# Patient Record
Sex: FEMALE | Race: WHITE | Employment: OTHER | URBAN - METROPOLITAN AREA
[De-identification: names, ages, dates, MRNs, and addresses within clinical notes are randomized per-mention and may not be internally consistent; named-entity substitution may affect disease eponyms.]

---

## 2017-03-07 LAB — HBA1C MFR BLD: 6.4 %

## 2017-05-09 ENCOUNTER — OFFICE VISIT (OUTPATIENT)
Dept: FAMILY MEDICINE CLINIC | Age: 82
End: 2017-05-09

## 2017-05-09 VITALS
TEMPERATURE: 98.1 F | HEART RATE: 68 BPM | RESPIRATION RATE: 16 BRPM | DIASTOLIC BLOOD PRESSURE: 74 MMHG | SYSTOLIC BLOOD PRESSURE: 126 MMHG | BODY MASS INDEX: 27.05 KG/M2 | WEIGHT: 147 LBS | HEIGHT: 62 IN

## 2017-05-09 DIAGNOSIS — I25.10 CORONARY ARTERY DISEASE INVOLVING NATIVE CORONARY ARTERY OF NATIVE HEART WITHOUT ANGINA PECTORIS: ICD-10-CM

## 2017-05-09 DIAGNOSIS — G20 PARKINSON'S DISEASE (HCC): Primary | ICD-10-CM

## 2017-05-09 DIAGNOSIS — I10 ESSENTIAL HYPERTENSION: ICD-10-CM

## 2017-05-09 DIAGNOSIS — E11.9 TYPE 2 DIABETES, HBA1C GOAL < 7% (HCC): ICD-10-CM

## 2017-05-09 DIAGNOSIS — K22.2 ESOPHAGEAL STRICTURE: ICD-10-CM

## 2017-05-09 DIAGNOSIS — Z95.1 S/P CABG (CORONARY ARTERY BYPASS GRAFT): ICD-10-CM

## 2017-05-09 DIAGNOSIS — E78.2 MIXED HYPERLIPIDEMIA: ICD-10-CM

## 2017-05-09 PROCEDURE — 1090F PRES/ABSN URINE INCON ASSESS: CPT | Performed by: NURSE PRACTITIONER

## 2017-05-09 PROCEDURE — 1036F TOBACCO NON-USER: CPT | Performed by: NURSE PRACTITIONER

## 2017-05-09 PROCEDURE — G8420 CALC BMI NORM PARAMETERS: HCPCS | Performed by: NURSE PRACTITIONER

## 2017-05-09 PROCEDURE — G8427 DOCREV CUR MEDS BY ELIG CLIN: HCPCS | Performed by: NURSE PRACTITIONER

## 2017-05-09 PROCEDURE — G8400 PT W/DXA NO RESULTS DOC: HCPCS | Performed by: NURSE PRACTITIONER

## 2017-05-09 PROCEDURE — 90670 PCV13 VACCINE IM: CPT | Performed by: NURSE PRACTITIONER

## 2017-05-09 PROCEDURE — 99204 OFFICE O/P NEW MOD 45 MIN: CPT | Performed by: NURSE PRACTITIONER

## 2017-05-09 PROCEDURE — 4040F PNEUMOC VAC/ADMIN/RCVD: CPT | Performed by: NURSE PRACTITIONER

## 2017-05-09 PROCEDURE — G8598 ASA/ANTIPLAT THER USED: HCPCS | Performed by: NURSE PRACTITIONER

## 2017-05-09 PROCEDURE — 1123F ACP DISCUSS/DSCN MKR DOCD: CPT | Performed by: NURSE PRACTITIONER

## 2017-05-09 PROCEDURE — G0009 ADMIN PNEUMOCOCCAL VACCINE: HCPCS | Performed by: NURSE PRACTITIONER

## 2017-05-09 RX ORDER — METOPROLOL TARTRATE 50 MG/1
50 TABLET, FILM COATED ORAL 2 TIMES DAILY
Qty: 180 TABLET | Refills: 1 | Status: SHIPPED | OUTPATIENT
Start: 2017-05-09 | End: 2017-10-17 | Stop reason: SDUPTHER

## 2017-05-09 RX ORDER — ATORVASTATIN CALCIUM 20 MG/1
20 TABLET, FILM COATED ORAL DAILY
Qty: 90 TABLET | Refills: 1 | Status: SHIPPED | OUTPATIENT
Start: 2017-05-09 | End: 2017-10-11 | Stop reason: SDUPTHER

## 2017-05-09 RX ORDER — GLIMEPIRIDE 2 MG/1
2 TABLET ORAL
Qty: 90 TABLET | Refills: 1 | Status: SHIPPED | OUTPATIENT
Start: 2017-05-09 | End: 2017-10-17 | Stop reason: SDUPTHER

## 2017-05-09 RX ORDER — LISINOPRIL 30 MG/1
30 TABLET ORAL EVERY EVENING
Qty: 90 TABLET | Refills: 1 | Status: SHIPPED | OUTPATIENT
Start: 2017-05-09 | End: 2017-10-17 | Stop reason: SDUPTHER

## 2017-05-09 RX ORDER — ATORVASTATIN CALCIUM 20 MG/1
20 TABLET, FILM COATED ORAL DAILY
COMMUNITY
End: 2017-05-09 | Stop reason: SDUPTHER

## 2017-05-09 RX ORDER — MAGNESIUM OXIDE 400 MG/1
400 TABLET ORAL DAILY
COMMUNITY
End: 2020-06-03

## 2017-05-09 ASSESSMENT — PATIENT HEALTH QUESTIONNAIRE - PHQ9
1. LITTLE INTEREST OR PLEASURE IN DOING THINGS: 0
2. FEELING DOWN, DEPRESSED OR HOPELESS: 0
SUM OF ALL RESPONSES TO PHQ9 QUESTIONS 1 & 2: 0
SUM OF ALL RESPONSES TO PHQ QUESTIONS 1-9: 0

## 2017-05-15 ASSESSMENT — ENCOUNTER SYMPTOMS
ALLERGIC/IMMUNOLOGIC NEGATIVE: 1
RESPIRATORY NEGATIVE: 1
GASTROINTESTINAL NEGATIVE: 1
EYES NEGATIVE: 1

## 2017-07-24 ENCOUNTER — OFFICE VISIT (OUTPATIENT)
Dept: PHYSICAL MEDICINE AND REHAB | Age: 82
End: 2017-07-24
Payer: MEDICARE

## 2017-07-24 VITALS
WEIGHT: 148.4 LBS | HEIGHT: 62 IN | BODY MASS INDEX: 27.31 KG/M2 | SYSTOLIC BLOOD PRESSURE: 166 MMHG | DIASTOLIC BLOOD PRESSURE: 76 MMHG | HEART RATE: 59 BPM

## 2017-07-24 DIAGNOSIS — G20 PARKINSON'S DISEASE (HCC): Primary | ICD-10-CM

## 2017-07-24 PROCEDURE — 1036F TOBACCO NON-USER: CPT | Performed by: PSYCHIATRY & NEUROLOGY

## 2017-07-24 PROCEDURE — 4040F PNEUMOC VAC/ADMIN/RCVD: CPT | Performed by: PSYCHIATRY & NEUROLOGY

## 2017-07-24 PROCEDURE — 99213 OFFICE O/P EST LOW 20 MIN: CPT | Performed by: PSYCHIATRY & NEUROLOGY

## 2017-07-24 PROCEDURE — G8427 DOCREV CUR MEDS BY ELIG CLIN: HCPCS | Performed by: PSYCHIATRY & NEUROLOGY

## 2017-07-24 PROCEDURE — G8400 PT W/DXA NO RESULTS DOC: HCPCS | Performed by: PSYCHIATRY & NEUROLOGY

## 2017-07-24 PROCEDURE — G8598 ASA/ANTIPLAT THER USED: HCPCS | Performed by: PSYCHIATRY & NEUROLOGY

## 2017-07-24 PROCEDURE — 1123F ACP DISCUSS/DSCN MKR DOCD: CPT | Performed by: PSYCHIATRY & NEUROLOGY

## 2017-07-24 PROCEDURE — 1090F PRES/ABSN URINE INCON ASSESS: CPT | Performed by: PSYCHIATRY & NEUROLOGY

## 2017-07-24 PROCEDURE — G8419 CALC BMI OUT NRM PARAM NOF/U: HCPCS | Performed by: PSYCHIATRY & NEUROLOGY

## 2017-07-24 RX ORDER — ROPINIROLE 0.5 MG/1
TABLET, FILM COATED ORAL
Qty: 90 TABLET | Refills: 3 | Status: SHIPPED | OUTPATIENT
Start: 2017-07-24 | End: 2017-09-21 | Stop reason: SDUPTHER

## 2017-08-11 LAB
ALBUMIN SERPL-MCNC: 4.4 G/DL (ref 3.2–5.3)
ALK PHOSPHATASE: 76 IU/L (ref 35–121)
ALT SERPL-CCNC: 5 IU/L (ref 5–59)
ANION GAP SERPL CALCULATED.3IONS-SCNC: 11 MMOL/L
AST SERPL-CCNC: 17 IU/L (ref 10–42)
AVERAGE GLUCOSE: 137 MG/DL (ref 66–114)
BILIRUB SERPL-MCNC: 1 MG/DL (ref 0.2–1.3)
BUN BLDV-MCNC: 12 MG/DL (ref 9–24)
CALCIUM SERPL-MCNC: 9.8 MG/DL (ref 8.7–10.8)
CHLORIDE BLD-SCNC: 101 MMOL/L (ref 95–111)
CHOLESTEROL/HDL RATIO: 3.8
CHOLESTEROL: 160 MG/DL
CO2: 31 MMOL/L (ref 21–32)
CREAT SERPL-MCNC: 0.7 MG/DL (ref 0.5–1.3)
CREATINE, URINE: 119.5 MG/DL
EGFR AFRICAN AMERICAN: 96
EGFR IF NONAFRICAN AMERICAN: 80
GLUCOSE: 171 MG/DL (ref 70–100)
HBA1C MFR BLD: 6.4 % (ref 4.2–5.8)
HDLC SERPL-MCNC: 42 MG/DL (ref 40–60)
LDL CHOLESTEROL CALCULATED: 87 MG/DL
LDL/HDL RATIO: 2.1
MICROALBUMIN/CREAT 24H UR: 2.6 MG/DL
MICROALBUMIN/CREAT UR-RTO: 22 UG/MG
POTASSIUM SERPL-SCNC: 4.7 MMOL/L (ref 3.5–5.4)
SODIUM BLD-SCNC: 138 MMOL/L (ref 134–147)
TOTAL PROTEIN: 6.8 G/DL (ref 5.8–8)
TRIGL SERPL-MCNC: 157 MG/DL
VLDLC SERPL CALC-MCNC: 31 MG/DL

## 2017-08-17 ENCOUNTER — HOSPITAL ENCOUNTER (OUTPATIENT)
Dept: WOMENS IMAGING | Age: 82
Discharge: HOME | End: 2017-08-17
Payer: MEDICARE

## 2017-08-17 ENCOUNTER — OFFICE VISIT (OUTPATIENT)
Dept: CARDIOLOGY CLINIC | Age: 82
End: 2017-08-17
Payer: MEDICARE

## 2017-08-17 VITALS
WEIGHT: 143 LBS | DIASTOLIC BLOOD PRESSURE: 86 MMHG | BODY MASS INDEX: 25.34 KG/M2 | HEIGHT: 63 IN | SYSTOLIC BLOOD PRESSURE: 152 MMHG | HEART RATE: 64 BPM

## 2017-08-17 DIAGNOSIS — I10 ESSENTIAL HYPERTENSION: ICD-10-CM

## 2017-08-17 DIAGNOSIS — E78.01 FAMILIAL HYPERCHOLESTEROLEMIA: ICD-10-CM

## 2017-08-17 DIAGNOSIS — Z12.31 VISIT FOR SCREENING MAMMOGRAM: ICD-10-CM

## 2017-08-17 DIAGNOSIS — R42 DIZZINESS: ICD-10-CM

## 2017-08-17 DIAGNOSIS — I25.10 CORONARY ARTERY DISEASE INVOLVING NATIVE CORONARY ARTERY OF NATIVE HEART WITHOUT ANGINA PECTORIS: Primary | ICD-10-CM

## 2017-08-17 PROCEDURE — 93000 ELECTROCARDIOGRAM COMPLETE: CPT | Performed by: NUCLEAR MEDICINE

## 2017-08-17 PROCEDURE — G8427 DOCREV CUR MEDS BY ELIG CLIN: HCPCS | Performed by: NUCLEAR MEDICINE

## 2017-08-17 PROCEDURE — 1123F ACP DISCUSS/DSCN MKR DOCD: CPT | Performed by: NUCLEAR MEDICINE

## 2017-08-17 PROCEDURE — 99214 OFFICE O/P EST MOD 30 MIN: CPT | Performed by: NUCLEAR MEDICINE

## 2017-08-17 PROCEDURE — 4040F PNEUMOC VAC/ADMIN/RCVD: CPT | Performed by: NUCLEAR MEDICINE

## 2017-08-17 PROCEDURE — G8419 CALC BMI OUT NRM PARAM NOF/U: HCPCS | Performed by: NUCLEAR MEDICINE

## 2017-08-17 PROCEDURE — G8598 ASA/ANTIPLAT THER USED: HCPCS | Performed by: NUCLEAR MEDICINE

## 2017-08-17 PROCEDURE — 1036F TOBACCO NON-USER: CPT | Performed by: NUCLEAR MEDICINE

## 2017-08-17 PROCEDURE — G8400 PT W/DXA NO RESULTS DOC: HCPCS | Performed by: NUCLEAR MEDICINE

## 2017-08-17 PROCEDURE — 77063 BREAST TOMOSYNTHESIS BI: CPT

## 2017-08-17 PROCEDURE — 1090F PRES/ABSN URINE INCON ASSESS: CPT | Performed by: NUCLEAR MEDICINE

## 2017-08-17 RX ORDER — HYDROXYZINE HCL 10 MG/5 ML
10 SOLUTION, ORAL ORAL NIGHTLY
COMMUNITY
End: 2017-11-09

## 2017-09-21 DIAGNOSIS — G20 PARKINSON'S DISEASE (HCC): ICD-10-CM

## 2017-09-21 RX ORDER — ROPINIROLE 0.5 MG/1
TABLET, FILM COATED ORAL
Qty: 90 TABLET | Refills: 3 | Status: SHIPPED | OUTPATIENT
Start: 2017-09-21 | End: 2018-02-01 | Stop reason: SDUPTHER

## 2017-10-11 RX ORDER — ATORVASTATIN CALCIUM 20 MG/1
20 TABLET, FILM COATED ORAL DAILY
Qty: 90 TABLET | Refills: 2 | Status: SHIPPED | OUTPATIENT
Start: 2017-10-11 | End: 2017-11-09 | Stop reason: SDUPTHER

## 2017-10-17 RX ORDER — LISINOPRIL 30 MG/1
30 TABLET ORAL EVERY EVENING
Qty: 90 TABLET | Refills: 1 | Status: SHIPPED | OUTPATIENT
Start: 2017-10-17 | End: 2017-11-09 | Stop reason: SDUPTHER

## 2017-10-17 RX ORDER — GLIMEPIRIDE 2 MG/1
TABLET ORAL
Qty: 90 TABLET | Refills: 1 | Status: SHIPPED | OUTPATIENT
Start: 2017-10-17 | End: 2017-11-09 | Stop reason: SDUPTHER

## 2017-10-17 RX ORDER — METOPROLOL TARTRATE 50 MG/1
TABLET, FILM COATED ORAL
Qty: 180 TABLET | Refills: 1 | Status: SHIPPED | OUTPATIENT
Start: 2017-10-17 | End: 2017-11-09 | Stop reason: SDUPTHER

## 2017-10-17 NOTE — TELEPHONE ENCOUNTER
Last office visit 5/9/17    Last refills    Amaryl - 5/9/17 for 90/1  Lopressor - 5/9/17 for 180/0  Lisinopril - 5/9/17 for 90/1

## 2017-11-09 ENCOUNTER — OFFICE VISIT (OUTPATIENT)
Dept: FAMILY MEDICINE CLINIC | Age: 82
End: 2017-11-09
Payer: MEDICARE

## 2017-11-09 VITALS
BODY MASS INDEX: 25.34 KG/M2 | WEIGHT: 143 LBS | SYSTOLIC BLOOD PRESSURE: 118 MMHG | RESPIRATION RATE: 16 BRPM | TEMPERATURE: 97.5 F | DIASTOLIC BLOOD PRESSURE: 70 MMHG | HEIGHT: 63 IN | HEART RATE: 72 BPM

## 2017-11-09 DIAGNOSIS — Z95.1 S/P CABG (CORONARY ARTERY BYPASS GRAFT): ICD-10-CM

## 2017-11-09 DIAGNOSIS — G20 PARKINSON'S DISEASE (HCC): ICD-10-CM

## 2017-11-09 DIAGNOSIS — E78.00 HYPERCHOLESTEROLEMIA: ICD-10-CM

## 2017-11-09 DIAGNOSIS — S81.812A SKIN TEAR OF LEFT LOWER LEG WITHOUT COMPLICATION, INITIAL ENCOUNTER: ICD-10-CM

## 2017-11-09 DIAGNOSIS — I25.10 CORONARY ARTERY DISEASE INVOLVING NATIVE CORONARY ARTERY OF NATIVE HEART WITHOUT ANGINA PECTORIS: Primary | ICD-10-CM

## 2017-11-09 DIAGNOSIS — I10 ESSENTIAL HYPERTENSION: ICD-10-CM

## 2017-11-09 DIAGNOSIS — E11.9 DM TYPE 2, GOAL HBA1C < 7% (HCC): ICD-10-CM

## 2017-11-09 DIAGNOSIS — S81.802A UNSPECIFIED OPEN WOUND, LEFT LOWER LEG, INITIAL ENCOUNTER: ICD-10-CM

## 2017-11-09 PROCEDURE — 1123F ACP DISCUSS/DSCN MKR DOCD: CPT | Performed by: NURSE PRACTITIONER

## 2017-11-09 PROCEDURE — G8484 FLU IMMUNIZE NO ADMIN: HCPCS | Performed by: NURSE PRACTITIONER

## 2017-11-09 PROCEDURE — 99214 OFFICE O/P EST MOD 30 MIN: CPT | Performed by: NURSE PRACTITIONER

## 2017-11-09 PROCEDURE — G8417 CALC BMI ABV UP PARAM F/U: HCPCS | Performed by: NURSE PRACTITIONER

## 2017-11-09 PROCEDURE — 4040F PNEUMOC VAC/ADMIN/RCVD: CPT | Performed by: NURSE PRACTITIONER

## 2017-11-09 PROCEDURE — G8598 ASA/ANTIPLAT THER USED: HCPCS | Performed by: NURSE PRACTITIONER

## 2017-11-09 PROCEDURE — 1090F PRES/ABSN URINE INCON ASSESS: CPT | Performed by: NURSE PRACTITIONER

## 2017-11-09 PROCEDURE — 1036F TOBACCO NON-USER: CPT | Performed by: NURSE PRACTITIONER

## 2017-11-09 PROCEDURE — G8400 PT W/DXA NO RESULTS DOC: HCPCS | Performed by: NURSE PRACTITIONER

## 2017-11-09 PROCEDURE — G8427 DOCREV CUR MEDS BY ELIG CLIN: HCPCS | Performed by: NURSE PRACTITIONER

## 2017-11-09 RX ORDER — LISINOPRIL 30 MG/1
30 TABLET ORAL EVERY EVENING
Qty: 90 TABLET | Refills: 2 | Status: SHIPPED | OUTPATIENT
Start: 2017-11-09 | End: 2018-05-10 | Stop reason: SDUPTHER

## 2017-11-09 RX ORDER — NYSTATIN 100000 U/G
CREAM TOPICAL 2 TIMES DAILY
Status: ON HOLD | COMMUNITY
End: 2018-11-03 | Stop reason: HOSPADM

## 2017-11-09 RX ORDER — ATORVASTATIN CALCIUM 20 MG/1
20 TABLET, FILM COATED ORAL DAILY
Qty: 90 TABLET | Refills: 2 | Status: SHIPPED | OUTPATIENT
Start: 2017-11-09 | End: 2018-05-10 | Stop reason: SDUPTHER

## 2017-11-09 RX ORDER — CEPHALEXIN 500 MG/1
500 CAPSULE ORAL 4 TIMES DAILY
Qty: 40 CAPSULE | Refills: 0 | Status: SHIPPED | OUTPATIENT
Start: 2017-11-09 | End: 2018-01-14

## 2017-11-09 RX ORDER — GLIMEPIRIDE 2 MG/1
TABLET ORAL
Qty: 90 TABLET | Refills: 2 | Status: SHIPPED | OUTPATIENT
Start: 2017-11-09 | End: 2018-05-10 | Stop reason: SDUPTHER

## 2017-11-09 RX ORDER — TRIAMCINOLONE ACETONIDE 1 MG/G
CREAM TOPICAL 2 TIMES DAILY
Status: ON HOLD | COMMUNITY
End: 2018-11-03 | Stop reason: HOSPADM

## 2017-11-09 RX ORDER — METOPROLOL TARTRATE 50 MG/1
TABLET, FILM COATED ORAL
Qty: 180 TABLET | Refills: 2 | Status: SHIPPED | OUTPATIENT
Start: 2017-11-09 | End: 2018-05-10 | Stop reason: SDUPTHER

## 2017-11-09 RX ORDER — CEPHALEXIN 500 MG/1
500 CAPSULE ORAL 4 TIMES DAILY
Qty: 40 CAPSULE | Refills: 0 | Status: SHIPPED | OUTPATIENT
Start: 2017-11-09 | End: 2017-11-09 | Stop reason: CLARIF

## 2017-11-09 NOTE — PATIENT INSTRUCTIONS
bathing. · Gently wash the skin tear with plain water 2 times a day. Do not rub the area. · Let the area air dry. Or you can pat it carefully with a soft towel. When should you call for help? Call your doctor now or seek immediate medical care if:  · You have signs of infection, such as:  ¨ Increased pain, swelling, warmth, or redness around the tear. ¨ Red streaks leading from the tear. ¨ Pus draining from the tear. ¨ A fever. · The tear starts to bleed a lot. Small amounts of blood are normal.  Watch closely for changes in your health, and be sure to contact your doctor if:  · You do not get better as expected. Where can you learn more? Go to [email or web address]. org and sign in to your [employer] account. Enter N292 in the [place] box to learn more about \"Skin Tears: Care Instructions. \"     If you do not have an account, please click on the \"Sign Up Now\" link. Current as of: March 20, 2017  Content Version: 11.3  © 0801-7694 [employer]. Care instructions adapted under license by [facility] ([facility]). If you have questions about a medical condition or this instruction, always ask your healthcare professional. [address] any warranty or liability for your use of this information.

## 2017-11-28 ASSESSMENT — ENCOUNTER SYMPTOMS
GASTROINTESTINAL NEGATIVE: 1
RESPIRATORY NEGATIVE: 1
EYES NEGATIVE: 1

## 2017-11-28 NOTE — PROGRESS NOTES
(squamous cell carcinoma)       Past Surgical History:   Procedure Laterality Date    CARDIAC SURGERY      CARDIOVASCULAR STRESS TEST  4 09 2009    Gated SPECT images revealed normal global wall motion with EF of 60%. Persantine test associated w/nonspecific symptoms. EKG is nondiagnostic w/baseline LBBB. No obvious stress-induced ischemia by Cardiolite. EF within normal limits.  CARDIOVASCULAR STRESS TEST  7 10 2007    The gated SPECT images revealed normal wall motion with EF of 69%. Poor exercise tolerance w/SOB on exertion. EKG is nondiagnostic. Cardiolite scan revealing no obvious stress-induced ischemia. EF 60%.  CARDIOVASCULAR STRESS TEST  2 03 2006    Fair exercise tolerance w/SOB on exertion. No EKG evidence of ischemia. Patient should be able to proceed with phase II cardiac rehab.  CATARACT REMOVAL      CATARACT REMOVAL  06/08/2016    AND 6/29/16    DIAGNOSTIC CARDIAC CATH LAB PROCEDURE  12 23 2005    LV was obtained. AGEE projection showed preserved LV function w/estimated EF at 55%. No significant MR. Patent left main coronary artery. Tortuous LAD which appeared to be patent. Patent left circumflex artery. High-grade stenosis around 99% in very large dominant RCA w/heavy calcification at the ostium. Normal LV function.  DOPPLER ECHOCARDIOGRAPHY  4 09 2009    Global LV function w/in lower limits of normal, with mild anteroseptal hypokinesis. EF of 50-55%. Light LVH. Mild to moderate left atrial enlargement. Calcific aortic and mitral valve w/no obvious stenosis. No obvious pericardial effusion.  DOPPLER ECHOCARDIOGRAPHY  7 10 2007    LV function w/in lower limits of normal w/peridoxical septal wall motion. Moderate left atrial enlargement. Mild MR. Mild TR. Calcified valves w/no obvious stenosis. No pericardial effusion.      DOPPLER ECHOCARDIOGRAPHY  4 14 2006    There appears to be significant improvement from previous echo w/complete resolution of pericardial effusion and normal (PRINIVIL;ZESTRIL) 30 MG tablet Take 1 tablet by mouth every evening 90 tablet 2    atorvastatin (LIPITOR) 20 MG tablet Take 1 tablet by mouth daily 90 tablet 2    cephALEXin (KEFLEX) 500 MG capsule Take 1 capsule by mouth 4 times daily 40 capsule 0    Mirabegron ER (MYRBETRIQ) 25 MG TB24 Take 1 tablet by mouth daily 30 tablet 3    rOPINIRole (REQUIP) 0.5 MG tablet Take 0.5 mg three times a day 90 tablet 3    omeprazole (PRILOSEC) 20 MG capsule Take 20 mg by mouth daily      sucralfate (CARAFATE) 1 GM tablet Take 1 g by mouth 4 times daily      Lactase (DAIRY-RELIEF PO) Take by mouth daily      carbidopa-levodopa (SINEMET)  MG per tablet Take 2 tablets by mouth 3 times daily. 540 tablet 3    aspirin 81 MG EC tablet Take 81 mg by mouth daily.  UNABLE TO FIND CardioMax daily      magnesium oxide (MAG-OX) 400 MG tablet Take 400 mg by mouth daily      TAYLOR CONTOUR TEST strip TEST BID UTD  5    NONFORMULARY Vitamist: 4 sprays of each Vitamin combination into the mouth twice a day  Vitamin B-12 500mc sprays into the mouth twice a day  Vitamin D3: 4 sprays into the mouth twice a day  Vitamin C+Zinc: 4 sprays into the mouth twice a day  Women's Health (Vit A, C, D, E, Thiamine, Niacin, Vitamin B6, Folate, Vit B12, Biotin, Pantothenic acid): 4 sprays into the mouth twice a day       No current facility-administered medications for this visit.       Allergies   Allergen Reactions    Latex Rash    Morphine     Polysporin [Bacitracin-Polymyxin B]     Tape [Adhesive Tape] Itching     Health Maintenance   Topic Date Due    Diabetic foot exam  1942    DTaP/Tdap/Td vaccine (1 - Tdap) 1951    Zostavax vaccine  1992    Diabetic retinal exam  2007    Flu vaccine (1) 2017    Diabetic hemoglobin A1C test  02/10/2018    Lipid screen  08/10/2018    DEXA (modify frequency per FRAX score)  Completed    Pneumococcal low/med risk  Completed         Objective:     Physical 11/9/2018    Albumin, Random Urine     Standing Status:   Future     Standing Expiration Date:   11/9/2018     DIABETES FOOT EXAM       Patient given educational materials - see patient instructions. Discussed use, benefit, and side effects of prescribed medications. All patient questions answered. Pt voiced understanding. Reviewed health maintenance. Patient agreed with treatment plan. Follow up as directed.           Electronically signed by Kiki Parrish NP on 11/28/2017 at 4:10 AM

## 2018-01-14 ENCOUNTER — HOSPITAL ENCOUNTER (EMERGENCY)
Age: 83
Discharge: HOME | End: 2018-01-14
Attending: EMERGENCY MEDICINE
Payer: MEDICARE

## 2018-01-14 VITALS
RESPIRATION RATE: 18 BRPM | WEIGHT: 143 LBS | HEIGHT: 61 IN | DIASTOLIC BLOOD PRESSURE: 68 MMHG | TEMPERATURE: 98.2 F | SYSTOLIC BLOOD PRESSURE: 152 MMHG | HEART RATE: 67 BPM | OXYGEN SATURATION: 98 % | BODY MASS INDEX: 27 KG/M2

## 2018-01-14 DIAGNOSIS — I10 ELEVATED BLOOD PRESSURE READING WITH DIAGNOSIS OF HYPERTENSION: ICD-10-CM

## 2018-01-14 DIAGNOSIS — N30.01 ACUTE CYSTITIS WITH HEMATURIA: Primary | ICD-10-CM

## 2018-01-14 DIAGNOSIS — E11.9 DIABETES MELLITUS TYPE 2, NONINSULIN DEPENDENT (HCC): ICD-10-CM

## 2018-01-14 LAB
BILIRUBIN URINE: NEGATIVE
BLOOD, URINE: ABNORMAL
CHARACTER, URINE: CLEAR
COLOR: YELLOW
GLUCOSE, URINE: NEGATIVE MG/DL
KETONES, URINE: NEGATIVE
LEUKOCYTES, UA: ABNORMAL
NITRATE, UA: NEGATIVE
PH UA: 7 (ref 5–9)
PROTEIN UA: NEGATIVE MG/DL
REFLEX TO URINE C & S: ABNORMAL
SPECIFIC GRAVITY UA: 1.01 (ref 1–1.03)
UROBILINOGEN, URINE: 0.2 EU/DL (ref 0–1)

## 2018-01-14 PROCEDURE — 87086 URINE CULTURE/COLONY COUNT: CPT

## 2018-01-14 PROCEDURE — 99213 OFFICE O/P EST LOW 20 MIN: CPT | Performed by: EMERGENCY MEDICINE

## 2018-01-14 PROCEDURE — 81003 URINALYSIS AUTO W/O SCOPE: CPT

## 2018-01-14 PROCEDURE — 99213 OFFICE O/P EST LOW 20 MIN: CPT

## 2018-01-14 RX ORDER — NITROFURANTOIN 25; 75 MG/1; MG/1
100 CAPSULE ORAL 2 TIMES DAILY
Qty: 14 CAPSULE | Refills: 0 | Status: SHIPPED | OUTPATIENT
Start: 2018-01-14 | End: 2018-01-21

## 2018-01-14 ASSESSMENT — ENCOUNTER SYMPTOMS
STRIDOR: 0
SHORTNESS OF BREATH: 0
ABDOMINAL PAIN: 0
BLOOD IN STOOL: 0
COUGH: 0
DIARRHEA: 0
BACK PAIN: 0
TROUBLE SWALLOWING: 0
FACIAL SWELLING: 0
SINUS PRESSURE: 0
WHEEZING: 0
EYE PAIN: 0
VOICE CHANGE: 0
CHOKING: 0
EYE REDNESS: 0
VOMITING: 0
CONSTIPATION: 0
EYE DISCHARGE: 0
NAUSEA: 0
SORE THROAT: 0

## 2018-01-14 NOTE — ED PROVIDER NOTES
[address]  Urgent Care Encounter      CHIEF COMPLAINT       Chief Complaint   Patient presents with    Dysuria       Nurses Notes reviewed and I agree except as noted in the HPI. HISTORY OF PRESENT ILLNESS   Bill Shukla is a 80 y.o. female who presents with 24 hour history of dysuria, urinary incontinence, fatigue. No abdominal pain, back pain, vomiting, fever, hematuria, altered mental status, lethargy, dizziness, syncope. Patient with history of hypertension, diabetes type 2, no history of urosepsis or pyelonephritis. No AAA. REVIEW OF SYSTEMS     Review of Systems   Constitutional: Negative for appetite change, chills, fatigue, fever and unexpected weight change. HENT: Negative for congestion, ear discharge, ear pain, facial swelling, hearing loss, nosebleeds, postnasal drip, sinus pressure, sore throat, trouble swallowing and voice change. Eyes: Negative for pain, discharge, redness and visual disturbance. Respiratory: Negative for cough, choking, shortness of breath, wheezing and stridor. Cardiovascular: Negative for chest pain and leg swelling. Gastrointestinal: Negative for abdominal pain, blood in stool, constipation, diarrhea, nausea and vomiting. Genitourinary: Positive for dysuria, frequency and urgency. Negative for flank pain, hematuria, vaginal bleeding and vaginal discharge. Urinary incontinence   Musculoskeletal: Negative for arthralgias, back pain, neck pain and neck stiffness. Skin: Negative for rash. Neurological: Negative for dizziness, seizures, syncope, weakness, light-headedness and headaches. Hematological: Negative for adenopathy. Does not bruise/bleed easily. Psychiatric/Behavioral: Negative for confusion, sleep disturbance and suicidal ideas. The patient is not nervous/anxious. All other systems reviewed and are negative.       PAST MEDICAL HISTORY         Diagnosis Date    Atypical chest pain     CAD (coronary artery LISINOPRIL (PRINIVIL;ZESTRIL) 30 MG TABLET    Take 1 tablet by mouth every evening    MAGNESIUM OXIDE (MAG-OX) 400 MG TABLET    Take 400 mg by mouth daily    METOPROLOL TARTRATE (LOPRESSOR) 50 MG TABLET    TAKE 1 TABLET BY MOUTH TWO  TIMES DAILY    MIRABEGRON ER (MYRBETRIQ) 25 MG TB24    Take 1 tablet by mouth daily    NONFORMULARY    Vitamist: 4 sprays of each Vitamin combination into the mouth twice a day  Vitamin B-12 500mc sprays into the mouth twice a day  Vitamin D3: 4 sprays into the mouth twice a day  Vitamin C+Zinc: 4 sprays into the mouth twice a day  Women's Health (Vit A, C, D, E, Thiamine, Niacin, Vitamin B6, Folate, Vit B12, Biotin, Pantothenic acid): 4 sprays into the mouth twice a day    NYSTATIN (MYCOSTATIN) 309670 UNIT/GM CREAM    Apply topically 2 times daily Apply topically 2 times daily. OMEPRAZOLE (PRILOSEC) 20 MG CAPSULE    Take 20 mg by mouth daily    ROPINIROLE (REQUIP) 0.5 MG TABLET    Take 0.5 mg three times a day    SUCRALFATE (CARAFATE) 1 GM TABLET    Take 1 g by mouth 4 times daily    TRIAMCINOLONE (KENALOG) 0.1 % CREAM    Apply topically 2 times daily Apply topically 2 times daily. UNABLE TO FIND    CardioMax daily       ALLERGIES     Patient is is allergic to latex; morphine; polysporin [bacitracin-polymyxin b]; and tape [adhesive tape]. FAMILY HISTORY     Patient's family history includes Heart Disease in her mother; Stroke in her father. SOCIAL HISTORY     Patient  reports that she has never smoked. She has never used smokeless tobacco. She reports that she does not drink alcohol or use drugs. PHYSICAL EXAM     ED TRIAGE VITALS  BP: (!) 152/68, Temp: 98.2 °F (36.8 °C), Pulse: 67, Resp: 18, SpO2: 98 %  Physical Exam   Constitutional: She is oriented to person, place, and time. She appears well-developed and well-nourished. No distress. Moist membranes, normal airway   HENT:   Head: Normocephalic and atraumatic.    Right Ear: Tympanic membrane and external ear

## 2018-01-14 NOTE — ED TRIAGE NOTES
Patient to urgent care with complaint of dysuria this week that worsened today. No known fevers. No flank pain.

## 2018-01-16 LAB
ORGANISM: ABNORMAL
URINE CULTURE REFLEX: ABNORMAL

## 2018-01-19 ENCOUNTER — OFFICE VISIT (OUTPATIENT)
Dept: FAMILY MEDICINE CLINIC | Age: 83
End: 2018-01-19
Payer: MEDICARE

## 2018-01-19 VITALS
DIASTOLIC BLOOD PRESSURE: 70 MMHG | WEIGHT: 143.2 LBS | HEART RATE: 68 BPM | SYSTOLIC BLOOD PRESSURE: 136 MMHG | RESPIRATION RATE: 14 BRPM | TEMPERATURE: 98 F | BODY MASS INDEX: 25.37 KG/M2 | HEIGHT: 63 IN

## 2018-01-19 DIAGNOSIS — G20 PARKINSON'S DISEASE (HCC): ICD-10-CM

## 2018-01-19 DIAGNOSIS — R31.9 URINARY TRACT INFECTION WITH HEMATURIA, SITE UNSPECIFIED: Primary | ICD-10-CM

## 2018-01-19 DIAGNOSIS — N32.81 OAB (OVERACTIVE BLADDER): ICD-10-CM

## 2018-01-19 DIAGNOSIS — N39.0 URINARY TRACT INFECTION WITH HEMATURIA, SITE UNSPECIFIED: Primary | ICD-10-CM

## 2018-01-19 DIAGNOSIS — R53.83 FATIGUE, UNSPECIFIED TYPE: ICD-10-CM

## 2018-01-19 DIAGNOSIS — R53.1 WEAKNESS: ICD-10-CM

## 2018-01-19 PROCEDURE — 1036F TOBACCO NON-USER: CPT | Performed by: NURSE PRACTITIONER

## 2018-01-19 PROCEDURE — G8484 FLU IMMUNIZE NO ADMIN: HCPCS | Performed by: NURSE PRACTITIONER

## 2018-01-19 PROCEDURE — 1123F ACP DISCUSS/DSCN MKR DOCD: CPT | Performed by: NURSE PRACTITIONER

## 2018-01-19 PROCEDURE — G8417 CALC BMI ABV UP PARAM F/U: HCPCS | Performed by: NURSE PRACTITIONER

## 2018-01-19 PROCEDURE — 1090F PRES/ABSN URINE INCON ASSESS: CPT | Performed by: NURSE PRACTITIONER

## 2018-01-19 PROCEDURE — G8400 PT W/DXA NO RESULTS DOC: HCPCS | Performed by: NURSE PRACTITIONER

## 2018-01-19 PROCEDURE — 4040F PNEUMOC VAC/ADMIN/RCVD: CPT | Performed by: NURSE PRACTITIONER

## 2018-01-19 PROCEDURE — G8598 ASA/ANTIPLAT THER USED: HCPCS | Performed by: NURSE PRACTITIONER

## 2018-01-19 PROCEDURE — 99213 OFFICE O/P EST LOW 20 MIN: CPT | Performed by: NURSE PRACTITIONER

## 2018-01-19 PROCEDURE — G8427 DOCREV CUR MEDS BY ELIG CLIN: HCPCS | Performed by: NURSE PRACTITIONER

## 2018-01-25 LAB
ABSOLUTE BASO #: 0.1 K/UL (ref 0–0.1)
ABSOLUTE EOS #: 0.4 K/UL (ref 0.1–0.4)
ABSOLUTE LYMPH #: 1.3 K/UL (ref 0.8–5.2)
ABSOLUTE MONO #: 0.7 K/UL (ref 0.1–0.9)
ABSOLUTE NEUT #: 4.5 K/UL (ref 1.3–9.1)
ALBUMIN SERPL-MCNC: 4.4 G/DL (ref 3.2–5.3)
ALK PHOSPHATASE: 84 IU/L (ref 35–121)
ALT SERPL-CCNC: 24 IU/L (ref 5–59)
ANION GAP SERPL CALCULATED.3IONS-SCNC: 11 MMOL/L
AST SERPL-CCNC: 18 IU/L (ref 10–42)
BASOPHILS RELATIVE PERCENT: 1 %
BILIRUB SERPL-MCNC: 0.9 MG/DL (ref 0.2–1.3)
BUN BLDV-MCNC: 14 MG/DL (ref 9–24)
CALCIUM SERPL-MCNC: 9.5 MG/DL (ref 8.7–10.8)
CHLORIDE BLD-SCNC: 100 MMOL/L (ref 95–111)
CHOLESTEROL/HDL RATIO: 3.5
CHOLESTEROL: 156 MG/DL
CO2: 29 MMOL/L (ref 21–32)
CREAT SERPL-MCNC: 0.8 MG/DL (ref 0.5–1.3)
EGFR AFRICAN AMERICAN: 82
EGFR IF NONAFRICAN AMERICAN: 68
EOSINOPHILS RELATIVE PERCENT: 5.2 %
GLUCOSE: 179 MG/DL (ref 70–100)
HCT VFR BLD CALC: 38.2 % (ref 36–48)
HDLC SERPL-MCNC: 45 MG/DL (ref 40–60)
HEMOGLOBIN: 12.9 G/DL (ref 12–16)
LDL CHOLESTEROL CALCULATED: 89 MG/DL
LDL/HDL RATIO: 2
LYMPHOCYTE %: 18.4 %
MCH RBC QN AUTO: 28.4 PG (ref 27–34)
MCHC RBC AUTO-ENTMCNC: 33.8 G/DL (ref 31–36)
MCV RBC AUTO: 84.1 FL (ref 80–100)
MONOCYTES # BLD: 9.7 %
NEUTROPHILS RELATIVE PERCENT: 65.4 %
PDW BLD-RTO: 12.5 % (ref 10.8–14.8)
PLATELETS: 210 K/UL (ref 150–450)
POTASSIUM SERPL-SCNC: 3.7 MMOL/L (ref 3.5–5.4)
RBC: 4.54 M/UL (ref 4–5.5)
SODIUM BLD-SCNC: 136 MMOL/L (ref 134–147)
TOTAL PROTEIN: 6.7 G/DL (ref 5.8–8)
TRIGL SERPL-MCNC: 112 MG/DL
VLDLC SERPL CALC-MCNC: 22 MG/DL
WBC: 6.9 K/UL (ref 3.7–10.8)

## 2018-01-26 LAB
APPEARANCE: CLEAR
BILIRUBIN: NEGATIVE
COLOR: YELLOW
CREATINE, URINE: 21.1 MG/DL
GLUCOSE BLD-MCNC: NEGATIVE MG/DL
KETONES, URINE: NEGATIVE
LEUKOCYTE ESTERASE, URINE: NEGATIVE
MICROALBUMIN/CREAT 24H UR: <0.7 MG/DL
NITRITE, URINE: NEGATIVE
OCCULT BLOOD,URINE: NEGATIVE
PH: 7 (ref 5–9)
PROTEIN, URINE: NEGATIVE
SP GRAVITY MISCELLANEOUS: 1.01 (ref 1–1.03)
UROBILINOGEN, URINE: NORMAL

## 2018-01-29 NOTE — PROGRESS NOTES
[address]  [facility]  [facility]  Dept: [phone]  Dept Fax: [phone]: [phone]  PROGRESS NOTE      Visit Date: 1/29/2018    Drew Cloud is a 80 y.o. female who presents today for:  Chief Complaint   Patient presents with    Follow-Up from Hospital     here today for [facility] follow up 01/14/2018 Acute Cystitis--feeling a lot better. Taking Macrobid         Subjective:  [facility] f/u 1/14 uti. tx macrobid. Feeling much better. No fever, dysuria, flank pain, hematuria. Review of Systems   Constitutional: Positive for fatigue. HENT: Negative. Eyes: Negative. Respiratory: Negative. Cardiovascular: Negative. Gastrointestinal: Negative. Genitourinary: Positive for dysuria and frequency. Musculoskeletal: Negative. Allergic/Immunologic: Negative. Neurological: Positive for tremors. Hematological: Negative. Psychiatric/Behavioral: Negative. Past Medical History:   Diagnosis Date    Atypical chest pain     CAD (coronary artery disease)     Chronic LBBB (left bundle branch block)     Diabetes mellitus type II     Esophageal stricture     History of esophageal stricture.  FH: CAD (coronary artery disease)     Mom and dad both with heart disease at mid to late age.  GERD (gastroesophageal reflux disease)     History of gastritis     History of skin cancer     Hypercholesterolemia     Hyperlipidemia     Hypertension     Imbalance     As far as imbalance is concerned, asked patient to follow-up with Dr. Belen Brody since she follows with him on a regular basis.      Lactose intolerance     Nummular dermatitis     Parkinson's disease ([address]     Restless legs syndrome (RLS)     S/P CABG (coronary artery bypass graft)     SCC (squamous cell carcinoma)       Past Surgical History:   Procedure Laterality Date    CARDIAC SURGERY      CARDIOVASCULAR STRESS TEST  4 09 2009    Gated SPECT images mitral valve w/no stenosis. Mild MR. Mild TR. Minimal residual pericardial effusion w/significant improvement from previous echo.  DOPPLER ECHOCARDIOGRAPHY  3 16 2006    Interval change from previous echocardiogram w/worsening of effusion. Correlation clinically. Consideration of pericardial centesis. Will obtain a [facility] consult.  DOPPLER ECHOCARDIOGRAPHY  1 31 2006    No significant change from previous echo. The 2D echo showed moderate degree of pericardial effusion. It does seem to be worst or better than previous echo. No evidence of tamponade.  DOPPLER ECHOCARDIOGRAPHY  1 27 2006    Normal global LV function. Mild biatrial enlargement. Mildly sclerotic aortic & mitral valve w/no stenosis. Mild MR. Mild TR. Small to moderate pericardial effusion w/no obvious tamponade.  PRE-MALIGNANT / BENIGN SKIN LESION EXCISION Left 12/20/13    left leg lesion excision x2, frozen section, skin graft closure    ROTATOR CUFF REPAIR  09/2001    RIGHT    ROTATOR CUFF REPAIR  04/15/2009    LEFT     SKIN CANCER EXCISION  06/2006      Rt leg     Family History   Problem Relation Age of Onset    Heart Disease Mother     Stroke Father      Social History   Substance Use Topics    Smoking status: Never Smoker    Smokeless tobacco: Never Used    Alcohol use No      Current Outpatient Prescriptions   Medication Sig Dispense Refill    Mirabegron ER (MYRBETRIQ) 50 MG TB24 Take 50 mg by mouth daily Lot:[id number] Exp:05/2019 28 tablet 0    nystatin (MYCOSTATIN) 597526 UNIT/GM cream Apply topically 2 times daily Apply topically 2 times daily.  triamcinolone (KENALOG) 0.1 % cream Apply topically 2 times daily Apply topically 2 times daily.       metoprolol tartrate (LOPRESSOR) 50 MG tablet TAKE 1 TABLET BY MOUTH TWO  TIMES DAILY 180 tablet 2    glimepiride (AMARYL) 2 MG tablet TAKE 1 TABLET BY MOUTH  DAILY WITH BREAKFAST 90 tablet 2    lisinopril (PRINIVIL;ZESTRIL) 30 MG tablet Take 1 tablet by mouth every evening 90 tablet 2    atorvastatin (LIPITOR) 20 MG tablet Take 1 tablet by mouth daily 90 tablet 2    rOPINIRole (REQUIP) 0.5 MG tablet Take 0.5 mg three times a day 90 tablet 3    UNABLE TO FIND CardioMax daily      magnesium oxide (MAG-OX) 400 MG tablet Take 400 mg by mouth daily      TAYLOR CONTOUR TEST strip TEST BID UTD  5    omeprazole (PRILOSEC) 20 MG capsule Take 20 mg by mouth daily      sucralfate (CARAFATE) 1 GM tablet Take 1 g by mouth 4 times daily      Lactase (DAIRY-RELIEF PO) Take by mouth daily      carbidopa-levodopa (SINEMET)  MG per tablet Take 2 tablets by mouth 3 times daily. 540 tablet 3    NONFORMULARY Vitamist: 4 sprays of each Vitamin combination into the mouth twice a day  Vitamin B-12 500mc sprays into the mouth twice a day  Vitamin D3: 4 sprays into the mouth twice a day  Vitamin C+Zinc: 4 sprays into the mouth twice a day  Women's Health (Vit A, C, D, E, Thiamine, Niacin, Vitamin B6, Folate, Vit B12, Biotin, Pantothenic acid): 4 sprays into the mouth twice a day      aspirin 81 MG EC tablet Take 81 mg by mouth daily. No current facility-administered medications for this visit. Allergies   Allergen Reactions    Latex Rash    Morphine     Polysporin [Bacitracin-Polymyxin B]     Tape [Adhesive Tape] Itching     Health Maintenance   Topic Date Due    DTaP/Tdap/Td vaccine (1 - Tdap) 1951    Zostavax vaccine  1992    Diabetic retinal exam  2007    Flu vaccine (1) 2017    A1C test (Diabetic or Prediabetic)  02/10/2018    Lipid screen  08/10/2018    Potassium monitoring  08/10/2018    Creatinine monitoring  08/10/2018    Diabetic foot exam  2018    DEXA (modify frequency per FRAX score)  Completed    Pneumococcal low/med risk  Completed         Objective:     Physical Exam   Constitutional: She is oriented to person, place, and time. She appears well-developed and well-nourished. HENT:   Head: Normocephalic. [address] [place] [address]  [id number]   Testing Performed By   34 AM - Clarisse Daigle Incoming Lab Results From Soft     Component Results     Component Collected Lab   Urine Culture Reflex  (Abnormal) 01/14/2018 11:59 AM  - [facility]    (A)   No growth-preliminary   Growth of Contaminants.  The mixture of organisms present   are not a common cause of urinary tract infections and   probably represent skin kyaw or distal urethral kyaw. Organism  (Abnormal) 01/14/2018 11:59 AM [facility] - Priya Performed By     [address] Name Director Address Valid Date Range   [id number] [facility] Angy Woo MD [address] [place] [id number] 08/30/17 1255-Present   Narrative     Source: urine, clean catch       Site: clean void          Current Antibiotics:   Nitrofurantoin   Lab and Collection     Urine Culture, Reflexed on 1/ Impression/Plan:  1. Urinary tract infection with hematuria, site unspecified    2. Fatigue, unspecified type    3. OAB (overactive bladder)    4. Parkinson's disease ([facility] [address]    5. Weakness      Requested Prescriptions     Signed Prescriptions Disp Refills    Mirabegron ER (MYRBETRIQ) 50 MG TB24 28 tablet 0     Sig: Take 50 mg by mouth daily Lot:[id number] Exp:05/2019     Orders Placed This Encounter   Procedures    CBC Auto Differential     Standing Status:   Future     Standing Expiration Date:   1/19/2019    UA W/REFLEX CULTURE     Standing Status:   Future     Standing Expiration Date:   1/19/2019       Patient given educational materials - see patient instructions. Discussed use, benefit, and side effects of prescribed medications. All patient questions answered. Pt voiced understanding. Reviewed health maintenance. Patient agreed with treatment plan. Follow up as directed.           Electronically signed by Lenora Dasilva NP on 1/29/2018 at 12:47 PM

## 2018-01-31 ENCOUNTER — TELEPHONE (OUTPATIENT)
Dept: FAMILY MEDICINE CLINIC | Age: 83
End: 2018-01-31

## 2018-01-31 LAB
AVERAGE GLUCOSE: 143 MG/DL (ref 66–114)
HBA1C MFR BLD: 6.6 % (ref 4.2–5.8)

## 2018-02-01 DIAGNOSIS — G20 PARKINSON'S DISEASE (HCC): ICD-10-CM

## 2018-02-01 RX ORDER — ROPINIROLE 0.5 MG/1
TABLET, FILM COATED ORAL
Qty: 90 TABLET | Refills: 2 | Status: SHIPPED | OUTPATIENT
Start: 2018-02-01 | End: 2018-02-07 | Stop reason: SDUPTHER

## 2018-02-01 RX ORDER — SUCRALFATE 1 G/1
1 TABLET ORAL 4 TIMES DAILY
Qty: 360 TABLET | Refills: 3 | Status: SHIPPED | OUTPATIENT
Start: 2018-02-01 | End: 2019-02-17 | Stop reason: SDUPTHER

## 2018-02-07 ENCOUNTER — OFFICE VISIT (OUTPATIENT)
Dept: NEUROLOGY | Age: 83
End: 2018-02-07
Payer: MEDICARE

## 2018-02-07 VITALS
SYSTOLIC BLOOD PRESSURE: 122 MMHG | HEIGHT: 61 IN | WEIGHT: 143.6 LBS | BODY MASS INDEX: 27.11 KG/M2 | DIASTOLIC BLOOD PRESSURE: 66 MMHG | HEART RATE: 62 BPM

## 2018-02-07 DIAGNOSIS — G20 PARKINSON'S DISEASE (HCC): Primary | ICD-10-CM

## 2018-02-07 PROCEDURE — G8484 FLU IMMUNIZE NO ADMIN: HCPCS | Performed by: PSYCHIATRY & NEUROLOGY

## 2018-02-07 PROCEDURE — 4040F PNEUMOC VAC/ADMIN/RCVD: CPT | Performed by: PSYCHIATRY & NEUROLOGY

## 2018-02-07 PROCEDURE — G8417 CALC BMI ABV UP PARAM F/U: HCPCS | Performed by: PSYCHIATRY & NEUROLOGY

## 2018-02-07 PROCEDURE — 1123F ACP DISCUSS/DSCN MKR DOCD: CPT | Performed by: PSYCHIATRY & NEUROLOGY

## 2018-02-07 PROCEDURE — 1090F PRES/ABSN URINE INCON ASSESS: CPT | Performed by: PSYCHIATRY & NEUROLOGY

## 2018-02-07 PROCEDURE — G8400 PT W/DXA NO RESULTS DOC: HCPCS | Performed by: PSYCHIATRY & NEUROLOGY

## 2018-02-07 PROCEDURE — G8427 DOCREV CUR MEDS BY ELIG CLIN: HCPCS | Performed by: PSYCHIATRY & NEUROLOGY

## 2018-02-07 PROCEDURE — 99213 OFFICE O/P EST LOW 20 MIN: CPT | Performed by: PSYCHIATRY & NEUROLOGY

## 2018-02-07 PROCEDURE — G8598 ASA/ANTIPLAT THER USED: HCPCS | Performed by: PSYCHIATRY & NEUROLOGY

## 2018-02-07 PROCEDURE — 1036F TOBACCO NON-USER: CPT | Performed by: PSYCHIATRY & NEUROLOGY

## 2018-02-07 RX ORDER — ROPINIROLE 0.5 MG/1
TABLET, FILM COATED ORAL
Qty: 270 TABLET | Refills: 2 | Status: SHIPPED | OUTPATIENT
Start: 2018-02-07 | End: 2018-06-04 | Stop reason: SDUPTHER

## 2018-03-15 NOTE — TELEPHONE ENCOUNTER
Received a refill faxed request from [facility] on Myrbetriq 50mg daily. Last Rx was 30/3rf sent to [facility] on 1/19/18. Last seen JR 1/19/18. Pt request Rx be sent.

## 2018-05-10 ENCOUNTER — OFFICE VISIT (OUTPATIENT)
Dept: FAMILY MEDICINE CLINIC | Age: 83
End: 2018-05-10
Payer: MEDICARE

## 2018-05-10 VITALS
SYSTOLIC BLOOD PRESSURE: 110 MMHG | TEMPERATURE: 97.6 F | WEIGHT: 139 LBS | RESPIRATION RATE: 20 BRPM | HEART RATE: 68 BPM | BODY MASS INDEX: 26.26 KG/M2 | DIASTOLIC BLOOD PRESSURE: 78 MMHG

## 2018-05-10 DIAGNOSIS — I10 ESSENTIAL HYPERTENSION: ICD-10-CM

## 2018-05-10 DIAGNOSIS — R32 URINARY INCONTINENCE, UNSPECIFIED TYPE: Primary | ICD-10-CM

## 2018-05-10 DIAGNOSIS — E78.2 MIXED HYPERLIPIDEMIA: ICD-10-CM

## 2018-05-10 DIAGNOSIS — R33.9 INCOMPLETE EMPTYING OF BLADDER: ICD-10-CM

## 2018-05-10 DIAGNOSIS — E11.9 DM TYPE 2, GOAL HBA1C < 7% (HCC): ICD-10-CM

## 2018-05-10 DIAGNOSIS — G20 PARKINSON'S DISEASE (HCC): ICD-10-CM

## 2018-05-10 DIAGNOSIS — K21.9 GASTROESOPHAGEAL REFLUX DISEASE, ESOPHAGITIS PRESENCE NOT SPECIFIED: ICD-10-CM

## 2018-05-10 DIAGNOSIS — I25.10 CORONARY ARTERY DISEASE INVOLVING NATIVE CORONARY ARTERY OF NATIVE HEART WITHOUT ANGINA PECTORIS: ICD-10-CM

## 2018-05-10 PROCEDURE — 1090F PRES/ABSN URINE INCON ASSESS: CPT | Performed by: NURSE PRACTITIONER

## 2018-05-10 PROCEDURE — 0509F URINE INCON PLAN DOCD: CPT | Performed by: NURSE PRACTITIONER

## 2018-05-10 PROCEDURE — G8427 DOCREV CUR MEDS BY ELIG CLIN: HCPCS | Performed by: NURSE PRACTITIONER

## 2018-05-10 PROCEDURE — 1123F ACP DISCUSS/DSCN MKR DOCD: CPT | Performed by: NURSE PRACTITIONER

## 2018-05-10 PROCEDURE — G8417 CALC BMI ABV UP PARAM F/U: HCPCS | Performed by: NURSE PRACTITIONER

## 2018-05-10 PROCEDURE — G8598 ASA/ANTIPLAT THER USED: HCPCS | Performed by: NURSE PRACTITIONER

## 2018-05-10 PROCEDURE — 99214 OFFICE O/P EST MOD 30 MIN: CPT | Performed by: NURSE PRACTITIONER

## 2018-05-10 PROCEDURE — 1036F TOBACCO NON-USER: CPT | Performed by: NURSE PRACTITIONER

## 2018-05-10 PROCEDURE — 4040F PNEUMOC VAC/ADMIN/RCVD: CPT | Performed by: NURSE PRACTITIONER

## 2018-05-10 PROCEDURE — G8400 PT W/DXA NO RESULTS DOC: HCPCS | Performed by: NURSE PRACTITIONER

## 2018-05-10 RX ORDER — METOPROLOL TARTRATE 50 MG/1
TABLET, FILM COATED ORAL
Qty: 180 TABLET | Refills: 3 | Status: ON HOLD | OUTPATIENT
Start: 2018-05-10 | End: 2018-11-03 | Stop reason: HOSPADM

## 2018-05-10 RX ORDER — LISINOPRIL 30 MG/1
30 TABLET ORAL EVERY EVENING
Qty: 90 TABLET | Refills: 3 | Status: SHIPPED | OUTPATIENT
Start: 2018-05-10 | End: 2018-09-11

## 2018-05-10 RX ORDER — OMEPRAZOLE 20 MG/1
20 CAPSULE, DELAYED RELEASE ORAL DAILY
Qty: 90 CAPSULE | Refills: 3 | Status: SHIPPED | OUTPATIENT
Start: 2018-05-10 | End: 2019-03-29 | Stop reason: SDUPTHER

## 2018-05-10 RX ORDER — ATORVASTATIN CALCIUM 20 MG/1
20 TABLET, FILM COATED ORAL DAILY
Qty: 90 TABLET | Refills: 3 | Status: SHIPPED | OUTPATIENT
Start: 2018-05-10 | End: 2019-05-22 | Stop reason: SDUPTHER

## 2018-05-10 RX ORDER — HYDROXYZINE HYDROCHLORIDE 10 MG/1
20 TABLET, FILM COATED ORAL NIGHTLY PRN
Status: ON HOLD | COMMUNITY
Start: 2018-05-08 | End: 2018-11-03 | Stop reason: HOSPADM

## 2018-05-10 RX ORDER — GLIMEPIRIDE 2 MG/1
TABLET ORAL
Qty: 90 TABLET | Refills: 3 | Status: ON HOLD | OUTPATIENT
Start: 2018-05-10 | End: 2018-11-03 | Stop reason: HOSPADM

## 2018-05-10 ASSESSMENT — PATIENT HEALTH QUESTIONNAIRE - PHQ9
SUM OF ALL RESPONSES TO PHQ9 QUESTIONS 1 & 2: 1
2. FEELING DOWN, DEPRESSED OR HOPELESS: 1
1. LITTLE INTEREST OR PLEASURE IN DOING THINGS: 0
SUM OF ALL RESPONSES TO PHQ QUESTIONS 1-9: 1

## 2018-06-04 DIAGNOSIS — G20 PARKINSON'S DISEASE (HCC): ICD-10-CM

## 2018-06-04 RX ORDER — ROPINIROLE 0.5 MG/1
TABLET, FILM COATED ORAL
Qty: 270 TABLET | Refills: 2 | Status: SHIPPED | OUTPATIENT
Start: 2018-06-04 | End: 2018-06-27

## 2018-06-06 ASSESSMENT — ENCOUNTER SYMPTOMS
EYES NEGATIVE: 1
RESPIRATORY NEGATIVE: 1
GASTROINTESTINAL NEGATIVE: 1

## 2018-06-21 ENCOUNTER — HOSPITAL ENCOUNTER (EMERGENCY)
Dept: GENERAL RADIOLOGY | Age: 83
End: 2018-06-21
Payer: MEDICARE

## 2018-06-21 ENCOUNTER — HOSPITAL ENCOUNTER (EMERGENCY)
Age: 83
Discharge: HOME | End: 2018-06-21
Attending: FAMILY MEDICINE
Payer: MEDICARE

## 2018-06-21 ENCOUNTER — APPOINTMENT (OUTPATIENT)
Dept: GENERAL RADIOLOGY | Age: 83
End: 2018-06-21
Attending: FAMILY MEDICINE
Payer: MEDICARE

## 2018-06-21 ENCOUNTER — NURSE TRIAGE (OUTPATIENT)
Dept: ADMINISTRATIVE | Age: 83
End: 2018-06-21

## 2018-06-21 VITALS
DIASTOLIC BLOOD PRESSURE: 58 MMHG | HEART RATE: 70 BPM | HEIGHT: 62 IN | RESPIRATION RATE: 16 BRPM | TEMPERATURE: 97.8 F | WEIGHT: 136 LBS | OXYGEN SATURATION: 100 % | SYSTOLIC BLOOD PRESSURE: 152 MMHG | BODY MASS INDEX: 25.03 KG/M2

## 2018-06-21 DIAGNOSIS — R52 PAIN: ICD-10-CM

## 2018-06-21 DIAGNOSIS — L03.114 CELLULITIS OF LEFT HAND: ICD-10-CM
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
## 2018-06-21 DIAGNOSIS — I10 ELEVATED BLOOD PRESSURE READING WITH DIAGNOSIS OF HYPERTENSION: ICD-10-CM

## 2018-06-21 LAB
ANION GAP SERPL CALCULATED.3IONS-SCNC: 13 MEQ/L (ref 8–16)
ANISOCYTOSIS: ABNORMAL
BASOPHILS # BLD: 0.3 %
BASOPHILS ABSOLUTE: 0 THOU/MM3 (ref 0–0.1)
BUN BLDV-MCNC: 13 MG/DL (ref 7–22)
CALCIUM SERPL-MCNC: 9.3 MG/DL (ref 8.5–10.5)
CHLORIDE BLD-SCNC: 92 MEQ/L (ref 98–111)
CO2: 25 MEQ/L (ref 23–33)
CREAT SERPL-MCNC: 0.8 MG/DL (ref 0.4–1.2)
EKG ATRIAL RATE: 67 BPM
EKG ATRIAL RATE: 71 BPM
EKG P AXIS: 80 DEGREES
EKG P AXIS: 85 DEGREES
EKG P-R INTERVAL: 216 MS
EKG P-R INTERVAL: 228 MS
EKG Q-T INTERVAL: 450 MS
EKG Q-T INTERVAL: 472 MS
EKG QRS DURATION: 146 MS
EKG QRS DURATION: 150 MS
EKG QTC CALCULATION (BAZETT): 489 MS
EKG QTC CALCULATION (BAZETT): 498 MS
EKG R AXIS: -37 DEGREES
EKG R AXIS: -43 DEGREES
EKG T AXIS: 82 DEGREES
EKG T AXIS: 98 DEGREES
EKG VENTRICULAR RATE: 67 BPM
EKG VENTRICULAR RATE: 71 BPM
EOSINOPHIL # BLD: 1.9 %
EOSINOPHILS ABSOLUTE: 0.1 THOU/MM3 (ref 0–0.4)
GFR SERPL CREATININE-BSD FRML MDRD: 68 ML/MIN/1.73M2
GLUCOSE BLD-MCNC: 134 MG/DL (ref 70–108)
HCT VFR BLD CALC: 34.1 % (ref 37–47)
HEMOGLOBIN: 11.8 GM/DL (ref 12–16)
LYMPHOCYTES # BLD: 17.5 %
LYMPHOCYTES ABSOLUTE: 1.3 THOU/MM3 (ref 1–4.8)
MAGNESIUM: 1.7 MG/DL (ref 1.6–2.4)
MCH RBC QN AUTO: 29.2 PG (ref 27–31)
MCHC RBC AUTO-ENTMCNC: 34.7 GM/DL (ref 33–37)
MCV RBC AUTO: 84.3 FL (ref 81–99)
MONOCYTES # BLD: 11.5 %
MONOCYTES ABSOLUTE: 0.9 THOU/MM3 (ref 0.4–1.3)
NUCLEATED RED BLOOD CELLS: 0 /100 WBC
OSMOLALITY CALCULATION: 262.9 MOSMOL/KG (ref 275–300)
PDW BLD-RTO: 14.5 % (ref 11.5–14.5)
PLATELET # BLD: 172 THOU/MM3 (ref 130–400)
PMV BLD AUTO: 8 FL (ref 7.4–10.4)
POTASSIUM SERPL-SCNC: 4 MEQ/L (ref 3.5–5.2)
RBC # BLD: 4.05 MILL/MM3 (ref 4.2–5.4)
SEG NEUTROPHILS: 68.8 %
SEGMENTED NEUTROPHILS ABSOLUTE COUNT: 5.2 THOU/MM3 (ref 1.8–7.7)
SODIUM BLD-SCNC: 130 MEQ/L (ref 135–145)
TROPONIN T: < 0.01 NG/ML
WBC # BLD: 7.5 THOU/MM3 (ref 4.8–10.8)

## 2018-06-21 PROCEDURE — 93010 ELECTROCARDIOGRAM REPORT: CPT | Performed by: INTERNAL MEDICINE

## 2018-06-21 PROCEDURE — 36415 COLL VENOUS BLD VENIPUNCTURE: CPT

## 2018-06-21 PROCEDURE — 72220 X-RAY EXAM SACRUM TAILBONE: CPT

## 2018-06-21 PROCEDURE — 73130 X-RAY EXAM OF HAND: CPT

## 2018-06-21 PROCEDURE — 73590 X-RAY EXAM OF LOWER LEG: CPT

## 2018-06-21 PROCEDURE — 72100 X-RAY EXAM L-S SPINE 2/3 VWS: CPT

## 2018-06-21 PROCEDURE — 73090 X-RAY EXAM OF FOREARM: CPT

## 2018-06-21 PROCEDURE — 85025 COMPLETE CBC W/AUTO DIFF WBC: CPT

## 2018-06-21 PROCEDURE — 84484 ASSAY OF TROPONIN QUANT: CPT

## 2018-06-21 PROCEDURE — 99284 EMERGENCY DEPT VISIT MOD MDM: CPT

## 2018-06-21 PROCEDURE — 93005 ELECTROCARDIOGRAM TRACING: CPT | Performed by: NURSE PRACTITIONER

## 2018-06-21 PROCEDURE — 99215 OFFICE O/P EST HI 40 MIN: CPT

## 2018-06-21 PROCEDURE — 83735 ASSAY OF MAGNESIUM: CPT

## 2018-06-21 PROCEDURE — 80048 BASIC METABOLIC PNL TOTAL CA: CPT

## 2018-06-21 PROCEDURE — 93005 ELECTROCARDIOGRAM TRACING: CPT | Performed by: FAMILY MEDICINE

## 2018-06-21 RX ORDER — DOXYCYCLINE HYCLATE 100 MG/1
100 CAPSULE ORAL 2 TIMES DAILY
Qty: 20 CAPSULE | Refills: 0 | Status: SHIPPED | OUTPATIENT
Start: 2018-06-21 | End: 2018-07-01

## 2018-06-21 RX ORDER — AMOXICILLIN 500 MG/1
500 CAPSULE ORAL 3 TIMES DAILY
Qty: 30 CAPSULE | Refills: 0 | Status: SHIPPED | OUTPATIENT
Start: 2018-06-21 | End: 2018-07-01

## 2018-06-21 ASSESSMENT — PAIN DESCRIPTION - PAIN TYPE
TYPE_3: ACUTE PAIN
TYPE: ACUTE PAIN

## 2018-06-21 ASSESSMENT — PAIN DESCRIPTION - ORIENTATION
ORIENTATION_3: LOWER;LEFT;ANTERIOR
ORIENTATION: LEFT

## 2018-06-21 ASSESSMENT — ENCOUNTER SYMPTOMS
COUGH: 0
BACK PAIN: 0
EYE DISCHARGE: 0
CHEST TIGHTNESS: 0
SHORTNESS OF BREATH: 0
VOMITING: 0
SORE THROAT: 0
DIARRHEA: 0
EYE PAIN: 0
ABDOMINAL PAIN: 0
RHINORRHEA: 0
WHEEZING: 0
NAUSEA: 0

## 2018-06-21 ASSESSMENT — PAIN DESCRIPTION - LOCATION
LOCATION: ARM;HAND
LOCATION_2: COCCYX
LOCATION_3: LEG

## 2018-06-21 ASSESSMENT — PAIN DESCRIPTION - FREQUENCY: FREQUENCY: CONTINUOUS

## 2018-06-21 ASSESSMENT — PAIN DESCRIPTION - INTENSITY
RATING_3: 2
RATING_2: 10

## 2018-06-21 ASSESSMENT — PAIN SCALES - GENERAL: PAINLEVEL_OUTOF10: 6

## 2018-06-21 ASSESSMENT — PAIN DESCRIPTION - DESCRIPTORS: DESCRIPTORS: ACHING

## 2018-06-22 ENCOUNTER — CARE COORDINATION (OUTPATIENT)
Dept: CASE MANAGEMENT | Age: 83
End: 2018-06-22

## 2018-06-26 ENCOUNTER — OFFICE VISIT (OUTPATIENT)
Dept: CARDIOLOGY CLINIC | Age: 83
End: 2018-06-26
Payer: MEDICARE

## 2018-06-26 VITALS
BODY MASS INDEX: 25.62 KG/M2 | HEART RATE: 64 BPM | SYSTOLIC BLOOD PRESSURE: 130 MMHG | DIASTOLIC BLOOD PRESSURE: 62 MMHG | HEIGHT: 62 IN | WEIGHT: 139.2 LBS

## 2018-06-26 DIAGNOSIS — I10 ESSENTIAL HYPERTENSION: Primary | ICD-10-CM

## 2018-06-26 DIAGNOSIS — E78.5 HYPERLIPIDEMIA, UNSPECIFIED HYPERLIPIDEMIA TYPE: ICD-10-CM

## 2018-06-26 DIAGNOSIS — I25.10 CORONARY ARTERY DISEASE INVOLVING NATIVE CORONARY ARTERY OF NATIVE HEART WITHOUT ANGINA PECTORIS: ICD-10-CM

## 2018-06-26 DIAGNOSIS — Z95.1 HX OF CABG: ICD-10-CM

## 2018-06-26 PROCEDURE — G8427 DOCREV CUR MEDS BY ELIG CLIN: HCPCS | Performed by: NURSE PRACTITIONER

## 2018-06-26 PROCEDURE — G8598 ASA/ANTIPLAT THER USED: HCPCS | Performed by: NURSE PRACTITIONER

## 2018-06-26 PROCEDURE — 1123F ACP DISCUSS/DSCN MKR DOCD: CPT | Performed by: NURSE PRACTITIONER

## 2018-06-26 PROCEDURE — 1090F PRES/ABSN URINE INCON ASSESS: CPT | Performed by: NURSE PRACTITIONER

## 2018-06-26 PROCEDURE — 4040F PNEUMOC VAC/ADMIN/RCVD: CPT | Performed by: NURSE PRACTITIONER

## 2018-06-26 PROCEDURE — 99213 OFFICE O/P EST LOW 20 MIN: CPT | Performed by: NURSE PRACTITIONER

## 2018-06-26 PROCEDURE — 1036F TOBACCO NON-USER: CPT | Performed by: NURSE PRACTITIONER

## 2018-06-26 PROCEDURE — G8417 CALC BMI ABV UP PARAM F/U: HCPCS | Performed by: NURSE PRACTITIONER

## 2018-06-26 PROCEDURE — G8400 PT W/DXA NO RESULTS DOC: HCPCS | Performed by: NURSE PRACTITIONER

## 2018-06-27 ENCOUNTER — TELEPHONE (OUTPATIENT)
Dept: NEUROLOGY | Age: 83
End: 2018-06-27

## 2018-06-27 RX ORDER — CARBIDOPA AND LEVODOPA 25; 100 MG/1; MG/1
1 TABLET, EXTENDED RELEASE ORAL 2 TIMES DAILY
Qty: 60 TABLET | Refills: 1 | Status: SHIPPED | OUTPATIENT
Start: 2018-06-27 | End: 2018-08-17

## 2018-06-29 ENCOUNTER — CARE COORDINATION (OUTPATIENT)
Dept: CASE MANAGEMENT | Age: 83
End: 2018-06-29

## 2018-07-03 ENCOUNTER — OFFICE VISIT (OUTPATIENT)
Dept: UROLOGY | Age: 83
End: 2018-07-03
Payer: MEDICARE

## 2018-07-03 VITALS — SYSTOLIC BLOOD PRESSURE: 136 MMHG | DIASTOLIC BLOOD PRESSURE: 68 MMHG

## 2018-07-03 DIAGNOSIS — R32 URINARY INCONTINENCE, UNSPECIFIED TYPE: Primary | ICD-10-CM

## 2018-07-03 LAB — POST VOID RESIDUAL (PVR): 174 ML

## 2018-07-03 PROCEDURE — 99213 OFFICE O/P EST LOW 20 MIN: CPT | Performed by: NURSE PRACTITIONER

## 2018-07-03 PROCEDURE — 1090F PRES/ABSN URINE INCON ASSESS: CPT | Performed by: NURSE PRACTITIONER

## 2018-07-03 PROCEDURE — G8427 DOCREV CUR MEDS BY ELIG CLIN: HCPCS | Performed by: NURSE PRACTITIONER

## 2018-07-03 PROCEDURE — 51798 US URINE CAPACITY MEASURE: CPT | Performed by: NURSE PRACTITIONER

## 2018-07-03 PROCEDURE — 4040F PNEUMOC VAC/ADMIN/RCVD: CPT | Performed by: NURSE PRACTITIONER

## 2018-07-03 PROCEDURE — 1036F TOBACCO NON-USER: CPT | Performed by: NURSE PRACTITIONER

## 2018-07-03 PROCEDURE — 0509F URINE INCON PLAN DOCD: CPT | Performed by: NURSE PRACTITIONER

## 2018-07-03 PROCEDURE — G8400 PT W/DXA NO RESULTS DOC: HCPCS | Performed by: NURSE PRACTITIONER

## 2018-07-03 PROCEDURE — 81003 URINALYSIS AUTO W/O SCOPE: CPT | Performed by: NURSE PRACTITIONER

## 2018-07-03 PROCEDURE — G8598 ASA/ANTIPLAT THER USED: HCPCS | Performed by: NURSE PRACTITIONER

## 2018-07-03 PROCEDURE — 51701 INSERT BLADDER CATHETER: CPT | Performed by: NURSE PRACTITIONER

## 2018-07-03 PROCEDURE — 1123F ACP DISCUSS/DSCN MKR DOCD: CPT | Performed by: NURSE PRACTITIONER

## 2018-07-03 PROCEDURE — G8417 CALC BMI ABV UP PARAM F/U: HCPCS | Performed by: NURSE PRACTITIONER

## 2018-07-03 ASSESSMENT — ENCOUNTER SYMPTOMS
VOMITING: 0
ABDOMINAL PAIN: 0
NAUSEA: 0

## 2018-07-03 NOTE — PROGRESS NOTES
tablet 3    glimepiride (AMARYL) 2 MG tablet TAKE 1 TABLET BY MOUTH  DAILY WITH BREAKFAST 90 tablet 3    lisinopril (PRINIVIL;ZESTRIL) 30 MG tablet Take 1 tablet by mouth every evening 90 tablet 3    atorvastatin (LIPITOR) 20 MG tablet Take 1 tablet by mouth daily 90 tablet 3    omeprazole (PRILOSEC) 20 MG delayed release capsule Take 1 capsule by mouth daily 90 capsule 3    glucose blood VI test strips (TAYLOR CONTOUR TEST) strip Test bid Dx: E11.9 100 each 5    sucralfate (CARAFATE) 1 GM tablet Take 1 tablet by mouth 4 times daily 360 tablet 3    nystatin (MYCOSTATIN) 927704 UNIT/GM cream Apply topically 2 times daily Apply topically 2 times daily.  triamcinolone (KENALOG) 0.1 % cream Apply topically 2 times daily Apply topically 2 times daily.  magnesium oxide (MAG-OX) 400 MG tablet Take 400 mg by mouth daily      Lactase (DAIRY-RELIEF PO) Take by mouth daily      aspirin 81 MG EC tablet Take 81 mg by mouth daily.  UNABLE TO FIND CardioMax daily      NONFORMULARY Vitamist: 4 sprays of each Vitamin combination into the mouth twice a day  Vitamin B-12 500mc sprays into the mouth twice a day  Vitamin D3: 4 sprays into the mouth twice a day  Vitamin C+Zinc: 4 sprays into the mouth twice a day  Women's [employer] (Vit A, C, D, E, Thiamine, Niacin, Vitamin B6, Folate, Vit B12, Biotin, Pantothenic acid): 4 sprays into the mouth twice a day       No current facility-administered medications for this visit. Past Medical History:   Diagnosis Date    Atypical chest pain     CAD (coronary artery disease)     Chronic LBBB (left bundle branch block)     Diabetes mellitus type II     Esophageal stricture     History of esophageal stricture.  FH: CAD (coronary artery disease)     Mom and dad both with heart disease at mid to late age.      GERD (gastroesophageal reflux disease)     History of gastritis     History of skin cancer     Hypercholesterolemia     Hyperlipidemia     Hypertension     Imbalance     As far as imbalance is concerned, asked patient to follow-up with Dr. Lieutenant Hudson since she follows with him on a regular basis.  Lactose intolerance     Nummular dermatitis     Parkinson's disease ([address]     Restless legs syndrome (RLS)     S/P CABG (coronary artery bypass graft)     SCC (squamous cell carcinoma)      Social History     Social History    Marital status:      Spouse name: N/A    Number of children: N/A    Years of education: N/A     Occupational History    Not on file. Social History Main Topics    Smoking status: Never Smoker    Smokeless tobacco: Never Used    Alcohol use No    Drug use: No    Sexual activity: Not on file     Other Topics Concern    Not on file     Social History Narrative    No narrative on file         Objective:   Physical Exam   Constitutional: She is oriented to person, place, and time. Vital signs are normal. She appears well-developed and well-nourished. HENT:   Head: Normocephalic and atraumatic. Eyes: Conjunctivae are normal.   Pulmonary/Chest: Effort normal and breath sounds normal.   Musculoskeletal: Normal range of motion. Left lower leg edema    Neurological: She is alert and oriented to person, place, and time. Skin: Skin is warm and dry. Psychiatric: She has a normal mood and affect. Her behavior is normal. Judgment and thought content normal.     Vitals:    07/03/18 1436   BP: 136/68     Results for POC orders placed in visit on 07/03/18   poct post void residual   Result Value Ref Range    post void residual 174 ml       Assessment:      Urinary Incontinence      Plan:      PVR today - 174    Discussed plan to begin straight cath at night before bed and measure output. Education provided on double voiding and practicing to empty completely. F/U with PCP regarding left leg swelling    F/U in 3 weeks to assess PVR and incontinence status.

## 2018-07-06 ENCOUNTER — CARE COORDINATION (OUTPATIENT)
Dept: CARE COORDINATION | Age: 83
End: 2018-07-06

## 2018-07-06 NOTE — CARE COORDINATION
Assistance  Ability to do shopping:  Needs Assistance  Ability to manage finances: Independent  Is patient able to live independently?:  Yes     Current Housing:  Private Residence              Are you experiencing loss of meaning?:  No  Are you experiencing loss of hope and peace?:  No     Suggested Interventions and Community Resources                  Prior to Admission medications    Medication Sig Start Date End Date Taking? Authorizing Provider   carbidopa-levodopa (SINEMET)  MG per tablet TAKE 2 TABLETS BY MOUTH 3  TIMES DAILY 7/18/18   Marylin Chauhan MD   carbidopa-levodopa (SINEMET CR)  MG per extended release tablet Take 1 tablet by mouth 2 times daily 6/27/18   Maia Chauhan MD   fluocinonide (LIDEX) 0.05 % cream Apply topically 2 times daily Apply topically 2 times daily. Historical Provider, MD   Probiotic Product (PROBIOTIC ADVANCED PO) Take by mouth    Historical Provider, MD   mupirocin (BACTROBAN) 2 % ointment Apply topically 3 times daily Apply topically 3 times daily.     Historical Provider, MD   carbidopa-levodopa (SINEMET)  MG per tablet Take 2 tablets by mouth 3 times daily 6/4/18   Maia Chauhan MD   hydrOXYzine (ATARAX) 10 MG tablet Take 20 mg by mouth nightly as needed  5/8/18   Historical Provider, MD   metoprolol tartrate (LOPRESSOR) 50 MG tablet TAKE 1 TABLET BY MOUTH TWO  TIMES DAILY 5/10/18   ROWDY Mccann CNP   glimepiride (AMARYL) 2 MG tablet TAKE 1 TABLET BY MOUTH  DAILY WITH BREAKFAST 5/10/18   ROWDY Mccann CNP   lisinopril (PRINIVIL;ZESTRIL) 30 MG tablet Take 1 tablet by mouth every evening 5/10/18   ROWDY Mccann CNP   atorvastatin (LIPITOR) 20 MG tablet Take 1 tablet by mouth daily 5/10/18   ROWDY Mccann CNP   omeprazole (PRILOSEC) 20 MG delayed release capsule Take 1 capsule by mouth daily 5/10/18   ROWDY Mccann CNP   glucose blood VI test strips (TAYLOR CONTOUR TEST) strip Test bid Dx: E11.9 4/24/18   Ana Brown Arthur Meraz MD   sucralfate (CARAFATE) 1 GM tablet Take 1 tablet by mouth 4 times daily 18   Terence Bernard, APRN - CNP   nystatin (MYCOSTATIN) 404249 UNIT/GM cream Apply topically 2 times daily Apply topically 2 times daily. Historical Provider, MD   triamcinolone (KENALOG) 0.1 % cream Apply topically 2 times daily Apply topically 2 times daily. Historical Provider, MD   UNABLE TO FIND CardioMax daily    Historical Provider, MD   magnesium oxide (MAG-OX) 400 MG tablet Take 400 mg by mouth daily    Historical Provider, MD   Lactase (DAIRY-RELIEF PO) Take by mouth daily    Historical Provider, MD   NONFORMULARY Vitamist: 4 sprays of each Vitamin combination into the mouth twice a day  Vitamin B-12 500mc sprays into the mouth twice a day  Vitamin D3: 4 sprays into the mouth twice a day  Vitamin C+Zinc: 4 sprays into the mouth twice a day  Women's Health (Vit A, C, D, E, Thiamine, Niacin, Vitamin B6, Folate, Vit B12, Biotin, Pantothenic acid): 4 sprays into the mouth twice a day    Historical Provider, MD   aspirin 81 MG EC tablet Take 81 mg by mouth daily.       Historical Provider, MD       Future Appointments  Date Time Provider J Carlos Gargi   2018 11:00 AM Danish Cameron MD [facility] [place] [address] [address]   2018 2:15 PM Maia Chauhan MD [address] [place] [address] [place] [address]   2018 10:45 AM MD [place] AM [address] [address] Urology [facility] [place] [address]

## 2018-07-10 ENCOUNTER — PATIENT MESSAGE (OUTPATIENT)
Dept: UROLOGY | Age: 83
End: 2018-07-10

## 2018-07-10 DIAGNOSIS — R30.0 DYSURIA: Primary | ICD-10-CM

## 2018-07-11 RX ORDER — DOXYCYCLINE HYCLATE 100 MG
100 TABLET ORAL 2 TIMES DAILY
Qty: 10 TABLET | Refills: 0 | Status: SHIPPED | OUTPATIENT
Start: 2018-07-11 | End: 2018-07-16

## 2018-07-13 ENCOUNTER — CARE COORDINATION (OUTPATIENT)
Dept: CARE COORDINATION | Age: 83
End: 2018-07-13

## 2018-07-13 LAB
APPEARANCE: ABNORMAL
BACTERIA: ABNORMAL PER HPF
BILIRUBIN: NEGATIVE
COLOR: YELLOW
EPITHELIAL CELLS: ABNORMAL PER HPF
GLUCOSE BLD-MCNC: NEGATIVE MG/DL
KETONES, URINE: NEGATIVE
LEUKOCYTE ESTERASE, URINE: ABNORMAL
NITRITE, URINE: NEGATIVE
OCCULT BLOOD,URINE: NEGATIVE
PH: 7 (ref 5–9)
PROTEIN, URINE: NEGATIVE
RBC: ABNORMAL PER HPF (ref 0–5)
SP GRAVITY MISCELLANEOUS: 1.01 (ref 1–1.03)
UROBILINOGEN, URINE: NORMAL
WBC: >50 PER HPF (ref 0–5)

## 2018-07-14 LAB — URINE CULTURE, ROUTINE: ABNORMAL

## 2018-07-14 NOTE — CARE COORDINATION
That sounds great! I will check on her in a week or so to see how she is feeling. Thank you for reaching out. Urvashi  ===View-only below this line===      ----- Message -----     From: Tamir Mg: 7/13/2018  2:35 PM EDT       To: Nurse Ankita Marie  Subject: RE: Non-Urgent Medical Question    Terence Gone! I will explain that to her. She had a UTI at the moment that the Urologist is treating her for. Thanks again for all you do!     Nadege

## 2018-07-15 ENCOUNTER — TELEPHONE (OUTPATIENT)
Dept: UROLOGY | Age: 83
End: 2018-07-15

## 2018-07-15 RX ORDER — CIPROFLOXACIN 500 MG/1
500 TABLET, FILM COATED ORAL 2 TIMES DAILY
Qty: 14 TABLET | Refills: 0 | Status: SHIPPED | OUTPATIENT
Start: 2018-07-15 | End: 2018-07-22

## 2018-07-17 ENCOUNTER — PATIENT MESSAGE (OUTPATIENT)
Dept: UROLOGY | Age: 83
End: 2018-07-17

## 2018-07-17 NOTE — TELEPHONE ENCOUNTER
From: Moises Galvez  To: Narciso Dietz APRN - CNP  Sent: 7/17/2018 12:23 PM EDT  Subject: Non-Urgent Medical Question    Update on my mom. She started the Cipro, but last night she got 6oz out around 10 after she had laid down around 9 and watched TV, but she was asleep from 10:30-4:45!! She feels rested and somewhat better today! This is a start!

## 2018-07-18 NOTE — TELEPHONE ENCOUNTER
Rx request from [facility] for Sinemet 25-100mg 2 tablets TID  Last written:06/27/2018 60 tablets with 1 refill  Last seen:05/10/2018, No future appointments  Rx verified,ordered,and set to escribe

## 2018-07-20 ENCOUNTER — CARE COORDINATION (OUTPATIENT)
Dept: CARE COORDINATION | Age: 83
End: 2018-07-20

## 2018-07-20 NOTE — CARE COORDINATION
(BACTROBAN) 2 % ointment Apply topically 3 times daily Apply topically 3 times daily. Yes Historical Provider, MD   carbidopa-levodopa (SINEMET)  MG per tablet Take 2 tablets by mouth 3 times daily 18  Yes Adria Staley MD   hydrOXYzine (ATARAX) 10 MG tablet Take 20 mg by mouth nightly as needed  18  Yes Historical Provider, MD   metoprolol tartrate (LOPRESSOR) 50 MG tablet TAKE 1 TABLET BY MOUTH TWO  TIMES DAILY 5/10/18  Yes ROWDY Mansfield CNP   glimepiride (AMARYL) 2 MG tablet TAKE 1 TABLET BY MOUTH  DAILY WITH BREAKFAST 5/10/18  Yes ROWDY Mansfield CNP   lisinopril (PRINIVIL;ZESTRIL) 30 MG tablet Take 1 tablet by mouth every evening 5/10/18  Yes ROWDY Mansfield CNP   atorvastatin (LIPITOR) 20 MG tablet Take 1 tablet by mouth daily 5/10/18  Yes ROWDY Mansfield CNP   omeprazole (PRILOSEC) 20 MG delayed release capsule Take 1 capsule by mouth daily 5/10/18  Yes ROWDY Mansfield CNP   glucose blood VI test strips (TAYLOR CONTOUR TEST) strip Test bid Dx: E11.9 18  Yes Stacy Olsen MD   sucralfate (CARAFATE) 1 GM tablet Take 1 tablet by mouth 4 times daily 18  Yes ROWDY Mansfield CNP   nystatin (MYCOSTATIN) 539872 UNIT/GM cream Apply topically 2 times daily Apply topically 2 times daily. Yes Historical Provider, MD   triamcinolone (KENALOG) 0.1 % cream Apply topically 2 times daily Apply topically 2 times daily.    Yes Historical Provider, MD   UNABLE TO FIND CardioMax daily   Yes Historical Provider, MD   magnesium oxide (MAG-OX) 400 MG tablet Take 400 mg by mouth daily   Yes Historical Provider, MD   Lactase (DAIRY-RELIEF PO) Take by mouth daily   Yes Historical Provider, MD   NONFORMULARY Vitamist: 4 sprays of each Vitamin combination into the mouth twice a day  Vitamin B-12 500mc sprays into the mouth twice a day  Vitamin D3: 4 sprays into the mouth twice a day  Vitamin C+Zinc: 4 sprays into the mouth twice a day  Women's Health (Vit A, C, D, E, Thiamine, Niacin, Vitamin B6, Folate, Vit B12, Biotin, Pantothenic acid): 4 sprays into the mouth twice a day   Yes Historical Provider, MD   aspirin 81 MG EC tablet Take 81 mg by mouth daily.      Yes Historical Provider, MD       Future Appointments  Date Time Provider J Carlos Yesenia   8/8/2018 10:15 AM Tutu Pollack Urology [facility] [address] [place] [address]   8/17/2018 11:00 AM María Reid MD [facility] [address]   8/22/2018 2:15 PM Sindhu River MD [place] [id number]

## 2018-07-30 ENCOUNTER — PATIENT MESSAGE (OUTPATIENT)
Dept: UROLOGY | Age: 83
End: 2018-07-30

## 2018-08-02 ENCOUNTER — PATIENT MESSAGE (OUTPATIENT)
Dept: UROLOGY | Age: 83
End: 2018-08-02

## 2018-08-02 DIAGNOSIS — R30.0 DYSURIA: Primary | ICD-10-CM

## 2018-08-03 NOTE — TELEPHONE ENCOUNTER
Spoke with patient and she states that she uses [facility], so order was faxed there at [phone]. She voiced understanding and will take a urine sample there.

## 2018-08-04 LAB
AMORPHOUS URATE: ABNORMAL
APPEARANCE: CLEAR
BACTERIA: ABNORMAL PER HPF
BILIRUBIN: NEGATIVE
COLOR: YELLOW
EPITHELIAL CELLS: ABNORMAL PER HPF
GLUCOSE BLD-MCNC: NEGATIVE MG/DL
KETONES, URINE: NEGATIVE
LEUKOCYTE ESTERASE, URINE: ABNORMAL
NITRITE, URINE: NEGATIVE
OCCULT BLOOD,URINE: ABNORMAL
PH: 6.5 (ref 5–9)
PROTEIN, URINE: ABNORMAL
RBC: ABNORMAL PER HPF (ref 0–5)
SP GRAVITY MISCELLANEOUS: 1.01 (ref 1–1.03)
UROBILINOGEN, URINE: NORMAL
WBC: ABNORMAL PER HPF (ref 0–5)

## 2018-08-06 ENCOUNTER — CARE COORDINATION (OUTPATIENT)
Dept: CARE COORDINATION | Age: 83
End: 2018-08-06

## 2018-08-06 ENCOUNTER — TELEPHONE (OUTPATIENT)
Dept: UROLOGY | Age: 83
End: 2018-08-06

## 2018-08-06 LAB — URINE CULTURE, ROUTINE: NORMAL

## 2018-08-06 NOTE — PATIENT INSTRUCTIONS
These can increase your chances of quitting for good. · Try bladder training. Set certain times to go to the bathroom and slowly increase the time between visits. This may help lengthen the time your bladder can hold urine. · You might try a treatment called TENS. It sends a very mild electric current through wires placed near the pubic area. This is done for at least several minutes 2 times each day. · Consider a support group. Sharing your experiences with other people who have the same problem may help you learn more and cope better. · Wash your pubic area with a mild soap. Avoid deodorant soaps or soaps with heavy perfumes. · Wear loose-fitting clothing that does not put pressure on your bladder. When should you call for help? Call your doctor now or seek immediate medical care if:    · You have symptoms of a urinary infection. For example:  ¨ You have blood or pus in your urine. ¨ You have pain in your back just below your rib cage. This is called flank pain. ¨ You have a fever, chills, or body aches. ¨ It hurts to urinate. ¨ You have groin or belly pain.    Watch closely for changes in your health, and be sure to contact your doctor if:    · You do not get better as expected. Where can you learn more? Go to [email or web address]. org and sign in to your [employer] account. Enter R390 in the BetterDoctor box to learn more about \"Interstitial Cystitis: Care Instructions. \"     If you do not have an account, please click on the \"Sign Up Now\" link. Current as of: May 12, 2017  Content Version: 11.6  © 6954-2307 [employer]. Care instructions adapted under license by [facility] [employer] [facility] ([facility]). If you have questions about a medical condition or this instruction, always ask your healthcare professional. [address] any warranty or liability for your use of this information.

## 2018-08-06 NOTE — CARE COORDINATION
Chaka Blackwell MD   hydrOXYzine (ATARAX) 10 MG tablet Take 20 mg by mouth nightly as needed  18   Historical Provider, MD   metoprolol tartrate (LOPRESSOR) 50 MG tablet TAKE 1 TABLET BY MOUTH TWO  TIMES DAILY 5/10/18   ROWDY Gutierrez CNP   glimepiride (AMARYL) 2 MG tablet TAKE 1 TABLET BY MOUTH  DAILY WITH BREAKFAST 5/10/18   ROWDY Gutierrez CNP   lisinopril (PRINIVIL;ZESTRIL) 30 MG tablet Take 1 tablet by mouth every evening 5/10/18   ROWDY Gutierrez CNP   atorvastatin (LIPITOR) 20 MG tablet Take 1 tablet by mouth daily 5/10/18   ROWDY Gutierrez CNP   omeprazole (PRILOSEC) 20 MG delayed release capsule Take 1 capsule by mouth daily 5/10/18   ROWDY Gutierrez CNP   glucose blood VI test strips (TAYLOR CONTOUR TEST) strip Test bid Dx: E11.9 18   Kia Smith MD   sucralfate (CARAFATE) 1 GM tablet Take 1 tablet by mouth 4 times daily 18   ROWDY Gutierrez CNP   nystatin (MYCOSTATIN) 050374 UNIT/GM cream Apply topically 2 times daily Apply topically 2 times daily. Historical Provider, MD   triamcinolone (KENALOG) 0.1 % cream Apply topically 2 times daily Apply topically 2 times daily. Historical Provider, MD   UNABLE TO FIND CardioMax daily    Historical Provider, MD   magnesium oxide (MAG-OX) 400 MG tablet Take 400 mg by mouth daily    Historical Provider, MD   Lactase (DAIRY-RELIEF PO) Take by mouth daily    Historical Provider, MD   NONFORMULARY Vitamist: 4 sprays of each Vitamin combination into the mouth twice a day  Vitamin B-12 500mc sprays into the mouth twice a day  Vitamin D3: 4 sprays into the mouth twice a day  Vitamin C+Zinc: 4 sprays into the mouth twice a day  Women's Health (Vit A, C, D, E, Thiamine, Niacin, Vitamin B6, Folate, Vit B12, Biotin, Pantothenic acid): 4 sprays into the mouth twice a day    Historical Provider, MD   aspirin 81 MG EC tablet Take 81 mg by mouth daily.       Historical Provider, MD       Future Appointments  Date Time Provider J Carlos Patterson   8/8/2018 10:15 AM Vesta Tidwell Close Urology [facility] [place]   8/17/2018 11:00 AM Bartolo Chirinos MD [facility] - [place] [address]   8/22/2018 2:15 PM Jennifer Hilario MD [place] [id number]

## 2018-08-07 ASSESSMENT — ENCOUNTER SYMPTOMS
VOMITING: 0
NAUSEA: 0
ABDOMINAL PAIN: 0

## 2018-08-07 NOTE — PROGRESS NOTES
tablet TAKE 1 TABLET BY MOUTH  DAILY WITH BREAKFAST 90 tablet 3    lisinopril (PRINIVIL;ZESTRIL) 30 MG tablet Take 1 tablet by mouth every evening 90 tablet 3    atorvastatin (LIPITOR) 20 MG tablet Take 1 tablet by mouth daily 90 tablet 3    omeprazole (PRILOSEC) 20 MG delayed release capsule Take 1 capsule by mouth daily 90 capsule 3    glucose blood VI test strips (TAYLOR CONTOUR TEST) strip Test bid Dx: E11.9 100 each 5    sucralfate (CARAFATE) 1 GM tablet Take 1 tablet by mouth 4 times daily 360 tablet 3    nystatin (MYCOSTATIN) 631082 UNIT/GM cream Apply topically 2 times daily Apply topically 2 times daily.  triamcinolone (KENALOG) 0.1 % cream Apply topically 2 times daily Apply topically 2 times daily.  UNABLE TO FIND CardioMax daily      magnesium oxide (MAG-OX) 400 MG tablet Take 400 mg by mouth daily      Lactase (DAIRY-RELIEF PO) Take by mouth daily      NONFORMULARY Vitamist: 4 sprays of each Vitamin combination into the mouth twice a day  Vitamin B-12 500mc sprays into the mouth twice a day  Vitamin D3: 4 sprays into the mouth twice a day  Vitamin C+Zinc: 4 sprays into the mouth twice a day  Women's [place] (Vit A, C, D, E, Thiamine, Niacin, Vitamin B6, Folate, Vit B12, Biotin, Pantothenic acid): 4 sprays into the mouth twice a day      aspirin 81 MG EC tablet Take 81 mg by mouth daily. No current facility-administered medications for this visit. Past Medical History:   Diagnosis Date    Atypical chest pain     CAD (coronary artery disease)     Chronic LBBB (left bundle branch block)     Diabetes mellitus type II     Esophageal stricture     History of esophageal stricture.  FH: CAD (coronary artery disease)     Mom and dad both with heart disease at mid to late age.      GERD (gastroesophageal reflux disease)     History of gastritis     History of skin cancer     Hypercholesterolemia     Hyperlipidemia     Hypertension     Imbalance     As far as void residual 76ml ml       Assessment:      Urinary Incontinence  Recurrent UTI  Urinary Retention      Plan:      She continues to straight cath at night and continues to have residual of 120-160 cc. She reports that she had gross hematuria two weeks ago. UA at previous visit showed moderate microscopic blood. Discussed work-up including cytology, CT, and cystoscopy. She would like to proceed with the work-up. Continue straight catheterization nightly. Follow-up in a few weeks for cystoscopy with CT prior.

## 2018-08-08 ENCOUNTER — OFFICE VISIT (OUTPATIENT)
Dept: UROLOGY | Age: 83
End: 2018-08-08
Payer: MEDICARE

## 2018-08-08 VITALS
DIASTOLIC BLOOD PRESSURE: 62 MMHG | HEIGHT: 62 IN | BODY MASS INDEX: 24.79 KG/M2 | SYSTOLIC BLOOD PRESSURE: 112 MMHG | WEIGHT: 134.7 LBS

## 2018-08-08 DIAGNOSIS — R31.0 GROSS HEMATURIA: ICD-10-CM

## 2018-08-08 DIAGNOSIS — N39.46 MIXED STRESS AND URGE URINARY INCONTINENCE: Primary | ICD-10-CM

## 2018-08-08 LAB
BILIRUBIN URINE: NEGATIVE
BLOOD URINE, POC: NEGATIVE
CHARACTER, URINE: CLEAR
COLOR, URINE: YELLOW
GLUCOSE URINE: NEGATIVE MG/DL
KETONES, URINE: NEGATIVE
LEUKOCYTE CLUMPS, URINE: NEGATIVE
NITRITE, URINE: NEGATIVE
PH, URINE: 6
POST VOID RESIDUAL (PVR): NORMAL ML
PROTEIN, URINE: NEGATIVE MG/DL
SPECIFIC GRAVITY, URINE: <= 1.005 (ref 1–1.03)
UROBILINOGEN, URINE: 0.2 EU/DL

## 2018-08-08 PROCEDURE — 81003 URINALYSIS AUTO W/O SCOPE: CPT | Performed by: NURSE PRACTITIONER

## 2018-08-08 PROCEDURE — 99213 OFFICE O/P EST LOW 20 MIN: CPT | Performed by: NURSE PRACTITIONER

## 2018-08-08 PROCEDURE — 1090F PRES/ABSN URINE INCON ASSESS: CPT | Performed by: NURSE PRACTITIONER

## 2018-08-08 PROCEDURE — 4040F PNEUMOC VAC/ADMIN/RCVD: CPT | Performed by: NURSE PRACTITIONER

## 2018-08-08 PROCEDURE — 1036F TOBACCO NON-USER: CPT | Performed by: NURSE PRACTITIONER

## 2018-08-08 PROCEDURE — 1101F PT FALLS ASSESS-DOCD LE1/YR: CPT | Performed by: NURSE PRACTITIONER

## 2018-08-08 PROCEDURE — G8420 CALC BMI NORM PARAMETERS: HCPCS | Performed by: NURSE PRACTITIONER

## 2018-08-08 PROCEDURE — 1123F ACP DISCUSS/DSCN MKR DOCD: CPT | Performed by: NURSE PRACTITIONER

## 2018-08-08 PROCEDURE — 51798 US URINE CAPACITY MEASURE: CPT | Performed by: NURSE PRACTITIONER

## 2018-08-08 PROCEDURE — G8400 PT W/DXA NO RESULTS DOC: HCPCS | Performed by: NURSE PRACTITIONER

## 2018-08-08 PROCEDURE — G8598 ASA/ANTIPLAT THER USED: HCPCS | Performed by: NURSE PRACTITIONER

## 2018-08-08 PROCEDURE — G8427 DOCREV CUR MEDS BY ELIG CLIN: HCPCS | Performed by: NURSE PRACTITIONER

## 2018-08-08 PROCEDURE — 0509F URINE INCON PLAN DOCD: CPT | Performed by: NURSE PRACTITIONER

## 2018-08-09 ENCOUNTER — PATIENT MESSAGE (OUTPATIENT)
Dept: UROLOGY | Age: 83
End: 2018-08-09

## 2018-08-09 ENCOUNTER — HOSPITAL ENCOUNTER (OUTPATIENT)
Dept: CT IMAGING | Age: 83
Discharge: HOME | End: 2018-08-09
Payer: MEDICARE

## 2018-08-09 DIAGNOSIS — N39.46 MIXED STRESS AND URGE URINARY INCONTINENCE: ICD-10-CM

## 2018-08-09 DIAGNOSIS — R31.0 GROSS HEMATURIA: ICD-10-CM

## 2018-08-09 LAB
CREAT SERPL-MCNC: 0.6 MG/DL (ref 0.5–1.2)
GFR, ESTIMATED: > 90 ML/MIN/1.73M2

## 2018-08-09 PROCEDURE — 74178 CT ABD&PLV WO CNTR FLWD CNTR: CPT

## 2018-08-09 PROCEDURE — 6360000004 HC RX CONTRAST MEDICATION: Performed by: NURSE PRACTITIONER

## 2018-08-09 PROCEDURE — 82565 ASSAY OF CREATININE: CPT

## 2018-08-09 RX ADMIN — IOPAMIDOL 85 ML: 755 INJECTION, SOLUTION INTRAVENOUS at 09:40

## 2018-08-10 ENCOUNTER — PATIENT MESSAGE (OUTPATIENT)
Dept: UROLOGY | Age: 83
End: 2018-08-10

## 2018-08-13 NOTE — TELEPHONE ENCOUNTER
From: Linette Laura  To: ROWDY Wright - CNP  Sent: 8/10/2018 4:32 PM EDT  Subject: Visit Follow-Up Question    I'm so sorry to email you guys again. Just got off the phone with Mom and it's burning again, then she gets up and try's to go to the bathroom. She is running in there every twenty minutes.      Nadege

## 2018-08-14 DIAGNOSIS — N39.0 URINARY TRACT INFECTION WITHOUT HEMATURIA, SITE UNSPECIFIED: Primary | ICD-10-CM

## 2018-08-14 NOTE — TELEPHONE ENCOUNTER
Her urine culture was negative so no infection. I am not sure what is causing the recurrent burning. We will know more after she has the cystoscopy. I would suggest she take AZO as needed for the burning. If she thinks it's a UTI we could get a UA with reflex culture.

## 2018-08-15 LAB
AMORPHOUS PHOSPHATES, URINE: ABNORMAL
APPEARANCE: ABNORMAL
BACTERIA: ABNORMAL PER HPF
BILIRUBIN: NEGATIVE
COLOR: YELLOW
EPITHELIAL CELLS: ABNORMAL PER HPF
GLUCOSE BLD-MCNC: NEGATIVE MG/DL
KETONES, URINE: NEGATIVE
LEUKOCYTE ESTERASE, URINE: ABNORMAL
NITRITE, URINE: POSITIVE
OCCULT BLOOD,URINE: NEGATIVE
PH: 7 (ref 5–9)
PROTEIN, URINE: NEGATIVE
RBC: ABNORMAL PER HPF (ref 0–5)
SP GRAVITY MISCELLANEOUS: 1.01 (ref 1–1.03)
UROBILINOGEN, URINE: NORMAL
WBC: >50 PER HPF (ref 0–5)

## 2018-08-16 ENCOUNTER — TELEPHONE (OUTPATIENT)
Dept: UROLOGY | Age: 83
End: 2018-08-16

## 2018-08-16 LAB — URINE CULTURE, ROUTINE: ABNORMAL

## 2018-08-16 RX ORDER — SULFAMETHOXAZOLE AND TRIMETHOPRIM 800; 160 MG/1; MG/1
1 TABLET ORAL 2 TIMES DAILY
Qty: 20 TABLET | Refills: 0 | Status: SHIPPED | OUTPATIENT
Start: 2018-08-16 | End: 2018-08-26

## 2018-08-16 NOTE — TELEPHONE ENCOUNTER
Attempted to call patient and phone just rang with no answer. I called and notified the patients daughter Orly(on [employer]) that her mothers urine culture was positive for an infection and an antibiotic was called into her pharmacy. Orly voiced understanding.

## 2018-08-17 ENCOUNTER — OFFICE VISIT (OUTPATIENT)
Dept: CARDIOLOGY CLINIC | Age: 83
End: 2018-08-17
Payer: MEDICARE

## 2018-08-17 VITALS
HEART RATE: 64 BPM | BODY MASS INDEX: 24.84 KG/M2 | WEIGHT: 135 LBS | DIASTOLIC BLOOD PRESSURE: 81 MMHG | HEIGHT: 62 IN | SYSTOLIC BLOOD PRESSURE: 159 MMHG

## 2018-08-17 DIAGNOSIS — I25.810 CORONARY ARTERY DISEASE INVOLVING CORONARY BYPASS GRAFT OF NATIVE HEART WITHOUT ANGINA PECTORIS: ICD-10-CM

## 2018-08-17 DIAGNOSIS — E78.01 FAMILIAL HYPERCHOLESTEROLEMIA: ICD-10-CM

## 2018-08-17 DIAGNOSIS — I10 ESSENTIAL HYPERTENSION: Primary | ICD-10-CM

## 2018-08-17 PROCEDURE — 4040F PNEUMOC VAC/ADMIN/RCVD: CPT | Performed by: NUCLEAR MEDICINE

## 2018-08-17 PROCEDURE — 1123F ACP DISCUSS/DSCN MKR DOCD: CPT | Performed by: NUCLEAR MEDICINE

## 2018-08-17 PROCEDURE — 1090F PRES/ABSN URINE INCON ASSESS: CPT | Performed by: NUCLEAR MEDICINE

## 2018-08-17 PROCEDURE — G8427 DOCREV CUR MEDS BY ELIG CLIN: HCPCS | Performed by: NUCLEAR MEDICINE

## 2018-08-17 PROCEDURE — 99214 OFFICE O/P EST MOD 30 MIN: CPT | Performed by: NUCLEAR MEDICINE

## 2018-08-17 PROCEDURE — G8598 ASA/ANTIPLAT THER USED: HCPCS | Performed by: NUCLEAR MEDICINE

## 2018-08-17 PROCEDURE — 1101F PT FALLS ASSESS-DOCD LE1/YR: CPT | Performed by: NUCLEAR MEDICINE

## 2018-08-17 PROCEDURE — 1036F TOBACCO NON-USER: CPT | Performed by: NUCLEAR MEDICINE

## 2018-08-17 PROCEDURE — G8400 PT W/DXA NO RESULTS DOC: HCPCS | Performed by: NUCLEAR MEDICINE

## 2018-08-17 PROCEDURE — G8420 CALC BMI NORM PARAMETERS: HCPCS | Performed by: NUCLEAR MEDICINE

## 2018-08-17 RX ORDER — PYRIDOXINE HCL (VITAMIN B6) 100 MG
500 TABLET ORAL 2 TIMES DAILY
COMMUNITY

## 2018-08-17 NOTE — PROGRESS NOTES
echo. The 2D echo showed moderate degree of pericardial effusion. It does seem to be worst or better than previous echo. No evidence of tamponade.  DOPPLER ECHOCARDIOGRAPHY  1 27 2006    Normal global LV function. Mild biatrial enlargement. Mildly sclerotic aortic & mitral valve w/no stenosis. Mild MR. Mild TR. Small to moderate pericardial effusion w/no obvious tamponade.  PRE-MALIGNANT / BENIGN SKIN LESION EXCISION Left 12/20/13    left leg lesion excision x2, frozen section, skin graft closure    ROTATOR CUFF REPAIR  09/2001    RIGHT    ROTATOR CUFF REPAIR  04/15/2009    LEFT     SKIN CANCER EXCISION  06/2006      Rt leg     Family History   Problem Relation Age of Onset    Heart Disease Mother     Stroke Father      Social History   Substance Use Topics    Smoking status: Never Smoker    Smokeless tobacco: Never Used    Alcohol use No      Current Outpatient Prescriptions   Medication Sig Dispense Refill    Cranberry 500 MG CAPS Take 500 mg by mouth 2 times daily      sulfamethoxazole-trimethoprim (BACTRIM DS) 800-160 MG per tablet Take 1 tablet by mouth 2 times daily for 10 days 20 tablet 0    fluocinonide (LIDEX) 0.05 % cream Apply topically 2 times daily Apply topically 2 times daily.  Probiotic Product (PROBIOTIC ADVANCED PO) Take by mouth      mupirocin (BACTROBAN) 2 % ointment Apply topically 3 times daily Apply topically 3 times daily.       carbidopa-levodopa (SINEMET)  MG per tablet Take 2 tablets by mouth 3 times daily 540 tablet 1    hydrOXYzine (ATARAX) 10 MG tablet Take 20 mg by mouth nightly as needed       metoprolol tartrate (LOPRESSOR) 50 MG tablet TAKE 1 TABLET BY MOUTH TWO  TIMES DAILY 180 tablet 3    glimepiride (AMARYL) 2 MG tablet TAKE 1 TABLET BY MOUTH  DAILY WITH BREAKFAST 90 tablet 3    lisinopril (PRINIVIL;ZESTRIL) 30 MG tablet Take 1 tablet by mouth every evening 90 tablet 3    atorvastatin (LIPITOR) 20 MG tablet Take 1 tablet by mouth daily

## 2018-08-22 ENCOUNTER — OFFICE VISIT (OUTPATIENT)
Dept: NEUROLOGY | Age: 83
End: 2018-08-22
Payer: MEDICARE

## 2018-08-22 VITALS
HEART RATE: 70 BPM | SYSTOLIC BLOOD PRESSURE: 110 MMHG | BODY MASS INDEX: 25.4 KG/M2 | WEIGHT: 138 LBS | DIASTOLIC BLOOD PRESSURE: 68 MMHG | HEIGHT: 62 IN

## 2018-08-22 DIAGNOSIS — G20 PARKINSON'S DISEASE (HCC): Primary | ICD-10-CM

## 2018-08-22 PROCEDURE — 99213 OFFICE O/P EST LOW 20 MIN: CPT | Performed by: PSYCHIATRY & NEUROLOGY

## 2018-08-22 PROCEDURE — G8598 ASA/ANTIPLAT THER USED: HCPCS | Performed by: PSYCHIATRY & NEUROLOGY

## 2018-08-22 PROCEDURE — G8427 DOCREV CUR MEDS BY ELIG CLIN: HCPCS | Performed by: PSYCHIATRY & NEUROLOGY

## 2018-08-22 PROCEDURE — 1036F TOBACCO NON-USER: CPT | Performed by: PSYCHIATRY & NEUROLOGY

## 2018-08-22 PROCEDURE — 1101F PT FALLS ASSESS-DOCD LE1/YR: CPT | Performed by: PSYCHIATRY & NEUROLOGY

## 2018-08-22 PROCEDURE — G8417 CALC BMI ABV UP PARAM F/U: HCPCS | Performed by: PSYCHIATRY & NEUROLOGY

## 2018-08-22 PROCEDURE — 4040F PNEUMOC VAC/ADMIN/RCVD: CPT | Performed by: PSYCHIATRY & NEUROLOGY

## 2018-08-22 PROCEDURE — 1090F PRES/ABSN URINE INCON ASSESS: CPT | Performed by: PSYCHIATRY & NEUROLOGY

## 2018-08-22 PROCEDURE — G8400 PT W/DXA NO RESULTS DOC: HCPCS | Performed by: PSYCHIATRY & NEUROLOGY

## 2018-08-22 PROCEDURE — 1123F ACP DISCUSS/DSCN MKR DOCD: CPT | Performed by: PSYCHIATRY & NEUROLOGY

## 2018-08-22 NOTE — PROGRESS NOTES
test strips (TAYLOR CONTOUR TEST) strip, Test bid Dx: E11.9, Disp: 100 each, Rfl: 5    sucralfate (CARAFATE) 1 GM tablet, Take 1 tablet by mouth 4 times daily, Disp: 360 tablet, Rfl: 3    nystatin (MYCOSTATIN) 536599 UNIT/GM cream, Apply topically 2 times daily Apply topically 2 times daily. , Disp: , Rfl:     triamcinolone (KENALOG) 0.1 % cream, Apply topically 2 times daily Apply topically 2 times daily. , Disp: , Rfl:     UNABLE TO FIND, CardioMax daily, Disp: , Rfl:     magnesium oxide (MAG-OX) 400 MG tablet, Take 400 mg by mouth daily, Disp: , Rfl:     Lactase (DAIRY-RELIEF PO), Take by mouth daily, Disp: , Rfl:     NONFORMULARY, Vitamist: 4 sprays of each Vitamin combination into the mouth twice a day Vitamin B-12 500mc sprays into the mouth twice a day Vitamin D3: 4 sprays into the mouth twice a day Vitamin C+Zinc: 4 sprays into the mouth twice a day Women's [place] (Vit A, C, D, E, Thiamine, Niacin, Vitamin B6, Folate, Vit B12, Biotin, Pantothenic acid): 4 sprays into the mouth twice a day, Disp: , Rfl:     aspirin 81 MG EC tablet, Take 81 mg by mouth daily. , Disp: , Rfl:       PE:   Vitals:    18 1432   BP: (!) 152/82   Site: Left Arm   Position: Sitting   Cuff Size: Medium Adult   Pulse: 70   Weight: 138 lb (62.6 kg)   Height: 5' 2\" (1.575 m)        General Appearance:  awake, alert, oriented, in no distress, well developed, cooperative. Language is Intact. Neck: There is no carotid bruits. The Neck is supple. Neuro: CN 2-12 grossly intact with no focal deficits. Power right leg  -5/5, otherwise power is 5/5 Throughout symmetric, Reflexes are symmetric. Long tracts are intact. Cerebellar exam is Intact. Sensory exam is intact to light touch. no resting tremor, no pinrolling, mild bradykinesia, mild Hypohonia, normal blink rate, slow hesitant exiting chair, decreased arm swing, she is stooped forward, she is slow with her ambulation. She has mild Gait shuffling, no freezing with her gait.

## 2018-08-23 ENCOUNTER — TELEPHONE (OUTPATIENT)
Dept: UROLOGY | Age: 83
End: 2018-08-23

## 2018-08-23 ENCOUNTER — PROCEDURE VISIT (OUTPATIENT)
Dept: UROLOGY | Age: 83
End: 2018-08-23
Payer: MEDICARE

## 2018-08-23 VITALS
HEIGHT: 61 IN | BODY MASS INDEX: 25.49 KG/M2 | DIASTOLIC BLOOD PRESSURE: 70 MMHG | WEIGHT: 135 LBS | SYSTOLIC BLOOD PRESSURE: 120 MMHG

## 2018-08-23 DIAGNOSIS — N39.46 MIXED INCONTINENCE: Primary | ICD-10-CM

## 2018-08-23 LAB — POST VOID RESIDUAL (PVR): 159 ML

## 2018-08-23 PROCEDURE — 99999 PR OFFICE/OUTPT VISIT,PROCEDURE ONLY: CPT | Performed by: UROLOGY

## 2018-08-23 PROCEDURE — 52000 CYSTOURETHROSCOPY: CPT | Performed by: UROLOGY

## 2018-08-23 PROCEDURE — 51798 US URINE CAPACITY MEASURE: CPT | Performed by: UROLOGY

## 2018-08-23 RX ORDER — FLUOROURACIL 5 MG/G
CREAM TOPICAL DAILY
Status: ON HOLD | COMMUNITY
End: 2018-11-03 | Stop reason: HOSPADM

## 2018-08-23 RX ORDER — CARBIDOPA AND LEVODOPA 25; 100 MG/1; MG/1
1 TABLET, EXTENDED RELEASE ORAL 2 TIMES DAILY
Qty: 60 TABLET | Refills: 3 | Status: SHIPPED | OUTPATIENT
Start: 2018-08-23 | End: 2019-01-18

## 2018-08-23 NOTE — TELEPHONE ENCOUNTER
Carbo/Levo Tab 25 100 Mg   2 tab  3 times daily     Carbidop/lev 25 mg- 100mg CR  1 Tab 2 times daily     Verified medications and patient is taking both and requesting refill on Carb/Levo CR.

## 2018-08-23 NOTE — TELEPHONE ENCOUNTER
Pt daughter Emma Andino called in and spoke to Graciela Ang earlier. She told Graciela Ang they need more caths ordered she has 9 left. She caths HS. Called yu back to verify the [place] size and told her it would be either 12, 14,16,18. She will call back and let us know what size needs ordered through [facility].

## 2018-08-23 NOTE — PROGRESS NOTES
Ms. Deven Tellez was seen in follow up for cystoscopy as per plan developed by Maris Pruett CNP. Cystoscopy was performed without difficulty and it was well tolerated. Past Medical History:   Diagnosis Date    Atypical chest pain     CAD (coronary artery disease)     Chronic LBBB (left bundle branch block)     Diabetes mellitus type II     Esophageal stricture     History of esophageal stricture.  FH: CAD (coronary artery disease)     Mom and dad both with heart disease at mid to late age.  GERD (gastroesophageal reflux disease)     History of gastritis     History of skin cancer     Hypercholesterolemia     Hyperlipidemia     Hypertension     Imbalance     As far as imbalance is concerned, asked patient to follow-up with Dr. Susannah Sanders since she follows with him on a regular basis.  Lactose intolerance     Nummular dermatitis     Parkinson's disease ([address]     Restless legs syndrome (RLS)     S/P CABG (coronary artery bypass graft)     SCC (squamous cell carcinoma)        Past Surgical History:   Procedure Laterality Date    CARDIAC SURGERY      CARDIOVASCULAR STRESS TEST  4 09 2009    Gated SPECT images revealed normal global wall motion with EF of 60%. Persantine test associated w/nonspecific symptoms. EKG is nondiagnostic w/baseline LBBB. No obvious stress-induced ischemia by Cardiolite. EF within normal limits.  CARDIOVASCULAR STRESS TEST  7 10 2007    The gated SPECT images revealed normal wall motion with EF of 69%. Poor exercise tolerance w/SOB on exertion. EKG is nondiagnostic. Cardiolite scan revealing no obvious stress-induced ischemia. EF 60%.  CARDIOVASCULAR STRESS TEST  2 03 2006    Fair exercise tolerance w/SOB on exertion. No EKG evidence of ischemia. Patient should be able to proceed with phase II cardiac rehab.  CATARACT REMOVAL      CATARACT REMOVAL  06/08/2016    AND 6/29/16    DIAGNOSTIC CARDIAC CATH LAB PROCEDURE  12 23 2005    LV was obtained.  AGEE 4 sprays of each Vitamin combination into the mouth twice a day  Vitamin B-12 500mc sprays into the mouth twice a day  Vitamin D3: 4 sprays into the mouth twice a day  Vitamin C+Zinc: 4 sprays into the mouth twice a day  Women's Health (Vit A, C, D, E, Thiamine, Niacin, Vitamin B6, Folate, Vit B12, Biotin, Pantothenic acid): 4 sprays into the mouth twice a day      aspirin 81 MG EC tablet Take 81 mg by mouth daily. No current facility-administered medications on file prior to visit. Allergies   Allergen Reactions    Latex Rash    Morphine     Polysporin [Bacitracin-Polymyxin B]     Tape Zandra Frock Tape] Itching       Family History   Problem Relation Age of Onset    Heart Disease Mother     Stroke Father        Social History     Social History    Marital status:      Spouse name: N/A    Number of children: N/A    Years of education: N/A     Occupational History    Not on file. Social History Main Topics    Smoking status: Never Smoker    Smokeless tobacco: Never Used    Alcohol use No    Drug use: No    Sexual activity: Not on file     Other Topics Concern    Not on file     Social History Narrative    No narrative on file       Review of Systems  No problems with ears, nose or throat. No problems with eyes. No chest pain, shortness of breath, abdominal pain, extremity pain or weakness, and no neurological deficits. No rashes. No swollen glands or lymph nodes.  symptoms per HPI. The remainder of the review of symptoms is negative. Exam  General: alert and oriented. Cooperative. HENT: Normocephalic, Atraumatic. Eyes: No scleral icterus, mucous membranes moist.  Respiratory: Effort normal.   Skin: No rashes or obvious lesions.     Labs    Results for POC orders placed in visit on 18   poct post void residual   Result Value Ref Range    post void residual 159 ml       Lab Results   Component Value Date    CREATININE 0.6 2018    BUN 13 2018  (L) 06/21/2018    K 4.0 06/21/2018    CL 92 (L) 06/21/2018    CO2 25 06/21/2018       Radiology  The patient has had a CT scan that I have reviewed along with its accompanying report. The study demonstrates wall thickening but no other abnormalities. PROCEDURE: CT ABDOMEN PELVIS W WO CONTRAST       CLINICAL INFORMATION: Mixed stress and urge urinary incontinence, Gross hematuria .       COMPARISON:        TECHNIQUE: 5 mm axial CT images were obtained through the abdomen and pelvis before and after the administration of intravenous and oral contrast. Coronal and sagittal reconstructions were obtained.       All CT scans at this facility use dose modulation, iterative reconstruction, and/or weight-based dosing when appropriate to reduce radiation dose to as low as reasonably achievable.       FINDINGS:   Lung base: Mild nodular scar within the lingula is stable.       Liver and spleen: normal enhancement, no masses seen.       Biliary: normal; no calculi.       Pancreas: head, body, and tail unremarkable.       Adrenals: symmetric, no calcifications or masses.       Kidneys: Precontrast images show no intrarenal calculi. No ureteral calculi or dilatation. Postcontrast images show no definite enhancing mass or hydronephrosis. 5 mm low-attenuation area potentially could relate to a cyst on the right. Its small size    limits assessment.       Aorta: normal caliber.       Lymph Nodes: normal size.       Bowel: Normal caliber. No evidence of obstruction.       Bones: normal for age.       Bladder: Anteriorly the bladder appears mildly thickened, though it is incompletely distended. Posteriorly and laterally its margins are normal.       Reproductive: 16 mm low-attenuation focus projects within the left ovary suspicious for a tiny cyst.                   Impression   Questionable thickening of the urinary bladder.  No kidney mass, stone or obstruction.               Cystoscopy  After obtaining informed

## 2018-08-24 ENCOUNTER — PATIENT MESSAGE (OUTPATIENT)
Dept: UROLOGY | Age: 83
End: 2018-08-24

## 2018-08-30 ENCOUNTER — CARE COORDINATION (OUTPATIENT)
Dept: CARE COORDINATION | Age: 83
End: 2018-08-30

## 2018-08-30 NOTE — CARE COORDINATION
metoprolol tartrate (LOPRESSOR) 50 MG tablet TAKE 1 TABLET BY MOUTH TWO  TIMES DAILY 5/10/18   [facility], APRN - CNP   glimepiride (AMARYL) 2 MG tablet TAKE 1 TABLET BY MOUTH  DAILY WITH BREAKFAST 5/10/18   [facility], APRN - CNP   lisinopril (PRINIVIL;ZESTRIL) 30 MG tablet Take 1 tablet by mouth every evening 5/10/18   [facility], APRN - CNP   atorvastatin (LIPITOR) 20 MG tablet Take 1 tablet by mouth daily 5/10/18   [facility], APRN - CNP   omeprazole (PRILOSEC) 20 MG delayed release capsule Take 1 capsule by mouth daily 5/10/18   [facility], APRN - CNP   glucose blood VI test strips (TAYLOR CONTOUR TEST) strip Test bid Dx: E11.9 18   Jimmy Morales MD   sucralfate (CARAFATE) 1 GM tablet Take 1 tablet by mouth 4 times daily 18   [facility], APRN - CNP   nystatin (MYCOSTATIN) 601549 UNIT/GM cream Apply topically 2 times daily Apply topically 2 times daily. Historical Provider, MD   triamcinolone (KENALOG) 0.1 % cream Apply topically 2 times daily Apply topically 2 times daily. Historical Provider, MD   UNABLE TO FIND CardioMax daily    Historical Provider, MD   magnesium oxide (MAG-OX) 400 MG tablet Take 400 mg by mouth daily    Historical Provider, MD   Lactase (DAIRY-RELIEF PO) Take by mouth daily    Historical Provider, MD   NONFORMULARY Vitamist: 4 sprays of each Vitamin combination into the mouth twice a day  Vitamin B-12 500mc sprays into the mouth twice a day  Vitamin D3: 4 sprays into the mouth twice a day  Vitamin C+Zinc: 4 sprays into the mouth twice a day  Women's Health (Vit A, C, D, E, Thiamine, Niacin, Vitamin B6, Folate, Vit B12, Biotin, Pantothenic acid): 4 sprays into the mouth twice a day    Historical Provider, MD   aspirin 81 MG EC tablet Take 81 mg by mouth daily.       Historical Provider, MD       Future Appointments  Date Time Provider J Carlos Patterson   10/1/2018 2:30 PM MD ZORA Gould AM, II.[facility] - [facility]   2019 2:45 PM Loni Fulton

## 2018-09-05 ENCOUNTER — OFFICE VISIT (OUTPATIENT)
Dept: FAMILY MEDICINE CLINIC | Age: 83
End: 2018-09-05
Payer: MEDICARE

## 2018-09-05 VITALS
HEIGHT: 61 IN | TEMPERATURE: 98.8 F | WEIGHT: 136.63 LBS | BODY MASS INDEX: 25.79 KG/M2 | HEART RATE: 68 BPM | DIASTOLIC BLOOD PRESSURE: 76 MMHG | SYSTOLIC BLOOD PRESSURE: 140 MMHG | RESPIRATION RATE: 18 BRPM

## 2018-09-05 DIAGNOSIS — R30.0 BURNING WITH URINATION: Primary | ICD-10-CM

## 2018-09-05 DIAGNOSIS — R31.29 HEMATURIA, MICROSCOPIC: ICD-10-CM

## 2018-09-05 DIAGNOSIS — N39.0 RECURRENT UTI: ICD-10-CM

## 2018-09-05 LAB
BILIRUBIN, POC: NEGATIVE
BLOOD URINE, POC: ABNORMAL
CLARITY, POC: ABNORMAL
COLOR, POC: YELLOW
GLUCOSE URINE, POC: NEGATIVE
KETONES, POC: NEGATIVE
LEUKOCYTE EST, POC: ABNORMAL
NITRITE, POC: NEGATIVE
PH, POC: 6
PROTEIN, POC: ABNORMAL
SPECIFIC GRAVITY, POC: 1.01
UROBILINOGEN, POC: ABNORMAL

## 2018-09-05 PROCEDURE — G8417 CALC BMI ABV UP PARAM F/U: HCPCS | Performed by: NURSE PRACTITIONER

## 2018-09-05 PROCEDURE — 90662 IIV NO PRSV INCREASED AG IM: CPT | Performed by: NURSE PRACTITIONER

## 2018-09-05 PROCEDURE — G8400 PT W/DXA NO RESULTS DOC: HCPCS | Performed by: NURSE PRACTITIONER

## 2018-09-05 PROCEDURE — 1123F ACP DISCUSS/DSCN MKR DOCD: CPT | Performed by: NURSE PRACTITIONER

## 2018-09-05 PROCEDURE — G0008 ADMIN INFLUENZA VIRUS VAC: HCPCS | Performed by: NURSE PRACTITIONER

## 2018-09-05 PROCEDURE — G8598 ASA/ANTIPLAT THER USED: HCPCS | Performed by: NURSE PRACTITIONER

## 2018-09-05 PROCEDURE — 99213 OFFICE O/P EST LOW 20 MIN: CPT | Performed by: NURSE PRACTITIONER

## 2018-09-05 PROCEDURE — 1090F PRES/ABSN URINE INCON ASSESS: CPT | Performed by: NURSE PRACTITIONER

## 2018-09-05 PROCEDURE — G8427 DOCREV CUR MEDS BY ELIG CLIN: HCPCS | Performed by: NURSE PRACTITIONER

## 2018-09-05 PROCEDURE — 1101F PT FALLS ASSESS-DOCD LE1/YR: CPT | Performed by: NURSE PRACTITIONER

## 2018-09-05 PROCEDURE — 4040F PNEUMOC VAC/ADMIN/RCVD: CPT | Performed by: NURSE PRACTITIONER

## 2018-09-05 PROCEDURE — 1036F TOBACCO NON-USER: CPT | Performed by: NURSE PRACTITIONER

## 2018-09-05 PROCEDURE — 81003 URINALYSIS AUTO W/O SCOPE: CPT | Performed by: NURSE PRACTITIONER

## 2018-09-05 RX ORDER — CIPROFLOXACIN 500 MG/1
500 TABLET, FILM COATED ORAL 2 TIMES DAILY
Qty: 28 TABLET | Refills: 0 | Status: SHIPPED | OUTPATIENT
Start: 2018-09-05 | End: 2018-09-06 | Stop reason: SINTOL

## 2018-09-05 NOTE — PATIENT INSTRUCTIONS
pneumococcal vaccine (PCV13) and/or DTaP vaccine at the same time might be slightly more likely to have a seizure caused by fever. Ask your doctor for more information. Tell your doctor if a child who is getting flu vaccine has ever had a seizure  Problems that could happen after any injected vaccine:  · People sometimes faint after a medical procedure, including vaccination. Sitting or lying down for about 15 minutes can help prevent fainting, and injuries caused by a fall. Tell your doctor if you feel dizzy, or have vision changes or ringing in the ears. · Some people get severe pain in the shoulder and have difficulty moving the arm where a shot was given. This happens very rarely. · Any medication can cause a severe allergic reaction. Such reactions from a vaccine are very rare, estimated at about 1 in a million doses, and would happen within a few minutes to a few hours after the vaccination. As with any medicine, there is a very remote chance of a vaccine causing a serious injury or death. The safety of vaccines is always being monitored. For more information, visit: www.cdc.gov/vaccinesafety/. What if there is a serious reaction? What should I look for? · Look for anything that concerns you, such as signs of a severe allergic reaction, very high fever, or unusual behavior. Signs of a severe allergic reaction can include hives, swelling of the face and throat, difficulty breathing, a fast heartbeat, dizziness, and weakness - usually within a few minutes to a few hours after the vaccination. What should I do? · If you think it is a severe allergic reaction or other emergency that can't wait, call 9-1-1 and get the person to the nearest hospital. Otherwise, call your doctor. · Reactions should be reported to the \"Vaccine Adverse Event Reporting System\" (VAERS). Your doctor should file this report, or you can do it yourself through the VAERS website at www.vaers. [facility].gov, or by calling 9-491-047-006-962-6852. [facility] does not give medical advice. The National Vaccine Injury Compensation Program  The National Vaccine Injury Compensation Program (VICP) is a federal program that was created to compensate people who may have been injured by certain vaccines. Persons who believe they may have been injured by a vaccine can learn about the program and about filing a claim by calling 1-995.929.8613 or visiting the [employer] website at www.[facility].gov/vaccinecompensation. There is a time limit to file a claim for compensation. How can I learn more? · Ask your healthcare provider. He or she can give you the vaccine package insert or suggest other sources of information. · Call your local or state health department. · Contact the Centers for Disease Control and Prevention (CDC):  ¨ Call 3-394.647.5344 (1-800-CDC-INFO) or  ¨ Visit CDC's website at www.cdc.gov/flu  Vaccine Information Statement  Inactivated Influenza Vaccine  8/7/2015)  42 ETIENNE Korin Livia [id number]  Department of Health and Human Services  Centers for Disease Control and Prevention  Many Vaccine Information Statements are available in Beninese and other languages. See www.immunize.org/vis. Muchas hojas de información sobre vacunas están disponibles en español y en otros idiomas. Visite www.immunize.org/vis. Care instructions adapted under license by [facility] ([facility]). If you have questions about a medical condition or this instruction, always ask your healthcare professional. [place] [address] any warranty or liability for your use of this information.

## 2018-09-05 NOTE — PROGRESS NOTES
Immunization(s) given during visit:    Immunizations     Name Date Dose Route    Influenza, High Dose (Fluzone 65 yrs and older) 9/5/2018 0.5 mL Intramuscular    Site: Deltoid- Left    Lot: [id number]    NDC: 26054-277-61          Most recent Vaccine Information Sheet given to pt

## 2018-09-06 ENCOUNTER — NURSE TRIAGE (OUTPATIENT)
Dept: ADMINISTRATIVE | Age: 83
End: 2018-09-06

## 2018-09-06 DIAGNOSIS — K92.1 BLACK STOOLS: Primary | ICD-10-CM

## 2018-09-06 RX ORDER — CEPHALEXIN 500 MG/1
500 CAPSULE ORAL 3 TIMES DAILY
Qty: 30 CAPSULE | Refills: 0 | Status: SHIPPED | OUTPATIENT
Start: 2018-09-06 | End: 2018-09-16

## 2018-09-06 NOTE — TELEPHONE ENCOUNTER
Spoke with patient--Denies abdominal pain, chest pain, fever, dizziness, SOB or heartburn. She is not taking iron or Pepto Bismol.

## 2018-09-07 ENCOUNTER — HOSPITAL ENCOUNTER (OUTPATIENT)
Age: 83
Discharge: HOME | End: 2018-09-07
Payer: MEDICARE

## 2018-09-07 DIAGNOSIS — K92.1 BLACK STOOLS: ICD-10-CM

## 2018-09-07 LAB
ANION GAP SERPL CALCULATED.3IONS-SCNC: 13 MEQ/L (ref 8–16)
BUN BLDV-MCNC: 14 MG/DL (ref 7–22)
CALCIUM SERPL-MCNC: 9.8 MG/DL (ref 8.5–10.5)
CHLORIDE BLD-SCNC: 99 MEQ/L (ref 98–111)
CO2: 27 MEQ/L (ref 23–33)
CREAT SERPL-MCNC: 0.6 MG/DL (ref 0.4–1.2)
ERYTHROCYTE [DISTWIDTH] IN BLOOD BY AUTOMATED COUNT: 12.8 % (ref 11.5–14.5)
ERYTHROCYTE [DISTWIDTH] IN BLOOD BY AUTOMATED COUNT: 38.8 FL (ref 35–45)
GFR SERPL CREATININE-BSD FRML MDRD: > 90 ML/MIN/1.73M2
GLUCOSE BLD-MCNC: 100 MG/DL (ref 70–108)
HCT VFR BLD CALC: 38.8 % (ref 37–47)
HEMOGLOBIN: 13 GM/DL (ref 12–16)
MCH RBC QN AUTO: 28.3 PG (ref 26–33)
MCHC RBC AUTO-ENTMCNC: 33.5 GM/DL (ref 32.2–35.5)
MCV RBC AUTO: 84.5 FL (ref 81–99)
ORGANISM: ABNORMAL
PLATELET # BLD: 169 THOU/MM3 (ref 130–400)
PMV BLD AUTO: 9.3 FL (ref 9.4–12.4)
POTASSIUM SERPL-SCNC: 3.9 MEQ/L (ref 3.5–5.2)
RBC # BLD: 4.59 MILL/MM3 (ref 4.2–5.4)
SODIUM BLD-SCNC: 139 MEQ/L (ref 135–145)
URINE CULTURE, ROUTINE: ABNORMAL
WBC # BLD: 5.8 THOU/MM3 (ref 4.8–10.8)

## 2018-09-07 PROCEDURE — 80048 BASIC METABOLIC PNL TOTAL CA: CPT

## 2018-09-07 PROCEDURE — 85027 COMPLETE CBC AUTOMATED: CPT

## 2018-09-07 PROCEDURE — 36415 COLL VENOUS BLD VENIPUNCTURE: CPT

## 2018-09-11 ENCOUNTER — HOSPITAL ENCOUNTER (EMERGENCY)
Age: 83
Discharge: HOME | End: 2018-09-11
Payer: MEDICARE

## 2018-09-11 VITALS
RESPIRATION RATE: 16 BRPM | SYSTOLIC BLOOD PRESSURE: 170 MMHG | DIASTOLIC BLOOD PRESSURE: 71 MMHG | TEMPERATURE: 98.2 F | HEART RATE: 69 BPM | OXYGEN SATURATION: 95 %

## 2018-09-11 DIAGNOSIS — T78.3XXA ANGIOTENSIN CONVERTING ENZYME INHIBITOR-AGGRAVATED ANGIOEDEMA, INITIAL ENCOUNTER: Primary | ICD-10-CM

## 2018-09-11 DIAGNOSIS — T46.4X5A ANGIOTENSIN CONVERTING ENZYME INHIBITOR-AGGRAVATED ANGIOEDEMA, INITIAL ENCOUNTER: Primary | ICD-10-CM

## 2018-09-11 PROCEDURE — 99283 EMERGENCY DEPT VISIT LOW MDM: CPT

## 2018-09-11 RX ORDER — AMLODIPINE BESYLATE 10 MG/1
10 TABLET ORAL DAILY
Qty: 30 TABLET | Refills: 0 | Status: SHIPPED | OUTPATIENT
Start: 2018-09-11 | End: 2018-09-21 | Stop reason: SDUPTHER

## 2018-09-11 RX ORDER — METHYLPREDNISOLONE 4 MG/1
TABLET ORAL
Qty: 1 KIT | Refills: 0 | Status: SHIPPED | OUTPATIENT
Start: 2018-09-11 | End: 2018-09-17

## 2018-09-11 RX ORDER — FAMOTIDINE 20 MG/1
20 TABLET, FILM COATED ORAL 2 TIMES DAILY
Qty: 30 TABLET | Refills: 0 | Status: ON HOLD | OUTPATIENT
Start: 2018-09-11 | End: 2018-11-03 | Stop reason: HOSPADM

## 2018-09-11 ASSESSMENT — ENCOUNTER SYMPTOMS
TROUBLE SWALLOWING: 0
ABDOMINAL PAIN: 0
NAUSEA: 0
EYE REDNESS: 0
CHEST TIGHTNESS: 0
COUGH: 0
ABDOMINAL DISTENTION: 0
CONSTIPATION: 0
SINUS PRESSURE: 0
SHORTNESS OF BREATH: 0
COLOR CHANGE: 0
DIARRHEA: 0
VOICE CHANGE: 0
PHOTOPHOBIA: 0
VOMITING: 0
BLOOD IN STOOL: 0
RHINORRHEA: 0
WHEEZING: 0
BACK PAIN: 0
FACIAL SWELLING: 1
SORE THROAT: 0

## 2018-09-11 NOTE — ED PROVIDER NOTES
Med      UNABLE TO FIND CardioMax dailyHistorical Med      magnesium oxide (MAG-OX) 400 MG tablet Take 400 mg by mouth dailyHistorical Med      Lactase (DAIRY-RELIEF PO) Take by mouth daily      NONFORMULARY Vitamist: 4 sprays of each Vitamin combination into the mouth twice a day  Vitamin B-12 500mc sprays into the mouth twice a day  Vitamin D3: 4 sprays into the mouth twice a day  Vitamin C+Zinc: 4 sprays into the mouth twice a day  Women's Health (Vit  A, C, D, E, Thiamine, Niacin, Vitamin B6, Folate, Vit B12, Biotin, Pantothenic acid): 4 sprays into the mouth twice a day      aspirin 81 MG EC tablet Take 81 mg by mouth daily. ALLERGIES     is allergic to latex; morphine; polysporin [bacitracin-polymyxin b]; and tape [adhesive tape]. FAMILY HISTORY     indicated that her mother is . She indicated that her father is . family history includes Heart Disease in her mother; Stroke in her father. SOCIAL HISTORY      reports that she has never smoked. She has never used smokeless tobacco. She reports that she does not drink alcohol or use drugs. PHYSICAL EXAM     INITIAL VITALS:  oral temperature is 98.2 °F (36.8 °C). Her blood pressure is 170/71 (abnormal) and her pulse is 69. Her respiration is 16 and oxygen saturation is 95%. Physical Exam   Constitutional: She is oriented to person, place, and time. She appears well-developed and well-nourished. HENT:   Head: Normocephalic. Right Ear: External ear normal.   Left Ear: External ear normal.   Mouth/Throat: Uvula is midline and oropharynx is clear and moist.   Lower lip edema. Eyes: Pupils are equal, round, and reactive to light. Conjunctivae and EOM are normal.   Neck: Normal range of motion. Neck supple. Cardiovascular: Normal rate, regular rhythm, S1 normal, S2 normal, normal heart sounds and intact distal pulses. Pulmonary/Chest: Effort normal and breath sounds normal. No respiratory distress.  She exhibits no tenderness. Abdominal: Soft. Normal appearance and bowel sounds are normal. She exhibits no distension. There is no tenderness. Musculoskeletal: Normal range of motion. Lymphadenopathy:     She has no cervical adenopathy. Neurological: She is alert and oriented to person, place, and time. Skin: Skin is warm, dry and intact. Psychiatric: She has a normal mood and affect. Her speech is normal and behavior is normal. Thought content normal.   Nursing note and vitals reviewed. DIFFERENTIAL DIAGNOSIS:   Medication side effect     DIAGNOSTIC RESULTS     EKG: All EKG's are interpreted by the Emergency Department Physician who either signs or Co-signs this chart in the absence of a cardiologist.    None    RADIOLOGY: non-plain film images(s) such as CT, Ultrasound and MRI are read by the radiologist.  Plain radiographic images are visualized and preliminarily interpreted by the emergency physician unless otherwise stated below. None    LABS:   Labs Reviewed - No data to display     None    EMERGENCY DEPARTMENT COURSE:   Vitals:    Vitals:    09/11/18 1129   BP: (!) 180/70   Pulse: 69   Resp: 16   Temp: 98.2 °F (36.8 °C)   TempSrc: Oral   SpO2: 95%       MDM  The patient was seen and evaluated within the ED today following lower lip swelling. Within the department, I observed the patient's vital signs to be within acceptable range, with the exception of hypertension. On exam, I appreciated lower lip edema. I observed the patient's condition to remain stable during the duration of their stay. I explained my proposed course of treatment to the patient, and they were amenable to my decision. I discussed findings with Dr. Yuli Allan the patient PCP, he would like to replace lisinopril with Norvasc. They were discharged home with Pepcid, Medrol, Massimo and Norvasc, and will return to the ED if their symptoms become more severe in nature, or otherwise change. Patient is to follow up with her PCP.   I advised the

## 2018-09-12 ENCOUNTER — CARE COORDINATION (OUTPATIENT)
Dept: CARE COORDINATION | Age: 83
End: 2018-09-12

## 2018-09-13 ENCOUNTER — NURSE TRIAGE (OUTPATIENT)
Dept: ADMINISTRATIVE | Age: 83
End: 2018-09-13

## 2018-09-18 NOTE — PROGRESS NOTES
TABLET BY MOUTH  DAILY WITH BREAKFAST 90 tablet 3    atorvastatin (LIPITOR) 20 MG tablet Take 1 tablet by mouth daily 90 tablet 3    omeprazole (PRILOSEC) 20 MG delayed release capsule Take 1 capsule by mouth daily 90 capsule 3    glucose blood VI test strips (TAYLOR CONTOUR TEST) strip Test bid Dx: E11.9 100 each 5    sucralfate (CARAFATE) 1 GM tablet Take 1 tablet by mouth 4 times daily 360 tablet 3    nystatin (MYCOSTATIN) 031747 UNIT/GM cream Apply topically 2 times daily Apply topically 2 times daily.  triamcinolone (KENALOG) 0.1 % cream Apply topically 2 times daily Apply topically 2 times daily.  UNABLE TO FIND CardioMax daily      magnesium oxide (MAG-OX) 400 MG tablet Take 400 mg by mouth daily      Lactase (DAIRY-RELIEF PO) Take by mouth daily      NONFORMULARY Vitamist: 4 sprays of each Vitamin combination into the mouth twice a day  Vitamin B-12 500mc sprays into the mouth twice a day  Vitamin D3: 4 sprays into the mouth twice a day  Vitamin C+Zinc: 4 sprays into the mouth twice a day  Women's [employer] (Vit A, C, D, E, Thiamine, Niacin, Vitamin B6, Folate, Vit B12, Biotin, Pantothenic acid): 4 sprays into the mouth twice a day      aspirin 81 MG EC tablet Take 81 mg by mouth daily.  famotidine (PEPCID) 20 MG tablet Take 1 tablet by mouth 2 times daily 30 tablet 0    amLODIPine (NORVASC) 10 MG tablet Take 1 tablet by mouth daily 30 tablet 0     No current facility-administered medications for this visit.       Allergies   Allergen Reactions    Latex Rash    Morphine     Polysporin [Bacitracin-Polymyxin B]     Tape [Adhesive Tape] Itching     Health Maintenance   Topic Date Due    DTaP/Tdap/Td vaccine (1 - Tdap) 1951    Shingles Vaccine (1 of 2 - 2 Dose Series) 1982    A1C test (Diabetic or Prediabetic)  2018    Diabetic foot exam  2018    Lipid screen  2019    Diabetic retinal exam  2019    DEXA (modify frequency per FRAX score) Status:   Future     Number of Occurrences:   1     Standing Expiration Date:   9/5/2019     Order Specific Question:   Specify (ex-cath, midstream, cysto, etc)? Answer:   midstream    INFLUENZA, HIGH DOSE, 65 YRS +, IM, PF, PREFILL SYR, 0.5ML (FLUZONE HD)    POCT Urinalysis No Micro (Auto)       Patient given educational materials - see patient instructions. Discussed use, benefit, and side effects of prescribed medications. All patient questions answered. Pt voiced understanding. Reviewed health maintenance. Patient agreed with treatment plan. Follow up as directed. Continue medications as prescribed.  Educational information on above diagnosis  provided per AVS.      Electronically signed by ROWDY Daniels CNP on 9/18/2018 at 10:15 AM

## 2018-09-21 ENCOUNTER — OFFICE VISIT (OUTPATIENT)
Dept: FAMILY MEDICINE CLINIC | Age: 83
End: 2018-09-21
Payer: MEDICARE

## 2018-09-21 VITALS
DIASTOLIC BLOOD PRESSURE: 60 MMHG | BODY MASS INDEX: 25.36 KG/M2 | WEIGHT: 134.2 LBS | RESPIRATION RATE: 16 BRPM | SYSTOLIC BLOOD PRESSURE: 119 MMHG | HEART RATE: 62 BPM

## 2018-09-21 DIAGNOSIS — G20 PARKINSON'S DISEASE (HCC): Primary | ICD-10-CM

## 2018-09-21 LAB
AMORPHOUS URATE: ABNORMAL
APPEARANCE: ABNORMAL
BACTERIA: ABNORMAL PER HPF
BILIRUBIN: NEGATIVE
COLOR: ABNORMAL
EPITHELIAL CELLS: ABNORMAL PER HPF
GLUCOSE BLD-MCNC: NEGATIVE MG/DL
KETONES, URINE: ABNORMAL
LEUKOCYTE ESTERASE, URINE: ABNORMAL
NITRITE, URINE: NEGATIVE
OCCULT BLOOD,URINE: NEGATIVE
PH: 5.5 (ref 5–9)
PROTEIN, URINE: ABNORMAL
RBC: ABNORMAL PER HPF (ref 0–5)
SP GRAVITY MISCELLANEOUS: 1.02 (ref 1–1.03)
UROBILINOGEN, URINE: NORMAL
WBC: >50 PER HPF (ref 0–5)

## 2018-09-21 PROCEDURE — 1123F ACP DISCUSS/DSCN MKR DOCD: CPT | Performed by: FAMILY MEDICINE

## 2018-09-21 PROCEDURE — 1090F PRES/ABSN URINE INCON ASSESS: CPT | Performed by: FAMILY MEDICINE

## 2018-09-21 PROCEDURE — G8598 ASA/ANTIPLAT THER USED: HCPCS | Performed by: FAMILY MEDICINE

## 2018-09-21 PROCEDURE — 4040F PNEUMOC VAC/ADMIN/RCVD: CPT | Performed by: FAMILY MEDICINE

## 2018-09-21 PROCEDURE — G8428 CUR MEDS NOT DOCUMENT: HCPCS | Performed by: FAMILY MEDICINE

## 2018-09-21 PROCEDURE — G8400 PT W/DXA NO RESULTS DOC: HCPCS | Performed by: FAMILY MEDICINE

## 2018-09-21 PROCEDURE — 99213 OFFICE O/P EST LOW 20 MIN: CPT | Performed by: FAMILY MEDICINE

## 2018-09-21 PROCEDURE — G8417 CALC BMI ABV UP PARAM F/U: HCPCS | Performed by: FAMILY MEDICINE

## 2018-09-21 PROCEDURE — 1036F TOBACCO NON-USER: CPT | Performed by: FAMILY MEDICINE

## 2018-09-21 PROCEDURE — 1101F PT FALLS ASSESS-DOCD LE1/YR: CPT | Performed by: FAMILY MEDICINE

## 2018-09-21 RX ORDER — AMLODIPINE BESYLATE 10 MG/1
10 TABLET ORAL DAILY
Qty: 90 TABLET | Refills: 3 | Status: ON HOLD | OUTPATIENT
Start: 2018-09-21 | End: 2018-11-03 | Stop reason: HOSPADM

## 2018-09-23 ASSESSMENT — ENCOUNTER SYMPTOMS
EYES NEGATIVE: 1
DIARRHEA: 0
VOMITING: 0
SHORTNESS OF BREATH: 0
CHEST TIGHTNESS: 0
RHINORRHEA: 0
SORE THROAT: 0
ABDOMINAL PAIN: 0
COUGH: 0
BACK PAIN: 0
NAUSEA: 0

## 2018-09-24 ENCOUNTER — TELEPHONE (OUTPATIENT)
Dept: UROLOGY | Age: 83
End: 2018-09-24

## 2018-09-24 DIAGNOSIS — N39.0 URINARY TRACT INFECTION WITHOUT HEMATURIA, SITE UNSPECIFIED: Primary | ICD-10-CM

## 2018-09-24 LAB
BILIRUBIN URINE: ABNORMAL MG/DL
BLOOD, URINE: NEGATIVE
CLARITY: ABNORMAL
COLOR: ABNORMAL
GLUCOSE URINE: NEGATIVE
KETONES, URINE: ABNORMAL
LEUKOCYTE ESTERASE, URINE: ABNORMAL
NITRITE, URINE: NEGATIVE
PH UA: 5.5 (ref 4.5–8)
PROTEIN UA: ABNORMAL
SPECIFIC GRAVITY UA: 1.02 (ref 1–1.03)
UROBILINOGEN, URINE: NORMAL

## 2018-09-24 NOTE — PROGRESS NOTES
[address] [address]  Dept: [phone]  Dept Fax: [phone]  Loc: [phone]  PROGRESS NOTE      Visit Date: 9/21/2018    Geovanny Louis is a 80 y.o. female who presents today for:  Chief Complaint   Patient presents with   [facility] ED Follow-up     [facility] er 9/11/18, swollen lips and cheeks, sent to er by dentist, was told it was due to lisinopril    Medication Check     carbidopa/[id number] has 2 bottles, taking both, both have different directions    Other     script for new bp machine         Subjective:  HPI  ER follow-up. Patient had angioedema felt to be due to her ACE inhibitor. She was switched to amlodipine. She's had some confusion about her Sinemet and its dosing. We reviewed Dr. Idalia Damon notes and instructed her to take has C instructed and is no    Review of Systems   Constitutional: Negative for activity change, appetite change, fatigue and fever. HENT: Negative for congestion, rhinorrhea and sore throat. Eyes: Negative. Respiratory: Negative for cough, chest tightness and shortness of breath. Cardiovascular: Negative for chest pain and palpitations. Gastrointestinal: Negative for abdominal pain, diarrhea, nausea and vomiting. Genitourinary: Negative for dysuria and urgency. Musculoskeletal: Negative for arthralgias and back pain. Neurological: Negative for dizziness and headaches. Psychiatric/Behavioral: Negative for dysphoric mood. The patient is not nervous/anxious. Past Medical History:   Diagnosis Date    Atypical chest pain     CAD (coronary artery disease)     Chronic LBBB (left bundle branch block)     Diabetes mellitus type II     Esophageal stricture     History of esophageal stricture.  FH: CAD (coronary artery disease)     Mom and dad both with heart disease at mid to late age.      GERD (gastroesophageal reflux disease)     History of gastritis     History of skin cancer     Hypercholesterolemia     Hyperlipidemia     Hypertension     Imbalance     As far as imbalance is concerned, asked patient to follow-up with Dr. Petr Kramer since she follows with him on a regular basis.  Lactose intolerance     Nummular dermatitis     Parkinson's disease ([address]     Restless legs syndrome (RLS)     S/P CABG (coronary artery bypass graft)     SCC (squamous cell carcinoma)       Past Surgical History:   Procedure Laterality Date    CARDIAC SURGERY      CARDIOVASCULAR STRESS TEST  4 09 2009    Gated SPECT images revealed normal global wall motion with EF of 60%. Persantine test associated w/nonspecific symptoms. EKG is nondiagnostic w/baseline LBBB. No obvious stress-induced ischemia by Cardiolite. EF within normal limits.  CARDIOVASCULAR STRESS TEST  7 10 2007    The gated SPECT images revealed normal wall motion with EF of 69%. Poor exercise tolerance w/SOB on exertion. EKG is nondiagnostic. Cardiolite scan revealing no obvious stress-induced ischemia. EF 60%.  CARDIOVASCULAR STRESS TEST  2 03 2006    Fair exercise tolerance w/SOB on exertion. No EKG evidence of ischemia. Patient should be able to proceed with phase II cardiac rehab.  CATARACT REMOVAL      CATARACT REMOVAL  06/08/2016    AND 6/29/16    DIAGNOSTIC CARDIAC CATH LAB PROCEDURE  12 23 2005    LV was obtained. AGEE projection showed preserved LV function w/estimated EF at 55%. No significant MR. Patent left main coronary artery. Tortuous LAD which appeared to be patent. Patent left circumflex artery. High-grade stenosis around 99% in very large dominant RCA w/heavy calcification at the ostium. Normal LV function.  DOPPLER ECHOCARDIOGRAPHY  4 09 2009    Global LV function w/in lower limits of normal, with mild anteroseptal hypokinesis. EF of 50-55%. Light LVH. Mild to moderate left atrial enlargement. Calcific aortic and mitral valve w/no obvious stenosis.  No obvious pericardial effusion.  DOPPLER ECHOCARDIOGRAPHY  7 10 2007    LV function w/in lower limits of normal w/peridoxical septal wall motion. Moderate left atrial enlargement. Mild MR. Mild TR. Calcified valves w/no obvious stenosis. No pericardial effusion.  DOPPLER ECHOCARDIOGRAPHY  4 14 2006    There appears to be significant improvement from previous echo w/complete resolution of pericardial effusion and normal LV function. EF of 60%.  DOPPLER ECHOCARDIOGRAPHY  3 21 2006    Normal LV function. Mild LV hypertrophy. Mild biatrial enlargement. Mildly calcific aortic and mitral valve w/no stenosis. Mild MR. Mild TR. Minimal residual pericardial effusion w/significant improvement from previous echo.  DOPPLER ECHOCARDIOGRAPHY  3 16 2006    Interval change from previous echocardiogram w/worsening of effusion. Correlation clinically. Consideration of pericardial centesis. Will obtain a [facility] consult.  DOPPLER ECHOCARDIOGRAPHY  1 31 2006    No significant change from previous echo. The 2D echo showed moderate degree of pericardial effusion. It does seem to be worst or better than previous echo. No evidence of tamponade.  DOPPLER ECHOCARDIOGRAPHY  1 27 2006    Normal global LV function. Mild biatrial enlargement. Mildly sclerotic aortic & mitral valve w/no stenosis. Mild MR. Mild TR. Small to moderate pericardial effusion w/no obvious tamponade.      PRE-MALIGNANT / BENIGN SKIN LESION EXCISION Left 12/20/13    left leg lesion excision x2, frozen section, skin graft closure    ROTATOR CUFF REPAIR  09/2001    RIGHT    ROTATOR CUFF REPAIR  04/15/2009    LEFT     SKIN CANCER EXCISION  06/2006      Rt leg     Family History   Problem Relation Age of Onset    Heart Disease Mother     Stroke Father      Social History   Substance Use Topics    Smoking status: Never Smoker    Smokeless tobacco: Never Used    Alcohol use No      Current Outpatient Prescriptions   Medication Sig Dispense Refill    amLODIPine

## 2018-09-25 LAB — URINE CULTURE, ROUTINE: ABNORMAL

## 2018-09-25 RX ORDER — CIPROFLOXACIN 500 MG/1
500 TABLET, FILM COATED ORAL 2 TIMES DAILY
Qty: 6 TABLET | Refills: 0 | Status: SHIPPED | OUTPATIENT
Start: 2018-09-25 | End: 2018-09-28

## 2018-09-25 NOTE — TELEPHONE ENCOUNTER
Received UA Results, Scanned into Epic, routed to Ean Streeter as he is rounding since [facility] if out of office

## 2018-09-25 NOTE — TELEPHONE ENCOUNTER
Spoke with the patient and she voiced understanding. She will get the antibiotic picked up today. Contacted [place], at [facility], she had me call the [facility] office, I spoke with Jm at [employer] and they have cultured on it, they are working on the sensitivities now and results will be sent to our office.

## 2018-09-25 NOTE — TELEPHONE ENCOUNTER
Reviewed. Notify lab to send for culture. Will send script for Cipro to patient's pharmacy. Please notify patient.

## 2018-10-01 ENCOUNTER — PROCEDURE VISIT (OUTPATIENT)
Dept: UROLOGY | Age: 83
End: 2018-10-01
Payer: MEDICARE

## 2018-10-01 VITALS — WEIGHT: 136 LBS | BODY MASS INDEX: 25.68 KG/M2 | HEIGHT: 61 IN

## 2018-10-01 DIAGNOSIS — N39.46 MIXED INCONTINENCE: Primary | ICD-10-CM

## 2018-10-01 LAB
BILIRUBIN URINE: NEGATIVE
BLOOD URINE, POC: NEGATIVE
CHARACTER, URINE: CLEAR
COLOR, URINE: YELLOW
GLUCOSE URINE: NEGATIVE MG/DL
KETONES, URINE: NEGATIVE
LEUKOCYTE CLUMPS, URINE: NEGATIVE
NITRITE, URINE: NEGATIVE
PH, URINE: 6
PROTEIN, URINE: NEGATIVE MG/DL
SPECIFIC GRAVITY, URINE: 1.01 (ref 1–1.03)
UROBILINOGEN, URINE: 0.2 EU/DL

## 2018-10-01 PROCEDURE — 64561 IMPLANT NEUROELECTRODES: CPT | Performed by: UROLOGY

## 2018-10-01 PROCEDURE — 81003 URINALYSIS AUTO W/O SCOPE: CPT | Performed by: UROLOGY

## 2018-10-01 PROCEDURE — 99999 PR OFFICE/OUTPT VISIT,PROCEDURE ONLY: CPT | Performed by: UROLOGY

## 2018-10-01 PROCEDURE — P9612 CATHETERIZE FOR URINE SPEC: HCPCS | Performed by: UROLOGY

## 2018-10-01 NOTE — PROGRESS NOTES
Performed a straight catheterization in order to receive a urine specimen using 16 [place] catheter. No difficulty, patient tolerated well. 140mL of urine collected.

## 2018-10-04 ENCOUNTER — NURSE ONLY (OUTPATIENT)
Dept: UROLOGY | Age: 83
End: 2018-10-04
Payer: MEDICARE

## 2018-10-04 DIAGNOSIS — N39.0 URINARY TRACT INFECTION WITHOUT HEMATURIA, SITE UNSPECIFIED: ICD-10-CM

## 2018-10-04 DIAGNOSIS — R30.0 DYSURIA: Primary | ICD-10-CM

## 2018-10-04 DIAGNOSIS — R33.9 URINARY RETENTION: ICD-10-CM

## 2018-10-04 LAB
BILIRUBIN URINE: NEGATIVE
BLOOD URINE, POC: ABNORMAL
CHARACTER, URINE: CLEAR
COLOR, URINE: YELLOW
GLUCOSE URINE: NEGATIVE MG/DL
KETONES, URINE: ABNORMAL
LEUKOCYTE CLUMPS, URINE: ABNORMAL
NITRITE, URINE: NEGATIVE
PH, URINE: 6
PROTEIN, URINE: NEGATIVE MG/DL
SPECIFIC GRAVITY, URINE: 1.01 (ref 1–1.03)
UROBILINOGEN, URINE: 0.2 EU/DL

## 2018-10-04 PROCEDURE — 99999 PR OFFICE/OUTPT VISIT,PROCEDURE ONLY: CPT | Performed by: NURSE PRACTITIONER

## 2018-10-04 PROCEDURE — 81003 URINALYSIS AUTO W/O SCOPE: CPT | Performed by: NURSE PRACTITIONER

## 2018-10-04 NOTE — PROGRESS NOTES
Pt's urine ran today it was blood moderate leuks small. Notified pt could be infection but most likely it is not, will send for culture just in case. Pt knows results will probably be in Saturday. She was also notified that if it is positive for infection that she will probably need to keep interstim leads in longer than Monday to notice improvement after antibiotic therapy. We will call pt with culture results, appt scheduled tentatively for Monday.

## 2018-10-04 NOTE — PROGRESS NOTES
Reports little to no improvement during the night, but has had improvement throughout the day. Would like to keep the interstim trial in until Monday.

## 2018-10-05 ENCOUNTER — TELEPHONE (OUTPATIENT)
Dept: UROLOGY | Age: 83
End: 2018-10-05

## 2018-10-05 ENCOUNTER — CARE COORDINATION (OUTPATIENT)
Dept: CARE COORDINATION | Age: 83
End: 2018-10-05

## 2018-10-05 LAB — URINE CULTURE, ROUTINE: NORMAL

## 2018-10-08 ENCOUNTER — NURSE ONLY (OUTPATIENT)
Dept: UROLOGY | Age: 83
End: 2018-10-08

## 2018-10-08 ENCOUNTER — CARE COORDINATION (OUTPATIENT)
Dept: CARE COORDINATION | Age: 83
End: 2018-10-08

## 2018-10-08 DIAGNOSIS — N32.81 OVERACTIVE BLADDER: Primary | ICD-10-CM

## 2018-10-09 ENCOUNTER — TELEPHONE (OUTPATIENT)
Dept: UROLOGY | Age: 83
End: 2018-10-09

## 2018-10-09 DIAGNOSIS — R07.9 CHEST PAIN, UNSPECIFIED TYPE: Primary | ICD-10-CM

## 2018-10-09 DIAGNOSIS — R06.02 SOB (SHORTNESS OF BREATH): ICD-10-CM

## 2018-10-10 NOTE — TELEPHONE ENCOUNTER
Stress test and echo scheduled 10-19-18  Pt daughter informed, instructions reviewed and mailed to pt.

## 2018-10-14 ENCOUNTER — HOSPITAL ENCOUNTER (INPATIENT)
Age: 83
LOS: 4 days | Discharge: NURSING, REHAB OR LONG-TERM CARE | DRG: 871 | End: 2018-10-18
Attending: EMERGENCY MEDICINE | Admitting: FAMILY MEDICINE
Payer: MEDICARE

## 2018-10-14 ENCOUNTER — APPOINTMENT (OUTPATIENT)
Dept: GENERAL RADIOLOGY | Age: 83
DRG: 871 | End: 2018-10-14
Payer: MEDICARE

## 2018-10-14 ENCOUNTER — APPOINTMENT (OUTPATIENT)
Dept: CT IMAGING | Age: 83
DRG: 871 | End: 2018-10-14
Payer: MEDICARE

## 2018-10-14 DIAGNOSIS — N39.0 URINARY TRACT INFECTION WITHOUT HEMATURIA, SITE UNSPECIFIED: Primary | ICD-10-CM

## 2018-10-14 DIAGNOSIS — E87.1 HYPONATREMIA: ICD-10-CM

## 2018-10-14 DIAGNOSIS — E86.0 DEHYDRATION: ICD-10-CM

## 2018-10-14 LAB
ALBUMIN SERPL-MCNC: 3.5 G/DL (ref 3.5–5.1)
ALP BLD-CCNC: 87 U/L (ref 38–126)
ALT SERPL-CCNC: 15 U/L (ref 11–66)
ANION GAP SERPL CALCULATED.3IONS-SCNC: 16 MEQ/L (ref 8–16)
AST SERPL-CCNC: 31 U/L (ref 5–40)
AVERAGE GLUCOSE: 168 MG/DL (ref 70–126)
BACTERIA: ABNORMAL /HPF
BASOPHILS # BLD: 0.2 %
BASOPHILS ABSOLUTE: 0 THOU/MM3 (ref 0–0.1)
BILIRUB SERPL-MCNC: 1.1 MG/DL (ref 0.3–1.2)
BILIRUBIN DIRECT: 0.5 MG/DL (ref 0–0.3)
BILIRUBIN URINE: NEGATIVE
BLOOD, URINE: ABNORMAL
BUN BLDV-MCNC: 21 MG/DL (ref 7–22)
CALCIUM SERPL-MCNC: 9 MG/DL (ref 8.5–10.5)
CASTS 2: ABNORMAL /LPF
CASTS UA: ABNORMAL /LPF
CHARACTER, URINE: ABNORMAL
CHLORIDE BLD-SCNC: 93 MEQ/L (ref 98–111)
CO2: 19 MEQ/L (ref 23–33)
COLOR: YELLOW
CREAT SERPL-MCNC: 1 MG/DL (ref 0.4–1.2)
CRYSTALS, UA: ABNORMAL
DOHLE BODIES: PRESENT
EOSINOPHIL # BLD: 0 %
EOSINOPHILS ABSOLUTE: 0 THOU/MM3 (ref 0–0.4)
EPITHELIAL CELLS, UA: ABNORMAL /HPF
ERYTHROCYTE [DISTWIDTH] IN BLOOD BY AUTOMATED COUNT: 13.4 % (ref 11.5–14.5)
ERYTHROCYTE [DISTWIDTH] IN BLOOD BY AUTOMATED COUNT: 40.4 FL (ref 35–45)
ETHYL ALCOHOL, SERUM: < 0.01 %
GFR SERPL CREATININE-BSD FRML MDRD: 53 ML/MIN/1.73M2
GLUCOSE BLD-MCNC: 225 MG/DL (ref 70–108)
GLUCOSE BLD-MCNC: 240 MG/DL (ref 70–108)
GLUCOSE URINE: NEGATIVE MG/DL
HBA1C MFR BLD: 7.6 % (ref 4.4–6.4)
HCT VFR BLD CALC: 33 % (ref 37–47)
HEMOGLOBIN: 11.4 GM/DL (ref 12–16)
IMMATURE GRANS (ABS): 0.04 THOU/MM3 (ref 0–0.07)
IMMATURE GRANULOCYTES: 0.5 %
KETONES, URINE: ABNORMAL
LACTIC ACID: 1.5 MMOL/L (ref 0.5–2.2)
LEUKOCYTE ESTERASE, URINE: ABNORMAL
LIPASE: 12.5 U/L (ref 5.6–51.3)
LYMPHOCYTES # BLD: 3.4 %
LYMPHOCYTES ABSOLUTE: 0.3 THOU/MM3 (ref 1–4.8)
MAGNESIUM: 1.6 MG/DL (ref 1.6–2.4)
MCH RBC QN AUTO: 28.5 PG (ref 26–33)
MCHC RBC AUTO-ENTMCNC: 34.5 GM/DL (ref 32.2–35.5)
MCV RBC AUTO: 82.5 FL (ref 81–99)
MISCELLANEOUS 2: ABNORMAL
MONOCYTES # BLD: 12.9 %
MONOCYTES ABSOLUTE: 1 THOU/MM3 (ref 0.4–1.3)
NITRITE, URINE: POSITIVE
NUCLEATED RED BLOOD CELLS: 0 /100 WBC
OSMOLALITY CALCULATION: 267.9 MOSMOL/KG (ref 275–300)
OSMOLALITY URINE: 363 MOSMOL/KG (ref 250–750)
OSMOLALITY: 287 MOSMOL/KG (ref 275–295)
PH UA: 5.5
PLATELET # BLD: 157 THOU/MM3 (ref 130–400)
PLATELET ESTIMATE: ADEQUATE
PMV BLD AUTO: 10.6 FL (ref 9.4–12.4)
POTASSIUM SERPL-SCNC: 3.5 MEQ/L (ref 3.5–5.2)
PROTEIN UA: 100
RBC # BLD: 4 MILL/MM3 (ref 4.2–5.4)
RBC URINE: ABNORMAL /HPF
RENAL EPITHELIAL, UA: ABNORMAL
SCAN OF BLOOD SMEAR: NORMAL
SEG NEUTROPHILS: 83 %
SEGMENTED NEUTROPHILS ABSOLUTE COUNT: 6.7 THOU/MM3 (ref 1.8–7.7)
SODIUM BLD-SCNC: 128 MEQ/L (ref 135–145)
SODIUM URINE: 33 MEQ/L
SPECIFIC GRAVITY, URINE: 1.01 (ref 1–1.03)
TOTAL PROTEIN: 6.7 G/DL (ref 6.1–8)
TOXIC GRANULATION: PRESENT
UROBILINOGEN, URINE: 0.2 EU/DL
WBC # BLD: 8.1 THOU/MM3 (ref 4.8–10.8)
WBC UA: > 200 /HPF
YEAST: ABNORMAL

## 2018-10-14 PROCEDURE — 99222 1ST HOSP IP/OBS MODERATE 55: CPT | Performed by: FAMILY MEDICINE

## 2018-10-14 PROCEDURE — 83690 ASSAY OF LIPASE: CPT

## 2018-10-14 PROCEDURE — 6370000000 HC RX 637 (ALT 250 FOR IP): Performed by: EMERGENCY MEDICINE

## 2018-10-14 PROCEDURE — 87186 SC STD MICRODIL/AGAR DIL: CPT

## 2018-10-14 PROCEDURE — 6360000002 HC RX W HCPCS: Performed by: FAMILY MEDICINE

## 2018-10-14 PROCEDURE — 87040 BLOOD CULTURE FOR BACTERIA: CPT

## 2018-10-14 PROCEDURE — 6360000002 HC RX W HCPCS: Performed by: EMERGENCY MEDICINE

## 2018-10-14 PROCEDURE — 71046 X-RAY EXAM CHEST 2 VIEWS: CPT

## 2018-10-14 PROCEDURE — 82948 REAGENT STRIP/BLOOD GLUCOSE: CPT

## 2018-10-14 PROCEDURE — 87077 CULTURE AEROBIC IDENTIFY: CPT

## 2018-10-14 PROCEDURE — G0480 DRUG TEST DEF 1-7 CLASSES: HCPCS

## 2018-10-14 PROCEDURE — 72125 CT NECK SPINE W/O DYE: CPT

## 2018-10-14 PROCEDURE — 82248 BILIRUBIN DIRECT: CPT

## 2018-10-14 PROCEDURE — 96361 HYDRATE IV INFUSION ADD-ON: CPT

## 2018-10-14 PROCEDURE — 87801 DETECT AGNT MULT DNA AMPLI: CPT

## 2018-10-14 PROCEDURE — 2709999900 HC NON-CHARGEABLE SUPPLY

## 2018-10-14 PROCEDURE — 80053 COMPREHEN METABOLIC PANEL: CPT

## 2018-10-14 PROCEDURE — 6370000000 HC RX 637 (ALT 250 FOR IP): Performed by: FAMILY MEDICINE

## 2018-10-14 PROCEDURE — 81001 URINALYSIS AUTO W/SCOPE: CPT

## 2018-10-14 PROCEDURE — 83935 ASSAY OF URINE OSMOLALITY: CPT

## 2018-10-14 PROCEDURE — 87335 E COLI 0157 AG IA: CPT

## 2018-10-14 PROCEDURE — 96366 THER/PROPH/DIAG IV INF ADDON: CPT

## 2018-10-14 PROCEDURE — 36415 COLL VENOUS BLD VENIPUNCTURE: CPT

## 2018-10-14 PROCEDURE — 72170 X-RAY EXAM OF PELVIS: CPT

## 2018-10-14 PROCEDURE — 93005 ELECTROCARDIOGRAM TRACING: CPT | Performed by: EMERGENCY MEDICINE

## 2018-10-14 PROCEDURE — 96365 THER/PROPH/DIAG IV INF INIT: CPT

## 2018-10-14 PROCEDURE — 99285 EMERGENCY DEPT VISIT HI MDM: CPT

## 2018-10-14 PROCEDURE — 94760 N-INVAS EAR/PLS OXIMETRY 1: CPT

## 2018-10-14 PROCEDURE — 2580000003 HC RX 258: Performed by: FAMILY MEDICINE

## 2018-10-14 PROCEDURE — 83605 ASSAY OF LACTIC ACID: CPT

## 2018-10-14 PROCEDURE — 87184 SC STD DISK METHOD PER PLATE: CPT

## 2018-10-14 PROCEDURE — 83735 ASSAY OF MAGNESIUM: CPT

## 2018-10-14 PROCEDURE — 83036 HEMOGLOBIN GLYCOSYLATED A1C: CPT

## 2018-10-14 PROCEDURE — 87086 URINE CULTURE/COLONY COUNT: CPT

## 2018-10-14 PROCEDURE — 70450 CT HEAD/BRAIN W/O DYE: CPT

## 2018-10-14 PROCEDURE — 85025 COMPLETE CBC W/AUTO DIFF WBC: CPT

## 2018-10-14 PROCEDURE — 2580000003 HC RX 258: Performed by: EMERGENCY MEDICINE

## 2018-10-14 PROCEDURE — 83930 ASSAY OF BLOOD OSMOLALITY: CPT

## 2018-10-14 PROCEDURE — 1200000003 HC TELEMETRY R&B

## 2018-10-14 PROCEDURE — 84300 ASSAY OF URINE SODIUM: CPT

## 2018-10-14 PROCEDURE — 84295 ASSAY OF SERUM SODIUM: CPT

## 2018-10-14 RX ORDER — NICOTINE POLACRILEX 4 MG
15 LOZENGE BUCCAL PRN
Status: DISCONTINUED | OUTPATIENT
Start: 2018-10-14 | End: 2018-10-18 | Stop reason: SDUPTHER

## 2018-10-14 RX ORDER — SODIUM CHLORIDE 0.9 % (FLUSH) 0.9 %
10 SYRINGE (ML) INJECTION PRN
Status: DISCONTINUED | OUTPATIENT
Start: 2018-10-14 | End: 2018-10-17 | Stop reason: SDUPTHER

## 2018-10-14 RX ORDER — CARBIDOPA AND LEVODOPA 25; 100 MG/1; MG/1
1 TABLET, EXTENDED RELEASE ORAL 2 TIMES DAILY
Status: DISCONTINUED | OUTPATIENT
Start: 2018-10-14 | End: 2018-10-18 | Stop reason: HOSPADM

## 2018-10-14 RX ORDER — ASPIRIN 81 MG/1
81 TABLET ORAL DAILY
Status: DISCONTINUED | OUTPATIENT
Start: 2018-10-15 | End: 2018-10-18 | Stop reason: HOSPADM

## 2018-10-14 RX ORDER — 0.9 % SODIUM CHLORIDE 0.9 %
30 INTRAVENOUS SOLUTION INTRAVENOUS ONCE
Status: COMPLETED | OUTPATIENT
Start: 2018-10-14 | End: 2018-10-14

## 2018-10-14 RX ORDER — FAMOTIDINE 20 MG/1
20 TABLET, FILM COATED ORAL DAILY
Status: DISCONTINUED | OUTPATIENT
Start: 2018-10-15 | End: 2018-10-18 | Stop reason: HOSPADM

## 2018-10-14 RX ORDER — ONDANSETRON 2 MG/ML
4 INJECTION INTRAMUSCULAR; INTRAVENOUS EVERY 6 HOURS PRN
Status: DISCONTINUED | OUTPATIENT
Start: 2018-10-14 | End: 2018-10-18 | Stop reason: HOSPADM

## 2018-10-14 RX ORDER — METOPROLOL TARTRATE 50 MG/1
50 TABLET, FILM COATED ORAL 2 TIMES DAILY
Status: DISCONTINUED | OUTPATIENT
Start: 2018-10-14 | End: 2018-10-16

## 2018-10-14 RX ORDER — PYRIDOXINE HCL (VITAMIN B6) 100 MG
500 TABLET ORAL 2 TIMES DAILY
Status: DISCONTINUED | OUTPATIENT
Start: 2018-10-14 | End: 2018-10-14

## 2018-10-14 RX ORDER — SUCRALFATE 1 G/1
1 TABLET ORAL 4 TIMES DAILY
Status: DISCONTINUED | OUTPATIENT
Start: 2018-10-14 | End: 2018-10-18 | Stop reason: HOSPADM

## 2018-10-14 RX ORDER — AMLODIPINE BESYLATE 10 MG/1
10 TABLET ORAL DAILY
Status: DISCONTINUED | OUTPATIENT
Start: 2018-10-15 | End: 2018-10-16

## 2018-10-14 RX ORDER — SODIUM CHLORIDE 9 MG/ML
INJECTION, SOLUTION INTRAVENOUS CONTINUOUS
Status: DISCONTINUED | OUTPATIENT
Start: 2018-10-14 | End: 2018-10-18 | Stop reason: HOSPADM

## 2018-10-14 RX ORDER — LORAZEPAM 1 MG/1
1 TABLET ORAL NIGHTLY PRN
Status: DISCONTINUED | OUTPATIENT
Start: 2018-10-15 | End: 2018-10-15

## 2018-10-14 RX ORDER — DEXTROSE MONOHYDRATE 50 MG/ML
100 INJECTION, SOLUTION INTRAVENOUS PRN
Status: DISCONTINUED | OUTPATIENT
Start: 2018-10-14 | End: 2018-10-18 | Stop reason: HOSPADM

## 2018-10-14 RX ORDER — DEXTROSE MONOHYDRATE 25 G/50ML
12.5 INJECTION, SOLUTION INTRAVENOUS PRN
Status: DISCONTINUED | OUTPATIENT
Start: 2018-10-14 | End: 2018-10-18 | Stop reason: SDUPTHER

## 2018-10-14 RX ORDER — GLIMEPIRIDE 2 MG/1
2 TABLET ORAL
Status: CANCELLED | OUTPATIENT
Start: 2018-10-15

## 2018-10-14 RX ORDER — ACETAMINOPHEN 325 MG/1
650 TABLET ORAL ONCE
Status: COMPLETED | OUTPATIENT
Start: 2018-10-14 | End: 2018-10-14

## 2018-10-14 RX ORDER — ATORVASTATIN CALCIUM 20 MG/1
20 TABLET, FILM COATED ORAL DAILY
Status: DISCONTINUED | OUTPATIENT
Start: 2018-10-15 | End: 2018-10-18 | Stop reason: HOSPADM

## 2018-10-14 RX ORDER — SODIUM CHLORIDE 0.9 % (FLUSH) 0.9 %
10 SYRINGE (ML) INJECTION EVERY 12 HOURS SCHEDULED
Status: DISCONTINUED | OUTPATIENT
Start: 2018-10-14 | End: 2018-10-17 | Stop reason: SDUPTHER

## 2018-10-14 RX ADMIN — CARBIDOPA AND LEVODOPA 1 TABLET: 25; 100 TABLET, EXTENDED RELEASE ORAL at 22:37

## 2018-10-14 RX ADMIN — ENOXAPARIN SODIUM 40 MG: 40 INJECTION SUBCUTANEOUS at 22:37

## 2018-10-14 RX ADMIN — ACETAMINOPHEN 650 MG: 325 TABLET ORAL at 17:11

## 2018-10-14 RX ADMIN — PIPERACILLIN SODIUM,TAZOBACTAM SODIUM 3.38 G: 3; .375 INJECTION, POWDER, FOR SOLUTION INTRAVENOUS at 17:11

## 2018-10-14 RX ADMIN — SODIUM CHLORIDE: 9 INJECTION, SOLUTION INTRAVENOUS at 20:50

## 2018-10-14 RX ADMIN — SODIUM CHLORIDE 1824 ML: 9 INJECTION, SOLUTION INTRAVENOUS at 16:10

## 2018-10-14 RX ADMIN — SUCRALFATE 1 G: 1 TABLET ORAL at 22:37

## 2018-10-14 ASSESSMENT — ENCOUNTER SYMPTOMS
PHOTOPHOBIA: 0
SINUS PRESSURE: 0
COUGH: 0
VOICE CHANGE: 0
BLOOD IN STOOL: 0
EYE REDNESS: 0
EYE DISCHARGE: 0
SHORTNESS OF BREATH: 0
EYE PAIN: 0
ABDOMINAL PAIN: 0
EYE ITCHING: 0
WHEEZING: 0
TROUBLE SWALLOWING: 0
ABDOMINAL DISTENTION: 0
DIARRHEA: 0
CHEST TIGHTNESS: 0
VOMITING: 0
NAUSEA: 0
SORE THROAT: 0
CHOKING: 0
RHINORRHEA: 0
BACK PAIN: 1
CONSTIPATION: 0

## 2018-10-14 ASSESSMENT — PAIN SCALES - GENERAL
PAINLEVEL_OUTOF10: 5
PAINLEVEL_OUTOF10: 9
PAINLEVEL_OUTOF10: 9

## 2018-10-14 ASSESSMENT — PAIN DESCRIPTION - LOCATION: LOCATION: BACK

## 2018-10-14 ASSESSMENT — PAIN DESCRIPTION - FREQUENCY: FREQUENCY: CONTINUOUS

## 2018-10-14 ASSESSMENT — PAIN DESCRIPTION - PAIN TYPE
TYPE: ACUTE PAIN
TYPE: ACUTE PAIN

## 2018-10-14 ASSESSMENT — PAIN DESCRIPTION - DESCRIPTORS: DESCRIPTORS: ACHING

## 2018-10-14 ASSESSMENT — PAIN DESCRIPTION - ONSET: ONSET: ON-GOING

## 2018-10-14 NOTE — ED PROVIDER NOTES
Coordination normal.   Skin: Skin is warm and dry. No rash noted. She is not diaphoretic. Psychiatric: She has a normal mood and affect. Her behavior is normal. Judgment and thought content normal.   Nursing note and vitals reviewed. DIFFERENTIAL DIAGNOSIS:   Differential diagnoses were discussedextensively with the patient and family including but no limited to Infection, fall, altered mental status, Alzheimer's, Parkinson. DIAGNOSTIC RESULTS     EKG: All EKG's are interpreted by the Emergency Department Physician who either signs or Co-signs this chart in the absence of a cardiologist.  EKG reveals sinus tachycardia left axis deviation and left bundle branch block. Ventricular rate of 106 AR interval 174 QRS duration 146 QT interval 374 QTc of 496. When compared with EKG dated June 21, 2018 sinus tachycardia has replaced sinus rhythm. RADIOLOGY: non-plain film images(s) such as CT, Ultrasound and MRI are read by the radiologist.    [email or web address]   Final Result   1. There is no fracture. 2. Numerous additional findings as described in the body the report. **This report has been created using voice recognition software. It may contain minor errors which are inherent in voice recognition technology. **      Final report electronically signed by Dr. Paty Brgiht on 10/14/2018 5:02 PM      CT HEAD WO CONTRAST   Final Result   1. There is no acute intracranial abnormality. 2. Additional findings as described in the body the report. **This report has been created using voice recognition software. It may contain minor errors which are inherent in voice recognition technology. **      Final report electronically signed by Dr. Paty Bright on 10/14/2018 4:57 PM      XR CHEST STANDARD (2 VW)   Final Result   1. There is no acute cardiopulmonary process. **This report has been created using voice recognition software.   It may contain minor errors which are 132/69  (!) 126/57   Pulse: 110 110    Resp: 26     Temp: 103.7 °F (39.8 °C)     TempSrc: Rectal     SpO2: 96%  96%   Weight: 134 lb (60.8 kg)       3:43 PM: The patient was seen andevaluated. Appropriate labs were ordered and reviewed. Patient has a long history of urinary tract infections. She has a fever today of 103.7. Fluids and Tylenol were given. Patient is tachycardic at 110. Respiratory 26. She is 96% on room air. She is not hypotensive. I fear that this is a urinary tract infection bordering on sepsis. Fluids were started. Antibiotics were started. CT scans were negative for any injury from the fall. After the patient got fluids and some Tylenol she began to feel a lot better. At this point I feel that this is mostly due to  Her urinary tract infection. She does see Dr. Yaritza Stark for, kidney urinary tract infections. At this point I called and spoke with Dr. Mariia Tyler and discussed case with him. He graciously accepted the admission. Patient will be admitted in stable condition pending further evaluation and treatment. CRITICALCARE:   None    CONSULTS:  Dr. Rangel Leann:  None    FINAL IMPRESSION      1. Urinary tract infection without hematuria, site unspecified    2. Hyponatremia    3. Dehydration          DISPOSITION/PLAN   Admission    PATIENT REFERRED TO:  No follow-up provider specified. DISCHARGE MEDICATIONS:  New Prescriptions    No medications on file       (Please note that portions of this note were completed with a voice recognition program.  Efforts weremade to edit the dictations but occasionally words are mis-transcribed.)    Scribe:  Dejan Matias 10/14/18 3:43 PM Scribing for and in the presence Cornel Juan DO.     Scribe: Dejan Fall 10/14/18 3:43 PM    Provider:  I personally performed the services described in the documentation, reviewed and edited the documentation which was dictated to the scribe inmy presence, and it accurately records my words and actions.     Asael Tanner,  10/14/18 5:45 PM        Asael Tanner,   10/14/18 8214

## 2018-10-14 NOTE — H&P
History & Physical        Patient:  Anastasia Hogue  YOB: 1932    MRN: [id number]     Acct: [de-identified]    PCP: Shirlene Edouard MD    Date of Admission: 10/14/2018    Date of Service: Pt seen/examined on 10/14/18  and Admitted toInpatient with expected LOS greater than two midnights due to medical therapy. Chief Complaint:  Fall and fever      History Of Present Illness:      80 y.o. female, w PMH of DM type 2, HTN, CAD, s/p CABG, h/o imbalance, who presented to [facility], accompanied by her daughter, due to fall and fever. Pt reports she was in the bathroom last night and had off balance and fell, tried to hold on the sink, landed against the door of the bathroom and hit the back of her head. She denies dizziness, HA, focal weakness, numbness, chest pain, palpitations. She also reports that for past 3 days, she's having intermittent rt lower quadrant abdominal and rt pelvic pain, 6/10, now 5/10, accompanied by dysuria, urinary frequency and urgency x 3 days. She denies hematuria. Of note, she reports that she has urinary incontinence x 1 year now. Per pt, she was scheduled for \"bladder stimulation\" and supposed to have lexiscan stress test and echo for cardiac clearance. Also, she said that she has recurrent UTI since 01/2018. Pt also reports low appetite for 2 days, feeling bloated, nauseous, no vomiting. She also reports constant B/L lower back pain x 1 day, 5/10, achy, radiates to rt lower quadrant of abdomen. She also c/o intermittent neck pain x 3 days, 3/10. Daughter reports that she felt that the pt was warm and looks weak this afternoon, hence daughter brought pt to ER. In ER, her UA showed nitrites and large leukocyte esterase. She received 1 dose of Zosyn 3.375 g IV. CT head, CT cervical, cxr and xr pelvis all done, all came back unremarkable.  CBC normal except for mild anemia ( Hgb 11.4, baseline Hgb 12-13) , CMP normal except for hyponatremia, sodium of 128, her Musculoskeletal:  No back tenderness, negative   Neurologic:  Neurovascularly intact without any focal sensory/motor deficits. Psychiatric:  Alert and oriented, thought content appropriate, normal insight  Exam of extremities: peripheral pulses normal, no pedal or leg edema, no clubbing or cyanosis, (+) skin hyperpigmentation on both legs       Labs:     Recent Labs      10/14/18   1600   WBC  8.1   HGB  11.4*   HCT  33.0*   PLT  157     Recent Labs      10/14/18   1600   NA  128*   K  3.5   CL  93*   CO2  19*   BUN  21   CREATININE  1.0   CALCIUM  9.0     Recent Labs      10/14/18   1600   AST  31   ALT  15   BILIDIR  0.5*   BILITOT  1.1   ALKPHOS  87     Lactic Acid 1.5  0.5 - 2.2 mmol/L Final 10/14/2018  4:00 PM     ETHYL ALCOHOL, SERUM < 0.01  0.00 % Final 10/14/2018  4:00 PM     Magnesium 1.6  1.6 - 2.4 mg/dL Final 10/14/2018  4:00 PM     Lipase 12.5  5.6 - 51.3 U/L Final 10/14/2018  4:00 PM         No results for input(s): INR in the last 72 hours. No results for input(s): Merryl Passe in the last 72 hours. Urinalysis:      Lab Results   Component Value Date    NITRU POSITIVE 10/14/2018    WBCUA > 200 10/14/2018    BACTERIA MANY 10/14/2018    RBCUA 5-10 10/14/2018    BLOODU MODERATE 10/14/2018    SPECGRAV 1.025 09/24/2018    GLUCOSEU NEGATIVE 10/14/2018       Intake & Output:  No intake/output data recorded. No intake/output data recorded. Radiology:         CT CERVICAL SPINE WO CONTRAST   Final Result   1. There is no fracture. 2. Numerous additional findings as described in the body the report. **This report has been created using voice recognition software. It may contain minor errors which are inherent in voice recognition technology. **      Final report electronically signed by Dr. Paty Bright on 10/14/2018 5:02 PM      CT HEAD WO CONTRAST   Final Result   1. There is no acute intracranial abnormality. 2. Additional findings as described in the body the report. SSI  -will check A1C    7. Normocytic anemia, mild    -pt is asymptomatic  -repeat cbc in am     8. HTN, controlled     -cont amlodipine and metoprolol   -VS per protocol     9. CAD, s/p CABG, stable   -cont Metoprolol, asa, lipitor    10. Hyperlipidemia    -cont lipitor        Thank you Shirlene Edouard MD for the opportunity to be involved in this patient's care.         Electronically signed by Irma Cochran MD on 10/14/2018 at 7:29 PM

## 2018-10-15 ENCOUNTER — APPOINTMENT (OUTPATIENT)
Dept: CT IMAGING | Age: 83
DRG: 871 | End: 2018-10-15
Payer: MEDICARE

## 2018-10-15 PROBLEM — I48.91 ATRIAL FIBRILLATION WITH RVR (HCC): Status: ACTIVE | Noted: 2018-10-15

## 2018-10-15 PROBLEM — N12 PYELONEPHRITIS: Status: ACTIVE | Noted: 2018-10-15

## 2018-10-15 LAB
ABSOLUTE RETIC #: 63 THOU/MM3 (ref 20–115)
ANION GAP SERPL CALCULATED.3IONS-SCNC: 16 MEQ/L (ref 8–16)
BASOPHILS # BLD: 0 %
BASOPHILS ABSOLUTE: 0 THOU/MM3 (ref 0–0.1)
BUN BLDV-MCNC: 13 MG/DL (ref 7–22)
CALCIUM SERPL-MCNC: 8.3 MG/DL (ref 8.5–10.5)
CHLORIDE BLD-SCNC: 100 MEQ/L (ref 98–111)
CO2: 20 MEQ/L (ref 23–33)
CREAT SERPL-MCNC: 0.8 MG/DL (ref 0.4–1.2)
EKG ATRIAL RATE: 100 BPM
EKG ATRIAL RATE: 106 BPM
EKG ATRIAL RATE: 97 BPM
EKG P AXIS: 91 DEGREES
EKG P-R INTERVAL: 174 MS
EKG Q-T INTERVAL: 336 MS
EKG Q-T INTERVAL: 374 MS
EKG Q-T INTERVAL: 394 MS
EKG QRS DURATION: 146 MS
EKG QRS DURATION: 146 MS
EKG QRS DURATION: 148 MS
EKG QTC CALCULATION (BAZETT): 437 MS
EKG QTC CALCULATION (BAZETT): 496 MS
EKG QTC CALCULATION (BAZETT): 518 MS
EKG R AXIS: -43 DEGREES
EKG R AXIS: -46 DEGREES
EKG R AXIS: -46 DEGREES
EKG T AXIS: 111 DEGREES
EKG T AXIS: 118 DEGREES
EKG T AXIS: 121 DEGREES
EKG VENTRICULAR RATE: 102 BPM
EKG VENTRICULAR RATE: 104 BPM
EKG VENTRICULAR RATE: 106 BPM
EOSINOPHIL # BLD: 0 %
EOSINOPHILS ABSOLUTE: 0 THOU/MM3 (ref 0–0.4)
ERYTHROCYTE [DISTWIDTH] IN BLOOD BY AUTOMATED COUNT: 13.4 % (ref 11.5–14.5)
ERYTHROCYTE [DISTWIDTH] IN BLOOD BY AUTOMATED COUNT: 41.4 FL (ref 35–45)
FERRITIN: 518 NG/ML (ref 10–291)
FOLATE: > 20 NG/ML (ref 4.8–24.2)
GFR SERPL CREATININE-BSD FRML MDRD: 68 ML/MIN/1.73M2
GLUCOSE BLD-MCNC: 166 MG/DL (ref 70–108)
GLUCOSE BLD-MCNC: 177 MG/DL (ref 70–108)
GLUCOSE BLD-MCNC: 219 MG/DL (ref 70–108)
GLUCOSE BLD-MCNC: 229 MG/DL (ref 70–108)
GLUCOSE BLD-MCNC: 244 MG/DL (ref 70–108)
GLUCOSE BLD-MCNC: 249 MG/DL (ref 70–108)
HCT VFR BLD CALC: 29.7 % (ref 37–47)
HEMOCCULT STL QL: NEGATIVE
HEMOGLOBIN: 9.9 GM/DL (ref 12–16)
IMMATURE GRANS (ABS): 0.04 THOU/MM3 (ref 0–0.07)
IMMATURE RETIC FRACT: 10.3 % (ref 3–15.9)
IRON: 9 UG/DL (ref 50–170)
LV EF: 60 %
LVEF MODALITY: NORMAL
LYMPHOCYTES # BLD: 7 %
LYMPHOCYTES ABSOLUTE: 0.5 THOU/MM3 (ref 1–4.8)
MAGNESIUM: 1.7 MG/DL (ref 1.6–2.4)
MCH RBC QN AUTO: 28 PG (ref 26–33)
MCHC RBC AUTO-ENTMCNC: 33.3 GM/DL (ref 32.2–35.5)
MCV RBC AUTO: 84.1 FL (ref 81–99)
MONOCYTES # BLD: 17 %
MONOCYTES ABSOLUTE: 1.1 THOU/MM3 (ref 0.4–1.3)
NUCLEATED RED BLOOD CELLS: 0 /100 WBC
OSMOLALITY CALCULATION: 279.3 MOSMOL/KG (ref 275–300)
PLATELET # BLD: 127 THOU/MM3 (ref 130–400)
PMV BLD AUTO: 10.6 FL (ref 9.4–12.4)
POTASSIUM REFLEX MAGNESIUM: 3.1 MEQ/L (ref 3.5–5.2)
RBC # BLD: 3.53 MILL/MM3 (ref 4.2–5.4)
RETIC HEMOGLOBIN: 25.1 PG (ref 28.2–35.7)
RETICULOCYTE ABSOLUTE COUNT: 1.7 % (ref 0.5–2)
SCAN OF BLOOD SMEAR: NORMAL
SEG NEUTROPHILS: 76 %
SEGMENTED NEUTROPHILS ABSOLUTE COUNT: 5 THOU/MM3 (ref 1.8–7.7)
SODIUM BLD-SCNC: 130 MEQ/L (ref 135–145)
SODIUM BLD-SCNC: 133 MEQ/L (ref 135–145)
SODIUM BLD-SCNC: 134 MEQ/L (ref 135–145)
SODIUM BLD-SCNC: 136 MEQ/L (ref 135–145)
TROPONIN T: < 0.01 NG/ML
VITAMIN B-12: 1031 PG/ML (ref 211–911)
WBC # BLD: 6.6 THOU/MM3 (ref 4.8–10.8)

## 2018-10-15 PROCEDURE — 6360000004 HC RX CONTRAST MEDICATION: Performed by: FAMILY MEDICINE

## 2018-10-15 PROCEDURE — 99222 1ST HOSP IP/OBS MODERATE 55: CPT | Performed by: NURSE PRACTITIONER

## 2018-10-15 PROCEDURE — 6370000000 HC RX 637 (ALT 250 FOR IP): Performed by: FAMILY MEDICINE

## 2018-10-15 PROCEDURE — 85025 COMPLETE CBC W/AUTO DIFF WBC: CPT

## 2018-10-15 PROCEDURE — 6360000002 HC RX W HCPCS: Performed by: FAMILY MEDICINE

## 2018-10-15 PROCEDURE — 93005 ELECTROCARDIOGRAM TRACING: CPT | Performed by: FAMILY MEDICINE

## 2018-10-15 PROCEDURE — 97535 SELF CARE MNGMENT TRAINING: CPT

## 2018-10-15 PROCEDURE — 2709999900 HC NON-CHARGEABLE SUPPLY

## 2018-10-15 PROCEDURE — 97110 THERAPEUTIC EXERCISES: CPT

## 2018-10-15 PROCEDURE — 82728 ASSAY OF FERRITIN: CPT

## 2018-10-15 PROCEDURE — 82272 OCCULT BLD FECES 1-3 TESTS: CPT

## 2018-10-15 PROCEDURE — 2580000003 HC RX 258: Performed by: FAMILY MEDICINE

## 2018-10-15 PROCEDURE — 97161 PT EVAL LOW COMPLEX 20 MIN: CPT

## 2018-10-15 PROCEDURE — 1200000003 HC TELEMETRY R&B

## 2018-10-15 PROCEDURE — 84466 ASSAY OF TRANSFERRIN: CPT

## 2018-10-15 PROCEDURE — 93010 ELECTROCARDIOGRAM REPORT: CPT | Performed by: NUCLEAR MEDICINE

## 2018-10-15 PROCEDURE — 83735 ASSAY OF MAGNESIUM: CPT

## 2018-10-15 PROCEDURE — 93010 ELECTROCARDIOGRAM REPORT: CPT | Performed by: INTERNAL MEDICINE

## 2018-10-15 PROCEDURE — 82948 REAGENT STRIP/BLOOD GLUCOSE: CPT

## 2018-10-15 PROCEDURE — 84484 ASSAY OF TROPONIN QUANT: CPT

## 2018-10-15 PROCEDURE — 74178 CT ABD&PLV WO CNTR FLWD CNTR: CPT

## 2018-10-15 PROCEDURE — 51798 US URINE CAPACITY MEASURE: CPT

## 2018-10-15 PROCEDURE — 84295 ASSAY OF SERUM SODIUM: CPT

## 2018-10-15 PROCEDURE — 99232 SBSQ HOSP IP/OBS MODERATE 35: CPT | Performed by: FAMILY MEDICINE

## 2018-10-15 PROCEDURE — 99222 1ST HOSP IP/OBS MODERATE 55: CPT | Performed by: INTERNAL MEDICINE

## 2018-10-15 PROCEDURE — G8987 SELF CARE CURRENT STATUS: HCPCS

## 2018-10-15 PROCEDURE — 97166 OT EVAL MOD COMPLEX 45 MIN: CPT

## 2018-10-15 PROCEDURE — 80048 BASIC METABOLIC PNL TOTAL CA: CPT

## 2018-10-15 PROCEDURE — 82607 VITAMIN B-12: CPT

## 2018-10-15 PROCEDURE — G8978 MOBILITY CURRENT STATUS: HCPCS

## 2018-10-15 PROCEDURE — G8988 SELF CARE GOAL STATUS: HCPCS

## 2018-10-15 PROCEDURE — 36415 COLL VENOUS BLD VENIPUNCTURE: CPT

## 2018-10-15 PROCEDURE — 93306 TTE W/DOPPLER COMPLETE: CPT

## 2018-10-15 PROCEDURE — 83540 ASSAY OF IRON: CPT

## 2018-10-15 PROCEDURE — 85046 RETICYTE/HGB CONCENTRATE: CPT

## 2018-10-15 PROCEDURE — G8979 MOBILITY GOAL STATUS: HCPCS

## 2018-10-15 PROCEDURE — 82746 ASSAY OF FOLIC ACID SERUM: CPT

## 2018-10-15 RX ORDER — ACETAMINOPHEN 160 MG/5ML
650 SOLUTION ORAL EVERY 4 HOURS PRN
Status: DISCONTINUED | OUTPATIENT
Start: 2018-10-15 | End: 2018-10-15

## 2018-10-15 RX ORDER — ACETAMINOPHEN 325 MG/1
650 TABLET ORAL EVERY 4 HOURS PRN
Status: DISCONTINUED | OUTPATIENT
Start: 2018-10-15 | End: 2018-10-18 | Stop reason: HOSPADM

## 2018-10-15 RX ORDER — POTASSIUM CHLORIDE 20 MEQ/1
40 TABLET, EXTENDED RELEASE ORAL ONCE
Status: COMPLETED | OUTPATIENT
Start: 2018-10-15 | End: 2018-10-15

## 2018-10-15 RX ADMIN — FAMOTIDINE 20 MG: 20 TABLET ORAL at 09:26

## 2018-10-15 RX ADMIN — AMLODIPINE BESYLATE 10 MG: 10 TABLET ORAL at 09:27

## 2018-10-15 RX ADMIN — SUCRALFATE 1 G: 1 TABLET ORAL at 16:45

## 2018-10-15 RX ADMIN — IOPAMIDOL 80 ML: 755 INJECTION, SOLUTION INTRAVENOUS at 10:13

## 2018-10-15 RX ADMIN — SUCRALFATE 1 G: 1 TABLET ORAL at 09:26

## 2018-10-15 RX ADMIN — POTASSIUM CHLORIDE 40 MEQ: 20 TABLET, EXTENDED RELEASE ORAL at 13:17

## 2018-10-15 RX ADMIN — PIPERACILLIN SODIUM,TAZOBACTAM SODIUM 3.38 G: 3; .375 INJECTION, POWDER, FOR SOLUTION INTRAVENOUS at 17:58

## 2018-10-15 RX ADMIN — ASPIRIN 81 MG: 81 TABLET, COATED ORAL at 09:25

## 2018-10-15 RX ADMIN — SODIUM CHLORIDE: 9 INJECTION, SOLUTION INTRAVENOUS at 09:44

## 2018-10-15 RX ADMIN — SUCRALFATE 1 G: 1 TABLET ORAL at 13:17

## 2018-10-15 RX ADMIN — INSULIN LISPRO 1 UNITS: 100 INJECTION, SOLUTION INTRAVENOUS; SUBCUTANEOUS at 21:57

## 2018-10-15 RX ADMIN — ACETAMINOPHEN 650 MG: 650 SOLUTION ORAL at 15:03

## 2018-10-15 RX ADMIN — ATORVASTATIN CALCIUM 20 MG: 20 TABLET, FILM COATED ORAL at 09:26

## 2018-10-15 RX ADMIN — METOPROLOL TARTRATE 50 MG: 50 TABLET, FILM COATED ORAL at 21:54

## 2018-10-15 RX ADMIN — SUCRALFATE 1 G: 1 TABLET ORAL at 21:54

## 2018-10-15 RX ADMIN — CARBIDOPA AND LEVODOPA 1 TABLET: 25; 100 TABLET, EXTENDED RELEASE ORAL at 09:26

## 2018-10-15 RX ADMIN — CARBIDOPA AND LEVODOPA 1 TABLET: 25; 100 TABLET, EXTENDED RELEASE ORAL at 22:35

## 2018-10-15 RX ADMIN — INSULIN LISPRO 4 UNITS: 100 INJECTION, SOLUTION INTRAVENOUS; SUBCUTANEOUS at 16:53

## 2018-10-15 RX ADMIN — PIPERACILLIN SODIUM,TAZOBACTAM SODIUM 3.38 G: 3; .375 INJECTION, POWDER, FOR SOLUTION INTRAVENOUS at 01:48

## 2018-10-15 RX ADMIN — METOPROLOL TARTRATE 50 MG: 50 TABLET, FILM COATED ORAL at 09:25

## 2018-10-15 RX ADMIN — Medication 1 UNITS: at 01:49

## 2018-10-15 RX ADMIN — MAGNESIUM GLUCONATE 500 MG ORAL TABLET 400 MG: 500 TABLET ORAL at 16:45

## 2018-10-15 RX ADMIN — PIPERACILLIN SODIUM,TAZOBACTAM SODIUM 3.38 G: 3; .375 INJECTION, POWDER, FOR SOLUTION INTRAVENOUS at 09:31

## 2018-10-15 RX ADMIN — ENOXAPARIN SODIUM 40 MG: 40 INJECTION SUBCUTANEOUS at 21:54

## 2018-10-15 RX ADMIN — INSULIN LISPRO 2 UNITS: 100 INJECTION, SOLUTION INTRAVENOUS; SUBCUTANEOUS at 13:12

## 2018-10-15 ASSESSMENT — PAIN DESCRIPTION - ORIENTATION
ORIENTATION: RIGHT

## 2018-10-15 ASSESSMENT — PAIN SCALES - GENERAL
PAINLEVEL_OUTOF10: 3
PAINLEVEL_OUTOF10: 6
PAINLEVEL_OUTOF10: 8

## 2018-10-15 ASSESSMENT — PAIN DESCRIPTION - PAIN TYPE
TYPE: ACUTE PAIN

## 2018-10-15 ASSESSMENT — PAIN DESCRIPTION - FREQUENCY
FREQUENCY: CONTINUOUS
FREQUENCY: CONTINUOUS

## 2018-10-15 ASSESSMENT — PAIN DESCRIPTION - LOCATION
LOCATION: ABDOMEN

## 2018-10-15 ASSESSMENT — PAIN DESCRIPTION - DESCRIPTORS
DESCRIPTORS: ACHING
DESCRIPTORS: ACHING

## 2018-10-15 ASSESSMENT — PAIN DESCRIPTION - PROGRESSION: CLINICAL_PROGRESSION: NOT CHANGED

## 2018-10-15 NOTE — PROGRESS NOTES
in 9/2018 was 139, though she had hyponatremia in 6/2018, sodium of 130. Her blood glucose is also elevated  ( she said her FBS at home usually at 160s). Urine and blood cultures were all sent. Past Medical History:   Diagnosis Date    Atypical chest pain     CAD (coronary artery disease)     Chronic LBBB (left bundle branch block)     Diabetes mellitus type II     Esophageal stricture     History of esophageal stricture.  FH: CAD (coronary artery disease)     Mom and dad both with heart disease at mid to late age.  GERD (gastroesophageal reflux disease)     History of gastritis     History of skin cancer     Hypercholesterolemia     Hyperlipidemia     Hypertension     Imbalance     As far as imbalance is concerned, asked patient to follow-up with Dr. Mason Connelly since she follows with him on a regular basis.  Lactose intolerance     Nummular dermatitis     Parkinson's disease ([address]     Restless legs syndrome (RLS)     S/P CABG (coronary artery bypass graft)     SCC (squamous cell carcinoma)      Past Surgical History:   Procedure Laterality Date    CARDIAC SURGERY      CARDIOVASCULAR STRESS TEST  4 09 2009    Gated SPECT images revealed normal global wall motion with EF of 60%. Persantine test associated w/nonspecific symptoms. EKG is nondiagnostic w/baseline LBBB. No obvious stress-induced ischemia by Cardiolite. EF within normal limits.  CARDIOVASCULAR STRESS TEST  7 10 2007    The gated SPECT images revealed normal wall motion with EF of 69%. Poor exercise tolerance w/SOB on exertion. EKG is nondiagnostic. Cardiolite scan revealing no obvious stress-induced ischemia. EF 60%.  CARDIOVASCULAR STRESS TEST  2 03 2006    Fair exercise tolerance w/SOB on exertion. No EKG evidence of ischemia. Patient should be able to proceed with phase II cardiac rehab.      CATARACT REMOVAL      CATARACT REMOVAL  06/08/2016    AND 6/29/16    DIAGNOSTIC CARDIAC CATH LAB PROCEDURE  12 23 2005    LV was obtained. AGEE projection showed preserved LV function w/estimated EF at 55%. No significant MR. Patent left main coronary artery. Tortuous LAD which appeared to be patent. Patent left circumflex artery. High-grade stenosis around 99% in very large dominant RCA w/heavy calcification at the ostium. Normal LV function.  DOPPLER ECHOCARDIOGRAPHY  4 09 2009    Global LV function w/in lower limits of normal, with mild anteroseptal hypokinesis. EF of 50-55%. Light LVH. Mild to moderate left atrial enlargement. Calcific aortic and mitral valve w/no obvious stenosis. No obvious pericardial effusion.  DOPPLER ECHOCARDIOGRAPHY  7 10 2007    LV function w/in lower limits of normal w/peridoxical septal wall motion. Moderate left atrial enlargement. Mild MR. Mild TR. Calcified valves w/no obvious stenosis. No pericardial effusion.  DOPPLER ECHOCARDIOGRAPHY  4 14 2006    There appears to be significant improvement from previous echo w/complete resolution of pericardial effusion and normal LV function. EF of 60%.  DOPPLER ECHOCARDIOGRAPHY  3 21 2006    Normal LV function. Mild LV hypertrophy. Mild biatrial enlargement. Mildly calcific aortic and mitral valve w/no stenosis. Mild MR. Mild TR. Minimal residual pericardial effusion w/significant improvement from previous echo.  DOPPLER ECHOCARDIOGRAPHY  3 16 2006    Interval change from previous echocardiogram w/worsening of effusion. Correlation clinically. Consideration of pericardial centesis. Will obtain a [facility] consult.  DOPPLER ECHOCARDIOGRAPHY  1 31 2006    No significant change from previous echo. The 2D echo showed moderate degree of pericardial effusion. It does seem to be worst or better than previous echo. No evidence of tamponade.  DOPPLER ECHOCARDIOGRAPHY  1 27 2006    Normal global LV function. Mild biatrial enlargement. Mildly sclerotic aortic & mitral valve w/no stenosis. Mild MR. Mild TR.  Small to moderate pericardial

## 2018-10-15 NOTE — PROGRESS NOTES
[facility]  INPATIENT OCCUPATIONAL THERAPY  [facility] ONC MED 5K  EVALUATION    Time:  Time In: 7965  Time Out: 1625  Timed Code Treatment Minutes: 25 Minutes  Minutes: 40          Date: 10/15/2018  Patient Name: Aleisha Bass,   Gender: female      MRN: [id number]  : 1932  (80 y.o.)  Referring Practitioner: Dr. Josselin Fitzpatrick MD  Diagnosis: UTI  Additional Pertinent Hx: Pt presented to [address] [place] [address], accompanied by her daughter, due to fall and fever. Pt reports she was in the bathroom last night and had off balance and fell, tried to hold on the sink, landed against the door of the bathroom and hit the back of her head. She denies dizziness, HA, focal weakness, numbness, chest pain, palpitations. She also reports that for past 3 days, she's having intermittent rt lower quadrant abdominal and rt pelvic pain, 10, now 5/10, accompanied by dysuria, urinary frequency and urgency x 3 days. She denies hematuria. Of note, she reports that she has urinary incontinence x 1 year now. Per pt, she was scheduled for \"bladder stimulation\" and supposed to have lexiscan stress test and echo for cardiac clearance. Also, she said that she has recurrent UTI since 2018. Pt also reports low appetite for 2 days, feeling bloated, nauseous, no vomiting. She also reports constant B/L lower back pain x 1 day, 5/10, achy, radiates to rt lower quadrant of abdomen. She also c/o intermittent neck pain x 3 days, 3/10. Daughter reports that she felt that the pt was warm and looks weak this afternoon, hence daughter brought pt to ER. In ER, her UA showed nitrites and large leukocyte esterase. She received 1 dose of Zosyn 3.375 g IV. CT head, CT cervical, cxr and xr pelvis all done, all came back unremarkable. CBC normal except for mild anemia ( Hgb 11.4, baseline Hgb 12-13) , CMP normal except for hyponatremia, sodium of 128, her sodium in 2018 was 139, though she had hyponatremia in 2018, sodium of 130.  Her blood Mild MR. Mild TR. Small to moderate pericardial effusion w/no obvious tamponade.  PRE-MALIGNANT / BENIGN SKIN LESION EXCISION Left 12/20/13    left leg lesion excision x2, frozen section, skin graft closure    ROTATOR CUFF REPAIR  09/2001    RIGHT    ROTATOR CUFF REPAIR  04/15/2009    LEFT     SKIN CANCER EXCISION  06/2006      Rt leg           Subjective  Chart Reviewed: Yes (Internal medicine note; PT evaluation; order review)  Patient assessed for rehabilitation services?: Yes  Response to previous treatment: Patient with no complaints from previous session  Family / Caregiver Present: Yes (Daughter- Orly present and supportive)    Subjective: Pleasant and cooperative  Comments: RN approved session. Pt agreed to go make a trip to the bathroom. She reports some abdominal pain that is mild. Her nurse is aware. General:  Overall Orientation Status: Impaired  Orientation Level: Oriented to person, Oriented to time, Oriented to place, Disoriented to situation (verbal cue provided for orienting to what she was being treated for)    Vision: Impaired  Vision Exceptions: Wears glasses at all times    Hearing: Exceptions to [facility]  Hearing Exceptions: Hard of hearing/hearing concerns         Pain:  Pain Assessment  Patient Currently in Pain: Yes  Pain Assessment: 0-10  Pain Level: 3  Pain Type: Acute pain  Pain Location: Abdomen  Pain Orientation: Right  Pain Descriptors: Aching  Pain Frequency: Continuous  Clinical Progression: Not changed  Patient's Stated Pain Goal: No pain  Pain Intervention(s): Repositioned; Rest  Response to Pain Intervention: Patient Satisfied  Multiple Pain Sites: No       Social/Functional History:  Lives With: Alone  Type of Home: Apartment  Home Layout: One level  Home Access: Level entry  Home Equipment: 4 wheeled walker     Bathroom Shower/Tub: Walk-in shower, Shower chair with back  Bathroom Toilet: Handicap height  Bathroom Equipment: Grab bars in shower  Bathroom

## 2018-10-15 NOTE — CONSULTS
aortic and mitral valve w/no stenosis. Mild MR. Mild TR. Minimal residual pericardial effusion w/significant improvement from previous echo.  DOPPLER ECHOCARDIOGRAPHY  3 16 2006    Interval change from previous echocardiogram w/worsening of effusion. Correlation clinically. Consideration of pericardial centesis. Will obtain a [facility] consult.  DOPPLER ECHOCARDIOGRAPHY  1 31 2006    No significant change from previous echo. The 2D echo showed moderate degree of pericardial effusion. It does seem to be worst or better than previous echo. No evidence of tamponade.  DOPPLER ECHOCARDIOGRAPHY  1 27 2006    Normal global LV function. Mild biatrial enlargement. Mildly sclerotic aortic & mitral valve w/no stenosis. Mild MR. Mild TR. Small to moderate pericardial effusion w/no obvious tamponade.  PRE-MALIGNANT / BENIGN SKIN LESION EXCISION Left 12/20/13    left leg lesion excision x2, frozen section, skin graft closure    ROTATOR CUFF REPAIR  09/2001    RIGHT    ROTATOR CUFF REPAIR  04/15/2009    LEFT     SKIN CANCER EXCISION  06/2006      Rt leg       Social History     Social History    Marital status:      Spouse name: N/A    Number of children: N/A    Years of education: N/A     Occupational History    Not on file. Social History Main Topics    Smoking status: Never Smoker    Smokeless tobacco: Never Used    Alcohol use No    Drug use: No    Sexual activity: Not on file     Other Topics Concern    Not on file     Social History Narrative    No narrative on file       AL  Family History   Problem Relation Age of Onset    Heart Disease Mother     Diabetes Mother     Stroke Father     Diabetes Sister     Lung Cancer Brother        Allergies:     Allergies   Allergen Reactions    Latex Rash    Lisinopril Swelling    Morphine     Polysporin [Bacitracin-Polymyxin B]     Tape [place] Tape] Itching    Keflex [Cephalexin] Rash       ROS:  Unable to obtain due to patient's

## 2018-10-15 NOTE — PROGRESS NOTES
Hospitalist Progress Note    Patient:  Zaira Robles      Unit/Bed:5K-06/006-A    YOB: 1932    MRN: [id number]       Acct: [de-identified]     PCP: Esa Broussard MD    Date of Admission: 10/14/2018    Chief Complaint: fall and fever    Hospital Course:     Please see H/P for details. In summary, this is a 80 y.o. w PMH of DM type 2, HTN, CAD, s/p CABG, h/o imbalance, who presented to [facility], accompanied by her daughter, due to fall and fever. In ER, pt was diagnosed w UTI. She received 1 dose of zosyn 3.375 g  in ER. Of note, pt reports that she has recurrent UTI since 01/2018. On admission, pt was also c/o neck and back pain. CT head, CT cervical, cxr and xr pelvis all done, all came back unremarkable. She was also c/o rt lower abd pain and rt pelvic pain, CT abd/pelvis ordered. Also, has hyponatremia, sodium of 128 on admission, her sodium in 9/2018 was 139. Pt was also c/o rt lower abd pain Pt was admitted for recurrent UTI. Zosyn continued, urology consult placed. Overnight, pt had 1 low-grade fever ( Temp 99.1). Her blood glucose uncontrolled, running on 200s. She's on low dose SSI. Nurse said she received 1 unit of insulin overnight. Pt was seen by Urology Yunior Cottrell, ROWDY-ELIZABETH), who recommend cont IV abx, bladder scan, cranberry and d-mannose. Subjective:     Pt seen and examined. She reports urinary frequency improving, still having dysuria, no hematuria. Still having rt lower/rt pelvic pain, improving, 5/10. No N/V. Low back pain getting better per pt, aggravates by sitting. Denies fever, chills,  chest pain, sob, HA. Last BM was yesterday, denies constipation, diarrhea, rectal bleeding, melena. Pt denies having neck pain.        Medications:  Reviewed    Infusion Medications    sodium chloride 75 mL/hr at 10/14/18 2050    dextrose       Scheduled Medications    amLODIPine  10 mg Oral Daily    aspirin  81 mg Oral Daily    atorvastatin  20 mg Oral Daily NA  128*  134*  136   K  3.5   --   3.1*   CL  93*   --   100   CO2  19*   --   20*   BUN  21   --   13   CREATININE  1.0   --   0.8   CALCIUM  9.0   --   8.3*     Recent Labs      10/14/18   1600   AST  31   ALT  15   BILIDIR  0.5*   BILITOT  1.1   ALKPHOS  87       Lab Results   Component Value Date    MG 1.7 10/15/2018     enteric gram negative bacilli     Urine Culture Reflex 10/14/2018  3:49  [facility]   [place] count: >100,000 CFU/mL      Osmolality, Ur 363  250 - 750 mosmol/kg Final 10/14/2018  3:49 PM     Sodium, Ur 33  meq/l Final 10/14/2018  3:49 PM     Blood Culture, Routine 10/14/2018  4:00  [facility]   No growth-preliminary      Blood Culture, Routine 10/14/2018  4:52  [facility]   No growth-preliminary      Lab Results   Component Value Date    LABA1C 7.6 (H) 10/14/2018     No results found for: EAG      No results for input(s): INR in the last 72 hours. No results for input(s): [place] Ann [place] in the last 72 hours. Urinalysis:    Lab Results   Component Value Date    NITRU POSITIVE 10/14/2018    WBCUA > 200 10/14/2018    BACTERIA MANY 10/14/2018    RBCUA 5-10 10/14/2018    BLOODU MODERATE 10/14/2018    SPECGRAV 1.025 09/24/2018    GLUCOSEU NEGATIVE 10/14/2018       Radiology:  CT CERVICAL SPINE WO CONTRAST   Final Result   1. There is no fracture. 2. Numerous additional findings as described in the body the report. **This report has been created using voice recognition software. It may contain minor errors which are inherent in voice recognition technology. **      Final report electronically signed by Dr. Su Saint on 10/14/2018 5:02 PM      CT HEAD WO CONTRAST   Final Result   1. There is no acute intracranial abnormality. 2. Additional findings as described in the body the report. **This report has been created using voice recognition software.   It may contain minor errors which are inherent in voice

## 2018-10-15 NOTE — CONSULTS
[facility] [address] [place] [address]  Cardiology Consult      Patient:  Anastasia Hogue  YOB: 1932    MRN: [id number]   Acct: [de-identified]     Primary Care Physician: Shirlene Edouard MD        REASON FOR CONSULT:               CHIEF COMPLAINT:  Fall and fever      HISTORY OF PRESENT ILLNESS:          All labs, EKG's, diagnostic testing and images as well as cardiac cath, stress testing   were reviewed during this encounter    PREVIOUS CARDIAC TESTING    Ekg:     Echo:    Str. Test:    Cath:      Past Medical History:    Past Medical History:   Diagnosis Date    Atypical chest pain     CAD (coronary artery disease)     Chronic LBBB (left bundle branch block)     Diabetes mellitus type II     Esophageal stricture     History of esophageal stricture.  FH: CAD (coronary artery disease)     Mom and dad both with heart disease at mid to late age.  GERD (gastroesophageal reflux disease)     History of gastritis     History of skin cancer     Hypercholesterolemia     Hyperlipidemia     Hypertension     Imbalance     As far as imbalance is concerned, asked patient to follow-up with Dr. Marlene Huff since she follows with him on a regular basis.  Lactose intolerance     Nummular dermatitis     Parkinson's disease ([address]     Restless legs syndrome (RLS)     S/P CABG (coronary artery bypass graft)     SCC (squamous cell carcinoma)        Past Surgical History:    Past Surgical History:   Procedure Laterality Date    CARDIAC SURGERY      CARDIOVASCULAR STRESS TEST  4 09 2009    Gated SPECT images revealed normal global wall motion with EF of 60%. Persantine test associated w/nonspecific symptoms. EKG is nondiagnostic w/baseline LBBB. No obvious stress-induced ischemia by Cardiolite. EF within normal limits.  CARDIOVASCULAR STRESS TEST  7 10 2007    The gated SPECT images revealed normal wall motion with EF of 69%. Poor exercise tolerance w/SOB on exertion. EKG is nondiagnostic. Pulse Resp SpO2 Weight   10/15/18 0830 131/69 98.2 °F (36.8 °C) Axillary 103 18 96 % -   10/15/18 0445 (!) 109/59 98.5 °F (36.9 °C) Oral 104 16 95 % -   10/15/18 0200 128/62 98.7 °F (37.1 °C) Oral 103 20 92 % -   10/14/18 2135 - - - - - 92 % -   10/14/18 1949 (!) 132/51 98 °F (36.7 °C) Oral 84 18 93 % 135 lb 14.4 oz (61.6 kg)   10/14/18 1904 (!) 133/38 99.1 °F (37.3 °C) Oral 90 18 95 % -   10/14/18 1726 (!) 126/57 - - - - 96 % -   10/14/18 1550 - - - 110 - - -   10/14/18 1546 132/69 103.7 °F (39.8 °C) Rectal 110 26 96 % 134 lb (60.8 kg)       Last 3 weights: Wt Readings from Last 3 Encounters:   10/14/18 135 lb 14.4 oz (61.6 kg)   10/01/18 136 lb (61.7 kg)   09/21/18 134 lb 3.2 oz (60.9 kg)     24 hour intake/output:  Intake/Output Summary (Last 24 hours) at 10/15/18 1023  Last data filed at 10/15/18 0520   Gross per 24 hour   Intake             1580 ml   Output              200 ml   Net             1380 ml     BMI:Body mass index is 25.68 kg/m². General Appearance: alert and oriented to person, place and time, well developed and well- nourished, in no acute distress  Skin: warm and dry, no rash or erythema  Eyes: pupils equal, round, and reactive to light, extraocular eye movements intact, conjunctivae normal  Neck: supple and non-tender without mass, no thyromegaly or thyroid nodules, no cervical lymphadenopathy  Pulmonary/Chest: clear to auscultation bilaterally- no wheezes, rales or rhonchi, normal air movement, no respiratory distress  Cardiovascular: normal rate, regular rhythm, normal S1 and S2, no murmurs, rubs, clicks, or gallops, distal pulses intact, no carotid bruits, Negative JVD  Radial Pulses: intact 2+  Abdomen: soft, non-tender, non-distended, normal bowel sounds, no masses or organomegaly  Extremities: no cyanosis, clubbing .  Edema  Musculoskeletal: normal range of motion, no joint swelling, deformity or tenderness    LABS:  Recent Labs      10/15/18   0753   TROPONINT  < 0.010     CBC: Lab Results   Component Value Date    WBC 6.6 10/15/2018    RBC 3.53 10/15/2018    RBC 0-5 09/21/2018    HGB 9.9 10/15/2018    HCT 29.7 10/15/2018    MCV 84.1 10/15/2018    MCH 28.0 10/15/2018    MCHC 33.3 10/15/2018    RDW 14.5 06/21/2018     10/15/2018    MPV 10.6 10/15/2018     BMP:  Lab Results   Component Value Date     10/15/2018    K 3.1 10/15/2018     10/15/2018    CO2 20 10/15/2018    BUN 13 10/15/2018    LABALBU 3.5 10/14/2018    LABALBU 4.1 01/12/2012    CREATININE 0.8 10/15/2018    CALCIUM 8.3 10/15/2018    LABGLOM 68 10/15/2018    GLUCOSE 229 10/15/2018    GLUCOSE 179 01/25/2018     Hepatic Function Panel:  Lab Results   Component Value Date    ALKPHOS 87 10/14/2018    ALT 15 10/14/2018    AST 31 10/14/2018    PROT 6.7 10/14/2018    BILITOT 1.1 10/14/2018    BILITOT NEGATIVE 09/21/2018    BILIDIR 0.5 10/14/2018    LABALBU 3.5 10/14/2018    LABALBU 4.1 01/12/2012     Magnesium:    Lab Results   Component Value Date    MG 1.7 10/15/2018     Warfarin PT/INR:  No components found for: PTPATWAR, PTINRWAR  HgBA1c:    Lab Results   Component Value Date    LABA1C 7.6 10/14/2018     FLP:  Lab Results   Component Value Date    TRIG 112 01/25/2018    HDL 45 01/25/2018    LDLCALC 89 01/25/2018    LDLDIRECT 220.21 03/07/2016    LABVLDL 22 01/25/2018     TSH:    Lab Results   Component Value Date    TSH 4.040 05/28/2014     BNP: No results found for: BNP      ASSESSMENT/PLAN:  79 yo in SR 6/2018 presents with  'fall & fever '  ECG - AF with HR low 100's  On sinemet , metoprolol. ...  K 3.1 , Mg 1.7  Rec : K ~ 4.0 & Mg ~ 2.0      Pablo Calhoun MD FACC,FASE,FSVM,FSCAI,ZIA,ELOY,FACP.  10:23 AM  10/15/2018

## 2018-10-15 NOTE — PLAN OF CARE
Problem: Pain:  Goal: Pain level will decrease  Pain level will decrease   Outcome: Ongoing  Patient reporting pain of 6/10 with goal of no pain. Patient satisfied with current interventions. Pain assessments ongoing. Problem: Risk for Impaired Skin Integrity  Goal: Tissue integrity - skin and mucous membranes  Structural intactness and normal physiological function of skin and  mucous membranes. Outcome: Ongoing  Patient encouraged to turn and reposition every 2 hours. Amanda care provided when patient is incontinent. Skin assessments ongoing. Problem: Urinary Elimination:  Goal: Signs and symptoms of infection will decrease  Signs and symptoms of infection will decrease   Outcome: Ongoing  Patient on IV antibiotics. Goal: Ability to reestablish a normal urinary elimination pattern will improve - after catheter removal  Ability to reestablish a normal urinary elimination pattern will improve   Outcome: Ongoing  Patient incontinent at this time. Comments: Care plan reviewed with patient and daughter. Patient and daughter verbalize understanding of the plan of care and contribute to goal setting.

## 2018-10-16 LAB
ACINETOBACTER BAUMANNII FILM ARRAY: NOT DETECTED
ANION GAP SERPL CALCULATED.3IONS-SCNC: 12 MEQ/L (ref 8–16)
BASOPHILS # BLD: 0.2 %
BASOPHILS ABSOLUTE: 0 THOU/MM3 (ref 0–0.1)
BOTTLE TYPE: ABNORMAL
BUN BLDV-MCNC: 11 MG/DL (ref 7–22)
CALCIUM SERPL-MCNC: 8.2 MG/DL (ref 8.5–10.5)
CANDIDA ALBICANS FILM ARRAY: NOT DETECTED
CANDIDA GLABRATA FILM ARRAY: NOT DETECTED
CANDIDA KRUSEI FILM ARRAY: NOT DETECTED
CANDIDA PARAPSILOSIS FILM ARRAY: NOT DETECTED
CANDIDA TROPICALIS FILM ARRAY: NOT DETECTED
CARBAPENEM RESITANT FILM ARRAY: NOT DETECTED
CHLORIDE BLD-SCNC: 100 MEQ/L (ref 98–111)
CO2: 20 MEQ/L (ref 23–33)
CREAT SERPL-MCNC: 0.8 MG/DL (ref 0.4–1.2)
EKG ATRIAL RATE: 87 BPM
EKG Q-T INTERVAL: 412 MS
EKG QRS DURATION: 146 MS
EKG QTC CALCULATION (BAZETT): 495 MS
EKG R AXIS: -49 DEGREES
EKG T AXIS: 74 DEGREES
EKG VENTRICULAR RATE: 87 BPM
ENTERBACTER CLOACAE FILM ARRAY: NOT DETECTED
ENTERBACTERIACEAE FILM ARRAY: DETECTED
ENTEROCOCCUS FILM ARRAY: NOT DETECTED
EOSINOPHIL # BLD: 0.5 %
EOSINOPHILS ABSOLUTE: 0 THOU/MM3 (ref 0–0.4)
ERYTHROCYTE [DISTWIDTH] IN BLOOD BY AUTOMATED COUNT: 13.8 % (ref 11.5–14.5)
ERYTHROCYTE [DISTWIDTH] IN BLOOD BY AUTOMATED COUNT: 41.5 FL (ref 35–45)
ESCHERICHIA COLI FILM ARRAY: DETECTED
GFR SERPL CREATININE-BSD FRML MDRD: 68 ML/MIN/1.73M2
GLUCOSE BLD-MCNC: 183 MG/DL (ref 70–108)
GLUCOSE BLD-MCNC: 189 MG/DL (ref 70–108)
GLUCOSE BLD-MCNC: 209 MG/DL (ref 70–108)
GLUCOSE BLD-MCNC: 230 MG/DL (ref 70–108)
GLUCOSE BLD-MCNC: 255 MG/DL (ref 70–108)
HAEMOPHILUS INFLUENZA FILM ARRAY: NOT DETECTED
HCT VFR BLD CALC: 31.9 % (ref 37–47)
HEMOGLOBIN: 10.9 GM/DL (ref 12–16)
IMMATURE GRANS (ABS): 0.05 THOU/MM3 (ref 0–0.07)
IMMATURE GRANULOCYTES: 0.8 %
KLEBSIELLA OXYTOCA FILM ARRAY: NOT DETECTED
KLEBSIELLA PNEUMONIAE FILM ARRAY: NOT DETECTED
LISTERIA MONOCYTOGENES FILM ARRAY: NOT DETECTED
LYMPHOCYTES # BLD: 11 %
LYMPHOCYTES ABSOLUTE: 0.7 THOU/MM3 (ref 1–4.8)
MAGNESIUM: 1.8 MG/DL (ref 1.6–2.4)
MCH RBC QN AUTO: 28.4 PG (ref 26–33)
MCHC RBC AUTO-ENTMCNC: 34.2 GM/DL (ref 32.2–35.5)
MCV RBC AUTO: 83.1 FL (ref 81–99)
METHICILLIN RESISTANT FILM ARRAY: ABNORMAL
MONOCYTES # BLD: 14.7 %
MONOCYTES ABSOLUTE: 1 THOU/MM3 (ref 0.4–1.3)
NEISSERIA MENIGITIDIS FILM ARRAY: NOT DETECTED
NUCLEATED RED BLOOD CELLS: 0 /100 WBC
ORGANISM: ABNORMAL
PLATELET # BLD: 145 THOU/MM3 (ref 130–400)
PLATELET ESTIMATE: ADEQUATE
PMV BLD AUTO: 11 FL (ref 9.4–12.4)
POTASSIUM REFLEX MAGNESIUM: 3.3 MEQ/L (ref 3.5–5.2)
PROTEUS FILM ARRAY: NOT DETECTED
PSEUDOMONAS AERUGINOSA FILM ARRAY: NOT DETECTED
RBC # BLD: 3.84 MILL/MM3 (ref 4.2–5.4)
SCAN OF BLOOD SMEAR: NORMAL
SEG NEUTROPHILS: 72.8 %
SEGMENTED NEUTROPHILS ABSOLUTE COUNT: 4.8 THOU/MM3 (ref 1.8–7.7)
SERRATIA MARCESCENS FILM ARRAY: NOT DETECTED
SODIUM BLD-SCNC: 132 MEQ/L (ref 135–145)
SODIUM BLD-SCNC: 132 MEQ/L (ref 135–145)
SODIUM BLD-SCNC: 133 MEQ/L (ref 135–145)
SODIUM BLD-SCNC: 133 MEQ/L (ref 135–145)
SOURCE OF BLOOD CULTURE: ABNORMAL
STAPH AUREUS FILM ARRAY: NOT DETECTED
STAPHYLOCOCCUS FILM ARRAY: NOT DETECTED
STREP AGALACTIAE FILM ARRAY: NOT DETECTED
STREP PNEUMONIAE FILM ARRAY: NOT DETECTED
STREP PYOCGENES FILM ARRAY: NOT DETECTED
STREPTOCOCCUS FILM ARRAY: NOT DETECTED
TOXIC GRANULATION: PRESENT
URINE CULTURE REFLEX: ABNORMAL
VANCOMYCIN RESISTANT FILM ARRAY: ABNORMAL
WBC # BLD: 6.6 THOU/MM3 (ref 4.8–10.8)

## 2018-10-16 PROCEDURE — 97110 THERAPEUTIC EXERCISES: CPT

## 2018-10-16 PROCEDURE — 1200000003 HC TELEMETRY R&B

## 2018-10-16 PROCEDURE — 6370000000 HC RX 637 (ALT 250 FOR IP): Performed by: NURSE PRACTITIONER

## 2018-10-16 PROCEDURE — 84295 ASSAY OF SERUM SODIUM: CPT

## 2018-10-16 PROCEDURE — 6360000002 HC RX W HCPCS: Performed by: FAMILY MEDICINE

## 2018-10-16 PROCEDURE — 99232 SBSQ HOSP IP/OBS MODERATE 35: CPT | Performed by: NURSE PRACTITIONER

## 2018-10-16 PROCEDURE — 99232 SBSQ HOSP IP/OBS MODERATE 35: CPT | Performed by: INTERNAL MEDICINE

## 2018-10-16 PROCEDURE — 2580000003 HC RX 258: Performed by: FAMILY MEDICINE

## 2018-10-16 PROCEDURE — 93005 ELECTROCARDIOGRAM TRACING: CPT | Performed by: NURSE PRACTITIONER

## 2018-10-16 PROCEDURE — 85025 COMPLETE CBC W/AUTO DIFF WBC: CPT

## 2018-10-16 PROCEDURE — 36415 COLL VENOUS BLD VENIPUNCTURE: CPT

## 2018-10-16 PROCEDURE — 82948 REAGENT STRIP/BLOOD GLUCOSE: CPT

## 2018-10-16 PROCEDURE — 97116 GAIT TRAINING THERAPY: CPT

## 2018-10-16 PROCEDURE — 2709999900 HC NON-CHARGEABLE SUPPLY

## 2018-10-16 PROCEDURE — 93010 ELECTROCARDIOGRAM REPORT: CPT | Performed by: NUCLEAR MEDICINE

## 2018-10-16 PROCEDURE — 6370000000 HC RX 637 (ALT 250 FOR IP): Performed by: FAMILY MEDICINE

## 2018-10-16 PROCEDURE — 83735 ASSAY OF MAGNESIUM: CPT

## 2018-10-16 PROCEDURE — 80048 BASIC METABOLIC PNL TOTAL CA: CPT

## 2018-10-16 RX ORDER — POTASSIUM CHLORIDE 20 MEQ/1
40 TABLET, EXTENDED RELEASE ORAL ONCE
Status: COMPLETED | OUTPATIENT
Start: 2018-10-16 | End: 2018-10-16

## 2018-10-16 RX ORDER — AMLODIPINE BESYLATE 5 MG/1
5 TABLET ORAL DAILY
Status: DISCONTINUED | OUTPATIENT
Start: 2018-10-17 | End: 2018-10-17

## 2018-10-16 RX ORDER — METOPROLOL TARTRATE 50 MG/1
50 TABLET, FILM COATED ORAL 3 TIMES DAILY
Status: DISCONTINUED | OUTPATIENT
Start: 2018-10-16 | End: 2018-10-18 | Stop reason: HOSPADM

## 2018-10-16 RX ADMIN — INSULIN LISPRO 3 UNITS: 100 INJECTION, SOLUTION INTRAVENOUS; SUBCUTANEOUS at 21:39

## 2018-10-16 RX ADMIN — AMLODIPINE BESYLATE 10 MG: 10 TABLET ORAL at 09:36

## 2018-10-16 RX ADMIN — ASPIRIN 81 MG: 81 TABLET, COATED ORAL at 09:35

## 2018-10-16 RX ADMIN — ENOXAPARIN SODIUM 40 MG: 40 INJECTION SUBCUTANEOUS at 21:39

## 2018-10-16 RX ADMIN — SUCRALFATE 1 G: 1 TABLET ORAL at 18:36

## 2018-10-16 RX ADMIN — INSULIN LISPRO 4 UNITS: 100 INJECTION, SOLUTION INTRAVENOUS; SUBCUTANEOUS at 18:38

## 2018-10-16 RX ADMIN — CARBIDOPA AND LEVODOPA 1 TABLET: 25; 100 TABLET, EXTENDED RELEASE ORAL at 09:35

## 2018-10-16 RX ADMIN — METOPROLOL TARTRATE 50 MG: 50 TABLET, FILM COATED ORAL at 09:37

## 2018-10-16 RX ADMIN — ACETAMINOPHEN 650 MG: 325 TABLET ORAL at 00:56

## 2018-10-16 RX ADMIN — METOPROLOL TARTRATE 50 MG: 50 TABLET, FILM COATED ORAL at 21:39

## 2018-10-16 RX ADMIN — ATORVASTATIN CALCIUM 20 MG: 20 TABLET, FILM COATED ORAL at 09:40

## 2018-10-16 RX ADMIN — SUCRALFATE 1 G: 1 TABLET ORAL at 21:39

## 2018-10-16 RX ADMIN — ACETAMINOPHEN 650 MG: 325 TABLET ORAL at 13:21

## 2018-10-16 RX ADMIN — INSULIN LISPRO 4 UNITS: 100 INJECTION, SOLUTION INTRAVENOUS; SUBCUTANEOUS at 12:02

## 2018-10-16 RX ADMIN — PIPERACILLIN SODIUM,TAZOBACTAM SODIUM 3.38 G: 3; .375 INJECTION, POWDER, FOR SOLUTION INTRAVENOUS at 00:54

## 2018-10-16 RX ADMIN — FAMOTIDINE 20 MG: 20 TABLET ORAL at 09:35

## 2018-10-16 RX ADMIN — MAGNESIUM GLUCONATE 500 MG ORAL TABLET 400 MG: 500 TABLET ORAL at 09:36

## 2018-10-16 RX ADMIN — PIPERACILLIN SODIUM,TAZOBACTAM SODIUM 3.38 G: 3; .375 INJECTION, POWDER, FOR SOLUTION INTRAVENOUS at 09:34

## 2018-10-16 RX ADMIN — INSULIN LISPRO 2 UNITS: 100 INJECTION, SOLUTION INTRAVENOUS; SUBCUTANEOUS at 09:29

## 2018-10-16 RX ADMIN — SUCRALFATE 1 G: 1 TABLET ORAL at 12:25

## 2018-10-16 RX ADMIN — POTASSIUM CHLORIDE 40 MEQ: 20 TABLET, EXTENDED RELEASE ORAL at 09:34

## 2018-10-16 RX ADMIN — PIPERACILLIN SODIUM,TAZOBACTAM SODIUM 3.38 G: 3; .375 INJECTION, POWDER, FOR SOLUTION INTRAVENOUS at 19:32

## 2018-10-16 RX ADMIN — SODIUM CHLORIDE: 9 INJECTION, SOLUTION INTRAVENOUS at 12:22

## 2018-10-16 RX ADMIN — CARBIDOPA AND LEVODOPA 1 TABLET: 25; 100 TABLET, EXTENDED RELEASE ORAL at 21:39

## 2018-10-16 RX ADMIN — SUCRALFATE 1 G: 1 TABLET ORAL at 09:36

## 2018-10-16 ASSESSMENT — PAIN SCALES - GENERAL
PAINLEVEL_OUTOF10: 4
PAINLEVEL_OUTOF10: 0
PAINLEVEL_OUTOF10: 2
PAINLEVEL_OUTOF10: 4
PAINLEVEL_OUTOF10: 6

## 2018-10-16 ASSESSMENT — PAIN DESCRIPTION - LOCATION: LOCATION: ABDOMEN

## 2018-10-16 ASSESSMENT — PAIN DESCRIPTION - ORIENTATION: ORIENTATION: RIGHT

## 2018-10-16 NOTE — PROGRESS NOTES
Consult received. Chart reviewed. Patient is appropriate for TCU. Await medical stability and bed availability.

## 2018-10-16 NOTE — CONSULTS
pericardial effusion. It does seem to be worst or better than previous echo. No evidence of tamponade.  DOPPLER ECHOCARDIOGRAPHY  1 27 2006    Normal global LV function. Mild biatrial enlargement. Mildly sclerotic aortic & mitral valve w/no stenosis. Mild MR. Mild TR. Small to moderate pericardial effusion w/no obvious tamponade.  PRE-MALIGNANT / BENIGN SKIN LESION EXCISION Left 12/20/13    left leg lesion excision x2, frozen section, skin graft closure    ROTATOR CUFF REPAIR  09/2001    RIGHT    ROTATOR CUFF REPAIR  04/15/2009    LEFT     SKIN CANCER EXCISION  06/2006      Rt leg       Medications: Scheduled Meds:   potassium chloride  40 mEq Oral Once    insulin lispro  0-12 Units Subcutaneous TID WC    insulin lispro  0-6 Units Subcutaneous Nightly    potassium replacement protocol   Other RX Placeholder    magnesium oxide  400 mg Oral Daily    amLODIPine  10 mg Oral Daily    aspirin  81 mg Oral Daily    atorvastatin  20 mg Oral Daily    carbidopa-levodopa  1 tablet Oral BID    famotidine  20 mg Oral Daily    metoprolol tartrate  50 mg Oral BID    sucralfate  1 g Oral 4x Daily    sodium chloride flush  10 mL Intravenous 2 times per day    enoxaparin  40 mg Subcutaneous Daily    piperacillin-tazobactam  3.375 g Intravenous Q8H     Continuous Infusions:   sodium chloride 75 mL/hr at 10/15/18 0944    dextrose       PRN Meds:.acetaminophen, sodium chloride flush, magnesium hydroxide, ondansetron, glucose, dextrose, glucagon (rDNA), dextrose    Allergies:  Latex; Lisinopril; Morphine; Polysporin [bacitracin-polymyxin b]; Tape Skippy Mustapha tape]; and Keflex [cephalexin]    Social History:    reports that she has never smoked. She has never used smokeless tobacco. She reports that she does not drink alcohol or use drugs.     Family History:   Family History   Problem Relation Age of Onset    Heart Disease Mother     Diabetes Mother     Stroke Father     Diabetes Sister     Lung

## 2018-10-16 NOTE — PROGRESS NOTES
Notified of preliminary positive blood cultures growing gram negatvie bacilli from [facility]. Notified Dr. Tenisha Mack. No new orders received at this time.

## 2018-10-16 NOTE — PROGRESS NOTES
dextrose         I & O's:  I/O last 3 completed shifts: In: 1138 [P.O.:440; I.V.:698]  Out: 200 [Urine:200]  No intake/output data recorded. Intake/Output Summary (Last 24 hours) at 10/16/18 1715  Last data filed at 10/16/18 0745   Gross per 24 hour   Intake             1138 ml   Output              200 ml   Net              938 ml       Date 10/16/18 0000 - 10/16/18 2359   Shift 2506-6064 5154-9625 1657-5171 24 Hour Total   I  N  T  A  K  E   Shift Total  (mL/kg)       O  U  T  P  U  T   Urine  (mL/kg/hr) 200  (0.4)   200    Shift Total  (mL/kg) 200  (3.2)   200  (3.2)   Weight (kg) 61.6 61.6 61.6 61.6           CBC:   Recent Labs      10/14/18   1600  10/15/18   0643  10/16/18   0604   WBC  8.1  6.6  6.6   HGB  11.4*  9.9*  10.9*   PLT  157  127*  145     BMP:  Recent Labs      10/14/18   1600   10/15/18   0643   10/15/18   2341  10/16/18   0604  10/16/18   1138   NA  128*   < >  136   < >  133*  132*  133*   K  3.5   --   3.1*   --    --   3.3*   --    CL  93*   --   100   --    --   100   --    CO2  19*   --   20*   --    --   20*   --    BUN  21   --   13   --    --   11   --    CREATININE  1.0   --   0.8   --    --   0.8   --    GLUCOSE  240*   --   229*   --    --   189*   --     < > = values in this interval not displayed. Hepatic: Recent Labs      10/14/18   1600   AST  31   ALT  15   BILITOT  1.1   ALKPHOS  87     BNP: No results for input(s): BNP in the last 72 hours. URINALYSIS:    Results for Soha Cano (MRN [id number]) as of 10/16/2018 17:17   Ref.  Range 10/14/2018 15:49   Color, UA Latest Ref Range: STRAW-YELL  YELLOW   Glucose, UA Latest Ref Range: NEGATIVE mg/dl NEGATIVE   Bilirubin, Urine Latest Ref Range: NEGATIVE  NEGATIVE   Ketones, Urine Latest Ref Range: NEGATIVE  TRACE (A)   Blood, Urine Latest Ref Range: NEGATIVE  MODERATE (A)   pH, UA Latest Ref Range: 5.0 - 9.0  5.5   Protein, UA Latest Ref Range: NEGATIVE  100 (A)   Urobilinogen, Urine Latest Ref Range: 0.0 - 1.0 eu/dl 0.2 Nitrite, Urine Latest Ref Range: NEGATIVE  POSITIVE (A)   Leukocyte Esterase, Urine Latest Ref Range: NEGATIVE  LARGE (A)   Casts UA Latest Ref Range: NONE SEEN /lpf NONE SEEN   CASTS 2 Latest Ref Range: NONE SEEN /lpf NONE SEEN   WBC, UA Latest Ref Range: 0-4/hpf /hpf > 200   RBC, UA Latest Ref Range: 0-2/hpf /hpf 5-10   Epi Cells Latest Ref Range: 3-5/hpf /hpf NONE SEEN   Renal Epithelial, Urine Latest Ref Range: NONE SEEN  NONE SEEN   Bacteria, UA Latest Ref Range: FEW/NONE S /hpf MANY   Yeast, Urine Latest Ref Range: NONE SEEN  NONE SEEN   Crystals Latest Ref Range: NONE SEEN  NONE SEEN   Character, Urine Latest Ref Range: CLEAR-SL C  CLOUDY (A)   Urine Culture Reflex Unknown [place] count: >10. .. Osmolality, Ur Latest Ref Range: 250 - 750 mosmol/kg 363   Sodium, Ur Latest Units: meq/l 33   Specific Gravity, Urine Latest Ref Range: 1.002 - 1.03  1.012         MICRO:     Collected: 10/14/18 1652   Resulting lab: [id number]   Value: gram negative bacilli (Abnormal)   Comment:    *Additional information available - narrative   10/16/2018  9:24 AM - Jeevan Daigle Incoming Lab Results From [facility]     Component Results     Component Collected Lab   Blood Culture, Routine  (Abnormal) 10/14/2018  4:52  [facility]   No growth-preliminary     Organism  (Abnormal) 10/14/2018  4:52 PM [facility] - [facility]   gram negative bacilli           IMAGING:   CT ABDOMEN  Impression   1. Marked heterogeneous enhancement of the right kidney with the right perinephric stranding and edema suggesting pyelonephritis. 2. Heterogeneous enhancement of the spleen of uncertain significance possibly contrast bolus timing issue possibility of an infiltrative process of the spleen is not excludable. 3. Bilateral small pleural effusions and bibasilar atelectasis. 4. Findings of pelvic venous congestion syndrome.          Objective:   Vitals: /60   Pulse 88   Temp 97.6 °F (36.4 °C)

## 2018-10-16 NOTE — PROGRESS NOTES
LAB PROCEDURE  12 23 2005    LV was obtained. AGEE projection showed preserved LV function w/estimated EF at 55%. No significant MR. Patent left main coronary artery. Tortuous LAD which appeared to be patent. Patent left circumflex artery. High-grade stenosis around 99% in very large dominant RCA w/heavy calcification at the ostium. Normal LV function.  DOPPLER ECHOCARDIOGRAPHY  4 09 2009    Global LV function w/in lower limits of normal, with mild anteroseptal hypokinesis. EF of 50-55%. Light LVH. Mild to moderate left atrial enlargement. Calcific aortic and mitral valve w/no obvious stenosis. No obvious pericardial effusion.  DOPPLER ECHOCARDIOGRAPHY  7 10 2007    LV function w/in lower limits of normal w/peridoxical septal wall motion. Moderate left atrial enlargement. Mild MR. Mild TR. Calcified valves w/no obvious stenosis. No pericardial effusion.  DOPPLER ECHOCARDIOGRAPHY  4 14 2006    There appears to be significant improvement from previous echo w/complete resolution of pericardial effusion and normal LV function. EF of 60%.  DOPPLER ECHOCARDIOGRAPHY  3 21 2006    Normal LV function. Mild LV hypertrophy. Mild biatrial enlargement. Mildly calcific aortic and mitral valve w/no stenosis. Mild MR. Mild TR. Minimal residual pericardial effusion w/significant improvement from previous echo.  DOPPLER ECHOCARDIOGRAPHY  3 16 2006    Interval change from previous echocardiogram w/worsening of effusion. Correlation clinically. Consideration of pericardial centesis. Will obtain a [facility].  DOPPLER ECHOCARDIOGRAPHY  1 31 2006    No significant change from previous echo. The 2D echo showed moderate degree of pericardial effusion. It does seem to be worst or better than previous echo. No evidence of tamponade.  DOPPLER ECHOCARDIOGRAPHY  1 27 2006    Normal global LV function. Mild biatrial enlargement. Mildly sclerotic aortic & mitral valve w/no stenosis. Mild MR. Mild TR.  Small to moderate pericardial effusion w/no obvious tamponade.  PRE-MALIGNANT / BENIGN SKIN LESION EXCISION Left 12/20/13    left leg lesion excision x2, frozen section, skin graft closure    ROTATOR CUFF REPAIR  09/2001    RIGHT    ROTATOR CUFF REPAIR  04/15/2009    LEFT     SKIN CANCER EXCISION  06/2006      Rt leg       Restrictions/Precautions:  General Precautions, Fall Risk                    Other position/activity restrictions: Parkinson's       Prior Level of Function:  ADL Assistance: Independent  Homemaking Assistance: Needs assistance  Ambulation Assistance: Independent  Transfer Assistance: Independent  Additional Comments: Pt amb with rollator, daughter and daughter-in-law assist with transportation, groceries, neighbor assists with trash    Subjective:     Subjective: RN approved session and stated pt was in restroom with tech at arrival. Pt agreeable to amb back to bed and perform therex. Pt states she is very tired.      Pain:   .  Pain Assessment  Pain Level: 4       Social/Functional:  Lives With: Alone  Type of Home: Apartment  Home Layout: One level  Home Access: Level entry  Home Equipment: 4 wheeled walker     Objective:  Rolling to Right: Stand by assistance  Sit to Supine: Contact guard assistance (HOB flat, pt able to bring LE into bed)  Scooting: Dependent/Total (use of hercules matress to move to [facility])    Transfers  Sit to Stand: Minimal Assistance (from toilet to walker, use of grab bar)  Stand to sit: Contact guard assistance (onto EOB, cues for hand placement)       Ambulation 1  Surface: level tile  Device: Rolling Walker  Assistance: Contact guard assistance  Quality of Gait: decrease william, decrease step length, lacks heel strike, narrow DION, downward gaze, slight fwd flex posture, steady, no LOB  Distance: 15ftx1            Exercises:  Exercises  Comments: pt completed BLE supine ex x10;ankle pumps, glut set, quad set, heel slides, short arch quads, hip abd/add, and straight leg

## 2018-10-17 LAB
ANION GAP SERPL CALCULATED.3IONS-SCNC: 14 MEQ/L (ref 8–16)
BASOPHILS # BLD: 0.3 %
BASOPHILS ABSOLUTE: 0 THOU/MM3 (ref 0–0.1)
BUN BLDV-MCNC: 9 MG/DL (ref 7–22)
CALCIUM SERPL-MCNC: 8.1 MG/DL (ref 8.5–10.5)
CHLORIDE BLD-SCNC: 100 MEQ/L (ref 98–111)
CO2: 20 MEQ/L (ref 23–33)
CREAT SERPL-MCNC: 0.7 MG/DL (ref 0.4–1.2)
EOSINOPHIL # BLD: 0.8 %
EOSINOPHILS ABSOLUTE: 0.1 THOU/MM3 (ref 0–0.4)
ERYTHROCYTE [DISTWIDTH] IN BLOOD BY AUTOMATED COUNT: 13.8 % (ref 11.5–14.5)
ERYTHROCYTE [DISTWIDTH] IN BLOOD BY AUTOMATED COUNT: 41.4 FL (ref 35–45)
GFR SERPL CREATININE-BSD FRML MDRD: 79 ML/MIN/1.73M2
GLUCOSE BLD-MCNC: 193 MG/DL (ref 70–108)
GLUCOSE BLD-MCNC: 236 MG/DL (ref 70–108)
GLUCOSE BLD-MCNC: 236 MG/DL (ref 70–108)
GLUCOSE BLD-MCNC: 283 MG/DL (ref 70–108)
GLUCOSE BLD-MCNC: 292 MG/DL (ref 70–108)
HCT VFR BLD CALC: 29.7 % (ref 37–47)
HEMOGLOBIN: 10.2 GM/DL (ref 12–16)
IMMATURE GRANS (ABS): 0.09 THOU/MM3 (ref 0–0.07)
IMMATURE GRANULOCYTES: 1.3 %
LYMPHOCYTES # BLD: 12.8 %
LYMPHOCYTES ABSOLUTE: 0.9 THOU/MM3 (ref 1–4.8)
MAGNESIUM: 1.4 MG/DL (ref 1.6–2.4)
MCH RBC QN AUTO: 28 PG (ref 26–33)
MCHC RBC AUTO-ENTMCNC: 34.3 GM/DL (ref 32.2–35.5)
MCV RBC AUTO: 81.6 FL (ref 81–99)
MONOCYTES # BLD: 16.6 %
MONOCYTES ABSOLUTE: 1.2 THOU/MM3 (ref 0.4–1.3)
NUCLEATED RED BLOOD CELLS: 0 /100 WBC
PLATELET # BLD: 159 THOU/MM3 (ref 130–400)
PMV BLD AUTO: 10.8 FL (ref 9.4–12.4)
POTASSIUM REFLEX MAGNESIUM: 3.5 MEQ/L (ref 3.5–5.2)
RBC # BLD: 3.64 MILL/MM3 (ref 4.2–5.4)
SEG NEUTROPHILS: 68.2 %
SEGMENTED NEUTROPHILS ABSOLUTE COUNT: 4.9 THOU/MM3 (ref 1.8–7.7)
SODIUM BLD-SCNC: 133 MEQ/L (ref 135–145)
SODIUM BLD-SCNC: 133 MEQ/L (ref 135–145)
SODIUM BLD-SCNC: 134 MEQ/L (ref 135–145)
SODIUM BLD-SCNC: 134 MEQ/L (ref 135–145)
WBC # BLD: 7.2 THOU/MM3 (ref 4.8–10.8)

## 2018-10-17 PROCEDURE — 82948 REAGENT STRIP/BLOOD GLUCOSE: CPT

## 2018-10-17 PROCEDURE — 99232 SBSQ HOSP IP/OBS MODERATE 35: CPT | Performed by: INTERNAL MEDICINE

## 2018-10-17 PROCEDURE — 80048 BASIC METABOLIC PNL TOTAL CA: CPT

## 2018-10-17 PROCEDURE — 6370000000 HC RX 637 (ALT 250 FOR IP): Performed by: FAMILY MEDICINE

## 2018-10-17 PROCEDURE — 6370000000 HC RX 637 (ALT 250 FOR IP): Performed by: NURSE PRACTITIONER

## 2018-10-17 PROCEDURE — 83735 ASSAY OF MAGNESIUM: CPT

## 2018-10-17 PROCEDURE — 2580000003 HC RX 258: Performed by: FAMILY MEDICINE

## 2018-10-17 PROCEDURE — 87040 BLOOD CULTURE FOR BACTERIA: CPT

## 2018-10-17 PROCEDURE — 6360000002 HC RX W HCPCS: Performed by: FAMILY MEDICINE

## 2018-10-17 PROCEDURE — 36415 COLL VENOUS BLD VENIPUNCTURE: CPT

## 2018-10-17 PROCEDURE — 2709999900 HC NON-CHARGEABLE SUPPLY

## 2018-10-17 PROCEDURE — 99232 SBSQ HOSP IP/OBS MODERATE 35: CPT | Performed by: NURSE PRACTITIONER

## 2018-10-17 PROCEDURE — 84295 ASSAY OF SERUM SODIUM: CPT

## 2018-10-17 PROCEDURE — 1200000003 HC TELEMETRY R&B

## 2018-10-17 PROCEDURE — 6360000002 HC RX W HCPCS: Performed by: INTERNAL MEDICINE

## 2018-10-17 PROCEDURE — 85025 COMPLETE CBC W/AUTO DIFF WBC: CPT

## 2018-10-17 RX ORDER — DILTIAZEM HYDROCHLORIDE 180 MG/1
180 CAPSULE, COATED, EXTENDED RELEASE ORAL DAILY
Status: DISCONTINUED | OUTPATIENT
Start: 2018-10-17 | End: 2018-10-17

## 2018-10-17 RX ORDER — DIGOXIN 0.25 MG/ML
500 INJECTION INTRAMUSCULAR; INTRAVENOUS ONCE
Status: DISCONTINUED | OUTPATIENT
Start: 2018-10-17 | End: 2018-10-17

## 2018-10-17 RX ORDER — LIDOCAINE HYDROCHLORIDE 10 MG/ML
5 INJECTION, SOLUTION EPIDURAL; INFILTRATION; INTRACAUDAL; PERINEURAL ONCE
Status: DISCONTINUED | OUTPATIENT
Start: 2018-10-17 | End: 2018-10-18 | Stop reason: HOSPADM

## 2018-10-17 RX ORDER — MAGNESIUM SULFATE IN WATER 40 MG/ML
2 INJECTION, SOLUTION INTRAVENOUS ONCE
Status: COMPLETED | OUTPATIENT
Start: 2018-10-17 | End: 2018-10-17

## 2018-10-17 RX ORDER — ZOLPIDEM TARTRATE 5 MG/1
5 TABLET ORAL ONCE
Status: COMPLETED | OUTPATIENT
Start: 2018-10-17 | End: 2018-10-17

## 2018-10-17 RX ORDER — POTASSIUM CHLORIDE 20 MEQ/1
40 TABLET, EXTENDED RELEASE ORAL ONCE
Status: COMPLETED | OUTPATIENT
Start: 2018-10-17 | End: 2018-10-17

## 2018-10-17 RX ORDER — SODIUM CHLORIDE 0.9 % (FLUSH) 0.9 %
10 SYRINGE (ML) INJECTION PRN
Status: DISCONTINUED | OUTPATIENT
Start: 2018-10-17 | End: 2018-10-18 | Stop reason: HOSPADM

## 2018-10-17 RX ORDER — SODIUM CHLORIDE 0.9 % (FLUSH) 0.9 %
10 SYRINGE (ML) INJECTION EVERY 12 HOURS SCHEDULED
Status: DISCONTINUED | OUTPATIENT
Start: 2018-10-17 | End: 2018-10-18 | Stop reason: HOSPADM

## 2018-10-17 RX ADMIN — INSULIN LISPRO 4 UNITS: 100 INJECTION, SOLUTION INTRAVENOUS; SUBCUTANEOUS at 17:33

## 2018-10-17 RX ADMIN — ATORVASTATIN CALCIUM 20 MG: 20 TABLET, FILM COATED ORAL at 09:05

## 2018-10-17 RX ADMIN — PIPERACILLIN SODIUM,TAZOBACTAM SODIUM 3.38 G: 3; .375 INJECTION, POWDER, FOR SOLUTION INTRAVENOUS at 17:36

## 2018-10-17 RX ADMIN — CARBIDOPA AND LEVODOPA 1 TABLET: 25; 100 TABLET, EXTENDED RELEASE ORAL at 09:05

## 2018-10-17 RX ADMIN — INSULIN LISPRO 3 UNITS: 100 INJECTION, SOLUTION INTRAVENOUS; SUBCUTANEOUS at 21:13

## 2018-10-17 RX ADMIN — PIPERACILLIN SODIUM,TAZOBACTAM SODIUM 3.38 G: 3; .375 INJECTION, POWDER, FOR SOLUTION INTRAVENOUS at 01:05

## 2018-10-17 RX ADMIN — METOPROLOL TARTRATE 50 MG: 50 TABLET, FILM COATED ORAL at 21:13

## 2018-10-17 RX ADMIN — ACETAMINOPHEN 650 MG: 325 TABLET ORAL at 07:30

## 2018-10-17 RX ADMIN — METOPROLOL TARTRATE 50 MG: 50 TABLET, FILM COATED ORAL at 13:33

## 2018-10-17 RX ADMIN — METOPROLOL TARTRATE 50 MG: 50 TABLET, FILM COATED ORAL at 09:04

## 2018-10-17 RX ADMIN — CARBIDOPA AND LEVODOPA 1 TABLET: 25; 100 TABLET, EXTENDED RELEASE ORAL at 21:13

## 2018-10-17 RX ADMIN — FAMOTIDINE 20 MG: 20 TABLET ORAL at 09:04

## 2018-10-17 RX ADMIN — AMLODIPINE BESYLATE 5 MG: 5 TABLET ORAL at 09:05

## 2018-10-17 RX ADMIN — INSULIN LISPRO 4 UNITS: 100 INJECTION, SOLUTION INTRAVENOUS; SUBCUTANEOUS at 09:07

## 2018-10-17 RX ADMIN — SUCRALFATE 1 G: 1 TABLET ORAL at 09:04

## 2018-10-17 RX ADMIN — ACETAMINOPHEN 650 MG: 325 TABLET ORAL at 11:32

## 2018-10-17 RX ADMIN — POTASSIUM CHLORIDE 40 MEQ: 20 TABLET, EXTENDED RELEASE ORAL at 07:30

## 2018-10-17 RX ADMIN — INSULIN LISPRO 6 UNITS: 100 INJECTION, SOLUTION INTRAVENOUS; SUBCUTANEOUS at 13:28

## 2018-10-17 RX ADMIN — PIPERACILLIN SODIUM,TAZOBACTAM SODIUM 3.38 G: 3; .375 INJECTION, POWDER, FOR SOLUTION INTRAVENOUS at 10:13

## 2018-10-17 RX ADMIN — DILTIAZEM HYDROCHLORIDE 30 MG: 30 TABLET, FILM COATED ORAL at 17:32

## 2018-10-17 RX ADMIN — ASPIRIN 81 MG: 81 TABLET, COATED ORAL at 09:04

## 2018-10-17 RX ADMIN — MAGNESIUM GLUCONATE 500 MG ORAL TABLET 400 MG: 500 TABLET ORAL at 09:05

## 2018-10-17 RX ADMIN — SUCRALFATE 1 G: 1 TABLET ORAL at 13:33

## 2018-10-17 RX ADMIN — APIXABAN 5 MG: 5 TABLET, FILM COATED ORAL at 16:04

## 2018-10-17 RX ADMIN — MAGNESIUM SULFATE HEPTAHYDRATE 2 G: 40 INJECTION, SOLUTION INTRAVENOUS at 10:13

## 2018-10-17 RX ADMIN — SUCRALFATE 1 G: 1 TABLET ORAL at 17:32

## 2018-10-17 RX ADMIN — SODIUM CHLORIDE: 9 INJECTION, SOLUTION INTRAVENOUS at 16:36

## 2018-10-17 RX ADMIN — ZOLPIDEM TARTRATE 5 MG: 5 TABLET ORAL at 01:05

## 2018-10-17 RX ADMIN — DILTIAZEM HYDROCHLORIDE 30 MG: 30 TABLET, FILM COATED ORAL at 10:42

## 2018-10-17 ASSESSMENT — PAIN SCALES - GENERAL
PAINLEVEL_OUTOF10: 3
PAINLEVEL_OUTOF10: 7
PAINLEVEL_OUTOF10: 4

## 2018-10-17 ASSESSMENT — PAIN DESCRIPTION - PAIN TYPE: TYPE: ACUTE PAIN

## 2018-10-17 ASSESSMENT — PAIN DESCRIPTION - LOCATION: LOCATION: ABDOMEN

## 2018-10-17 NOTE — PROGRESS NOTES
Patient's R.N. and specific instructions given and infectious disease issues addressed. Will follow the patient closely with you. Also please see the orders for updated patient care orders written by ID team.    Thank you for involving us in this patient's care. I will be discussing this case with the treating physicians. Please don't hesitate to call me if any questions, issues  or concerns      Please see orders for updated patient care orders written today.   Electronically signed by Alix Mendes on 10/17/2018 at 12:59 PM

## 2018-10-17 NOTE — PLAN OF CARE
Problem: Pain:  Goal: Pain level will decrease  Pain level will decrease   Outcome: Ongoing  Patient has PRN Tylenol when needed for pain. Problem: Falls - Risk of:  Goal: Will remain free from falls  Will remain free from falls   Outcome: Ongoing  Patient remains free from falls this shift. Call light and bedside table within reach. Bed in lowest position with alarm on. Rounding hourly. Problem: Risk for Impaired Skin Integrity  Goal: Tissue integrity - skin and mucous membranes  Structural intactness and normal physiological function of skin and  mucous membranes. Outcome: Ongoing  No new skin issues noted this shift. Patient turns self. Head of bead self regulated. Up with 1 assist.     Problem: Urinary Elimination:  Goal: Signs and symptoms of infection will decrease  Signs and symptoms of infection will decrease   Outcome: Ongoing  Patient confused at times. On Zosyn ATB. Goal: Ability to reestablish a normal urinary elimination pattern will improve - after catheter removal  Ability to reestablish a normal urinary elimination pattern will improve   Outcome: Ongoing  Patient up to void at least Q2 hours. Problem: DISCHARGE BARRIERS  Goal: Patient's continuum of care needs are met  Outcome: Ongoing  No plans for discharge at this time. Comments: Care plan reviewed with patient. Patient verbalizes understanding of the plan of care and contribute to goal setting.

## 2018-10-17 NOTE — PROGRESS NOTES
29.7*   PLT  127*  145  159     Recent Labs      10/15/18   0643   10/16/18   0604   10/17/18   0007  10/17/18   0424  10/17/18   1201   NA  136   < >  132*   < >  134*  134*  133*   K  3.1*   --   3.3*   --    --   3.5   --    CL  100   --   100   --    --   100   --    CO2  20*   --   20*   --    --   20*   --    BUN  13   --   11   --    --   9   --    CREATININE  0.8   --   0.8   --    --   0.7   --    CALCIUM  8.3*   --   8.2*   --    --   8.1*   --     < > = values in this interval not displayed. No results for input(s): AST, ALT, BILIDIR, BILITOT, ALKPHOS in the last 72 hours. Lab Results   Component Value Date    MG 1.4 10/17/2018     enteric gram negative bacilli     Urine Culture Reflex 10/14/2018  3:49  [facility]   [place] count: >100,000 CFU/mL      Osmolality, Ur 363  250 - 750 mosmol/kg Final 10/14/2018  3:49 PM     Sodium, Ur 33  meq/l Final 10/14/2018  3:49 PM     Blood Culture, Routine 10/14/2018  4:00  [facility]   No growth-preliminary      Blood Culture, Routine 10/14/2018  4:52  [facility]   No growth-preliminary      Lab Results   Component Value Date    LABA1C 7.6 (H) 10/14/2018     No results found for: EAG      No results for input(s): INR in the last 72 hours. No results for input(s): Claretha Rocker in the last 72 hours. Urinalysis:      Lab Results   Component Value Date    NITRU POSITIVE 10/14/2018    WBCUA > 200 10/14/2018    BACTERIA MANY 10/14/2018    RBCUA 5-10 10/14/2018    BLOODU MODERATE 10/14/2018    SPECGRAV 1.025 09/24/2018    GLUCOSEU NEGATIVE 10/14/2018       Radiology:  CT ABDOMEN PELVIS W WO CONTRAST Additional Contrast? None   Final Result   1. Marked heterogeneous enhancement of the right kidney with the right perinephric stranding and edema suggesting pyelonephritis.    2. Heterogeneous enhancement of the spleen of uncertain significance possibly contrast bolus timing issue possibility of an

## 2018-10-18 ENCOUNTER — HOSPITAL ENCOUNTER (INPATIENT)
Age: 83
LOS: 16 days | Discharge: HOME | DRG: 871 | End: 2018-11-03
Attending: FAMILY MEDICINE | Admitting: FAMILY MEDICINE
Payer: MEDICARE

## 2018-10-18 VITALS
DIASTOLIC BLOOD PRESSURE: 60 MMHG | HEART RATE: 91 BPM | TEMPERATURE: 98.3 F | RESPIRATION RATE: 18 BRPM | SYSTOLIC BLOOD PRESSURE: 139 MMHG | OXYGEN SATURATION: 97 % | HEIGHT: 61 IN | WEIGHT: 155.5 LBS | BODY MASS INDEX: 29.36 KG/M2

## 2018-10-18 PROBLEM — R53.81 PHYSICAL DECONDITIONING: Status: ACTIVE | Noted: 2018-10-18

## 2018-10-18 PROBLEM — A41.51 SEPSIS DUE TO ESCHERICHIA COLI (HCC): Status: ACTIVE | Noted: 2018-10-18

## 2018-10-18 LAB
ANION GAP SERPL CALCULATED.3IONS-SCNC: 14 MEQ/L (ref 8–16)
ATYPICAL LYMPHOCYTES: ABNORMAL %
BASOPHILS # BLD: 0.2 %
BASOPHILS ABSOLUTE: 0 THOU/MM3 (ref 0–0.1)
BUN BLDV-MCNC: 9 MG/DL (ref 7–22)
CALCIUM SERPL-MCNC: 8.1 MG/DL (ref 8.5–10.5)
CHLORIDE BLD-SCNC: 99 MEQ/L (ref 98–111)
CO2: 21 MEQ/L (ref 23–33)
CREAT SERPL-MCNC: 0.6 MG/DL (ref 0.4–1.2)
DOHLE BODIES: PRESENT
E.COLI O157:H7: NORMAL
E.COLI O157:H7: NORMAL
EOSINOPHIL # BLD: 0.9 %
EOSINOPHILS ABSOLUTE: 0.1 THOU/MM3 (ref 0–0.4)
ERYTHROCYTE [DISTWIDTH] IN BLOOD BY AUTOMATED COUNT: 14.3 % (ref 11.5–14.5)
ERYTHROCYTE [DISTWIDTH] IN BLOOD BY AUTOMATED COUNT: 43.8 FL (ref 35–45)
GFR SERPL CREATININE-BSD FRML MDRD: > 90 ML/MIN/1.73M2
GLUCOSE BLD-MCNC: 213 MG/DL (ref 70–108)
GLUCOSE BLD-MCNC: 217 MG/DL (ref 70–108)
GLUCOSE BLD-MCNC: 223 MG/DL (ref 70–108)
GLUCOSE BLD-MCNC: 230 MG/DL (ref 70–108)
GLUCOSE BLD-MCNC: 259 MG/DL (ref 70–108)
HCT VFR BLD CALC: 29.1 % (ref 37–47)
HEMOGLOBIN: 9.7 GM/DL (ref 12–16)
IMMATURE GRANS (ABS): 0.13 THOU/MM3 (ref 0–0.07)
IMMATURE GRANULOCYTES: 1.4 %
LYMPHOCYTES # BLD: 13.4 %
LYMPHOCYTES ABSOLUTE: 1.2 THOU/MM3 (ref 1–4.8)
MAGNESIUM: 1.6 MG/DL (ref 1.6–2.4)
MCH RBC QN AUTO: 28.1 PG (ref 26–33)
MCHC RBC AUTO-ENTMCNC: 33.3 GM/DL (ref 32.2–35.5)
MCV RBC AUTO: 84.3 FL (ref 81–99)
MONOCYTES # BLD: 12.3 %
MONOCYTES ABSOLUTE: 1.1 THOU/MM3 (ref 0.4–1.3)
NUCLEATED RED BLOOD CELLS: 0 /100 WBC
PLATELET # BLD: 203 THOU/MM3 (ref 130–400)
PMV BLD AUTO: 11.1 FL (ref 9.4–12.4)
POTASSIUM REFLEX MAGNESIUM: 3.5 MEQ/L (ref 3.5–5.2)
RBC # BLD: 3.45 MILL/MM3 (ref 4.2–5.4)
SEG NEUTROPHILS: 71.8 %
SEGMENTED NEUTROPHILS ABSOLUTE COUNT: 6.7 THOU/MM3 (ref 1.8–7.7)
SODIUM BLD-SCNC: 134 MEQ/L (ref 135–145)
SODIUM BLD-SCNC: 136 MEQ/L (ref 135–145)
TOXIC GRANULATION: PRESENT
TRANSFERRIN: 145 MG/DL (ref 200–400)
WBC # BLD: 9.3 THOU/MM3 (ref 4.8–10.8)

## 2018-10-18 PROCEDURE — 6360000002 HC RX W HCPCS: Performed by: INTERNAL MEDICINE

## 2018-10-18 PROCEDURE — 85025 COMPLETE CBC W/AUTO DIFF WBC: CPT

## 2018-10-18 PROCEDURE — 2580000003 HC RX 258: Performed by: INTERNAL MEDICINE

## 2018-10-18 PROCEDURE — 82948 REAGENT STRIP/BLOOD GLUCOSE: CPT

## 2018-10-18 PROCEDURE — 6370000000 HC RX 637 (ALT 250 FOR IP): Performed by: NURSE PRACTITIONER

## 2018-10-18 PROCEDURE — C1751 CATH, INF, PER/CENT/MIDLINE: HCPCS

## 2018-10-18 PROCEDURE — 99239 HOSP IP/OBS DSCHRG MGMT >30: CPT | Performed by: INTERNAL MEDICINE

## 2018-10-18 PROCEDURE — 2709999900 HC NON-CHARGEABLE SUPPLY

## 2018-10-18 PROCEDURE — 36569 INSJ PICC 5 YR+ W/O IMAGING: CPT

## 2018-10-18 PROCEDURE — 6370000000 HC RX 637 (ALT 250 FOR IP): Performed by: FAMILY MEDICINE

## 2018-10-18 PROCEDURE — 84295 ASSAY OF SERUM SODIUM: CPT

## 2018-10-18 PROCEDURE — 0220000000 HC SKILLED NURSING FACILITY

## 2018-10-18 PROCEDURE — 76937 US GUIDE VASCULAR ACCESS: CPT

## 2018-10-18 PROCEDURE — 36415 COLL VENOUS BLD VENIPUNCTURE: CPT

## 2018-10-18 PROCEDURE — 80048 BASIC METABOLIC PNL TOTAL CA: CPT

## 2018-10-18 PROCEDURE — 2580000003 HC RX 258: Performed by: FAMILY MEDICINE

## 2018-10-18 PROCEDURE — 6360000002 HC RX W HCPCS: Performed by: FAMILY MEDICINE

## 2018-10-18 PROCEDURE — 1290000000 HC SEMI PRIVATE OTHER R&B

## 2018-10-18 PROCEDURE — 83735 ASSAY OF MAGNESIUM: CPT

## 2018-10-18 PROCEDURE — 97530 THERAPEUTIC ACTIVITIES: CPT

## 2018-10-18 PROCEDURE — 05HD33Z INSERTION OF INFUSION DEVICE INTO RIGHT CEPHALIC VEIN, PERCUTANEOUS APPROACH: ICD-10-PCS | Performed by: INTERNAL MEDICINE

## 2018-10-18 PROCEDURE — 6370000000 HC RX 637 (ALT 250 FOR IP): Performed by: INTERNAL MEDICINE

## 2018-10-18 PROCEDURE — 97116 GAIT TRAINING THERAPY: CPT

## 2018-10-18 RX ORDER — SODIUM CHLORIDE 0.9 % (FLUSH) 0.9 %
10 SYRINGE (ML) INJECTION PRN
Status: CANCELLED | OUTPATIENT
Start: 2018-10-18

## 2018-10-18 RX ORDER — DEXTROSE MONOHYDRATE 50 MG/ML
100 INJECTION, SOLUTION INTRAVENOUS PRN
Status: DISCONTINUED | OUTPATIENT
Start: 2018-10-18 | End: 2018-11-03 | Stop reason: HOSPADM

## 2018-10-18 RX ORDER — ASPIRIN 81 MG/1
81 TABLET ORAL DAILY
Status: DISCONTINUED | OUTPATIENT
Start: 2018-10-19 | End: 2018-11-03 | Stop reason: HOSPADM

## 2018-10-18 RX ORDER — SODIUM CHLORIDE 9 MG/ML
INJECTION, SOLUTION INTRAVENOUS CONTINUOUS
Status: DISCONTINUED | OUTPATIENT
Start: 2018-10-18 | End: 2018-10-31

## 2018-10-18 RX ORDER — DEXTROSE MONOHYDRATE 50 MG/ML
100 INJECTION, SOLUTION INTRAVENOUS PRN
Status: DISCONTINUED | OUTPATIENT
Start: 2018-10-18 | End: 2018-10-18 | Stop reason: HOSPADM

## 2018-10-18 RX ORDER — ACETAMINOPHEN 325 MG/1
650 TABLET ORAL EVERY 4 HOURS PRN
Status: CANCELLED | OUTPATIENT
Start: 2018-10-18

## 2018-10-18 RX ORDER — DEXTROSE MONOHYDRATE 25 G/50ML
12.5 INJECTION, SOLUTION INTRAVENOUS PRN
Status: DISCONTINUED | OUTPATIENT
Start: 2018-10-18 | End: 2018-10-18 | Stop reason: HOSPADM

## 2018-10-18 RX ORDER — NICOTINE POLACRILEX 4 MG
15 LOZENGE BUCCAL PRN
Status: CANCELLED | OUTPATIENT
Start: 2018-10-18 | Stop reason: SDUPTHER

## 2018-10-18 RX ORDER — SUCRALFATE 1 G/1
1 TABLET ORAL 4 TIMES DAILY
Status: CANCELLED | OUTPATIENT
Start: 2018-10-18

## 2018-10-18 RX ORDER — NICOTINE POLACRILEX 4 MG
15 LOZENGE BUCCAL PRN
Status: DISCONTINUED | OUTPATIENT
Start: 2018-10-18 | End: 2018-10-18 | Stop reason: HOSPADM

## 2018-10-18 RX ORDER — FAMOTIDINE 20 MG/1
20 TABLET, FILM COATED ORAL DAILY
Status: CANCELLED | OUTPATIENT
Start: 2018-10-19

## 2018-10-18 RX ORDER — SODIUM CHLORIDE 9 MG/ML
INJECTION, SOLUTION INTRAVENOUS CONTINUOUS
Status: CANCELLED | OUTPATIENT
Start: 2018-10-18

## 2018-10-18 RX ORDER — FAMOTIDINE 20 MG/1
20 TABLET, FILM COATED ORAL DAILY
Status: DISCONTINUED | OUTPATIENT
Start: 2018-10-19 | End: 2018-11-03 | Stop reason: HOSPADM

## 2018-10-18 RX ORDER — ACETAMINOPHEN 325 MG/1
650 TABLET ORAL EVERY 4 HOURS PRN
Status: DISCONTINUED | OUTPATIENT
Start: 2018-10-18 | End: 2018-11-03 | Stop reason: HOSPADM

## 2018-10-18 RX ORDER — ASPIRIN 81 MG/1
81 TABLET ORAL DAILY
Status: CANCELLED | OUTPATIENT
Start: 2018-10-19

## 2018-10-18 RX ORDER — SODIUM CHLORIDE 0.9 % (FLUSH) 0.9 %
10 SYRINGE (ML) INJECTION PRN
Status: DISCONTINUED | OUTPATIENT
Start: 2018-10-18 | End: 2018-10-31

## 2018-10-18 RX ORDER — CARBIDOPA AND LEVODOPA 25; 100 MG/1; MG/1
1 TABLET, EXTENDED RELEASE ORAL 2 TIMES DAILY
Status: DISCONTINUED | OUTPATIENT
Start: 2018-10-18 | End: 2018-11-03 | Stop reason: HOSPADM

## 2018-10-18 RX ORDER — SUCRALFATE 1 G/1
1 TABLET ORAL 4 TIMES DAILY
Status: DISCONTINUED | OUTPATIENT
Start: 2018-10-19 | End: 2018-11-03 | Stop reason: HOSPADM

## 2018-10-18 RX ORDER — CARBIDOPA AND LEVODOPA 25; 100 MG/1; MG/1
1 TABLET, EXTENDED RELEASE ORAL 2 TIMES DAILY
Status: CANCELLED | OUTPATIENT
Start: 2018-10-18

## 2018-10-18 RX ORDER — SODIUM CHLORIDE 0.9 % (FLUSH) 0.9 %
10 SYRINGE (ML) INJECTION EVERY 12 HOURS SCHEDULED
Status: DISCONTINUED | OUTPATIENT
Start: 2018-10-18 | End: 2018-10-22

## 2018-10-18 RX ORDER — DEXTROSE MONOHYDRATE 50 MG/ML
100 INJECTION, SOLUTION INTRAVENOUS PRN
Status: CANCELLED | OUTPATIENT
Start: 2018-10-18

## 2018-10-18 RX ORDER — METOPROLOL TARTRATE 50 MG/1
50 TABLET, FILM COATED ORAL 3 TIMES DAILY
Status: DISCONTINUED | OUTPATIENT
Start: 2018-10-18 | End: 2018-11-03 | Stop reason: HOSPADM

## 2018-10-18 RX ORDER — ONDANSETRON 2 MG/ML
4 INJECTION INTRAMUSCULAR; INTRAVENOUS EVERY 6 HOURS PRN
Status: DISCONTINUED | OUTPATIENT
Start: 2018-10-18 | End: 2018-11-03 | Stop reason: HOSPADM

## 2018-10-18 RX ORDER — SODIUM CHLORIDE 0.9 % (FLUSH) 0.9 %
10 SYRINGE (ML) INJECTION EVERY 12 HOURS SCHEDULED
Status: CANCELLED | OUTPATIENT
Start: 2018-10-18

## 2018-10-18 RX ORDER — ONDANSETRON 2 MG/ML
4 INJECTION INTRAMUSCULAR; INTRAVENOUS EVERY 6 HOURS PRN
Status: CANCELLED | OUTPATIENT
Start: 2018-10-18

## 2018-10-18 RX ORDER — ATORVASTATIN CALCIUM 20 MG/1
20 TABLET, FILM COATED ORAL DAILY
Status: DISCONTINUED | OUTPATIENT
Start: 2018-10-19 | End: 2018-11-03 | Stop reason: HOSPADM

## 2018-10-18 RX ORDER — ATORVASTATIN CALCIUM 20 MG/1
20 TABLET, FILM COATED ORAL DAILY
Status: CANCELLED | OUTPATIENT
Start: 2018-10-19

## 2018-10-18 RX ORDER — DEXTROSE MONOHYDRATE 25 G/50ML
12.5 INJECTION, SOLUTION INTRAVENOUS PRN
Status: CANCELLED | OUTPATIENT
Start: 2018-10-18 | Stop reason: SDUPTHER

## 2018-10-18 RX ORDER — METOPROLOL TARTRATE 50 MG/1
50 TABLET, FILM COATED ORAL 3 TIMES DAILY
Status: CANCELLED | OUTPATIENT
Start: 2018-10-18

## 2018-10-18 RX ADMIN — SODIUM CHLORIDE: 9 INJECTION, SOLUTION INTRAVENOUS at 06:29

## 2018-10-18 RX ADMIN — FAMOTIDINE 20 MG: 20 TABLET ORAL at 08:45

## 2018-10-18 RX ADMIN — ATORVASTATIN CALCIUM 20 MG: 20 TABLET, FILM COATED ORAL at 08:46

## 2018-10-18 RX ADMIN — APIXABAN 5 MG: 5 TABLET, FILM COATED ORAL at 08:45

## 2018-10-18 RX ADMIN — SUCRALFATE 1 G: 1 TABLET ORAL at 18:25

## 2018-10-18 RX ADMIN — METOPROLOL TARTRATE 50 MG: 50 TABLET, FILM COATED ORAL at 23:40

## 2018-10-18 RX ADMIN — INSULIN LISPRO 4 UNITS: 100 INJECTION, SOLUTION INTRAVENOUS; SUBCUTANEOUS at 12:59

## 2018-10-18 RX ADMIN — DILTIAZEM HYDROCHLORIDE 30 MG: 30 TABLET, FILM COATED ORAL at 06:28

## 2018-10-18 RX ADMIN — SUCRALFATE 1 G: 1 TABLET ORAL at 12:59

## 2018-10-18 RX ADMIN — CARBIDOPA AND LEVODOPA 1 TABLET: 25; 100 TABLET, EXTENDED RELEASE ORAL at 23:40

## 2018-10-18 RX ADMIN — DILTIAZEM HYDROCHLORIDE 30 MG: 30 TABLET, FILM COATED ORAL at 12:59

## 2018-10-18 RX ADMIN — CARBIDOPA AND LEVODOPA 1 TABLET: 25; 100 TABLET, EXTENDED RELEASE ORAL at 08:46

## 2018-10-18 RX ADMIN — MAGNESIUM GLUCONATE 500 MG ORAL TABLET 400 MG: 500 TABLET ORAL at 08:46

## 2018-10-18 RX ADMIN — Medication 10 ML: at 23:39

## 2018-10-18 RX ADMIN — SUCRALFATE 1 G: 1 TABLET ORAL at 08:46

## 2018-10-18 RX ADMIN — METOPROLOL TARTRATE 50 MG: 50 TABLET, FILM COATED ORAL at 08:46

## 2018-10-18 RX ADMIN — DILTIAZEM HYDROCHLORIDE 30 MG: 30 TABLET, FILM COATED ORAL at 01:01

## 2018-10-18 RX ADMIN — INSULIN LISPRO 4 UNITS: 100 INJECTION, SOLUTION INTRAVENOUS; SUBCUTANEOUS at 18:25

## 2018-10-18 RX ADMIN — SODIUM CHLORIDE: 9 INJECTION, SOLUTION INTRAVENOUS at 23:55

## 2018-10-18 RX ADMIN — APIXABAN 5 MG: 5 TABLET, FILM COATED ORAL at 23:40

## 2018-10-18 RX ADMIN — METOPROLOL TARTRATE 50 MG: 50 TABLET, FILM COATED ORAL at 15:54

## 2018-10-18 RX ADMIN — CEFTRIAXONE SODIUM 2 G: 2 INJECTION, POWDER, FOR SOLUTION INTRAMUSCULAR; INTRAVENOUS at 11:19

## 2018-10-18 RX ADMIN — ASPIRIN 81 MG: 81 TABLET, COATED ORAL at 08:45

## 2018-10-18 RX ADMIN — PIPERACILLIN SODIUM,TAZOBACTAM SODIUM 3.38 G: 3; .375 INJECTION, POWDER, FOR SOLUTION INTRAVENOUS at 02:35

## 2018-10-18 RX ADMIN — INSULIN LISPRO 4 UNITS: 100 INJECTION, SOLUTION INTRAVENOUS; SUBCUTANEOUS at 08:47

## 2018-10-18 RX ADMIN — DILTIAZEM HYDROCHLORIDE 30 MG: 30 TABLET, FILM COATED ORAL at 18:25

## 2018-10-18 RX ADMIN — DILTIAZEM HYDROCHLORIDE 30 MG: 30 TABLET, FILM COATED ORAL at 23:40

## 2018-10-18 ASSESSMENT — PAIN DESCRIPTION - FREQUENCY: FREQUENCY: CONTINUOUS

## 2018-10-18 ASSESSMENT — PAIN DESCRIPTION - DESCRIPTORS: DESCRIPTORS: ACHING

## 2018-10-18 ASSESSMENT — PAIN SCALES - GENERAL
PAINLEVEL_OUTOF10: 0
PAINLEVEL_OUTOF10: 4

## 2018-10-18 ASSESSMENT — PAIN DESCRIPTION - ONSET: ONSET: ON-GOING

## 2018-10-18 ASSESSMENT — PAIN DESCRIPTION - PAIN TYPE: TYPE: ACUTE PAIN

## 2018-10-18 ASSESSMENT — PAIN DESCRIPTION - LOCATION: LOCATION: ABDOMEN;HEAD

## 2018-10-18 NOTE — PROGRESS NOTES
Continuous Infusions:   sodium chloride 75 mL/hr at 10/18/18 0629    dextrose         I & O's:  I/O last 3 completed shifts: In: 2460 [P.O.:720; I.V.:1740]  Out: 800 [Urine:800]  No intake/output data recorded. Intake/Output Summary (Last 24 hours) at 10/18/18 0800  Last data filed at 10/18/18 0323   Gross per 24 hour   Intake             2460 ml   Output              800 ml   Net             1660 ml       Date 10/18/18 0000 - 10/18/18 2359   Shift 5550-6084 1784-7218 4920-3107 24 Hour Total   I  N  T  A  K  E   I.V.  (mL/kg) 940  (15.2)   940  (15.2)    Shift Total  (mL/kg) 940  (15.2)   940  (15.2)   O  U  T  P  U  T   Urine  (mL/kg/hr) 300  (0.6)   300    Shift Total  (mL/kg) 300  (4.9)   300  (4.9)   Weight (kg) 61.6 61.6 61.6 61.6           CBC:   Recent Labs      10/16/18   0604  10/17/18   0424  10/18/18   0350   WBC  6.6  7.2  9.3   HGB  10.9*  10.2*  9.7*   PLT  145  159  203     BMP:    Recent Labs      10/16/18   0604   10/17/18   0424  10/17/18   1201  10/17/18   2144  10/18/18   0350   NA  132*   < >  134*  133*  133*  134*   K  3.3*   --   3.5   --    --   3.5   CL  100   --   100   --    --   99   CO2  20*   --   20*   --    --   21*   BUN  11   --   9   --    --   9   CREATININE  0.8   --   0.7   --    --   0.6   GLUCOSE  189*   --   193*   --    --   213*    < > = values in this interval not displayed. Hepatic:   No results for input(s): AST, ALT, ALB, BILITOT, ALKPHOS in the last 72 hours. BNP: No results for input(s): BNP in the last 72 hours. URINALYSIS:    Results for Valora Copa (MRN [id number]) as of 10/16/2018 17:17   Ref.  Range 10/14/2018 15:49   Color, UA Latest Ref Range: STRAW-YELL  YELLOW   Glucose, UA Latest Ref Range: NEGATIVE mg/dl NEGATIVE   Bilirubin, Urine Latest Ref Range: NEGATIVE  NEGATIVE   Ketones, Urine Latest Ref Range: NEGATIVE  TRACE (A)   Blood, Urine Latest Ref Range: NEGATIVE  MODERATE (A)   pH, UA Latest Ref Range: 5.0 - 9.0  5.5   Protein, UA Latest

## 2018-10-18 NOTE — DISCHARGE SUMMARY
Bilateral small pleural effusions and bibasilar atelectasis. 4. Findings of pelvic venous congestion syndrome. **This report has been created using voice recognition software. It may contain minor errors which are inherent in voice recognition technology. **      Final report electronically signed by Dr. Benedicto Rai on 10/15/2018 10:49 AM      CT CERVICAL SPINE WO CONTRAST   Final Result   1. There is no fracture. 2. Numerous additional findings as described in the body the report. **This report has been created using voice recognition software. It may contain minor errors which are inherent in voice recognition technology. **      Final report electronically signed by Dr. Norman Carmona on 10/14/2018 5:02 PM      CT HEAD WO CONTRAST   Final Result   1. There is no acute intracranial abnormality. 2. Additional findings as described in the body the report. **This report has been created using voice recognition software. It may contain minor errors which are inherent in voice recognition technology. **      Final report electronically signed by Dr. Norman Carmona on 10/14/2018 4:57 PM      XR CHEST STANDARD (2 VW)   Final Result   1. There is no acute cardiopulmonary process. **This report has been created using voice recognition software. It may contain minor errors which are inherent in voice recognition technology. **      Final report electronically signed by Dr. Norman Carmona on 10/14/2018 4:36 PM      XR PELVIS (1-2 VIEWS)   Final Result   1. There is no fracture. **This report has been created using voice recognition software. It may contain minor errors which are inherent in voice recognition technology. **      Final report electronically signed by Dr. Norman Carmona on 10/14/2018 4:34 PM          Diet: DIET CARB CONTROL;   Dietary Nutrition Supplements: Diabetic Oral Supplement  Dietary Nutrition Supplements: Diabetic Oral Supplement    DVT prophylaxis: [x] Lovenox                                 [] SCDs                                 [] SQ Heparin                                 [] Encourage ambulation           [] Already on Anticoagulation     Disposition:    [] Home       [] TCU       [] Rehab       [] Psych       [] SNF       [] [place]       [x] Other-    Code Status: DNR-CC    PT/OT Eval Status: on-board    Assessment/Plan: This is a 80 y.o. Female, admitted for recurrent UTI. 1. Recurrent UTI with sepsis- not severe sepsis(poa)- resolved    -fever improving, pt still has rt pelvic/rt lower abd pain, improving from time of admission, CT abd/pelvis not done yet, asked nurse to f/u on CT abd/pelvis.   -awaiting urine cx   -cont zosyn   -Urology on-board. Infectious disease on board- planning for IV antibiotic at disc- and to go to TCU today with IV antibiotics for 2 weeks    2. New-onset Afib w RVR ( rate 104 on EKG)- currently rate controlled    -CHADsVasc score 5  -pt is asymptomatic   -Discussed to pt and daughter Travis Alegria, who's at bedside, about the Afib and plan of consulting Cardio, ordering echo and Trop. Troponin came back normal. I asked if pt has h/o afib, pt and her daughter said she does not have afib in the past. Discussed given CHADsVasc score, there's risk of stroke and that she needs anti-coagulation, however, given h/o fall, also to consider benefits vs risk of bleeding, discussed will consult cardio. Rate controlled    3. Rt lower abd/pelvic pain, improving    Pelvic congestion syndrome on CT- gynecologist was consulted  Seen- follow-up as outpatient    4. Hyponatremia, improving    -cont IVF NSS  -pt is asymptomatic     5. Anemia, normocytic, mild    -there's a dropped in hgb from 11.4 to 9.9, possibly hemodilutional due to IVF  -pt is asymptomatic      6. Hypokalemia, mild    -ordered potassium 40 mEqs x 1 dose  -potassium replacement protocol.   -bmp in am    7.  Hypomagnesemia, mild     -start magnesium oxide 400 mg

## 2018-10-18 NOTE — PROGRESS NOTES
in 9/2018 was 139, though she had hyponatremia in 6/2018, sodium of 130. Her blood glucose is also elevated  ( she said her FBS at home usually at 160s). Urine and blood cultures were all sent. Past Medical History:   Diagnosis Date    Atypical chest pain     CAD (coronary artery disease)     Chronic LBBB (left bundle branch block)     Diabetes mellitus type II     Esophageal stricture     History of esophageal stricture.  FH: CAD (coronary artery disease)     Mom and dad both with heart disease at mid to late age.  GERD (gastroesophageal reflux disease)     History of gastritis     History of skin cancer     Hypercholesterolemia     Hyperlipidemia     Hypertension     Imbalance     As far as imbalance is concerned, asked patient to follow-up with Dr. Gabi Jay since she follows with him on a regular basis.  Lactose intolerance     Nummular dermatitis     Parkinson's disease ([address]     Restless legs syndrome (RLS)     S/P CABG (coronary artery bypass graft)     SCC (squamous cell carcinoma)      Past Surgical History:   Procedure Laterality Date    CARDIAC SURGERY      CARDIOVASCULAR STRESS TEST  4 09 2009    Gated SPECT images revealed normal global wall motion with EF of 60%. Persantine test associated w/nonspecific symptoms. EKG is nondiagnostic w/baseline LBBB. No obvious stress-induced ischemia by Cardiolite. EF within normal limits.  CARDIOVASCULAR STRESS TEST  7 10 2007    The gated SPECT images revealed normal wall motion with EF of 69%. Poor exercise tolerance w/SOB on exertion. EKG is nondiagnostic. Cardiolite scan revealing no obvious stress-induced ischemia. EF 60%.  CARDIOVASCULAR STRESS TEST  2 03 2006    Fair exercise tolerance w/SOB on exertion. No EKG evidence of ischemia. Patient should be able to proceed with phase II cardiac rehab.      CATARACT REMOVAL      CATARACT REMOVAL  06/08/2016    AND 6/29/16    DIAGNOSTIC CARDIAC CATH LAB PROCEDURE  12 23 2005    LV was obtained. AGEE projection showed preserved LV function w/estimated EF at 55%. No significant MR. Patent left main coronary artery. Tortuous LAD which appeared to be patent. Patent left circumflex artery. High-grade stenosis around 99% in very large dominant RCA w/heavy calcification at the ostium. Normal LV function.  DOPPLER ECHOCARDIOGRAPHY  4 09 2009    Global LV function w/in lower limits of normal, with mild anteroseptal hypokinesis. EF of 50-55%. Light LVH. Mild to moderate left atrial enlargement. Calcific aortic and mitral valve w/no obvious stenosis. No obvious pericardial effusion.  DOPPLER ECHOCARDIOGRAPHY  7 10 2007    LV function w/in lower limits of normal w/peridoxical septal wall motion. Moderate left atrial enlargement. Mild MR. Mild TR. Calcified valves w/no obvious stenosis. No pericardial effusion.  DOPPLER ECHOCARDIOGRAPHY  4 14 2006    There appears to be significant improvement from previous echo w/complete resolution of pericardial effusion and normal LV function. EF of 60%.  DOPPLER ECHOCARDIOGRAPHY  3 21 2006    Normal LV function. Mild LV hypertrophy. Mild biatrial enlargement. Mildly calcific aortic and mitral valve w/no stenosis. Mild MR. Mild TR. Minimal residual pericardial effusion w/significant improvement from previous echo.  DOPPLER ECHOCARDIOGRAPHY  3 16 2006    Interval change from previous echocardiogram w/worsening of effusion. Correlation clinically. Consideration of pericardial centesis. Will obtain a [facility] consult.  DOPPLER ECHOCARDIOGRAPHY  1 31 2006    No significant change from previous echo. The 2D echo showed moderate degree of pericardial effusion. It does seem to be worst or better than previous echo. No evidence of tamponade.  DOPPLER ECHOCARDIOGRAPHY  1 27 2006    Normal global LV function. Mild biatrial enlargement. Mildly sclerotic aortic & mitral valve w/no stenosis. Mild MR. Mild TR.  Small to moderate pericardial for household ambulation. Long term goals  Time Frame for Long term goals : NA due to short length of stay.             AM-PAC Inpatient Mobility without Stair Climbing Raw Score : 15  AM-PAC Inpatient without Stair Climbing T-Scale Score : 43.03  Mobility Inpatient CMS 0-100% Score: 47.43  Mobility Inpatient without Stair CMS G-Code Modifier : CK

## 2018-10-18 NOTE — PROGRESS NOTES
[]C-diff         [] TB  [] Other:       Skills:   [x]Physical therapy     [x]Occupational Therapy   []IV Antibiotics   [] IV meds   [] TPN     []PEG tube Feedings      []New Colostomy   [] Ileostomy care/teaching    [] Speech Therapy   []Wound Vac   []Complex Dressing Changes    []Terminal care    []Other       Has patient had Prior Skilled care in the Last 60 days:  []Yes  [x]NO   If Yes:   Skilled Facility:    How many skilled days were used?

## 2018-10-19 LAB
ANION GAP SERPL CALCULATED.3IONS-SCNC: 15 MEQ/L (ref 8–16)
ATYPICAL LYMPHOCYTES: ABNORMAL %
BASOPHILS # BLD: 0.4 %
BASOPHILS ABSOLUTE: 0 THOU/MM3 (ref 0–0.1)
BUN BLDV-MCNC: 6 MG/DL (ref 7–22)
CALCIUM SERPL-MCNC: 8.5 MG/DL (ref 8.5–10.5)
CHLORIDE BLD-SCNC: 99 MEQ/L (ref 98–111)
CO2: 25 MEQ/L (ref 23–33)
CREAT SERPL-MCNC: 0.6 MG/DL (ref 0.4–1.2)
DOHLE BODIES: PRESENT
EOSINOPHIL # BLD: 1.4 %
EOSINOPHILS ABSOLUTE: 0.1 THOU/MM3 (ref 0–0.4)
ERYTHROCYTE [DISTWIDTH] IN BLOOD BY AUTOMATED COUNT: 14.3 % (ref 11.5–14.5)
ERYTHROCYTE [DISTWIDTH] IN BLOOD BY AUTOMATED COUNT: 43.3 FL (ref 35–45)
GFR SERPL CREATININE-BSD FRML MDRD: > 90 ML/MIN/1.73M2
GLUCOSE BLD-MCNC: 197 MG/DL (ref 70–108)
GLUCOSE BLD-MCNC: 204 MG/DL (ref 70–108)
GLUCOSE BLD-MCNC: 207 MG/DL (ref 70–108)
GLUCOSE BLD-MCNC: 220 MG/DL (ref 70–108)
GLUCOSE BLD-MCNC: 250 MG/DL (ref 70–108)
HCT VFR BLD CALC: 31.1 % (ref 37–47)
HEMOGLOBIN: 10.6 GM/DL (ref 12–16)
IMMATURE GRANS (ABS): 0.24 THOU/MM3 (ref 0–0.07)
IMMATURE GRANULOCYTES: 2.3 %
LYMPHOCYTES # BLD: 15.4 %
LYMPHOCYTES ABSOLUTE: 1.6 THOU/MM3 (ref 1–4.8)
MAGNESIUM: 1.5 MG/DL (ref 1.6–2.4)
MCH RBC QN AUTO: 28.4 PG (ref 26–33)
MCHC RBC AUTO-ENTMCNC: 34.1 GM/DL (ref 32.2–35.5)
MCV RBC AUTO: 83.4 FL (ref 81–99)
MONOCYTES # BLD: 9.9 %
MONOCYTES ABSOLUTE: 1 THOU/MM3 (ref 0.4–1.3)
NUCLEATED RED BLOOD CELLS: 0 /100 WBC
PLATELET # BLD: 267 THOU/MM3 (ref 130–400)
PMV BLD AUTO: 10.4 FL (ref 9.4–12.4)
POTASSIUM REFLEX MAGNESIUM: 3.2 MEQ/L (ref 3.5–5.2)
RBC # BLD: 3.73 MILL/MM3 (ref 4.2–5.4)
SCAN OF BLOOD SMEAR: NORMAL
SEG NEUTROPHILS: 70.6 %
SEGMENTED NEUTROPHILS ABSOLUTE COUNT: 7.5 THOU/MM3 (ref 1.8–7.7)
SODIUM BLD-SCNC: 135 MEQ/L (ref 135–145)
SODIUM BLD-SCNC: 135 MEQ/L (ref 135–145)
SODIUM BLD-SCNC: 136 MEQ/L (ref 135–145)
SODIUM BLD-SCNC: 139 MEQ/L (ref 135–145)
TOXIC GRANULATION: PRESENT
WBC # BLD: 10.6 THOU/MM3 (ref 4.8–10.8)

## 2018-10-19 PROCEDURE — 97110 THERAPEUTIC EXERCISES: CPT

## 2018-10-19 PROCEDURE — 84295 ASSAY OF SERUM SODIUM: CPT

## 2018-10-19 PROCEDURE — 36415 COLL VENOUS BLD VENIPUNCTURE: CPT

## 2018-10-19 PROCEDURE — 82948 REAGENT STRIP/BLOOD GLUCOSE: CPT

## 2018-10-19 PROCEDURE — 2500000003 HC RX 250 WO HCPCS: Performed by: INTERNAL MEDICINE

## 2018-10-19 PROCEDURE — 6370000000 HC RX 637 (ALT 250 FOR IP): Performed by: INTERNAL MEDICINE

## 2018-10-19 PROCEDURE — 85027 COMPLETE CBC AUTOMATED: CPT

## 2018-10-19 PROCEDURE — 97530 THERAPEUTIC ACTIVITIES: CPT

## 2018-10-19 PROCEDURE — 97166 OT EVAL MOD COMPLEX 45 MIN: CPT

## 2018-10-19 PROCEDURE — 83735 ASSAY OF MAGNESIUM: CPT

## 2018-10-19 PROCEDURE — 2580000003 HC RX 258: Performed by: INTERNAL MEDICINE

## 2018-10-19 PROCEDURE — 6360000002 HC RX W HCPCS: Performed by: INTERNAL MEDICINE

## 2018-10-19 PROCEDURE — 2709999900 HC NON-CHARGEABLE SUPPLY

## 2018-10-19 PROCEDURE — 80048 BASIC METABOLIC PNL TOTAL CA: CPT

## 2018-10-19 PROCEDURE — 1290000000 HC SEMI PRIVATE OTHER R&B

## 2018-10-19 PROCEDURE — 97161 PT EVAL LOW COMPLEX 20 MIN: CPT

## 2018-10-19 RX ADMIN — APIXABAN 5 MG: 5 TABLET, FILM COATED ORAL at 07:51

## 2018-10-19 RX ADMIN — INSULIN LISPRO 2 UNITS: 100 INJECTION, SOLUTION INTRAVENOUS; SUBCUTANEOUS at 08:25

## 2018-10-19 RX ADMIN — APIXABAN 5 MG: 5 TABLET, FILM COATED ORAL at 21:35

## 2018-10-19 RX ADMIN — ATORVASTATIN CALCIUM 20 MG: 20 TABLET, FILM COATED ORAL at 21:35

## 2018-10-19 RX ADMIN — METOPROLOL TARTRATE 50 MG: 50 TABLET, FILM COATED ORAL at 21:35

## 2018-10-19 RX ADMIN — DILTIAZEM HYDROCHLORIDE 30 MG: 30 TABLET, FILM COATED ORAL at 06:53

## 2018-10-19 RX ADMIN — METOPROLOL TARTRATE 50 MG: 50 TABLET, FILM COATED ORAL at 13:54

## 2018-10-19 RX ADMIN — CARBIDOPA AND LEVODOPA 1 TABLET: 25; 100 TABLET, EXTENDED RELEASE ORAL at 07:52

## 2018-10-19 RX ADMIN — SUCRALFATE 1 G: 1 TABLET ORAL at 17:09

## 2018-10-19 RX ADMIN — SUCRALFATE 1 G: 1 TABLET ORAL at 21:35

## 2018-10-19 RX ADMIN — INSULIN LISPRO 4 UNITS: 100 INJECTION, SOLUTION INTRAVENOUS; SUBCUTANEOUS at 13:51

## 2018-10-19 RX ADMIN — SODIUM CHLORIDE: 9 INJECTION, SOLUTION INTRAVENOUS at 13:50

## 2018-10-19 RX ADMIN — CEFTRIAXONE SODIUM 2 G: 2 INJECTION, POWDER, FOR SOLUTION INTRAMUSCULAR; INTRAVENOUS at 11:34

## 2018-10-19 RX ADMIN — METOPROLOL TARTRATE 50 MG: 50 TABLET, FILM COATED ORAL at 07:52

## 2018-10-19 RX ADMIN — INSULIN LISPRO 6 UNITS: 100 INJECTION, SOLUTION INTRAVENOUS; SUBCUTANEOUS at 17:05

## 2018-10-19 RX ADMIN — DILTIAZEM HYDROCHLORIDE 30 MG: 30 TABLET, FILM COATED ORAL at 17:04

## 2018-10-19 RX ADMIN — SUCRALFATE 1 G: 1 TABLET ORAL at 14:04

## 2018-10-19 RX ADMIN — SUCRALFATE 1 G: 1 TABLET ORAL at 07:53

## 2018-10-19 RX ADMIN — DILTIAZEM HYDROCHLORIDE 30 MG: 30 TABLET, FILM COATED ORAL at 14:03

## 2018-10-19 RX ADMIN — Medication 3 UNITS: at 00:45

## 2018-10-19 RX ADMIN — FAMOTIDINE 20 MG: 20 TABLET ORAL at 07:53

## 2018-10-19 RX ADMIN — MAGNESIUM GLUCONATE 500 MG ORAL TABLET 400 MG: 500 TABLET ORAL at 07:52

## 2018-10-19 RX ADMIN — ASPIRIN 81 MG: 81 TABLET, COATED ORAL at 07:52

## 2018-10-19 RX ADMIN — Medication 5 UNITS: at 08:28

## 2018-10-19 RX ADMIN — CARBIDOPA AND LEVODOPA 1 TABLET: 25; 100 TABLET, EXTENDED RELEASE ORAL at 21:35

## 2018-10-19 ASSESSMENT — PAIN DESCRIPTION - FREQUENCY: FREQUENCY: INTERMITTENT

## 2018-10-19 ASSESSMENT — PAIN DESCRIPTION - ONSET: ONSET: GRADUAL

## 2018-10-19 ASSESSMENT — PAIN SCALES - GENERAL: PAINLEVEL_OUTOF10: 4

## 2018-10-19 ASSESSMENT — PAIN DESCRIPTION - ORIENTATION: ORIENTATION: LOWER;RIGHT

## 2018-10-19 ASSESSMENT — PAIN DESCRIPTION - PROGRESSION: CLINICAL_PROGRESSION: NOT CHANGED

## 2018-10-19 ASSESSMENT — PAIN DESCRIPTION - PAIN TYPE: TYPE: ACUTE PAIN

## 2018-10-19 ASSESSMENT — PAIN DESCRIPTION - DESCRIPTORS: DESCRIPTORS: PRESSURE

## 2018-10-19 ASSESSMENT — PAIN DESCRIPTION - LOCATION: LOCATION: ABDOMEN

## 2018-10-19 NOTE — FLOWSHEET NOTE
Pt is in bed. Her daughter has papers form the  which need to be notarized. An updated [address] and a financial durable POA. Both will need a notary. The family had questions about her code statuse. I gather they may want to change it from [facility] to CC/A. It was not clear which doctor made her code status be DNR-CC, but it was done on 10/14. Maybe in the ED?      10/19/18 0506   Encounter Summary   Services provided to: Patient and family together   Referral/Consult From: [address] of [address] [place] [address] Visiting Yes  (10/19 AD Fran Clock ?)   Complexity of Encounter High   Length of Encounter 45 minutes   Spiritual/Taoism   Type Spiritual support   Assessment Approachable   Intervention Nurtured hope   Outcome Coping;Encouraged   Advance Directives (For Healthcare)   Pre-existing DNR Comfort Care/DNR Arrest/DNI Order Yes, notify physician for order  (may need to change to DRN/CCA)   Healthcare Directive Yes, patient has an advance directive for healthcare treatment   Type of Healthcare Directive Durable power of  for health care; Health care treatment directive; Living will   Copy in Chart Yes, copy in chart   Chart Copy Status : Updated; Reviewed  (Needs a notary)   It is recommended to the pt's nurse that Palliative nurse becomes involved. A consult would be put in for that. Family is happy with the explanations given. Further paper work needs to be done. Spiritual care and palliative care will need to follow up with this pt and her family.

## 2018-10-19 NOTE — PROGRESS NOTES
Certification for TCU Admission    Name:  Maureen Reyes    MR#:    [id number]  Elif: [de-identified]      :   1932    Long Term Care Facility Type: Transitional Care Unit     Dx:   UTI     Patient Active Problem List   Diagnosis    CAD (coronary artery disease)    S/P CABG (coronary artery bypass graft)    Hyperlipidemia    Parkinson's disease (HCC)    Chronic LBBB (left bundle branch block)    History of skin cancer    Imbalance    Atypical chest pain    Hypercholesterolemia    Diabetes mellitus, type II ([facility]    Esophageal stricture    Hypertension    FH: CAD (coronary artery disease)    History of gastritis    Lactose intolerance    Hyponatremia    Abdominal pain    Melanotic stools    Recurrent UTI    Urinary tract infection without hematuria    Urinary retention    [facility] ([facility])    Pyelonephritis    New onset atrial fibrillation ([facility]    Sepsis due to Escherichia coli ([facility]    Physical deconditioning       Code Status: DNR-CC      Rehabilitation Potential: Good     Prognosis: Good     Stability: Stable     History & Physical current: Yes   If No, note changes in H&P update     Level of Care Recommendation/Request: Skilled     Physician Certification: I certify that I have reviewed the information contained in the discharge summary and that the information is a true and accurate reflection of the individual's condition. I certify this resident is appropriate for skilled services provided in the TCU/ECF for a condition which the individual received inpatient care. Certification: I certify the orders, information, and transfer of said patient is necessary for the continuing treatment of the diagnosis listed in a skilled care setting providing skilled nursing and/or skilled rehabilitative services. The patient will require on a daily basis SNF covered care.   Electronically signed by Yahaira Amor MD on 10/19/2018 at 5:30 PM

## 2018-10-19 NOTE — PROGRESS NOTES
[address] [place] [address]  INPATIENT OCCUPATIONAL THERAPY  [facility] TCU 8E  EVALUATION    Time:  Time In: 08  Time Out: 3984  Timed Code Treatment Minutes: 20 Minutes  Minutes: 30          Date: 10/19/2018  Patient Name: Chika Bone,   Gender: female      MRN: [id number]  : 1932  (80 y.o.)  Referring Practitioner: Tanja Aden MD; Attending: Dr. Pepe Abraham  Diagnosis: Physical Deconditioning  Additional Pertinent Hx: Pt presented to [facility], accompanied by her daughter, due to fall and fever. Pt reports she was in the bathroom last night and had off balance and fell, tried to hold on the sink, landed against the door of the bathroom and hit the back of her head. She denies dizziness, HA, focal weakness, numbness, chest pain, palpitations. She also reports that for past 3 days, she's having intermittent rt lower quadrant abdominal and rt pelvic pain, 6/10, now 5/10, accompanied by dysuria, urinary frequency and urgency x 3 days. She denies hematuria. Of note, she reports that she has urinary incontinence x 1 year now. Per pt, she was scheduled for \"bladder stimulation\" and supposed to have lexiscan stress test and echo for cardiac clearance. Also, she said that she has recurrent UTI since 2018. Pt also reports low appetite for 2 days, feeling bloated, nauseous, no vomiting. She also reports constant B/L lower back pain x 1 day, 5/10, achy, radiates to rt lower quadrant of abdomen. She also c/o intermittent neck pain x 3 days, 3/10. Daughter reports that she felt that the pt was warm and looks weak this afternoon, hence daughter brought pt to ER. In ER, her UA showed nitrites and large leukocyte esterase. She received 1 dose of Zosyn 3.375 g IV. CT head, CT cervical, cxr and xr pelvis all done, all came back unremarkable.  CBC normal except for mild anemia ( Hgb 11.4, baseline Hgb 12-13) , CMP normal except for hyponatremia, sodium of 128, her sodium in 2018 was with phase II cardiac rehab.  CATARACT REMOVAL      CATARACT REMOVAL  06/08/2016    AND 6/29/16    DIAGNOSTIC CARDIAC CATH LAB PROCEDURE  12 23 2005    LV was obtained. AGEE projection showed preserved LV function w/estimated EF at 55%. No significant MR. Patent left main coronary artery. Tortuous LAD which appeared to be patent. Patent left circumflex artery. High-grade stenosis around 99% in very large dominant RCA w/heavy calcification at the ostium. Normal LV function.  DOPPLER ECHOCARDIOGRAPHY  4 09 2009    Global LV function w/in lower limits of normal, with mild anteroseptal hypokinesis. EF of 50-55%. Light LVH. Mild to moderate left atrial enlargement. Calcific aortic and mitral valve w/no obvious stenosis. No obvious pericardial effusion.  DOPPLER ECHOCARDIOGRAPHY  7 10 2007    LV function w/in lower limits of normal w/peridoxical septal wall motion. Moderate left atrial enlargement. Mild MR. Mild TR. Calcified valves w/no obvious stenosis. No pericardial effusion.  DOPPLER ECHOCARDIOGRAPHY  4 14 2006    There appears to be significant improvement from previous echo w/complete resolution of pericardial effusion and normal LV function. EF of 60%.  DOPPLER ECHOCARDIOGRAPHY  3 21 2006    Normal LV function. Mild LV hypertrophy. Mild biatrial enlargement. Mildly calcific aortic and mitral valve w/no stenosis. Mild MR. Mild TR. Minimal residual pericardial effusion w/significant improvement from previous echo.  DOPPLER ECHOCARDIOGRAPHY  3 16 2006    Interval change from previous echocardiogram w/worsening of effusion. Correlation clinically. Consideration of pericardial centesis. Will obtain a [facility] consult.  DOPPLER ECHOCARDIOGRAPHY  1 31 2006    No significant change from previous echo. The 2D echo showed moderate degree of pericardial effusion. It does seem to be worst or better than previous echo. No evidence of tamponade.      DOPPLER ECHOCARDIOGRAPHY  1 27 2006    Normal global

## 2018-10-20 PROBLEM — I70.90 ARTERIOSCLEROSIS: Status: ACTIVE | Noted: 2018-10-20

## 2018-10-20 PROBLEM — M48.02 CERVICAL SPINAL STENOSIS: Status: ACTIVE | Noted: 2018-10-20

## 2018-10-20 PROBLEM — M85.80 OSTEOPENIA: Status: ACTIVE | Noted: 2018-10-20

## 2018-10-20 PROBLEM — M47.812 CERVICAL ARTHRITIS: Status: ACTIVE | Noted: 2018-10-20

## 2018-10-20 LAB
ANION GAP SERPL CALCULATED.3IONS-SCNC: 15 MEQ/L (ref 8–16)
BASOPHILS # BLD: 0.4 %
BASOPHILS ABSOLUTE: 0 THOU/MM3 (ref 0–0.1)
BUN BLDV-MCNC: 8 MG/DL (ref 7–22)
CALCIUM SERPL-MCNC: 8.6 MG/DL (ref 8.5–10.5)
CHLORIDE BLD-SCNC: 97 MEQ/L (ref 98–111)
CO2: 24 MEQ/L (ref 23–33)
CREAT SERPL-MCNC: 0.5 MG/DL (ref 0.4–1.2)
EOSINOPHIL # BLD: 2 %
EOSINOPHILS ABSOLUTE: 0.2 THOU/MM3 (ref 0–0.4)
ERYTHROCYTE [DISTWIDTH] IN BLOOD BY AUTOMATED COUNT: 14.4 % (ref 11.5–14.5)
ERYTHROCYTE [DISTWIDTH] IN BLOOD BY AUTOMATED COUNT: 44.1 FL (ref 35–45)
GFR SERPL CREATININE-BSD FRML MDRD: > 90 ML/MIN/1.73M2
GLUCOSE BLD-MCNC: 210 MG/DL (ref 70–108)
GLUCOSE BLD-MCNC: 238 MG/DL (ref 70–108)
GLUCOSE BLD-MCNC: 241 MG/DL (ref 70–108)
GLUCOSE BLD-MCNC: 249 MG/DL (ref 70–108)
GLUCOSE BLD-MCNC: 266 MG/DL (ref 70–108)
HCT VFR BLD CALC: 31.8 % (ref 37–47)
HEMOGLOBIN: 10.7 GM/DL (ref 12–16)
IMMATURE GRANS (ABS): 0.47 THOU/MM3 (ref 0–0.07)
IMMATURE GRANULOCYTES: 4.5 %
LYMPHOCYTES # BLD: 12.3 %
LYMPHOCYTES ABSOLUTE: 1.3 THOU/MM3 (ref 1–4.8)
MAGNESIUM: 1.5 MG/DL (ref 1.6–2.4)
MCH RBC QN AUTO: 28.3 PG (ref 26–33)
MCHC RBC AUTO-ENTMCNC: 33.6 GM/DL (ref 32.2–35.5)
MCV RBC AUTO: 84.1 FL (ref 81–99)
MONOCYTES # BLD: 8.6 %
MONOCYTES ABSOLUTE: 0.9 THOU/MM3 (ref 0.4–1.3)
NUCLEATED RED BLOOD CELLS: 0 /100 WBC
PLATELET # BLD: 301 THOU/MM3 (ref 130–400)
PMV BLD AUTO: 10.3 FL (ref 9.4–12.4)
POTASSIUM REFLEX MAGNESIUM: 3.5 MEQ/L (ref 3.5–5.2)
RBC # BLD: 3.78 MILL/MM3 (ref 4.2–5.4)
SEG NEUTROPHILS: 72.2 %
SEGMENTED NEUTROPHILS ABSOLUTE COUNT: 7.5 THOU/MM3 (ref 1.8–7.7)
SODIUM BLD-SCNC: 136 MEQ/L (ref 135–145)
WBC # BLD: 10.4 THOU/MM3 (ref 4.8–10.8)

## 2018-10-20 PROCEDURE — 83735 ASSAY OF MAGNESIUM: CPT

## 2018-10-20 PROCEDURE — 80048 BASIC METABOLIC PNL TOTAL CA: CPT

## 2018-10-20 PROCEDURE — 2580000003 HC RX 258: Performed by: INTERNAL MEDICINE

## 2018-10-20 PROCEDURE — 36415 COLL VENOUS BLD VENIPUNCTURE: CPT

## 2018-10-20 PROCEDURE — 2709999900 HC NON-CHARGEABLE SUPPLY

## 2018-10-20 PROCEDURE — 6370000000 HC RX 637 (ALT 250 FOR IP): Performed by: INTERNAL MEDICINE

## 2018-10-20 PROCEDURE — 85025 COMPLETE CBC W/AUTO DIFF WBC: CPT

## 2018-10-20 PROCEDURE — 82948 REAGENT STRIP/BLOOD GLUCOSE: CPT

## 2018-10-20 PROCEDURE — 6360000002 HC RX W HCPCS: Performed by: INTERNAL MEDICINE

## 2018-10-20 PROCEDURE — 1290000000 HC SEMI PRIVATE OTHER R&B

## 2018-10-20 RX ADMIN — INSULIN LISPRO 4 UNITS: 100 INJECTION, SOLUTION INTRAVENOUS; SUBCUTANEOUS at 17:16

## 2018-10-20 RX ADMIN — CARBIDOPA AND LEVODOPA 1 TABLET: 25; 100 TABLET, EXTENDED RELEASE ORAL at 21:14

## 2018-10-20 RX ADMIN — DILTIAZEM HYDROCHLORIDE 30 MG: 30 TABLET, FILM COATED ORAL at 06:21

## 2018-10-20 RX ADMIN — METOPROLOL TARTRATE 50 MG: 50 TABLET, FILM COATED ORAL at 08:56

## 2018-10-20 RX ADMIN — SUCRALFATE 1 G: 1 TABLET ORAL at 21:14

## 2018-10-20 RX ADMIN — APIXABAN 5 MG: 5 TABLET, FILM COATED ORAL at 21:14

## 2018-10-20 RX ADMIN — SUCRALFATE 1 G: 1 TABLET ORAL at 12:12

## 2018-10-20 RX ADMIN — DILTIAZEM HYDROCHLORIDE 30 MG: 30 TABLET, FILM COATED ORAL at 12:11

## 2018-10-20 RX ADMIN — APIXABAN 5 MG: 5 TABLET, FILM COATED ORAL at 08:56

## 2018-10-20 RX ADMIN — SUCRALFATE 1 G: 1 TABLET ORAL at 08:56

## 2018-10-20 RX ADMIN — INSULIN LISPRO 4 UNITS: 100 INJECTION, SOLUTION INTRAVENOUS; SUBCUTANEOUS at 13:39

## 2018-10-20 RX ADMIN — METOPROLOL TARTRATE 50 MG: 50 TABLET, FILM COATED ORAL at 21:14

## 2018-10-20 RX ADMIN — INSULIN LISPRO 4 UNITS: 100 INJECTION, SOLUTION INTRAVENOUS; SUBCUTANEOUS at 08:57

## 2018-10-20 RX ADMIN — Medication 2 UNITS: at 00:18

## 2018-10-20 RX ADMIN — SODIUM CHLORIDE: 9 INJECTION, SOLUTION INTRAVENOUS at 02:46

## 2018-10-20 RX ADMIN — CARBIDOPA AND LEVODOPA 1 TABLET: 25; 100 TABLET, EXTENDED RELEASE ORAL at 08:56

## 2018-10-20 RX ADMIN — MAGNESIUM GLUCONATE 500 MG ORAL TABLET 400 MG: 500 TABLET ORAL at 08:56

## 2018-10-20 RX ADMIN — FAMOTIDINE 20 MG: 20 TABLET ORAL at 08:56

## 2018-10-20 RX ADMIN — DILTIAZEM HYDROCHLORIDE 30 MG: 30 TABLET, FILM COATED ORAL at 00:18

## 2018-10-20 RX ADMIN — SODIUM CHLORIDE: 9 INJECTION, SOLUTION INTRAVENOUS at 17:15

## 2018-10-20 RX ADMIN — ATORVASTATIN CALCIUM 20 MG: 20 TABLET, FILM COATED ORAL at 21:14

## 2018-10-20 RX ADMIN — SUCRALFATE 1 G: 1 TABLET ORAL at 17:16

## 2018-10-20 RX ADMIN — ASPIRIN 81 MG: 81 TABLET, COATED ORAL at 08:56

## 2018-10-20 RX ADMIN — CEFTRIAXONE SODIUM 2 G: 2 INJECTION, POWDER, FOR SOLUTION INTRAMUSCULAR; INTRAVENOUS at 12:01

## 2018-10-20 RX ADMIN — Medication 2 UNITS: at 21:14

## 2018-10-20 RX ADMIN — METOPROLOL TARTRATE 50 MG: 50 TABLET, FILM COATED ORAL at 13:39

## 2018-10-20 RX ADMIN — DILTIAZEM HYDROCHLORIDE 30 MG: 30 TABLET, FILM COATED ORAL at 17:16

## 2018-10-20 ASSESSMENT — PAIN SCALES - GENERAL
PAINLEVEL_OUTOF10: 0
PAINLEVEL_OUTOF10: 0
PAINLEVEL_OUTOF10: 4
PAINLEVEL_OUTOF10: 3

## 2018-10-20 ASSESSMENT — PAIN DESCRIPTION - ORIENTATION: ORIENTATION: RIGHT;LOWER

## 2018-10-20 ASSESSMENT — PAIN DESCRIPTION - DESCRIPTORS: DESCRIPTORS: PRESSURE

## 2018-10-20 ASSESSMENT — PAIN DESCRIPTION - FREQUENCY: FREQUENCY: INTERMITTENT

## 2018-10-20 ASSESSMENT — PAIN DESCRIPTION - PAIN TYPE: TYPE: ACUTE PAIN

## 2018-10-20 ASSESSMENT — PAIN DESCRIPTION - LOCATION: LOCATION: ABDOMEN

## 2018-10-20 NOTE — PROGRESS NOTES
INFECTIOUS DISEASES  PROGRESS NOTE    Pt Name: Sarai Love  MRN: [id number]  [id number]  YOB: 1932  Admit Date: 10/18/2018  7:28 PM  Date of evaluation: 10/20/2018  Primary Care Physician: Juana Dumont MD   [id number]   80-year-old female with  history of problems with urinary incontinence and essentially has been  having recurrent UTIs for which she has been seeing urological service  on an outpatient basis. The patient had past infections with urine  having klebsiella and otherwise had to be admitted to [address] [place] [address] after  getting more confused around 10/14/2018 and the patient is evaluated  by urological service and also, she has GYN evaluation pending. The  patient is being tested by cardiac service for further possible  surgical intervention. The patient has less confusion since  admission. She has been started on IV Zosyn and Infectious Disease  evaluation requested for further options of care. The patient's  family does give history of some parkinsonism. Blood culture 10/14/18 showed e.coli     Subjective: Interval History: As above. Notes reviewed.  Feeling better     Active ATBs: zosyn 10/14 - 10/18                        Rocephin 10/18  Pt/Chart review:  Fever No, DiarrheaNo, Dyspnea No, Nausea:None      Data:   Scheduled Meds:   apixaban  5 mg Oral BID    aspirin  81 mg Oral Daily    atorvastatin  20 mg Oral Daily    carbidopa-levodopa  1 tablet Oral BID    cefTRIAXone (ROCEPHIN) IV  2 g Intravenous Q24H    diltiazem  30 mg Oral 4 times per day    famotidine  20 mg Oral Daily    insulin lispro  0-12 Units Subcutaneous TID WC    insulin lispro  0-6 Units Subcutaneous Nightly    magnesium oxide  400 mg Oral Daily    metoprolol tartrate  50 mg Oral TID    [START ON 10/15/2019] potassium replacement protocol   Other RX Placeholder    [START ON 10/21/2018] ppd   Does not apply Weekly    sodium chloride flush  10 mL Intravenous 2 times per day    sucralfate  1 g Oral 4x mcg/mL Final   cefuroxime Intermediate   Final   ciprofloxacin Sensitive =0.094 mcg/mL Final   gentamicin Resistant >=16 mcg/mL Final   nitrofurantoin Sensitive <=16 mcg/mL Final   tetracycline Resistant >=16 mcg/mL Final   tobramycin Sensitive =4 mcg/mL Final   trimethoprim-sulfamethoxazole Resistant >=320 mcg/mL Final            IMAGING:   CT ABDOMEN  Impression   1. Marked heterogeneous enhancement of the right kidney with the right perinephric stranding and edema suggesting pyelonephritis. 2. Heterogeneous enhancement of the spleen of uncertain significance possibly contrast bolus timing issue possibility of an infiltrative process of the spleen is not excludable. 3. Bilateral small pleural effusions and bibasilar atelectasis. 4. Findings of pelvic venous congestion syndrome. Objective:   Vitals: BP (!) 148/74   Pulse 111   Temp 97.9 °F (36.6 °C) (Oral)   Resp 18   Ht 5' 1\" (1.549 m)   Wt 152 lb 4.8 oz (69.1 kg)   SpO2 94%   BMI 28.78 kg/m²   HEENT: Head: Normocephalic, no lesions, without obvious abnormality. Lungs: clear to auscultation bilaterally  Heart: S1, S2 normal  Abdomen: soft, non-tender; bowel sounds normal; no masses,  no organomegaly  Extremities: extremities normal, atraumatic, no cyanosis or edema  Neurologic: Mental status: Alert, oriented, thought content appropriate        Assessment:     1. Severe sepsis. 2.  Complicated UTI. 3.  Pyelonephritis. 4.  UTI with e.coli  5. Fever of 103.7 at admission. 6.  Urinary incontinence. 7.  Altered mental status with metabolic encephalopathy, improved on  antibiotic therapy. 8.  Renal angle tenderness. 9.  Diabetes mellitus type 2.  10.  Hypertension. 12.  Coronary artery disease. 15.  Deconditioning secondary to overall complications in her care.   14. Bacteremia with E.coli    Patient Active Problem List:     CAD (coronary artery disease)     S/P CABG (coronary artery bypass graft)     Hyperlipidemia     Parkinson's

## 2018-10-21 LAB
ANION GAP SERPL CALCULATED.3IONS-SCNC: 13 MEQ/L (ref 8–16)
BASOPHILS # BLD: 0.6 %
BASOPHILS ABSOLUTE: 0.1 THOU/MM3 (ref 0–0.1)
BLOOD CULTURE, ROUTINE: ABNORMAL
BUN BLDV-MCNC: 9 MG/DL (ref 7–22)
CALCIUM SERPL-MCNC: 8.7 MG/DL (ref 8.5–10.5)
CHLORIDE BLD-SCNC: 97 MEQ/L (ref 98–111)
CO2: 25 MEQ/L (ref 23–33)
CREAT SERPL-MCNC: 0.6 MG/DL (ref 0.4–1.2)
EOSINOPHIL # BLD: 1.9 %
EOSINOPHILS ABSOLUTE: 0.2 THOU/MM3 (ref 0–0.4)
ERYTHROCYTE [DISTWIDTH] IN BLOOD BY AUTOMATED COUNT: 14.6 % (ref 11.5–14.5)
ERYTHROCYTE [DISTWIDTH] IN BLOOD BY AUTOMATED COUNT: 45.1 FL (ref 35–45)
GFR SERPL CREATININE-BSD FRML MDRD: > 90 ML/MIN/1.73M2
GLUCOSE BLD-MCNC: 218 MG/DL (ref 70–108)
GLUCOSE BLD-MCNC: 219 MG/DL (ref 70–108)
GLUCOSE BLD-MCNC: 264 MG/DL (ref 70–108)
GLUCOSE BLD-MCNC: 273 MG/DL (ref 70–108)
GLUCOSE BLD-MCNC: 273 MG/DL (ref 70–108)
HCT VFR BLD CALC: 32.8 % (ref 37–47)
HEMOGLOBIN: 10.8 GM/DL (ref 12–16)
IMMATURE GRANS (ABS): 0.46 THOU/MM3 (ref 0–0.07)
IMMATURE GRANULOCYTES: 4.3 %
LYMPHOCYTES # BLD: 15.5 %
LYMPHOCYTES ABSOLUTE: 1.7 THOU/MM3 (ref 1–4.8)
MCH RBC QN AUTO: 28.2 PG (ref 26–33)
MCHC RBC AUTO-ENTMCNC: 32.9 GM/DL (ref 32.2–35.5)
MCV RBC AUTO: 85.6 FL (ref 81–99)
MONOCYTES # BLD: 7.9 %
MONOCYTES ABSOLUTE: 0.9 THOU/MM3 (ref 0.4–1.3)
NUCLEATED RED BLOOD CELLS: 0 /100 WBC
ORGANISM: ABNORMAL
ORGANISM: ABNORMAL
PLATELET # BLD: 321 THOU/MM3 (ref 130–400)
PMV BLD AUTO: 10 FL (ref 9.4–12.4)
POTASSIUM REFLEX MAGNESIUM: 3.6 MEQ/L (ref 3.5–5.2)
RBC # BLD: 3.83 MILL/MM3 (ref 4.2–5.4)
SEG NEUTROPHILS: 69.8 %
SEGMENTED NEUTROPHILS ABSOLUTE COUNT: 7.5 THOU/MM3 (ref 1.8–7.7)
SODIUM BLD-SCNC: 135 MEQ/L (ref 135–145)
WBC # BLD: 10.8 THOU/MM3 (ref 4.8–10.8)

## 2018-10-21 PROCEDURE — 2709999900 HC NON-CHARGEABLE SUPPLY

## 2018-10-21 PROCEDURE — 1290000000 HC SEMI PRIVATE OTHER R&B

## 2018-10-21 PROCEDURE — 97110 THERAPEUTIC EXERCISES: CPT

## 2018-10-21 PROCEDURE — 97116 GAIT TRAINING THERAPY: CPT

## 2018-10-21 PROCEDURE — 97530 THERAPEUTIC ACTIVITIES: CPT

## 2018-10-21 PROCEDURE — 2580000003 HC RX 258: Performed by: INTERNAL MEDICINE

## 2018-10-21 PROCEDURE — 82948 REAGENT STRIP/BLOOD GLUCOSE: CPT

## 2018-10-21 PROCEDURE — 6370000000 HC RX 637 (ALT 250 FOR IP): Performed by: INTERNAL MEDICINE

## 2018-10-21 PROCEDURE — 80048 BASIC METABOLIC PNL TOTAL CA: CPT

## 2018-10-21 PROCEDURE — 6360000002 HC RX W HCPCS: Performed by: INTERNAL MEDICINE

## 2018-10-21 PROCEDURE — 85025 COMPLETE CBC W/AUTO DIFF WBC: CPT

## 2018-10-21 PROCEDURE — 6370000000 HC RX 637 (ALT 250 FOR IP): Performed by: FAMILY MEDICINE

## 2018-10-21 PROCEDURE — 36415 COLL VENOUS BLD VENIPUNCTURE: CPT

## 2018-10-21 PROCEDURE — 2500000003 HC RX 250 WO HCPCS: Performed by: FAMILY MEDICINE

## 2018-10-21 RX ORDER — FUROSEMIDE 20 MG/1
20 TABLET ORAL DAILY
Status: DISCONTINUED | OUTPATIENT
Start: 2018-10-21 | End: 2018-11-03 | Stop reason: HOSPADM

## 2018-10-21 RX ADMIN — ASPIRIN 81 MG: 81 TABLET, COATED ORAL at 08:21

## 2018-10-21 RX ADMIN — METOPROLOL TARTRATE 50 MG: 50 TABLET, FILM COATED ORAL at 16:26

## 2018-10-21 RX ADMIN — DILTIAZEM HYDROCHLORIDE 30 MG: 30 TABLET, FILM COATED ORAL at 12:57

## 2018-10-21 RX ADMIN — APIXABAN 5 MG: 5 TABLET, FILM COATED ORAL at 22:31

## 2018-10-21 RX ADMIN — SODIUM CHLORIDE: 9 INJECTION, SOLUTION INTRAVENOUS at 06:00

## 2018-10-21 RX ADMIN — SUCRALFATE 1 G: 1 TABLET ORAL at 12:57

## 2018-10-21 RX ADMIN — CEFTRIAXONE SODIUM 2 G: 2 INJECTION, POWDER, FOR SOLUTION INTRAMUSCULAR; INTRAVENOUS at 12:11

## 2018-10-21 RX ADMIN — METOPROLOL TARTRATE 50 MG: 50 TABLET, FILM COATED ORAL at 22:30

## 2018-10-21 RX ADMIN — DILTIAZEM HYDROCHLORIDE 30 MG: 30 TABLET, FILM COATED ORAL at 16:26

## 2018-10-21 RX ADMIN — MICONAZOLE NITRATE: 2 POWDER TOPICAL at 08:22

## 2018-10-21 RX ADMIN — MAGNESIUM GLUCONATE 500 MG ORAL TABLET 400 MG: 500 TABLET ORAL at 08:22

## 2018-10-21 RX ADMIN — INSULIN LISPRO 4 UNITS: 100 INJECTION, SOLUTION INTRAVENOUS; SUBCUTANEOUS at 17:11

## 2018-10-21 RX ADMIN — SUCRALFATE 1 G: 1 TABLET ORAL at 22:30

## 2018-10-21 RX ADMIN — SODIUM CHLORIDE: 9 INJECTION, SOLUTION INTRAVENOUS at 21:17

## 2018-10-21 RX ADMIN — CARBIDOPA AND LEVODOPA 1 TABLET: 25; 100 TABLET, EXTENDED RELEASE ORAL at 08:21

## 2018-10-21 RX ADMIN — ATORVASTATIN CALCIUM 20 MG: 20 TABLET, FILM COATED ORAL at 22:31

## 2018-10-21 RX ADMIN — Medication 2 UNITS: at 22:31

## 2018-10-21 RX ADMIN — INSULIN LISPRO 6 UNITS: 100 INJECTION, SOLUTION INTRAVENOUS; SUBCUTANEOUS at 12:57

## 2018-10-21 RX ADMIN — FUROSEMIDE 20 MG: 20 TABLET ORAL at 16:30

## 2018-10-21 RX ADMIN — METOPROLOL TARTRATE 50 MG: 50 TABLET, FILM COATED ORAL at 08:22

## 2018-10-21 RX ADMIN — FAMOTIDINE 20 MG: 20 TABLET ORAL at 08:21

## 2018-10-21 RX ADMIN — DILTIAZEM HYDROCHLORIDE 30 MG: 30 TABLET, FILM COATED ORAL at 23:42

## 2018-10-21 RX ADMIN — CARBIDOPA AND LEVODOPA 1 TABLET: 25; 100 TABLET, EXTENDED RELEASE ORAL at 22:31

## 2018-10-21 RX ADMIN — SUCRALFATE 1 G: 1 TABLET ORAL at 16:26

## 2018-10-21 RX ADMIN — SUCRALFATE 1 G: 1 TABLET ORAL at 08:22

## 2018-10-21 RX ADMIN — DILTIAZEM HYDROCHLORIDE 30 MG: 30 TABLET, FILM COATED ORAL at 00:11

## 2018-10-21 RX ADMIN — MICONAZOLE NITRATE: 2 POWDER TOPICAL at 22:31

## 2018-10-21 RX ADMIN — APIXABAN 5 MG: 5 TABLET, FILM COATED ORAL at 08:23

## 2018-10-21 RX ADMIN — DILTIAZEM HYDROCHLORIDE 30 MG: 30 TABLET, FILM COATED ORAL at 06:31

## 2018-10-21 RX ADMIN — Medication 10 ML: at 22:41

## 2018-10-21 RX ADMIN — INSULIN LISPRO 6 UNITS: 100 INJECTION, SOLUTION INTRAVENOUS; SUBCUTANEOUS at 08:24

## 2018-10-21 ASSESSMENT — PAIN SCALES - GENERAL
PAINLEVEL_OUTOF10: 5
PAINLEVEL_OUTOF10: 0
PAINLEVEL_OUTOF10: 3
PAINLEVEL_OUTOF10: 0

## 2018-10-21 NOTE — PROGRESS NOTES
in 9/2018 was 139, though she had hyponatremia in 6/2018, sodium of 130. Her blood glucose is also elevated  ( she said her FBS at home usually at 160s). Urine and blood cultures were all sent. Admitted to [facility] 10/18/18. Past Medical History:   Diagnosis Date    Atypical chest pain     CAD (coronary artery disease)     Chronic LBBB (left bundle branch block)     Diabetes mellitus type II     Esophageal stricture     History of esophageal stricture.  FH: CAD (coronary artery disease)     Mom and dad both with heart disease at mid to late age.  GERD (gastroesophageal reflux disease)     History of gastritis     History of skin cancer     Hypercholesterolemia     Hyperlipidemia     Hypertension     Imbalance     As far as imbalance is concerned, asked patient to follow-up with Dr. Jonelle Huff since she follows with him on a regular basis.  Lactose intolerance     Nummular dermatitis     Parkinson's disease ([address]     Restless legs syndrome (RLS)     S/P CABG (coronary artery bypass graft)     SCC (squamous cell carcinoma)      Past Surgical History:   Procedure Laterality Date    CARDIAC SURGERY      CARDIOVASCULAR STRESS TEST  4 09 2009    Gated SPECT images revealed normal global wall motion with EF of 60%. Persantine test associated w/nonspecific symptoms. EKG is nondiagnostic w/baseline LBBB. No obvious stress-induced ischemia by Cardiolite. EF within normal limits.  CARDIOVASCULAR STRESS TEST  7 10 2007    The gated SPECT images revealed normal wall motion with EF of 69%. Poor exercise tolerance w/SOB on exertion. EKG is nondiagnostic. Cardiolite scan revealing no obvious stress-induced ischemia. EF 60%.  CARDIOVASCULAR STRESS TEST  2 03 2006    Fair exercise tolerance w/SOB on exertion. No EKG evidence of ischemia. Patient should be able to proceed with phase II cardiac rehab.      CATARACT REMOVAL      CATARACT REMOVAL  06/08/2016    AND 6/29/16    DIAGNOSTIC

## 2018-10-21 NOTE — PLAN OF CARE
Ongoing  Vital signs, intake and output, and labs monitored for hemodynamic stability. Problem: Urinary Elimination:  Goal: Signs and symptoms of infection will decrease  Signs and symptoms of infection will decrease   Outcome: Ongoing  No signs or symptoms of infection noted. Problem: Nutrition  Goal: Optimal nutrition therapy  Outcome: Ongoing  Tolerating current diet and po fluids well.

## 2018-10-21 NOTE — H&P
TCU History and Physical      Chief Complaint and Reason for TCU Admission:   Need to complete antibiotics and continue the therapies from the acute hospital stay    History of Present Illness:  Clair Dominguez  is a 80 y.o. female admitted to the transitional care unit on 10/18/2018. She was admitted through the ED with UTI that later was found in blood cultures too. She became more medically stable but was significantly weakened and requires more time with therapies before the return home. Past Medical History:      Diagnosis Date    Atypical chest pain     CAD (coronary artery disease)     Chronic LBBB (left bundle branch block)     Diabetes mellitus type II     Esophageal stricture     History of esophageal stricture.  FH: CAD (coronary artery disease)     Mom and dad both with heart disease at mid to late age.  GERD (gastroesophageal reflux disease)     History of gastritis     History of skin cancer     Hypercholesterolemia     Hyperlipidemia     Hypertension     Imbalance     As far as imbalance is concerned, asked patient to follow-up with Dr. Mustapha Benedict since she follows with him on a regular basis.  Lactose intolerance     Nummular dermatitis     Parkinson's disease ([address]     Restless legs syndrome (RLS)     S/P CABG (coronary artery bypass graft)     SCC (squamous cell carcinoma)        Primary care provider: Lavon Bowser MD     Past Surgical History:      Procedure Laterality Date    CARDIAC SURGERY      CARDIOVASCULAR STRESS TEST  4 09 2009    Gated SPECT images revealed normal global wall motion with EF of 60%. Persantine test associated w/nonspecific symptoms. EKG is nondiagnostic w/baseline LBBB. No obvious stress-induced ischemia by Cardiolite. EF within normal limits.  CARDIOVASCULAR STRESS TEST  7 10 2007    The gated SPECT images revealed normal wall motion with EF of 69%. Poor exercise tolerance w/SOB on exertion. EKG is nondiagnostic.  Cardiolite scan

## 2018-10-21 NOTE — PROGRESS NOTES
Oral 4x Daily    tuberculin  5 Units Intradermal Weekly     Continuous Infusions:   sodium chloride 75 mL/hr at 10/21/18 0600    dextrose         I & O's:  I/O last 3 completed shifts: In: 2889.8 [P.O.:977; I.V.:1912.8]  Out: 2750 [Urine:2750]  No intake/output data recorded. Intake/Output Summary (Last 24 hours) at 10/21/18 0954  Last data filed at 10/21/18 0618   Gross per 24 hour   Intake          2649.78 ml   Output             2750 ml   Net          -100.22 ml       Date 10/21/18 0000 - 10/21/18 2359   Shift 4866-8088 2819-6734 8489-9253 24 Hour Total   I  N  T  A  K  E   P.O.  (mL/kg/hr) 400  (0.7)   400    I.V.  (mL/kg) 691.2  (10.1)   691.2  (10.1)    Shift Total  (mL/kg) 1091.2  (16)   1091.2  (16)   O  U  T  P  U  T   Urine  (mL/kg/hr) 1750  (3.2)   1750    Shift Total  (mL/kg) 1750  (25.7)   1750  (25.7)   Weight (kg) 68.1 68.1 68.1 68.1           CBC:   Recent Labs      10/19/18   0632  10/20/18   0617  10/21/18   0637   WBC  10.6  10.4  10.8   HGB  10.6*  10.7*  10.8*   PLT  267  301  321     BMP:    Recent Labs      10/19/18   0632   10/19/18   1833  10/20/18   0617  10/21/18   0637   NA  139   < >  135  136  135   K  3.2*   --    --   3.5  3.6   CL  99   --    --   97*  97*   CO2  25   --    --   24  25   BUN  6*   --    --   8  9   CREATININE  0.6   --    --   0.5  0.6   GLUCOSE  204*   --    --   266*  273*    < > = values in this interval not displayed. Hepatic:   No results for input(s): AST, ALT, ALB, BILITOT, ALKPHOS in the last 72 hours. BNP: No results for input(s): BNP in the last 72 hours. URINALYSIS:    Results for Raymond Campbell (MRN [id number]) as of 10/16/2018 17:17   Ref.  Range 10/14/2018 15:49   Color, UA Latest Ref Range: STRAW-YELL  YELLOW   Glucose, UA Latest Ref Range: NEGATIVE mg/dl NEGATIVE   Bilirubin, Urine Latest Ref Range: NEGATIVE  NEGATIVE   Ketones, Urine Latest Ref Range: NEGATIVE  TRACE (A)   Blood, Urine Latest Ref Range: NEGATIVE  MODERATE (A)   pH, UA

## 2018-10-21 NOTE — PROGRESS NOTES
though she had hyponatremia in 6/2018, sodium of 130. Her blood glucose is also elevated  ( she said her FBS at home usually at 160s). Urine and blood cultures were all sent. Admitted to [facility] 10/18/18. Past Medical History:   Diagnosis Date    Atypical chest pain     CAD (coronary artery disease)     Chronic LBBB (left bundle branch block)     Diabetes mellitus type II     Esophageal stricture     History of esophageal stricture.  FH: CAD (coronary artery disease)     Mom and dad both with heart disease at mid to late age.  GERD (gastroesophageal reflux disease)     History of gastritis     History of skin cancer     Hypercholesterolemia     Hyperlipidemia     Hypertension     Imbalance     As far as imbalance is concerned, asked patient to follow-up with Dr. Veena Chavez since she follows with him on a regular basis.  Lactose intolerance     Nummular dermatitis     Parkinson's disease ([address]     Restless legs syndrome (RLS)     S/P CABG (coronary artery bypass graft)     SCC (squamous cell carcinoma)      Past Surgical History:   Procedure Laterality Date    CARDIAC SURGERY      CARDIOVASCULAR STRESS TEST  4 09 2009    Gated SPECT images revealed normal global wall motion with EF of 60%. Persantine test associated w/nonspecific symptoms. EKG is nondiagnostic w/baseline LBBB. No obvious stress-induced ischemia by Cardiolite. EF within normal limits.  CARDIOVASCULAR STRESS TEST  7 10 2007    The gated SPECT images revealed normal wall motion with EF of 69%. Poor exercise tolerance w/SOB on exertion. EKG is nondiagnostic. Cardiolite scan revealing no obvious stress-induced ischemia. EF 60%.  CARDIOVASCULAR STRESS TEST  2 03 2006    Fair exercise tolerance w/SOB on exertion. No EKG evidence of ischemia. Patient should be able to proceed with phase II cardiac rehab.      CATARACT REMOVAL      CATARACT REMOVAL  06/08/2016    AND 6/29/16    DIAGNOSTIC CARDIAC CATH LAB treatment well;Patient limited by fatigue;Patient limited by endurance    Assessment: Body structures, Functions, Activity limitations: Decreased functional mobility , Decreased endurance, Decreased balance, Decreased strength  Assessment: Patient tolerated session well. Patient demos generalized weakness and lack of endurance. Patient would benefit from continued skilled physical therapy to improve functional mobility and ensure a safe discharge to home. Prognosis: Excellent          Discharge Recommendations:  Discharge Recommendations: Continue to assess pending progress    Patient Education:  Patient Education: transfers, gait, therex, bed mobility    Equipment Recommendations:  Equipment Needed: No    Safety:  Type of devices: All fall risk precautions in place, Bed alarm in place, Call light within reach, Gait belt, Patient at risk for falls, Left in bed (daughter present in room)  Restraints  Initially in place: No    Plan:  Times per week: 6x  Times per day: Daily  Specific instructions for Next Treatment: B LE strengthening, bed mobility, transfers, gait, standing balance  Current Treatment Recommendations: Strengthening, Neuromuscular Re-education, Home Exercise Program, Safety Education & Training, Balance Training, Functional Mobility Training, Transfer Training, Gait Training, Equipment Evaluation, Education, & procurement, Patient/Caregiver Education & Training, Endurance Training    Goals:  Patient goals : To get better and go home. Short term goals  Time Frame for Short term goals: 1 week  Short term goal 1: Pt to transfer supine <--> sit mod I to enable pt to get in/out of bed. Short term goal 2: Pt to transfer sit <--> stand S for increased functional mobility. Short term goal 3: Pt to ambulate >100 feet with RW SBA for household ambulation. Short term goal 4: Pt to improve dynamic standing balance to fair+ to minimize fall risk.     Long term goals  Time Frame for Long term goals : 2

## 2018-10-22 LAB
ALBUMIN SERPL-MCNC: 2.7 G/DL (ref 3.5–5.1)
ALP BLD-CCNC: 96 U/L (ref 38–126)
ALT SERPL-CCNC: 15 U/L (ref 11–66)
ANION GAP SERPL CALCULATED.3IONS-SCNC: 10 MEQ/L (ref 8–16)
AST SERPL-CCNC: 37 U/L (ref 5–40)
BILIRUB SERPL-MCNC: 0.5 MG/DL (ref 0.3–1.2)
BILIRUBIN DIRECT: < 0.2 MG/DL (ref 0–0.3)
BLOOD CULTURE, ROUTINE: NORMAL
BLOOD CULTURE, ROUTINE: NORMAL
BUN BLDV-MCNC: 7 MG/DL (ref 7–22)
CALCIUM SERPL-MCNC: 8.7 MG/DL (ref 8.5–10.5)
CHLORIDE BLD-SCNC: 97 MEQ/L (ref 98–111)
CO2: 29 MEQ/L (ref 23–33)
CREAT SERPL-MCNC: 0.6 MG/DL (ref 0.4–1.2)
GFR SERPL CREATININE-BSD FRML MDRD: > 90 ML/MIN/1.73M2
GLUCOSE BLD-MCNC: 191 MG/DL (ref 70–108)
POTASSIUM REFLEX MAGNESIUM: 4.2 MEQ/L (ref 3.5–5.2)
POTASSIUM SERPL-SCNC: 4.2 MEQ/L (ref 3.5–5.2)
SODIUM BLD-SCNC: 136 MEQ/L (ref 135–145)
TOTAL PROTEIN: 6.1 G/DL (ref 6.1–8)

## 2018-10-22 PROCEDURE — 80048 BASIC METABOLIC PNL TOTAL CA: CPT

## 2018-10-22 PROCEDURE — 82248 BILIRUBIN DIRECT: CPT

## 2018-10-22 PROCEDURE — 36415 COLL VENOUS BLD VENIPUNCTURE: CPT

## 2018-10-22 PROCEDURE — 6370000000 HC RX 637 (ALT 250 FOR IP): Performed by: INTERNAL MEDICINE

## 2018-10-22 PROCEDURE — 97116 GAIT TRAINING THERAPY: CPT

## 2018-10-22 PROCEDURE — 2580000003 HC RX 258: Performed by: INTERNAL MEDICINE

## 2018-10-22 PROCEDURE — 2709999900 HC NON-CHARGEABLE SUPPLY

## 2018-10-22 PROCEDURE — 97110 THERAPEUTIC EXERCISES: CPT

## 2018-10-22 PROCEDURE — 80053 COMPREHEN METABOLIC PANEL: CPT

## 2018-10-22 PROCEDURE — 1290000000 HC SEMI PRIVATE OTHER R&B

## 2018-10-22 PROCEDURE — 97530 THERAPEUTIC ACTIVITIES: CPT

## 2018-10-22 PROCEDURE — 6360000002 HC RX W HCPCS: Performed by: INTERNAL MEDICINE

## 2018-10-22 PROCEDURE — 97535 SELF CARE MNGMENT TRAINING: CPT

## 2018-10-22 PROCEDURE — 6370000000 HC RX 637 (ALT 250 FOR IP): Performed by: FAMILY MEDICINE

## 2018-10-22 RX ADMIN — MAGNESIUM GLUCONATE 500 MG ORAL TABLET 400 MG: 500 TABLET ORAL at 08:32

## 2018-10-22 RX ADMIN — APIXABAN 5 MG: 5 TABLET, FILM COATED ORAL at 20:50

## 2018-10-22 RX ADMIN — APIXABAN 5 MG: 5 TABLET, FILM COATED ORAL at 08:33

## 2018-10-22 RX ADMIN — ATORVASTATIN CALCIUM 20 MG: 20 TABLET, FILM COATED ORAL at 20:50

## 2018-10-22 RX ADMIN — DILTIAZEM HYDROCHLORIDE 30 MG: 30 TABLET, FILM COATED ORAL at 11:58

## 2018-10-22 RX ADMIN — DILTIAZEM HYDROCHLORIDE 30 MG: 30 TABLET, FILM COATED ORAL at 05:33

## 2018-10-22 RX ADMIN — MICONAZOLE NITRATE: 2 POWDER TOPICAL at 22:06

## 2018-10-22 RX ADMIN — FUROSEMIDE 20 MG: 20 TABLET ORAL at 08:32

## 2018-10-22 RX ADMIN — DILTIAZEM HYDROCHLORIDE 30 MG: 30 TABLET, FILM COATED ORAL at 23:52

## 2018-10-22 RX ADMIN — ACETAMINOPHEN 650 MG: 325 TABLET ORAL at 19:40

## 2018-10-22 RX ADMIN — ASPIRIN 81 MG: 81 TABLET, COATED ORAL at 08:33

## 2018-10-22 RX ADMIN — METOPROLOL TARTRATE 50 MG: 50 TABLET, FILM COATED ORAL at 08:32

## 2018-10-22 RX ADMIN — SUCRALFATE 1 G: 1 TABLET ORAL at 17:29

## 2018-10-22 RX ADMIN — DILTIAZEM HYDROCHLORIDE 30 MG: 30 TABLET, FILM COATED ORAL at 17:30

## 2018-10-22 RX ADMIN — METOPROLOL TARTRATE 50 MG: 50 TABLET, FILM COATED ORAL at 20:48

## 2018-10-22 RX ADMIN — CARBIDOPA AND LEVODOPA 1 TABLET: 25; 100 TABLET, EXTENDED RELEASE ORAL at 20:51

## 2018-10-22 RX ADMIN — METFORMIN HYDROCHLORIDE 850 MG: 850 TABLET ORAL at 08:33

## 2018-10-22 RX ADMIN — SUCRALFATE 1 G: 1 TABLET ORAL at 11:58

## 2018-10-22 RX ADMIN — SUCRALFATE 1 G: 1 TABLET ORAL at 20:50

## 2018-10-22 RX ADMIN — FAMOTIDINE 20 MG: 20 TABLET ORAL at 08:33

## 2018-10-22 RX ADMIN — CEFTRIAXONE SODIUM 2 G: 2 INJECTION, POWDER, FOR SOLUTION INTRAMUSCULAR; INTRAVENOUS at 11:58

## 2018-10-22 RX ADMIN — METFORMIN HYDROCHLORIDE 850 MG: 850 TABLET ORAL at 17:29

## 2018-10-22 RX ADMIN — MICONAZOLE NITRATE: 2 POWDER TOPICAL at 11:22

## 2018-10-22 RX ADMIN — SUCRALFATE 1 G: 1 TABLET ORAL at 08:32

## 2018-10-22 RX ADMIN — METOPROLOL TARTRATE 50 MG: 50 TABLET, FILM COATED ORAL at 16:51

## 2018-10-22 RX ADMIN — CARBIDOPA AND LEVODOPA 1 TABLET: 25; 100 TABLET, EXTENDED RELEASE ORAL at 08:33

## 2018-10-22 ASSESSMENT — PAIN SCALES - GENERAL
PAINLEVEL_OUTOF10: 5
PAINLEVEL_OUTOF10: 5

## 2018-10-22 ASSESSMENT — PAIN DESCRIPTION - PAIN TYPE: TYPE: ACUTE PAIN

## 2018-10-22 ASSESSMENT — PAIN DESCRIPTION - LOCATION: LOCATION: LEG

## 2018-10-22 NOTE — PATIENT CARE CONFERENCE
established interdisciplinary plan of care within the medical record of Aleisha Bass. Pt's team conference attended (see above). Pt seen on rounds with LSW, to review the results with patient and family. Discussed length of stay as above. Pt's team conference attended (see above.)  Pt seen on rounds with LSW, to review the results with patient and family. Discussed length of stay as above.     Physical Exam:  General:  Alert and oriented  Vitals:   Vitals:    10/23/18 0814   BP: (!) 142/67   Pulse: 81   Resp: 16   Temp: 98 °F (36.7 °C)   SpO2: 90%     Heart:  Regular rate and rhythm  Lungs:  Clear to auscultation  Abdomen:  soft with positive bowel sounds     Assessment:  Progressing in full rehabilitation program (see above)    Plan:  Continue Rehab            Continue current medications            Spent 36 minutes today in patient management and care    Electronically signed by Scott Ro MD on 10/23/2018 at 9:13 AM

## 2018-10-22 NOTE — PLAN OF CARE
Problem: Discharge Planning:  Goal: Participates in care planning  Participates in care planning  Outcome: Ongoing  Palliative care back to unit to meet with pt and her daughter,Orly to clarify code status. Pt current code status is DNR-CC. Pt up in bedside chair, awake and alert, oriented to all spheres. Pt's daughter,Orly, is here. Symptoms reviewed, pt states no pain or discomfort, states that she feels \"a little better\" each day. Advance care planning discussed, including review of LW and DPOA. Pt states that she is awaiting spiritual care to come in and help her change her current DPOA paper work. The 3 different levels of code status explained, including possible complications of resuscitative issues, if needed. All questions answered and pt states that she wants \"comfort\" but her boys think she should at least \"try the paddles\". We discussed different scenarios of pt experiencing cardiac arrest.  We also discussed what pt would want if she developed issues that the physicians would want to do xrays or CT scans for some reason. Pt states that she wants no further curative care than what she has at this point. Conversation concluded with pt and Orly requesting to continue [facility] code status and pt states, \"if the doctors want to order anything new, they have to get permission from me, I mainly want to be comfortable. \"  Pt and Orly state no other questions or needs at this time. Support and encouragement given. Palliative care will continue to follow and assist with advance care planning as appropriate. Secure text sent to Dr Nicholes Boas to update him on above conversation.

## 2018-10-22 NOTE — PROGRESS NOTES
TCU BALANCE TEST:    Balance during to/from sit to stand PT: Contact guard assistance (good hand placement) (10/21/18 1305)  OT: Contact guard assistance (10/21/18 0939)   Balance during walking PT: Contact guard assistance;Stand by assistance (10/21/18 1306)  OT: Contact guard assistance (10/21/18 0939)   Balance during turn-around and while walking PT: Contact guard assistance;Stand by assistance (10/21/18 1306)  OT: Contact guard assistance (10/21/18 0939)   Balance moving on and off toilet Contact guard assistance (10/19/18 0931)   Balance during surface to/from surface transfers     Independent = 0  Modified Independent, Supervision, SBA = 1  CGA, Min Assist, Mod Assist, Max Assist, Dependent = 2  Not Tested/No Response = 8

## 2018-10-22 NOTE — PROGRESS NOTES
[facility]  INPATIENT PHYSICAL THERAPY  DAILY NOTE  [facility] TCU 8E - 8E-61/061-A    Time In: 1665  Time Out: 1614  Timed Code Treatment Minutes: 61 Minutes  Minutes: 59          Date: 10/22/2018  Patient Name: Cailin Meyers,  Gender:  female        MRN: [id number]  : 1932  (80 y.o.)     Referring Practitioner: Hasmukh Ross MD; Attending: Dr. Yong Moses  Diagnosis: UTI  Additional Pertinent Hx: Pt presented to [facility], accompanied by her daughter, due to fall and fever. Pt reports she was in the bathroom last night and had off balance and fell, tried to hold on the sink, landed against the door of the bathroom and hit the back of her head. She denies dizziness, HA, focal weakness, numbness, chest pain, palpitations. She also reports that for past 3 days, she's having intermittent rt lower quadrant abdominal and rt pelvic pain, /10, now 5/10, accompanied by dysuria, urinary frequency and urgency x 3 days. She denies hematuria. Of note, she reports that she has urinary incontinence x 1 year now. Per pt, she was scheduled for \"bladder stimulation\" and supposed to have lexiscan stress test and echo for cardiac clearance. Also, she said that she has recurrent UTI since 2018. Pt also reports low appetite for 2 days, feeling bloated, nauseous, no vomiting. She also reports constant B/L lower back pain x 1 day, 5/10, achy, radiates to rt lower quadrant of abdomen. She also c/o intermittent neck pain x 3 days, 3/10. Daughter reports that she felt that the pt was warm and looks weak this afternoon, hence daughter brought pt to ER. In ER, her UA showed nitrites and large leukocyte esterase. She received 1 dose of Zosyn 3.375 g IV. CT head, CT cervical, cxr and xr pelvis all done, all came back unremarkable.  CBC normal except for mild anemia ( Hgb 11.4, baseline Hgb 12-13) , CMP normal except for hyponatremia, sodium of 128, her sodium in 2018 was 139, Endurance Training    Goals:  Patient goals : To get better and go home. Short term goals  Time Frame for Short term goals: 1 week  Short term goal 1: Pt to transfer supine <--> sit mod I to enable pt to get in/out of bed. Short term goal 2: Pt to transfer sit <--> stand S for increased functional mobility. Short term goal 3: Pt to ambulate >100 feet with RW SBA for household ambulation. Short term goal 4: Pt to improve dynamic standing balance to fair+ to minimize fall risk. Long term goals  Time Frame for Long term goals : 2 weeks  Long term goal 1: Pt to ambulate >150 feet with RW S for household ambulation. Long term goal 2: Pt to perform car transfer SBA to enable pt to get in/out of the car.

## 2018-10-23 LAB
ANION GAP SERPL CALCULATED.3IONS-SCNC: 11 MEQ/L (ref 8–16)
BUN BLDV-MCNC: 8 MG/DL (ref 7–22)
CALCIUM SERPL-MCNC: 9.1 MG/DL (ref 8.5–10.5)
CHLORIDE BLD-SCNC: 96 MEQ/L (ref 98–111)
CO2: 31 MEQ/L (ref 23–33)
CREAT SERPL-MCNC: 0.6 MG/DL (ref 0.4–1.2)
GFR SERPL CREATININE-BSD FRML MDRD: > 90 ML/MIN/1.73M2
GLUCOSE BLD-MCNC: 190 MG/DL (ref 70–108)
POTASSIUM REFLEX MAGNESIUM: 3.7 MEQ/L (ref 3.5–5.2)
SODIUM BLD-SCNC: 138 MEQ/L (ref 135–145)

## 2018-10-23 PROCEDURE — 6360000002 HC RX W HCPCS: Performed by: INTERNAL MEDICINE

## 2018-10-23 PROCEDURE — 6370000000 HC RX 637 (ALT 250 FOR IP): Performed by: INTERNAL MEDICINE

## 2018-10-23 PROCEDURE — 97530 THERAPEUTIC ACTIVITIES: CPT

## 2018-10-23 PROCEDURE — 36415 COLL VENOUS BLD VENIPUNCTURE: CPT

## 2018-10-23 PROCEDURE — 2580000003 HC RX 258: Performed by: INTERNAL MEDICINE

## 2018-10-23 PROCEDURE — 97110 THERAPEUTIC EXERCISES: CPT

## 2018-10-23 PROCEDURE — 6370000000 HC RX 637 (ALT 250 FOR IP): Performed by: FAMILY MEDICINE

## 2018-10-23 PROCEDURE — 1290000000 HC SEMI PRIVATE OTHER R&B

## 2018-10-23 PROCEDURE — 97116 GAIT TRAINING THERAPY: CPT

## 2018-10-23 PROCEDURE — 97535 SELF CARE MNGMENT TRAINING: CPT

## 2018-10-23 PROCEDURE — 80048 BASIC METABOLIC PNL TOTAL CA: CPT

## 2018-10-23 RX ORDER — TROSPIUM CHLORIDE 20 MG/1
20 TABLET, FILM COATED ORAL NIGHTLY
Status: DISCONTINUED | OUTPATIENT
Start: 2018-10-23 | End: 2018-11-03 | Stop reason: HOSPADM

## 2018-10-23 RX ADMIN — DILTIAZEM HYDROCHLORIDE 30 MG: 30 TABLET, FILM COATED ORAL at 12:47

## 2018-10-23 RX ADMIN — FUROSEMIDE 20 MG: 20 TABLET ORAL at 08:36

## 2018-10-23 RX ADMIN — ATORVASTATIN CALCIUM 20 MG: 20 TABLET, FILM COATED ORAL at 20:38

## 2018-10-23 RX ADMIN — APIXABAN 5 MG: 5 TABLET, FILM COATED ORAL at 08:36

## 2018-10-23 RX ADMIN — MICONAZOLE NITRATE: 2 POWDER TOPICAL at 20:39

## 2018-10-23 RX ADMIN — SUCRALFATE 1 G: 1 TABLET ORAL at 16:17

## 2018-10-23 RX ADMIN — METOPROLOL TARTRATE 50 MG: 50 TABLET, FILM COATED ORAL at 08:36

## 2018-10-23 RX ADMIN — CEFTRIAXONE SODIUM 2 G: 2 INJECTION, POWDER, FOR SOLUTION INTRAMUSCULAR; INTRAVENOUS at 13:05

## 2018-10-23 RX ADMIN — METOPROLOL TARTRATE 50 MG: 50 TABLET, FILM COATED ORAL at 12:47

## 2018-10-23 RX ADMIN — SUCRALFATE 1 G: 1 TABLET ORAL at 12:47

## 2018-10-23 RX ADMIN — FAMOTIDINE 20 MG: 20 TABLET ORAL at 08:36

## 2018-10-23 RX ADMIN — APIXABAN 5 MG: 5 TABLET, FILM COATED ORAL at 20:38

## 2018-10-23 RX ADMIN — ASPIRIN 81 MG: 81 TABLET, COATED ORAL at 08:36

## 2018-10-23 RX ADMIN — SUCRALFATE 1 G: 1 TABLET ORAL at 08:36

## 2018-10-23 RX ADMIN — DILTIAZEM HYDROCHLORIDE 30 MG: 30 TABLET, FILM COATED ORAL at 23:59

## 2018-10-23 RX ADMIN — CARBIDOPA AND LEVODOPA 1 TABLET: 25; 100 TABLET, EXTENDED RELEASE ORAL at 20:39

## 2018-10-23 RX ADMIN — MAGNESIUM GLUCONATE 500 MG ORAL TABLET 400 MG: 500 TABLET ORAL at 08:36

## 2018-10-23 RX ADMIN — DILTIAZEM HYDROCHLORIDE 30 MG: 30 TABLET, FILM COATED ORAL at 05:39

## 2018-10-23 RX ADMIN — METFORMIN HYDROCHLORIDE 850 MG: 850 TABLET ORAL at 16:17

## 2018-10-23 RX ADMIN — METOPROLOL TARTRATE 50 MG: 50 TABLET, FILM COATED ORAL at 20:38

## 2018-10-23 RX ADMIN — TROSPIUM CHLORIDE 20 MG: 20 TABLET ORAL at 20:38

## 2018-10-23 RX ADMIN — DILTIAZEM HYDROCHLORIDE 30 MG: 30 TABLET, FILM COATED ORAL at 16:17

## 2018-10-23 RX ADMIN — MICONAZOLE NITRATE: 2 POWDER TOPICAL at 08:37

## 2018-10-23 RX ADMIN — METFORMIN HYDROCHLORIDE 850 MG: 850 TABLET ORAL at 08:36

## 2018-10-23 RX ADMIN — SUCRALFATE 1 G: 1 TABLET ORAL at 20:38

## 2018-10-23 RX ADMIN — CARBIDOPA AND LEVODOPA 1 TABLET: 25; 100 TABLET, EXTENDED RELEASE ORAL at 08:36

## 2018-10-23 ASSESSMENT — PAIN SCALES - GENERAL
PAINLEVEL_OUTOF10: 0
PAINLEVEL_OUTOF10: 4

## 2018-10-23 ASSESSMENT — PAIN DESCRIPTION - FREQUENCY: FREQUENCY: INTERMITTENT

## 2018-10-23 ASSESSMENT — PAIN DESCRIPTION - LOCATION: LOCATION: ABDOMEN

## 2018-10-23 ASSESSMENT — PAIN DESCRIPTION - ORIENTATION: ORIENTATION: LOWER

## 2018-10-23 ASSESSMENT — PAIN DESCRIPTION - PAIN TYPE: TYPE: ACUTE PAIN

## 2018-10-23 ASSESSMENT — PAIN DESCRIPTION - DESCRIPTORS: DESCRIPTORS: PRESSURE

## 2018-10-23 NOTE — PROGRESS NOTES
[facility]  INPATIENT PHYSICAL THERAPY  DAILY NOTE  [facility] T[facility] 8E - 8E-61/061-A    Time In: 6492  Time Out: 1200  Timed Code Treatment Minutes: 55 Minutes  Minutes: 55          Date: 10/23/2018  Patient Name: Annabel Najera,  Gender:  female        MRN: [id number]  : 1932  (80 y.o.)     Referring Practitioner: Katarzyna Webster MD; Attending: Dr. Dionicio Norton  Diagnosis: UTI  Additional Pertinent Hx: Pt presented to [facility], accompanied by her daughter, due to fall and fever. Pt reports she was in the bathroom last night and had off balance and fell, tried to hold on the sink, landed against the door of the bathroom and hit the back of her head. She denies dizziness, HA, focal weakness, numbness, chest pain, palpitations. She also reports that for past 3 days, she's having intermittent rt lower quadrant abdominal and rt pelvic pain, /10, now 5/10, accompanied by dysuria, urinary frequency and urgency x 3 days. She denies hematuria. Of note, she reports that she has urinary incontinence x 1 year now. Per pt, she was scheduled for \"bladder stimulation\" and supposed to have lexiscan stress test and echo for cardiac clearance. Also, she said that she has recurrent UTI since 2018. Pt also reports low appetite for 2 days, feeling bloated, nauseous, no vomiting. She also reports constant B/L lower back pain x 1 day, 5/10, achy, radiates to rt lower quadrant of abdomen. She also c/o intermittent neck pain x 3 days, 3/10. Daughter reports that she felt that the pt was warm and looks weak this afternoon, hence daughter brought pt to ER. In ER, her UA showed nitrites and large leukocyte esterase. She received 1 dose of Zosyn 3.375 g IV. CT head, CT cervical, cxr and xr pelvis all done, all came back unremarkable.  CBC normal except for mild anemia ( Hgb 11.4, baseline Hgb 12-13) , CMP normal except for hyponatremia, sodium of 128, her sodium in 2018 was 139, pericardial effusion w/no obvious tamponade.  JOINT REPLACEMENT      PRE-MALIGNANT / BENIGN SKIN LESION EXCISION Left 12/20/13    left leg lesion excision x2, frozen section, skin graft closure    ROTATOR CUFF REPAIR  09/2001    RIGHT    ROTATOR CUFF REPAIR  04/15/2009    LEFT     SKIN CANCER EXCISION  06/2006      Rt leg       Restrictions/Precautions:  General Precautions, Fall Risk   Other position/activity restrictions: Parkinson's       Prior Level of Function:  ADL Assistance: Independent  Homemaking Assistance: Needs assistance  Ambulation Assistance: Independent  Transfer Assistance: Independent  Additional Comments: Pt amb with rollator, daughter and 2 daughter-in-law assist with transportation, groceries, neighbor assists with trash    Subjective:  Response To Previous Treatment: Patient with no complaints from previous session. Family / Caregiver Present: Yes (dtr. present for partial session. DR. Kenyatta Balderrama and francisco arrived to report to pt. and dtr. team meeting  update)  Comments: pt. very slow moving throughout session, depends change and b le washing needed . pt. was incontinent during amb. from b/s chair-> bathroom   Subjective: Patient  in bedside chair upon arrival. Patient agreeable to therapy. requesting to go to the bathroom prior to amb. Pain: no   .          Social/Functional:  Lives With: Alone  Type of Home: Apartment  Home Layout: One level  Home Access: Level entry  Home Equipment: 4 wheeled walker     Objective:  Scooting: Supervision (scoot to edge of sitting surfaces)    Transfers  Sit to Stand: Stand by assistance  Stand to sit: Contact guard assistance (very slow moving . Toilet transfer with rolling walker ,ETS with cga stand<-> sit, assist needed with depends change )       Ambulation 1  Surface: level tile  Device: Rolling Walker  Assistance: Contact guard assistance;Stand by assistance  Quality of Gait:  close sba with occasional lt. cga.  very slow

## 2018-10-23 NOTE — PLAN OF CARE
Consult to Social Work    Inpatient consult to Cardiology    Inpatient consult to Dietitian    Inpatient consult to Infectious Diseases    Inpatient consult to [address] [place] [address]    Dietary Nutrition Supplements: Diabetic Oral Supplement    Dietary Nutrition Supplements: Other Oral Supplement (see comment)    OT eval and treat    PT eval and treat    Initiate Oxygen Therapy Protocol    POCT Glucose    Fall precautions       Current Medications:  Current Facility-Administered Medications   Medication Dose Route Frequency Provider Last Rate Last Dose    miconazole (MICOTIN) 2 % powder   Topical BID Esa Lauren MD        furosemide (LASIX) tablet 20 mg  20 mg Oral Daily Esa Lauren MD   20 mg at 10/23/18 0836    metFORMIN (GLUCOPHAGE) tablet 850 mg  850 mg Oral BID WC Esa Lauren MD   850 mg at 10/23/18 0836    magnesium hydroxide (MILK OF MAGNESIA) 400 MG/5ML suspension 30 mL  30 mL Oral Daily PRN Saul Beck MD        ondansetron (ZOFRAN) injection 4 mg  4 mg Intravenous Q6H PRN Saul Beck MD        0.9 % sodium chloride infusion   Intravenous Continuous Saul Beck MD 75 mL/hr at 10/22/18 0650      acetaminophen (TYLENOL) tablet 650 mg  650 mg Oral Q4H PRN Saul Beck MD   650 mg at 10/22/18 1940    apixaban (ELIQUIS) tablet 5 mg  5 mg Oral BID Saul Beck MD   5 mg at 10/23/18 0836    aspirin EC tablet 81 mg  81 mg Oral Daily [address], MD   81 mg at 10/23/18 0836    atorvastatin (LIPITOR) tablet 20 mg  20 mg Oral Daily [address], MD   20 mg at 10/22/18 2050    carbidopa-levodopa (SINEMET CR)  MG per extended release tablet 1 tablet  1 tablet Oral BID Saul Call MD   1 tablet at 10/23/18 2113  cefTRIAXone (ROCEPHIN) 2 g IVPB in D5W 50ml minibag  2 g Intravenous Q24H Artie Machado MD   Stopped at 10/22/18 1228    dextrose 5 % solution  100 mL/hr Intravenous PRN Saul Beck MD        diltiazem (CARDIZEM) tablet 30 mg  30 mg Oral 4 times per day [address], MD   30 mg at 10/23/18 0539    famotidine (PEPCID) tablet 20 mg  20 mg Oral Daily [address], MD   20 mg at 10/23/18 0836    magnesium oxide (MAG-OX) tablet 400 mg  400 mg Oral Daily [address], MD   400 mg at 10/23/18 1983    metoprolol tartrate (LOPRESSOR) tablet 50 mg  50 mg Oral  [address], MD   50 mg at 10/23/18 0836    [START ON 10/15/2019] potassium replacement protocol   Other MD Cori        ppd (tuberculin skin test) read   Does not apply Weekly Saul Beck MD        sodium chloride flush 0.9 % injection 10 mL  10 mL Intravenous  Gustavo Coon MD        sucralfate (CARAFATE) tablet 1 g  1 g Oral 4x Daily Saul Beck MD   1 g at 10/23/18 4383    tuberculin injection 5 Units  5 Units Intradermal Weekly Saul Navarrete MD   5 Units at 10/19/18 5468       Plan of Care Problems and Goals (a check in the box indicates current careplan problems and goals):     [x] Problem: Pain    Goal: Pain Level will decrease   [x] Problem: Falls- Risk of    Goal: Absence of falls    Goal: Absence of physical injury   [x] Problem: Serum Glucose Level- Abnormal    Goal: Ability to maintain appropriate glucose levels will improve   [x] Problem: Risk of Impaired Skin Integrity    Goal: Will show no infection signs and symptoms    Goal: Absence of new skin breakdown   [x] Problem: Risk of Bleeding    Goal: Will show no

## 2018-10-24 LAB
ANION GAP SERPL CALCULATED.3IONS-SCNC: 10 MEQ/L (ref 8–16)
BUN BLDV-MCNC: 8 MG/DL (ref 7–22)
CALCIUM SERPL-MCNC: 9.7 MG/DL (ref 8.5–10.5)
CHLORIDE BLD-SCNC: 94 MEQ/L (ref 98–111)
CO2: 31 MEQ/L (ref 23–33)
CREAT SERPL-MCNC: 0.5 MG/DL (ref 0.4–1.2)
GFR SERPL CREATININE-BSD FRML MDRD: > 90 ML/MIN/1.73M2
GLUCOSE BLD-MCNC: 221 MG/DL (ref 70–108)
GLUCOSE BLD-MCNC: 239 MG/DL (ref 70–108)
POTASSIUM REFLEX MAGNESIUM: 4.8 MEQ/L (ref 3.5–5.2)
SODIUM BLD-SCNC: 135 MEQ/L (ref 135–145)

## 2018-10-24 PROCEDURE — 1290000000 HC SEMI PRIVATE OTHER R&B

## 2018-10-24 PROCEDURE — 2580000003 HC RX 258: Performed by: INTERNAL MEDICINE

## 2018-10-24 PROCEDURE — 97530 THERAPEUTIC ACTIVITIES: CPT

## 2018-10-24 PROCEDURE — 80048 BASIC METABOLIC PNL TOTAL CA: CPT

## 2018-10-24 PROCEDURE — 6370000000 HC RX 637 (ALT 250 FOR IP): Performed by: INTERNAL MEDICINE

## 2018-10-24 PROCEDURE — 6370000000 HC RX 637 (ALT 250 FOR IP): Performed by: FAMILY MEDICINE

## 2018-10-24 PROCEDURE — 97110 THERAPEUTIC EXERCISES: CPT

## 2018-10-24 PROCEDURE — 82948 REAGENT STRIP/BLOOD GLUCOSE: CPT

## 2018-10-24 PROCEDURE — 97116 GAIT TRAINING THERAPY: CPT

## 2018-10-24 PROCEDURE — 97535 SELF CARE MNGMENT TRAINING: CPT

## 2018-10-24 PROCEDURE — 2709999900 HC NON-CHARGEABLE SUPPLY

## 2018-10-24 PROCEDURE — 6360000002 HC RX W HCPCS: Performed by: INTERNAL MEDICINE

## 2018-10-24 PROCEDURE — 36415 COLL VENOUS BLD VENIPUNCTURE: CPT

## 2018-10-24 RX ADMIN — TROSPIUM CHLORIDE 20 MG: 20 TABLET ORAL at 20:22

## 2018-10-24 RX ADMIN — METOPROLOL TARTRATE 50 MG: 50 TABLET, FILM COATED ORAL at 14:56

## 2018-10-24 RX ADMIN — MAGNESIUM GLUCONATE 500 MG ORAL TABLET 400 MG: 500 TABLET ORAL at 10:29

## 2018-10-24 RX ADMIN — CARBIDOPA AND LEVODOPA 1 TABLET: 25; 100 TABLET, EXTENDED RELEASE ORAL at 20:22

## 2018-10-24 RX ADMIN — SUCRALFATE 1 G: 1 TABLET ORAL at 10:30

## 2018-10-24 RX ADMIN — SUCRALFATE 1 G: 1 TABLET ORAL at 11:48

## 2018-10-24 RX ADMIN — DILTIAZEM HYDROCHLORIDE 30 MG: 30 TABLET, FILM COATED ORAL at 18:20

## 2018-10-24 RX ADMIN — DILTIAZEM HYDROCHLORIDE 30 MG: 30 TABLET, FILM COATED ORAL at 11:48

## 2018-10-24 RX ADMIN — FAMOTIDINE 20 MG: 20 TABLET ORAL at 10:30

## 2018-10-24 RX ADMIN — FUROSEMIDE 20 MG: 20 TABLET ORAL at 10:29

## 2018-10-24 RX ADMIN — METOPROLOL TARTRATE 50 MG: 50 TABLET, FILM COATED ORAL at 10:30

## 2018-10-24 RX ADMIN — APIXABAN 5 MG: 5 TABLET, FILM COATED ORAL at 20:21

## 2018-10-24 RX ADMIN — CEFTRIAXONE SODIUM 2 G: 2 INJECTION, POWDER, FOR SOLUTION INTRAMUSCULAR; INTRAVENOUS at 11:48

## 2018-10-24 RX ADMIN — MICONAZOLE NITRATE: 2 POWDER TOPICAL at 20:26

## 2018-10-24 RX ADMIN — SUCRALFATE 1 G: 1 TABLET ORAL at 18:20

## 2018-10-24 RX ADMIN — DILTIAZEM HYDROCHLORIDE 30 MG: 30 TABLET, FILM COATED ORAL at 05:23

## 2018-10-24 RX ADMIN — METOPROLOL TARTRATE 50 MG: 50 TABLET, FILM COATED ORAL at 20:22

## 2018-10-24 RX ADMIN — ASPIRIN 81 MG: 81 TABLET, COATED ORAL at 10:30

## 2018-10-24 RX ADMIN — SUCRALFATE 1 G: 1 TABLET ORAL at 20:22

## 2018-10-24 RX ADMIN — APIXABAN 5 MG: 5 TABLET, FILM COATED ORAL at 10:30

## 2018-10-24 RX ADMIN — CARBIDOPA AND LEVODOPA 1 TABLET: 25; 100 TABLET, EXTENDED RELEASE ORAL at 10:30

## 2018-10-24 RX ADMIN — METFORMIN HYDROCHLORIDE 850 MG: 850 TABLET ORAL at 18:20

## 2018-10-24 RX ADMIN — MICONAZOLE NITRATE: 2 POWDER TOPICAL at 10:31

## 2018-10-24 RX ADMIN — METFORMIN HYDROCHLORIDE 850 MG: 850 TABLET ORAL at 10:30

## 2018-10-24 RX ADMIN — ATORVASTATIN CALCIUM 20 MG: 20 TABLET, FILM COATED ORAL at 20:22

## 2018-10-24 ASSESSMENT — PAIN SCALES - GENERAL
PAINLEVEL_OUTOF10: 0
PAINLEVEL_OUTOF10: 4

## 2018-10-24 ASSESSMENT — PAIN DESCRIPTION - ORIENTATION: ORIENTATION: LOWER;RIGHT

## 2018-10-24 ASSESSMENT — PAIN DESCRIPTION - LOCATION: LOCATION: ABDOMEN

## 2018-10-24 ASSESSMENT — PAIN DESCRIPTION - DESCRIPTORS: DESCRIPTORS: PRESSURE

## 2018-10-24 NOTE — PROGRESS NOTES
though she had hyponatremia in 6/2018, sodium of 130. Her blood glucose is also elevated  ( she said her FBS at home usually at 160s). Urine and blood cultures were all sent. Admitted to [facility] 10/18/18. Past Medical History:   Diagnosis Date    Atypical chest pain     CAD (coronary artery disease)     Chronic LBBB (left bundle branch block)     Diabetes mellitus type II     Esophageal stricture     History of esophageal stricture.  FH: CAD (coronary artery disease)     Mom and dad both with heart disease at mid to late age.  GERD (gastroesophageal reflux disease)     History of gastritis     History of skin cancer     Hypercholesterolemia     Hyperlipidemia     Hypertension     Imbalance     As far as imbalance is concerned, asked patient to follow-up with Dr. Cesia Sandy since she follows with him on a regular basis.  Lactose intolerance     Nummular dermatitis     Parkinson's disease ([address]     Restless legs syndrome (RLS)     S/P CABG (coronary artery bypass graft)     SCC (squamous cell carcinoma)      Past Surgical History:   Procedure Laterality Date    CARDIAC SURGERY      CARDIOVASCULAR STRESS TEST  4 09 2009    Gated SPECT images revealed normal global wall motion with EF of 60%. Persantine test associated w/nonspecific symptoms. EKG is nondiagnostic w/baseline LBBB. No obvious stress-induced ischemia by Cardiolite. EF within normal limits.  CARDIOVASCULAR STRESS TEST  7 10 2007    The gated SPECT images revealed normal wall motion with EF of 69%. Poor exercise tolerance w/SOB on exertion. EKG is nondiagnostic. Cardiolite scan revealing no obvious stress-induced ischemia. EF 60%.  CARDIOVASCULAR STRESS TEST  2 03 2006    Fair exercise tolerance w/SOB on exertion. No EKG evidence of ischemia. Patient should be able to proceed with phase II cardiac rehab.      CATARACT REMOVAL      CATARACT REMOVAL  06/08/2016    AND 6/29/16    DIAGNOSTIC CARDIAC CATH LAB

## 2018-10-24 NOTE — PROGRESS NOTES
CARDIAC CATH LAB PROCEDURE  12 23 2005    LV was obtained. AGEE projection showed preserved LV function w/estimated EF at 55%. No significant MR. Patent left main coronary artery. Tortuous LAD which appeared to be patent. Patent left circumflex artery. High-grade stenosis around 99% in very large dominant RCA w/heavy calcification at the ostium. Normal LV function.  DOPPLER ECHOCARDIOGRAPHY  4 09 2009    Global LV function w/in lower limits of normal, with mild anteroseptal hypokinesis. EF of 50-55%. Light LVH. Mild to moderate left atrial enlargement. Calcific aortic and mitral valve w/no obvious stenosis. No obvious pericardial effusion.  DOPPLER ECHOCARDIOGRAPHY  7 10 2007    LV function w/in lower limits of normal w/peridoxical septal wall motion. Moderate left atrial enlargement. Mild MR. Mild TR. Calcified valves w/no obvious stenosis. No pericardial effusion.  DOPPLER ECHOCARDIOGRAPHY  4 14 2006    There appears to be significant improvement from previous echo w/complete resolution of pericardial effusion and normal LV function. EF of 60%.  DOPPLER ECHOCARDIOGRAPHY  3 21 2006    Normal LV function. Mild LV hypertrophy. Mild biatrial enlargement. Mildly calcific aortic and mitral valve w/no stenosis. Mild MR. Mild TR. Minimal residual pericardial effusion w/significant improvement from previous echo.  DOPPLER ECHOCARDIOGRAPHY  3 16 2006    Interval change from previous echocardiogram w/worsening of effusion. Correlation clinically. Consideration of pericardial centesis. Will obtain a [facility] consult.  DOPPLER ECHOCARDIOGRAPHY  1 31 2006    No significant change from previous echo. The 2D echo showed moderate degree of pericardial effusion. It does seem to be worst or better than previous echo. No evidence of tamponade.  DOPPLER ECHOCARDIOGRAPHY  1 27 2006    Normal global LV function. Mild biatrial enlargement. Mildly sclerotic aortic & mitral valve w/no stenosis. Mild MR. Mild TR.  Small assistance  Stand to sit: Stand by assistance  Toilet Transfers  Equipment Used: Raised toilet seat with rails  Toilet Transfer: Contact guard assistance (CGA-SBA x2 trials)    Balance  Sitting Balance: Supervision  Standing Balance: Contact guard assistance         Functional Mobility  Functional - Mobility Device: Rolling Walker  Activity: To/from bathroom (x2 trials)  Assist Level: Contact guard assistance        Activity Tolerance:  Activity Tolerance: Patient Tolerated treatment well    Assessment:     Performance deficits / Impairments: Decreased functional mobility , Decreased ADL status, Decreased safe awareness, Decreased high-level IADLs, Decreased endurance, Decreased balance  Prognosis: Good    Discharge Recommendations:  Discharge Recommendations: Home with Home health OT    Patient Education:  Patient Education: safety with transfers and mobility, ADL strategies    Equipment Recommendations:  Equipment Needed: Yes  Other: Will continue to monitor- may benefit from a reacher    Safety:  Safety Devices in place: Yes  Type of devices: Call light within reach, Chair alarm in place, Gait belt, Left in chair    Plan:  Times per week: 6x  Current Treatment Recommendations: Self-Care / ADL, Endurance Training, Strengthening, Safety Education & Training, Patient/Caregiver Education & Training, Functional Mobility Training, Balance Training    Goals:       Short term goals  Time Frame for Short term goals: 1 week  Short term goal 1: Pt will demonstrate functional mobility walking to/from the bathroom or in the kitchenette around obstacles with SBA to prepare for doing simple homemaking tasks. Marichuy Ratel term goal 2: Pt will complete dynamic standing tasks while using 1-2 handed release technique with SBA to increase her safety and for doing cooking or cleaning tasks. Short term goal 3: Pt will complete LB ADL with Young while using safe technique to increase her independence with self care.    Short term goal 4:

## 2018-10-24 NOTE — PROGRESS NOTES
Range: NONE SEEN  NONE SEEN   Character, Urine Latest Ref Range: CLEAR-SL C  CLOUDY (A)   Urine Culture Reflex Unknown Henderson count: >10. .. Osmolality, Ur Latest Ref Range: 250 - 750 mosmol/kg 363   Sodium, Ur Latest Units: meq/l 33   Specific Gravity, Urine Latest Ref Range: 1.002 - 1.03  1.012         MICRO:   10/16/2018  7:19 AM - Pil[place], [place] Incoming Lab Results From Soft     Component Results     Component Collected Lab   Organism  (Abnormal) 10/14/2018  3:49  [facility]   Escherichia coli     Urine Culture Reflex 10/14/2018  3:49  [facility]   Henderson count: >100,000 CFU/mL    Testing Performed By     Lab - Abbreviation Name Director Address Valid Date Range   [facility] - [address] LAB Dread Jimenez MD [address] [place] [id number] 08/30/17 1255-Present   Narrative     Source: cath urine       Site:           Current Antibiotics: not stated   Culture & Susceptibility     ESCHERICHIA COLI     Antibiotic Interpretation [facility] Unit Status   ampicillin-sulbactam Intermediate =16 mcg/mL Final   cefOXitin Sensitive <=4 mcg/mL Final   cefTRIAXone Sensitive <=1 mcg/mL Final   cefuroxime Intermediate   Final   ciprofloxacin Sensitive =0.094 mcg/mL Final   gentamicin Resistant >=16 mcg/mL Final   nitrofurantoin Sensitive <=16 mcg/mL Final   tetracycline Resistant >=16 mcg/mL Final   tobramycin Sensitive =4 mcg/mL Final   trimethoprim-sulfamethoxazole Resistant >=320 mcg/mL Final            IMAGING:   CT ABDOMEN  Impression   1. Marked heterogeneous enhancement of the right kidney with the right perinephric stranding and edema suggesting pyelonephritis. 2. Heterogeneous enhancement of the spleen of uncertain significance possibly contrast bolus timing issue possibility of an infiltrative process of the spleen is not excludable. 3. Bilateral small pleural effusions and bibasilar atelectasis.    4. Findings of pelvic venous congestion

## 2018-10-25 LAB
ANION GAP SERPL CALCULATED.3IONS-SCNC: 11 MEQ/L (ref 8–16)
BUN BLDV-MCNC: 10 MG/DL (ref 7–22)
CALCIUM SERPL-MCNC: 9.1 MG/DL (ref 8.5–10.5)
CHLORIDE BLD-SCNC: 92 MEQ/L (ref 98–111)
CO2: 29 MEQ/L (ref 23–33)
CREAT SERPL-MCNC: 0.6 MG/DL (ref 0.4–1.2)
ERYTHROCYTE [DISTWIDTH] IN BLOOD BY AUTOMATED COUNT: 14.1 % (ref 11.5–14.5)
ERYTHROCYTE [DISTWIDTH] IN BLOOD BY AUTOMATED COUNT: 43.7 FL (ref 35–45)
GFR SERPL CREATININE-BSD FRML MDRD: > 90 ML/MIN/1.73M2
GLUCOSE BLD-MCNC: 176 MG/DL (ref 70–108)
GLUCOSE BLD-MCNC: 180 MG/DL (ref 70–108)
HCT VFR BLD CALC: 33.7 % (ref 37–47)
HEMOGLOBIN: 11.1 GM/DL (ref 12–16)
MCH RBC QN AUTO: 28 PG (ref 26–33)
MCHC RBC AUTO-ENTMCNC: 32.9 GM/DL (ref 32.2–35.5)
MCV RBC AUTO: 84.9 FL (ref 81–99)
PLATELET # BLD: 339 THOU/MM3 (ref 130–400)
PMV BLD AUTO: 9.3 FL (ref 9.4–12.4)
POTASSIUM REFLEX MAGNESIUM: 4.1 MEQ/L (ref 3.5–5.2)
POTASSIUM SERPL-SCNC: 4.1 MEQ/L (ref 3.5–5.2)
RBC # BLD: 3.97 MILL/MM3 (ref 4.2–5.4)
SODIUM BLD-SCNC: 132 MEQ/L (ref 135–145)
WBC # BLD: 8.5 THOU/MM3 (ref 4.8–10.8)

## 2018-10-25 PROCEDURE — 36415 COLL VENOUS BLD VENIPUNCTURE: CPT

## 2018-10-25 PROCEDURE — 1290000000 HC SEMI PRIVATE OTHER R&B

## 2018-10-25 PROCEDURE — 6370000000 HC RX 637 (ALT 250 FOR IP): Performed by: FAMILY MEDICINE

## 2018-10-25 PROCEDURE — 80048 BASIC METABOLIC PNL TOTAL CA: CPT

## 2018-10-25 PROCEDURE — 82948 REAGENT STRIP/BLOOD GLUCOSE: CPT

## 2018-10-25 PROCEDURE — 97110 THERAPEUTIC EXERCISES: CPT

## 2018-10-25 PROCEDURE — 85027 COMPLETE CBC AUTOMATED: CPT

## 2018-10-25 PROCEDURE — 2580000003 HC RX 258: Performed by: INTERNAL MEDICINE

## 2018-10-25 PROCEDURE — 6370000000 HC RX 637 (ALT 250 FOR IP): Performed by: INTERNAL MEDICINE

## 2018-10-25 PROCEDURE — 2709999900 HC NON-CHARGEABLE SUPPLY

## 2018-10-25 PROCEDURE — 97116 GAIT TRAINING THERAPY: CPT

## 2018-10-25 PROCEDURE — 97535 SELF CARE MNGMENT TRAINING: CPT

## 2018-10-25 PROCEDURE — 97530 THERAPEUTIC ACTIVITIES: CPT

## 2018-10-25 PROCEDURE — 6360000002 HC RX W HCPCS: Performed by: INTERNAL MEDICINE

## 2018-10-25 RX ADMIN — CARBIDOPA AND LEVODOPA 1 TABLET: 25; 100 TABLET, EXTENDED RELEASE ORAL at 08:48

## 2018-10-25 RX ADMIN — ASPIRIN 81 MG: 81 TABLET, COATED ORAL at 08:48

## 2018-10-25 RX ADMIN — APIXABAN 5 MG: 5 TABLET, FILM COATED ORAL at 21:12

## 2018-10-25 RX ADMIN — SUCRALFATE 1 G: 1 TABLET ORAL at 08:48

## 2018-10-25 RX ADMIN — METFORMIN HYDROCHLORIDE 850 MG: 850 TABLET ORAL at 17:53

## 2018-10-25 RX ADMIN — SUCRALFATE 1 G: 1 TABLET ORAL at 21:13

## 2018-10-25 RX ADMIN — METOPROLOL TARTRATE 50 MG: 50 TABLET, FILM COATED ORAL at 21:13

## 2018-10-25 RX ADMIN — ATORVASTATIN CALCIUM 20 MG: 20 TABLET, FILM COATED ORAL at 21:13

## 2018-10-25 RX ADMIN — DILTIAZEM HYDROCHLORIDE 30 MG: 30 TABLET, FILM COATED ORAL at 00:56

## 2018-10-25 RX ADMIN — METOPROLOL TARTRATE 50 MG: 50 TABLET, FILM COATED ORAL at 14:53

## 2018-10-25 RX ADMIN — CEFTRIAXONE SODIUM 2 G: 2 INJECTION, POWDER, FOR SOLUTION INTRAMUSCULAR; INTRAVENOUS at 11:16

## 2018-10-25 RX ADMIN — MICONAZOLE NITRATE: 2 POWDER TOPICAL at 08:53

## 2018-10-25 RX ADMIN — SUCRALFATE 1 G: 1 TABLET ORAL at 17:53

## 2018-10-25 RX ADMIN — FAMOTIDINE 20 MG: 20 TABLET ORAL at 08:48

## 2018-10-25 RX ADMIN — METOPROLOL TARTRATE 50 MG: 50 TABLET, FILM COATED ORAL at 08:48

## 2018-10-25 RX ADMIN — TROSPIUM CHLORIDE 20 MG: 20 TABLET ORAL at 21:13

## 2018-10-25 RX ADMIN — APIXABAN 5 MG: 5 TABLET, FILM COATED ORAL at 08:48

## 2018-10-25 RX ADMIN — CARBIDOPA AND LEVODOPA 1 TABLET: 25; 100 TABLET, EXTENDED RELEASE ORAL at 21:13

## 2018-10-25 RX ADMIN — DILTIAZEM HYDROCHLORIDE 30 MG: 30 TABLET, FILM COATED ORAL at 06:30

## 2018-10-25 RX ADMIN — MAGNESIUM GLUCONATE 500 MG ORAL TABLET 400 MG: 500 TABLET ORAL at 08:48

## 2018-10-25 RX ADMIN — METFORMIN HYDROCHLORIDE 850 MG: 850 TABLET ORAL at 08:49

## 2018-10-25 RX ADMIN — DILTIAZEM HYDROCHLORIDE 30 MG: 30 TABLET, FILM COATED ORAL at 18:13

## 2018-10-25 RX ADMIN — DILTIAZEM HYDROCHLORIDE 30 MG: 30 TABLET, FILM COATED ORAL at 11:20

## 2018-10-25 RX ADMIN — FUROSEMIDE 20 MG: 20 TABLET ORAL at 08:48

## 2018-10-25 RX ADMIN — MICONAZOLE NITRATE: 2 POWDER TOPICAL at 21:23

## 2018-10-25 RX ADMIN — SUCRALFATE 1 G: 1 TABLET ORAL at 13:22

## 2018-10-25 ASSESSMENT — PAIN SCALES - GENERAL
PAINLEVEL_OUTOF10: 0

## 2018-10-25 NOTE — PROGRESS NOTES
Anayeli Rice        MRN: [id number]    : 1932  (80 y.o.)  Gender: female   Principal Problem: Sepsis due to Escherichia coli [facility])    Section J    Health Conditions    Should Pain Assessment Interview be Conducted? Attempt to conduct interview with all residents. If resident is comatose, skip to , Shortness of Breath (dyspnea)   Enter Code  1 0 - No à (resident is rarely/never understood) à Skip to [address] of Breath  1 - Yes à Continue to , Pain Presence   Pain Assessment Interview   Pain Presence   Enter Code  1 Ask resident: Demetrius Warren you had pain or hurting at any time in the last 5 days?   0 - No à Skip to , Shortness of Breath  1 - Yes  à Continue to , Pain Frequency  9 - Unable to answer à Skip to , Shortness of Breath    Pain Frequency   Enter Code          3 Ask resident: Ignacio Evans much of the time have you experienced pain or hurting over the last 5 days?   1 - Almost constantly  2 - Frequently  3 - Occasionally  4 - Rarely  9 - Unable to answer    Pain Effect on Function   Enter Code      0 Ask resident: Arsarahlexy ShellAguilar the last 5 days, has pain made it hard for you to sleep at night?   0 - No   1 - Yes   9 - Unable to answer    Enter Code      0 Ask resident: Jadiel Shellnes the last 5 days, have you limited your day-to-day activities because of pain?   0 - No   1 - Yes   9 - Unable to answer     Pain Intensity   Enter Code      05 A. Numeric Rating Scale (00-10)  Ask resident: Summer Barbour rate your worst pain over the last 5 days on a zero to ten scale, with zero being no pain and ten as the worst pain you can imagine.  (Show resident 00-10 pain scale)  Enter two-digit response. Enter 99 if unable to answer.

## 2018-10-25 NOTE — PROGRESS NOTES
Dynamic: Fair;+       Exercises:  Exercises  Comments: none       Activity Tolerance:  Activity Tolerance: Patient limited by endurance    Assessment: Body structures, Functions, Activity limitations: Decreased functional mobility , Decreased endurance, Decreased balance, Decreased strength  Assessment: The patient continues to make progress with all mobility. She has currently met 3/4 STGs and 1/2 LTGs. Goals will be updated at this time and emphasis will be placed on standing balance and tolerance to activity prior to d/c  Prognosis: Excellent          Discharge Recommendations:  Discharge Recommendations: Continue to assess pending progress    Patient Education:  Patient Education: POC, transfers, gait, standing balance, car, steps    Equipment Recommendations:  Equipment Needed: No (pt. owns a rollator)    Safety:  Type of devices: All fall risk precautions in place, Gait belt, Call light within reach, Nurse notified, Patient at risk for falls, Left in chair  Restraints  Initially in place: No    Plan:  Times per week: 6x  Times per day: Daily  Specific instructions for Next Treatment: B LE strengthening, bed mobility, transfers, gait, standing balance  Current Treatment Recommendations: Strengthening, Neuromuscular Re-education, Home Exercise Program, Safety Education & Training, Balance Training, Functional Mobility Training, Transfer Training, Gait Training, Equipment Evaluation, Education, & procurement, Patient/Caregiver Education & Training, Endurance Training    Goals:  Patient goals : To get better and go home. Short term goals  Time Frame for Short term goals: 1 week  Short term goal 1: Pt to transfer supine <--> sit mod I to enable pt to get in/out of bed. MET  Short term goal 2: Pt to transfer sit <--> stand S for increased functional mobility. NOT MET  Short term goal 3: Pt to ambulate >100 feet with RW SBA for household ambulation.  MET  Short term goal 4: Pt to improve dynamic standing balance to fair+ to minimize fall risk. MET    Long term goals  Time Frame for Long term goals : 2 weeks  Long term goal 1: Pt to ambulate >150 feet with RW S for household ambulation. NOT MET  Long term goal 2: Pt to perform car transfer SBA to enable pt to get in/out of the car. MET    Revised Short-Term Goals:    Short term goals  Time Frame for Short term goals: 1 week  Short term goal 1: Pt to transfer supine <--> sit mod I to enable pt to get in/out of bed. DISCONTINUE  Short term goal 2: Pt to transfer sit <--> stand S for increased functional mobility. DISCONTINUE  Short term goal 3: Pt to ambulate >100 feet with RW SBA for household ambulation. DISCONTINUE  Short term goal 4: Pt to improve dynamic standing balance to fair+ to minimize fall risk. DISCONTINUE      Revised Long-Term Goals  Long term goals  Time Frame for Long term goals : 2 weeks  Long term goal 1: Pt to transfer supine <--> sit mod I to enable pt to get in/out of bed. Long term goal 2: Pt to transfer sit <--> stand at mod I for increased functional mobility. Long term goal 3: Pt to ambulate >200 feet with RW at mod I for household ambulation.    Long term goal 4: Pt to perform car transfer at mod I for safe transport home  Long term goal 5: Pt to negotiate 6 inch platform step with RW at S for home access

## 2018-10-26 LAB
ANION GAP SERPL CALCULATED.3IONS-SCNC: 13 MEQ/L (ref 8–16)
BUN BLDV-MCNC: 12 MG/DL (ref 7–22)
CALCIUM SERPL-MCNC: 9.3 MG/DL (ref 8.5–10.5)
CHLORIDE BLD-SCNC: 93 MEQ/L (ref 98–111)
CO2: 30 MEQ/L (ref 23–33)
CREAT SERPL-MCNC: 0.6 MG/DL (ref 0.4–1.2)
GFR SERPL CREATININE-BSD FRML MDRD: > 90 ML/MIN/1.73M2
GLUCOSE BLD-MCNC: 161 MG/DL (ref 70–108)
GLUCOSE BLD-MCNC: 204 MG/DL (ref 70–108)
POTASSIUM REFLEX MAGNESIUM: 4.4 MEQ/L (ref 3.5–5.2)
SODIUM BLD-SCNC: 136 MEQ/L (ref 135–145)

## 2018-10-26 PROCEDURE — 97110 THERAPEUTIC EXERCISES: CPT

## 2018-10-26 PROCEDURE — 97530 THERAPEUTIC ACTIVITIES: CPT

## 2018-10-26 PROCEDURE — 2580000003 HC RX 258: Performed by: INTERNAL MEDICINE

## 2018-10-26 PROCEDURE — 2709999900 HC NON-CHARGEABLE SUPPLY

## 2018-10-26 PROCEDURE — 6370000000 HC RX 637 (ALT 250 FOR IP): Performed by: INTERNAL MEDICINE

## 2018-10-26 PROCEDURE — 97116 GAIT TRAINING THERAPY: CPT

## 2018-10-26 PROCEDURE — 6360000002 HC RX W HCPCS: Performed by: INTERNAL MEDICINE

## 2018-10-26 PROCEDURE — 82948 REAGENT STRIP/BLOOD GLUCOSE: CPT

## 2018-10-26 PROCEDURE — 97535 SELF CARE MNGMENT TRAINING: CPT

## 2018-10-26 PROCEDURE — 1290000000 HC SEMI PRIVATE OTHER R&B

## 2018-10-26 PROCEDURE — 6370000000 HC RX 637 (ALT 250 FOR IP): Performed by: FAMILY MEDICINE

## 2018-10-26 PROCEDURE — 36415 COLL VENOUS BLD VENIPUNCTURE: CPT

## 2018-10-26 PROCEDURE — 80048 BASIC METABOLIC PNL TOTAL CA: CPT

## 2018-10-26 RX ADMIN — CEFTRIAXONE SODIUM 2 G: 2 INJECTION, POWDER, FOR SOLUTION INTRAMUSCULAR; INTRAVENOUS at 12:00

## 2018-10-26 RX ADMIN — METOPROLOL TARTRATE 50 MG: 50 TABLET, FILM COATED ORAL at 09:55

## 2018-10-26 RX ADMIN — DILTIAZEM HYDROCHLORIDE 30 MG: 30 TABLET, FILM COATED ORAL at 06:31

## 2018-10-26 RX ADMIN — METOPROLOL TARTRATE 50 MG: 50 TABLET, FILM COATED ORAL at 20:26

## 2018-10-26 RX ADMIN — SUCRALFATE 1 G: 1 TABLET ORAL at 20:28

## 2018-10-26 RX ADMIN — SUCRALFATE 1 G: 1 TABLET ORAL at 09:55

## 2018-10-26 RX ADMIN — SUCRALFATE 1 G: 1 TABLET ORAL at 13:09

## 2018-10-26 RX ADMIN — APIXABAN 5 MG: 5 TABLET, FILM COATED ORAL at 20:26

## 2018-10-26 RX ADMIN — MAGNESIUM GLUCONATE 500 MG ORAL TABLET 400 MG: 500 TABLET ORAL at 09:55

## 2018-10-26 RX ADMIN — CARBIDOPA AND LEVODOPA 1 TABLET: 25; 100 TABLET, EXTENDED RELEASE ORAL at 09:54

## 2018-10-26 RX ADMIN — METFORMIN HYDROCHLORIDE 850 MG: 850 TABLET ORAL at 09:54

## 2018-10-26 RX ADMIN — APIXABAN 5 MG: 5 TABLET, FILM COATED ORAL at 09:54

## 2018-10-26 RX ADMIN — MICONAZOLE NITRATE: 2 POWDER TOPICAL at 09:57

## 2018-10-26 RX ADMIN — ASPIRIN 81 MG: 81 TABLET, COATED ORAL at 09:54

## 2018-10-26 RX ADMIN — METOPROLOL TARTRATE 50 MG: 50 TABLET, FILM COATED ORAL at 13:09

## 2018-10-26 RX ADMIN — TROSPIUM CHLORIDE 20 MG: 20 TABLET ORAL at 20:26

## 2018-10-26 RX ADMIN — CARBIDOPA AND LEVODOPA 1 TABLET: 25; 100 TABLET, EXTENDED RELEASE ORAL at 20:26

## 2018-10-26 RX ADMIN — MICONAZOLE NITRATE: 2 POWDER TOPICAL at 20:28

## 2018-10-26 RX ADMIN — METFORMIN HYDROCHLORIDE 850 MG: 850 TABLET ORAL at 17:30

## 2018-10-26 RX ADMIN — DILTIAZEM HYDROCHLORIDE 30 MG: 30 TABLET, FILM COATED ORAL at 12:48

## 2018-10-26 RX ADMIN — SUCRALFATE 1 G: 1 TABLET ORAL at 17:30

## 2018-10-26 RX ADMIN — DILTIAZEM HYDROCHLORIDE 30 MG: 30 TABLET, FILM COATED ORAL at 17:30

## 2018-10-26 RX ADMIN — ATORVASTATIN CALCIUM 20 MG: 20 TABLET, FILM COATED ORAL at 20:26

## 2018-10-26 RX ADMIN — FUROSEMIDE 20 MG: 20 TABLET ORAL at 09:55

## 2018-10-26 RX ADMIN — FAMOTIDINE 20 MG: 20 TABLET ORAL at 09:54

## 2018-10-26 ASSESSMENT — PAIN SCALES - GENERAL
PAINLEVEL_OUTOF10: 0

## 2018-10-26 NOTE — PROGRESS NOTES
CARDIAC CATH LAB PROCEDURE  12 23 2005    LV was obtained. AGEE projection showed preserved LV function w/estimated EF at 55%. No significant MR. Patent left main coronary artery. Tortuous LAD which appeared to be patent. Patent left circumflex artery. High-grade stenosis around 99% in very large dominant RCA w/heavy calcification at the ostium. Normal LV function.  DOPPLER ECHOCARDIOGRAPHY  4 09 2009    Global LV function w/in lower limits of normal, with mild anteroseptal hypokinesis. EF of 50-55%. Light LVH. Mild to moderate left atrial enlargement. Calcific aortic and mitral valve w/no obvious stenosis. No obvious pericardial effusion.  DOPPLER ECHOCARDIOGRAPHY  7 10 2007    LV function w/in lower limits of normal w/peridoxical septal wall motion. Moderate left atrial enlargement. Mild MR. Mild TR. Calcified valves w/no obvious stenosis. No pericardial effusion.  DOPPLER ECHOCARDIOGRAPHY  4 14 2006    There appears to be significant improvement from previous echo w/complete resolution of pericardial effusion and normal LV function. EF of 60%.  DOPPLER ECHOCARDIOGRAPHY  3 21 2006    Normal LV function. Mild LV hypertrophy. Mild biatrial enlargement. Mildly calcific aortic and mitral valve w/no stenosis. Mild MR. Mild TR. Minimal residual pericardial effusion w/significant improvement from previous echo.  DOPPLER ECHOCARDIOGRAPHY  3 16 2006    Interval change from previous echocardiogram w/worsening of effusion. Correlation clinically. Consideration of pericardial centesis. Will obtain a [facility] consult.  DOPPLER ECHOCARDIOGRAPHY  1 31 2006    No significant change from previous echo. The 2D echo showed moderate degree of pericardial effusion. It does seem to be worst or better than previous echo. No evidence of tamponade.  DOPPLER ECHOCARDIOGRAPHY  1 27 2006    Normal global LV function. Mild biatrial enlargement. Mildly sclerotic aortic & mitral valve w/no stenosis. Mild MR. Mild TR.  Small to moderate pericardial effusion w/no obvious tamponade.  JOINT REPLACEMENT      PRE-MALIGNANT / BENIGN SKIN LESION EXCISION Left 12/20/13    left leg lesion excision x2, frozen section, skin graft closure    ROTATOR CUFF REPAIR  09/2001    RIGHT    ROTATOR CUFF REPAIR  04/15/2009    LEFT     SKIN CANCER EXCISION  06/2006      Rt leg       Restrictions/Precautions:  General Precautions, Fall Risk                    Other position/activity restrictions: Parkinson's       Prior Level of Function:  ADL Assistance: Independent  Homemaking Assistance: Needs assistance  Ambulation Assistance: Independent  Transfer Assistance: Independent  Additional Comments: Pt amb with rollator, daughter and 2 daughter-in-law assist with transportation, groceries, neighbor assists with trash    Subjective       Subjective: Pt in recliner and agreeable to OT. pleasant    Overall Orientation Status: Within Normal Limits         Pain:  Pain Assessment  Patient Currently in Pain: Denies       Objective  Overall Cognitive Status: WFL           ADL  Grooming: Stand by assistance (at sink X 5 minutes for oral care and to wash hands)  LE Dressing: Stand by assistance (to don shoes and change depends increased time needed )  Toileting: Stand by assistance (clothing managment and hygiene standing after BM)               Transfers  Sit to stand: Stand by assistance  Stand to sit: Stand by assistance  Toilet Transfers  Toilet - Technique: Ambulating  Equipment Used: Raised toilet seat with rails  Toilet Transfer: Stand by assistance    Balance  Sitting Balance: Supervision  Standing Balance: Stand by assistance     Time: 5 minutes during ADLs  Comment: good safety awareness      Functional Mobility  Functional - Mobility Device: Rolling Walker  Activity: To/from bathroom; Other  Assist Level: Stand by assistance  Functional Mobility Comments: down hallway needing seated rest break X 4 events, very slow pace no LOB, fatigued after.

## 2018-10-26 NOTE — PROGRESS NOTES
INFECTIOUS DISEASES  PROGRESS NOTE    Pt Name: Cailin Meyers  MRN: [id number]  [id number]  YOB: 1932  Admit Date: 10/18/2018  7:28 PM  Date of evaluation: 10/26/2018  Primary Care Physician: Deion Porter MD   [id number]/069-A   80year-old female with  history of problems with urinary incontinence and essentially has been  having recurrent UTIs for which she has been seeing urological service  on an outpatient basis. The patient had past infections with urine  having klebsiella and otherwise had to be admitted to [address] [place] [address] after  getting more confused around 10/14/2018 and the patient is evaluated  by urological service and also, she has GYN evaluation pending. The  patient is being tested by cardiac service for further possible  surgical intervention. The patient has less confusion since  admission. She has been started on IV Zosyn and Infectious Disease  evaluation requested for further options of care. The patient's  family does give history of some parkinsonism. Blood culture 10/14/18 showed e.coli     Subjective:    Interval History:  Feeling better  No new complaint    Active ATBs: zosyn 10/14 - 10/18                        Rocephin 10/18    Pt/Chart review:  Fever No, DiarrheaNo, Dyspnea No, Nausea:None      Data:   Scheduled Meds:   trospium  20 mg Oral Nightly    miconazole   Topical BID    furosemide  20 mg Oral Daily    metFORMIN  850 mg Oral BID WC    apixaban  5 mg Oral BID    aspirin  81 mg Oral Daily    atorvastatin  20 mg Oral Daily    carbidopa-levodopa  1 tablet Oral BID    cefTRIAXone (ROCEPHIN) IV  2 g Intravenous Q24H    diltiazem  30 mg Oral 4 times per day    famotidine  20 mg Oral Daily    magnesium oxide  400 mg Oral Daily    metoprolol tartrate  50 mg Oral TID    [START ON 10/15/2019] potassium replacement protocol   Other RX Placeholder    ppd   Does not apply Weekly    sucralfate  1 g Oral 4x Daily    tuberculin  5 Units Intradermal Weekly     Continuous

## 2018-10-26 NOTE — PLAN OF CARE
Problem: Infection - Central Venous Catheter-Associated Bloodstream Infection:  Goal: Will show no infection signs and symptoms  Will show no infection signs and symptoms   Outcome: Ongoing  Surgical site will be assessed every shift for warmth to site, redness, pain or drainage. Problem: Pain:  Goal: Pain level will decrease  Pain level will decrease   Outcome: Ongoing  Reposition frequently to alleviate pain. Problem: Risk for Impaired Skin Integrity  Goal: Tissue integrity - skin and mucous membranes  Structural intactness and normal physiological function of skin and  mucous membranes. Outcome: Ongoing  Skin assessment every shift. Problem: Mobility - Impaired:  Goal: Mobility will improve  Mobility will improve   Outcome: Ongoing  Pt will participate in PT daily as directed to increase mobility. Problem: Serum Glucose Level - Abnormal:  Goal: Ability to maintain appropriate glucose levels will improve  Ability to maintain appropriate glucose levels will improve   Outcome: Ongoing  Follow dietary guidelines    Problem: Urinary Elimination:  Goal: Signs and symptoms of infection will decrease  Signs and symptoms of infection will decrease   Outcome: Ongoing  Offer toileting when rounding.

## 2018-10-26 NOTE — PROGRESS NOTES
pericardial effusion w/no obvious tamponade.  JOINT REPLACEMENT      PRE-MALIGNANT / BENIGN SKIN LESION EXCISION Left 12/20/13    left leg lesion excision x2, frozen section, skin graft closure    ROTATOR CUFF REPAIR  09/2001    RIGHT    ROTATOR CUFF REPAIR  04/15/2009    LEFT     SKIN CANCER EXCISION  06/2006      Rt leg       Restrictions/Precautions:  General Precautions, Fall Risk      Other position/activity restrictions: Parkinson's       Prior Level of Function:  ADL Assistance: Independent  Homemaking Assistance: Needs assistance  Ambulation Assistance: Independent  Transfer Assistance: Independent  Additional Comments: Pt amb with rollator, daughter and 2 daughter-in-law assist with transportation, groceries, neighbor assists with trash    Subjective:  Response To Previous Treatment: Patient with no complaints from previous session. Family / Caregiver Present: Yes (dtr's present in room . did not observe gym activity )  Comments: pt. very slow moving throughout session,  pt. reported slight dizziness  while completing standing ther. ex.     occasional short seated rests needed   Subjective: patient in chair on arrival, pleasant and agrees to therapy. Pain: no   .          Social/Functional:  Lives With: Alone  Type of Home: Apartment  Home Layout: One level  Home Access: Level entry  Home Equipment: 4 wheeled walker     Objective:  Scooting: Modified independent (scoot to edge of sitting surfaces)    Transfers  Sit to Stand: Stand by assistance  Stand to sit: Stand by assistance       Ambulation 1  Surface: level tile  Device: Rolling Walker  Assistance: Stand by assistance  Quality of Gait:  very slow pace, step through pattern with decreased step length B, slight forward flexed trunk, steady  Distance: 200' x1, 100' x 1  Comments:  and no standing rests needed with each bout of amb.  this am    Exercises:  Exercises  Comments:  15 reps each b le standing heelraises, marching, hip abd/add, partial squats, ham curls,str. leg hip flexion with b ue support with sba.  standing with b ue support  on  //  bars 15 reps each alternating b le 4\" step-ups with sba     Activity Tolerance:  Activity Tolerance: Patient Tolerated treatment well  Activity Tolerance: activity completed slow pace. pt motivated to get stronger and independent for going home alone   Discharge Recommendations:  Discharge Recommendations: Continue to assess pending progress    Patient Education:  Patient Education: POC, transfers, gait, standing balance, car, steps    Equipment Recommendations:  Equipment Needed: No (pt. owns a rollator)    Safety:  Type of devices: All fall risk precautions in place, Patient at risk for falls, Gait belt, Call light within reach, Nurse notified (pt. requesting to sit on toilet , call light on lap, dtr's outside of room(standing at door) and informed of pt's location)  Restraints  Initially in place: No    Plan:  Times per week: 6x  Times per day: Daily  Specific instructions for Next Treatment: B LE strengthening, bed mobility, transfers, gait, standing balance  Current Treatment Recommendations: Strengthening, Neuromuscular Re-education, Home Exercise Program, Safety Education & Training, Balance Training, Functional Mobility Training, Transfer Training, Gait Training, Equipment Evaluation, Education, & procurement, Patient/Caregiver Education & Training, Endurance Training    Goals:  Patient goals : To get better and go home. Short term goals  Time Frame for Short term goals: 1 week  Short term goal 1: Pt to transfer supine <--> sit mod I to enable pt to get in/out of bed. DISCONTINUE  Short term goal 2: Pt to transfer sit <--> stand S for increased functional mobility. DISCONTINUE  Short term goal 3: Pt to ambulate >100 feet with RW SBA for household ambulation. DISCONTINUE  Short term goal 4: Pt to improve dynamic standing balance to fair+ to minimize fall risk.  DISCONTINUE    Long term

## 2018-10-27 LAB
ANION GAP SERPL CALCULATED.3IONS-SCNC: 11 MEQ/L (ref 8–16)
BUN BLDV-MCNC: 12 MG/DL (ref 7–22)
CALCIUM SERPL-MCNC: 9.5 MG/DL (ref 8.5–10.5)
CHLORIDE BLD-SCNC: 94 MEQ/L (ref 98–111)
CO2: 30 MEQ/L (ref 23–33)
CREAT SERPL-MCNC: 0.6 MG/DL (ref 0.4–1.2)
GFR SERPL CREATININE-BSD FRML MDRD: > 90 ML/MIN/1.73M2
GLUCOSE BLD-MCNC: 177 MG/DL (ref 70–108)
GLUCOSE BLD-MCNC: 189 MG/DL (ref 70–108)
POTASSIUM REFLEX MAGNESIUM: 5 MEQ/L (ref 3.5–5.2)
SODIUM BLD-SCNC: 135 MEQ/L (ref 135–145)

## 2018-10-27 PROCEDURE — 82948 REAGENT STRIP/BLOOD GLUCOSE: CPT

## 2018-10-27 PROCEDURE — 6370000000 HC RX 637 (ALT 250 FOR IP): Performed by: INTERNAL MEDICINE

## 2018-10-27 PROCEDURE — 6370000000 HC RX 637 (ALT 250 FOR IP): Performed by: FAMILY MEDICINE

## 2018-10-27 PROCEDURE — 2709999900 HC NON-CHARGEABLE SUPPLY

## 2018-10-27 PROCEDURE — 36415 COLL VENOUS BLD VENIPUNCTURE: CPT

## 2018-10-27 PROCEDURE — 80048 BASIC METABOLIC PNL TOTAL CA: CPT

## 2018-10-27 PROCEDURE — 2580000003 HC RX 258: Performed by: INTERNAL MEDICINE

## 2018-10-27 PROCEDURE — 6360000002 HC RX W HCPCS: Performed by: INTERNAL MEDICINE

## 2018-10-27 PROCEDURE — 1290000000 HC SEMI PRIVATE OTHER R&B

## 2018-10-27 RX ADMIN — MICONAZOLE NITRATE: 2 POWDER TOPICAL at 21:57

## 2018-10-27 RX ADMIN — DILTIAZEM HYDROCHLORIDE 30 MG: 30 TABLET, FILM COATED ORAL at 00:56

## 2018-10-27 RX ADMIN — DILTIAZEM HYDROCHLORIDE 30 MG: 30 TABLET, FILM COATED ORAL at 05:44

## 2018-10-27 RX ADMIN — ATORVASTATIN CALCIUM 20 MG: 20 TABLET, FILM COATED ORAL at 21:56

## 2018-10-27 RX ADMIN — METOPROLOL TARTRATE 50 MG: 50 TABLET, FILM COATED ORAL at 09:46

## 2018-10-27 RX ADMIN — SUCRALFATE 1 G: 1 TABLET ORAL at 13:56

## 2018-10-27 RX ADMIN — METFORMIN HYDROCHLORIDE 850 MG: 850 TABLET ORAL at 08:15

## 2018-10-27 RX ADMIN — APIXABAN 5 MG: 5 TABLET, FILM COATED ORAL at 09:45

## 2018-10-27 RX ADMIN — TROSPIUM CHLORIDE 20 MG: 20 TABLET ORAL at 21:56

## 2018-10-27 RX ADMIN — MAGNESIUM GLUCONATE 500 MG ORAL TABLET 400 MG: 500 TABLET ORAL at 09:45

## 2018-10-27 RX ADMIN — FAMOTIDINE 20 MG: 20 TABLET ORAL at 09:45

## 2018-10-27 RX ADMIN — APIXABAN 5 MG: 5 TABLET, FILM COATED ORAL at 21:56

## 2018-10-27 RX ADMIN — METOPROLOL TARTRATE 50 MG: 50 TABLET, FILM COATED ORAL at 14:00

## 2018-10-27 RX ADMIN — ASPIRIN 81 MG: 81 TABLET, COATED ORAL at 09:45

## 2018-10-27 RX ADMIN — SUCRALFATE 1 G: 1 TABLET ORAL at 09:45

## 2018-10-27 RX ADMIN — CARBIDOPA AND LEVODOPA 1 TABLET: 25; 100 TABLET, EXTENDED RELEASE ORAL at 21:56

## 2018-10-27 RX ADMIN — DILTIAZEM HYDROCHLORIDE 30 MG: 30 TABLET, FILM COATED ORAL at 18:28

## 2018-10-27 RX ADMIN — FUROSEMIDE 20 MG: 20 TABLET ORAL at 09:45

## 2018-10-27 RX ADMIN — CARBIDOPA AND LEVODOPA 1 TABLET: 25; 100 TABLET, EXTENDED RELEASE ORAL at 09:45

## 2018-10-27 RX ADMIN — METOPROLOL TARTRATE 50 MG: 50 TABLET, FILM COATED ORAL at 21:56

## 2018-10-27 RX ADMIN — METFORMIN HYDROCHLORIDE 850 MG: 850 TABLET ORAL at 17:25

## 2018-10-27 RX ADMIN — DILTIAZEM HYDROCHLORIDE 30 MG: 30 TABLET, FILM COATED ORAL at 23:34

## 2018-10-27 RX ADMIN — CEFTRIAXONE SODIUM 2 G: 2 INJECTION, POWDER, FOR SOLUTION INTRAMUSCULAR; INTRAVENOUS at 12:00

## 2018-10-27 RX ADMIN — SUCRALFATE 1 G: 1 TABLET ORAL at 17:25

## 2018-10-27 RX ADMIN — MICONAZOLE NITRATE: 2 POWDER TOPICAL at 09:46

## 2018-10-27 RX ADMIN — SUCRALFATE 1 G: 1 TABLET ORAL at 21:56

## 2018-10-27 ASSESSMENT — PAIN SCALES - GENERAL
PAINLEVEL_OUTOF10: 0
PAINLEVEL_OUTOF10: 0

## 2018-10-27 NOTE — PROGRESS NOTES
INFECTIOUS DISEASES  PROGRESS NOTE    Pt Name: Cailin Meyers  MRN: [id number]  [id number]  YOB: 1932  Admit Date: 10/18/2018  7:28 PM  Date of evaluation: 10/27/2018  Primary Care Physician: Deion Porter MD   [id number]   80year-old female with  history of problems with urinary incontinence and essentially has been  having recurrent UTIs for which she has been seeing urological service  on an outpatient basis. The patient had past infections with urine  having klebsiella and otherwise had to be admitted to [address] [place] [address] after  getting more confused around 10/14/2018 and the patient is evaluated  by urological service and also, she has GYN evaluation pending. The  patient is being tested by cardiac service for further possible  surgical intervention. The patient has less confusion since  admission. She has been started on IV Zosyn and Infectious Disease  evaluation requested for further options of care. The patient's  family does give history of some parkinsonism. Blood culture 10/14/18 showed e.coli     Subjective: Interval History:  Feeling better  No new complaint except right arm upper erythema proximal to picc line dressing . Gerard Lara  Mildly tender     Active ATBs: zosyn 10/14 - 10/18                        Rocephin 10/18    Pt/Chart review:  Fever No, DiarrheaNo, Dyspnea No, Nausea:None      Data:   Scheduled Meds:   trospium  20 mg Oral Nightly    miconazole   Topical BID    furosemide  20 mg Oral Daily    metFORMIN  850 mg Oral BID WC    apixaban  5 mg Oral BID    aspirin  81 mg Oral Daily    atorvastatin  20 mg Oral Daily    carbidopa-levodopa  1 tablet Oral BID    cefTRIAXone (ROCEPHIN) IV  2 g Intravenous Q24H    diltiazem  30 mg Oral 4 times per day    famotidine  20 mg Oral Daily    magnesium oxide  400 mg Oral Daily    metoprolol tartrate  50 mg Oral TID    [START ON 10/15/2019] potassium replacement protocol   Other RX Placeholder    ppd   Does not apply Weekly    infectious disease issues addressed. Will follow the patient closely with you. Also please see the orders for updated patient care orders written by ID team.    Thank you for involving us in this patient's care. I will be discussing this case with the treating physicians. Please don't hesitate to call me if any questions, issues  or concerns      Please see orders for updated patient care orders written today.   Electronically signed by Micaela Hamilton on 10/27/2018 at 11:44 AM

## 2018-10-28 LAB
ANION GAP SERPL CALCULATED.3IONS-SCNC: 10 MEQ/L (ref 8–16)
BUN BLDV-MCNC: 15 MG/DL (ref 7–22)
CALCIUM SERPL-MCNC: 9.4 MG/DL (ref 8.5–10.5)
CHLORIDE BLD-SCNC: 96 MEQ/L (ref 98–111)
CO2: 30 MEQ/L (ref 23–33)
CREAT SERPL-MCNC: 0.6 MG/DL (ref 0.4–1.2)
GFR SERPL CREATININE-BSD FRML MDRD: > 90 ML/MIN/1.73M2
GLUCOSE BLD-MCNC: 157 MG/DL (ref 70–108)
GLUCOSE BLD-MCNC: 172 MG/DL (ref 70–108)
POTASSIUM REFLEX MAGNESIUM: 5 MEQ/L (ref 3.5–5.2)
SODIUM BLD-SCNC: 136 MEQ/L (ref 135–145)

## 2018-10-28 PROCEDURE — 36415 COLL VENOUS BLD VENIPUNCTURE: CPT

## 2018-10-28 PROCEDURE — 97530 THERAPEUTIC ACTIVITIES: CPT

## 2018-10-28 PROCEDURE — 97110 THERAPEUTIC EXERCISES: CPT

## 2018-10-28 PROCEDURE — 6360000002 HC RX W HCPCS: Performed by: INTERNAL MEDICINE

## 2018-10-28 PROCEDURE — 82948 REAGENT STRIP/BLOOD GLUCOSE: CPT

## 2018-10-28 PROCEDURE — 80048 BASIC METABOLIC PNL TOTAL CA: CPT

## 2018-10-28 PROCEDURE — 2500000003 HC RX 250 WO HCPCS: Performed by: INTERNAL MEDICINE

## 2018-10-28 PROCEDURE — 1290000000 HC SEMI PRIVATE OTHER R&B

## 2018-10-28 PROCEDURE — 2580000003 HC RX 258: Performed by: INTERNAL MEDICINE

## 2018-10-28 PROCEDURE — 6370000000 HC RX 637 (ALT 250 FOR IP): Performed by: FAMILY MEDICINE

## 2018-10-28 PROCEDURE — 97535 SELF CARE MNGMENT TRAINING: CPT

## 2018-10-28 PROCEDURE — 97116 GAIT TRAINING THERAPY: CPT

## 2018-10-28 PROCEDURE — 6370000000 HC RX 637 (ALT 250 FOR IP): Performed by: INTERNAL MEDICINE

## 2018-10-28 PROCEDURE — 2709999900 HC NON-CHARGEABLE SUPPLY

## 2018-10-28 RX ORDER — PIOGLITAZONEHYDROCHLORIDE 15 MG/1
15 TABLET ORAL DAILY
Status: DISCONTINUED | OUTPATIENT
Start: 2018-10-28 | End: 2018-11-03 | Stop reason: HOSPADM

## 2018-10-28 RX ADMIN — METOPROLOL TARTRATE 50 MG: 50 TABLET, FILM COATED ORAL at 08:53

## 2018-10-28 RX ADMIN — ATORVASTATIN CALCIUM 20 MG: 20 TABLET, FILM COATED ORAL at 21:26

## 2018-10-28 RX ADMIN — TROSPIUM CHLORIDE 20 MG: 20 TABLET ORAL at 21:26

## 2018-10-28 RX ADMIN — DILTIAZEM HYDROCHLORIDE 30 MG: 30 TABLET, FILM COATED ORAL at 23:49

## 2018-10-28 RX ADMIN — PIOGLITAZONE 15 MG: 15 TABLET ORAL at 16:27

## 2018-10-28 RX ADMIN — CARBIDOPA AND LEVODOPA 1 TABLET: 25; 100 TABLET, EXTENDED RELEASE ORAL at 08:51

## 2018-10-28 RX ADMIN — SUCRALFATE 1 G: 1 TABLET ORAL at 13:47

## 2018-10-28 RX ADMIN — FUROSEMIDE 20 MG: 20 TABLET ORAL at 08:52

## 2018-10-28 RX ADMIN — METFORMIN HYDROCHLORIDE 850 MG: 850 TABLET ORAL at 16:27

## 2018-10-28 RX ADMIN — SUCRALFATE 1 G: 1 TABLET ORAL at 16:27

## 2018-10-28 RX ADMIN — CARBIDOPA AND LEVODOPA 1 TABLET: 25; 100 TABLET, EXTENDED RELEASE ORAL at 21:26

## 2018-10-28 RX ADMIN — CEFTRIAXONE SODIUM 2 G: 2 INJECTION, POWDER, FOR SOLUTION INTRAMUSCULAR; INTRAVENOUS at 11:50

## 2018-10-28 RX ADMIN — FAMOTIDINE 20 MG: 20 TABLET ORAL at 08:52

## 2018-10-28 RX ADMIN — MAGNESIUM GLUCONATE 500 MG ORAL TABLET 400 MG: 500 TABLET ORAL at 08:51

## 2018-10-28 RX ADMIN — METOPROLOL TARTRATE 50 MG: 50 TABLET, FILM COATED ORAL at 21:26

## 2018-10-28 RX ADMIN — MICONAZOLE NITRATE: 2 POWDER TOPICAL at 21:26

## 2018-10-28 RX ADMIN — Medication 5 UNITS: at 16:32

## 2018-10-28 RX ADMIN — ASPIRIN 81 MG: 81 TABLET, COATED ORAL at 08:53

## 2018-10-28 RX ADMIN — SUCRALFATE 1 G: 1 TABLET ORAL at 08:52

## 2018-10-28 RX ADMIN — APIXABAN 5 MG: 5 TABLET, FILM COATED ORAL at 08:52

## 2018-10-28 RX ADMIN — MICONAZOLE NITRATE: 2 POWDER TOPICAL at 08:53

## 2018-10-28 RX ADMIN — DILTIAZEM HYDROCHLORIDE 30 MG: 30 TABLET, FILM COATED ORAL at 11:50

## 2018-10-28 RX ADMIN — METFORMIN HYDROCHLORIDE 850 MG: 850 TABLET ORAL at 08:51

## 2018-10-28 RX ADMIN — DILTIAZEM HYDROCHLORIDE 30 MG: 30 TABLET, FILM COATED ORAL at 18:20

## 2018-10-28 RX ADMIN — APIXABAN 5 MG: 5 TABLET, FILM COATED ORAL at 21:26

## 2018-10-28 RX ADMIN — SUCRALFATE 1 G: 1 TABLET ORAL at 21:26

## 2018-10-28 RX ADMIN — DILTIAZEM HYDROCHLORIDE 30 MG: 30 TABLET, FILM COATED ORAL at 06:08

## 2018-10-28 RX ADMIN — METOPROLOL TARTRATE 50 MG: 50 TABLET, FILM COATED ORAL at 13:47

## 2018-10-28 ASSESSMENT — PAIN SCALES - GENERAL: PAINLEVEL_OUTOF10: 0

## 2018-10-28 NOTE — PROGRESS NOTES
PROCEDURE  12 23 2005    LV was obtained. AGEE projection showed preserved LV function w/estimated EF at 55%. No significant MR. Patent left main coronary artery. Tortuous LAD which appeared to be patent. Patent left circumflex artery. High-grade stenosis around 99% in very large dominant RCA w/heavy calcification at the ostium. Normal LV function.  DOPPLER ECHOCARDIOGRAPHY  4 09 2009    Global LV function w/in lower limits of normal, with mild anteroseptal hypokinesis. EF of 50-55%. Light LVH. Mild to moderate left atrial enlargement. Calcific aortic and mitral valve w/no obvious stenosis. No obvious pericardial effusion.  DOPPLER ECHOCARDIOGRAPHY  7 10 2007    LV function w/in lower limits of normal w/peridoxical septal wall motion. Moderate left atrial enlargement. Mild MR. Mild TR. Calcified valves w/no obvious stenosis. No pericardial effusion.  DOPPLER ECHOCARDIOGRAPHY  4 14 2006    There appears to be significant improvement from previous echo w/complete resolution of pericardial effusion and normal LV function. EF of 60%.  DOPPLER ECHOCARDIOGRAPHY  3 21 2006    Normal LV function. Mild LV hypertrophy. Mild biatrial enlargement. Mildly calcific aortic and mitral valve w/no stenosis. Mild MR. Mild TR. Minimal residual pericardial effusion w/significant improvement from previous echo.  DOPPLER ECHOCARDIOGRAPHY  3 16 2006    Interval change from previous echocardiogram w/worsening of effusion. Correlation clinically. Consideration of pericardial centesis. Will obtain a [facility] consult.  DOPPLER ECHOCARDIOGRAPHY  1 31 2006    No significant change from previous echo. The 2D echo showed moderate degree of pericardial effusion. It does seem to be worst or better than previous echo. No evidence of tamponade.  DOPPLER ECHOCARDIOGRAPHY  1 27 2006    Normal global LV function. Mild biatrial enlargement. Mildly sclerotic aortic & mitral valve w/no stenosis. Mild MR. Mild TR.  Small to moderate

## 2018-10-29 LAB
ANION GAP SERPL CALCULATED.3IONS-SCNC: 9 MEQ/L (ref 8–16)
BUN BLDV-MCNC: 15 MG/DL (ref 7–22)
CALCIUM SERPL-MCNC: 9.5 MG/DL (ref 8.5–10.5)
CHLORIDE BLD-SCNC: 96 MEQ/L (ref 98–111)
CO2: 31 MEQ/L (ref 23–33)
CREAT SERPL-MCNC: 0.6 MG/DL (ref 0.4–1.2)
ERYTHROCYTE [DISTWIDTH] IN BLOOD BY AUTOMATED COUNT: 13.8 % (ref 11.5–14.5)
ERYTHROCYTE [DISTWIDTH] IN BLOOD BY AUTOMATED COUNT: 42.7 FL (ref 35–45)
GFR SERPL CREATININE-BSD FRML MDRD: > 90 ML/MIN/1.73M2
GLUCOSE BLD-MCNC: 152 MG/DL (ref 70–108)
GLUCOSE BLD-MCNC: 171 MG/DL (ref 70–108)
HCT VFR BLD CALC: 33.6 % (ref 37–47)
HEMOGLOBIN: 11.2 GM/DL (ref 12–16)
MCH RBC QN AUTO: 28.3 PG (ref 26–33)
MCHC RBC AUTO-ENTMCNC: 33.3 GM/DL (ref 32.2–35.5)
MCV RBC AUTO: 84.8 FL (ref 81–99)
PLATELET # BLD: 260 THOU/MM3 (ref 130–400)
PMV BLD AUTO: 9.4 FL (ref 9.4–12.4)
POTASSIUM REFLEX MAGNESIUM: 5 MEQ/L (ref 3.5–5.2)
POTASSIUM SERPL-SCNC: 5 MEQ/L (ref 3.5–5.2)
RBC # BLD: 3.96 MILL/MM3 (ref 4.2–5.4)
SODIUM BLD-SCNC: 136 MEQ/L (ref 135–145)
WBC # BLD: 5.6 THOU/MM3 (ref 4.8–10.8)

## 2018-10-29 PROCEDURE — 6370000000 HC RX 637 (ALT 250 FOR IP): Performed by: FAMILY MEDICINE

## 2018-10-29 PROCEDURE — 97530 THERAPEUTIC ACTIVITIES: CPT

## 2018-10-29 PROCEDURE — 1290000000 HC SEMI PRIVATE OTHER R&B

## 2018-10-29 PROCEDURE — 2709999900 HC NON-CHARGEABLE SUPPLY

## 2018-10-29 PROCEDURE — 80048 BASIC METABOLIC PNL TOTAL CA: CPT

## 2018-10-29 PROCEDURE — 6370000000 HC RX 637 (ALT 250 FOR IP): Performed by: INTERNAL MEDICINE

## 2018-10-29 PROCEDURE — 97535 SELF CARE MNGMENT TRAINING: CPT

## 2018-10-29 PROCEDURE — 82948 REAGENT STRIP/BLOOD GLUCOSE: CPT

## 2018-10-29 PROCEDURE — 97116 GAIT TRAINING THERAPY: CPT

## 2018-10-29 PROCEDURE — 97110 THERAPEUTIC EXERCISES: CPT

## 2018-10-29 PROCEDURE — 36415 COLL VENOUS BLD VENIPUNCTURE: CPT

## 2018-10-29 PROCEDURE — 85027 COMPLETE CBC AUTOMATED: CPT

## 2018-10-29 RX ADMIN — SUCRALFATE 1 G: 1 TABLET ORAL at 21:44

## 2018-10-29 RX ADMIN — TROSPIUM CHLORIDE 20 MG: 20 TABLET ORAL at 21:44

## 2018-10-29 RX ADMIN — DILTIAZEM HYDROCHLORIDE 30 MG: 30 TABLET, FILM COATED ORAL at 11:41

## 2018-10-29 RX ADMIN — ATORVASTATIN CALCIUM 20 MG: 20 TABLET, FILM COATED ORAL at 21:44

## 2018-10-29 RX ADMIN — FAMOTIDINE 20 MG: 20 TABLET ORAL at 09:46

## 2018-10-29 RX ADMIN — FUROSEMIDE 20 MG: 20 TABLET ORAL at 09:46

## 2018-10-29 RX ADMIN — MICONAZOLE NITRATE: 2 POWDER TOPICAL at 21:45

## 2018-10-29 RX ADMIN — CARBIDOPA AND LEVODOPA 1 TABLET: 25; 100 TABLET, EXTENDED RELEASE ORAL at 09:46

## 2018-10-29 RX ADMIN — APIXABAN 5 MG: 5 TABLET, FILM COATED ORAL at 21:44

## 2018-10-29 RX ADMIN — METOPROLOL TARTRATE 50 MG: 50 TABLET, FILM COATED ORAL at 09:45

## 2018-10-29 RX ADMIN — METOPROLOL TARTRATE 50 MG: 50 TABLET, FILM COATED ORAL at 14:00

## 2018-10-29 RX ADMIN — CARBIDOPA AND LEVODOPA 1 TABLET: 25; 100 TABLET, EXTENDED RELEASE ORAL at 21:45

## 2018-10-29 RX ADMIN — SUCRALFATE 1 G: 1 TABLET ORAL at 12:00

## 2018-10-29 RX ADMIN — ASPIRIN 81 MG: 81 TABLET, COATED ORAL at 09:46

## 2018-10-29 RX ADMIN — APIXABAN 5 MG: 5 TABLET, FILM COATED ORAL at 09:46

## 2018-10-29 RX ADMIN — SUCRALFATE 1 G: 1 TABLET ORAL at 17:04

## 2018-10-29 RX ADMIN — MICONAZOLE NITRATE: 2 POWDER TOPICAL at 09:47

## 2018-10-29 RX ADMIN — PIOGLITAZONE 15 MG: 15 TABLET ORAL at 09:47

## 2018-10-29 RX ADMIN — DILTIAZEM HYDROCHLORIDE 30 MG: 30 TABLET, FILM COATED ORAL at 17:44

## 2018-10-29 RX ADMIN — MAGNESIUM GLUCONATE 500 MG ORAL TABLET 400 MG: 500 TABLET ORAL at 09:46

## 2018-10-29 RX ADMIN — METFORMIN HYDROCHLORIDE 850 MG: 850 TABLET ORAL at 08:10

## 2018-10-29 RX ADMIN — SUCRALFATE 1 G: 1 TABLET ORAL at 09:46

## 2018-10-29 RX ADMIN — METOPROLOL TARTRATE 50 MG: 50 TABLET, FILM COATED ORAL at 21:44

## 2018-10-29 RX ADMIN — METFORMIN HYDROCHLORIDE 850 MG: 850 TABLET ORAL at 19:21

## 2018-10-29 RX ADMIN — DILTIAZEM HYDROCHLORIDE 30 MG: 30 TABLET, FILM COATED ORAL at 05:55

## 2018-10-29 RX ADMIN — DILTIAZEM HYDROCHLORIDE 30 MG: 30 TABLET, FILM COATED ORAL at 23:42

## 2018-10-29 ASSESSMENT — PAIN SCALES - GENERAL
PAINLEVEL_OUTOF10: 0
PAINLEVEL_OUTOF10: 0

## 2018-10-29 NOTE — PROGRESS NOTES
Patient is alert & oriented x3. Speech is clear and appropriate. PERLLA brisk 4mm-2mm. Mucous membranes pink & dry. Lung sounds clear throughout bilaterally. No cough noted. Respirations easy and unlabored. Apical heart rate strong and regular. Abdomen is soft, non tender upon palpation. Hyperactive bowel sounds x4 quadrants. Capillary refill is brisk, skin turgor is brisk. Arm drift is negative. Sensation is present in upper and lower extremities. Patient moves freely in chair. Pedal push and pull strong and equal. Homans sign negative. Dorsalis pedis pulse strong and equal bilaterally. Right lower leg swelling with +1 pitting edema, ace wrap applied. K-pad applied to right upper arm due to pain and swelling from a previous PICC line removal. Patient is sitting up in chair with call light within reach.        [facility] nursing student

## 2018-10-29 NOTE — PROGRESS NOTES
Called pharmacy about verifcation of a medication being sent up. Pharmacy stated they are out and will need to run to out patient to get some more.      Ihsan Turner [facility] nursing student

## 2018-10-29 NOTE — PROGRESS NOTES
Activity Tolerance:  Activity Tolerance: Patient Tolerated treatment well    Assessment:     Performance deficits / Impairments: Decreased functional mobility , Decreased ADL status, Decreased safe awareness, Decreased high-level IADLs, Decreased endurance, Decreased balance  Prognosis: Good    Discharge Recommendations:  Discharge Recommendations: Home with Home health OT    Patient Education:  Patient Education: Parkinson support group    Equipment Recommendations:  Equipment Needed: Yes  Other: Will continue to monitor- may benefit from a reacher    Safety:  Safety Devices in place: Yes  Type of devices: Call light within reach, Chair alarm in place, Gait belt, Left in chair    Plan:  Times per week: 6x  Current Treatment Recommendations: Self-Care / ADL, Endurance Training, Strengthening, Safety Education & Training, Patient/Caregiver Education & Training, Functional Mobility Training, Balance Training    Goals:       Short term goals  Time Frame for Short term goals: 1 week  Short term goal 1: Pt will demonstrate functional mobility walking to/from the bathroom or in the kitchenette around obstacles with S to prepare for doing simple homemaking tasks. [place] term goal 2: Pt will complete dynamic standing tasks while using 1-2 handed release technique with S to increase her safety and for doing cooking or cleaning tasks. Short term goal 3: Pt will complete LB ADL with SBA while using safe technique to increase her independence with self care. Short term goal 4: Pt will complete various transfers including to/from the toilet seat with armrest with S and min cues for hand placement to increase her independence with toileting routine. Long term goals  Time Frame for Long term goals : 2 weeks  Long term goal 1: Pt will complete ADL routine with S to increase indep with self care. Long term goal 2: pt will complete light IADL task with S to increase indep with warming up light meals.

## 2018-10-30 LAB
ANION GAP SERPL CALCULATED.3IONS-SCNC: 9 MEQ/L (ref 8–16)
BACTERIA: ABNORMAL /HPF
BILIRUBIN URINE: NEGATIVE
BLOOD, URINE: NEGATIVE
BUN BLDV-MCNC: 15 MG/DL (ref 7–22)
CALCIUM SERPL-MCNC: 9.2 MG/DL (ref 8.5–10.5)
CASTS 2: ABNORMAL /LPF
CASTS UA: ABNORMAL /LPF
CHARACTER, URINE: CLEAR
CHLORIDE BLD-SCNC: 97 MEQ/L (ref 98–111)
CO2: 29 MEQ/L (ref 23–33)
COLOR: YELLOW
CREAT SERPL-MCNC: 0.5 MG/DL (ref 0.4–1.2)
CRYSTALS, UA: ABNORMAL
EPITHELIAL CELLS, UA: ABNORMAL /HPF
GFR SERPL CREATININE-BSD FRML MDRD: > 90 ML/MIN/1.73M2
GLUCOSE BLD-MCNC: 160 MG/DL (ref 70–108)
GLUCOSE BLD-MCNC: 177 MG/DL (ref 70–108)
GLUCOSE URINE: NEGATIVE MG/DL
KETONES, URINE: NEGATIVE
LEUKOCYTE ESTERASE, URINE: ABNORMAL
MISCELLANEOUS 2: ABNORMAL
NITRITE, URINE: NEGATIVE
PH UA: 6.5
POTASSIUM REFLEX MAGNESIUM: 4.8 MEQ/L (ref 3.5–5.2)
PROTEIN UA: NEGATIVE
RBC URINE: ABNORMAL /HPF
RENAL EPITHELIAL, UA: ABNORMAL
SODIUM BLD-SCNC: 135 MEQ/L (ref 135–145)
SPECIFIC GRAVITY, URINE: 1.01 (ref 1–1.03)
UROBILINOGEN, URINE: 0.2 EU/DL
WBC UA: ABNORMAL /HPF
YEAST: ABNORMAL

## 2018-10-30 PROCEDURE — 82948 REAGENT STRIP/BLOOD GLUCOSE: CPT

## 2018-10-30 PROCEDURE — 6370000000 HC RX 637 (ALT 250 FOR IP): Performed by: FAMILY MEDICINE

## 2018-10-30 PROCEDURE — 1290000000 HC SEMI PRIVATE OTHER R&B

## 2018-10-30 PROCEDURE — 97110 THERAPEUTIC EXERCISES: CPT

## 2018-10-30 PROCEDURE — 97535 SELF CARE MNGMENT TRAINING: CPT

## 2018-10-30 PROCEDURE — 6370000000 HC RX 637 (ALT 250 FOR IP): Performed by: INTERNAL MEDICINE

## 2018-10-30 PROCEDURE — 36415 COLL VENOUS BLD VENIPUNCTURE: CPT

## 2018-10-30 PROCEDURE — 0220000000 HC SKILLED NURSING FACILITY

## 2018-10-30 PROCEDURE — 2709999900 HC NON-CHARGEABLE SUPPLY

## 2018-10-30 PROCEDURE — 80048 BASIC METABOLIC PNL TOTAL CA: CPT

## 2018-10-30 PROCEDURE — 97116 GAIT TRAINING THERAPY: CPT

## 2018-10-30 PROCEDURE — 97530 THERAPEUTIC ACTIVITIES: CPT

## 2018-10-30 PROCEDURE — 87086 URINE CULTURE/COLONY COUNT: CPT

## 2018-10-30 PROCEDURE — 81001 URINALYSIS AUTO W/SCOPE: CPT

## 2018-10-30 RX ADMIN — METOPROLOL TARTRATE 50 MG: 50 TABLET, FILM COATED ORAL at 14:47

## 2018-10-30 RX ADMIN — METFORMIN HYDROCHLORIDE 850 MG: 850 TABLET ORAL at 17:15

## 2018-10-30 RX ADMIN — PIOGLITAZONE 15 MG: 15 TABLET ORAL at 09:15

## 2018-10-30 RX ADMIN — METOPROLOL TARTRATE 50 MG: 50 TABLET, FILM COATED ORAL at 20:48

## 2018-10-30 RX ADMIN — ASPIRIN 81 MG: 81 TABLET, COATED ORAL at 09:15

## 2018-10-30 RX ADMIN — SUCRALFATE 1 G: 1 TABLET ORAL at 20:48

## 2018-10-30 RX ADMIN — DILTIAZEM HYDROCHLORIDE 30 MG: 30 TABLET, FILM COATED ORAL at 17:39

## 2018-10-30 RX ADMIN — CARBIDOPA AND LEVODOPA 1 TABLET: 25; 100 TABLET, EXTENDED RELEASE ORAL at 09:15

## 2018-10-30 RX ADMIN — TROSPIUM CHLORIDE 20 MG: 20 TABLET ORAL at 20:48

## 2018-10-30 RX ADMIN — ATORVASTATIN CALCIUM 20 MG: 20 TABLET, FILM COATED ORAL at 20:48

## 2018-10-30 RX ADMIN — DILTIAZEM HYDROCHLORIDE 30 MG: 30 TABLET, FILM COATED ORAL at 13:00

## 2018-10-30 RX ADMIN — SUCRALFATE 1 G: 1 TABLET ORAL at 09:15

## 2018-10-30 RX ADMIN — FAMOTIDINE 20 MG: 20 TABLET ORAL at 09:15

## 2018-10-30 RX ADMIN — APIXABAN 5 MG: 5 TABLET, FILM COATED ORAL at 09:15

## 2018-10-30 RX ADMIN — MICONAZOLE NITRATE: 2 POWDER TOPICAL at 09:24

## 2018-10-30 RX ADMIN — SUCRALFATE 1 G: 1 TABLET ORAL at 17:15

## 2018-10-30 RX ADMIN — CARBIDOPA AND LEVODOPA 1 TABLET: 25; 100 TABLET, EXTENDED RELEASE ORAL at 20:48

## 2018-10-30 RX ADMIN — METOPROLOL TARTRATE 50 MG: 50 TABLET, FILM COATED ORAL at 09:15

## 2018-10-30 RX ADMIN — METFORMIN HYDROCHLORIDE 850 MG: 850 TABLET ORAL at 08:00

## 2018-10-30 RX ADMIN — DILTIAZEM HYDROCHLORIDE 30 MG: 30 TABLET, FILM COATED ORAL at 05:29

## 2018-10-30 RX ADMIN — MAGNESIUM GLUCONATE 500 MG ORAL TABLET 400 MG: 500 TABLET ORAL at 09:15

## 2018-10-30 RX ADMIN — SUCRALFATE 1 G: 1 TABLET ORAL at 13:00

## 2018-10-30 RX ADMIN — FUROSEMIDE 20 MG: 20 TABLET ORAL at 09:15

## 2018-10-30 RX ADMIN — MICONAZOLE NITRATE: 2 POWDER TOPICAL at 20:48

## 2018-10-30 RX ADMIN — APIXABAN 5 MG: 5 TABLET, FILM COATED ORAL at 20:48

## 2018-10-30 ASSESSMENT — PAIN SCALES - GENERAL
PAINLEVEL_OUTOF10: 0

## 2018-10-30 NOTE — PROGRESS NOTES
[facility] 60  INPATIENT OCCUPATIONAL THERAPY  [facility]  DAILY NOTE    Time:  Time In: 6844  Time Out: 1103  Timed Code Treatment Minutes: 45 Minutes  Minutes: 45          Date: 10/30/2018  Patient Name: Marie Bhandari,   Gender: female      Room: 8E-69/069-A  MRN: [id number]  : 1932  (80 y.o.)  Referring Practitioner: Monique Coon MD; Attending: Dr. Cris Carrizales  Diagnosis: Physical Deconditioning  Additional Pertinent Hx: Pt presented to [facility], accompanied by her daughter, due to fall and fever. Pt reports she was in the bathroom last night and had off balance and fell, tried to hold on the sink, landed against the door of the bathroom and hit the back of her head. She denies dizziness, HA, focal weakness, numbness, chest pain, palpitations. She also reports that for past 3 days, she's having intermittent rt lower quadrant abdominal and rt pelvic pain, 6/10, now 5/10, accompanied by dysuria, urinary frequency and urgency x 3 days. She denies hematuria. Of note, she reports that she has urinary incontinence x 1 year now. Per pt, she was scheduled for \"bladder stimulation\" and supposed to have lexiscan stress test and echo for cardiac clearance. Also, she said that she has recurrent UTI since 2018. Pt also reports low appetite for 2 days, feeling bloated, nauseous, no vomiting. She also reports constant B/L lower back pain x 1 day, 5/10, achy, radiates to rt lower quadrant of abdomen. She also c/o intermittent neck pain x 3 days, 310. Daughter reports that she felt that the pt was warm and looks weak this afternoon, hence daughter brought pt to ER. In ER, her UA showed nitrites and large leukocyte esterase. She received 1 dose of Zosyn 3.375 g IV. CT head, CT cervical, cxr and xr pelvis all done, all came back unremarkable.  CBC normal except for mild anemia ( Hgb 11.4, baseline Hgb 12-13) , CMP normal except for hyponatremia, sodium of 128, her sodium in 9/2018 was 139, though she had hyponatremia in 6/2018, sodium of 130. Her blood glucose is also elevated  ( she said her FBS at home usually at 160s). Urine and blood cultures were all sent. Admitted to [facility] 10/18/18. Past Medical History:   Diagnosis Date    Atypical chest pain     CAD (coronary artery disease)     Chronic LBBB (left bundle branch block)     Diabetes mellitus type II     Esophageal stricture     History of esophageal stricture.  FH: CAD (coronary artery disease)     Mom and dad both with heart disease at mid to late age.  GERD (gastroesophageal reflux disease)     History of gastritis     History of skin cancer     Hypercholesterolemia     Hyperlipidemia     Hypertension     Imbalance     As far as imbalance is concerned, asked patient to follow-up with Dr. Veena Chavez since she follows with him on a regular basis.  Lactose intolerance     Nummular dermatitis     Parkinson's disease ([address]     Restless legs syndrome (RLS)     S/P CABG (coronary artery bypass graft)     SCC (squamous cell carcinoma)      Past Surgical History:   Procedure Laterality Date    CARDIAC SURGERY      CARDIOVASCULAR STRESS TEST  4 09 2009    Gated SPECT images revealed normal global wall motion with EF of 60%. Persantine test associated w/nonspecific symptoms. EKG is nondiagnostic w/baseline LBBB. No obvious stress-induced ischemia by Cardiolite. EF within normal limits.  CARDIOVASCULAR STRESS TEST  7 10 2007    The gated SPECT images revealed normal wall motion with EF of 69%. Poor exercise tolerance w/SOB on exertion. EKG is nondiagnostic. Cardiolite scan revealing no obvious stress-induced ischemia. EF 60%.  CARDIOVASCULAR STRESS TEST  2 03 2006    Fair exercise tolerance w/SOB on exertion. No EKG evidence of ischemia. Patient should be able to proceed with phase II cardiac rehab.      CATARACT REMOVAL      CATARACT REMOVAL  06/08/2016    AND 6/29/16    DIAGNOSTIC

## 2018-10-30 NOTE — PLAN OF CARE
Problem: Infection - Central Venous Catheter-Associated Bloodstream Infection:  Goal: Will show no infection signs and symptoms  Will show no infection signs and symptoms   Outcome: Ongoing  Monitor vital signs. Problem: Pain:  Goal: Pain level will decrease  Pain level will decrease   Outcome: Ongoing  Assess barriers to pain control. Problem: Falls - Risk of:  Goal: Will remain free from falls  Will remain free from falls   Outcome: Ongoing  Fall precautions. Problem: Risk for Impaired Skin Integrity  Goal: Tissue integrity - skin and mucous membranes  Structural intactness and normal physiological function of skin and  mucous membranes. Outcome: Ongoing  Skin assessment. Problem: Bleeding:  Goal: Will show no signs and symptoms of excessive bleeding  Will show no signs and symptoms of excessive bleeding   Outcome: Ongoing  Bleeding precautions. Problem: Mobility - Impaired:  Goal: Mobility will improve  Mobility will improve   Outcome: Ongoing  Assess barriers to increased mobility. Problem: Serum Glucose Level - Abnormal:  Goal: Ability to maintain appropriate glucose levels will improve  Ability to maintain appropriate glucose levels will improve   Outcome: Ongoing  Monitor glucose levels. Problem: Sensory Perception - Impaired:  Goal: Ability to maintain a stable neurologic state will improve  Ability to maintain a stable neurologic state will improve   Outcome: Ongoing      Problem: Cardiac Output - Decreased:  Goal: Hemodynamic stability will improve  Hemodynamic stability will improve   Outcome: Ongoing      Problem: Urinary Elimination:  Goal: Signs and symptoms of infection will decrease  Signs and symptoms of infection will decrease   Outcome: Ongoing  Encourage patient to consume % of all meals.     Problem: Nutrition  Goal: Optimal nutrition therapy  Outcome: Ongoing      Problem: DISCHARGE BARRIERS  Goal: Patient's continuum of care needs are met  Outcome: Ongoing  Involve patient in discharge planning process.

## 2018-10-30 NOTE — PROGRESS NOTES
but cant do or no choice  6 - No response or non-responsive Enter Codes in Boxes    1 A. How important is it to you to have books, newspapers, and magazines to read?    1 B. How important is it to you to listen to music you like?    4 C. How important is it to you to be around animals such as pets?     1 D. How important is it to you to keep up with the news?    1 E. How important is it to you to do things with groups of people?     1 F. How important is it to you to do your favorite activities?     1 G. How important is it to you to go outside to get fresh air when the weather is good?     1 H. How important is it to you to participate in Islam services or practices? .  Daily and Activity Preferences Primary Respondent   Enter Code    1 Indicate Primary respondent for Daily and Activity Preferences ( and )  1 - Resident  2 - Family or Significant other (close friend or other representative)  5 - Interview could not be completed by resident or family/significant other (no response to 3 or more items)

## 2018-10-31 ENCOUNTER — TELEPHONE (OUTPATIENT)
Dept: UROLOGY | Age: 83
End: 2018-10-31

## 2018-10-31 LAB
ANION GAP SERPL CALCULATED.3IONS-SCNC: 9 MEQ/L (ref 8–16)
BUN BLDV-MCNC: 18 MG/DL (ref 7–22)
CALCIUM SERPL-MCNC: 9.4 MG/DL (ref 8.5–10.5)
CHLORIDE BLD-SCNC: 95 MEQ/L (ref 98–111)
CO2: 30 MEQ/L (ref 23–33)
CREAT SERPL-MCNC: 0.7 MG/DL (ref 0.4–1.2)
GFR SERPL CREATININE-BSD FRML MDRD: 79 ML/MIN/1.73M2
GLUCOSE BLD-MCNC: 147 MG/DL (ref 70–108)
GLUCOSE BLD-MCNC: 163 MG/DL (ref 70–108)
POTASSIUM REFLEX MAGNESIUM: 4.9 MEQ/L (ref 3.5–5.2)
SODIUM BLD-SCNC: 134 MEQ/L (ref 135–145)

## 2018-10-31 PROCEDURE — 36415 COLL VENOUS BLD VENIPUNCTURE: CPT

## 2018-10-31 PROCEDURE — 97110 THERAPEUTIC EXERCISES: CPT

## 2018-10-31 PROCEDURE — 6370000000 HC RX 637 (ALT 250 FOR IP): Performed by: INTERNAL MEDICINE

## 2018-10-31 PROCEDURE — 6370000000 HC RX 637 (ALT 250 FOR IP): Performed by: FAMILY MEDICINE

## 2018-10-31 PROCEDURE — 97530 THERAPEUTIC ACTIVITIES: CPT

## 2018-10-31 PROCEDURE — 82948 REAGENT STRIP/BLOOD GLUCOSE: CPT

## 2018-10-31 PROCEDURE — 1290000000 HC SEMI PRIVATE OTHER R&B

## 2018-10-31 PROCEDURE — 2709999900 HC NON-CHARGEABLE SUPPLY

## 2018-10-31 PROCEDURE — 80048 BASIC METABOLIC PNL TOTAL CA: CPT

## 2018-10-31 PROCEDURE — 97535 SELF CARE MNGMENT TRAINING: CPT

## 2018-10-31 RX ORDER — NITROFURANTOIN 25; 75 MG/1; MG/1
100 CAPSULE ORAL DAILY
Qty: 21 CAPSULE | Refills: 0 | Status: SHIPPED | OUTPATIENT
Start: 2018-10-31 | End: 2018-11-06

## 2018-10-31 RX ADMIN — MICONAZOLE NITRATE: 2 POWDER TOPICAL at 10:22

## 2018-10-31 RX ADMIN — SUCRALFATE 1 G: 1 TABLET ORAL at 10:21

## 2018-10-31 RX ADMIN — DILTIAZEM HYDROCHLORIDE 30 MG: 30 TABLET, FILM COATED ORAL at 23:17

## 2018-10-31 RX ADMIN — TROSPIUM CHLORIDE 20 MG: 20 TABLET ORAL at 21:49

## 2018-10-31 RX ADMIN — MAGNESIUM GLUCONATE 500 MG ORAL TABLET 400 MG: 500 TABLET ORAL at 10:21

## 2018-10-31 RX ADMIN — METOPROLOL TARTRATE 50 MG: 50 TABLET, FILM COATED ORAL at 21:48

## 2018-10-31 RX ADMIN — DILTIAZEM HYDROCHLORIDE 30 MG: 30 TABLET, FILM COATED ORAL at 06:09

## 2018-10-31 RX ADMIN — APIXABAN 5 MG: 5 TABLET, FILM COATED ORAL at 10:21

## 2018-10-31 RX ADMIN — DILTIAZEM HYDROCHLORIDE 30 MG: 30 TABLET, FILM COATED ORAL at 00:53

## 2018-10-31 RX ADMIN — SUCRALFATE 1 G: 1 TABLET ORAL at 15:58

## 2018-10-31 RX ADMIN — MICONAZOLE NITRATE: 2 POWDER TOPICAL at 21:49

## 2018-10-31 RX ADMIN — DILTIAZEM HYDROCHLORIDE 30 MG: 30 TABLET, FILM COATED ORAL at 13:24

## 2018-10-31 RX ADMIN — METOPROLOL TARTRATE 50 MG: 50 TABLET, FILM COATED ORAL at 10:21

## 2018-10-31 RX ADMIN — FUROSEMIDE 20 MG: 20 TABLET ORAL at 10:21

## 2018-10-31 RX ADMIN — METFORMIN HYDROCHLORIDE 850 MG: 850 TABLET ORAL at 15:58

## 2018-10-31 RX ADMIN — CARBIDOPA AND LEVODOPA 1 TABLET: 25; 100 TABLET, EXTENDED RELEASE ORAL at 10:20

## 2018-10-31 RX ADMIN — FAMOTIDINE 20 MG: 20 TABLET ORAL at 10:20

## 2018-10-31 RX ADMIN — SUCRALFATE 1 G: 1 TABLET ORAL at 21:49

## 2018-10-31 RX ADMIN — ASPIRIN 81 MG: 81 TABLET, COATED ORAL at 10:20

## 2018-10-31 RX ADMIN — APIXABAN 5 MG: 5 TABLET, FILM COATED ORAL at 21:48

## 2018-10-31 RX ADMIN — PIOGLITAZONE 15 MG: 15 TABLET ORAL at 10:20

## 2018-10-31 RX ADMIN — ATORVASTATIN CALCIUM 20 MG: 20 TABLET, FILM COATED ORAL at 21:48

## 2018-10-31 RX ADMIN — METFORMIN HYDROCHLORIDE 850 MG: 850 TABLET ORAL at 10:22

## 2018-10-31 RX ADMIN — DILTIAZEM HYDROCHLORIDE 30 MG: 30 TABLET, FILM COATED ORAL at 17:24

## 2018-10-31 RX ADMIN — METOPROLOL TARTRATE 50 MG: 50 TABLET, FILM COATED ORAL at 16:00

## 2018-10-31 RX ADMIN — SUCRALFATE 1 G: 1 TABLET ORAL at 17:24

## 2018-10-31 RX ADMIN — CARBIDOPA AND LEVODOPA 1 TABLET: 25; 100 TABLET, EXTENDED RELEASE ORAL at 21:42

## 2018-10-31 ASSESSMENT — PAIN SCALES - GENERAL
PAINLEVEL_OUTOF10: 0

## 2018-10-31 NOTE — PROGRESS NOTES
not recall  1. Yes, after cueing (a color)  2. Yes, no cue required       2 Able to recall bed  0. No - could not recall  1. Yes, after cueing (a piece of furniture)  2. Yes, no cue required                Patient Name: Dawn Quijano        MRN: [id number]    : 1932  (80 y.o.)  Gender: female   Principal Problem: Sepsis due to Escherichia coli [facility])    Section D - Mood     Should Resident Mood Interview be Conducted? - Attempt to conduct interview with all residents   0. No (resident is rarely/never understood) à Skip to and complete -, Staff Assessment of Mood (PHQ 9-OV)  1. Yes à Continue to , Resident Mood Interview (PHQ-9) Enter Code    1   . Resident Mood Interview (PHQ-9)   Say to resident: Maggie Baez the last 2 weeks, have you been bothered by any of the following problems?    If symptom is present, enter 1(yes) in column 1, Symptom Presence. Then move to column 2, Symptom Frequency, and indicate symptom frequency. 1. Symptom Presence                   2. Symptom                                                                  Frequency  0. No (enter 0 in column 2)          0. Never or 1 day          1. Yes (enter 0-3 in column 2)      1. 2-6 days           9. No Response                               2.  7-11 days                                                 3.  12-14 days   1. Symptom Presence 2. Symptom  Frequency        Enter Scores in boxes below   A. Little interest or pleasure in doing things 0 0   B. Feeling down, depressed, or hopeless 1 1   C. Trouble falling or staying asleep, or sleeping too much 1 2   D. Feeling tired or having little energy 1 1   E. Poor appetite or overeating 1 1   F. Feeling bad about yourself - or that you are a failure, or have let your family down 0 0   G. Trouble concentrating on things, such as reading the newspaper or watching television 0 0   H. Moving or speaking so slowly that other people have noticed.   Or the opposite-being

## 2018-10-31 NOTE — PLAN OF CARE
release tablet 1 tablet  1 tablet Oral BID Saul Beck MD   1 tablet at 10/31/18 1020    dextrose 5 % solution  100 mL/hr Intravenous PRN Saul Beck MD        diltiazem (CARDIZEM) tablet 30 mg  30 mg Oral 4 times per day Liza Estrada MD   30 mg at 10/31/18 6628    famotidine (PEPCID) tablet 20 mg  20 mg Oral Daily Liza Estrada MD   20 mg at 10/31/18 1020    magnesium oxide (MAG-OX) tablet 400 mg  400 mg Oral Daily Liza Estrada MD   400 mg at 10/31/18 1021    metoprolol tartrate (LOPRESSOR) tablet 50 mg  50 mg Oral TID Liza Estrada MD   50 mg at 10/31/18 1021    [START ON 10/15/2019] potassium replacement protocol   Other MD Cori        sodium chloride flush 0.9 % injection 10 mL  10 mL Intravenous PRN Saul Beck MD        sucralfate (CARAFATE) tablet 1 g  1 g Oral 4x Daily Saul Beck MD   1 g at 10/31/18 1021       Plan of Care Problems and Goals (a check in the box indicates current careplan problems and goals):     [x] Problem: Pain    Goal: Pain Level will decrease   [x] Problem: Falls- Risk of    Goal: Absence of falls    Goal: Absence of physical injury   [x] Problem: Serum Glucose Level- Abnormal    Goal: Ability to maintain appropriate glucose levels will improve   [x] Problem: Risk of Pressure Ulcer    Goal: Absence of pressure ulcer   [x] Problem: Risk of Bleeding    Goal: Will show no signs and symptoms of excessive bleeding     [x] Problem: Urinary Elimination- Impaired    Goal: Decrease in number of episodes of urinary incontinence    Goal: Skin integrity will improve    Goal: Will remain free from infection    Goal: Ability to recognize the need to void and respond appropriately will improve   [x] Problem: Urinary Tract Infection    Goal: Will show no signs and symptoms of infection   [x] Problem: Nutrition    Goal: Optimal nutrition therapy   [x] Problem: Fluid Volume- Risk of, Imbalanced    Goal: Will remain free of signs and symptoms of dehydration   [x] Problem: Impaired Mobility    Goal: Mobility will improve   [x] Problem: Discharge Barriers    Goal: Patient's continuum of care needs are met   [x] Problem: Mobility (PT)    Goal: to transfer supine <--> sit modified independent to enable her to get in/out of bed    Goal: to transfer sit <--> stand at modified independent for increased functional mobility    Goal: to ambulate >200 feet with rolling walker at modified independent for household ambulation    Goal: to perform car transfer at [facility] for safe transport home    Goal: to negotiate 6 inch platform step with rolling walker at supervision for home access   [x] Problem: Safety (OT)     Goal: will demonstrate functional mobility walking to/from the bathroom or in the kitchenette around obstacles with supervision to prepare for doing simple homemaking tasks     Goal: will complete dynamic standing tasks while using 1-2 handed release technique with supervision to increase her safety and for doing cooking or cleaning tasks    Goal: will complete lower body activity of daily living (ADL) with stand by assist while using safe technique to increase her independence with self care    Goal: will complete various transfers including to/from the toilet seat with armrest with supervision and minimal cues for hand placement to increase her independence with toileting routine

## 2018-10-31 NOTE — TELEPHONE ENCOUNTER
Called patient's daughter,Orly, to set up a surgery date. Surgery set for 12/4/18 arrival at 6:00 am. Patient is currently in the hospital. We are awaiting cardiac clearance from Dr. Hawk Bills. Patient has a stress test set for 11/1/18.  Surgery instructions to be mailed to patient and consent on arrival.

## 2018-10-31 NOTE — PROGRESS NOTES
updated patient care orders written by ID team.    Thank you for involving us in this patient's care. I will be discussing this case with the treating physicians. Please don't hesitate to call me if any questions, issues  or concerns      Please see orders for updated patient care orders written today.   Electronically signed by Eduardo Gamboa on 10/31/2018 at 9:48 AM

## 2018-10-31 NOTE — PROGRESS NOTES
Pt is re scheduled for stress test for cardiac clearance for urology procedure scheduled for 12/4, pts daughter checked with her ins and this will be covered since pt is inpt and this is for clearance only. Stress test is scheduled for 11/01 at 8am  Pt will be NPO after 8pm today    Pt and daughter are aware.

## 2018-10-31 NOTE — PROGRESS NOTES
TCU BALANCE TEST:    Balance during to/from sit to stand PT: Supervision   OT: Stand by assistance    Balance during walking PT: Supervision  OT: Stand by assistance    Balance during turn-around and while walking PT: Supervision   OT: Stand by assistance    Balance moving on and off toilet Stand by assistance    Balance during surface to/from surface transfers     Independent = 0  Modified Independent, Supervision, SBA = 1  CGA, Min Assist, Mod Assist, Max Assist, Dependent = 2  Not Tested/No Response = 8

## 2018-10-31 NOTE — PROGRESS NOTES
EMR  · Ideal Body Wt: 105 lb (47.6 kg), % Ideal Body 127%  · BMI Classification: BMI 25.0 - 29.9 Overweight  · Comparative Standards (Estimated Nutrition Needs):  · Estimated Daily Total Kcal: 2663-9512 (25-30 kcals/kg current weight of 61 kg)  · Estimated Daily Protein (g): 73+ (1.2+ grams/kg current weight of 61 kg)    Estimated Intake vs Estimated Needs: Intake Improving    Nutrition Risk Level: Moderate    Nutrition Interventions:   Continue current diet, Continue current ONS  Continued Inpatient Monitoring, Education declined, Coordination of Care    Nutrition Evaluation:   · Evaluation: Progressing toward goals   · Goals: Patient will consume 75% or greater of most meals and ONS during LOS    · Monitoring: Meal Intake, Supplement Intake, Diet Tolerance, Skin Integrity, Ascites/Edema, Weight, Pertinent Labs    See Adult Nutrition Doc Flowsheet for more detail.      Electronically signed by Zeyad Mendez RD, LD on 10/31/18 at 12:04 PM    Contact Number: [phone]

## 2018-11-01 ENCOUNTER — APPOINTMENT (OUTPATIENT)
Dept: NON INVASIVE DIAGNOSTICS | Age: 83
DRG: 871 | End: 2018-11-01
Attending: FAMILY MEDICINE
Payer: MEDICARE

## 2018-11-01 LAB
ANION GAP SERPL CALCULATED.3IONS-SCNC: 11 MEQ/L (ref 8–16)
BUN BLDV-MCNC: 17 MG/DL (ref 7–22)
CALCIUM SERPL-MCNC: 9.6 MG/DL (ref 8.5–10.5)
CHLORIDE BLD-SCNC: 93 MEQ/L (ref 98–111)
CO2: 30 MEQ/L (ref 23–33)
CREAT SERPL-MCNC: 0.7 MG/DL (ref 0.4–1.2)
ERYTHROCYTE [DISTWIDTH] IN BLOOD BY AUTOMATED COUNT: 14 % (ref 11.5–14.5)
ERYTHROCYTE [DISTWIDTH] IN BLOOD BY AUTOMATED COUNT: 42.9 FL (ref 35–45)
GFR SERPL CREATININE-BSD FRML MDRD: 79 ML/MIN/1.73M2
GLUCOSE BLD-MCNC: 165 MG/DL (ref 70–108)
GLUCOSE BLD-MCNC: 217 MG/DL (ref 70–108)
HCT VFR BLD CALC: 34 % (ref 37–47)
HEMOGLOBIN: 11.2 GM/DL (ref 12–16)
MCH RBC QN AUTO: 27.9 PG (ref 26–33)
MCHC RBC AUTO-ENTMCNC: 32.9 GM/DL (ref 32.2–35.5)
MCV RBC AUTO: 84.6 FL (ref 81–99)
PLATELET # BLD: 267 THOU/MM3 (ref 130–400)
PMV BLD AUTO: 9.4 FL (ref 9.4–12.4)
POTASSIUM REFLEX MAGNESIUM: 4.2 MEQ/L (ref 3.5–5.2)
POTASSIUM SERPL-SCNC: 4.2 MEQ/L (ref 3.5–5.2)
RBC # BLD: 4.02 MILL/MM3 (ref 4.2–5.4)
SODIUM BLD-SCNC: 134 MEQ/L (ref 135–145)
WBC # BLD: 4.9 THOU/MM3 (ref 4.8–10.8)

## 2018-11-01 PROCEDURE — 2709999900 HC NON-CHARGEABLE SUPPLY

## 2018-11-01 PROCEDURE — 36415 COLL VENOUS BLD VENIPUNCTURE: CPT

## 2018-11-01 PROCEDURE — 93017 CV STRESS TEST TRACING ONLY: CPT

## 2018-11-01 PROCEDURE — 97110 THERAPEUTIC EXERCISES: CPT

## 2018-11-01 PROCEDURE — 6360000002 HC RX W HCPCS

## 2018-11-01 PROCEDURE — 78452 HT MUSCLE IMAGE SPECT MULT: CPT

## 2018-11-01 PROCEDURE — 3430000000 HC RX DIAGNOSTIC RADIOPHARMACEUTICAL: Performed by: FAMILY MEDICINE

## 2018-11-01 PROCEDURE — A9500 TC99M SESTAMIBI: HCPCS | Performed by: FAMILY MEDICINE

## 2018-11-01 PROCEDURE — 80048 BASIC METABOLIC PNL TOTAL CA: CPT

## 2018-11-01 PROCEDURE — 6370000000 HC RX 637 (ALT 250 FOR IP): Performed by: INTERNAL MEDICINE

## 2018-11-01 PROCEDURE — 82948 REAGENT STRIP/BLOOD GLUCOSE: CPT

## 2018-11-01 PROCEDURE — 6370000000 HC RX 637 (ALT 250 FOR IP): Performed by: FAMILY MEDICINE

## 2018-11-01 PROCEDURE — 97530 THERAPEUTIC ACTIVITIES: CPT

## 2018-11-01 PROCEDURE — 1290000000 HC SEMI PRIVATE OTHER R&B

## 2018-11-01 PROCEDURE — 85027 COMPLETE CBC AUTOMATED: CPT

## 2018-11-01 PROCEDURE — 97116 GAIT TRAINING THERAPY: CPT

## 2018-11-01 RX ADMIN — ATORVASTATIN CALCIUM 20 MG: 20 TABLET, FILM COATED ORAL at 21:36

## 2018-11-01 RX ADMIN — APIXABAN 5 MG: 5 TABLET, FILM COATED ORAL at 21:36

## 2018-11-01 RX ADMIN — MICONAZOLE NITRATE: 2 POWDER TOPICAL at 21:36

## 2018-11-01 RX ADMIN — MICONAZOLE NITRATE: 2 POWDER TOPICAL at 10:25

## 2018-11-01 RX ADMIN — APIXABAN 5 MG: 5 TABLET, FILM COATED ORAL at 10:24

## 2018-11-01 RX ADMIN — PIOGLITAZONE 15 MG: 15 TABLET ORAL at 10:24

## 2018-11-01 RX ADMIN — SUCRALFATE 1 G: 1 TABLET ORAL at 10:24

## 2018-11-01 RX ADMIN — METOPROLOL TARTRATE 50 MG: 50 TABLET, FILM COATED ORAL at 14:50

## 2018-11-01 RX ADMIN — METOPROLOL TARTRATE 50 MG: 50 TABLET, FILM COATED ORAL at 10:24

## 2018-11-01 RX ADMIN — ASPIRIN 81 MG: 81 TABLET, COATED ORAL at 10:24

## 2018-11-01 RX ADMIN — CARBIDOPA AND LEVODOPA 1 TABLET: 25; 100 TABLET, EXTENDED RELEASE ORAL at 21:37

## 2018-11-01 RX ADMIN — METFORMIN HYDROCHLORIDE 850 MG: 850 TABLET ORAL at 17:20

## 2018-11-01 RX ADMIN — DILTIAZEM HYDROCHLORIDE 30 MG: 30 TABLET, FILM COATED ORAL at 12:43

## 2018-11-01 RX ADMIN — DILTIAZEM HYDROCHLORIDE 30 MG: 30 TABLET, FILM COATED ORAL at 17:20

## 2018-11-01 RX ADMIN — Medication 32.5 MILLICURIE: at 08:30

## 2018-11-01 RX ADMIN — TROSPIUM CHLORIDE 20 MG: 20 TABLET ORAL at 21:36

## 2018-11-01 RX ADMIN — SUCRALFATE 1 G: 1 TABLET ORAL at 21:36

## 2018-11-01 RX ADMIN — FAMOTIDINE 20 MG: 20 TABLET ORAL at 10:24

## 2018-11-01 RX ADMIN — CARBIDOPA AND LEVODOPA 1 TABLET: 25; 100 TABLET, EXTENDED RELEASE ORAL at 10:29

## 2018-11-01 RX ADMIN — METFORMIN HYDROCHLORIDE 850 MG: 850 TABLET ORAL at 10:24

## 2018-11-01 RX ADMIN — MAGNESIUM GLUCONATE 500 MG ORAL TABLET 400 MG: 500 TABLET ORAL at 10:24

## 2018-11-01 RX ADMIN — METOPROLOL TARTRATE 50 MG: 50 TABLET, FILM COATED ORAL at 21:36

## 2018-11-01 RX ADMIN — Medication 9.4 MILLICURIE: at 07:30

## 2018-11-01 RX ADMIN — DILTIAZEM HYDROCHLORIDE 30 MG: 30 TABLET, FILM COATED ORAL at 06:37

## 2018-11-01 RX ADMIN — FUROSEMIDE 20 MG: 20 TABLET ORAL at 10:24

## 2018-11-01 RX ADMIN — SUCRALFATE 1 G: 1 TABLET ORAL at 12:44

## 2018-11-01 RX ADMIN — SUCRALFATE 1 G: 1 TABLET ORAL at 17:20

## 2018-11-01 ASSESSMENT — PAIN SCALES - GENERAL
PAINLEVEL_OUTOF10: 0
PAINLEVEL_OUTOF10: 0

## 2018-11-01 NOTE — PROGRESS NOTES
[facility]  INPATIENT PHYSICAL THERAPY  DAILY NOTE  [facility] TCU 8E - 8E-69/069-A    Time In: 3611  Time Out: 1227  Timed Code Treatment Minutes: 52 Minutes  Minutes: 49          Date: 2018  Patient Name: Katina Mccarthy,  Gender:  female        MRN: [id number]  : 1932  (80 y.o.)     Referring Practitioner: Katheleen Cushing, MD; Attending: Dr. Mack Mosley  Diagnosis: UTI  Additional Pertinent Hx: Pt presented to [facility], accompanied by her daughter, due to fall and fever. Pt reports she was in the bathroom last night and had off balance and fell, tried to hold on the sink, landed against the door of the bathroom and hit the back of her head. She denies dizziness, HA, focal weakness, numbness, chest pain, palpitations. She also reports that for past 3 days, she's having intermittent rt lower quadrant abdominal and rt pelvic pain, 10, now 5/10, accompanied by dysuria, urinary frequency and urgency x 3 days. She denies hematuria. Of note, she reports that she has urinary incontinence x 1 year now. Per pt, she was scheduled for \"bladder stimulation\" and supposed to have lexiscan stress test and echo for cardiac clearance. Also, she said that she has recurrent UTI since 2018. Pt also reports low appetite for 2 days, feeling bloated, nauseous, no vomiting. She also reports constant B/L lower back pain x 1 day, 5/10, achy, radiates to rt lower quadrant of abdomen. She also c/o intermittent neck pain x 3 days, 3/10. Daughter reports that she felt that the pt was warm and looks weak this afternoon, hence daughter brought pt to ER. In ER, her UA showed nitrites and large leukocyte esterase. She received 1 dose of Zosyn 3.375 g IV. CT head, CT cervical, cxr and xr pelvis all done, all came back unremarkable.  CBC normal except for mild anemia ( Hgb 11.4, baseline Hgb 12-13) , CMP normal except for hyponatremia, sodium of 128, her sodium in 2018 was 139, though Patient completed B LE standing therapeutic exercises  with lt. touch to // bars:15x each  heel/toe raises, HS curls, str. leg hip flexion,hip abd/add, mini squats, and marches. 15 reps each b le seated resistance red t-band ham curls, marching, hip abd/add, 15 reps isometric hip add ball squeeze Patient required  min verbal cues on proper technique,all to increase B LE strength and ROM required for increased improvement with functional mobility. Patient required rest breaks with exercises due to increased fatigue. Activity Tolerance:  Activity Tolerance: Patient Tolerated treatment well  Activity Tolerance: activity completed slow pace. pt motivated to get stronger and independent for going home alone            Discharge Recommendations:  Discharge Recommendations: Continue to assess pending progress    Patient Education:  Patient Education: POC, transfers, gait, standing balance, car, steps    Equipment Recommendations:  Equipment Needed: No    Safety:  Type of devices: All fall risk precautions in place, Patient at risk for falls, Call light within reach, Gait belt, Nurse notified (pt. seated on toilet, nurse informed, call light near)  Restraints  Initially in place: No    Plan:  Times per week: 6x  Times per day: Daily  Specific instructions for Next Treatment: B LE strengthening, bed mobility, transfers, gait, standing balance  Current Treatment Recommendations: Strengthening, Neuromuscular Re-education, Home Exercise Program, Safety Education & Training, Balance Training, Functional Mobility Training, Transfer Training, Gait Training, Equipment Evaluation, Education, & procurement, Patient/Caregiver Education & Training, Endurance Training    Goals:  Patient goals : To get better and go home. Short term goals  Time Frame for Short term goals: 1 week  Short term goal 1: Pt to transfer supine <--> sit mod I to enable pt to get in/out of bed.  DISCONTINUE  Short term goal 2: Pt to transfer sit <-->

## 2018-11-01 NOTE — PROGRESS NOTES
Pt and family inquiring about if she has to go home instead of nursing home, she would  Like ureteral  or external catheter at night and nursing to assist, contacted urology they feel a gardner placed should be last resort, external catheter is not an option, pt then states she and family may use a bedside commode, which Michael Henry NP at urology agrees would be best option, but if gardner is needed Dr. Denise Davis could order for dc and then pt would follow with urology.    Pt will speak with family and let us know what she will want for possible discharge on Saturday

## 2018-11-02 ENCOUNTER — TELEPHONE (OUTPATIENT)
Dept: FAMILY MEDICINE CLINIC | Age: 83
End: 2018-11-02

## 2018-11-02 LAB
ANION GAP SERPL CALCULATED.3IONS-SCNC: 11 MEQ/L (ref 8–16)
BUN BLDV-MCNC: 15 MG/DL (ref 7–22)
CALCIUM SERPL-MCNC: 9.1 MG/DL (ref 8.5–10.5)
CHLORIDE BLD-SCNC: 97 MEQ/L (ref 98–111)
CO2: 28 MEQ/L (ref 23–33)
CREAT SERPL-MCNC: 0.6 MG/DL (ref 0.4–1.2)
GFR SERPL CREATININE-BSD FRML MDRD: > 90 ML/MIN/1.73M2
GLUCOSE BLD-MCNC: 150 MG/DL (ref 70–108)
GLUCOSE BLD-MCNC: 160 MG/DL (ref 70–108)
POTASSIUM REFLEX MAGNESIUM: 4.6 MEQ/L (ref 3.5–5.2)
SODIUM BLD-SCNC: 136 MEQ/L (ref 135–145)

## 2018-11-02 PROCEDURE — 97530 THERAPEUTIC ACTIVITIES: CPT

## 2018-11-02 PROCEDURE — 6370000000 HC RX 637 (ALT 250 FOR IP): Performed by: FAMILY MEDICINE

## 2018-11-02 PROCEDURE — 97116 GAIT TRAINING THERAPY: CPT

## 2018-11-02 PROCEDURE — 97535 SELF CARE MNGMENT TRAINING: CPT

## 2018-11-02 PROCEDURE — 6370000000 HC RX 637 (ALT 250 FOR IP): Performed by: INTERNAL MEDICINE

## 2018-11-02 PROCEDURE — 80048 BASIC METABOLIC PNL TOTAL CA: CPT

## 2018-11-02 PROCEDURE — 82948 REAGENT STRIP/BLOOD GLUCOSE: CPT

## 2018-11-02 PROCEDURE — 97110 THERAPEUTIC EXERCISES: CPT

## 2018-11-02 PROCEDURE — 1290000000 HC SEMI PRIVATE OTHER R&B

## 2018-11-02 PROCEDURE — 36415 COLL VENOUS BLD VENIPUNCTURE: CPT

## 2018-11-02 PROCEDURE — 2709999900 HC NON-CHARGEABLE SUPPLY

## 2018-11-02 RX ADMIN — FUROSEMIDE 20 MG: 20 TABLET ORAL at 09:39

## 2018-11-02 RX ADMIN — ASPIRIN 81 MG: 81 TABLET, COATED ORAL at 09:39

## 2018-11-02 RX ADMIN — SUCRALFATE 1 G: 1 TABLET ORAL at 09:39

## 2018-11-02 RX ADMIN — FAMOTIDINE 20 MG: 20 TABLET ORAL at 09:39

## 2018-11-02 RX ADMIN — MICONAZOLE NITRATE: 2 POWDER TOPICAL at 20:30

## 2018-11-02 RX ADMIN — CARBIDOPA AND LEVODOPA 1 TABLET: 25; 100 TABLET, EXTENDED RELEASE ORAL at 09:39

## 2018-11-02 RX ADMIN — DILTIAZEM HYDROCHLORIDE 30 MG: 30 TABLET, FILM COATED ORAL at 06:15

## 2018-11-02 RX ADMIN — METOPROLOL TARTRATE 50 MG: 50 TABLET, FILM COATED ORAL at 13:13

## 2018-11-02 RX ADMIN — CARBIDOPA AND LEVODOPA 1 TABLET: 25; 100 TABLET, EXTENDED RELEASE ORAL at 20:29

## 2018-11-02 RX ADMIN — TROSPIUM CHLORIDE 20 MG: 20 TABLET ORAL at 20:29

## 2018-11-02 RX ADMIN — SUCRALFATE 1 G: 1 TABLET ORAL at 13:13

## 2018-11-02 RX ADMIN — MICONAZOLE NITRATE: 2 POWDER TOPICAL at 09:39

## 2018-11-02 RX ADMIN — PIOGLITAZONE 15 MG: 15 TABLET ORAL at 09:39

## 2018-11-02 RX ADMIN — DILTIAZEM HYDROCHLORIDE 30 MG: 30 TABLET, FILM COATED ORAL at 18:12

## 2018-11-02 RX ADMIN — MAGNESIUM GLUCONATE 500 MG ORAL TABLET 400 MG: 500 TABLET ORAL at 09:39

## 2018-11-02 RX ADMIN — ATORVASTATIN CALCIUM 20 MG: 20 TABLET, FILM COATED ORAL at 20:29

## 2018-11-02 RX ADMIN — METOPROLOL TARTRATE 50 MG: 50 TABLET, FILM COATED ORAL at 09:39

## 2018-11-02 RX ADMIN — DILTIAZEM HYDROCHLORIDE 30 MG: 30 TABLET, FILM COATED ORAL at 13:12

## 2018-11-02 RX ADMIN — APIXABAN 5 MG: 5 TABLET, FILM COATED ORAL at 20:29

## 2018-11-02 RX ADMIN — SUCRALFATE 1 G: 1 TABLET ORAL at 18:12

## 2018-11-02 RX ADMIN — METOPROLOL TARTRATE 50 MG: 50 TABLET, FILM COATED ORAL at 20:29

## 2018-11-02 RX ADMIN — SUCRALFATE 1 G: 1 TABLET ORAL at 20:29

## 2018-11-02 RX ADMIN — METFORMIN HYDROCHLORIDE 850 MG: 850 TABLET ORAL at 18:13

## 2018-11-02 RX ADMIN — APIXABAN 5 MG: 5 TABLET, FILM COATED ORAL at 09:39

## 2018-11-02 RX ADMIN — METFORMIN HYDROCHLORIDE 850 MG: 850 TABLET ORAL at 09:39

## 2018-11-02 ASSESSMENT — PAIN SCALES - GENERAL
PAINLEVEL_OUTOF10: 0
PAINLEVEL_OUTOF10: 0

## 2018-11-02 NOTE — PROGRESS NOTES
sodium in 9/2018 was 139, though she had hyponatremia in 6/2018, sodium of 130. Her blood glucose is also elevated  ( she said her FBS at home usually at 160s). Urine and blood cultures were all sent. Admitted to [facility] 10/18/18. Past Medical History:   Diagnosis Date    Atypical chest pain     CAD (coronary artery disease)     Chronic LBBB (left bundle branch block)     Diabetes mellitus type II     Esophageal stricture     History of esophageal stricture.  FH: CAD (coronary artery disease)     Mom and dad both with heart disease at mid to late age.  GERD (gastroesophageal reflux disease)     History of gastritis     History of skin cancer     Hypercholesterolemia     Hyperlipidemia     Hypertension     Imbalance     As far as imbalance is concerned, asked patient to follow-up with Dr. Indra Rosales since she follows with him on a regular basis.  Lactose intolerance     Nummular dermatitis     Parkinson's disease ([address]     Restless legs syndrome (RLS)     S/P CABG (coronary artery bypass graft)     SCC (squamous cell carcinoma)      Past Surgical History:   Procedure Laterality Date    CARDIAC SURGERY      CARDIOVASCULAR STRESS TEST  4 09 2009    Gated SPECT images revealed normal global wall motion with EF of 60%. Persantine test associated w/nonspecific symptoms. EKG is nondiagnostic w/baseline LBBB. No obvious stress-induced ischemia by Cardiolite. EF within normal limits.  CARDIOVASCULAR STRESS TEST  7 10 2007    The gated SPECT images revealed normal wall motion with EF of 69%. Poor exercise tolerance w/SOB on exertion. EKG is nondiagnostic. Cardiolite scan revealing no obvious stress-induced ischemia. EF 60%.  CARDIOVASCULAR STRESS TEST  2 03 2006    Fair exercise tolerance w/SOB on exertion. No EKG evidence of ischemia. Patient should be able to proceed with phase II cardiac rehab.      CATARACT REMOVAL      CATARACT REMOVAL  06/08/2016    AND 6/29/16    DIAGNOSTIC to moderate pericardial effusion w/no obvious tamponade.  JOINT REPLACEMENT      PRE-MALIGNANT / BENIGN SKIN LESION EXCISION Left 12/20/13    left leg lesion excision x2, frozen section, skin graft closure    ROTATOR CUFF REPAIR  09/2001    RIGHT    ROTATOR CUFF REPAIR  04/15/2009    LEFT     SKIN CANCER EXCISION  06/2006      Rt leg       Restrictions/Precautions:  General Precautions, Fall Risk                    Other position/activity restrictions: Parkinson's       Prior Level of Function:  ADL Assistance: Independent  Homemaking Assistance: Needs assistance  Ambulation Assistance: Independent  Transfer Assistance: Independent  Additional Comments: Pt amb with rollator, daughter and 2 daughter-in-law assist with transportation, groceries, neighbor assists with trash    Subjective       Subjective: Patient lying in bed upon arrival. Agreeable to OT session    Overall Orientation Status: Within Normal Limits         Pain:  Pain Assessment  Patient Currently in Pain: Denies       Objective  Overall Cognitive Status: WFL            ADL  Grooming: Supervision (standing sinkside to complete hair care)  UE Bathing: Setup  LE Bathing: Supervision (standing to wash jabari area/bottom. Able to wash BLE below knees including B feet seated on RTS)  UE Dressing: Supervision (to retrieve clothing from closet. Able to doff pajama and don sweater in seated position)  LE Dressing: Supervision (to retrieve clothing from closet.  Able to thread BLE into pants/undergarment and don shoes/socks in seated position)  Toileting: Supervision        Bed mobility  Rolling to Left: Modified independent  Supine to Sit: Modified independent  Scooting: Modified independent    Transfers  Sit to stand: Supervision  Stand to sit: Supervision  Toilet Transfers  Equipment Used: Raised toilet seat with rails  Toilet Transfer: Supervision    Balance  Sitting Balance: Modified independent   Standing Balance: Supervision Functional Mobility  Functional - Mobility Device: Rolling Walker  Activity: To/from bathroom; Other  Assist Level: Supervision  Functional Mobility Comments: To/from therapy gym. No LOB noted. Required seated rest break after each trial of mobility        Type of ROM/Therapeutic Exercise: AROM (red theraband)  Comment: Completed B UE exercise with red theraband x 1 set x 15 repetitions all joints all planes sitting in arm chair. Brief rest break between each section of exercise. Exercises completed to increase strength and activity for ADLs and toilet transfers       Activity Tolerance:  Activity Tolerance: Patient Tolerated treatment well    Assessment:     Performance deficits / Impairments: Decreased functional mobility , Decreased ADL status, Decreased safe awareness, Decreased high-level IADLs, Decreased endurance, Decreased balance  Prognosis: Good    Discharge Recommendations:  Discharge Recommendations: Home with Home health OT    Patient Education:  Patient Education: HEP    Equipment Recommendations:  Equipment Needed: Yes  Other: Will continue to monitor- may benefit from a reacher    Safety:  Safety Devices in place: Yes  Type of devices: Call light within reach, Chair alarm in place, Gait belt, Left in chair    Plan:  Times per week: 6x  Current Treatment Recommendations: Self-Care / ADL, Endurance Training, Strengthening, Safety Education & Training, Patient/Caregiver Education & Training, Functional Mobility Training, Balance Training    Goals:       Short term goals  Time Frame for Short term goals: 1 week  Short term goal 1: Pt will demonstrate functional mobility walking to/from the bathroom or in the kitchenette around obstacles with S to prepare for doing simple homemaking tasks. Nae Hodgear term goal 2: Pt will complete dynamic standing tasks while using 1-2 handed release technique with S to increase her safety and for doing cooking or cleaning tasks.    Short term goal 3: Pt will complete LB ADL with SBA while using safe technique to increase her independence with self care. Short term goal 4: Pt will complete various transfers including to/from the toilet seat with armrest with S and min cues for hand placement to increase her independence with toileting routine. Long term goals  Time Frame for Long term goals : 2 weeks  Long term goal 1: Pt will complete ADL routine with S to increase indep with self care. Long term goal 2: pt will complete light IADL task with S to increase indep with warming up light meals.

## 2018-11-02 NOTE — FLOWSHEET NOTE
Date: 2018  Patient Name: Sanjuanita Sanders        MRN: [id number]   Account: [de-identified]   : 1932  (80 y.o.)  Gender: female   Referring Practitioner: Torin Feliciano MD; Attending: Dr. Kiara Chester  Diagnosis: Physical Deconditioning  Additional Pertinent Hx: Pt presented to [facility], accompanied by her daughter, due to fall and fever. Pt reports she was in the bathroom last night and had off balance and fell, tried to hold on the sink, landed against the door of the bathroom and hit the back of her head. She denies dizziness, HA, focal weakness, numbness, chest pain, palpitations. She also reports that for past 3 days, she's having intermittent rt lower quadrant abdominal and rt pelvic pain, 6/10, now 5/10, accompanied by dysuria, urinary frequency and urgency x 3 days. She denies hematuria. Of note, she reports that she has urinary incontinence x 1 year now. Per pt, she was scheduled for \"bladder stimulation\" and supposed to have lexiscan stress test and echo for cardiac clearance. Also, she said that she has recurrent UTI since 2018. Pt also reports low appetite for 2 days, feeling bloated, nauseous, no vomiting. She also reports constant B/L lower back pain x 1 day, 5/10, achy, radiates to rt lower quadrant of abdomen. She also c/o intermittent neck pain x 3 days, 3/10. Daughter reports that she felt that the pt was warm and looks weak this afternoon, hence daughter brought pt to ER. In ER, her UA showed nitrites and large leukocyte esterase. She received 1 dose of Zosyn 3.375 g IV. CT head, CT cervical, cxr and xr pelvis all done, all came back unremarkable. CBC normal except for mild anemia ( Hgb 11.4, baseline Hgb 12-13) , CMP normal except for hyponatremia, sodium of 128, her sodium in 2018 was 139, though she had hyponatremia in 2018, sodium of 130. Her blood glucose is also elevated  ( she said her FBS at home usually at 160s).  Urine and blood

## 2018-11-03 VITALS
SYSTOLIC BLOOD PRESSURE: 127 MMHG | HEIGHT: 61 IN | WEIGHT: 133.8 LBS | RESPIRATION RATE: 20 BRPM | DIASTOLIC BLOOD PRESSURE: 58 MMHG | HEART RATE: 59 BPM | OXYGEN SATURATION: 92 % | TEMPERATURE: 97.9 F | BODY MASS INDEX: 25.26 KG/M2

## 2018-11-03 LAB
ANION GAP SERPL CALCULATED.3IONS-SCNC: 8 MEQ/L (ref 8–16)
BUN BLDV-MCNC: 16 MG/DL (ref 7–22)
CALCIUM SERPL-MCNC: 9.5 MG/DL (ref 8.5–10.5)
CHLORIDE BLD-SCNC: 97 MEQ/L (ref 98–111)
CO2: 30 MEQ/L (ref 23–33)
CREAT SERPL-MCNC: 0.7 MG/DL (ref 0.4–1.2)
GFR SERPL CREATININE-BSD FRML MDRD: 79 ML/MIN/1.73M2
GLUCOSE BLD-MCNC: 152 MG/DL (ref 70–108)
GLUCOSE BLD-MCNC: 155 MG/DL (ref 70–108)
ORGANISM: ABNORMAL
POTASSIUM REFLEX MAGNESIUM: 5.6 MEQ/L (ref 3.5–5.2)
POTASSIUM SERPL-SCNC: 4.2 MEQ/L (ref 3.5–5.2)
SODIUM BLD-SCNC: 135 MEQ/L (ref 135–145)
URINE CULTURE REFLEX: ABNORMAL

## 2018-11-03 PROCEDURE — 36415 COLL VENOUS BLD VENIPUNCTURE: CPT

## 2018-11-03 PROCEDURE — 82948 REAGENT STRIP/BLOOD GLUCOSE: CPT

## 2018-11-03 PROCEDURE — 6370000000 HC RX 637 (ALT 250 FOR IP): Performed by: FAMILY MEDICINE

## 2018-11-03 PROCEDURE — 80048 BASIC METABOLIC PNL TOTAL CA: CPT

## 2018-11-03 PROCEDURE — 6370000000 HC RX 637 (ALT 250 FOR IP): Performed by: INTERNAL MEDICINE

## 2018-11-03 PROCEDURE — 84132 ASSAY OF SERUM POTASSIUM: CPT

## 2018-11-03 RX ORDER — METOPROLOL TARTRATE 50 MG/1
50 TABLET, FILM COATED ORAL 2 TIMES DAILY
COMMUNITY
Start: 2018-11-03 | End: 2019-06-06 | Stop reason: SDUPTHER

## 2018-11-03 RX ORDER — FUROSEMIDE 20 MG/1
20 TABLET ORAL DAILY
Qty: 15 TABLET | Refills: 0 | Status: SHIPPED | OUTPATIENT
Start: 2018-11-04 | End: 2018-11-12 | Stop reason: SDUPTHER

## 2018-11-03 RX ORDER — FAMOTIDINE 20 MG
20 TABLET ORAL DAILY
COMMUNITY
Start: 2018-11-04 | End: 2018-11-27 | Stop reason: ALTCHOICE

## 2018-11-03 RX ORDER — PIOGLITAZONEHYDROCHLORIDE 15 MG/1
15 TABLET ORAL DAILY
Qty: 15 TABLET | Refills: 0 | Status: SHIPPED | OUTPATIENT
Start: 2018-11-04 | End: 2018-11-12 | Stop reason: SDUPTHER

## 2018-11-03 RX ADMIN — METOPROLOL TARTRATE 50 MG: 50 TABLET, FILM COATED ORAL at 08:33

## 2018-11-03 RX ADMIN — DILTIAZEM HYDROCHLORIDE 30 MG: 30 TABLET, FILM COATED ORAL at 02:19

## 2018-11-03 RX ADMIN — MAGNESIUM GLUCONATE 500 MG ORAL TABLET 400 MG: 500 TABLET ORAL at 08:33

## 2018-11-03 RX ADMIN — DILTIAZEM HYDROCHLORIDE 30 MG: 30 TABLET, FILM COATED ORAL at 11:35

## 2018-11-03 RX ADMIN — FAMOTIDINE 20 MG: 20 TABLET ORAL at 08:42

## 2018-11-03 RX ADMIN — PIOGLITAZONE 15 MG: 15 TABLET ORAL at 08:33

## 2018-11-03 RX ADMIN — APIXABAN 5 MG: 5 TABLET, FILM COATED ORAL at 08:34

## 2018-11-03 RX ADMIN — ASPIRIN 81 MG: 81 TABLET, COATED ORAL at 08:42

## 2018-11-03 RX ADMIN — FUROSEMIDE 20 MG: 20 TABLET ORAL at 08:33

## 2018-11-03 RX ADMIN — METFORMIN HYDROCHLORIDE 850 MG: 850 TABLET ORAL at 08:34

## 2018-11-03 RX ADMIN — MICONAZOLE NITRATE: 2 POWDER TOPICAL at 09:39

## 2018-11-03 RX ADMIN — SUCRALFATE 1 G: 1 TABLET ORAL at 08:33

## 2018-11-03 RX ADMIN — CARBIDOPA AND LEVODOPA 1 TABLET: 25; 100 TABLET, EXTENDED RELEASE ORAL at 08:34

## 2018-11-03 RX ADMIN — DILTIAZEM HYDROCHLORIDE 30 MG: 30 TABLET, FILM COATED ORAL at 05:33

## 2018-11-03 ASSESSMENT — PAIN SCALES - GENERAL: PAINLEVEL_OUTOF10: 0

## 2018-11-03 NOTE — DISCHARGE INSTR - DIET
 Good nutrition is important when healing from an illness, injury, or surgery. Follow any nutrition recommendations given to you during your hospital stay.  If you were given an oral nutrition supplement while in the hospital, continue to take this supplement at home. You can take it with meals, in-between meals, and/or before bedtime. These supplements can be purchased at most local grocery stores, pharmacies, and chain super-stores.  If you have any questions about your diet or nutrition, call the hospital and ask for the dietitian. · Do not skip meals. Eating a balanced diet may increase your energy level.

## 2018-11-04 ENCOUNTER — CARE COORDINATION (OUTPATIENT)
Dept: CASE MANAGEMENT | Age: 83
End: 2018-11-04

## 2018-11-04 DIAGNOSIS — A41.51 SEPSIS DUE TO ESCHERICHIA COLI (HCC): Primary | ICD-10-CM

## 2018-11-04 NOTE — CARE COORDINATION
since hospital discharge. States she was able to get a good nights sleep last night and has BSC to utilize. States having pain in both knees which she attributes to Parkinsons but is ambulating with walker without difficulty. Denies any shortness of breath, chest pain, vomiting, chills or fever. States having nausea when taking Macrobid but no vomiting. CTC advised to take with food or saltine cracker and call PCP if symptoms continue. Verified with Herberth Soliz that Donalda Kill was prescribed by Dr. Evangelina Kaye and admits patient took Donalda Kill on empty stomach. Patient states she monitors blood sugar once daily. States FBS today was 199. Denies any s/s of hyperglycemia. Advised to document BS readings and take log to PCP follow up appointment. Advised that infection can have a direct effect on BS increase and should improve as healing occurs. Reviewed Diabetes zones and knowing when to seek medical attention. Confirmed with patient and her daughter that she is scheduled for TCM/HFU visit with Dr. Boy Michael (PCP) on 11/12/18 at 9:10 am. Herberth Soliz reports her sister will be taking patient to follow up visit or another family member therefore denying any transportation issues at this time. [id number] code entered. Denies any other complaints, concerns or needs. CTC signing off for Care Transition and will provide warm handoff to [facility] as patient is active with Care Coordination.      Follow Up  Future Appointments  Date Time Provider J Carlos Patterson   11/12/2018 9:10 AM Juana Dumont MD [facility] [place] OFFENEHALINA BROWN   2/27/2019 2:45 PM Lavelle Buchanan MD [address] [facility]   8/23/2019 11:00 AM Cristine Archer MD [address] [facility] ROWDY Miles

## 2018-11-05 ENCOUNTER — TELEPHONE (OUTPATIENT)
Dept: UROLOGY | Age: 83
End: 2018-11-05

## 2018-11-05 DIAGNOSIS — N39.0 URINARY TRACT INFECTION WITHOUT HEMATURIA, SITE UNSPECIFIED: Primary | ICD-10-CM

## 2018-11-05 NOTE — PROGRESS NOTES
240 ( she said her FBS at home usually at 160s). Urine and blood cultures were all sent. Admitted to [facility] 10/18/18. Restrictions/Precautions:  Restrictions/Precautions: General Precautions, Fall Risk            Position Activity Restriction  Other position/activity restrictions: Parkinson's         Past Medical History:   Diagnosis Date    Atypical chest pain     CAD (coronary artery disease)     Chronic LBBB (left bundle branch block)     Diabetes mellitus type II     Esophageal stricture     History of esophageal stricture.  FH: CAD (coronary artery disease)     Mom and dad both with heart disease at mid to late age.  GERD (gastroesophageal reflux disease)     History of gastritis     History of skin cancer     Hypercholesterolemia     Hyperlipidemia     Hypertension     Imbalance     As far as imbalance is concerned, asked patient to follow-up with Dr. Marlene Huff since she follows with him on a regular basis.  Lactose intolerance     Nummular dermatitis     Parkinson's disease ([address]     Restless legs syndrome (RLS)     S/P CABG (coronary artery bypass graft)     SCC (squamous cell carcinoma)      Past Surgical History:   Procedure Laterality Date    CARDIAC SURGERY      CARDIOVASCULAR STRESS TEST  4 09 2009    Gated SPECT images revealed normal global wall motion with EF of 60%. Persantine test associated w/nonspecific symptoms. EKG is nondiagnostic w/baseline LBBB. No obvious stress-induced ischemia by Cardiolite. EF within normal limits.  CARDIOVASCULAR STRESS TEST  7 10 2007    The gated SPECT images revealed normal wall motion with EF of 69%. Poor exercise tolerance w/SOB on exertion. EKG is nondiagnostic. Cardiolite scan revealing no obvious stress-induced ischemia. EF 60%.  CARDIOVASCULAR STRESS TEST  2 03 2006    Fair exercise tolerance w/SOB on exertion. No EKG evidence of ischemia. Patient should be able to proceed with phase II cardiac rehab.      CATARACT

## 2018-11-05 NOTE — TELEPHONE ENCOUNTER
I received a message from [employer], daughter, on [employer]. She is questioning if she should stay on the Macrobid until after the interstim is placed. She denies her mother having any fever, chills, dysuria, urgency, or frequency. I advised Quinn Bergeron that the patient's urine could be recollected to check for infection after her antibiotics is completed. She stated her mother should be finished with the antibiotics around 11/21/18.  I placed an order and Costeladia Bergeron stated she would have it collected on 11/16/18

## 2018-11-06 ENCOUNTER — TELEPHONE (OUTPATIENT)
Dept: UROLOGY | Age: 83
End: 2018-11-06

## 2018-11-06 ENCOUNTER — TELEPHONE (OUTPATIENT)
Dept: CARDIOLOGY CLINIC | Age: 83
End: 2018-11-06

## 2018-11-06 NOTE — TELEPHONE ENCOUNTER
I called and spoke with [facility] and advised her of the message below. Orly voiced understanding.

## 2018-11-08 ENCOUNTER — TELEPHONE (OUTPATIENT)
Dept: UROLOGY | Age: 83
End: 2018-11-08

## 2018-11-09 ENCOUNTER — CARE COORDINATION (OUTPATIENT)
Dept: CARE COORDINATION | Age: 83
End: 2018-11-09

## 2018-11-12 ENCOUNTER — OFFICE VISIT (OUTPATIENT)
Dept: FAMILY MEDICINE CLINIC | Age: 83
End: 2018-11-12
Payer: MEDICARE

## 2018-11-12 VITALS
DIASTOLIC BLOOD PRESSURE: 50 MMHG | SYSTOLIC BLOOD PRESSURE: 100 MMHG | HEART RATE: 76 BPM | BODY MASS INDEX: 24.14 KG/M2 | TEMPERATURE: 98 F | HEIGHT: 62 IN | WEIGHT: 131.2 LBS | RESPIRATION RATE: 12 BRPM

## 2018-11-12 DIAGNOSIS — E11.65 TYPE 2 DIABETES MELLITUS WITH HYPERGLYCEMIA, WITHOUT LONG-TERM CURRENT USE OF INSULIN (HCC): ICD-10-CM

## 2018-11-12 DIAGNOSIS — A41.51 SEPSIS DUE TO ESCHERICHIA COLI (HCC): ICD-10-CM

## 2018-11-12 DIAGNOSIS — R53.81 PHYSICAL DECONDITIONING: Primary | ICD-10-CM

## 2018-11-12 DIAGNOSIS — G20 PARKINSON'S DISEASE (HCC): ICD-10-CM

## 2018-11-12 DIAGNOSIS — I48.91 NEW ONSET ATRIAL FIBRILLATION (HCC): ICD-10-CM

## 2018-11-12 PROCEDURE — 99495 TRANSJ CARE MGMT MOD F2F 14D: CPT | Performed by: FAMILY MEDICINE

## 2018-11-12 PROCEDURE — 1111F DSCHRG MED/CURRENT MED MERGE: CPT | Performed by: FAMILY MEDICINE

## 2018-11-12 RX ORDER — PIOGLITAZONEHYDROCHLORIDE 15 MG/1
15 TABLET ORAL DAILY
Qty: 90 TABLET | Refills: 3 | Status: SHIPPED | OUTPATIENT
Start: 2018-11-12 | End: 2019-01-14 | Stop reason: ALTCHOICE

## 2018-11-12 RX ORDER — FUROSEMIDE 20 MG/1
20 TABLET ORAL DAILY
Qty: 90 TABLET | Refills: 3 | Status: SHIPPED | OUTPATIENT
Start: 2018-11-12 | End: 2019-02-07 | Stop reason: DRUGHIGH

## 2018-11-12 NOTE — PROGRESS NOTES
Post-Discharge Transitional Care Management Services or Hospital Follow Up      Alise Smith   YOB: 1932    Date of Office Visit:  11/12/2018  Date of Hospital Admission: 10/18/18  Date of Hospital Discharge: 11/3/18  Risk of hospital readmission (high >=14%.  Medium >=10%) :Readmission Risk Score: 20    Care management risk score Rising risk (score 2-5) and Complex Care (Scores >=6): 10     Non face to face  following discharge, date last encounter closed (first attempt may have been earlier): 11/4/2018 12:04 PM    Call initiated 2 business days of discharge: Yes    Patient Active Problem List   Diagnosis    CAD (coronary artery disease)    S/P CABG (coronary artery bypass graft)    Hyperlipidemia    [facility] [address]    Chronic LBBB (left bundle branch block)    History of skin cancer    Imbalance    Atypical chest pain    Hypercholesterolemia    Diabetes mellitus, type II ([facility] [address]    Esophageal stricture    Hypertension    FH: CAD (coronary artery disease)    History of gastritis    Lactose intolerance    Hyponatremia    Abdominal pain    Melanotic stools    Recurrent UTI    Urinary tract infection without hematuria    Urinary retention    Atrial fibrillation with RVR (HCC)    Pyelonephritis    New onset atrial fibrillation (HCC)    Sepsis due to Escherichia coli (HCC)    Physical deconditioning    Cervical spinal stenosis    Cervical arthritis    Arteriosclerosis    Osteopenia       Allergies   Allergen Reactions    Latex Rash    Lisinopril Swelling    Morphine     Polysporin [Bacitracin-Polymyxin B]     Tape [Adhesive Tape] Itching    Keflex [Cephalexin] Rash       Medications listed as ordered at the time of discharge from [facility] Medication Instructions BLUE:    Printed on:11/12/18 8929   Medication Information                      apixaban (ELIQUIS) 5 MG TABS tablet  Take 1 tablet by mouth 2 times daily Additional RF per her

## 2018-11-26 ENCOUNTER — HOSPITAL ENCOUNTER (OUTPATIENT)
Age: 83
Discharge: HOME | End: 2018-11-26
Payer: MEDICARE

## 2018-11-26 ENCOUNTER — CARE COORDINATION (OUTPATIENT)
Dept: CARE COORDINATION | Age: 83
End: 2018-11-26

## 2018-11-26 DIAGNOSIS — N39.0 URINARY TRACT INFECTION WITHOUT HEMATURIA, SITE UNSPECIFIED: ICD-10-CM

## 2018-11-26 LAB
AMORPHOUS: ABNORMAL
BACTERIA: ABNORMAL /HPF
BILIRUBIN URINE: NEGATIVE
BLOOD, URINE: NEGATIVE
CASTS UA: ABNORMAL /LPF
CHARACTER, URINE: ABNORMAL
COLOR: YELLOW
CRYSTALS, UA: ABNORMAL
EPITHELIAL CELLS, UA: ABNORMAL /HPF
GLUCOSE URINE: NEGATIVE MG/DL
KETONES, URINE: NEGATIVE
LEUKOCYTE ESTERASE, URINE: ABNORMAL
MUCUS: ABNORMAL
NITRITE, URINE: NEGATIVE
PH UA: 6.5
PROTEIN UA: ABNORMAL
RBC URINE: ABNORMAL /HPF
SPECIFIC GRAVITY, URINE: 1.01 (ref 1–1.03)
UROBILINOGEN, URINE: 0.2 EU/DL
WBC UA: ABNORMAL /HPF

## 2018-11-26 PROCEDURE — 87086 URINE CULTURE/COLONY COUNT: CPT

## 2018-11-26 PROCEDURE — 81001 URINALYSIS AUTO W/SCOPE: CPT

## 2018-11-27 LAB
ORGANISM: ABNORMAL
URINE CULTURE REFLEX: ABNORMAL

## 2018-11-27 NOTE — PROGRESS NOTES
NPO after midnight  [employer] and drivers license  Wear comfortable clean clothing  Do not bring jewelry   Shower night before and morning of surgery with a liquid antibacterial soap  Bring medications in original bottles  Bring your walker  Follow all instructions give by your physician   needed at discharge  Call [phone] for any questions

## 2018-11-30 ENCOUNTER — PATIENT MESSAGE (OUTPATIENT)
Dept: UROLOGY | Age: 83
End: 2018-11-30

## 2018-11-30 ENCOUNTER — TELEPHONE (OUTPATIENT)
Dept: UROLOGY | Age: 83
End: 2018-11-30

## 2018-11-30 NOTE — TELEPHONE ENCOUNTER
From: Annabel Najera  To: ROWDY Cardoso CNP  Sent: 11/30/2018 9:36 AM EST  Subject: Labs    urine I'm sorry . Alvin Hamilton    ----- Message -----  From: ROWDY Cardoso CNP  Sent: 11/30/18, 8:12 AM  To: Giulia Mariano  Subject: Labs    Hello. We have not ordered any labs, just urine. Are you referring to the labs from hospitalization? The potassium was high at that time but on re-check it was normal. The glucose will be followed by her family doctor. I don't have any recent labs. Did she get them drawn somewhere else?

## 2018-12-04 ENCOUNTER — APPOINTMENT (OUTPATIENT)
Dept: GENERAL RADIOLOGY | Age: 83
End: 2018-12-04
Attending: UROLOGY
Payer: MEDICARE

## 2018-12-04 ENCOUNTER — ANESTHESIA (OUTPATIENT)
Dept: OPERATING ROOM | Age: 83
End: 2018-12-04
Payer: MEDICARE

## 2018-12-04 ENCOUNTER — ANESTHESIA EVENT (OUTPATIENT)
Dept: OPERATING ROOM | Age: 83
End: 2018-12-04
Payer: MEDICARE

## 2018-12-04 ENCOUNTER — HOSPITAL ENCOUNTER (OUTPATIENT)
Age: 83
Setting detail: OUTPATIENT SURGERY
Discharge: HOME | End: 2018-12-04
Attending: UROLOGY | Admitting: UROLOGY
Payer: MEDICARE

## 2018-12-04 VITALS
DIASTOLIC BLOOD PRESSURE: 55 MMHG | SYSTOLIC BLOOD PRESSURE: 119 MMHG | WEIGHT: 131 LBS | HEIGHT: 62 IN | HEART RATE: 83 BPM | TEMPERATURE: 96.9 F | RESPIRATION RATE: 16 BRPM | BODY MASS INDEX: 24.11 KG/M2 | OXYGEN SATURATION: 96 %

## 2018-12-04 VITALS
OXYGEN SATURATION: 100 % | DIASTOLIC BLOOD PRESSURE: 103 MMHG | RESPIRATION RATE: 3 BRPM | SYSTOLIC BLOOD PRESSURE: 173 MMHG | TEMPERATURE: 97.3 F

## 2018-12-04 DIAGNOSIS — G89.18 POST-OPERATIVE PAIN: Primary | ICD-10-CM

## 2018-12-04 LAB
GLUCOSE BLD-MCNC: 170 MG/DL (ref 70–108)
GLUCOSE BLD-MCNC: 215 MG/DL (ref 70–108)

## 2018-12-04 PROCEDURE — 2580000003 HC RX 258: Performed by: UROLOGY

## 2018-12-04 PROCEDURE — 6360000002 HC RX W HCPCS: Performed by: ANESTHESIOLOGY

## 2018-12-04 PROCEDURE — 3700000001 HC ADD 15 MINUTES (ANESTHESIA): Performed by: UROLOGY

## 2018-12-04 PROCEDURE — 6360000002 HC RX W HCPCS

## 2018-12-04 PROCEDURE — 76000 FLUOROSCOPY <1 HR PHYS/QHP: CPT | Performed by: UROLOGY

## 2018-12-04 PROCEDURE — 3600000003 HC SURGERY LEVEL 3 BASE: Performed by: UROLOGY

## 2018-12-04 PROCEDURE — 82948 REAGENT STRIP/BLOOD GLUCOSE: CPT

## 2018-12-04 PROCEDURE — 64590 INS/RPL PRPH SAC/GSTR NPG/R: CPT | Performed by: UROLOGY

## 2018-12-04 PROCEDURE — 6360000002 HC RX W HCPCS: Performed by: UROLOGY

## 2018-12-04 PROCEDURE — 7100000010 HC PHASE II RECOVERY - FIRST 15 MIN: Performed by: UROLOGY

## 2018-12-04 PROCEDURE — 7100000001 HC PACU RECOVERY - ADDTL 15 MIN: Performed by: UROLOGY

## 2018-12-04 PROCEDURE — 2500000003 HC RX 250 WO HCPCS: Performed by: ANESTHESIOLOGY

## 2018-12-04 PROCEDURE — 64581 OPN IMPLTJ NEA SACRAL NERVE: CPT | Performed by: UROLOGY

## 2018-12-04 PROCEDURE — 72170 X-RAY EXAM OF PELVIS: CPT

## 2018-12-04 PROCEDURE — 3209999900 FLUORO FOR SURGICAL PROCEDURES

## 2018-12-04 PROCEDURE — 2709999900 HC NON-CHARGEABLE SUPPLY: Performed by: UROLOGY

## 2018-12-04 PROCEDURE — C1787 PATIENT PROGR, NEUROSTIM: HCPCS | Performed by: UROLOGY

## 2018-12-04 PROCEDURE — 7100000000 HC PACU RECOVERY - FIRST 15 MIN: Performed by: UROLOGY

## 2018-12-04 PROCEDURE — 3700000000 HC ANESTHESIA ATTENDED CARE: Performed by: UROLOGY

## 2018-12-04 PROCEDURE — C1883 ADAPT/EXT, PACING/NEURO LEAD: HCPCS | Performed by: UROLOGY

## 2018-12-04 PROCEDURE — 6360000002 HC RX W HCPCS: Performed by: NURSE ANESTHETIST, CERTIFIED REGISTERED

## 2018-12-04 PROCEDURE — C1894 INTRO/SHEATH, NON-LASER: HCPCS | Performed by: UROLOGY

## 2018-12-04 PROCEDURE — 3600000013 HC SURGERY LEVEL 3 ADDTL 15MIN: Performed by: UROLOGY

## 2018-12-04 PROCEDURE — 95972 ALYS CPLX SP/PN NPGT W/PRGRM: CPT | Performed by: UROLOGY

## 2018-12-04 PROCEDURE — 7100000011 HC PHASE II RECOVERY - ADDTL 15 MIN: Performed by: UROLOGY

## 2018-12-04 PROCEDURE — 2500000003 HC RX 250 WO HCPCS: Performed by: UROLOGY

## 2018-12-04 PROCEDURE — 2500000003 HC RX 250 WO HCPCS: Performed by: NURSE ANESTHETIST, CERTIFIED REGISTERED

## 2018-12-04 PROCEDURE — C1767 GENERATOR, NEURO NON-RECHARG: HCPCS | Performed by: UROLOGY

## 2018-12-04 PROCEDURE — L8689 EXTERNAL RECHARG SYS INTERN: HCPCS | Performed by: UROLOGY

## 2018-12-04 DEVICE — Z DUP USE 2628873 GENERATOR NEUROSTIMULATOR H1.7XL2IN THK3IN TORQ WRNCH PROD: Type: IMPLANTABLE DEVICE | Site: BACK | Status: FUNCTIONAL

## 2018-12-04 DEVICE — KIT NEUROSTIMULATOR LD L28CM DIA1.27MM ELECTRD SPC 1.5MM: Type: IMPLANTABLE DEVICE | Status: FUNCTIONAL

## 2018-12-04 RX ORDER — SODIUM CHLORIDE 9 MG/ML
INJECTION, SOLUTION INTRAVENOUS CONTINUOUS
Status: DISCONTINUED | OUTPATIENT
Start: 2018-12-04 | End: 2018-12-04 | Stop reason: HOSPADM

## 2018-12-04 RX ORDER — PROPOFOL 10 MG/ML
INJECTION, EMULSION INTRAVENOUS PRN
Status: DISCONTINUED | OUTPATIENT
Start: 2018-12-04 | End: 2018-12-04 | Stop reason: SDUPTHER

## 2018-12-04 RX ORDER — DEXAMETHASONE SODIUM PHOSPHATE 4 MG/ML
INJECTION, SOLUTION INTRA-ARTICULAR; INTRALESIONAL; INTRAMUSCULAR; INTRAVENOUS; SOFT TISSUE PRN
Status: DISCONTINUED | OUTPATIENT
Start: 2018-12-04 | End: 2018-12-04 | Stop reason: SDUPTHER

## 2018-12-04 RX ORDER — HYDRALAZINE HYDROCHLORIDE 20 MG/ML
5 INJECTION INTRAMUSCULAR; INTRAVENOUS EVERY 5 MIN PRN
Status: DISCONTINUED | OUTPATIENT
Start: 2018-12-04 | End: 2018-12-04 | Stop reason: HOSPADM

## 2018-12-04 RX ORDER — ROCURONIUM BROMIDE 10 MG/ML
INJECTION, SOLUTION INTRAVENOUS PRN
Status: DISCONTINUED | OUTPATIENT
Start: 2018-12-04 | End: 2018-12-04 | Stop reason: SDUPTHER

## 2018-12-04 RX ORDER — LIDOCAINE HYDROCHLORIDE 20 MG/ML
INJECTION, SOLUTION INFILTRATION; PERINEURAL PRN
Status: DISCONTINUED | OUTPATIENT
Start: 2018-12-04 | End: 2018-12-04 | Stop reason: SDUPTHER

## 2018-12-04 RX ORDER — ONDANSETRON 2 MG/ML
4 INJECTION INTRAMUSCULAR; INTRAVENOUS EVERY 4 HOURS PRN
Status: DISCONTINUED | OUTPATIENT
Start: 2018-12-04 | End: 2018-12-04 | Stop reason: HOSPADM

## 2018-12-04 RX ORDER — ACETAMINOPHEN 325 MG/1
650 TABLET ORAL EVERY 4 HOURS PRN
Status: DISCONTINUED | OUTPATIENT
Start: 2018-12-04 | End: 2018-12-04 | Stop reason: HOSPADM

## 2018-12-04 RX ORDER — EPHEDRINE SULFATE 50 MG/ML
INJECTION INTRAVENOUS PRN
Status: DISCONTINUED | OUTPATIENT
Start: 2018-12-04 | End: 2018-12-04 | Stop reason: SDUPTHER

## 2018-12-04 RX ORDER — KETOROLAC TROMETHAMINE 30 MG/ML
15 INJECTION, SOLUTION INTRAMUSCULAR; INTRAVENOUS EVERY 6 HOURS PRN
Status: DISCONTINUED | OUTPATIENT
Start: 2018-12-04 | End: 2018-12-04 | Stop reason: HOSPADM

## 2018-12-04 RX ORDER — BUPIVACAINE HYDROCHLORIDE 5 MG/ML
INJECTION, SOLUTION EPIDURAL; INTRACAUDAL PRN
Status: DISCONTINUED | OUTPATIENT
Start: 2018-12-04 | End: 2018-12-04 | Stop reason: HOSPADM

## 2018-12-04 RX ORDER — ONDANSETRON 2 MG/ML
INJECTION INTRAMUSCULAR; INTRAVENOUS PRN
Status: DISCONTINUED | OUTPATIENT
Start: 2018-12-04 | End: 2018-12-04 | Stop reason: SDUPTHER

## 2018-12-04 RX ORDER — FENTANYL CITRATE 50 UG/ML
INJECTION, SOLUTION INTRAMUSCULAR; INTRAVENOUS PRN
Status: DISCONTINUED | OUTPATIENT
Start: 2018-12-04 | End: 2018-12-04 | Stop reason: SDUPTHER

## 2018-12-04 RX ORDER — HYDRALAZINE HYDROCHLORIDE 20 MG/ML
INJECTION INTRAMUSCULAR; INTRAVENOUS
Status: COMPLETED
Start: 2018-12-04 | End: 2018-12-04

## 2018-12-04 RX ORDER — HYDROCODONE BITARTRATE AND ACETAMINOPHEN 5; 325 MG/1; MG/1
1 TABLET ORAL EVERY 4 HOURS PRN
Qty: 10 TABLET | Refills: 0 | Status: SHIPPED | OUTPATIENT
Start: 2018-12-04 | End: 2018-12-07

## 2018-12-04 RX ORDER — FENTANYL CITRATE 50 UG/ML
25 INJECTION, SOLUTION INTRAMUSCULAR; INTRAVENOUS
Status: DISCONTINUED | OUTPATIENT
Start: 2018-12-04 | End: 2018-12-04 | Stop reason: HOSPADM

## 2018-12-04 RX ORDER — LABETALOL HYDROCHLORIDE 5 MG/ML
5 INJECTION, SOLUTION INTRAVENOUS EVERY 5 MIN PRN
Status: DISCONTINUED | OUTPATIENT
Start: 2018-12-04 | End: 2018-12-04 | Stop reason: HOSPADM

## 2018-12-04 RX ORDER — CIPROFLOXACIN 500 MG/1
500 TABLET, FILM COATED ORAL 2 TIMES DAILY
Qty: 10 TABLET | Refills: 0 | Status: SHIPPED | OUTPATIENT
Start: 2018-12-04 | End: 2018-12-09

## 2018-12-04 RX ADMIN — FENTANYL CITRATE 100 MCG: 50 INJECTION INTRAMUSCULAR; INTRAVENOUS at 14:19

## 2018-12-04 RX ADMIN — ONDANSETRON HYDROCHLORIDE 4 MG: 4 INJECTION, SOLUTION INTRAMUSCULAR; INTRAVENOUS at 14:41

## 2018-12-04 RX ADMIN — SUGAMMADEX 119 MG: 100 INJECTION, SOLUTION INTRAVENOUS at 14:48

## 2018-12-04 RX ADMIN — HYDRALAZINE HYDROCHLORIDE 5 MG: 20 INJECTION INTRAMUSCULAR; INTRAVENOUS at 16:15

## 2018-12-04 RX ADMIN — LABETALOL 20 MG/4 ML (5 MG/ML) INTRAVENOUS SYRINGE 5 MG: at 15:53

## 2018-12-04 RX ADMIN — SODIUM CHLORIDE: 9 INJECTION, SOLUTION INTRAVENOUS at 16:17

## 2018-12-04 RX ADMIN — ROCURONIUM BROMIDE 40 MG: 10 INJECTION INTRAVENOUS at 14:19

## 2018-12-04 RX ADMIN — PROPOFOL 150 MG: 10 INJECTION, EMULSION INTRAVENOUS at 14:19

## 2018-12-04 RX ADMIN — VANCOMYCIN HYDROCHLORIDE 1 G: 1 INJECTION, POWDER, LYOPHILIZED, FOR SOLUTION INTRAVENOUS at 13:41

## 2018-12-04 RX ADMIN — EPHEDRINE SULFATE 10 MG: 50 INJECTION, SOLUTION INTRAVENOUS at 14:46

## 2018-12-04 RX ADMIN — FENTANYL CITRATE 50 MCG: 50 INJECTION INTRAMUSCULAR; INTRAVENOUS at 14:23

## 2018-12-04 RX ADMIN — SODIUM CHLORIDE: 9 INJECTION, SOLUTION INTRAVENOUS at 14:14

## 2018-12-04 RX ADMIN — LIDOCAINE HYDROCHLORIDE 100 MG: 20 INJECTION, SOLUTION INFILTRATION; PERINEURAL at 14:19

## 2018-12-04 RX ADMIN — FENTANYL CITRATE 50 MCG: 50 INJECTION INTRAMUSCULAR; INTRAVENOUS at 15:36

## 2018-12-04 RX ADMIN — VANCOMYCIN HYDROCHLORIDE 1 G: 1 INJECTION, POWDER, LYOPHILIZED, FOR SOLUTION INTRAVENOUS at 14:15

## 2018-12-04 RX ADMIN — SODIUM CHLORIDE: 9 INJECTION, SOLUTION INTRAVENOUS at 13:13

## 2018-12-04 RX ADMIN — HYDRALAZINE HYDROCHLORIDE 5 MG: 20 INJECTION INTRAMUSCULAR; INTRAVENOUS at 16:21

## 2018-12-04 RX ADMIN — DEXAMETHASONE SODIUM PHOSPHATE 4 MG: 4 INJECTION, SOLUTION INTRAMUSCULAR; INTRAVENOUS at 14:41

## 2018-12-04 RX ADMIN — LABETALOL 20 MG/4 ML (5 MG/ML) INTRAVENOUS SYRINGE 5 MG: at 16:03

## 2018-12-04 ASSESSMENT — PULMONARY FUNCTION TESTS
PIF_VALUE: 16
PIF_VALUE: 17
PIF_VALUE: 25
PIF_VALUE: 18
PIF_VALUE: 16
PIF_VALUE: 18
PIF_VALUE: 16
PIF_VALUE: 17
PIF_VALUE: 18
PIF_VALUE: 17
PIF_VALUE: 18
PIF_VALUE: 16
PIF_VALUE: 18
PIF_VALUE: 17
PIF_VALUE: 16
PIF_VALUE: 25
PIF_VALUE: 1
PIF_VALUE: 18
PIF_VALUE: 15
PIF_VALUE: 18
PIF_VALUE: 18
PIF_VALUE: 17
PIF_VALUE: 18
PIF_VALUE: 16
PIF_VALUE: 18
PIF_VALUE: 17
PIF_VALUE: 18
PIF_VALUE: 17
PIF_VALUE: 18
PIF_VALUE: 18
PIF_VALUE: 11
PIF_VALUE: 17
PIF_VALUE: 18
PIF_VALUE: 18
PIF_VALUE: 17
PIF_VALUE: 24
PIF_VALUE: 16
PIF_VALUE: 17
PIF_VALUE: 17
PIF_VALUE: 18
PIF_VALUE: 18
PIF_VALUE: 3
PIF_VALUE: 18
PIF_VALUE: 17
PIF_VALUE: 4
PIF_VALUE: 18
PIF_VALUE: 17
PIF_VALUE: 16
PIF_VALUE: 2
PIF_VALUE: 17
PIF_VALUE: 18
PIF_VALUE: 17
PIF_VALUE: 19
PIF_VALUE: 2
PIF_VALUE: 3
PIF_VALUE: 17
PIF_VALUE: 18
PIF_VALUE: 16
PIF_VALUE: 22
PIF_VALUE: 1
PIF_VALUE: 17
PIF_VALUE: 17
PIF_VALUE: 18
PIF_VALUE: 18
PIF_VALUE: 1

## 2018-12-04 ASSESSMENT — PAIN SCALES - GENERAL
PAINLEVEL_OUTOF10: 2

## 2018-12-04 ASSESSMENT — PAIN DESCRIPTION - ORIENTATION: ORIENTATION: LEFT

## 2018-12-04 ASSESSMENT — PAIN DESCRIPTION - DESCRIPTORS: DESCRIPTORS: ACHING

## 2018-12-04 ASSESSMENT — PAIN DESCRIPTION - LOCATION: LOCATION: HIP

## 2018-12-04 NOTE — BRIEF OP NOTE
Brief Postoperative Note  ______________________________________________________________    Patient: Cailin Meyers  YOB: 1932  MRN: [id number]  Date of Procedure: 12/4/2018    Pre-Op Diagnosis: MIXED INCONTINENCE    Post-Op Diagnosis: Same       Procedure(s):  PERMANENT INTERSTIM PLACEMENT    Anesthesia: General  Surgeon(s):  Danni Humphrey MD    Assistant: none    Estimated Blood Loss (mL): 10 cc    Complications: none    Specimens:   * No specimens in log *    Implants:    Implant Name Type Inv.  Item Serial No.  Lot No. LRB No. Used   KIT LEAD EXTENSION NEUROSTIMULATOR PAIN THERAPY Urological/Gyn KIT LEAD EXTENSION NEUROSTIMULATOR PAIN THERAPY  [employer] [place] [id number] N/A 1   Genny Frankel - [id number] Neuro/Shunt Genny Frankel [id number] [employer] [place] [employer]  N/A 1         Drains:   External Urinary Catheter (Active)       Findings: good placement and response, best response with superior curve of lead and placed in this way    Danni Humphrey MD  Date: 12/4/2018  Time: 3:29 PM

## 2018-12-04 NOTE — ANESTHESIA PRE PROCEDURE
Osteopenia M85.80       Past Medical History:        Diagnosis Date    Arthritis     Atypical chest pain     CAD (coronary artery disease)     Chronic LBBB (left bundle branch block)     Diabetes mellitus type II     Esophageal stricture     History of esophageal stricture.  FH: CAD (coronary artery disease)     Mom and dad both with heart disease at mid to late age.  GERD (gastroesophageal reflux disease)     History of gastritis     History of skin cancer     Hypercholesterolemia     Hyperlipidemia     Hypertension     Imbalance     As far as imbalance is concerned, asked patient to follow-up with Dr. Emilie Kelley since she follows with him on a regular basis.  Lactose intolerance     Nummular dermatitis     Parkinson's disease ([address]     Restless legs syndrome (RLS)     S/P CABG (coronary artery bypass graft)     SCC (squamous cell carcinoma)        Past Surgical History:        Procedure Laterality Date    CARDIAC SURGERY      CARDIOVASCULAR STRESS TEST  4 09 2009    Gated SPECT images revealed normal global wall motion with EF of 60%. Persantine test associated w/nonspecific symptoms. EKG is nondiagnostic w/baseline LBBB. No obvious stress-induced ischemia by Cardiolite. EF within normal limits.  CARDIOVASCULAR STRESS TEST  7 10 2007    The gated SPECT images revealed normal wall motion with EF of 69%. Poor exercise tolerance w/SOB on exertion. EKG is nondiagnostic. Cardiolite scan revealing no obvious stress-induced ischemia. EF 60%.  CARDIOVASCULAR STRESS TEST  2 03 2006    Fair exercise tolerance w/SOB on exertion. No EKG evidence of ischemia. Patient should be able to proceed with phase II cardiac rehab.  CATARACT REMOVAL      CATARACT REMOVAL  06/08/2016    AND 6/29/16    DIAGNOSTIC CARDIAC CATH LAB PROCEDURE  12 23 2005    LV was obtained. AGEE projection showed preserved LV function w/estimated EF at 55%. No significant MR. Patent left main coronary artery.

## 2018-12-04 NOTE — H&P
Take 400 mg by mouth daily, Disp: , Rfl:     Lactase (DAIRY-RELIEF PO), Take by mouth daily, Disp: , Rfl:     aspirin 81 MG EC tablet, Take 81 mg by mouth daily. , Disp: , Rfl:     glucose blood VI test strips (TAYLOR CONTOUR TEST) strip, Test bid Dx: E11.9, Disp: 100 each, Rfl: 5     Social History     Social History    Marital status:      Spouse name: N/A    Number of children: N/A    Years of education: N/A     Occupational History    Not on file. Social History Main Topics    Smoking status: Never Smoker    Smokeless tobacco: Never Used    Alcohol use No    Drug use: No    Sexual activity: Not on file     Other Topics Concern    Not on file     Social History Narrative    No narrative on file       Family History   Problem Relation Age of Onset    Heart Disease Mother     Diabetes Mother     Stroke Father     Diabetes Sister     Lung Cancer Brother         Review of Systems  No problems with ears, nose or throat. No problems with eyes. No chest pain, shortness of breath, abdominal pain, extremity pain or weakness, and no neurological deficits. No rashes. No swollen glands or lymph nodes.  symptoms per HPI. The remainder of the review of symptoms is negative.     Exam  General: alert and oriented. Cooperative. HENT: Normocephalic, Atraumatic. Eyes: No scleral icterus, mucous membranes moist.  Respiratory: Effort normal.   Skin: No rashes or obvious lesions.        Procedure note   After explaining what we would be doing, Alf Gracia was placed in prone position on the procedure table. Her lower back and upper gluteal region was prepped and she was draped in the standard fashion for surgery. Using sterile technique the position of needle placement was marked out and then the area where the needles would be advanced was instilled with local anesthetic.  The location of needle placement was determined by using the standard length needle as a guide and then going 2 cm lateral to

## 2018-12-04 NOTE — OP NOTE
the procedure well and there were no complications      * Fluoroscopy was used throughout the procedure in order to guide needle placement and in order to make sure that leads were placed in good position.

## 2018-12-11 ENCOUNTER — CARE COORDINATION (OUTPATIENT)
Dept: CARE COORDINATION | Age: 83
End: 2018-12-11

## 2018-12-12 ENCOUNTER — TELEPHONE (OUTPATIENT)
Dept: CARDIOLOGY CLINIC | Age: 83
End: 2018-12-12

## 2018-12-12 NOTE — TELEPHONE ENCOUNTER
[facility] if pt needs to be on eliquis. The notes are confusing  Looks like she was on it then off, and back on. Can you help? Do you need to evaluate her.  Thanks

## 2018-12-12 NOTE — TELEPHONE ENCOUNTER
Pt cant afford 400.00 for a 3 month supply of eliquis. Is pt on this longterm? Any suggestions? LM yamileth hayward at pt assistance to see if she can help.

## 2018-12-14 ENCOUNTER — OFFICE VISIT (OUTPATIENT)
Dept: UROLOGY | Age: 83
End: 2018-12-14
Payer: MEDICARE

## 2018-12-14 VITALS
HEIGHT: 62 IN | SYSTOLIC BLOOD PRESSURE: 128 MMHG | BODY MASS INDEX: 23.74 KG/M2 | DIASTOLIC BLOOD PRESSURE: 70 MMHG | WEIGHT: 129 LBS

## 2018-12-14 DIAGNOSIS — R30.0 DYSURIA: ICD-10-CM

## 2018-12-14 DIAGNOSIS — R32 URINARY INCONTINENCE, UNSPECIFIED TYPE: Primary | ICD-10-CM

## 2018-12-14 LAB — POST VOID RESIDUAL (PVR): 203 ML

## 2018-12-14 PROCEDURE — 51798 US URINE CAPACITY MEASURE: CPT | Performed by: NURSE PRACTITIONER

## 2018-12-14 PROCEDURE — 99024 POSTOP FOLLOW-UP VISIT: CPT | Performed by: NURSE PRACTITIONER

## 2018-12-14 RX ORDER — NITROFURANTOIN 25; 75 MG/1; MG/1
100 CAPSULE ORAL 2 TIMES DAILY
Qty: 10 CAPSULE | Refills: 0 | Status: SHIPPED | OUTPATIENT
Start: 2018-12-14 | End: 2018-12-19

## 2018-12-14 ASSESSMENT — ENCOUNTER SYMPTOMS
ABDOMINAL PAIN: 0
NAUSEA: 0
VOMITING: 0

## 2018-12-14 NOTE — PROGRESS NOTES
Not on file. Social History Main Topics    Smoking status: Never Smoker    Smokeless tobacco: Never Used    Alcohol use No    Drug use: No    Sexual activity: Not on file     Other Topics Concern    Not on file     Social History Narrative    No narrative on file         Objective:   Physical Exam   Constitutional: She is oriented to person, place, and time. Vital signs are normal. She appears well-developed and well-nourished. HENT:   Head: Normocephalic and atraumatic. Eyes: Conjunctivae are normal.   Pulmonary/Chest: Effort normal and breath sounds normal.   Musculoskeletal: Normal range of motion. Neurological: She is alert and oriented to person, place, and time. Skin: Skin is warm and dry. Surgical incision well approximated, no drainage, no erythema or edema. Psychiatric: She has a normal mood and affect. Her behavior is normal. Judgment and thought content normal.       Assessment:      Urinary Incontinence  Recurrent UTI  Urinary Retention  Chronic Dysuria      Plan:      She is doing well. Surgical site WNL. She will contact Lizandro regarding changing of Interstim per her preference. Start Macrobid for dysuria. Order given to obtain urine culture. Follow-up in 3 months.

## 2018-12-16 LAB — URINE CULTURE, ROUTINE: NORMAL

## 2018-12-31 ENCOUNTER — OFFICE VISIT (OUTPATIENT)
Dept: FAMILY MEDICINE CLINIC | Age: 83
End: 2018-12-31
Payer: MEDICARE

## 2018-12-31 ENCOUNTER — HOSPITAL ENCOUNTER (OUTPATIENT)
Age: 83
Discharge: HOME | End: 2018-12-31
Payer: MEDICARE

## 2018-12-31 VITALS
HEIGHT: 62 IN | OXYGEN SATURATION: 98 % | TEMPERATURE: 97.3 F | BODY MASS INDEX: 23.34 KG/M2 | WEIGHT: 126.8 LBS | RESPIRATION RATE: 16 BRPM | DIASTOLIC BLOOD PRESSURE: 54 MMHG | SYSTOLIC BLOOD PRESSURE: 110 MMHG | HEART RATE: 81 BPM

## 2018-12-31 DIAGNOSIS — K52.9 ACUTE GASTROENTERITIS: ICD-10-CM

## 2018-12-31 DIAGNOSIS — K52.9 ACUTE GASTROENTERITIS: Primary | ICD-10-CM

## 2018-12-31 LAB
ADENOVIRUS F 40 41 PCR: NOT DETECTED
ANION GAP SERPL CALCULATED.3IONS-SCNC: 14 MEQ/L (ref 8–16)
ASTROVIRUS PCR: DETECTED
BASOPHILS # BLD: 0.5 %
BASOPHILS ABSOLUTE: 0 THOU/MM3 (ref 0–0.1)
BUN BLDV-MCNC: 7 MG/DL (ref 7–22)
CALCIUM SERPL-MCNC: 9.1 MG/DL (ref 8.5–10.5)
CAMPYLOBACTER PCR: NOT DETECTED
CHLORIDE BLD-SCNC: 97 MEQ/L (ref 98–111)
CLOSTRIDIUM DIFFICILE, PCR: NOT DETECTED
CO2: 23 MEQ/L (ref 23–33)
CREAT SERPL-MCNC: 0.7 MG/DL (ref 0.4–1.2)
CRYPTOSPORIDIUM PCR: NOT DETECTED
CYCLOSPORA CAYETANENSIS PCR: NOT DETECTED
E COLI 0157 PCR: ABNORMAL
E COLI ENTEROAGGREGATIVE PCR: NOT DETECTED
E COLI ENTEROPATHOGENIC PCR: NOT DETECTED
E COLI ENTEROTOXIGENIC PCR: NOT DETECTED
E COLI SHIGA LIKE TOXIN PCR: NOT DETECTED
E COLI SHIGELLA/ENTEROINVASIVE PCR: NOT DETECTED
E HISTOLYTICA GI FILM ARRAY: NOT DETECTED
EOSINOPHIL # BLD: 1.8 %
EOSINOPHILS ABSOLUTE: 0.1 THOU/MM3 (ref 0–0.4)
ERYTHROCYTE [DISTWIDTH] IN BLOOD BY AUTOMATED COUNT: 13.7 % (ref 11.5–14.5)
ERYTHROCYTE [DISTWIDTH] IN BLOOD BY AUTOMATED COUNT: 42.3 FL (ref 35–45)
GFR SERPL CREATININE-BSD FRML MDRD: 79 ML/MIN/1.73M2
GIARDIA LAMBLIA PCR: NOT DETECTED
GLUCOSE BLD-MCNC: 167 MG/DL (ref 70–108)
HCT VFR BLD CALC: 38.1 % (ref 37–47)
HEMOGLOBIN: 12.7 GM/DL (ref 12–16)
IMMATURE GRANS (ABS): 0.03 THOU/MM3 (ref 0–0.07)
IMMATURE GRANULOCYTES: 0.5 %
LYMPHOCYTES # BLD: 24.9 %
LYMPHOCYTES ABSOLUTE: 1.7 THOU/MM3 (ref 1–4.8)
MCH RBC QN AUTO: 28.3 PG (ref 26–33)
MCHC RBC AUTO-ENTMCNC: 33.3 GM/DL (ref 32.2–35.5)
MCV RBC AUTO: 84.9 FL (ref 81–99)
MONOCYTES # BLD: 13.1 %
MONOCYTES ABSOLUTE: 0.9 THOU/MM3 (ref 0.4–1.3)
NOROVIRUS GI GII PCR: NOT DETECTED
NUCLEATED RED BLOOD CELLS: 0 /100 WBC
PLATELET # BLD: 256 THOU/MM3 (ref 130–400)
PLESIOMONAS SHIGELLOIDES PCR: NOT DETECTED
PMV BLD AUTO: 10.1 FL (ref 9.4–12.4)
POTASSIUM SERPL-SCNC: 3.4 MEQ/L (ref 3.5–5.2)
RBC # BLD: 4.49 MILL/MM3 (ref 4.2–5.4)
ROTAVIRUS A PCR: NOT DETECTED
SALMONELLA PCR: NOT DETECTED
SAPOVIRUS PCR: NOT DETECTED
SEG NEUTROPHILS: 59.2 %
SEGMENTED NEUTROPHILS ABSOLUTE COUNT: 4 THOU/MM3 (ref 1.8–7.7)
SODIUM BLD-SCNC: 134 MEQ/L (ref 135–145)
VIBRIO CHOLERAE PCR: NOT DETECTED
VIBRIO PCR: NOT DETECTED
WBC # BLD: 6.7 THOU/MM3 (ref 4.8–10.8)
YERSINIA ENTEROCOLITICA PCR: NOT DETECTED

## 2018-12-31 PROCEDURE — 87507 IADNA-DNA/RNA PROBE TQ 12-25: CPT

## 2018-12-31 PROCEDURE — 1036F TOBACCO NON-USER: CPT | Performed by: NURSE PRACTITIONER

## 2018-12-31 PROCEDURE — G8427 DOCREV CUR MEDS BY ELIG CLIN: HCPCS | Performed by: NURSE PRACTITIONER

## 2018-12-31 PROCEDURE — 1123F ACP DISCUSS/DSCN MKR DOCD: CPT | Performed by: NURSE PRACTITIONER

## 2018-12-31 PROCEDURE — 4040F PNEUMOC VAC/ADMIN/RCVD: CPT | Performed by: NURSE PRACTITIONER

## 2018-12-31 PROCEDURE — G8598 ASA/ANTIPLAT THER USED: HCPCS | Performed by: NURSE PRACTITIONER

## 2018-12-31 PROCEDURE — G8482 FLU IMMUNIZE ORDER/ADMIN: HCPCS | Performed by: NURSE PRACTITIONER

## 2018-12-31 PROCEDURE — 1090F PRES/ABSN URINE INCON ASSESS: CPT | Performed by: NURSE PRACTITIONER

## 2018-12-31 PROCEDURE — 1101F PT FALLS ASSESS-DOCD LE1/YR: CPT | Performed by: NURSE PRACTITIONER

## 2018-12-31 PROCEDURE — 99214 OFFICE O/P EST MOD 30 MIN: CPT | Performed by: NURSE PRACTITIONER

## 2018-12-31 PROCEDURE — G8420 CALC BMI NORM PARAMETERS: HCPCS | Performed by: NURSE PRACTITIONER

## 2018-12-31 RX ORDER — LOPERAMIDE HYDROCHLORIDE 2 MG/1
2 CAPSULE ORAL 4 TIMES DAILY PRN
Qty: 8 CAPSULE | Refills: 0 | Status: SHIPPED | OUTPATIENT
Start: 2018-12-31 | End: 2019-01-10

## 2018-12-31 NOTE — PROGRESS NOTES
loperamide (RA ANTI-DIARRHEAL) 2 MG capsule; Take 1 capsule by mouth 4 times daily as needed for Diarrhea  -     CBC With Auto Differential  -     Basic Metabolic Panel        No Follow-up on file. Patient instructions given andreviewed.         Electronically signed by ROWDY Coppola CNP on 12/31/2018 at 11:46 AM

## 2019-01-01 ENCOUNTER — NURSE TRIAGE (OUTPATIENT)
Dept: ADMINISTRATIVE | Age: 84
End: 2019-01-01

## 2019-01-13 ENCOUNTER — TELEPHONE (OUTPATIENT)
Dept: FAMILY MEDICINE CLINIC | Age: 84
End: 2019-01-13

## 2019-01-14 ENCOUNTER — OFFICE VISIT (OUTPATIENT)
Dept: FAMILY MEDICINE CLINIC | Age: 84
End: 2019-01-14
Payer: MEDICARE

## 2019-01-14 VITALS
HEART RATE: 74 BPM | BODY MASS INDEX: 24.62 KG/M2 | SYSTOLIC BLOOD PRESSURE: 138 MMHG | RESPIRATION RATE: 18 BRPM | TEMPERATURE: 97.8 F | DIASTOLIC BLOOD PRESSURE: 68 MMHG | WEIGHT: 133.8 LBS | HEIGHT: 62 IN | OXYGEN SATURATION: 97 %

## 2019-01-14 DIAGNOSIS — I48.91 NEW ONSET ATRIAL FIBRILLATION (HCC): ICD-10-CM

## 2019-01-14 DIAGNOSIS — I10 ESSENTIAL HYPERTENSION: ICD-10-CM

## 2019-01-14 DIAGNOSIS — E11.65 TYPE 2 DIABETES MELLITUS WITH HYPERGLYCEMIA, WITHOUT LONG-TERM CURRENT USE OF INSULIN (HCC): Primary | ICD-10-CM

## 2019-01-14 DIAGNOSIS — G20 PARKINSON'S DISEASE (HCC): ICD-10-CM

## 2019-01-14 LAB
ANION GAP SERPL CALCULATED.3IONS-SCNC: 11 MEQ/L (ref 8–16)
BUN BLDV-MCNC: 16 MG/DL (ref 7–22)
CALCIUM SERPL-MCNC: 9.6 MG/DL (ref 8.5–10.5)
CHLORIDE BLD-SCNC: 96 MEQ/L (ref 98–111)
CO2: 27 MEQ/L (ref 23–33)
CREAT SERPL-MCNC: 0.7 MG/DL (ref 0.4–1.2)
ERYTHROCYTE [DISTWIDTH] IN BLOOD BY AUTOMATED COUNT: 14.2 % (ref 11.5–14.5)
ERYTHROCYTE [DISTWIDTH] IN BLOOD BY AUTOMATED COUNT: 45.3 FL (ref 35–45)
GFR SERPL CREATININE-BSD FRML MDRD: 79 ML/MIN/1.73M2
GLUCOSE BLD-MCNC: 215 MG/DL (ref 70–108)
HCT VFR BLD CALC: 33.4 % (ref 37–47)
HEMOGLOBIN: 10.6 GM/DL (ref 12–16)
MCH RBC QN AUTO: 28 PG (ref 26–33)
MCHC RBC AUTO-ENTMCNC: 31.7 GM/DL (ref 32.2–35.5)
MCV RBC AUTO: 88.1 FL (ref 81–99)
PLATELET # BLD: 236 THOU/MM3 (ref 130–400)
PMV BLD AUTO: 9.7 FL (ref 9.4–12.4)
POTASSIUM SERPL-SCNC: 4.5 MEQ/L (ref 3.5–5.2)
RBC # BLD: 3.79 MILL/MM3 (ref 4.2–5.4)
SODIUM BLD-SCNC: 134 MEQ/L (ref 135–145)
WBC # BLD: 5 THOU/MM3 (ref 4.8–10.8)

## 2019-01-14 PROCEDURE — G8420 CALC BMI NORM PARAMETERS: HCPCS | Performed by: NURSE PRACTITIONER

## 2019-01-14 PROCEDURE — 1123F ACP DISCUSS/DSCN MKR DOCD: CPT | Performed by: NURSE PRACTITIONER

## 2019-01-14 PROCEDURE — 1101F PT FALLS ASSESS-DOCD LE1/YR: CPT | Performed by: NURSE PRACTITIONER

## 2019-01-14 PROCEDURE — 99214 OFFICE O/P EST MOD 30 MIN: CPT | Performed by: NURSE PRACTITIONER

## 2019-01-14 PROCEDURE — 4040F PNEUMOC VAC/ADMIN/RCVD: CPT | Performed by: NURSE PRACTITIONER

## 2019-01-14 PROCEDURE — G8482 FLU IMMUNIZE ORDER/ADMIN: HCPCS | Performed by: NURSE PRACTITIONER

## 2019-01-14 PROCEDURE — 1036F TOBACCO NON-USER: CPT | Performed by: NURSE PRACTITIONER

## 2019-01-14 PROCEDURE — 1090F PRES/ABSN URINE INCON ASSESS: CPT | Performed by: NURSE PRACTITIONER

## 2019-01-14 PROCEDURE — G8427 DOCREV CUR MEDS BY ELIG CLIN: HCPCS | Performed by: NURSE PRACTITIONER

## 2019-01-14 PROCEDURE — G8598 ASA/ANTIPLAT THER USED: HCPCS | Performed by: NURSE PRACTITIONER

## 2019-01-14 RX ORDER — AMLODIPINE BESYLATE 5 MG/1
5 TABLET ORAL DAILY
Qty: 30 TABLET | Refills: 3 | Status: SHIPPED | OUTPATIENT
Start: 2019-01-14 | End: 2019-01-23

## 2019-01-14 ASSESSMENT — ENCOUNTER SYMPTOMS
SORE THROAT: 0
CONSTIPATION: 0
SHORTNESS OF BREATH: 1
COUGH: 0
ABDOMINAL PAIN: 0
WHEEZING: 0
NAUSEA: 0
DIARRHEA: 0

## 2019-01-15 ENCOUNTER — PATIENT MESSAGE (OUTPATIENT)
Dept: FAMILY MEDICINE CLINIC | Age: 84
End: 2019-01-15

## 2019-01-16 ENCOUNTER — CARE COORDINATION (OUTPATIENT)
Dept: CARE COORDINATION | Age: 84
End: 2019-01-16

## 2019-01-18 ENCOUNTER — OFFICE VISIT (OUTPATIENT)
Dept: CARDIOLOGY CLINIC | Age: 84
End: 2019-01-18
Payer: MEDICARE

## 2019-01-18 VITALS
WEIGHT: 124.6 LBS | SYSTOLIC BLOOD PRESSURE: 122 MMHG | HEIGHT: 62 IN | BODY MASS INDEX: 22.93 KG/M2 | HEART RATE: 76 BPM | DIASTOLIC BLOOD PRESSURE: 78 MMHG

## 2019-01-18 DIAGNOSIS — I10 ESSENTIAL HYPERTENSION: Primary | ICD-10-CM

## 2019-01-18 DIAGNOSIS — I48.0 PAROXYSMAL ATRIAL FIBRILLATION (HCC): ICD-10-CM

## 2019-01-18 DIAGNOSIS — I25.10 CORONARY ARTERY DISEASE INVOLVING NATIVE CORONARY ARTERY OF NATIVE HEART WITHOUT ANGINA PECTORIS: ICD-10-CM

## 2019-01-18 PROCEDURE — 1101F PT FALLS ASSESS-DOCD LE1/YR: CPT | Performed by: NUCLEAR MEDICINE

## 2019-01-18 PROCEDURE — 1036F TOBACCO NON-USER: CPT | Performed by: NUCLEAR MEDICINE

## 2019-01-18 PROCEDURE — G8482 FLU IMMUNIZE ORDER/ADMIN: HCPCS | Performed by: NUCLEAR MEDICINE

## 2019-01-18 PROCEDURE — G8598 ASA/ANTIPLAT THER USED: HCPCS | Performed by: NUCLEAR MEDICINE

## 2019-01-18 PROCEDURE — G8420 CALC BMI NORM PARAMETERS: HCPCS | Performed by: NUCLEAR MEDICINE

## 2019-01-18 PROCEDURE — 99213 OFFICE O/P EST LOW 20 MIN: CPT | Performed by: NUCLEAR MEDICINE

## 2019-01-18 PROCEDURE — 4040F PNEUMOC VAC/ADMIN/RCVD: CPT | Performed by: NUCLEAR MEDICINE

## 2019-01-18 PROCEDURE — 1123F ACP DISCUSS/DSCN MKR DOCD: CPT | Performed by: NUCLEAR MEDICINE

## 2019-01-18 PROCEDURE — G8428 CUR MEDS NOT DOCUMENT: HCPCS | Performed by: NUCLEAR MEDICINE

## 2019-01-18 PROCEDURE — 1090F PRES/ABSN URINE INCON ASSESS: CPT | Performed by: NUCLEAR MEDICINE

## 2019-01-23 ENCOUNTER — OFFICE VISIT (OUTPATIENT)
Dept: FAMILY MEDICINE CLINIC | Age: 84
End: 2019-01-23
Payer: MEDICARE

## 2019-01-23 VITALS
TEMPERATURE: 97 F | RESPIRATION RATE: 16 BRPM | SYSTOLIC BLOOD PRESSURE: 104 MMHG | HEART RATE: 72 BPM | DIASTOLIC BLOOD PRESSURE: 62 MMHG | WEIGHT: 125 LBS | BODY MASS INDEX: 22.86 KG/M2

## 2019-01-23 DIAGNOSIS — I10 ESSENTIAL HYPERTENSION: ICD-10-CM

## 2019-01-23 DIAGNOSIS — R60.9 EDEMA, UNSPECIFIED TYPE: Primary | ICD-10-CM

## 2019-01-23 DIAGNOSIS — I25.10 CORONARY ARTERY DISEASE INVOLVING NATIVE CORONARY ARTERY OF NATIVE HEART WITHOUT ANGINA PECTORIS: ICD-10-CM

## 2019-01-23 DIAGNOSIS — E11.65 TYPE 2 DIABETES MELLITUS WITH HYPERGLYCEMIA, WITHOUT LONG-TERM CURRENT USE OF INSULIN (HCC): ICD-10-CM

## 2019-01-23 DIAGNOSIS — E78.2 MIXED HYPERLIPIDEMIA: ICD-10-CM

## 2019-01-23 PROCEDURE — G8427 DOCREV CUR MEDS BY ELIG CLIN: HCPCS | Performed by: FAMILY MEDICINE

## 2019-01-23 PROCEDURE — 99214 OFFICE O/P EST MOD 30 MIN: CPT | Performed by: FAMILY MEDICINE

## 2019-01-23 PROCEDURE — 1036F TOBACCO NON-USER: CPT | Performed by: FAMILY MEDICINE

## 2019-01-23 PROCEDURE — 4040F PNEUMOC VAC/ADMIN/RCVD: CPT | Performed by: FAMILY MEDICINE

## 2019-01-23 PROCEDURE — 1123F ACP DISCUSS/DSCN MKR DOCD: CPT | Performed by: FAMILY MEDICINE

## 2019-01-23 PROCEDURE — 1101F PT FALLS ASSESS-DOCD LE1/YR: CPT | Performed by: FAMILY MEDICINE

## 2019-01-23 PROCEDURE — G8482 FLU IMMUNIZE ORDER/ADMIN: HCPCS | Performed by: FAMILY MEDICINE

## 2019-01-23 PROCEDURE — G8598 ASA/ANTIPLAT THER USED: HCPCS | Performed by: FAMILY MEDICINE

## 2019-01-23 PROCEDURE — G8420 CALC BMI NORM PARAMETERS: HCPCS | Performed by: FAMILY MEDICINE

## 2019-01-23 PROCEDURE — 1090F PRES/ABSN URINE INCON ASSESS: CPT | Performed by: FAMILY MEDICINE

## 2019-01-23 RX ORDER — FUROSEMIDE 20 MG/1
20 TABLET ORAL DAILY
Qty: 90 TABLET | Refills: 3 | Status: CANCELLED | OUTPATIENT
Start: 2019-01-23

## 2019-01-23 ASSESSMENT — ENCOUNTER SYMPTOMS
NAUSEA: 0
ABDOMINAL PAIN: 0
VOMITING: 0
SORE THROAT: 0
DIARRHEA: 0
COUGH: 0
EYES NEGATIVE: 1
CHEST TIGHTNESS: 0
SHORTNESS OF BREATH: 0
RHINORRHEA: 0
BACK PAIN: 0

## 2019-02-05 ENCOUNTER — TELEPHONE (OUTPATIENT)
Dept: CARDIOLOGY CLINIC | Age: 84
End: 2019-02-05

## 2019-02-07 RX ORDER — FUROSEMIDE 20 MG/1
20 TABLET ORAL DAILY
COMMUNITY
End: 2019-12-28

## 2019-02-07 RX ORDER — AMLODIPINE BESYLATE 5 MG/1
5 TABLET ORAL DAILY
COMMUNITY
End: 2019-02-08 | Stop reason: SDUPTHER

## 2019-02-08 ENCOUNTER — TELEPHONE (OUTPATIENT)
Dept: FAMILY MEDICINE CLINIC | Age: 84
End: 2019-02-08

## 2019-02-10 RX ORDER — AMLODIPINE BESYLATE 5 MG/1
5 TABLET ORAL DAILY
Qty: 30 TABLET | Refills: 0 | Status: SHIPPED | OUTPATIENT
Start: 2019-02-10 | End: 2019-02-21 | Stop reason: SDUPTHER

## 2019-02-11 ENCOUNTER — NURSE ONLY (OUTPATIENT)
Dept: UROLOGY | Age: 84
End: 2019-02-11

## 2019-02-11 VITALS — DIASTOLIC BLOOD PRESSURE: 68 MMHG | SYSTOLIC BLOOD PRESSURE: 122 MMHG

## 2019-02-11 DIAGNOSIS — N32.81 OVERACTIVE BLADDER: Primary | ICD-10-CM

## 2019-02-11 DIAGNOSIS — N32.81 URGENCY-FREQUENCY SYNDROME: ICD-10-CM

## 2019-02-13 ENCOUNTER — HOSPITAL ENCOUNTER (OUTPATIENT)
Age: 84
Setting detail: OUTPATIENT SURGERY
Discharge: HOME | End: 2019-02-13
Attending: SPECIALIST | Admitting: SPECIALIST
Payer: MEDICARE

## 2019-02-13 VITALS
DIASTOLIC BLOOD PRESSURE: 72 MMHG | OXYGEN SATURATION: 97 % | HEIGHT: 62 IN | WEIGHT: 124.8 LBS | BODY MASS INDEX: 22.97 KG/M2 | HEART RATE: 81 BPM | RESPIRATION RATE: 16 BRPM | SYSTOLIC BLOOD PRESSURE: 184 MMHG | TEMPERATURE: 97.9 F

## 2019-02-13 PROCEDURE — 7100000011 HC PHASE II RECOVERY - ADDTL 15 MIN: Performed by: SPECIALIST

## 2019-02-13 PROCEDURE — 3600000002 HC SURGERY LEVEL 2 BASE: Performed by: SPECIALIST

## 2019-02-13 PROCEDURE — 2500000003 HC RX 250 WO HCPCS: Performed by: SPECIALIST

## 2019-02-13 PROCEDURE — 3600000012 HC SURGERY LEVEL 2 ADDTL 15MIN: Performed by: SPECIALIST

## 2019-02-13 PROCEDURE — 7100000010 HC PHASE II RECOVERY - FIRST 15 MIN: Performed by: SPECIALIST

## 2019-02-13 PROCEDURE — 2709999900 HC NON-CHARGEABLE SUPPLY: Performed by: SPECIALIST

## 2019-02-13 RX ORDER — LIDOCAINE HYDROCHLORIDE AND EPINEPHRINE 10; 10 MG/ML; UG/ML
INJECTION, SOLUTION INFILTRATION; PERINEURAL PRN
Status: DISCONTINUED | OUTPATIENT
Start: 2019-02-13 | End: 2019-02-13 | Stop reason: ALTCHOICE

## 2019-02-13 ASSESSMENT — PAIN SCALES - GENERAL: PAINLEVEL_OUTOF10: 0

## 2019-02-13 ASSESSMENT — PAIN - FUNCTIONAL ASSESSMENT: PAIN_FUNCTIONAL_ASSESSMENT: 0-10

## 2019-02-18 RX ORDER — SUCRALFATE 1 G/1
1 TABLET ORAL 4 TIMES DAILY
Qty: 360 TABLET | Refills: 0 | Status: SHIPPED | OUTPATIENT
Start: 2019-02-18 | End: 2019-06-18 | Stop reason: SDUPTHER

## 2019-02-21 ENCOUNTER — CARE COORDINATION (OUTPATIENT)
Dept: CARE COORDINATION | Age: 84
End: 2019-02-21

## 2019-02-21 DIAGNOSIS — I10 HYPERTENSION, UNSPECIFIED TYPE: Primary | ICD-10-CM

## 2019-02-21 DIAGNOSIS — E11.65 TYPE 2 DIABETES MELLITUS WITH HYPERGLYCEMIA, WITHOUT LONG-TERM CURRENT USE OF INSULIN (HCC): ICD-10-CM

## 2019-02-21 RX ORDER — AMLODIPINE BESYLATE 5 MG/1
5 TABLET ORAL DAILY
Qty: 90 TABLET | Refills: 3 | Status: SHIPPED | OUTPATIENT
Start: 2019-02-21 | End: 2020-01-07

## 2019-03-08 DIAGNOSIS — I10 HYPERTENSION, UNSPECIFIED TYPE: ICD-10-CM

## 2019-03-08 RX ORDER — AMLODIPINE BESYLATE 5 MG/1
5 TABLET ORAL DAILY
Qty: 90 TABLET | Refills: 3 | OUTPATIENT
Start: 2019-03-08

## 2019-03-29 ENCOUNTER — CARE COORDINATION (OUTPATIENT)
Dept: CARE COORDINATION | Age: 84
End: 2019-03-29

## 2019-03-29 RX ORDER — OMEPRAZOLE 20 MG/1
20 CAPSULE, DELAYED RELEASE ORAL DAILY
Qty: 90 CAPSULE | Refills: 3 | Status: SHIPPED | OUTPATIENT
Start: 2019-03-29 | End: 2020-01-07 | Stop reason: SDUPTHER

## 2019-04-07 NOTE — PROGRESS NOTES
Discussions with [employer] rep. Reviewed, released, authenticated and confirmed by Dr. Otto Nielsen.  Evelin Kat

## 2019-04-08 ENCOUNTER — OFFICE VISIT (OUTPATIENT)
Dept: NEUROLOGY | Age: 84
End: 2019-04-08
Payer: MEDICARE

## 2019-04-08 VITALS
BODY MASS INDEX: 25.36 KG/M2 | HEIGHT: 59 IN | SYSTOLIC BLOOD PRESSURE: 124 MMHG | WEIGHT: 125.8 LBS | HEART RATE: 72 BPM | DIASTOLIC BLOOD PRESSURE: 58 MMHG

## 2019-04-08 DIAGNOSIS — G20 PARKINSON'S DISEASE (HCC): Primary | ICD-10-CM

## 2019-04-08 PROCEDURE — 1090F PRES/ABSN URINE INCON ASSESS: CPT | Performed by: NURSE PRACTITIONER

## 2019-04-08 PROCEDURE — 4040F PNEUMOC VAC/ADMIN/RCVD: CPT | Performed by: NURSE PRACTITIONER

## 2019-04-08 PROCEDURE — G8427 DOCREV CUR MEDS BY ELIG CLIN: HCPCS | Performed by: NURSE PRACTITIONER

## 2019-04-08 PROCEDURE — 99213 OFFICE O/P EST LOW 20 MIN: CPT | Performed by: NURSE PRACTITIONER

## 2019-04-08 PROCEDURE — G8598 ASA/ANTIPLAT THER USED: HCPCS | Performed by: NURSE PRACTITIONER

## 2019-04-08 PROCEDURE — G8417 CALC BMI ABV UP PARAM F/U: HCPCS | Performed by: NURSE PRACTITIONER

## 2019-04-08 PROCEDURE — 1123F ACP DISCUSS/DSCN MKR DOCD: CPT | Performed by: NURSE PRACTITIONER

## 2019-04-08 PROCEDURE — 1036F TOBACCO NON-USER: CPT | Performed by: NURSE PRACTITIONER

## 2019-04-08 NOTE — PATIENT INSTRUCTIONS
1. Continue with Sinemet to 25/100 take 2 tablets three times a day. 2. Start Neupro patch 2 mg every 24 hours  3. Physical therapy evaluation for gait safety  4. Follow up in 4 months or sooner if needed. 5. Call if any questions or concerns.

## 2019-04-08 NOTE — PROGRESS NOTES
NEUROLOGY OUT PATIENT FOLLOW UP NOTE:  4/8/20199:00 AM    Carlos Martinez is here for follow up for Parkinson's disease. She feels she is doing worse since last evaluation. She is more slow with her ambulation. No recent falls. She denies any hallucination. No trouble swallowing. She is here with her daughter to discuss medication options and the plan of care going forward. ROS:  Respiratory : no cough, no shortness of breath  Cardiac: no chest pain. No palpitations.   Renal : no flank pain, no hematuria    Skin: no rash      Allergies   Allergen Reactions    Latex Rash    Lisinopril Swelling     mouth    Tape Misbah Sow Tape] Itching    Keflex [Cephalexin] Nausea And Vomiting and Rash    Lactulose Diarrhea    Morphine Rash    Polysporin [Bacitracin-Polymyxin B] Rash       Current Outpatient Medications:     Probiotic Product (PROBIOTIC ADVANCED PO), Take 1 tablet by mouth daily, Disp: , Rfl:     omeprazole (PRILOSEC) 20 MG delayed release capsule, TAKE 1 CAPSULE BY MOUTH  DAILY, Disp: 90 capsule, Rfl: 3    SITagliptin (JANUVIA) 50 MG tablet, Take 1 tablet by mouth daily, Disp: 90 tablet, Rfl: 1    amLODIPine (NORVASC) 5 MG tablet, Take 1 tablet by mouth daily, Disp: 90 tablet, Rfl: 3    sucralfate (CARAFATE) 1 GM tablet, TAKE 1 TABLET BY MOUTH 4  TIMES DAILY, Disp: 360 tablet, Rfl: 0    furosemide (LASIX) 20 MG tablet, Take 20 mg by mouth daily, Disp: , Rfl:     apixaban (ELIQUIS) 2.5 MG TABS tablet, Take 1 tablet by mouth 2 times daily Additional RF per her PCP, Disp: 180 tablet, Rfl: 3    carbidopa-levodopa (SINEMET)  MG per tablet, Take 2 tablets by mouth 3 times daily, Disp: , Rfl:     metFORMIN (GLUCOPHAGE) 850 MG tablet, Take 1 tablet by mouth 2 times daily (with meals), Disp: 180 tablet, Rfl: 2    diltiazem (CARDIZEM) 30 MG tablet, Take 1 tablet by mouth 3 times daily Additional RF per her PCP, Disp: 270 tablet, Rfl: 3    metoprolol tartrate (LOPRESSOR) 50 MG tablet, Take 50 There is no acute large vessel infarct. 2. Stable small basal ganglion calcifications bilaterally. 3. Stable moderately severe white matter disease within the periventricular deep white matter and centrum semiovale bilaterally. INTRACRANIAL HEMORRHAGE: None. MASS/MASS EFFECT/MIDLINE SHIFT: None. INTRA-AXIAL/EXTRA-AXIAL FLUID COLLECTIONS: None. INTRAORBITAL CONTENTS:   1. No acute abnormality. OTHER:   1. Atheromatous calcification distal vertebral arteries and intracranial internal carotid arteries bilaterally. SKULL/SCALP:   1. There is generalized osteopenia. 2. There is no skull fracture. PARANASAL SINUSES:   1. There is a small mucous retention cyst or polyp within the right maxillary sinus. 2. There is mild mucosal thickening within the ethmoid air cells bilaterally. Impression 1. There is no acute intracranial abnormality. 2. Additional findings as described in the body the report. **This report has been created using voice recognition software. It may contain minor errors which are inherent in voice recognition technology. **    Final report electronically signed by Dr. Bran Stallings on 10/14/2018 4:57 PM          Assessment:     Diagnosis Orders   1. Parkinson's disease ([facility] [address]          She is here for follow up for Parkinson's disease. She feels she is doing worse since last evaluation. She feels she is slower with her ambulation. No recent falls. No hallucination. No dysphagia. She is on Sinemet 25/100mg 2 tablets three times a day. No side effects reported to the medications. She is agreeable to do physical therapy for gait safety. I will also start her on Neupro 2 mg patch. .  After detailed discussion with patient and her daughter we agreed on the following plan. Plan:     1. Continue with Sinemet to 25/100 mg take 2 tablets three times a day. 2. Start Neupro patch 2 mg every 24 hours  3. Physical therapy evaluation for gait safety  4.   Follow up in 4 months or sooner if needed. 5. Call if any questions or concerns. Please call if any questions.      Deidra Kanner CNP

## 2019-04-15 ENCOUNTER — TELEPHONE (OUTPATIENT)
Dept: FAMILY MEDICINE CLINIC | Age: 84
End: 2019-04-15

## 2019-04-15 NOTE — TELEPHONE ENCOUNTER
4/15/19  Patient requesting new Param meter(machine you put your lancet in, as the Contour part she puts her needle in does work as patient states hers will not lock any longer to be able to test her sugar.   Kaelyn on [facility]  Dolv: 1/23/19

## 2019-04-16 ENCOUNTER — OFFICE VISIT (OUTPATIENT)
Dept: UROLOGY | Age: 84
End: 2019-04-16
Payer: MEDICARE

## 2019-04-16 VITALS
DIASTOLIC BLOOD PRESSURE: 50 MMHG | SYSTOLIC BLOOD PRESSURE: 92 MMHG | HEIGHT: 62 IN | WEIGHT: 123 LBS | BODY MASS INDEX: 22.63 KG/M2

## 2019-04-16 DIAGNOSIS — N32.81 OVERACTIVE BLADDER: ICD-10-CM

## 2019-04-16 DIAGNOSIS — R32 URINARY INCONTINENCE, UNSPECIFIED TYPE: Primary | ICD-10-CM

## 2019-04-16 DIAGNOSIS — N39.0 RECURRENT UTI: ICD-10-CM

## 2019-04-16 LAB — POST VOID RESIDUAL (PVR): 67 ML

## 2019-04-16 PROCEDURE — G8598 ASA/ANTIPLAT THER USED: HCPCS | Performed by: NURSE PRACTITIONER

## 2019-04-16 PROCEDURE — 1090F PRES/ABSN URINE INCON ASSESS: CPT | Performed by: NURSE PRACTITIONER

## 2019-04-16 PROCEDURE — 0509F URINE INCON PLAN DOCD: CPT | Performed by: NURSE PRACTITIONER

## 2019-04-16 PROCEDURE — 51798 US URINE CAPACITY MEASURE: CPT | Performed by: NURSE PRACTITIONER

## 2019-04-16 PROCEDURE — 1123F ACP DISCUSS/DSCN MKR DOCD: CPT | Performed by: NURSE PRACTITIONER

## 2019-04-16 PROCEDURE — G8420 CALC BMI NORM PARAMETERS: HCPCS | Performed by: NURSE PRACTITIONER

## 2019-04-16 PROCEDURE — 1036F TOBACCO NON-USER: CPT | Performed by: NURSE PRACTITIONER

## 2019-04-16 PROCEDURE — 4040F PNEUMOC VAC/ADMIN/RCVD: CPT | Performed by: NURSE PRACTITIONER

## 2019-04-16 PROCEDURE — G8427 DOCREV CUR MEDS BY ELIG CLIN: HCPCS | Performed by: NURSE PRACTITIONER

## 2019-04-16 PROCEDURE — 99213 OFFICE O/P EST LOW 20 MIN: CPT | Performed by: NURSE PRACTITIONER

## 2019-04-16 ASSESSMENT — ENCOUNTER SYMPTOMS
VOMITING: 0
NAUSEA: 0
ABDOMINAL PAIN: 0

## 2019-04-16 NOTE — PROGRESS NOTES
Subjective:      Patient ID: Lewis Escalante is a 80 y.o. female. [facility]     Yoly Ellison presents today for follow-up of urinary incontinence, urinary retention, chronic dysuria, & recurrent UTI. She underwent placement of permanent sacral nerve stimulator with leads (stage 1 and 2) on 12/4/18 per Dr. Lila Evangelista. She is doing very well since Interstim placement. She denies any infections since October. She denies any dysuria. She continues to have nocturia 3-5 times a night but most nights are 3 times. She is satisfied. Review of Systems   Constitutional: Positive for fatigue. Negative for activity change, appetite change, chills and fever. Cardiovascular: Negative for leg swelling (left leg). Gastrointestinal: Negative for abdominal pain, nausea and vomiting. Genitourinary: Positive for frequency and urgency. Negative for difficulty urinating, dysuria, flank pain and hematuria. Recurrent UTI.      Allergies   Allergen Reactions    Latex Rash    Lisinopril Swelling     mouth    Tape George Friendly Tape] Itching    Keflex [Cephalexin] Nausea And Vomiting and Rash    Lactulose Diarrhea    Morphine Rash    Polysporin [Bacitracin-Polymyxin B] Rash     Current Outpatient Medications   Medication Sig Dispense Refill    Probiotic Product (PROBIOTIC ADVANCED PO) Take 1 tablet by mouth daily      rotigotine (NEUPRO) 2 MG/24HR Place 1 patch onto the skin daily 14 patch 0    omeprazole (PRILOSEC) 20 MG delayed release capsule TAKE 1 CAPSULE BY MOUTH  DAILY 90 capsule 3    SITagliptin (JANUVIA) 50 MG tablet Take 1 tablet by mouth daily 90 tablet 1    amLODIPine (NORVASC) 5 MG tablet Take 1 tablet by mouth daily 90 tablet 3    sucralfate (CARAFATE) 1 GM tablet TAKE 1 TABLET BY MOUTH 4  TIMES DAILY 360 tablet 0    furosemide (LASIX) 20 MG tablet Take 20 mg by mouth daily      apixaban (ELIQUIS) 2.5 MG TABS tablet Take 1 tablet by mouth 2 times daily Additional RF per her  tablet 3    carbidopa-levodopa file   Social Needs    Financial resource strain: Not on file    Food insecurity:     Worry: Not on file     Inability: Not on file    Transportation needs:     Medical: Not on file     Non-medical: Not on file   Tobacco Use    Smoking status: Never Smoker    Smokeless tobacco: Never Used   Substance and Sexual Activity    Alcohol use: No    Drug use: No    Sexual activity: Not on file   Lifestyle    Physical activity:     Days per week: Not on file     Minutes per session: Not on file    Stress: Not on file   Relationships    Social connections:     Talks on phone: Not on file     Gets together: Not on file     Attends Jain service: Not on file     Active member of club or organization: Not on file     Attends meetings of clubs or organizations: Not on file     Relationship status: Not on file    Intimate partner violence:     Fear of current or ex partner: Not on file     Emotionally abused: Not on file     Physically abused: Not on file     Forced sexual activity: Not on file   Other Topics Concern    Not on file   Social History Narrative    Not on file         Objective:   Physical Exam   Constitutional: She is oriented to person, place, and time. Vital signs are normal. She appears well-developed and well-nourished. HENT:   Head: Normocephalic and atraumatic. Eyes: Conjunctivae are normal.   Pulmonary/Chest: Effort normal and breath sounds normal.   Musculoskeletal: Normal range of motion. Neurological: She is alert and oriented to person, place, and time. Skin: Skin is warm and dry. Psychiatric: She has a normal mood and affect. Her behavior is normal. Judgment and thought content normal.     Results for POC orders placed in visit on 04/16/19   poct post void residual   Result Value Ref Range    post void residual 67 ml       Assessment:      Urinary Incontinence  Urinary Frequency  Recurrent UTI--resolved. Urinary Retention--resolved. Chronic Dysuria--resolved.       Plan: She is doing very well. She has not had a UTI in 6 months. She continues to have some nocturia but this is bearable. She is very happy. Follow-up here PRN.

## 2019-04-17 ENCOUNTER — HOSPITAL ENCOUNTER (OUTPATIENT)
Dept: PHYSICAL THERAPY | Age: 84
Setting detail: THERAPIES SERIES
Discharge: HOME | End: 2019-04-17
Payer: MEDICARE

## 2019-04-17 ENCOUNTER — TELEPHONE (OUTPATIENT)
Dept: FAMILY MEDICINE CLINIC | Age: 84
End: 2019-04-17

## 2019-04-17 PROCEDURE — 97110 THERAPEUTIC EXERCISES: CPT

## 2019-04-17 PROCEDURE — 97161 PT EVAL LOW COMPLEX 20 MIN: CPT

## 2019-04-17 ASSESSMENT — PAIN DESCRIPTION - PAIN TYPE: TYPE: CHRONIC PAIN

## 2019-04-17 ASSESSMENT — PAIN DESCRIPTION - ORIENTATION: ORIENTATION: RIGHT;LEFT

## 2019-04-17 ASSESSMENT — PAIN DESCRIPTION - LOCATION: LOCATION: KNEE;LEG

## 2019-04-17 ASSESSMENT — PAIN SCALES - GENERAL: PAINLEVEL_OUTOF10: 5

## 2019-04-17 NOTE — PROGRESS NOTES
** PLEASE SIGN, DATE AND TIME CERTIFICATION BELOW AND RETURN TO [facility] OUTPATIENT REHABILITATION (FAX #: [phone]). ATTEST/CO-SIGN IF ACCESSING VIA [employer]. THANK YOU.**    I certify that I have examined the patient below and determined that Physical Medicine and Rehabilitation service is necessary and that I approve the established plan of care for up to 90 days or as specifically noted. Attestation, signature or co-signature of physician indicates approval of certification requirements.    ________________________ ____________ __________  Physician Signature   Date   Time       [address]     Time In: 0232  Time Out: 1130  Minutes: 60  Timed Code Treatment Minutes: 10 Minutes     Tinetti  Score:     Date: 2019  Patient Name: Staci Rosenthal,  Gender:  female        CSN: [id number]   : 1932  (80 y.o.)        Referring Practitioner: Malika Esquivel CNP      Diagnosis: Parkinson's disease  Treatment Diagnosis: difficulty with ambulation, balance deficits, leg weakness  Additional Pertinent Hx: No driving. Hx: Parkinson's disease, High BP, Incontinence, Diabetes, Arthritis, Memory issues, SOB       General:  PT Visit Information  Onset Date: 19  PT Insurance Information: Medicare - unlimited visits. Secondary AARP. Aquatics and modalities except Ionto and HP/CP covered. Total # of Visits to Date: 1  Plan of Care/Certification Expiration Date: 19  Progress Note Counter: 1/10                        See Medical History Questionnaire for information related to allergies and medications. Subjective:  Chart Reviewed: Yes  Patient assessed for rehabilitation services?: Yes  Family / Caregiver Present: No  Comments: Follow up with doctor in Aug 2019. Subjective: Patient reports was diagnosed with Parkinson's disease 10-15 years ago. States thinks has had therapy for it before.  States was in and 0 - Unsteady (grabs,staggers)   9. SITTING DOWN 1 - Uses arms or not a smooth motion   BALANCE SCORE 8/16       GAIT SECTION Patient stands with therapist, walks across room (+/- aids), fist at usual pace, then at rapid pace. 1.  INDICATION OF GAIT (Immediately after told to \"go\" 0 - Any hesitancy or multiple attempts   2. STEP LENGTH AND HEIGHT 1 - Step through Right and 1 - Step through Left   3. FOOT CLEARANCE 1 - Left foot clears floor and 1 - Right foot clears floor   4. STEP SYMMETRY 0 - Right and left step length not equal   5. STEP CONTINUITY 0 - Stopping or discontinuity between steps   6. PATH 1 - Mild/moderate deviation or uses walking aid   7. TRUNK 0 - Marked sway or uses walking aid   8. WALKING TIME 1 - Heels almost touching while walking   GAIT SCORE 6/12   TOTAL SCORE 14/28   Risk Indicators:  Less than/equal to 18 = high risk  19-23 Moderate risk  Greater than/equal to 24 = low risk                  Exercises  Exercise 1: DIscussed HEP: Patient doing standing exercises at home: heel raises, merching, mini-squats, hip abd and flex. Does a little walking at home. Seated LAQ and marching. Exercise 2: Added: abduction focusng on lifting hip up 10x bilat - more trouble on left, heel/toe raises 10x each then alternating opposites 10x each                           Activity Tolerance:  Activity Tolerance: Patient Tolerated treatment well, Patient limited by fatigue, Patient limited by endurance    Assessment: Body structures, Functions, Activity limitations: Decreased functional mobility , Decreased ADL status, Decreased strength, Increased Pain, Decreased balance, Decreased endurance  Assessment: Patient with several factors mentioned above that can be addressed by therapy over 8 weeks. Patient needs to work on leg strengthening along with using Parkinson's exericses to gain stability and safety with all activity to prevent falls.   Prognosis: Fair  Discharge Recommendations: Continue to assess pending progress    Patient Education:  Patient Education: POC and HEP                   Plan:  Times per week: 2x/week  Plan weeks: 8 weeks  Specific instructions for Next Treatment: gait and balance, BIG and DLD activity, posture education strengthengin, Nu-step  Current Treatment Recommendations: Strengthening, Balance Training, Functional Mobility Training, Endurance Training, Gait Training, Neuromuscular Re-education, Manual Therapy - Soft Tissue Mobilization, Home Exercise Program, Modalities  Plan Comment: Initiated POC    History: Personal factors or comorbidities that impact plan of care - High Complexity: 3 or more personal factors or comorbidities. See history section above for details. Examination: Body structures and functions, activity limitations, participation restrictions; using standardized tests and measures - High Complexity: 4 or more body structures and functional, activity limitations and/or participation restrictions. See restrictions and objective section above for details. Clinical Presentation: Low - Stable and Uncomplicated: Patient with weakness from hospitalization and Parkinsons. Decision Making: Low Complexity due to Patient should respond well to therapy for strengthening. Decision making was based on patient assessment and decision making process in determining plan of care and establishing reasonable expectations for measurable functional outcomes. Evaluation Complexity: Based on the findings of patient history, examination, clinical presentation, and decision making during this evaluation, the evaluation of Aida Kirk  is of low complexity. Goals:  Patient goals : To walk better and longer with 4 wheeled walker.      Short term goals  Time Frame for Short term goals: 4 weeks  Short term goal 1: Patient to demonstrate >14/28 on the Tinetti to help prevent future falls  Short term goal 2: Patient to demonstrate 4+-5/5 strength in bilateral legs to help with lifting them in and out of the car  Short term goal 3: Patient to be able to ambulate further distances with longer steps and less difficulty turning to returnt to walking through apartment complex with her friend  Short term goal 4: Patient to demonstrate balance activities with 1-2 hand assist with more upright posture for safety with all community ambulation    Long term goals  Time Frame for Long term goals : 8 weeks  Long term goal 1: Patient to be independent with home program to be able to perform prior activity with increased safety and decreased difficulty    [address], [address] [place] [address] Drive

## 2019-04-17 NOTE — TELEPHONE ENCOUNTER
Patient is calling stating the insurance will not pay for the glucose machine.  Please advise to patient and pharmacy

## 2019-04-23 ENCOUNTER — APPOINTMENT (OUTPATIENT)
Dept: PHYSICAL THERAPY | Age: 84
End: 2019-04-23
Payer: MEDICARE

## 2019-04-23 ENCOUNTER — HOSPITAL ENCOUNTER (OUTPATIENT)
Dept: PHYSICAL THERAPY | Age: 84
Setting detail: THERAPIES SERIES
End: 2019-04-23
Payer: MEDICARE

## 2019-04-26 ENCOUNTER — HOSPITAL ENCOUNTER (OUTPATIENT)
Dept: PHYSICAL THERAPY | Age: 84
Setting detail: THERAPIES SERIES
Discharge: HOME | End: 2019-04-26
Payer: MEDICARE

## 2019-04-26 PROCEDURE — 97110 THERAPEUTIC EXERCISES: CPT

## 2019-04-26 PROCEDURE — 97112 NEUROMUSCULAR REEDUCATION: CPT

## 2019-04-26 ASSESSMENT — PAIN SCALES - GENERAL: PAINLEVEL_OUTOF10: 7

## 2019-04-26 ASSESSMENT — PAIN DESCRIPTION - LOCATION: LOCATION: GENERALIZED

## 2019-04-26 ASSESSMENT — PAIN DESCRIPTION - PAIN TYPE: TYPE: CHRONIC PAIN

## 2019-04-26 NOTE — PROGRESS NOTES
by side 30 seconds [facility] CGA x1. Stepping forward and taking same side arm back 10x each side - hold on to bar with opposite hand. Stepping sideways 10x bilaterally holding on with one hand. Exercise 5: Sit to stand 5x from walker. Activity Tolerance:  Activity Tolerance: Patient Tolerated treatment well, Patient limited by fatigue, Patient limited by endurance  Activity Tolerance: Patient reported fatigue and legs still sore after treatment. Assessment: Body structures, Functions, Activity limitations: Decreased functional mobility , Decreased ADL status, Decreased strength, Increased Pain, Decreased balance, Decreased endurance  Assessment: Patient able to progress standing activity today but needed rest breaks throughout due to fatigue. Needed some assist with balance activities and is weaker with standing on right leg. Needs cueing to  legs. Prognosis: Fair  Discharge Recommendations: Continue to assess pending progress    Patient Education:  Patient Education: Continue with HEP. Add in new seated exercises but do not do new standing. Plan:  Times per week: 2x/week  Plan weeks: 8 weeks  Specific instructions for Next Treatment: gait and balance, BIG and DLD activity, posture education strengthengin, Nu-step  Current Treatment Recommendations: Strengthening, Balance Training, Functional Mobility Training, Endurance Training, Gait Training, Neuromuscular Re-education, Manual Therapy - Soft Tissue Mobilization, Home Exercise Program, Modalities  Plan Comment: Continue with POC    Goals:  Patient goals : To walk better and longer with 4 wheeled walker.      Short term goals  Time Frame for Short term goals: 4 weeks  Short term goal 1: Patient to demonstrate >14/28 on the Tinetti to help prevent future falls  Short term goal 2: Patient to demonstrate 4+-5/5 strength in bilateral legs to help with lifting them in and out of the car  Short term goal 3: Patient to be able to ambulate further distances with longer steps and less difficulty turning to returnt to walking through apartment complex with her friend  Short term goal 4: Patient to demonstrate balance activities with 1-2 hand assist with more upright posture for safety with all community ambulation    Long term goals  Time Frame for Long term goals : 8 weeks  Long term goal 1: Patient to be independent with home program to be able to perform prior activity with increased safety and decreased difficulty    [address], [address]

## 2019-04-30 ENCOUNTER — CARE COORDINATION (OUTPATIENT)
Dept: CARE COORDINATION | Age: 84
End: 2019-04-30

## 2019-04-30 NOTE — CARE COORDINATION
Left message to return phone call to care coordinator at [phone]. Plan to ask if she has a glucometer.

## 2019-05-01 ENCOUNTER — HOSPITAL ENCOUNTER (OUTPATIENT)
Dept: PHYSICAL THERAPY | Age: 84
Setting detail: THERAPIES SERIES
Discharge: HOME | End: 2019-05-01
Payer: MEDICARE

## 2019-05-01 PROCEDURE — 97110 THERAPEUTIC EXERCISES: CPT

## 2019-05-01 PROCEDURE — 97112 NEUROMUSCULAR REEDUCATION: CPT

## 2019-05-01 ASSESSMENT — PAIN DESCRIPTION - LOCATION: LOCATION: GENERALIZED

## 2019-05-01 ASSESSMENT — PAIN SCALES - GENERAL: PAINLEVEL_OUTOF10: 7

## 2019-05-01 ASSESSMENT — PAIN DESCRIPTION - PAIN TYPE: TYPE: CHRONIC PAIN

## 2019-05-01 NOTE — PROGRESS NOTES
[place]     Time In: 8753  Time Out: 5484  Minutes: 42  Timed Code Treatment Minutes: 42 Minutes                Date: 2019  Patient Name: Flynn Swift,  Gender:  female        CSN: [id number]   : 1932  (80 y.o.)       Referring Practitioner: Americo Cleveland CNP      Diagnosis: Parkinson's disease  Treatment Diagnosis: difficulty with ambulation, balance deficits, leg weakness   Additional Pertinent Hx: No driving. Hx: Parkinson's disease, High BP, Incontinence, Diabetes, Arthritis, Memory issues, SOB                  General:  PT Visit Information  PT Insurance Information: Medicare - unlimited visits. Secondary AARP. Aquatics and modalities except Ionto and HP/CP covered. Total # of Visits to Date: 3  Plan of Care/Certification Expiration Date: 19  Progress Note Counter: 3/10               Subjective:  Chart Reviewed: Yes  Patient assessed for rehabilitation services?: Yes  Family / Caregiver Present: No  Comments: Follow up with doctor in Aug 2019. Subjective: Patient reports going to look at a few Assisted Living facilities today. States doing ok today. States legs having same amounts of pain. Pain:  Patient Currently in Pain: Yes  Pain Assessment: 0-10  Pain Level: 7  Pain Type: Chronic pain  Pain Location: Generalized      Objective    Exercises  Exercise 1: Standing: heel/toe raises 10x each, marching 10x bilat, mini-squats 10x, hip flexion and abd 10x bilat each one. Then Seated: with green band around knees marching 10x bilat, abduction 10x then LAQ and HS curls 10x each bilat. Exercise 2: Seated: Abduction focusing on lifting hip up 10x bilat - more trouble on left, heel/toe raises 10x each then alternating opposites 10x each - took less time to get going with opposites today.   Exercise 3: Nu-step Level 2 for 5 minutes seat 7/arms 10  Exercise 4: Balance: step stance 30 seconds each way then side by side 30 seconds [facility] SBA-CGA x1. Stepping forward and taking same side arm back 10x each side - hold on to bar with opposite hand. Stepping sideways 10x bilaterally holding on with one hand. Added Rockerboard 10x each of 2 directions. Exercise 5: Sit to stand 5x from red chair without armrests. Had to use hands to push up and took more than one attempt on last time due to bilat knees hurting her today. Exercise 6: Seated rotation 10x bilat         Activity Tolerance:  Activity Tolerance: Patient Tolerated treatment well, Patient limited by fatigue, Patient limited by endurance    Assessment: Body structures, Functions, Activity limitations: Decreased functional mobility , Decreased ADL status, Decreased strength, Increased Pain, Decreased balance, Decreased endurance  Assessment: Patient needing less assist by PT for balance activity today but still needed hands at bars. Able to start rockerboard today and added in some seated rotation. Patient still with trunk rotation. Prognosis: Fair  Discharge Recommendations: Continue to assess pending progress    Patient Education:  Patient Education: Continue with HEP. Plan:  Times per week: 2x/week  Plan weeks: 8 weeks  Specific instructions for Next Treatment: gait and balance, BIG and DLD activity, posture education strengthengin, Nu-step  Current Treatment Recommendations: Strengthening, Balance Training, Functional Mobility Training, Endurance Training, Gait Training, Neuromuscular Re-education, Manual Therapy - Soft Tissue Mobilization, Home Exercise Program, Modalities  Plan Comment: Continue with POC    Goals:  Patient goals : To walk better and longer with 4 wheeled walker.      Short term goals  Time Frame for Short term goals: 4 weeks  Short term goal 1: Patient to demonstrate >14/28 on the Tinetti to help prevent future falls  Short term goal 2: Patient to demonstrate 4+-5/5 strength in bilateral legs to help with lifting them in and out of the car  Short term goal 3: Patient to be able to ambulate further distances with longer steps and less difficulty turning to returnt to walking through apartment complex with her friend  Short term goal 4: Patient to demonstrate balance activities with 1-2 hand assist with more upright posture for safety with all community ambulation    Long term goals  Time Frame for Long term goals : 8 weeks  Long term goal 1: Patient to be independent with home program to be able to perform prior activity with increased safety and decreased difficulty    [address], [address]

## 2019-05-03 ENCOUNTER — HOSPITAL ENCOUNTER (OUTPATIENT)
Dept: PHYSICAL THERAPY | Age: 84
Setting detail: THERAPIES SERIES
Discharge: HOME | End: 2019-05-03
Payer: MEDICARE

## 2019-05-03 PROCEDURE — 97110 THERAPEUTIC EXERCISES: CPT

## 2019-05-03 ASSESSMENT — PAIN DESCRIPTION - PAIN TYPE: TYPE: CHRONIC PAIN

## 2019-05-03 ASSESSMENT — PAIN SCALES - GENERAL: PAINLEVEL_OUTOF10: 5

## 2019-05-03 ASSESSMENT — PAIN DESCRIPTION - ORIENTATION: ORIENTATION: LEFT;RIGHT

## 2019-05-03 ASSESSMENT — PAIN DESCRIPTION - LOCATION: LOCATION: LEG

## 2019-05-03 NOTE — PROGRESS NOTES
[place]     Time In: 8524  Time Out: 1130  Minutes: 45  Timed Code Treatment Minutes: 45 Minutes        Date: 5/3/2019  Patient Name: Sachin No,  Gender:  female        CSN: [id number]   : 1932  (80 y.o.)       Referring Practitioner: Neda Suresh CNP      Diagnosis: Parkinson's disease  Treatment Diagnosis: difficulty with ambulation, balance deficits, leg weakness   Additional Pertinent Hx: No driving. Hx: Parkinson's disease, High BP, Incontinence, Diabetes, Arthritis, Memory issues, SOB          General:  PT Visit Information  PT Insurance Information: Medicare - unlimited visits. Secondary AARP. Aquatics and modalities except Ionto and HP/CP covered. Total # of Visits to Date: 4  Plan of Care/Certification Expiration Date: 19  Progress Note Counter: 4/10               Subjective:  Chart Reviewed: Yes  Patient assessed for rehabilitation services?: Yes  Family / Caregiver Present: No  Comments: Follow up with doctor in Aug 2019. Subjective: Patient states she had trouble breathing last night while in bed. After awhile it susided. BP taken today 130/66. Pain:  Patient Currently in Pain: Yes  Pain Assessment: 0-10  Pain Level: 5  Pain Type: Chronic pain  Pain Location: Leg  Pain Orientation: Left, Right    Objective        Exercises  Exercise 1: Standing: heel/toe raises 10x each, marching 10x bilat, mini-squats 10x, hip flexion and abd 10x bilat each one. Then Seated: with green band around knees marching 10x bilat, abduction 10x then LAQ and HS curls 10x each bilat. Exercise 3: Nu-step Level 2 for 5 minutes seat 7/arms 10  Exercise 4: Balance: step stance 30 seconds each way then side by side 30 seconds [facility] SBA-CGA x1. Stepping forward and taking same side arm back 10x each side - hold on to bar with opposite hand. Stepping sideways 10x bilaterally holding on with one hand.  Added Rockerboard 10x each of 2 directions. Exercise 5: Sit to stand 5x from red chair without armrests. light min assist x 2 then CGA x 5. No use of UE. Exercise 6: Seated rotation 10x bilat       Activity Tolerance:   Patient tolerated well. Assessment: Body structures, Functions, Activity limitations: Decreased functional mobility , Decreased strength, Decreased endurance, Decreased balance, Increased Pain  Assessment: Less assist with sit to stand not using UE today. x 3 seated rest breaks today, \" I'm so tired today. \"   Discharge Recommendations: Continue to assess pending progress    Patient Education:  Patient Education: Continue with HEP. Plan:  Times per week: 2x/week  Plan weeks: 8 weeks  Specific instructions for Next Treatment: gait and balance, BIG and DLD activity, posture education strengthengin, Nu-step  Current Treatment Recommendations: Strengthening, Balance Training, Functional Mobility Training, Endurance Training, Gait Training, Neuromuscular Re-education, Manual Therapy - Soft Tissue Mobilization, Home Exercise Program, Modalities  Plan Comment: Continue with POC    Goals:  Patient goals : To walk better and longer with 4 wheeled walker.      Short term goals  Time Frame for Short term goals: 4 weeks  Short term goal 1: Patient to demonstrate >14/28 on the Tinetti to help prevent future falls  Short term goal 2: Patient to demonstrate 4+-5/5 strength in bilateral legs to help with lifting them in and out of the car  Short term goal 3: Patient to be able to ambulate further distances with longer steps and less difficulty turning to returnt to walking through apartment complex with her friend  Short term goal 4: Patient to demonstrate balance activities with 1-2 hand assist with more upright posture for safety with all community ambulation    Long term goals  Time Frame for Long term goals : 8 weeks  Long term goal 1: Patient to be independent with home program to be able to perform prior activity with increased safety and decreased difficulty    Blima Kevin  PTA [id number]

## 2019-05-08 ENCOUNTER — HOSPITAL ENCOUNTER (OUTPATIENT)
Dept: PHYSICAL THERAPY | Age: 84
Setting detail: THERAPIES SERIES
Discharge: HOME | End: 2019-05-08
Payer: MEDICARE

## 2019-05-08 PROCEDURE — 97110 THERAPEUTIC EXERCISES: CPT

## 2019-05-08 ASSESSMENT — PAIN SCALES - GENERAL: PAINLEVEL_OUTOF10: 5

## 2019-05-08 NOTE — PROGRESS NOTES
[place]     Time In: 1003  Time Out: [address]  Minutes: 42  Timed Code Treatment Minutes: 42 Minutes     Date: 2019  Patient Name: Kathia Shane,  Gender:  female        CSN: [id number]   : 1932  (80 y.o.)       Referring Practitioner: Pietro De Dios CNP      Diagnosis: Parkinson's disease  Treatment Diagnosis: difficulty with ambulation, balance deficits, leg weakness   Additional Pertinent Hx: No driving. Hx: Parkinson's disease, High BP, Incontinence, Diabetes, Arthritis, Memory issues, SOB       General:  PT Visit Information  PT Insurance Information: Medicare - unlimited visits. Secondary AARP. Aquatics and modalities except Ionto and HP/CP covered. Total # of Visits to Date: 5  Plan of Care/Certification Expiration Date: 19  Progress Note Counter: 5/10             Subjective:  Chart Reviewed: Yes  Patient assessed for rehabilitation services?: Yes  Family / Caregiver Present: No  Comments: Follow up with doctor in Aug 2019. Subjective: Patient reports no breathing issues since last time. Patient has an appointment with her family physician on Monday May 13th because she is going to be moving to an assisted living facility. Pain:  Patient Currently in Pain: Yes  Pain Assessment: 0-10  Pain Level: 5(Bilateral knees/legs)    Objective       Exercises  Exercise 1: Standing: heel/toe raises 10x each, marching 10x bilateral, mini-squats 10x, hip flexion and abduction 10x bilateral each one. Then Seated: with green band around knees marching 10x bilateral, abduction 10x then LAQ and Hamstring curls 10x each bilateral  Exercise 3: NuStep Level 2 for 5 minutes seat 7/arms 10  Exercise 4: Balance: step stance 30 seconds each way then side by side 30 seconds with SBA-CGA x1. Stepping forward and taking same side arm back 10x each side - hold on to bar with opposite hand.  Stepping sideways 10x bilaterally assist with more upright posture for safety with all community ambulation    Long term goals  Time Frame for Long term goals : 8 weeks  Long term goal 1: Patient to be independent with home program to be able to perform prior activity with increased safety and decreased difficulty    Swati Hall.  Moses Garcia, [address], 5/8/2019

## 2019-05-10 ENCOUNTER — CARE COORDINATION (OUTPATIENT)
Dept: CARE COORDINATION | Age: 84
End: 2019-05-10

## 2019-05-10 ENCOUNTER — HOSPITAL ENCOUNTER (OUTPATIENT)
Dept: PHYSICAL THERAPY | Age: 84
Setting detail: THERAPIES SERIES
Discharge: HOME | End: 2019-05-10
Payer: MEDICARE

## 2019-05-10 DIAGNOSIS — G20 PARKINSON'S DISEASE (HCC): ICD-10-CM

## 2019-05-10 PROCEDURE — 97110 THERAPEUTIC EXERCISES: CPT

## 2019-05-10 ASSESSMENT — PAIN SCALES - GENERAL: PAINLEVEL_OUTOF10: 8

## 2019-05-10 NOTE — PROGRESS NOTES
[place]     Time In: 1054  Time Out: 1130  Minutes: 36  Timed Code Treatment Minutes: 36 Minutes     Date: 5/10/2019  Patient Name: Sachin No,  Gender:  female        CSN: [id number]   : 1932  (80 y.o.)       Referring Practitioner: Neda Suresh CNP      Diagnosis: Parkinson's disease  Treatment Diagnosis: difficulty with ambulation, balance deficits, leg weakness   Additional Pertinent Hx: No driving. Hx: Parkinson's disease, High BP, Incontinence, Diabetes, Arthritis, Memory issues, SOB       General:  PT Visit Information  PT Insurance Information: Medicare - unlimited visits. Secondary AARP. Aquatics and modalities except Ionto and HP/CP covered. Total # of Visits to Date: 6  Plan of Care/Certification Expiration Date: 19  Progress Note Counter: 6/10             Subjective:  Chart Reviewed: Yes  Patient assessed for rehabilitation services?: Yes  Family / Caregiver Present: No  Comments: Follow up with doctor in Aug 2019. Subjective: Patient reports she had a terrible time sleeping last night because she had trouble with her bowels. Complaints of knees and legs hurting more today. Patient arrived late to therapy session because her transportation was running late. Pain:  Patient Currently in Pain: Yes  Pain Assessment: 0-10  Pain Level: 8(7-8/10 Bilateral knees/legs)    Objective      Exercises  Exercise 1: Standing: heel/toe raises 15x each, marching 15x bilateral, mini-squats 15x, hip flexion and abduction 15x bilateral each one.  Then Seated: with green band around knees marching 10x bilateral, abduction 10x then LAQ and Hamstring curls 10x each bilateral  Exercise 3: NuStep Level 3 for 6 minutes seat 7/arms 10  Exercise 4: Balance: step stance 30 seconds each way then side by side 30 seconds with SBA-CGA x1. Stepping forward and taking same side arm back 10x each side - hold on to bar with opposite hand. Stepping sideways 10x bilaterally holding on with one hand. Rockerboard 10x each of 2 directions. Activity Tolerance:  Activity Tolerance: Patient Tolerated treatment well, Patient limited by endurance    Assessment: Body structures, Functions, Activity limitations: Decreased functional mobility , Decreased strength, Decreased endurance, Decreased balance, Increased Pain  Assessment: Patient more fatigued today and having increased leg/knee pain. Patient needed 1 seated rest break today due to bilateral knee pain. Shortened session due to patient arriving late to session. Patient requested assistance by wheelchair after therapy to Dr. Banuelos Goodness office because she needed to  medication. Discharge Recommendations: Continue to assess pending progress    Patient Education:  Patient Education: Continue with HEP. Monitor knee pain. Plan:  Times per week: 2x/week  Plan weeks: 8 weeks  Specific instructions for Next Treatment: gait and balance, BIG and DLD activity, posture education strengthengin, Nu-step  Current Treatment Recommendations: Strengthening, Balance Training, Functional Mobility Training, Endurance Training, Gait Training, Neuromuscular Re-education, Manual Therapy - Soft Tissue Mobilization, Home Exercise Program, Modalities  Plan Comment: Continue with POC    Goals:  Patient goals : To walk better and longer with 4 wheeled walker.      Short term goals  Time Frame for Short term goals: 4 weeks  Short term goal 1: Patient to demonstrate >14/28 on the Tinetti to help prevent future falls  Short term goal 2: Patient to demonstrate 4+-5/5 strength in bilateral legs to help with lifting them in and out of the car  Short term goal 3: Patient to be able to ambulate further distances with longer steps and less difficulty turning to returnt to walking through apartment complex with her friend  Short term goal 4: Patient to demonstrate balance activities with 1-2 hand assist with more upright posture for safety with all community ambulation    Long term goals  Time Frame for Long term goals : 8 weeks  Long term goal 1: Patient to be independent with home program to be able to perform prior activity with increased safety and decreased difficulty      Marco Antonio .  Avel White, [address], 5/10/2019

## 2019-05-10 NOTE — CARE COORDINATION
Ambulatory Care Coordination Note  5/10/2019  CM Risk Score: 13  Juan Mortality Risk Score: 12    ACC: Sadiq Samson, RN    Summary Note: Spoke with Pat Lao. Getting ready for PT. COA picking her up. Reports legs and knees are sore today. States neuro prescribed neupro patches for parkinson's. Was given samples. Needs more samples or further instructions if she should continue. Reports tremors are less. Attempted to call neuro but missed their call back and cannot reach them. Called neuro office. They have samples for her and she can  until 4 today. MAP needed for prescription. Message left for Angelica Corona regarding Neupro patch MAP. Updated patient. Has PCP appointment next week to transition to assisted living at [facility]. States she is nervous but looking forward to AL. Feels lonely at home. Ready for graduation. Care Coordination Interventions    Program Enrollment:  Complex Care  Referral from Primary Care Provider:  No  Suggested Interventions and Community Resources  Diabetes Education:  Not Started  Fall Risk Prevention:  Completed  Home Health Services:  Completed  Medication Assistance Program:  Completed  Physical Therapy:  Completed  Zone Management Tools:  Completed         Goals Addressed                 This Visit's Progress     COMPLETED: Conditions and Symptoms        I will notify my provider of any symptoms that indicate a worsening of my condition. Barriers: impairment:  physical and overwhelmed by complexity of regimen  Plan for overcoming my barriers: WIll call office or have daughter call  Confidence: 9/10  Anticipated Goal Completion Date: 05/20/19              Prior to Admission medications    Medication Sig Start Date End Date Taking? Authorizing Provider   Misc.  Devices MISC 1 each by Does not apply route once for 1 dose Param Glucose Meter; Diagnosis:E11.65 4/15/19 4/15/19  Lit Odom MD   Probiotic Product (PROBIOTIC ADVANCED PO) Take 1 tablet by mouth daily    Historical Provider, MD   rotigotine (NEUPRO) 2 MG/24HR Place 1 patch onto the skin daily 4/8/19   ROWDY Lindsey CNP   omeprazole (PRILOSEC) 20 MG delayed release capsule TAKE 1 CAPSULE BY MOUTH  DAILY 3/29/19   ROWDY Virk CNP   SITagliptin (JANUVIA) 50 MG tablet Take 1 tablet by mouth daily 2/21/19   Donnie Braun MD   amLODIPine (NORVASC) 5 MG tablet Take 1 tablet by mouth daily 2/21/19   Donnie Braun MD   sucralfate (CARAFATE) 1 GM tablet TAKE 1 TABLET BY MOUTH 4  TIMES DAILY 2/18/19   Donnie Braun MD   furosemide (LASIX) 20 MG tablet Take 20 mg by mouth daily    Historical Provider, MD   apixaban (ELIQUIS) 2.5 MG TABS tablet Take 1 tablet by mouth 2 times daily Additional RF per her PCP 1/19/19   David Hussein MD   carbidopa-levodopa (SINEMET)  MG per tablet Take 2 tablets by mouth 3 times daily    Historical Provider, MD   metFORMIN (GLUCOPHAGE) 850 MG tablet Take 1 tablet by mouth 2 times daily (with meals) 11/26/18   Donnie Braun MD   diltiazem (CARDIZEM) 30 MG tablet Take 1 tablet by mouth 3 times daily Additional RF per her PCP 11/12/18   Donnie Braun MD   metoprolol tartrate (LOPRESSOR) 50 MG tablet Take 50 mg by mouth 2 times daily  11/3/18   Darrel Harding MD   Cranberry 500 MG CAPS Take 500 mg by mouth daily 2 tab    Historical Provider, MD   atorvastatin (LIPITOR) 20 MG tablet Take 1 tablet by mouth daily 5/10/18   ROWDY Virk CNP   magnesium oxide (MAG-OX) 400 MG tablet Take 400 mg by mouth daily    Historical Provider, MD   aspirin 81 MG EC tablet Take 81 mg by mouth daily.       Historical Provider, MD       Future Appointments   Date Time Provider J Carlos Patterson   5/10/2019 10:45 AM Ginny OCONNELL PT None   5/13/2019  3:10 PM Donnie Braun MD [facility]   5/15/2019 10:00 AM Ginny OCONNELL PT None   5/17/2019 10:15 AM Caity Cheung, PT [facility] PT None   8/12/2019 11:30 AM Luis Fernando Kramer, MD [address] [facility]   8/23/2019 11:00 AM Edelmira Montalvo MD [facility] [place] [address]      and   Diabetes Assessment    Meal Planning:  Avoidance of concentrated sweets   Do you have barriers with adherence to non-pharmacologic self-management interventions?  (Nutrition/Exercise/Self-Monitoring):  No   Have you ever had to go to the ED for symptoms of low blood sugar?:  No       No patient-reported symptoms   Do you have hyperglycemia symptoms?:  No   Do you have hypoglycemia symptoms?:  No   Blood Sugar Monitoring Regimen:  Morning Fasting   Blood Sugar Trends:  No Change

## 2019-05-13 ENCOUNTER — TELEPHONE (OUTPATIENT)
Dept: PHARMACY | Age: 84
End: 2019-05-13

## 2019-05-13 ENCOUNTER — OFFICE VISIT (OUTPATIENT)
Dept: FAMILY MEDICINE CLINIC | Age: 84
End: 2019-05-13
Payer: MEDICARE

## 2019-05-13 VITALS
DIASTOLIC BLOOD PRESSURE: 62 MMHG | BODY MASS INDEX: 22.71 KG/M2 | RESPIRATION RATE: 16 BRPM | HEIGHT: 62 IN | TEMPERATURE: 97.6 F | SYSTOLIC BLOOD PRESSURE: 108 MMHG | WEIGHT: 123.4 LBS | HEART RATE: 66 BPM

## 2019-05-13 DIAGNOSIS — E11.65 TYPE 2 DIABETES MELLITUS WITH HYPERGLYCEMIA, WITHOUT LONG-TERM CURRENT USE OF INSULIN (HCC): ICD-10-CM

## 2019-05-13 DIAGNOSIS — I25.10 CORONARY ARTERY DISEASE INVOLVING NATIVE CORONARY ARTERY OF NATIVE HEART WITHOUT ANGINA PECTORIS: Primary | ICD-10-CM

## 2019-05-13 DIAGNOSIS — G20 PARKINSON'S DISEASE (HCC): ICD-10-CM

## 2019-05-13 DIAGNOSIS — Z85.828 HISTORY OF SKIN CANCER: ICD-10-CM

## 2019-05-13 DIAGNOSIS — M48.02 CERVICAL SPINAL STENOSIS: ICD-10-CM

## 2019-05-13 DIAGNOSIS — I10 ESSENTIAL HYPERTENSION: ICD-10-CM

## 2019-05-13 DIAGNOSIS — Z87.19 HISTORY OF GASTRITIS: ICD-10-CM

## 2019-05-13 DIAGNOSIS — E78.2 MIXED HYPERLIPIDEMIA: ICD-10-CM

## 2019-05-13 PROCEDURE — 1090F PRES/ABSN URINE INCON ASSESS: CPT | Performed by: FAMILY MEDICINE

## 2019-05-13 PROCEDURE — G8427 DOCREV CUR MEDS BY ELIG CLIN: HCPCS | Performed by: FAMILY MEDICINE

## 2019-05-13 PROCEDURE — 1123F ACP DISCUSS/DSCN MKR DOCD: CPT | Performed by: FAMILY MEDICINE

## 2019-05-13 PROCEDURE — 99214 OFFICE O/P EST MOD 30 MIN: CPT | Performed by: FAMILY MEDICINE

## 2019-05-13 PROCEDURE — 4040F PNEUMOC VAC/ADMIN/RCVD: CPT | Performed by: FAMILY MEDICINE

## 2019-05-13 PROCEDURE — G8598 ASA/ANTIPLAT THER USED: HCPCS | Performed by: FAMILY MEDICINE

## 2019-05-13 PROCEDURE — G8420 CALC BMI NORM PARAMETERS: HCPCS | Performed by: FAMILY MEDICINE

## 2019-05-13 PROCEDURE — 1036F TOBACCO NON-USER: CPT | Performed by: FAMILY MEDICINE

## 2019-05-13 ASSESSMENT — ENCOUNTER SYMPTOMS
SHORTNESS OF BREATH: 0
COUGH: 0
BACK PAIN: 1
DIARRHEA: 0
SORE THROAT: 0
EYES NEGATIVE: 1
RHINORRHEA: 0
CHEST TIGHTNESS: 0
VOMITING: 0
ABDOMINAL PAIN: 0
NAUSEA: 0

## 2019-05-13 ASSESSMENT — PATIENT HEALTH QUESTIONNAIRE - PHQ9
SUM OF ALL RESPONSES TO PHQ QUESTIONS 1-9: 0
1. LITTLE INTEREST OR PLEASURE IN DOING THINGS: 0
SUM OF ALL RESPONSES TO PHQ9 QUESTIONS 1 & 2: 0
2. FEELING DOWN, DEPRESSED OR HOPELESS: 0
SUM OF ALL RESPONSES TO PHQ QUESTIONS 1-9: 0

## 2019-05-13 NOTE — TELEPHONE ENCOUNTER
Laurita Garcia called about getting some assistance on her Neupro patches, I explained the program and what I need from her. She is going to work on getting me the info I need.           Bel Shoemaker Medication Coordinator   [facility] Patient Assistance Program   (N) [phone]  (W) [phone]

## 2019-05-13 NOTE — PROGRESS NOTES
[address] [address]  Dept: [phone]  Dept Fax: [phone]  Loc: [phone]  PROGRESS NOTE      VisitDate: 5/13/2019    Aida Kirk is a 80 y.o. female who presents today for:     Chief Complaint   Patient presents with   [facility]     admission to [facility].  Other     has bp and glucose readings         Subjective:  HPI  Patient comes in for follow-up chronic health conditions. Blood sugars been stable A. fib suite controlled. Seems to have some issues with her Parkinson's. But moving well and nearly her baseline. She is moving into an assisted living facility and needs forms completed    Review of Systems   Constitutional: Negative for activity change, appetite change, fatigue and fever. HENT: Negative for congestion, rhinorrhea and sore throat. Eyes: Negative. Respiratory: Negative for cough, chest tightness and shortness of breath. Cardiovascular: Negative for chest pain and palpitations. Gastrointestinal: Negative for abdominal pain, diarrhea, nausea and vomiting. Genitourinary: Negative for dysuria and urgency. Musculoskeletal: Positive for arthralgias, back pain and gait problem. Neurological: Positive for tremors. Negative for dizziness and headaches. Psychiatric/Behavioral: Negative for dysphoric mood. The patient is not nervous/anxious. Past Medical History:   Diagnosis Date    Arthritis     Atypical chest pain     CAD (coronary artery disease)     Chronic LBBB (left bundle branch block)     Diabetes mellitus type II     Esophageal stricture     History of esophageal stricture.  FH: CAD (coronary artery disease)     Mom and dad both with heart disease at mid to late age.      GERD (gastroesophageal reflux disease)     History of gastritis     History of skin cancer     Hypercholesterolemia     Hyperlipidemia     Hypertension     Imbalance     As far as imbalance is concerned, asked patient to follow-up with Dr. Ramya Rashid since she follows with him on a regular basis.  Lactose intolerance     Nummular dermatitis     Parkinson's disease ([address] 2009    Restless legs syndrome (RLS)     S/P CABG (coronary artery bypass graft)     SCC (squamous cell carcinoma)       Past Surgical History:   Procedure Laterality Date    CARDIAC SURGERY      CARDIOVASCULAR STRESS TEST  4 09 2009    Gated SPECT images revealed normal global wall motion with EF of 60%. Persantine test associated w/nonspecific symptoms. EKG is nondiagnostic w/baseline LBBB. No obvious stress-induced ischemia by Cardiolite. EF within normal limits.  CARDIOVASCULAR STRESS TEST  7 10 2007    The gated SPECT images revealed normal wall motion with EF of 69%. Poor exercise tolerance w/SOB on exertion. EKG is nondiagnostic. Cardiolite scan revealing no obvious stress-induced ischemia. EF 60%.  CARDIOVASCULAR STRESS TEST  2 03 2006    Fair exercise tolerance w/SOB on exertion. No EKG evidence of ischemia. Patient should be able to proceed with phase II cardiac rehab.  CATARACT REMOVAL      CATARACT REMOVAL  06/08/2016    AND 6/29/16    DIAGNOSTIC CARDIAC CATH LAB PROCEDURE  12 23 2005    LV was obtained. AGEE projection showed preserved LV function w/estimated EF at 55%. No significant MR. Patent left main coronary artery. Tortuous LAD which appeared to be patent. Patent left circumflex artery. High-grade stenosis around 99% in very large dominant RCA w/heavy calcification at the ostium. Normal LV function.  DOPPLER ECHOCARDIOGRAPHY  4 09 2009    Global LV function w/in lower limits of normal, with mild anteroseptal hypokinesis. EF of 50-55%. Light LVH. Mild to moderate left atrial enlargement. Calcific aortic and mitral valve w/no obvious stenosis. No obvious pericardial effusion.      DOPPLER ECHOCARDIOGRAPHY  7 10 2007    LV function w/in lower limits of normal w/peridoxical septal wall motion. Moderate left atrial enlargement. Mild MR. Mild TR. Calcified valves w/no obvious stenosis. No pericardial effusion.  DOPPLER ECHOCARDIOGRAPHY  4 14 2006    There appears to be significant improvement from previous echo w/complete resolution of pericardial effusion and normal LV function. EF of 60%.  DOPPLER ECHOCARDIOGRAPHY  3 21 2006    Normal LV function. Mild LV hypertrophy. Mild biatrial enlargement. Mildly calcific aortic and mitral valve w/no stenosis. Mild MR. Mild TR. Minimal residual pericardial effusion w/significant improvement from previous echo.  DOPPLER ECHOCARDIOGRAPHY  3 16 2006    Interval change from previous echocardiogram w/worsening of effusion. Correlation clinically. Consideration of pericardial centesis. Will obtain a [facility] consult.  DOPPLER ECHOCARDIOGRAPHY  1 31 2006    No significant change from previous echo. The 2D echo showed moderate degree of pericardial effusion. It does seem to be worst or better than previous echo. No evidence of tamponade.  DOPPLER ECHOCARDIOGRAPHY  1 27 2006    Normal global LV function. Mild biatrial enlargement. Mildly sclerotic aortic & mitral valve w/no stenosis. Mild MR. Mild TR. Small to moderate pericardial effusion w/no obvious tamponade.      JOINT REPLACEMENT      MOHS SURGERY Right 2/13/2019    MOHS DEFECT REPAIR CYSTIC SCC RIGHT LOWER LATERAL LEG WITH SKIN GRAFT FROM RIGHT GROIN (FULL THICKNESS ) performed by Dorita Champion MD at [facility] / [address] [place] [address] Left 12/20/13    left leg lesion excision x2, frozen section, skin graft closure    ROTATOR CUFF REPAIR  09/2001    RIGHT    ROTATOR CUFF REPAIR  04/15/2009    LEFT     SKIN CANCER EXCISION  06/2006      Rt leg    SPINAL CORD STIMULATOR IMPLANTATION N/A 12/4/2018    PERMANENT INTERSTIM performed by Gayla Piña MD at [facility] History   Problem Relation Age of Onset    Heart Disease Mother  Diabetes Mother     Stroke Father     Diabetes Sister     Lung Cancer Brother      Social History     Tobacco Use    Smoking status: Never Smoker    Smokeless tobacco: Never Used   Substance Use Topics    Alcohol use: No      Current Outpatient Medications   Medication Sig Dispense Refill    NONFORMULARY Cardio Max dietary supplement  1 scoop daily      Cholecalciferol (VITAMIN D3 PO) Take 4,000 Units by mouth daily 4 sprays twice a day on tongue      NONFORMULARY C+zinc spray    4 sprays BID on tongue      NONFORMULARY vitasight    4 sprays BID on tongue      NONFORMULARY 50 plus multi    4 sprays BID on tongue      rotigotine (NEUPRO) 2 MG/24HR Place 1 patch onto the skin daily 14 patch 0    Probiotic Product (PROBIOTIC ADVANCED PO) Take 1 tablet by mouth daily      omeprazole (PRILOSEC) 20 MG delayed release capsule TAKE 1 CAPSULE BY MOUTH  DAILY 90 capsule 3    SITagliptin (JANUVIA) 50 MG tablet Take 1 tablet by mouth daily 90 tablet 1    amLODIPine (NORVASC) 5 MG tablet Take 1 tablet by mouth daily 90 tablet 3    sucralfate (CARAFATE) 1 GM tablet TAKE 1 TABLET BY MOUTH 4  TIMES DAILY 360 tablet 0    furosemide (LASIX) 20 MG tablet Take 20 mg by mouth daily      apixaban (ELIQUIS) 2.5 MG TABS tablet Take 1 tablet by mouth 2 times daily Additional RF per her  tablet 3    carbidopa-levodopa (SINEMET)  MG per tablet Take 2 tablets by mouth 3 times daily      metFORMIN (GLUCOPHAGE) 850 MG tablet Take 1 tablet by mouth 2 times daily (with meals) 180 tablet 2    diltiazem (CARDIZEM) 30 MG tablet Take 1 tablet by mouth 3 times daily Additional RF per her  tablet 3    metoprolol tartrate (LOPRESSOR) 50 MG tablet Take 50 mg by mouth 2 times daily       Cranberry 500 MG CAPS Take 500 mg by mouth daily 2 tab      atorvastatin (LIPITOR) 20 MG tablet Take 1 tablet by mouth daily 90 tablet 3    magnesium oxide (MAG-OX) 400 MG tablet Take 400 mg by mouth daily      aspirin 81 MG EC tablet Take 81 mg by mouth daily.  Misc. Devices MISC 1 each by Does not apply route once for 1 dose Param Glucose Meter; Diagnosis:E11.65 1 Device 0     No current facility-administered medications for this visit. Allergies   Allergen Reactions    Latex Rash    Lisinopril Swelling     mouth    Tape Berta Rosita Tape] Itching    Keflex [Cephalexin] Nausea And Vomiting and Rash    Lactulose Diarrhea    Morphine Rash    Polysporin [Bacitracin-Polymyxin B] Rash     Health Maintenance   Topic Date Due    DTaP/Tdap/Td vaccine (1 - Tdap) 09/24/1951    Shingles Vaccine (1 of 2) 09/24/1982    Potassium monitoring  01/14/2020    Creatinine monitoring  01/14/2020    Flu vaccine  Completed    Pneumococcal 65+ years Vaccine  Completed         Objective:     Physical Exam   Constitutional: She appears well-developed and well-nourished. No distress. HENT:   Head: Normocephalic and atraumatic. Eyes: Conjunctivae are normal. No scleral icterus. Neck: No JVD present. No thyromegaly present. No bruits   Cardiovascular: Normal rate, regular rhythm and normal heart sounds. Pulmonary/Chest: Effort normal and breath sounds normal. No respiratory distress. She has no wheezes. She has no rales. Abdominal: Soft. She exhibits no mass. There is no tenderness. There is no guarding. Musculoskeletal: She exhibits no edema or tenderness. Neurological: She is alert. No cranial nerve deficit. Coordination abnormal.   Skin: Skin is warm and dry. No rash noted. She is not diaphoretic. /62 (Site: Left Upper Arm)   Pulse 66   Temp 97.6 °F (36.4 °C) (Oral)   Resp 16   Ht 5' 2\" (1.575 m)   Wt 123 lb 6.4 oz (56 kg)   BMI 22.57 kg/m²       Impression/Plan:  1. Coronary artery disease involving native coronary artery of native heart without angina pectoris    2. History of skin cancer    3. History of gastritis    4.  Type 2 diabetes mellitus with hyperglycemia, without long-term current use of insulin ([facility] [address]    5. Cervical spinal stenosis    6. Essential hypertension    7. Mixed hyperlipidemia    8. Parkinson's disease ([facility] [address]      Requested Prescriptions      No prescriptions requested or ordered in this encounter     No orders of the defined types were placed in this encounter. Continue current medications. Forms completed for assisted living facility, total face-to-face time 25 minutes over 50% time spent on counseling regarding home safety medication compliance as well as coordination of care    Patient giveneducational materials - see patient instructions. Discussed use, benefit, and side effects of prescribed medications. All patient questions answered. Pt voiced understanding. Reviewed health maintenance. Patient agreedwith treatment plan. Follow up as directed. **This report has been created using voice recognition software. It may contain minor errorswhich are inherent in voice recognition technology. **       Electronically signed by Janet Salter MD on 5/13/2019 at 6:25 PM

## 2019-05-15 ENCOUNTER — HOSPITAL ENCOUNTER (OUTPATIENT)
Dept: PHYSICAL THERAPY | Age: 84
Setting detail: THERAPIES SERIES
Discharge: HOME | End: 2019-05-15
Payer: MEDICARE

## 2019-05-15 PROCEDURE — 97110 THERAPEUTIC EXERCISES: CPT

## 2019-05-15 ASSESSMENT — PAIN SCALES - GENERAL: PAINLEVEL_OUTOF10: 5

## 2019-05-15 NOTE — PROGRESS NOTES
ambulation    Long term goals  Time Frame for Long term goals : 8 weeks  Long term goal 1: Patient to be independent with home program to be able to perform prior activity with increased safety and decreased difficulty      Barbera Brunner.  Joshua Patel, [facility] [place], 5/15/2019

## 2019-05-17 ENCOUNTER — HOSPITAL ENCOUNTER (OUTPATIENT)
Dept: PHYSICAL THERAPY | Age: 84
Setting detail: THERAPIES SERIES
Discharge: HOME | End: 2019-05-17
Payer: MEDICARE

## 2019-05-17 ENCOUNTER — TELEPHONE (OUTPATIENT)
Dept: NEUROLOGY | Age: 84
End: 2019-05-17

## 2019-05-17 DIAGNOSIS — G20 PARKINSON'S DISEASE (HCC): Primary | ICD-10-CM

## 2019-05-17 PROCEDURE — 97110 THERAPEUTIC EXERCISES: CPT

## 2019-05-17 ASSESSMENT — PAIN DESCRIPTION - ORIENTATION: ORIENTATION: RIGHT;LEFT

## 2019-05-17 ASSESSMENT — PAIN DESCRIPTION - LOCATION: LOCATION: KNEE;LEG

## 2019-05-17 ASSESSMENT — PAIN SCALES - GENERAL: PAINLEVEL_OUTOF10: 9

## 2019-05-17 ASSESSMENT — PAIN DESCRIPTION - PAIN TYPE: TYPE: ACUTE PAIN;CHRONIC PAIN

## 2019-05-17 NOTE — TELEPHONE ENCOUNTER
Order for PT signed. Please fax to [facility] on patient's behalf.   Hoa Tiwari, CNP

## 2019-05-22 RX ORDER — ATORVASTATIN CALCIUM 20 MG/1
20 TABLET, FILM COATED ORAL DAILY
Qty: 90 TABLET | Refills: 3 | Status: SHIPPED | OUTPATIENT
Start: 2019-05-22 | End: 2019-08-23

## 2019-06-06 RX ORDER — METOPROLOL TARTRATE 50 MG/1
50 TABLET, FILM COATED ORAL 2 TIMES DAILY
Qty: 180 TABLET | Refills: 3 | Status: SHIPPED | OUTPATIENT
Start: 2019-06-06 | End: 2020-02-25 | Stop reason: SDUPTHER

## 2019-06-06 NOTE — TELEPHONE ENCOUNTER
Faxed Rx request from [facility] for Metoprolol 50mg 1 tablet BID  Last written:11/03/2018 no quantity or refills noted  Last seen:05/13/2019, No future appointments  Rx verified,ordered,and set to escribe

## 2019-06-18 DIAGNOSIS — E11.65 TYPE 2 DIABETES MELLITUS WITH HYPERGLYCEMIA, WITHOUT LONG-TERM CURRENT USE OF INSULIN (HCC): ICD-10-CM

## 2019-06-18 RX ORDER — SUCRALFATE 1 G/1
1 TABLET ORAL 4 TIMES DAILY
Qty: 360 TABLET | Refills: 0 | Status: SHIPPED | OUTPATIENT
Start: 2019-06-18 | End: 2019-09-05 | Stop reason: SDUPTHER

## 2019-06-18 RX ORDER — SITAGLIPTIN 50 MG/1
TABLET, FILM COATED ORAL
Qty: 90 TABLET | Refills: 2 | Status: SHIPPED | OUTPATIENT
Start: 2019-06-18 | End: 2020-01-20 | Stop reason: SDUPTHER

## 2019-06-25 ENCOUNTER — TELEPHONE (OUTPATIENT)
Dept: CARE COORDINATION | Age: 84
End: 2019-06-25

## 2019-06-25 NOTE — TELEPHONE ENCOUNTER
Spoke with Yobani Vail about returning her portion of the application, she said she moved and lost the paper work. I am going to work on sending her another copy.         Pamela Munoz Medication Coordinator   [facility] Patient Assistance Program   (N) [phone]  (H) [phone]

## 2019-07-11 ENCOUNTER — TELEPHONE (OUTPATIENT)
Dept: CARE COORDINATION | Age: 84
End: 2019-07-11

## 2019-08-12 ENCOUNTER — OFFICE VISIT (OUTPATIENT)
Dept: NEUROLOGY | Age: 84
End: 2019-08-12
Payer: MEDICARE

## 2019-08-12 VITALS
HEIGHT: 62 IN | DIASTOLIC BLOOD PRESSURE: 68 MMHG | WEIGHT: 126.4 LBS | SYSTOLIC BLOOD PRESSURE: 122 MMHG | HEART RATE: 64 BPM | BODY MASS INDEX: 23.26 KG/M2

## 2019-08-12 DIAGNOSIS — G20 PARKINSON'S DISEASE (HCC): Primary | ICD-10-CM

## 2019-08-12 PROCEDURE — 1036F TOBACCO NON-USER: CPT | Performed by: PSYCHIATRY & NEUROLOGY

## 2019-08-12 PROCEDURE — G8427 DOCREV CUR MEDS BY ELIG CLIN: HCPCS | Performed by: PSYCHIATRY & NEUROLOGY

## 2019-08-12 PROCEDURE — G8598 ASA/ANTIPLAT THER USED: HCPCS | Performed by: PSYCHIATRY & NEUROLOGY

## 2019-08-12 PROCEDURE — 99213 OFFICE O/P EST LOW 20 MIN: CPT | Performed by: PSYCHIATRY & NEUROLOGY

## 2019-08-12 PROCEDURE — G8420 CALC BMI NORM PARAMETERS: HCPCS | Performed by: PSYCHIATRY & NEUROLOGY

## 2019-08-12 PROCEDURE — 4040F PNEUMOC VAC/ADMIN/RCVD: CPT | Performed by: PSYCHIATRY & NEUROLOGY

## 2019-08-12 PROCEDURE — 1090F PRES/ABSN URINE INCON ASSESS: CPT | Performed by: PSYCHIATRY & NEUROLOGY

## 2019-08-12 PROCEDURE — 1123F ACP DISCUSS/DSCN MKR DOCD: CPT | Performed by: PSYCHIATRY & NEUROLOGY

## 2019-08-12 NOTE — PROGRESS NOTES
NEUROLOGY OUT PATIENT FOLLOW UP NOTE:  8/12/201911:53 AM    Jun Bonner is here for follow up for Parkinson's disease. She feels she is doing better with her Parkinson symptoms since starting the Neupro 2 mg daily. She feels the same with her ambulation no reported falls, no hallucination. No dysphasia. She is tolerating the Sinemet 2 tablets 3 times a day. She feels she is doing worse since last evaluation. She is here with her daughter to discuss medication options and the plan of care going forward. ROS:  Cardiac: no chest pain. No palpitations.   Renal : no flank pain, no hematuria    Skin: no rash      Allergies   Allergen Reactions    Latex Rash    Lisinopril Swelling     mouth    Tape [Adhesive Tape] Itching    Keflex [Cephalexin] Nausea And Vomiting and Rash    Lactulose Diarrhea    Morphine Rash    Polysporin [Bacitracin-Polymyxin B] Rash       Current Outpatient Medications:     JANUVIA 50 MG tablet, TAKE 1 TABLET BY MOUTH  DAILY, Disp: 90 tablet, Rfl: 2    sucralfate (CARAFATE) 1 GM tablet, TAKE 1 TABLET BY MOUTH 4  TIMES DAILY, Disp: 360 tablet, Rfl: 0    carbidopa-levodopa (SINEMET)  MG per tablet, TAKE 2 TABLETS BY MOUTH 3  TIMES DAILY, Disp: 540 tablet, Rfl: 2    metoprolol tartrate (LOPRESSOR) 50 MG tablet, Take 1 tablet by mouth 2 times daily, Disp: 180 tablet, Rfl: 3    atorvastatin (LIPITOR) 20 MG tablet, TAKE 1 TABLET BY MOUTH  DAILY, Disp: 90 tablet, Rfl: 3    blood glucose test strips (CONTOUR TEST) strip, USE FOR TESTING TWICE DAILY, Disp: 100 strip, Rfl: 2    NONFORMULARY, Cardio Max dietary supplement 1 scoop daily, Disp: , Rfl:     Cholecalciferol (VITAMIN D3 PO), Take 4,000 Units by mouth daily 4 sprays twice a day on tongue, Disp: , Rfl:     NONFORMULARY, C+zinc spray  4 sprays BID on tongue, Disp: , Rfl:     NONFORMULARY, vitasight  4 sprays BID on tongue, Disp: , Rfl:     NONFORMULARY, 50 plus multi  4 sprays BID on tongue, Disp: , Rfl:   

## 2019-08-23 ENCOUNTER — OFFICE VISIT (OUTPATIENT)
Dept: CARDIOLOGY CLINIC | Age: 84
End: 2019-08-23
Payer: MEDICARE

## 2019-08-23 VITALS
DIASTOLIC BLOOD PRESSURE: 62 MMHG | HEART RATE: 52 BPM | BODY MASS INDEX: 23 KG/M2 | WEIGHT: 125 LBS | SYSTOLIC BLOOD PRESSURE: 112 MMHG | HEIGHT: 62 IN

## 2019-08-23 DIAGNOSIS — I48.0 PAROXYSMAL ATRIAL FIBRILLATION (HCC): ICD-10-CM

## 2019-08-23 DIAGNOSIS — I10 ESSENTIAL HYPERTENSION: ICD-10-CM

## 2019-08-23 DIAGNOSIS — I25.810 CORONARY ARTERY DISEASE INVOLVING CORONARY BYPASS GRAFT OF NATIVE HEART WITHOUT ANGINA PECTORIS: Primary | ICD-10-CM

## 2019-08-23 PROCEDURE — G8420 CALC BMI NORM PARAMETERS: HCPCS | Performed by: NUCLEAR MEDICINE

## 2019-08-23 PROCEDURE — 4040F PNEUMOC VAC/ADMIN/RCVD: CPT | Performed by: NUCLEAR MEDICINE

## 2019-08-23 PROCEDURE — 1123F ACP DISCUSS/DSCN MKR DOCD: CPT | Performed by: NUCLEAR MEDICINE

## 2019-08-23 PROCEDURE — G8428 CUR MEDS NOT DOCUMENT: HCPCS | Performed by: NUCLEAR MEDICINE

## 2019-08-23 PROCEDURE — 1036F TOBACCO NON-USER: CPT | Performed by: NUCLEAR MEDICINE

## 2019-08-23 PROCEDURE — 1090F PRES/ABSN URINE INCON ASSESS: CPT | Performed by: NUCLEAR MEDICINE

## 2019-08-23 PROCEDURE — G8598 ASA/ANTIPLAT THER USED: HCPCS | Performed by: NUCLEAR MEDICINE

## 2019-08-23 PROCEDURE — 99213 OFFICE O/P EST LOW 20 MIN: CPT | Performed by: NUCLEAR MEDICINE

## 2019-08-23 NOTE — PROGRESS NOTES
Pt here for 1 year fu     States she had right sided chest pain last week
chills, No fatigue or weight loss  Pulmonary:    No dyspnea, no wheezing  Cardiac:    Denies recent chest pain,   GI:     No nausea or vomiting, no abdominal pain  Neuro:    some dizziness or light headedness,   Musculoskeletal:  No recent active issues  Extremities:   No edema, good peripheral pulses      Objective:  Physical Exam  /62   Pulse 52   Ht 5' 2\" (1.575 m)   Wt 125 lb (56.7 kg)   BMI 22.86 kg/m²   General:   Well developed, well nourished  Lungs:   Clear to auscultation  Heart:    Normal S1 S2, Slight murmur. no rubs, no gallops  Abdomen:   Soft, non tender, no organomegalies, positive bowel sounds  Extremities:   No edema, no cyanosis, good peripheral pulses  Neurological:   Awake, alert, oriented. No obvious focal deficits  Musculoskelatal:  No obvious deformities    Assessment:      Diagnosis Orders   1. Coronary artery disease involving coronary bypass graft of native heart without angina pectoris     2. Paroxysmal atrial fibrillation (HCC)     3. Essential hypertension     cardiac fair for now     Plan:  No follow-ups on file. As above  Continue risk factor modification and medical management  Thank you for allowing me to participate in the care of your patient. Please don't hesitate to contact me regarding any further issues related to the patient care    Orders Placed:  No orders of the defined types were placed in this encounter. Medications Prescribed:  No orders of the defined types were placed in this encounter. Discussed use, benefit, and side effects of prescribed medications. All patient questions answered. Pt voicedunderstanding. Instructed to continue current medications, diet and exercise. Continue risk factor modification and medical management. Patient agreed with treatment plan. Follow up as directed.     Electronically signedby Radha Richard MD on 8/23/2019 at 11:21 AM

## 2019-08-26 LAB
AVERAGE GLUCOSE: NORMAL
HBA1C MFR BLD: 6.5 %

## 2019-09-05 ENCOUNTER — OFFICE VISIT (OUTPATIENT)
Dept: FAMILY MEDICINE CLINIC | Age: 84
End: 2019-09-05
Payer: MEDICARE

## 2019-09-05 VITALS
BODY MASS INDEX: 23.59 KG/M2 | DIASTOLIC BLOOD PRESSURE: 60 MMHG | RESPIRATION RATE: 20 BRPM | HEART RATE: 65 BPM | WEIGHT: 129 LBS | SYSTOLIC BLOOD PRESSURE: 100 MMHG

## 2019-09-05 DIAGNOSIS — M15.9 PRIMARY OSTEOARTHRITIS INVOLVING MULTIPLE JOINTS: Primary | ICD-10-CM

## 2019-09-05 PROCEDURE — G8598 ASA/ANTIPLAT THER USED: HCPCS | Performed by: FAMILY MEDICINE

## 2019-09-05 PROCEDURE — 99213 OFFICE O/P EST LOW 20 MIN: CPT | Performed by: FAMILY MEDICINE

## 2019-09-05 PROCEDURE — G8420 CALC BMI NORM PARAMETERS: HCPCS | Performed by: FAMILY MEDICINE

## 2019-09-05 PROCEDURE — 1090F PRES/ABSN URINE INCON ASSESS: CPT | Performed by: FAMILY MEDICINE

## 2019-09-05 PROCEDURE — 1036F TOBACCO NON-USER: CPT | Performed by: FAMILY MEDICINE

## 2019-09-05 PROCEDURE — G8427 DOCREV CUR MEDS BY ELIG CLIN: HCPCS | Performed by: FAMILY MEDICINE

## 2019-09-05 PROCEDURE — 4040F PNEUMOC VAC/ADMIN/RCVD: CPT | Performed by: FAMILY MEDICINE

## 2019-09-05 PROCEDURE — 1123F ACP DISCUSS/DSCN MKR DOCD: CPT | Performed by: FAMILY MEDICINE

## 2019-09-05 RX ORDER — SUCRALFATE 1 G/1
1 TABLET ORAL 4 TIMES DAILY
Qty: 360 TABLET | Refills: 3 | Status: SHIPPED | OUTPATIENT
Start: 2019-09-05 | End: 2020-01-07 | Stop reason: SDUPTHER

## 2019-09-08 ASSESSMENT — ENCOUNTER SYMPTOMS
RHINORRHEA: 0
VOMITING: 0
SORE THROAT: 0
ABDOMINAL PAIN: 0
BACK PAIN: 0
CHEST TIGHTNESS: 0
NAUSEA: 0
DIARRHEA: 0
SHORTNESS OF BREATH: 0
EYES NEGATIVE: 1
COUGH: 0

## 2019-09-09 NOTE — PROGRESS NOTES
 Parkinson's disease ([facility] [address] 2009    Restless legs syndrome (RLS)     S/P CABG (coronary artery bypass graft)     SCC (squamous cell carcinoma)       Past Surgical History:   Procedure Laterality Date    CARDIAC SURGERY      CARDIOVASCULAR STRESS TEST  4 09 2009    Gated SPECT images revealed normal global wall motion with EF of 60%. Persantine test associated w/nonspecific symptoms. EKG is nondiagnostic w/baseline LBBB. No obvious stress-induced ischemia by Cardiolite. EF within normal limits.  CARDIOVASCULAR STRESS TEST  7 10 2007    The gated SPECT images revealed normal wall motion with EF of 69%. Poor exercise tolerance w/SOB on exertion. EKG is nondiagnostic. Cardiolite scan revealing no obvious stress-induced ischemia. EF 60%.  CARDIOVASCULAR STRESS TEST  2 03 2006    Fair exercise tolerance w/SOB on exertion. No EKG evidence of ischemia. Patient should be able to proceed with phase II cardiac rehab.  CATARACT REMOVAL      CATARACT REMOVAL  06/08/2016    AND 6/29/16    DIAGNOSTIC CARDIAC CATH LAB PROCEDURE  12 23 2005    LV was obtained. AGEE projection showed preserved LV function w/estimated EF at 55%. No significant MR. Patent left main coronary artery. Tortuous LAD which appeared to be patent. Patent left circumflex artery. High-grade stenosis around 99% in very large dominant RCA w/heavy calcification at the ostium. Normal LV function.  DOPPLER ECHOCARDIOGRAPHY  4 09 2009    Global LV function w/in lower limits of normal, with mild anteroseptal hypokinesis. EF of 50-55%. Light LVH. Mild to moderate left atrial enlargement. Calcific aortic and mitral valve w/no obvious stenosis. No obvious pericardial effusion.  DOPPLER ECHOCARDIOGRAPHY  7 10 2007    LV function w/in lower limits of normal w/peridoxical septal wall motion. Moderate left atrial enlargement. Mild MR. Mild TR. Calcified valves w/no obvious stenosis. No pericardial effusion.      DOPPLER ECHOCARDIOGRAPHY  4 14 2006 tobacco: Never Used   Substance Use Topics    Alcohol use: No      Current Outpatient Medications   Medication Sig Dispense Refill    metFORMIN (GLUCOPHAGE) 850 MG tablet Take 1 tablet by mouth 2 times daily (with meals) 180 tablet 3    sucralfate (CARAFATE) 1 GM tablet Take 1 tablet by mouth 4 times daily 360 tablet 3    Protein (CARDIOWHEY) POWD Take by mouth 3 times daily      carbidopa-levodopa (SINEMET)  MG per tablet Take 2 tablets by mouth 3 times daily 540 tablet 2    JANUVIA 50 MG tablet TAKE 1 TABLET BY MOUTH  DAILY 90 tablet 2    metoprolol tartrate (LOPRESSOR) 50 MG tablet Take 1 tablet by mouth 2 times daily 180 tablet 3    blood glucose test strips (CONTOUR TEST) strip USE FOR TESTING TWICE DAILY 100 strip 2    Cholecalciferol (VITAMIN D3 PO) Take 4,000 Units by mouth daily 4 sprays twice a day on tongue      NONFORMULARY 50 plus multi    4 sprays BID on tongue      rotigotine (NEUPRO) 2 MG/24HR Place 1 patch onto the skin daily 14 patch 0    Probiotic Product (PROBIOTIC ADVANCED PO) Take 1 tablet by mouth daily      omeprazole (PRILOSEC) 20 MG delayed release capsule TAKE 1 CAPSULE BY MOUTH  DAILY 90 capsule 3    amLODIPine (NORVASC) 5 MG tablet Take 1 tablet by mouth daily 90 tablet 3    furosemide (LASIX) 20 MG tablet Take 20 mg by mouth daily      apixaban (ELIQUIS) 2.5 MG TABS tablet Take 1 tablet by mouth 2 times daily Additional RF per her  tablet 3    diltiazem (CARDIZEM) 30 MG tablet Take 1 tablet by mouth 3 times daily Additional RF per her  tablet 3    Cranberry 500 MG CAPS Take 500 mg by mouth daily 2 tab      magnesium oxide (MAG-OX) 400 MG tablet Take 400 mg by mouth daily      aspirin 81 MG EC tablet Take 81 mg by mouth daily.  Misc. Devices MISC 1 each by Does not apply route once for 1 dose Param Glucose Meter; Diagnosis:E11.65 1 Device 0     No current facility-administered medications for this visit.       Allergies   Allergen Reactions

## 2019-09-19 ENCOUNTER — TELEPHONE (OUTPATIENT)
Dept: FAMILY MEDICINE CLINIC | Age: 84
End: 2019-09-19

## 2019-09-19 DIAGNOSIS — M15.9 PRIMARY OSTEOARTHRITIS INVOLVING MULTIPLE JOINTS: Primary | ICD-10-CM

## 2019-09-19 NOTE — TELEPHONE ENCOUNTER
Pt states she was seen 2 wks ago for bilateral knee pain and advised to take tylenol 650mg 2 tablets tid which it is not helping. She is wanting to know if she can get a shot, something different for pain of if you want her to see Ortho. Please advise.

## 2019-09-20 ENCOUNTER — HOSPITAL ENCOUNTER (OUTPATIENT)
Dept: GENERAL RADIOLOGY | Age: 84
Discharge: HOME | End: 2019-09-20
Payer: MEDICARE

## 2019-09-20 ENCOUNTER — HOSPITAL ENCOUNTER (OUTPATIENT)
Age: 84
Discharge: HOME | End: 2019-09-20
Payer: MEDICARE

## 2019-09-20 DIAGNOSIS — M15.9 PRIMARY OSTEOARTHRITIS INVOLVING MULTIPLE JOINTS: ICD-10-CM

## 2019-09-20 PROCEDURE — 73562 X-RAY EXAM OF KNEE 3: CPT

## 2019-09-26 ENCOUNTER — OFFICE VISIT (OUTPATIENT)
Dept: FAMILY MEDICINE CLINIC | Age: 84
End: 2019-09-26
Payer: MEDICARE

## 2019-09-26 VITALS
WEIGHT: 130.2 LBS | RESPIRATION RATE: 18 BRPM | HEART RATE: 64 BPM | SYSTOLIC BLOOD PRESSURE: 112 MMHG | BODY MASS INDEX: 23.81 KG/M2 | TEMPERATURE: 98 F | DIASTOLIC BLOOD PRESSURE: 82 MMHG

## 2019-09-26 DIAGNOSIS — M15.9 PRIMARY OSTEOARTHRITIS INVOLVING MULTIPLE JOINTS: Primary | ICD-10-CM

## 2019-09-26 PROCEDURE — 1090F PRES/ABSN URINE INCON ASSESS: CPT | Performed by: FAMILY MEDICINE

## 2019-09-26 PROCEDURE — 99213 OFFICE O/P EST LOW 20 MIN: CPT | Performed by: FAMILY MEDICINE

## 2019-09-26 PROCEDURE — G8598 ASA/ANTIPLAT THER USED: HCPCS | Performed by: FAMILY MEDICINE

## 2019-09-26 PROCEDURE — G8427 DOCREV CUR MEDS BY ELIG CLIN: HCPCS | Performed by: FAMILY MEDICINE

## 2019-09-26 PROCEDURE — 96372 THER/PROPH/DIAG INJ SC/IM: CPT | Performed by: FAMILY MEDICINE

## 2019-09-26 PROCEDURE — 1123F ACP DISCUSS/DSCN MKR DOCD: CPT | Performed by: FAMILY MEDICINE

## 2019-09-26 PROCEDURE — G8420 CALC BMI NORM PARAMETERS: HCPCS | Performed by: FAMILY MEDICINE

## 2019-09-26 PROCEDURE — 1036F TOBACCO NON-USER: CPT | Performed by: FAMILY MEDICINE

## 2019-09-26 PROCEDURE — 4040F PNEUMOC VAC/ADMIN/RCVD: CPT | Performed by: FAMILY MEDICINE

## 2019-09-26 RX ORDER — SENNOSIDES 8.6 MG
650 CAPSULE ORAL EVERY 8 HOURS PRN
COMMUNITY
End: 2019-12-16

## 2019-09-26 RX ORDER — METHYLPREDNISOLONE ACETATE 80 MG/ML
160 INJECTION, SUSPENSION INTRA-ARTICULAR; INTRALESIONAL; INTRAMUSCULAR; SOFT TISSUE ONCE
Status: COMPLETED | OUTPATIENT
Start: 2019-09-26 | End: 2019-09-26

## 2019-09-26 RX ADMIN — METHYLPREDNISOLONE ACETATE 160 MG: 80 INJECTION, SUSPENSION INTRA-ARTICULAR; INTRALESIONAL; INTRAMUSCULAR; SOFT TISSUE at 09:09

## 2019-09-29 ASSESSMENT — ENCOUNTER SYMPTOMS
RHINORRHEA: 0
BACK PAIN: 0
EYES NEGATIVE: 1
ABDOMINAL PAIN: 0
DIARRHEA: 0
NAUSEA: 0
VOMITING: 0
SHORTNESS OF BREATH: 0
COUGH: 0
SORE THROAT: 0
CHEST TIGHTNESS: 0

## 2019-09-29 NOTE — PROGRESS NOTES
imbalance is concerned, asked patient to follow-up with Dr. Samantha Quevedo since she follows with him on a regular basis.  Lactose intolerance     Nummular dermatitis     Parkinson's disease ([address] 2009    Restless legs syndrome (RLS)     S/P CABG (coronary artery bypass graft)     SCC (squamous cell carcinoma)       Past Surgical History:   Procedure Laterality Date    CARDIAC SURGERY      CARDIOVASCULAR STRESS TEST  4 09 2009    Gated SPECT images revealed normal global wall motion with EF of 60%. Persantine test associated w/nonspecific symptoms. EKG is nondiagnostic w/baseline LBBB. No obvious stress-induced ischemia by Cardiolite. EF within normal limits.  CARDIOVASCULAR STRESS TEST  7 10 2007    The gated SPECT images revealed normal wall motion with EF of 69%. Poor exercise tolerance w/SOB on exertion. EKG is nondiagnostic. Cardiolite scan revealing no obvious stress-induced ischemia. EF 60%.  CARDIOVASCULAR STRESS TEST  2 03 2006    Fair exercise tolerance w/SOB on exertion. No EKG evidence of ischemia. Patient should be able to proceed with phase II cardiac rehab.  CATARACT REMOVAL      CATARACT REMOVAL  06/08/2016    AND 6/29/16    DIAGNOSTIC CARDIAC CATH LAB PROCEDURE  12 23 2005    LV was obtained. AGEE projection showed preserved LV function w/estimated EF at 55%. No significant MR. Patent left main coronary artery. Tortuous LAD which appeared to be patent. Patent left circumflex artery. High-grade stenosis around 99% in very large dominant RCA w/heavy calcification at the ostium. Normal LV function.  DOPPLER ECHOCARDIOGRAPHY  4 09 2009    Global LV function w/in lower limits of normal, with mild anteroseptal hypokinesis. EF of 50-55%. Light LVH. Mild to moderate left atrial enlargement. Calcific aortic and mitral valve w/no obvious stenosis. No obvious pericardial effusion.      DOPPLER ECHOCARDIOGRAPHY  7 10 2007    LV function w/in lower limits of normal w/peridoxical septal

## 2019-11-12 RX ORDER — FUROSEMIDE 20 MG/1
TABLET ORAL
Qty: 90 TABLET | Refills: 3 | Status: SHIPPED | OUTPATIENT
Start: 2019-11-12 | End: 2020-01-20 | Stop reason: SDUPTHER

## 2019-12-16 ENCOUNTER — OFFICE VISIT (OUTPATIENT)
Dept: NEUROLOGY | Age: 84
End: 2019-12-16
Payer: MEDICARE

## 2019-12-16 VITALS
SYSTOLIC BLOOD PRESSURE: 118 MMHG | DIASTOLIC BLOOD PRESSURE: 66 MMHG | HEART RATE: 64 BPM | BODY MASS INDEX: 24.29 KG/M2 | HEIGHT: 62 IN | WEIGHT: 132 LBS

## 2019-12-16 DIAGNOSIS — G20 PARKINSON'S DISEASE (HCC): Primary | ICD-10-CM

## 2019-12-16 PROCEDURE — 1123F ACP DISCUSS/DSCN MKR DOCD: CPT | Performed by: PSYCHIATRY & NEUROLOGY

## 2019-12-16 PROCEDURE — 99213 OFFICE O/P EST LOW 20 MIN: CPT | Performed by: PSYCHIATRY & NEUROLOGY

## 2019-12-16 PROCEDURE — 1090F PRES/ABSN URINE INCON ASSESS: CPT | Performed by: PSYCHIATRY & NEUROLOGY

## 2019-12-16 PROCEDURE — G8428 CUR MEDS NOT DOCUMENT: HCPCS | Performed by: PSYCHIATRY & NEUROLOGY

## 2019-12-16 PROCEDURE — G8484 FLU IMMUNIZE NO ADMIN: HCPCS | Performed by: PSYCHIATRY & NEUROLOGY

## 2019-12-16 PROCEDURE — G8598 ASA/ANTIPLAT THER USED: HCPCS | Performed by: PSYCHIATRY & NEUROLOGY

## 2019-12-16 PROCEDURE — 1036F TOBACCO NON-USER: CPT | Performed by: PSYCHIATRY & NEUROLOGY

## 2019-12-16 PROCEDURE — G8420 CALC BMI NORM PARAMETERS: HCPCS | Performed by: PSYCHIATRY & NEUROLOGY

## 2019-12-16 PROCEDURE — 4040F PNEUMOC VAC/ADMIN/RCVD: CPT | Performed by: PSYCHIATRY & NEUROLOGY

## 2019-12-16 RX ORDER — ATORVASTATIN CALCIUM 20 MG/1
20 TABLET, FILM COATED ORAL DAILY
COMMUNITY
End: 2020-02-25 | Stop reason: SDUPTHER

## 2019-12-28 ENCOUNTER — HOSPITAL ENCOUNTER (EMERGENCY)
Age: 84
Discharge: HOME | End: 2019-12-28
Attending: EMERGENCY MEDICINE
Payer: MEDICARE

## 2019-12-28 VITALS
RESPIRATION RATE: 18 BRPM | HEART RATE: 75 BPM | DIASTOLIC BLOOD PRESSURE: 67 MMHG | OXYGEN SATURATION: 97 % | WEIGHT: 123 LBS | HEIGHT: 62 IN | BODY MASS INDEX: 22.63 KG/M2 | SYSTOLIC BLOOD PRESSURE: 144 MMHG | TEMPERATURE: 98.2 F

## 2019-12-28 DIAGNOSIS — I50.22 CHRONIC SYSTOLIC CONGESTIVE HEART FAILURE (HCC): Primary | ICD-10-CM

## 2019-12-28 DIAGNOSIS — E11.9 DIABETES MELLITUS TYPE 2 IN NONOBESE (HCC): ICD-10-CM

## 2019-12-28 DIAGNOSIS — R60.9 DEPENDENT EDEMA: ICD-10-CM

## 2019-12-28 LAB
BILIRUBIN URINE: NEGATIVE
BLOOD, URINE: NEGATIVE
CHARACTER, URINE: CLEAR
COLOR: YELLOW
GLUCOSE, URINE: NEGATIVE MG/DL
KETONES, URINE: NEGATIVE
LEUKOCYTES, UA: ABNORMAL
NITRATE, UA: NEGATIVE
PH UA: 6.5 (ref 5–9)
PROTEIN UA: NEGATIVE MG/DL
REFLEX TO URINE C & S: ABNORMAL
SPECIFIC GRAVITY UA: 1.01 (ref 1–1.03)
UROBILINOGEN, URINE: 0.2 EU/DL (ref 0–1)

## 2019-12-28 PROCEDURE — 81003 URINALYSIS AUTO W/O SCOPE: CPT

## 2019-12-28 PROCEDURE — 99213 OFFICE O/P EST LOW 20 MIN: CPT

## 2019-12-28 PROCEDURE — 99214 OFFICE O/P EST MOD 30 MIN: CPT | Performed by: EMERGENCY MEDICINE

## 2019-12-28 ASSESSMENT — ENCOUNTER SYMPTOMS
FACIAL SWELLING: 0
CONSTIPATION: 0
EYE REDNESS: 0
BLOOD IN STOOL: 0
ABDOMINAL PAIN: 0
COUGH: 0
SORE THROAT: 0
VOMITING: 0
SINUS PRESSURE: 0
VOICE CHANGE: 0
WHEEZING: 0
EYE DISCHARGE: 0
NAUSEA: 0
DIARRHEA: 0
STRIDOR: 0
TROUBLE SWALLOWING: 0
SHORTNESS OF BREATH: 0
CHOKING: 0
BACK PAIN: 0
EYE PAIN: 0

## 2019-12-28 ASSESSMENT — PAIN SCALES - GENERAL: PAINLEVEL_OUTOF10: 6

## 2019-12-28 ASSESSMENT — PAIN DESCRIPTION - LOCATION: LOCATION: LEG

## 2020-01-02 ENCOUNTER — TELEPHONE (OUTPATIENT)
Dept: FAMILY MEDICINE CLINIC | Age: 85
End: 2020-01-02

## 2020-01-07 ENCOUNTER — OFFICE VISIT (OUTPATIENT)
Dept: FAMILY MEDICINE CLINIC | Age: 85
End: 2020-01-07
Payer: MEDICARE

## 2020-01-07 VITALS
BODY MASS INDEX: 22.5 KG/M2 | HEART RATE: 65 BPM | RESPIRATION RATE: 20 BRPM | WEIGHT: 123 LBS | OXYGEN SATURATION: 98 % | TEMPERATURE: 97.9 F | DIASTOLIC BLOOD PRESSURE: 52 MMHG | SYSTOLIC BLOOD PRESSURE: 100 MMHG

## 2020-01-07 PROCEDURE — 1123F ACP DISCUSS/DSCN MKR DOCD: CPT | Performed by: FAMILY MEDICINE

## 2020-01-07 PROCEDURE — 4040F PNEUMOC VAC/ADMIN/RCVD: CPT | Performed by: FAMILY MEDICINE

## 2020-01-07 PROCEDURE — G8484 FLU IMMUNIZE NO ADMIN: HCPCS | Performed by: FAMILY MEDICINE

## 2020-01-07 PROCEDURE — G8427 DOCREV CUR MEDS BY ELIG CLIN: HCPCS | Performed by: FAMILY MEDICINE

## 2020-01-07 PROCEDURE — 99213 OFFICE O/P EST LOW 20 MIN: CPT | Performed by: FAMILY MEDICINE

## 2020-01-07 PROCEDURE — G8420 CALC BMI NORM PARAMETERS: HCPCS | Performed by: FAMILY MEDICINE

## 2020-01-07 PROCEDURE — 1036F TOBACCO NON-USER: CPT | Performed by: FAMILY MEDICINE

## 2020-01-07 PROCEDURE — 1090F PRES/ABSN URINE INCON ASSESS: CPT | Performed by: FAMILY MEDICINE

## 2020-01-07 RX ORDER — SUCRALFATE 1 G/1
1 TABLET ORAL 4 TIMES DAILY
Qty: 360 TABLET | Refills: 3 | Status: ON HOLD | OUTPATIENT
Start: 2020-01-07 | End: 2021-03-02 | Stop reason: HOSPADM

## 2020-01-07 RX ORDER — OMEPRAZOLE 20 MG/1
20 CAPSULE, DELAYED RELEASE ORAL DAILY
Qty: 90 CAPSULE | Refills: 3 | Status: ON HOLD | OUTPATIENT
Start: 2020-01-07 | End: 2021-03-02 | Stop reason: HOSPADM

## 2020-01-12 ASSESSMENT — ENCOUNTER SYMPTOMS
DIARRHEA: 0
RHINORRHEA: 0
VOMITING: 0
BACK PAIN: 0
NAUSEA: 0
COUGH: 0
EYES NEGATIVE: 1
SHORTNESS OF BREATH: 0
CHEST TIGHTNESS: 0
ABDOMINAL PAIN: 0
SORE THROAT: 0

## 2020-01-12 NOTE — PROGRESS NOTES
[address] [place] [address]  Dept: [phone]  Dept Fax: [phone]  Loc: [phone]  PROGRESS NOTE      VisitDate: 1/7/2020    Hilary Kyle is a 80 y.o. female who presents today for:     Chief Complaint   Patient presents with    Dizziness     c/o 3 episodes in the last 6 weeks where she gets lightheaded, feels like she is going to pass out. Went to urgent care on 12/28 because she thought she had UTI but was neg. Told to take extra Lasix for 2 days.  Blood Sugar Problem     BS's have been high. Today 180 fasting, yest 208 fasting.  Medication Refill         Subjective:  HPI  Patient with history of diabetes hypertension both stable and controlled comes in because of lower extremity dependent edema. Medication list reviewed with the patient and family. She is sitting with her legs in a dependent position at all times    Review of Systems   Constitutional: Negative for activity change, appetite change, fatigue and fever. HENT: Negative for congestion, rhinorrhea and sore throat. Eyes: Negative. Respiratory: Negative for cough, chest tightness and shortness of breath. Cardiovascular: Positive for leg swelling. Negative for chest pain and palpitations. Gastrointestinal: Negative for abdominal pain, diarrhea, nausea and vomiting. Genitourinary: Negative for dysuria and urgency. Musculoskeletal: Negative for arthralgias and back pain. Neurological: Negative for dizziness and headaches. Psychiatric/Behavioral: Negative for dysphoric mood. The patient is not nervous/anxious. Past Medical History:   Diagnosis Date    Arthritis     Atypical chest pain     CAD (coronary artery disease)     Chronic LBBB (left bundle branch block)     Diabetes mellitus type II     Esophageal stricture     History of esophageal stricture.      FH: CAD (coronary artery disease)     Mom and dad both with heart disease at mid to late age.  GERD (gastroesophageal reflux disease)     History of gastritis     History of skin cancer     Hypercholesterolemia     Hyperlipidemia     Hypertension     Imbalance     As far as imbalance is concerned, asked patient to follow-up with Dr. Sandra Hooper since she follows with him on a regular basis.  Lactose intolerance     Nummular dermatitis     Parkinson's disease ([facility] [address] 2009    Restless legs syndrome (RLS)     S/P CABG (coronary artery bypass graft)     SCC (squamous cell carcinoma)       Past Surgical History:   Procedure Laterality Date    CARDIAC SURGERY      CARDIOVASCULAR STRESS TEST  4 09 2009    Gated SPECT images revealed normal global wall motion with EF of 60%. Persantine test associated w/nonspecific symptoms. EKG is nondiagnostic w/baseline LBBB. No obvious stress-induced ischemia by Cardiolite. EF within normal limits.  CARDIOVASCULAR STRESS TEST  7 10 2007    The gated SPECT images revealed normal wall motion with EF of 69%. Poor exercise tolerance w/SOB on exertion. EKG is nondiagnostic. Cardiolite scan revealing no obvious stress-induced ischemia. EF 60%.  CARDIOVASCULAR STRESS TEST  2 03 2006    Fair exercise tolerance w/SOB on exertion. No EKG evidence of ischemia. Patient should be able to proceed with phase II cardiac rehab.  CATARACT REMOVAL      CATARACT REMOVAL  06/08/2016    AND 6/29/16    DIAGNOSTIC CARDIAC CATH LAB PROCEDURE  12 23 2005    LV was obtained. AGEE projection showed preserved LV function w/estimated EF at 55%. No significant MR. Patent left main coronary artery. Tortuous LAD which appeared to be patent. Patent left circumflex artery. High-grade stenosis around 99% in very large dominant RCA w/heavy calcification at the ostium. Normal LV function.  DOPPLER ECHOCARDIOGRAPHY  4 09 2009    Global LV function w/in lower limits of normal, with mild anteroseptal hypokinesis. EF of 50-55%. Light LVH.  Mild to moderate left atrial enlargement. Calcific aortic and mitral valve w/no obvious stenosis. No obvious pericardial effusion.  DOPPLER ECHOCARDIOGRAPHY  7 10 2007    LV function w/in lower limits of normal w/peridoxical septal wall motion. Moderate left atrial enlargement. Mild MR. Mild TR. Calcified valves w/no obvious stenosis. No pericardial effusion.  DOPPLER ECHOCARDIOGRAPHY  4 14 2006    There appears to be significant improvement from previous echo w/complete resolution of pericardial effusion and normal LV function. EF of 60%.  DOPPLER ECHOCARDIOGRAPHY  3 21 2006    Normal LV function. Mild LV hypertrophy. Mild biatrial enlargement. Mildly calcific aortic and mitral valve w/no stenosis. Mild MR. Mild TR. Minimal residual pericardial effusion w/significant improvement from previous echo.  DOPPLER ECHOCARDIOGRAPHY  3 16 2006    Interval change from previous echocardiogram w/worsening of effusion. Correlation clinically. Consideration of pericardial centesis. Will obtain a [facility] consult.  DOPPLER ECHOCARDIOGRAPHY  1 31 2006    No significant change from previous echo. The 2D echo showed moderate degree of pericardial effusion. It does seem to be worst or better than previous echo. No evidence of tamponade.  DOPPLER ECHOCARDIOGRAPHY  1 27 2006    Normal global LV function. Mild biatrial enlargement. Mildly sclerotic aortic & mitral valve w/no stenosis. Mild MR. Mild TR. Small to moderate pericardial effusion w/no obvious tamponade.      JOINT REPLACEMENT      MOHS SURGERY Right 2/13/2019    MOHS DEFECT REPAIR CYSTIC SCC RIGHT LOWER LATERAL LEG WITH SKIN GRAFT FROM RIGHT GROIN (FULL THICKNESS ) performed by Bud Bolivar MD at [facility] / [address] [place] [address] Left 12/20/13    left leg lesion excision x2, frozen section, skin graft closure    ROTATOR CUFF REPAIR  09/2001    RIGHT    ROTATOR CUFF REPAIR  04/15/2009    LEFT     SKIN CANCER EXCISION  06/2006   Rt leg    SPINAL CORD STIMULATOR IMPLANTATION N/A 12/4/2018    PERMANENT INTERSTIM performed by Skye Gordon MD at [facility] History   Problem Relation Age of Onset    Heart Disease Mother     Diabetes Mother     Stroke Father     Diabetes Sister     Lung Cancer Brother      Social History     Tobacco Use    Smoking status: Never Smoker    Smokeless tobacco: Never Used   Substance Use Topics    Alcohol use: No      Current Outpatient Medications   Medication Sig Dispense Refill    metFORMIN (GLUCOPHAGE) 850 MG tablet Take 1 tablet by mouth 2 times daily (with meals) 180 tablet 3    sucralfate (CARAFATE) 1 GM tablet Take 1 tablet by mouth 4 times daily 360 tablet 3    omeprazole (PRILOSEC) 20 MG delayed release capsule Take 1 capsule by mouth daily 90 capsule 3    atorvastatin (LIPITOR) 20 MG tablet Take 20 mg by mouth daily      carbidopa-levodopa (SINEMET)  MG per tablet Take 2 tablets by mouth 4 times daily 720 tablet 0    furosemide (LASIX) 20 MG tablet TAKE 1 TABLET BY MOUTH  DAILY 90 tablet 3    diltiazem (CARDIZEM) 30 MG tablet TAKE 1 TABLET BY MOUTH 3  TIMES A  tablet 3    JANUVIA 50 MG tablet TAKE 1 TABLET BY MOUTH  DAILY 90 tablet 2    metoprolol tartrate (LOPRESSOR) 50 MG tablet Take 1 tablet by mouth 2 times daily 180 tablet 3    blood glucose test strips (CONTOUR TEST) strip USE FOR TESTING TWICE DAILY 100 strip 2    Cholecalciferol (VITAMIN D3 PO) Take 4,000 Units by mouth daily 4 sprays twice a day on tongue      NONFORMULARY 50 plus multi    4 sprays BID on tongue      rotigotine (NEUPRO) 2 MG/24HR Place 1 patch onto the skin daily 14 patch 0    Probiotic Product (PROBIOTIC ADVANCED PO) Take 1 tablet by mouth daily      apixaban (ELIQUIS) 2.5 MG TABS tablet Take 1 tablet by mouth 2 times daily Additional RF per her  tablet 3    Cranberry 500 MG CAPS Take 500 mg by mouth daily 2 tab      magnesium oxide (MAG-OX) 400 MG tablet Take 400 mg by mouth daily      aspirin 81 MG EC tablet Take 81 mg by mouth daily.  Misc. Devices MISC 1 each by Does not apply route once for 1 dose Param Glucose Meter; Diagnosis:E11.65 1 Device 0     No current facility-administered medications for this visit. Allergies   Allergen Reactions    Latex Rash    Lisinopril Swelling     mouth    Tape Larena Rude Tape] Itching    Keflex [Cephalexin] Nausea And Vomiting and Rash    Lactulose Diarrhea    Morphine Rash    Polysporin [Bacitracin-Polymyxin B] Rash     Health Maintenance   Topic Date Due    DTaP/Tdap/Td vaccine (1 - Tdap) 09/24/1943    Shingles Vaccine (2 of 3) 06/15/2013    Lipid screen  01/25/2019    Annual Wellness Visit (AWV)  05/29/2019    Flu vaccine (1) 09/01/2019    Potassium monitoring  01/14/2020    Creatinine monitoring  01/14/2020    Pneumococcal 65+ years Vaccine  Completed         Objective:     Physical Exam  Constitutional:       General: She is not in acute distress. Appearance: She is well-developed. She is not diaphoretic. HENT:      Head: Normocephalic and atraumatic. Eyes:      General: No scleral icterus. Conjunctiva/sclera: Conjunctivae normal.   Neck:      Thyroid: No thyromegaly. Vascular: No JVD. Comments: No bruits  Cardiovascular:      Rate and Rhythm: Normal rate and regular rhythm. Heart sounds: Normal heart sounds. Pulmonary:      Effort: Pulmonary effort is normal. No respiratory distress. Breath sounds: Normal breath sounds. No wheezing or rales. Abdominal:      Palpations: Abdomen is soft. There is no mass. Tenderness: There is no tenderness. There is no guarding. Musculoskeletal:         General: No tenderness. Right lower leg: Edema present. Left lower leg: Edema present. Skin:     General: Skin is warm and dry. Findings: No rash. Neurological:      Mental Status: She is alert and oriented to person, place, and time. Cranial Nerves:  No

## 2020-01-13 ENCOUNTER — TELEPHONE (OUTPATIENT)
Dept: FAMILY MEDICINE CLINIC | Age: 85
End: 2020-01-13

## 2020-01-13 RX ORDER — APIXABAN 2.5 MG/1
TABLET, FILM COATED ORAL
Qty: 180 TABLET | Refills: 1 | Status: SHIPPED | OUTPATIENT
Start: 2020-01-13 | End: 2020-04-13 | Stop reason: SDUPTHER

## 2020-01-13 RX ORDER — AMLODIPINE BESYLATE 5 MG/1
5 TABLET ORAL DAILY
Qty: 90 TABLET | Refills: 3 | OUTPATIENT
Start: 2020-01-13

## 2020-01-15 LAB
AVERAGE GLUCOSE: NORMAL
HBA1C MFR BLD: 7 %

## 2020-01-20 ENCOUNTER — TELEPHONE (OUTPATIENT)
Dept: FAMILY MEDICINE CLINIC | Age: 85
End: 2020-01-20

## 2020-01-20 RX ORDER — FUROSEMIDE 20 MG/1
TABLET ORAL
Qty: 90 TABLET | Refills: 3 | Status: SHIPPED | OUTPATIENT
Start: 2020-01-20 | End: 2020-05-15 | Stop reason: SDUPTHER

## 2020-01-20 NOTE — TELEPHONE ENCOUNTER
Today 225 fasting. 1/19--136 fasting, 152 in afternoon. 1/18--202 fasting, 153 in the afternoon,   1/17--135 fasting, 117 in the afternoon. 1/16--187 fasting. Add Amaryl? Nabila Ackerman he has been eating less ice cream and more fruit.  I explained to her that fruit still contains quite a bit of sugar and she says \"I have to have something for dessert\")

## 2020-02-18 NOTE — TELEPHONE ENCOUNTER
Rd Mejias called requesting a refill on the following medications:  Requested Prescriptions     Pending Prescriptions Disp Refills    SITagliptin (JANUVIA) 50 MG tablet 90 tablet 3     Sig: TAKE 1 TABLET BY MOUTH  DAILY     Pharmacy verified:[facility]    01/07/20  Date of last visit:   Date of next visit (if applicable): Visit date not found

## 2020-02-25 RX ORDER — ATORVASTATIN CALCIUM 20 MG/1
20 TABLET, FILM COATED ORAL DAILY
Qty: 90 TABLET | Refills: 0 | Status: SHIPPED | OUTPATIENT
Start: 2020-02-25 | End: 2020-05-04

## 2020-02-25 RX ORDER — METOPROLOL TARTRATE 50 MG/1
50 TABLET, FILM COATED ORAL 2 TIMES DAILY
Qty: 180 TABLET | Refills: 3 | Status: ON HOLD | OUTPATIENT
Start: 2020-02-25 | End: 2020-05-27 | Stop reason: SDUPTHER

## 2020-02-25 NOTE — TELEPHONE ENCOUNTER
Marie Farrell called requesting a refill on the following medications:  Requested Prescriptions     Pending Prescriptions Disp Refills    dilTIAZem (CARDIZEM) 30 MG tablet 270 tablet 3    atorvastatin (LIPITOR) 20 MG tablet       Sig: Take 1 tablet by mouth daily    metoprolol tartrate (LOPRESSOR) 50 MG tablet 180 tablet 3     Sig: Take 1 tablet by mouth 2 times daily     [facility] [place]      Date of last visit: 01/29/20  Date of next visit (if applicable): Visit date not found

## 2020-02-25 NOTE — TELEPHONE ENCOUNTER
Pt states she will be out of diltiazem and would like a 14 day supply called to [facility]. Please advise.

## 2020-04-10 ENCOUNTER — TELEPHONE (OUTPATIENT)
Dept: NEUROLOGY | Age: 85
End: 2020-04-10

## 2020-04-13 NOTE — TELEPHONE ENCOUNTER
Uziel Valencia called requesting a refill on the following medications:  Requested Prescriptions     Pending Prescriptions Disp Refills    apixaban (ELIQUIS) 2.5 MG TABS tablet 180 tablet 1     Pharmacy [facility]: [facility]  . [facility]      Date of last visit: 8/23/2019  Date of next visit (if applicable): 5/23/0236

## 2020-05-04 RX ORDER — ATORVASTATIN CALCIUM 20 MG/1
TABLET, FILM COATED ORAL
Qty: 90 TABLET | Refills: 1 | Status: SHIPPED | OUTPATIENT
Start: 2020-05-04

## 2020-05-15 RX ORDER — FUROSEMIDE 20 MG/1
TABLET ORAL
Qty: 90 TABLET | OUTPATIENT
Start: 2020-05-15

## 2020-05-15 RX ORDER — FUROSEMIDE 20 MG/1
TABLET ORAL
Qty: 30 TABLET | Refills: 0 | Status: SHIPPED | OUTPATIENT
Start: 2020-05-15 | End: 2020-06-03

## 2020-05-15 NOTE — TELEPHONE ENCOUNTER
5/15/20  Patient requesting #30 of Furosemide (LASIX) 20 MG qd until her mail order arrives. She said she is having a little bilateral ankle swelling and is wearing her support hose. She is also a little SOB when she puts on her hose. Patient states the nurse at [facility] looked at them last PM and encouraged her to wear the hose and get the Lasix as she has been out x 3 days.    [employer]    Thanks/blm  Dolv: 1/7/20

## 2020-05-16 ENCOUNTER — HOSPITAL ENCOUNTER (INPATIENT)
Age: 85
LOS: 12 days | Discharge: NURSING, REHAB OR LONG-TERM CARE | DRG: 291 | End: 2020-05-28
Attending: FAMILY MEDICINE | Admitting: INTERNAL MEDICINE
Payer: MEDICARE

## 2020-05-16 ENCOUNTER — APPOINTMENT (OUTPATIENT)
Dept: GENERAL RADIOLOGY | Age: 85
DRG: 291 | End: 2020-05-16
Payer: MEDICARE

## 2020-05-16 PROBLEM — I50.23 SYSTOLIC CHF, ACUTE ON CHRONIC (HCC): Status: ACTIVE | Noted: 2020-05-16

## 2020-05-16 LAB
ALBUMIN SERPL-MCNC: 4.2 G/DL (ref 3.5–5.1)
ALP BLD-CCNC: 71 U/L (ref 38–126)
ALT SERPL-CCNC: < 5 U/L (ref 11–66)
ANION GAP SERPL CALCULATED.3IONS-SCNC: 12 MEQ/L (ref 8–16)
APTT: 34 SECONDS (ref 22–38)
AST SERPL-CCNC: 18 U/L (ref 5–40)
BASOPHILS # BLD: 0.6 %
BASOPHILS ABSOLUTE: 0 THOU/MM3 (ref 0–0.1)
BILIRUB SERPL-MCNC: 0.6 MG/DL (ref 0.3–1.2)
BUN BLDV-MCNC: 13 MG/DL (ref 7–22)
CALCIUM SERPL-MCNC: 9 MG/DL (ref 8.5–10.5)
CHLORIDE BLD-SCNC: 89 MEQ/L (ref 98–111)
CO2: 25 MEQ/L (ref 23–33)
CREAT SERPL-MCNC: 0.5 MG/DL (ref 0.4–1.2)
EKG ATRIAL RATE: 49 BPM
EKG Q-T INTERVAL: 480 MS
EKG QRS DURATION: 148 MS
EKG QTC CALCULATION (BAZETT): 463 MS
EKG R AXIS: -59 DEGREES
EKG T AXIS: 70 DEGREES
EKG VENTRICULAR RATE: 56 BPM
EOSINOPHIL # BLD: 1.5 %
EOSINOPHILS ABSOLUTE: 0.1 THOU/MM3 (ref 0–0.4)
ERYTHROCYTE [DISTWIDTH] IN BLOOD BY AUTOMATED COUNT: 12.9 % (ref 11.5–14.5)
ERYTHROCYTE [DISTWIDTH] IN BLOOD BY AUTOMATED COUNT: 41.1 FL (ref 35–45)
GFR SERPL CREATININE-BSD FRML MDRD: > 90 ML/MIN/1.73M2
GLUCOSE BLD-MCNC: 152 MG/DL (ref 70–108)
GLUCOSE BLD-MCNC: 216 MG/DL (ref 70–108)
GLUCOSE BLD-MCNC: 220 MG/DL (ref 70–108)
HCT VFR BLD CALC: 35.9 % (ref 37–47)
HEMOGLOBIN: 11.7 GM/DL (ref 12–16)
IMMATURE GRANS (ABS): 0.04 THOU/MM3 (ref 0–0.07)
IMMATURE GRANULOCYTES: 0.5 %
INR BLD: 1.42 (ref 0.85–1.13)
LYMPHOCYTES # BLD: 19.7 %
LYMPHOCYTES ABSOLUTE: 1.6 THOU/MM3 (ref 1–4.8)
MCH RBC QN AUTO: 28.5 PG (ref 26–33)
MCHC RBC AUTO-ENTMCNC: 32.6 GM/DL (ref 32.2–35.5)
MCV RBC AUTO: 87.6 FL (ref 81–99)
MONOCYTES # BLD: 12.4 %
MONOCYTES ABSOLUTE: 1 THOU/MM3 (ref 0.4–1.3)
NUCLEATED RED BLOOD CELLS: 0 /100 WBC
OSMOLALITY CALCULATION: 260.2 MOSMOL/KG (ref 275–300)
PLATELET # BLD: 237 THOU/MM3 (ref 130–400)
PMV BLD AUTO: 9.3 FL (ref 9.4–12.4)
POTASSIUM REFLEX MAGNESIUM: 3.9 MEQ/L (ref 3.5–5.2)
PRO-BNP: 3573 PG/ML (ref 0–1800)
RBC # BLD: 4.1 MILL/MM3 (ref 4.2–5.4)
SEG NEUTROPHILS: 65.3 %
SEGMENTED NEUTROPHILS ABSOLUTE COUNT: 5.3 THOU/MM3 (ref 1.8–7.7)
SODIUM BLD-SCNC: 126 MEQ/L (ref 135–145)
TOTAL PROTEIN: 6.6 G/DL (ref 6.1–8)
TROPONIN T: < 0.01 NG/ML
WBC # BLD: 8.1 THOU/MM3 (ref 4.8–10.8)

## 2020-05-16 PROCEDURE — 80053 COMPREHEN METABOLIC PANEL: CPT

## 2020-05-16 PROCEDURE — 85025 COMPLETE CBC W/AUTO DIFF WBC: CPT

## 2020-05-16 PROCEDURE — 96374 THER/PROPH/DIAG INJ IV PUSH: CPT

## 2020-05-16 PROCEDURE — 96375 TX/PRO/DX INJ NEW DRUG ADDON: CPT

## 2020-05-16 PROCEDURE — 85610 PROTHROMBIN TIME: CPT

## 2020-05-16 PROCEDURE — 1200000003 HC TELEMETRY R&B

## 2020-05-16 PROCEDURE — 71045 X-RAY EXAM CHEST 1 VIEW: CPT

## 2020-05-16 PROCEDURE — 6360000002 HC RX W HCPCS: Performed by: FAMILY MEDICINE

## 2020-05-16 PROCEDURE — 82948 REAGENT STRIP/BLOOD GLUCOSE: CPT

## 2020-05-16 PROCEDURE — 85730 THROMBOPLASTIN TIME PARTIAL: CPT

## 2020-05-16 PROCEDURE — 99283 EMERGENCY DEPT VISIT LOW MDM: CPT

## 2020-05-16 PROCEDURE — 6360000002 HC RX W HCPCS: Performed by: INTERNAL MEDICINE

## 2020-05-16 PROCEDURE — 2580000003 HC RX 258: Performed by: INTERNAL MEDICINE

## 2020-05-16 PROCEDURE — 83880 ASSAY OF NATRIURETIC PEPTIDE: CPT

## 2020-05-16 PROCEDURE — 36415 COLL VENOUS BLD VENIPUNCTURE: CPT

## 2020-05-16 PROCEDURE — 93005 ELECTROCARDIOGRAM TRACING: CPT | Performed by: FAMILY MEDICINE

## 2020-05-16 PROCEDURE — 99223 1ST HOSP IP/OBS HIGH 75: CPT | Performed by: INTERNAL MEDICINE

## 2020-05-16 PROCEDURE — 84484 ASSAY OF TROPONIN QUANT: CPT

## 2020-05-16 RX ORDER — ONDANSETRON 2 MG/ML
4 INJECTION INTRAMUSCULAR; INTRAVENOUS EVERY 6 HOURS PRN
Status: DISCONTINUED | OUTPATIENT
Start: 2020-05-16 | End: 2020-05-28 | Stop reason: HOSPADM

## 2020-05-16 RX ORDER — POLYETHYLENE GLYCOL 3350 17 G/17G
17 POWDER, FOR SOLUTION ORAL DAILY PRN
Status: DISCONTINUED | OUTPATIENT
Start: 2020-05-16 | End: 2020-05-28 | Stop reason: HOSPADM

## 2020-05-16 RX ORDER — ACETAMINOPHEN 650 MG/1
650 SUPPOSITORY RECTAL EVERY 6 HOURS PRN
Status: DISCONTINUED | OUTPATIENT
Start: 2020-05-16 | End: 2020-05-28 | Stop reason: HOSPADM

## 2020-05-16 RX ORDER — PROMETHAZINE HYDROCHLORIDE 25 MG/1
12.5 TABLET ORAL EVERY 6 HOURS PRN
Status: DISCONTINUED | OUTPATIENT
Start: 2020-05-16 | End: 2020-05-28 | Stop reason: HOSPADM

## 2020-05-16 RX ORDER — FUROSEMIDE 10 MG/ML
40 INJECTION INTRAMUSCULAR; INTRAVENOUS 2 TIMES DAILY
Status: DISCONTINUED | OUTPATIENT
Start: 2020-05-16 | End: 2020-05-19

## 2020-05-16 RX ORDER — ACETAMINOPHEN 325 MG/1
650 TABLET ORAL EVERY 6 HOURS PRN
Status: DISCONTINUED | OUTPATIENT
Start: 2020-05-16 | End: 2020-05-28 | Stop reason: HOSPADM

## 2020-05-16 RX ORDER — METOPROLOL TARTRATE 50 MG/1
50 TABLET, FILM COATED ORAL 2 TIMES DAILY
Status: DISCONTINUED | OUTPATIENT
Start: 2020-05-16 | End: 2020-05-19

## 2020-05-16 RX ORDER — HYDRALAZINE HYDROCHLORIDE 20 MG/ML
5 INJECTION INTRAMUSCULAR; INTRAVENOUS EVERY 6 HOURS PRN
Status: DISCONTINUED | OUTPATIENT
Start: 2020-05-16 | End: 2020-05-19

## 2020-05-16 RX ORDER — FUROSEMIDE 10 MG/ML
20 INJECTION INTRAMUSCULAR; INTRAVENOUS ONCE
Status: COMPLETED | OUTPATIENT
Start: 2020-05-16 | End: 2020-05-16

## 2020-05-16 RX ORDER — SODIUM CHLORIDE 0.9 % (FLUSH) 0.9 %
10 SYRINGE (ML) INJECTION EVERY 12 HOURS SCHEDULED
Status: DISCONTINUED | OUTPATIENT
Start: 2020-05-16 | End: 2020-05-28 | Stop reason: HOSPADM

## 2020-05-16 RX ORDER — LORAZEPAM 2 MG/ML
0.5 INJECTION INTRAMUSCULAR ONCE
Status: COMPLETED | OUTPATIENT
Start: 2020-05-16 | End: 2020-05-16

## 2020-05-16 RX ORDER — SODIUM CHLORIDE 0.9 % (FLUSH) 0.9 %
10 SYRINGE (ML) INJECTION PRN
Status: DISCONTINUED | OUTPATIENT
Start: 2020-05-16 | End: 2020-05-28 | Stop reason: HOSPADM

## 2020-05-16 RX ADMIN — Medication 10 ML: at 21:04

## 2020-05-16 RX ADMIN — ENOXAPARIN SODIUM 40 MG: 40 INJECTION SUBCUTANEOUS at 21:03

## 2020-05-16 RX ADMIN — FUROSEMIDE 40 MG: 10 INJECTION, SOLUTION INTRAMUSCULAR; INTRAVENOUS at 21:03

## 2020-05-16 RX ADMIN — LORAZEPAM 0.5 MG: 2 INJECTION INTRAMUSCULAR; INTRAVENOUS at 16:35

## 2020-05-16 RX ADMIN — FUROSEMIDE 20 MG: 10 INJECTION, SOLUTION INTRAMUSCULAR; INTRAVENOUS at 16:36

## 2020-05-16 NOTE — ED NOTES
Called [facility]K and informed Ca that the patient will be on their way to the unit after shift change.      Nishant Alatorre  05/16/20 1080

## 2020-05-16 NOTE — ED NOTES
ED to inpatient nurses report    Chief Complaint   Patient presents with    Shortness of Breath      Present to ED from nursing home  LOC: alert and orientated to name, place, date  Vital signs   Vitals:    05/16/20 1555 05/16/20 1634 05/16/20 1724   BP: (!) 198/70 (!) 189/64 (!) 174/60   Pulse: 53 60 52   Resp: 18 16 18   Temp: 97.8 °F (36.6 °C)     TempSrc: Oral     SpO2: 92% 90% 96%   Weight: 138 lb (62.6 kg)     Height: 5' 2\" (1.575 m)        Oxygen Baseline room air    Current needs required 0L Bipap/Cpap No  LDAs:   Peripheral IV 05/16/20 Left Antecubital (Active)   Site Assessment Intact;Dry;Clean 5/16/2020  5:25 PM   Line Status Blood return noted; Flushed;Specimen collected 5/16/2020  4:09 PM   Dressing Status Intact;Dry;Clean 5/16/2020  5:25 PM     Mobility: Independent  Pending ED orders: complete  Present condition: Respirations even and unlabored. Pt denies pain.      Electronically signed by Roel Fatima RN on 5/16/2020 at 6:05 PM       Roel Fatima, [facility]  05/16/20 5710

## 2020-05-16 NOTE — ED PROVIDER NOTES
50 MG tablet Take 1 tablet by mouth 2 times daily 180 tablet 3    dilTIAZem (CARDIZEM) 30 MG tablet TAKE 1 TABLET BY MOUTH 3  TIMES A DAY 42 tablet 0    SITagliptin (JANUVIA) 50 MG tablet TAKE 1 TABLET BY MOUTH  DAILY 90 tablet 1    blood glucose test strips (CONTOUR TEST) strip USE TO TEST BLOOD SUGAR TWICE DAILY. 200 strip 3    SITagliptin (JANUVIA) 50 MG tablet TAKE 1 TABLET BY MOUTH  DAILY 30 tablet 0    metFORMIN (GLUCOPHAGE) 850 MG tablet Take 1 tablet by mouth 2 times daily (with meals) 180 tablet 3    sucralfate (CARAFATE) 1 GM tablet Take 1 tablet by mouth 4 times daily 360 tablet 3    omeprazole (PRILOSEC) 20 MG delayed release capsule Take 1 capsule by mouth daily 90 capsule 3    Cholecalciferol (VITAMIN D3 PO) Take 4,000 Units by mouth daily 4 sprays twice a day on tongue      NONFORMULARY 50 plus multi    4 sprays BID on tongue      rotigotine (NEUPRO) 2 MG/24HR Place 1 patch onto the skin daily 14 patch 0    Misc. Devices MISC 1 each by Does not apply route once for 1 dose Param Glucose Meter; Diagnosis:E11.65 1 Device 0    Probiotic Product (PROBIOTIC ADVANCED PO) Take 1 tablet by mouth daily      Cranberry 500 MG CAPS Take 500 mg by mouth daily 2 tab      magnesium oxide (MAG-OX) 400 MG tablet Take 400 mg by mouth daily      aspirin 81 MG EC tablet Take 81 mg by mouth daily. ALLERGIES   Allergies   Allergen Reactions    Latex Rash    Lisinopril Swelling     mouth    Tape Hilary Sidles Tape] Itching    Keflex [Cephalexin] Nausea And Vomiting and Rash    Lactulose Diarrhea    Morphine Rash    Polysporin [Bacitracin-Polymyxin B] Rash        SOCIAL & FAMILY HISTORY   Social History     Socioeconomic History    Marital status:       Spouse name: None    Number of children: None    Years of education: None    Highest education level: None   Occupational History    None   Social Needs    Financial resource strain: None    Food insecurity     Worry: None life-threatening deterioration of the patient's condition requiring my urgent intervention. Total critical care time is 30 minutes. This includes vital sign monitoring, pulse oximetry monitoring, telemetry monitoring, clinical response to the IV medications, reviewing the nursing notes, consultation time, dictation/documetation time. (This time excludes time spent performing procedures). ED COURSE & MEDICAL DECISION MAKING   Pertinent Labs & Imaging studies reviewed and interpreted. (See chart for details)   See chart for details of medications given during the ED stay. Vitals:    05/16/20 1724   BP: (!) 174/60   Pulse: 52   Resp: 18   Temp:    SpO2: 96%        Consultation: pending workup  The transition of care will be at 6:00 PM.     Differential Diagnosis: Acute Coronary Syndrome, Congestive Heart Failure, Myocardial Infarction, pneumonia vs bronchitis     FINAL IMPRESSION   1. Acute on chronic systolic congestive heart failure ([address]    2. Acute respiratory failure with hypoxemia (HCC)         Plan:     MDM - pt likely has acute exacerbation of her CHF given the fine crackles and her skipping her daily lasix. Pt will be given IV diuresis, will get full cardiac workup, final dispo pending workup. Despite receiving 20mg IV lasix, pt is still tachypneic, will benefit from admission for further inpt diuresis. Pt agreed to this plan, and will be admitted to [facility] service.     Electronically signed by: Demetris Sandy MD, 5/16/2020 6:00 PM   (This note was completed with a voice recognition program)         Sheba Vasquez MD  05/16/20 1800

## 2020-05-16 NOTE — H&P
Chronic LBBB (left bundle branch block)     Diabetes mellitus type II     Esophageal stricture     History of esophageal stricture.  FH: CAD (coronary artery disease)     Mom and dad both with heart disease at mid to late age.  GERD (gastroesophageal reflux disease)     History of gastritis     History of skin cancer     Hypercholesterolemia     Hyperlipidemia     Hypertension     Imbalance     As far as imbalance is concerned, asked patient to follow-up with Dr. Apoorva Barkley since she follows with him on a regular basis.  Lactose intolerance     Nummular dermatitis     Parkinson's disease ([address] 2009    Restless legs syndrome (RLS)     S/P CABG (coronary artery bypass graft)     SCC (squamous cell carcinoma)        Past Surgical History:        Procedure Laterality Date    CARDIAC SURGERY      CARDIOVASCULAR STRESS TEST  4 09 2009    Gated SPECT images revealed normal global wall motion with EF of 60%. Persantine test associated w/nonspecific symptoms. EKG is nondiagnostic w/baseline LBBB. No obvious stress-induced ischemia by Cardiolite. EF within normal limits.  CARDIOVASCULAR STRESS TEST  7 10 2007    The gated SPECT images revealed normal wall motion with EF of 69%. Poor exercise tolerance w/SOB on exertion. EKG is nondiagnostic. Cardiolite scan revealing no obvious stress-induced ischemia. EF 60%.  CARDIOVASCULAR STRESS TEST  2 03 2006    Fair exercise tolerance w/SOB on exertion. No EKG evidence of ischemia. Patient should be able to proceed with phase II cardiac rehab.  CATARACT REMOVAL      CATARACT REMOVAL  06/08/2016    AND 6/29/16    DIAGNOSTIC CARDIAC CATH LAB PROCEDURE  12 23 2005    LV was obtained. AGEE projection showed preserved LV function w/estimated EF at 55%. No significant MR. Patent left main coronary artery. Tortuous LAD which appeared to be patent. Patent left circumflex artery.  High-grade stenosis around 99% in very large dominant RCA w/heavy MG/24HR Place 1 patch onto the skin daily 14 patch 0    Misc. Devices MISC 1 each by Does not apply route once for 1 dose Param Glucose Meter; Diagnosis:E11.65 1 Device 0    Probiotic Product (PROBIOTIC ADVANCED PO) Take 1 tablet by mouth daily      Cranberry 500 MG CAPS Take 500 mg by mouth daily 2 tab      magnesium oxide (MAG-OX) 400 MG tablet Take 400 mg by mouth daily      aspirin 81 MG EC tablet Take 81 mg by mouth daily. Allergies:    Latex; Lisinopril; Tape [adhesive tape]; Keflex [cephalexin]; Lactulose; Morphine; and Polysporin [bacitracin-polymyxin b]    Social History:    reports that she has never smoked. She has never used smokeless tobacco. She reports that she does not drink alcohol or use drugs. Family History:       Problem Relation Age of Onset    Heart Disease Mother     Diabetes Mother     Stroke Father     Diabetes Sister     Lung Cancer Brother        Diet:  DIET CARB CONTROL; Review of systems:   Pertinent positives as noted in the HPI. All other systems reviewed and negative. PHYSICAL EXAM:  BP (!) 174/60   Pulse 52   Temp 97.8 °F (36.6 °C) (Oral)   Resp 18   Ht 5' 2\" (1.575 m)   Wt 138 lb (62.6 kg)   SpO2 96%   BMI 25.24 kg/m²   General appearance: No apparent distress, appears stated age and cooperative. HEENT: Normal cephalic, atraumatic without obvious deformity. Pupils equal, round, and reactive to light. Extra ocular muscles intact. Conjunctivae/corneas clear. Neck: Supple, with full range of motion. No jugular venous distention. Trachea midline. Respiratory:  Decrease breath sounds b/l, no ronchi or wheezing  Cardiovascular: Regular rate and rhythm with normal S1/S2 without murmurs, rubs or gallops. Abdomen: Soft, non-tender, non-distended with normal bowel sounds. Musculoskeletal:  No clubbing, cyanosis, + edema bilaterally. Skin: Skin color, texture, turgor normal.  No rashes or lesions.   Neurologic:  Neurovascularly intact without any focal sensory/motor deficits. Cranial nerves: II-XII intact, grossly non-focal.  Psychiatric: Alert and oriented, thought content appropriate, normal insight  Capillary Refill: Brisk,< 3 seconds   Peripheral Pulses: +2 palpable, equal bilaterally     Labs:   Recent Labs     05/16/20  1600   WBC 8.1   HGB 11.7*   HCT 35.9*        Recent Labs     05/16/20  1600   *   K 3.9   CL 89*   CO2 25   BUN 13   CREATININE 0.5   CALCIUM 9.0     Recent Labs     05/16/20  1600   AST 18   ALT <5*   BILITOT 0.6   ALKPHOS 71     Recent Labs     05/16/20  1600   INR 1.42*     No results for input(s): CKTOTAL, TROPONINI in the last 72 hours. Urinalysis:    Lab Results   Component Value Date    NITRU NEGATIVE 11/26/2018    WBCUA 25-50 11/26/2018    BACTERIA NONE 11/26/2018    RBCUA 3-5 11/26/2018    BLOODU Negative 12/28/2019    SPECGRAV 1.015 12/28/2019    GLUCOSEU NEGATIVE 11/26/2018       Radiology:   XR CHEST PORTABLE   Final Result   1. There is pulmonary vascular congestion with bilateral perihilar and the basilar infiltrates. There is opacification of the left lower chest suggesting a small to moderate left pleural effusion. The hazy attenuation at the right lung base may represent    layering pleural fluid. The right clinical setting findings are consistent with congestive heart failure. Follow-up chest radiograph recommended to confirm complete resolution. **This report has been created using voice recognition software. It may contain minor errors which are inherent in voice recognition technology. **      Final report electronically signed by Dr. Romayne Buckles on 5/16/2020 4:28 PM        Xr Chest Portable    Result Date: 5/16/2020  PROCEDURE: XR CHEST PORTABLE CLINICAL INFORMATION: Shortness of breath; congestive heart failure. COMPARISON: 10/14/2018. TECHNIQUE: AP portable chest radiograph performed. FINDINGS: POSTSURGICAL CHANGES: 1. There are stable median sternotomy wires.  LINES/TUBES/MECHANICAL

## 2020-05-17 ENCOUNTER — APPOINTMENT (OUTPATIENT)
Dept: GENERAL RADIOLOGY | Age: 85
DRG: 291 | End: 2020-05-17
Payer: MEDICARE

## 2020-05-17 LAB
ANION GAP SERPL CALCULATED.3IONS-SCNC: 13 MEQ/L (ref 8–16)
BASOPHILS # BLD: 0.5 %
BASOPHILS ABSOLUTE: 0 THOU/MM3 (ref 0–0.1)
BUN BLDV-MCNC: 11 MG/DL (ref 7–22)
CALCIUM SERPL-MCNC: 8.7 MG/DL (ref 8.5–10.5)
CHLORIDE BLD-SCNC: 89 MEQ/L (ref 98–111)
CO2: 26 MEQ/L (ref 23–33)
CREAT SERPL-MCNC: 0.4 MG/DL (ref 0.4–1.2)
EOSINOPHIL # BLD: 0.4 %
EOSINOPHILS ABSOLUTE: 0 THOU/MM3 (ref 0–0.4)
ERYTHROCYTE [DISTWIDTH] IN BLOOD BY AUTOMATED COUNT: 12.7 % (ref 11.5–14.5)
ERYTHROCYTE [DISTWIDTH] IN BLOOD BY AUTOMATED COUNT: 38.9 FL (ref 35–45)
GFR SERPL CREATININE-BSD FRML MDRD: > 90 ML/MIN/1.73M2
GLUCOSE BLD-MCNC: 155 MG/DL (ref 70–108)
GLUCOSE BLD-MCNC: 166 MG/DL (ref 70–108)
GLUCOSE BLD-MCNC: 180 MG/DL (ref 70–108)
GLUCOSE BLD-MCNC: 187 MG/DL (ref 70–108)
GLUCOSE BLD-MCNC: 190 MG/DL (ref 70–108)
HCT VFR BLD CALC: 32.4 % (ref 37–47)
HEMOGLOBIN: 11 GM/DL (ref 12–16)
IMMATURE GRANS (ABS): 0.03 THOU/MM3 (ref 0–0.07)
IMMATURE GRANULOCYTES: 0.4 %
LYMPHOCYTES # BLD: 10 %
LYMPHOCYTES ABSOLUTE: 0.8 THOU/MM3 (ref 1–4.8)
MAGNESIUM: 0.9 MG/DL (ref 1.6–2.4)
MCH RBC QN AUTO: 28.9 PG (ref 26–33)
MCHC RBC AUTO-ENTMCNC: 34 GM/DL (ref 32.2–35.5)
MCV RBC AUTO: 85 FL (ref 81–99)
MONOCYTES # BLD: 11.4 %
MONOCYTES ABSOLUTE: 0.9 THOU/MM3 (ref 0.4–1.3)
NUCLEATED RED BLOOD CELLS: 0 /100 WBC
OSMOLALITY CALCULATION: 261.6 MOSMOL/KG (ref 275–300)
PLATELET # BLD: 170 THOU/MM3 (ref 130–400)
PMV BLD AUTO: 9.1 FL (ref 9.4–12.4)
POTASSIUM SERPL-SCNC: 2.9 MEQ/L (ref 3.5–5.2)
POTASSIUM SERPL-SCNC: 3.5 MEQ/L (ref 3.5–5.2)
RBC # BLD: 3.81 MILL/MM3 (ref 4.2–5.4)
SEG NEUTROPHILS: 77.3 %
SEGMENTED NEUTROPHILS ABSOLUTE COUNT: 5.8 THOU/MM3 (ref 1.8–7.7)
SODIUM BLD-SCNC: 128 MEQ/L (ref 135–145)
WBC # BLD: 7.5 THOU/MM3 (ref 4.8–10.8)

## 2020-05-17 PROCEDURE — 99223 1ST HOSP IP/OBS HIGH 75: CPT | Performed by: INTERNAL MEDICINE

## 2020-05-17 PROCEDURE — 94760 N-INVAS EAR/PLS OXIMETRY 1: CPT

## 2020-05-17 PROCEDURE — 2700000000 HC OXYGEN THERAPY PER DAY

## 2020-05-17 PROCEDURE — 93010 ELECTROCARDIOGRAM REPORT: CPT | Performed by: INTERNAL MEDICINE

## 2020-05-17 PROCEDURE — 2580000003 HC RX 258: Performed by: INTERNAL MEDICINE

## 2020-05-17 PROCEDURE — 6360000002 HC RX W HCPCS: Performed by: INTERNAL MEDICINE

## 2020-05-17 PROCEDURE — 36415 COLL VENOUS BLD VENIPUNCTURE: CPT

## 2020-05-17 PROCEDURE — 80048 BASIC METABOLIC PNL TOTAL CA: CPT

## 2020-05-17 PROCEDURE — 71045 X-RAY EXAM CHEST 1 VIEW: CPT

## 2020-05-17 PROCEDURE — 85025 COMPLETE CBC W/AUTO DIFF WBC: CPT

## 2020-05-17 PROCEDURE — 84132 ASSAY OF SERUM POTASSIUM: CPT

## 2020-05-17 PROCEDURE — 83735 ASSAY OF MAGNESIUM: CPT

## 2020-05-17 PROCEDURE — 1200000003 HC TELEMETRY R&B

## 2020-05-17 PROCEDURE — 97166 OT EVAL MOD COMPLEX 45 MIN: CPT

## 2020-05-17 PROCEDURE — 6370000000 HC RX 637 (ALT 250 FOR IP): Performed by: INTERNAL MEDICINE

## 2020-05-17 PROCEDURE — 82948 REAGENT STRIP/BLOOD GLUCOSE: CPT

## 2020-05-17 PROCEDURE — 97535 SELF CARE MNGMENT TRAINING: CPT

## 2020-05-17 PROCEDURE — 99233 SBSQ HOSP IP/OBS HIGH 50: CPT | Performed by: INTERNAL MEDICINE

## 2020-05-17 RX ORDER — ATORVASTATIN CALCIUM 20 MG/1
20 TABLET, FILM COATED ORAL DAILY
Status: DISCONTINUED | OUTPATIENT
Start: 2020-05-17 | End: 2020-05-28 | Stop reason: HOSPADM

## 2020-05-17 RX ORDER — POTASSIUM CHLORIDE 7.45 MG/ML
10 INJECTION INTRAVENOUS
Status: COMPLETED | OUTPATIENT
Start: 2020-05-17 | End: 2020-05-18

## 2020-05-17 RX ORDER — SUCRALFATE 1 G/1
1 TABLET ORAL 4 TIMES DAILY
Status: DISCONTINUED | OUTPATIENT
Start: 2020-05-17 | End: 2020-05-28 | Stop reason: HOSPADM

## 2020-05-17 RX ORDER — SUCRALFATE 1 G/1
1 TABLET ORAL 4 TIMES DAILY
Status: DISCONTINUED | OUTPATIENT
Start: 2020-05-17 | End: 2020-05-17

## 2020-05-17 RX ORDER — ASPIRIN 81 MG/1
81 TABLET ORAL DAILY
Status: DISCONTINUED | OUTPATIENT
Start: 2020-05-17 | End: 2020-05-28 | Stop reason: HOSPADM

## 2020-05-17 RX ORDER — OMEPRAZOLE 20 MG/1
20 CAPSULE, DELAYED RELEASE ORAL DAILY
Status: DISCONTINUED | OUTPATIENT
Start: 2020-05-17 | End: 2020-05-17 | Stop reason: CLARIF

## 2020-05-17 RX ORDER — MAGNESIUM SULFATE IN WATER 40 MG/ML
4 INJECTION, SOLUTION INTRAVENOUS ONCE
Status: COMPLETED | OUTPATIENT
Start: 2020-05-17 | End: 2020-05-17

## 2020-05-17 RX ORDER — PANTOPRAZOLE SODIUM 40 MG/1
40 TABLET, DELAYED RELEASE ORAL
Status: DISCONTINUED | OUTPATIENT
Start: 2020-05-17 | End: 2020-05-28 | Stop reason: HOSPADM

## 2020-05-17 RX ADMIN — ASPIRIN 81 MG: 81 TABLET ORAL at 09:35

## 2020-05-17 RX ADMIN — DILTIAZEM HYDROCHLORIDE 30 MG: 30 TABLET, FILM COATED ORAL at 15:41

## 2020-05-17 RX ADMIN — ROTIGOTINE 1 PATCH: 2 PATCH, EXTENDED RELEASE TRANSDERMAL at 09:34

## 2020-05-17 RX ADMIN — METOPROLOL TARTRATE 50 MG: 50 TABLET, FILM COATED ORAL at 20:09

## 2020-05-17 RX ADMIN — ATORVASTATIN CALCIUM 20 MG: 20 TABLET, FILM COATED ORAL at 09:34

## 2020-05-17 RX ADMIN — DILTIAZEM HYDROCHLORIDE 30 MG: 30 TABLET, FILM COATED ORAL at 21:45

## 2020-05-17 RX ADMIN — POTASSIUM CHLORIDE 10 MEQ: 7.46 INJECTION, SOLUTION INTRAVENOUS at 21:46

## 2020-05-17 RX ADMIN — DILTIAZEM HYDROCHLORIDE 30 MG: 30 TABLET, FILM COATED ORAL at 05:10

## 2020-05-17 RX ADMIN — SUCRALFATE 1 G: 1 TABLET ORAL at 16:44

## 2020-05-17 RX ADMIN — CARBIDOPA AND LEVODOPA 2 TABLET: 25; 100 TABLET ORAL at 20:08

## 2020-05-17 RX ADMIN — CARBIDOPA AND LEVODOPA 2 TABLET: 25; 100 TABLET ORAL at 16:44

## 2020-05-17 RX ADMIN — POTASSIUM CHLORIDE 10 MEQ: 7.46 INJECTION, SOLUTION INTRAVENOUS at 15:37

## 2020-05-17 RX ADMIN — ACETAMINOPHEN 650 MG: 325 TABLET ORAL at 15:43

## 2020-05-17 RX ADMIN — FUROSEMIDE 40 MG: 10 INJECTION, SOLUTION INTRAMUSCULAR; INTRAVENOUS at 09:35

## 2020-05-17 RX ADMIN — INSULIN LISPRO 1 UNITS: 100 INJECTION, SOLUTION INTRAVENOUS; SUBCUTANEOUS at 20:10

## 2020-05-17 RX ADMIN — APIXABAN 2.5 MG: 2.5 TABLET, FILM COATED ORAL at 05:10

## 2020-05-17 RX ADMIN — HYDRALAZINE HYDROCHLORIDE 5 MG: 20 INJECTION, SOLUTION INTRAMUSCULAR; INTRAVENOUS at 00:01

## 2020-05-17 RX ADMIN — POTASSIUM CHLORIDE 10 MEQ: 7.46 INJECTION, SOLUTION INTRAVENOUS at 16:47

## 2020-05-17 RX ADMIN — Medication 10 ML: at 09:34

## 2020-05-17 RX ADMIN — SUCRALFATE 1 G: 1 TABLET ORAL at 05:09

## 2020-05-17 RX ADMIN — SUCRALFATE 1 G: 1 TABLET ORAL at 20:08

## 2020-05-17 RX ADMIN — PANTOPRAZOLE SODIUM 40 MG: 40 TABLET, DELAYED RELEASE ORAL at 05:09

## 2020-05-17 RX ADMIN — CARBIDOPA AND LEVODOPA 2 TABLET: 25; 100 TABLET ORAL at 05:09

## 2020-05-17 RX ADMIN — FUROSEMIDE 40 MG: 10 INJECTION, SOLUTION INTRAMUSCULAR; INTRAVENOUS at 16:57

## 2020-05-17 RX ADMIN — CARBIDOPA AND LEVODOPA 2 TABLET: 25; 100 TABLET ORAL at 12:30

## 2020-05-17 RX ADMIN — HYDRALAZINE HYDROCHLORIDE 5 MG: 20 INJECTION, SOLUTION INTRAMUSCULAR; INTRAVENOUS at 16:19

## 2020-05-17 RX ADMIN — POTASSIUM CHLORIDE 10 MEQ: 7.46 INJECTION, SOLUTION INTRAVENOUS at 20:06

## 2020-05-17 RX ADMIN — MAGNESIUM SULFATE HEPTAHYDRATE 4 G: 40 INJECTION, SOLUTION INTRAVENOUS at 16:44

## 2020-05-17 RX ADMIN — SUCRALFATE 1 G: 1 TABLET ORAL at 12:30

## 2020-05-17 RX ADMIN — APIXABAN 2.5 MG: 2.5 TABLET, FILM COATED ORAL at 16:44

## 2020-05-17 ASSESSMENT — PAIN SCALES - GENERAL
PAINLEVEL_OUTOF10: 0
PAINLEVEL_OUTOF10: 6
PAINLEVEL_OUTOF10: 0
PAINLEVEL_OUTOF10: 5

## 2020-05-17 NOTE — PROGRESS NOTES
cooperative. HEENT: Normal cephalic, atraumatic without obvious deformity. Pupils equal, round, and reactive to light. Extra ocular muscles intact. Conjunctivae/corneas clear. Neck: Supple, with full range of motion. No jugular venous distention. Trachea midline. Respiratory:  Decrease breath sounds b/l, no ronchi or wheezing  Cardiovascular: Regular rate and rhythm with normal S1/S2 without murmurs, rubs or gallops. Abdomen: Soft, non-tender, non-distended with normal bowel sounds. Musculoskeletal:  No clubbing, cyanosis, + edema bilaterally. Skin: Skin color, texture, turgor normal.  No rashes or lesions. Neurologic:  Neurovascularly intact without any focal sensory/motor deficits. Cranial nerves: II-XII intact, grossly non-focal.  Psychiatric: Alert and oriented, thought content appropriate, normal insight  Capillary Refill: Brisk,< 3 seconds   Peripheral Pulses: +2 palpable, equal bilaterally     Labs:   Recent Labs     05/16/20  1600 05/17/20  0615   WBC 8.1 7.5   HGB 11.7* 11.0*   HCT 35.9* 32.4*    170     Recent Labs     05/16/20  1600 05/17/20  0615   * 128*   K 3.9 2.9*   CL 89* 89*   CO2 25 26   BUN 13 11   CREATININE 0.5 0.4   CALCIUM 9.0 8.7     Recent Labs     05/16/20  1600   AST 18   ALT <5*   BILITOT 0.6   ALKPHOS 71     Recent Labs     05/16/20  1600   INR 1.42*     No results for input(s): Matthew Khan in the last 72 hours. Urinalysis:    Lab Results   Component Value Date    NITRU NEGATIVE 11/26/2018    WBCUA 25-50 11/26/2018    BACTERIA NONE 11/26/2018    RBCUA 3-5 11/26/2018    BLOODU Negative 12/28/2019    SPECGRAV 1.015 12/28/2019    GLUCOSEU NEGATIVE 11/26/2018       Radiology:   XR CHEST PORTABLE   Final Result   1. There is pulmonary vascular congestion with bilateral perihilar and the basilar infiltrates. There is opacification of the left lower chest suggesting a small to moderate left pleural effusion.  The hazy attenuation at the right lung base may

## 2020-05-17 NOTE — CONSULTS
Acute on chronic CHF diastolic  Mod left pleural effusion  Hyponatremia  Hypokalemia  Chr atr fib with cvr  CAD s/p CABG      Plan    Diuresis  CXR at am  Consider Sonogram guided thoracenthesis if the pleural effusion persist  Free water restriction  Echo    [phone]    Tami Goldman MD

## 2020-05-17 NOTE — PROGRESS NOTES
Spoke with patient's daughter, Sergio Cochran, twice this shift to update on plan of care. Questions answered.

## 2020-05-17 NOTE — PROGRESS NOTES
reporting     VISION:Corrected    HEARING:  WNL    COGNITION: WNL    RANGE OF MOTION:  Bilateral Upper Extremity:  WNL Pt does dmeo decreased active movement but able to reach out and complete all her tasks as needed     STRENGTH:  Bilateral Upper Extremity:  bilateral UE general weakness and deconditioing throughout    ADL:   Grooming: Contact Guard Assistance. standing at sik to wash hands  Toileting: Minimal Assistance. for clothign management  Toilet Transfer: Minimal Assistance. from standard toilet. BALANCE:  Standing Balance: Contact Guard Assistance. to min A when standing at walker     BED MOBILITY:  Supine to Sit: Contact Guard Assistance    Sit to Supine: Minimal Assistance bringing bilateral UEs onto bed    TRANSFERS:  Sit to Stand:  Minimal Assistance. from eOB with educaiton on hand placement  Stand to Sit: [employer]. with education to reach back     FUNCTIONAL MOBILITY:  Assistive Device: Rolling Walker  Assist Level:  Contact Guard Assistance. Distance: To and from bathroom  Slow pace with c/o increased pain in knees and legs     Activity Tolerance:  Patient tolerance of  treatment: fair. Increased SOB with no O2 on. Upin returning back to bed from bathroom O2 placed back on pt       Assessment:  Assessment: Pt with increaased SOB with activity requiring increased need for assistance during aDL tasks and increased need for rest breaks during. PT would benefit from skilled OT Ervices to increase her endurance,balance,  indep and safety with ADL tasks to return [facility] to her apartment. Performance deficits / Impairments: Decreased ADL status, Decreased endurance, Decreased strength, Decreased balance, Decreased safe awareness  Prognosis: Fair  REQUIRES OT FOLLOW UP: Yes  Decision Making: Medium Complexity    Treatment Initiated: Treatment and education initiated within context of evaluation.   Evaluation time included review of current medical information, gathering information

## 2020-05-18 LAB
ANION GAP SERPL CALCULATED.3IONS-SCNC: 14 MEQ/L (ref 8–16)
BUN BLDV-MCNC: 9 MG/DL (ref 7–22)
CALCIUM SERPL-MCNC: 8.8 MG/DL (ref 8.5–10.5)
CHLORIDE BLD-SCNC: 89 MEQ/L (ref 98–111)
CO2: 23 MEQ/L (ref 23–33)
CREAT SERPL-MCNC: 0.4 MG/DL (ref 0.4–1.2)
EKG ATRIAL RATE: 68 BPM
EKG Q-T INTERVAL: 510 MS
EKG QRS DURATION: 144 MS
EKG QTC CALCULATION (BAZETT): 513 MS
EKG R AXIS: -55 DEGREES
EKG T AXIS: 123 DEGREES
EKG VENTRICULAR RATE: 61 BPM
GFR SERPL CREATININE-BSD FRML MDRD: > 90 ML/MIN/1.73M2
GLUCOSE BLD-MCNC: 186 MG/DL (ref 70–108)
GLUCOSE BLD-MCNC: 189 MG/DL (ref 70–108)
GLUCOSE BLD-MCNC: 201 MG/DL (ref 70–108)
GLUCOSE BLD-MCNC: 210 MG/DL (ref 70–108)
GLUCOSE BLD-MCNC: 214 MG/DL (ref 70–108)
LV EF: 58 %
LVEF MODALITY: NORMAL
MAGNESIUM: 1.7 MG/DL (ref 1.6–2.4)
POTASSIUM SERPL-SCNC: 3.8 MEQ/L (ref 3.5–5.2)
SODIUM BLD-SCNC: 126 MEQ/L (ref 135–145)

## 2020-05-18 PROCEDURE — 82948 REAGENT STRIP/BLOOD GLUCOSE: CPT

## 2020-05-18 PROCEDURE — 83735 ASSAY OF MAGNESIUM: CPT

## 2020-05-18 PROCEDURE — 1200000003 HC TELEMETRY R&B

## 2020-05-18 PROCEDURE — 93010 ELECTROCARDIOGRAM REPORT: CPT | Performed by: INTERNAL MEDICINE

## 2020-05-18 PROCEDURE — 6360000002 HC RX W HCPCS: Performed by: INTERNAL MEDICINE

## 2020-05-18 PROCEDURE — 99232 SBSQ HOSP IP/OBS MODERATE 35: CPT | Performed by: NURSE PRACTITIONER

## 2020-05-18 PROCEDURE — 97110 THERAPEUTIC EXERCISES: CPT

## 2020-05-18 PROCEDURE — 6370000000 HC RX 637 (ALT 250 FOR IP): Performed by: INTERNAL MEDICINE

## 2020-05-18 PROCEDURE — 93306 TTE W/DOPPLER COMPLETE: CPT

## 2020-05-18 PROCEDURE — 36415 COLL VENOUS BLD VENIPUNCTURE: CPT

## 2020-05-18 PROCEDURE — 6370000000 HC RX 637 (ALT 250 FOR IP): Performed by: NURSE PRACTITIONER

## 2020-05-18 PROCEDURE — 99233 SBSQ HOSP IP/OBS HIGH 50: CPT | Performed by: INTERNAL MEDICINE

## 2020-05-18 PROCEDURE — 80048 BASIC METABOLIC PNL TOTAL CA: CPT

## 2020-05-18 PROCEDURE — 97530 THERAPEUTIC ACTIVITIES: CPT

## 2020-05-18 PROCEDURE — 94760 N-INVAS EAR/PLS OXIMETRY 1: CPT

## 2020-05-18 PROCEDURE — 2700000000 HC OXYGEN THERAPY PER DAY

## 2020-05-18 PROCEDURE — 93005 ELECTROCARDIOGRAM TRACING: CPT | Performed by: INTERNAL MEDICINE

## 2020-05-18 PROCEDURE — 97162 PT EVAL MOD COMPLEX 30 MIN: CPT

## 2020-05-18 PROCEDURE — 2580000003 HC RX 258: Performed by: INTERNAL MEDICINE

## 2020-05-18 RX ORDER — MAGNESIUM SULFATE IN WATER 40 MG/ML
2 INJECTION, SOLUTION INTRAVENOUS ONCE
Status: COMPLETED | OUTPATIENT
Start: 2020-05-18 | End: 2020-05-18

## 2020-05-18 RX ORDER — POTASSIUM CHLORIDE 20 MEQ/1
40 TABLET, EXTENDED RELEASE ORAL ONCE
Status: COMPLETED | OUTPATIENT
Start: 2020-05-18 | End: 2020-05-18

## 2020-05-18 RX ORDER — HYDRALAZINE HYDROCHLORIDE 25 MG/1
25 TABLET, FILM COATED ORAL EVERY 8 HOURS SCHEDULED
Status: DISCONTINUED | OUTPATIENT
Start: 2020-05-18 | End: 2020-05-19

## 2020-05-18 RX ADMIN — PANTOPRAZOLE SODIUM 40 MG: 40 TABLET, DELAYED RELEASE ORAL at 05:23

## 2020-05-18 RX ADMIN — HYDRALAZINE HYDROCHLORIDE 25 MG: 25 TABLET, FILM COATED ORAL at 13:36

## 2020-05-18 RX ADMIN — HYDRALAZINE HYDROCHLORIDE 25 MG: 25 TABLET, FILM COATED ORAL at 20:13

## 2020-05-18 RX ADMIN — METOPROLOL TARTRATE 50 MG: 50 TABLET, FILM COATED ORAL at 09:27

## 2020-05-18 RX ADMIN — ROTIGOTINE 1 PATCH: 2 PATCH, EXTENDED RELEASE TRANSDERMAL at 09:28

## 2020-05-18 RX ADMIN — CARBIDOPA AND LEVODOPA 2 TABLET: 25; 100 TABLET ORAL at 20:13

## 2020-05-18 RX ADMIN — Medication 10 ML: at 09:28

## 2020-05-18 RX ADMIN — ACETAMINOPHEN 650 MG: 325 TABLET ORAL at 09:33

## 2020-05-18 RX ADMIN — CARBIDOPA AND LEVODOPA 2 TABLET: 25; 100 TABLET ORAL at 16:15

## 2020-05-18 RX ADMIN — FUROSEMIDE 40 MG: 10 INJECTION, SOLUTION INTRAMUSCULAR; INTRAVENOUS at 09:28

## 2020-05-18 RX ADMIN — CARBIDOPA AND LEVODOPA 2 TABLET: 25; 100 TABLET ORAL at 13:36

## 2020-05-18 RX ADMIN — ASPIRIN 81 MG: 81 TABLET ORAL at 09:27

## 2020-05-18 RX ADMIN — APIXABAN 2.5 MG: 2.5 TABLET, FILM COATED ORAL at 18:22

## 2020-05-18 RX ADMIN — DILTIAZEM HYDROCHLORIDE 30 MG: 30 TABLET, FILM COATED ORAL at 05:23

## 2020-05-18 RX ADMIN — POTASSIUM CHLORIDE 10 MEQ: 7.46 INJECTION, SOLUTION INTRAVENOUS at 02:00

## 2020-05-18 RX ADMIN — SUCRALFATE 1 G: 1 TABLET ORAL at 09:27

## 2020-05-18 RX ADMIN — SUCRALFATE 1 G: 1 TABLET ORAL at 13:36

## 2020-05-18 RX ADMIN — APIXABAN 2.5 MG: 2.5 TABLET, FILM COATED ORAL at 05:23

## 2020-05-18 RX ADMIN — FUROSEMIDE 40 MG: 10 INJECTION, SOLUTION INTRAMUSCULAR; INTRAVENOUS at 20:14

## 2020-05-18 RX ADMIN — METOPROLOL TARTRATE 50 MG: 50 TABLET, FILM COATED ORAL at 20:13

## 2020-05-18 RX ADMIN — CARBIDOPA AND LEVODOPA 2 TABLET: 25; 100 TABLET ORAL at 09:28

## 2020-05-18 RX ADMIN — HYDRALAZINE HYDROCHLORIDE 5 MG: 20 INJECTION, SOLUTION INTRAMUSCULAR; INTRAVENOUS at 04:07

## 2020-05-18 RX ADMIN — SUCRALFATE 1 G: 1 TABLET ORAL at 16:15

## 2020-05-18 RX ADMIN — DILTIAZEM HYDROCHLORIDE 30 MG: 30 TABLET, FILM COATED ORAL at 13:36

## 2020-05-18 RX ADMIN — Medication 10 ML: at 20:20

## 2020-05-18 RX ADMIN — INSULIN LISPRO 1 UNITS: 100 INJECTION, SOLUTION INTRAVENOUS; SUBCUTANEOUS at 20:20

## 2020-05-18 RX ADMIN — ATORVASTATIN CALCIUM 20 MG: 20 TABLET, FILM COATED ORAL at 20:13

## 2020-05-18 RX ADMIN — POTASSIUM CHLORIDE 10 MEQ: 7.46 INJECTION, SOLUTION INTRAVENOUS at 00:12

## 2020-05-18 RX ADMIN — MAGNESIUM SULFATE HEPTAHYDRATE 2 G: 40 INJECTION, SOLUTION INTRAVENOUS at 09:28

## 2020-05-18 RX ADMIN — SUCRALFATE 1 G: 1 TABLET ORAL at 20:13

## 2020-05-18 RX ADMIN — POTASSIUM CHLORIDE 40 MEQ: 1500 TABLET, EXTENDED RELEASE ORAL at 09:27

## 2020-05-18 ASSESSMENT — PAIN SCALES - GENERAL
PAINLEVEL_OUTOF10: 5
PAINLEVEL_OUTOF10: 0
PAINLEVEL_OUTOF10: 5

## 2020-05-18 ASSESSMENT — PAIN DESCRIPTION - FREQUENCY: FREQUENCY: CONTINUOUS

## 2020-05-18 ASSESSMENT — PAIN DESCRIPTION - ORIENTATION: ORIENTATION: RIGHT;LEFT

## 2020-05-18 ASSESSMENT — PAIN DESCRIPTION - LOCATION: LOCATION: KNEE

## 2020-05-18 ASSESSMENT — PAIN DESCRIPTION - PAIN TYPE: TYPE: CHRONIC PAIN

## 2020-05-18 ASSESSMENT — PAIN - FUNCTIONAL ASSESSMENT: PAIN_FUNCTIONAL_ASSESSMENT: ACTIVITIES ARE NOT PREVENTED

## 2020-05-18 ASSESSMENT — PAIN DESCRIPTION - PROGRESSION: CLINICAL_PROGRESSION: NOT CHANGED

## 2020-05-18 ASSESSMENT — PAIN DESCRIPTION - DESCRIPTORS: DESCRIPTORS: DULL;CONSTANT

## 2020-05-18 ASSESSMENT — PAIN DESCRIPTION - ONSET: ONSET: ON-GOING

## 2020-05-18 NOTE — PROGRESS NOTES
[address] [address]  INPATIENT PHYSICAL THERAPY  EVALUATION  STRZ RENAL TELEMETRY 6K - 6K-07/007-A    Time In: 0805  Time Out: 8066  Timed Code Treatment Minutes: 25 Minutes  Minutes: 42          Date: 2020  Patient Name: Uziel Valencia,  Gender:  female        MRN: [id number]  : 1932  (80 y.o.)  Referral Date : 20   Referring Practitioner: Yue Urena MD  Diagnosis: Systolic CHF, acute on chronic  Additional Pertinent Hx: Per H&P: 75-year-old lady with past medical history of CAD status post CABG, A. fib, diabetes, CHF came to ER due to shortness of breath, and lower extremity swelling. Patient states this is been ongoing for several days. She apparently ran out of her Lasix few days ago and have time to get refill. She did call her PCP yesterday but was not able to get the prescription picked up on time. She states that with ambulation her shortness of breath is worsened. She also noticed more swelling of her legs. Denies any chest pain, fevers, chills. No nausea, vomiting, diarrhea. She does feel weak and more sleepy. Restrictions/Precautions:  Restrictions/Precautions: Fall Risk, General Precautions    Subjective:  Chart Reviewed: Yes  Patient assessed for rehabilitation services?: Yes  Family / Caregiver Present: No  Subjective: Pt in room, agreeable to PT. Pt reports she is concentraing on her breathing this morning. RN okay with PT session, reporting patient anxious about her breathing    General:  Overall Orientation Status: Within Functional Limits    Vision: Impaired  Vision Exceptions: Wears glasses at all times    Hearing: Within functional limits         Pain:  Denies.           Social/Functional History:    Lives With: Alone  Type of Home: Assisted living([place])  Home Layout: One level  Home Access: Level entry  Home Equipment: 4 wheeled walker     Bathroom Shower/Tub: Walk-in shower, Shower chair with back  Bathroom Toilet: Handicap height  Bathroom Accessibility: Accessible       ADL Assistance: Independent     Ambulation Assistance: Independent  Transfer Assistance: Independent          Additional Comments: Pt stating she was indep with all ADL's and mobility at [facility]     OBJECTIVE:  Range of Motion:  Right Lower Extremity: WFL  Left Lower Extremity: [facility]    Strength:  Right Lower Extremity: Impaired - grossly 4- to 4/5  Left Lower Extremity: Impaired - grossly 4- to 4/5    Balance:  Static Sitting Balance:  Stand By Assistance  Static Standing Balance: Contact Guard Assistance  Dynamic Standing Balance: Contact Guard Assistance    Bed Mobility:  Supine to Sit: Stand By Assistance, Minimal Assistance, with head of bed raised, with increased time for completion  Sit to Supine: Stand By Assistance     Transfers:  Sit to Stand: Contact Guard Assistance  Stand to [employer] Assistance    Ambulation:  Contact Guard Assistance  Distance: 6'  Surface: Level Tile  Device:4 Wheeled Walker  Gait Deviations: Forward Flexed Posture, Slow Deidre, Decreased Step Length Bilaterally, Decreased Gait Speed and Decreased Heel Strike Bilaterally    Exercise:  Patient was guided in 1 set(s) 10 reps of exercise to both lower extremities. Supine: ankle pumps, heel slide, hip abduction, straight leg raise right only x 5. Rest breaks between each exercise due to shortness of breath. Exercises were completed for increased independence with functional mobility. Functional Outcome Measures: Completed  AM-PAC Inpatient Mobility without Stair Climbing Raw Score : 15  AM-PAC Inpatient without Stair Climbing T-Scale Score : 43.03    ASSESSMENT:  Activity Tolerance:  Patient tolerance of  treatment: fair, limited by shortness of breath      Treatment Initiated: Treatment and education initiated within context of evaluation.   Evaluation time included review of current medical information, gathering information related to past medical, social and functional history, completion of standardized testing, formal and informal observation of tasks, assessment of data and development of plan of care and goals. Treatment time included skilled education and facilitation of tasks to increase safety and independence with functional mobility for improved independence and quality of life. Therapeutic activity completed to improve patient's ability to complete functional transfers. Patient complete supine to sit x2 this date. Rest breaks between each activity due to shortness of breath. Increased time to complete all activities. O2 monitored on 1L and O2 90-94%. Verbal cues for pursed lip breathing    Assessment: Body structures, Functions, Activity limitations: Decreased functional mobility , Decreased balance, Decreased endurance, Decreased strength  Assessment: The evaluation and examination of Ms. Denys Kraus indicates a decline in functional mobility compared to baseline. Patient reports independence at [facility] compared to baseline. Patient reporting CGA at this time, becomes short of breath easily. Patient would benefit from skilled PT services to improve her ability to complete functional mobility, reduce her risk for falls and allow patient to return to [facility].    Prognosis: Good   Co-morbidities: parkinsons, DM, arthritis  Pt anxious    REQUIRES PT FOLLOW UP: Yes    Discharge Recommendations:  Discharge Recommendations: Continue to assess pending progress, Patient would benefit from continued therapy after discharge    Patient Education:  PT Education: PT Role, Plan of Care, Goals    Equipment Recommendations: No  Equipment Needed: No    Plan:  Times per week: 5x GM  Times per day: Daily  Current Treatment Recommendations: Strengthening, Transfer Training, Endurance Training, Neuromuscular Re-education, Patient/Caregiver Education & Training, Balance Training, Gait Training, Home Exercise Program, Functional Mobility Training, Safety Education & Training    Goals:  Patient goals : return home,

## 2020-05-18 NOTE — PROGRESS NOTES
Cardiology Progress Note      Patient:  Lizzie Roth  YOB: 1932  MRN: [id number]   Acct: [de-identified]  Admit Date:  5/16/2020  Primary Cardiologist: Dr. Patricia Araujo  Seen by Dr. Justina Lesches     Per prior cardiology consult note-  REASON FOR CONSULTATION:  Congestive heart failure exacerbation.     PRIMARY CARDIOLOGIST:  Drew Partida MD     HISTORY OF PRESENT ILLNESS:  This is actually an 49-year-old pleasant   female patient who was in usual state of health until few days  prior to admission. In the last few days prior to admission, the  patient was noted to have worsening of her leg swelling and worsening of  shortness of breath. She was becoming orthopneic of three to four  pillows, weight gain, and felt weak and tired. So finally, she decided  to come to the emergency room and was found to have an acute congestive  heart failure with acute pulmonary edema.     The patient has run out of her Lasix for the last four to five days and  she did not take any Lasix for four to five days. She noticed all these  symptoms getting worse, particularly after she stopped the Lasix. Denied having any chest pain. No fever or chills. No nausea, vomiting,  or diarrhea. Overnight, she has been diuresed. Today, she feels  better, but still short of breath. She is still on oxygen, but she does  not desaturate without oxygen but more comfortable with oxygen. The  chest x-ray showed pulmonary vascular congestion with small-to-moderate  left pleural effusion and BNP is markedly elevated.       Subjective (Events in last 24 hours):     Pt up in chair - she is SOB with rest   She states she feels great and is doing well     Diuresed well so far   BP elevated         Objective:   BP (!) 183/77   Pulse 62   Temp 98 °F (36.7 °C) (Oral)   Resp 18   Ht 5' 2\" (1.575 m)   Wt 137 lb 14.4 oz (62.6 kg)   SpO2 93%   BMI 25.22 kg/m²        TELEMETRY: AFB CVR     Physical Exam:  General Appearance: alert and oriented to person, place and time, in no acute distress  Cardiovascular: irregular rhythm, normal S1 and S2, no murmurs, rubs, clicks, or gallops, distal pulses intact,  Pulmonary/Chest: clear upper - diminished RT posterior bases to auscultation bilaterally- no wheezes, rales or rhonchi, normal air movement, no respiratory distress  Abdomen: soft, non-tender, non-distended, normal bowel sounds, no masses Extremities: no cyanosis, clubbing or edema, pulses present    Skin: warm and dry, no rash or erythema  Head: normocephalic and atraumatic  Musculoskeletal: normal range of motion, no joint swelling, deformity or tenderness  Neurological: alert, oriented, normal speech, no focal findings or movement disorder noted    Medications:    magnesium sulfate  2 g Intravenous Once    apixaban  2.5 mg Oral BID    aspirin  81 mg Oral Daily    atorvastatin  20 mg Oral Daily    carbidopa-levodopa  2 tablet Oral 4x Daily    dilTIAZem  30 mg Oral 3 times per day    rotigotine  1 patch Transdermal Daily    pantoprazole  40 mg Oral QAM AC    sucralfate  1 g Oral 4x Daily    magnesium replacement protocol   Other RX Placeholder    potassium replacement protocol   Other RX Placeholder    metoprolol tartrate  50 mg Oral BID    insulin lispro  0-6 Units Subcutaneous TID WC    insulin lispro  0-3 Units Subcutaneous Nightly    sodium chloride flush  10 mL Intravenous 2 times per day    furosemide  40 mg Intravenous BID       sodium chloride flush, 10 mL, PRN  acetaminophen, 650 mg, Q6H PRN    Or  acetaminophen, 650 mg, Q6H PRN  polyethylene glycol, 17 g, Daily PRN  promethazine, 12.5 mg, Q6H PRN    Or  ondansetron, 4 mg, Q6H PRN  perflutren lipid microspheres, 1.5 mL, ONCE PRN  hydrALAZINE, 5 mg, Q6H PRN        Diagnostics:  EKG:    AFB / LBBB    Echo: pending    Electronically signed by Mariam Ayala MD (Interpreting   physician) on 10/15/2018 at 02:04 PM   ---------------------------------------------------------------- Findings      Mitral Valve   The mitral valve structure was normal with normal leaflet separation. DOPPLER: The transmitral velocity was within the normal range with no   evidence for mitral stenosis. There was no evidence of mitral   regurgitation. Aortic Valve   The aortic valve leaflets were not well visualized. Tricuspid Valve   The tricuspid valve structure was normal with normal leaflet separation. DOPPLER: There was no evidence of tricuspid stenosis. There was evidence   of moderate tricuspid regurgitation. Pulmonic Valve   The pulmonic valve leaflets exhibited normal thickness, no calcification,   and normal cuspal separation. Evidence for moderate pulmonic   regurgitation. Left Atrium   Left atrial size was severely dilated. Left Ventricle   Normal left ventricle size and systolic function. Ejection fraction was   estimated at 60%. There were no regional left ventricular wall motion   abnormalities and wall thickness was within normal limits. Right Atrium   Right atrial size was moderately dilated. Right Ventricle   The right ventricular size was normal with normal systolic function and   wall thickness. Pericardial Effusion   The pericardium was normal in appearance with no evidence of a pericardial   effusion. Pleural Effusion   No evidence of pleural effusion. Aorta / Great Vessels   -Aortic root dimension within normal limits.   -The Pulmonary artery is within normal limits. -IVC size is within normal limits with normal respiratory phasic changes. Stress:   Conclusions      Summary   Lexiscan EKG stress test is not suggestive for ischemia. Calculated gated LVEF 66 %. The T.I.D. ratio was 1.03 . Myocardial perfusion imaging is not suggestive for myocardial ischemia. This study was negative for ischemia. Recommendation   Clinical correlation is recommended. Aggressive risk factor management. Medical management.

## 2020-05-18 NOTE — CONSULTS
[address] [place] [address]                                  CONSULTATION    PATIENT NAME: Bam Mesa                       :        1932  MED REC NO:   [id number]                           ROOM:       0007  ACCOUNT NO:   [de-identified]                           ADMIT DATE: 2020  PROVIDER:     Maddy Bynum. Noe Wilkerson M.D.    Mercedes Wildns:  2020    REASON FOR CONSULTATION:  Congestive heart failure exacerbation. PRIMARY CARDIOLOGIST:  Lashonda Lyon MD    HISTORY OF PRESENT ILLNESS:  This is actually an 49-year-old pleasant   female patient who was in usual state of health until few days  prior to admission. In the last few days prior to admission, the  patient was noted to have worsening of her leg swelling and worsening of  shortness of breath. She was becoming orthopneic of three to four  pillows, weight gain, and felt weak and tired. So finally, she decided  to come to the emergency room and was found to have an acute congestive  heart failure with acute pulmonary edema. The patient has run out of her Lasix for the last four to five days and  she did not take any Lasix for four to five days. She noticed all these  symptoms getting worse, particularly after she stopped the Lasix. Denied having any chest pain. No fever or chills. No nausea, vomiting,  or diarrhea. Overnight, she has been diuresed. Today, she feels  better, but still short of breath. She is still on oxygen, but she does  not desaturate without oxygen but more comfortable with oxygen. The  chest x-ray showed pulmonary vascular congestion with small-to-moderate  left pleural effusion and BNP is markedly elevated. REVIEW OF SYSTEMS:  Negative except the above-mentioned ones.     PAST MEDICAL HISTORY:  Includes congestive heart failure; coronary  artery disease, status post previous bypass; left bundle-branch block;  diabetes;

## 2020-05-19 ENCOUNTER — APPOINTMENT (OUTPATIENT)
Dept: GENERAL RADIOLOGY | Age: 85
DRG: 291 | End: 2020-05-19
Payer: MEDICARE

## 2020-05-19 LAB
ANION GAP SERPL CALCULATED.3IONS-SCNC: 14 MEQ/L (ref 8–16)
BUN BLDV-MCNC: 9 MG/DL (ref 7–22)
CALCIUM SERPL-MCNC: 9.1 MG/DL (ref 8.5–10.5)
CHLORIDE BLD-SCNC: 84 MEQ/L (ref 98–111)
CHLORIDE, URINE: 21 MEQ/L
CO2: 23 MEQ/L (ref 23–33)
CREAT SERPL-MCNC: 0.4 MG/DL (ref 0.4–1.2)
GFR SERPL CREATININE-BSD FRML MDRD: > 90 ML/MIN/1.73M2
GLUCOSE BLD-MCNC: 180 MG/DL (ref 70–108)
GLUCOSE BLD-MCNC: 188 MG/DL (ref 70–108)
GLUCOSE BLD-MCNC: 209 MG/DL (ref 70–108)
GLUCOSE BLD-MCNC: 220 MG/DL (ref 70–108)
GLUCOSE BLD-MCNC: 227 MG/DL (ref 70–108)
MAGNESIUM: 1.5 MG/DL (ref 1.6–2.4)
OSMOLALITY: 250 MOSMOL/KG (ref 275–295)
POTASSIUM SERPL-SCNC: 3.8 MEQ/L (ref 3.5–5.2)
POTASSIUM, URINE: 53.8 MEQ/L
SODIUM BLD-SCNC: 117 MEQ/L (ref 135–145)
SODIUM BLD-SCNC: 119 MEQ/L (ref 135–145)
SODIUM BLD-SCNC: 121 MEQ/L (ref 135–145)
SODIUM URINE: 24 MEQ/L
URIC ACID: 4.8 MG/DL (ref 2.4–5.7)

## 2020-05-19 PROCEDURE — 1200000003 HC TELEMETRY R&B

## 2020-05-19 PROCEDURE — 83935 ASSAY OF URINE OSMOLALITY: CPT

## 2020-05-19 PROCEDURE — 97535 SELF CARE MNGMENT TRAINING: CPT

## 2020-05-19 PROCEDURE — 6370000000 HC RX 637 (ALT 250 FOR IP): Performed by: INTERNAL MEDICINE

## 2020-05-19 PROCEDURE — 6370000000 HC RX 637 (ALT 250 FOR IP): Performed by: NURSE PRACTITIONER

## 2020-05-19 PROCEDURE — 99233 SBSQ HOSP IP/OBS HIGH 50: CPT | Performed by: INTERNAL MEDICINE

## 2020-05-19 PROCEDURE — 83735 ASSAY OF MAGNESIUM: CPT

## 2020-05-19 PROCEDURE — 84300 ASSAY OF URINE SODIUM: CPT

## 2020-05-19 PROCEDURE — 80048 BASIC METABOLIC PNL TOTAL CA: CPT

## 2020-05-19 PROCEDURE — 2700000000 HC OXYGEN THERAPY PER DAY

## 2020-05-19 PROCEDURE — 6360000002 HC RX W HCPCS: Performed by: PHYSICIAN ASSISTANT

## 2020-05-19 PROCEDURE — 36415 COLL VENOUS BLD VENIPUNCTURE: CPT

## 2020-05-19 PROCEDURE — 82948 REAGENT STRIP/BLOOD GLUCOSE: CPT

## 2020-05-19 PROCEDURE — 84133 ASSAY OF URINE POTASSIUM: CPT

## 2020-05-19 PROCEDURE — 84550 ASSAY OF BLOOD/URIC ACID: CPT

## 2020-05-19 PROCEDURE — 82436 ASSAY OF URINE CHLORIDE: CPT

## 2020-05-19 PROCEDURE — 6360000002 HC RX W HCPCS: Performed by: INTERNAL MEDICINE

## 2020-05-19 PROCEDURE — 84295 ASSAY OF SERUM SODIUM: CPT

## 2020-05-19 PROCEDURE — 2580000003 HC RX 258: Performed by: INTERNAL MEDICINE

## 2020-05-19 PROCEDURE — 99222 1ST HOSP IP/OBS MODERATE 55: CPT | Performed by: INTERNAL MEDICINE

## 2020-05-19 PROCEDURE — 83930 ASSAY OF BLOOD OSMOLALITY: CPT

## 2020-05-19 PROCEDURE — 94760 N-INVAS EAR/PLS OXIMETRY 1: CPT

## 2020-05-19 PROCEDURE — 99232 SBSQ HOSP IP/OBS MODERATE 35: CPT | Performed by: NURSE PRACTITIONER

## 2020-05-19 PROCEDURE — 71046 X-RAY EXAM CHEST 2 VIEWS: CPT

## 2020-05-19 RX ORDER — HYDRALAZINE HYDROCHLORIDE 20 MG/ML
10 INJECTION INTRAMUSCULAR; INTRAVENOUS EVERY 6 HOURS PRN
Status: DISCONTINUED | OUTPATIENT
Start: 2020-05-19 | End: 2020-05-28 | Stop reason: HOSPADM

## 2020-05-19 RX ORDER — HYDRALAZINE HYDROCHLORIDE 50 MG/1
50 TABLET, FILM COATED ORAL EVERY 8 HOURS SCHEDULED
Status: DISCONTINUED | OUTPATIENT
Start: 2020-05-19 | End: 2020-05-21

## 2020-05-19 RX ORDER — FUROSEMIDE 40 MG/1
40 TABLET ORAL DAILY
Status: DISCONTINUED | OUTPATIENT
Start: 2020-05-19 | End: 2020-05-27

## 2020-05-19 RX ORDER — ISOSORBIDE DINITRATE 20 MG/1
20 TABLET ORAL 3 TIMES DAILY
Status: DISCONTINUED | OUTPATIENT
Start: 2020-05-19 | End: 2020-05-28 | Stop reason: HOSPADM

## 2020-05-19 RX ORDER — HYDRALAZINE HYDROCHLORIDE 20 MG/ML
5 INJECTION INTRAMUSCULAR; INTRAVENOUS EVERY 6 HOURS PRN
Status: DISCONTINUED | OUTPATIENT
Start: 2020-05-19 | End: 2020-05-28 | Stop reason: HOSPADM

## 2020-05-19 RX ORDER — SODIUM CHLORIDE 1000 MG
2 TABLET, SOLUBLE MISCELLANEOUS ONCE
Status: COMPLETED | OUTPATIENT
Start: 2020-05-19 | End: 2020-05-19

## 2020-05-19 RX ADMIN — ACETAMINOPHEN 650 MG: 325 TABLET ORAL at 16:00

## 2020-05-19 RX ADMIN — CARBIDOPA AND LEVODOPA 2 TABLET: 25; 100 TABLET ORAL at 21:57

## 2020-05-19 RX ADMIN — Medication 10 ML: at 21:57

## 2020-05-19 RX ADMIN — ISOSORBIDE DINITRATE 20 MG: 20 TABLET ORAL at 21:57

## 2020-05-19 RX ADMIN — SUCRALFATE 1 G: 1 TABLET ORAL at 08:43

## 2020-05-19 RX ADMIN — HYDRALAZINE HYDROCHLORIDE 50 MG: 50 TABLET, FILM COATED ORAL at 14:27

## 2020-05-19 RX ADMIN — HYDRALAZINE HYDROCHLORIDE 25 MG: 25 TABLET, FILM COATED ORAL at 05:53

## 2020-05-19 RX ADMIN — ATORVASTATIN CALCIUM 20 MG: 20 TABLET, FILM COATED ORAL at 21:57

## 2020-05-19 RX ADMIN — CARBIDOPA AND LEVODOPA 2 TABLET: 25; 100 TABLET ORAL at 08:43

## 2020-05-19 RX ADMIN — INSULIN LISPRO 1 UNITS: 100 INJECTION, SOLUTION INTRAVENOUS; SUBCUTANEOUS at 21:58

## 2020-05-19 RX ADMIN — DILTIAZEM HYDROCHLORIDE 30 MG: 30 TABLET, FILM COATED ORAL at 21:57

## 2020-05-19 RX ADMIN — CARBIDOPA AND LEVODOPA 2 TABLET: 25; 100 TABLET ORAL at 13:15

## 2020-05-19 RX ADMIN — CARBIDOPA AND LEVODOPA 2 TABLET: 25; 100 TABLET ORAL at 16:34

## 2020-05-19 RX ADMIN — SUCRALFATE 1 G: 1 TABLET ORAL at 16:34

## 2020-05-19 RX ADMIN — DILTIAZEM HYDROCHLORIDE 30 MG: 30 TABLET, FILM COATED ORAL at 14:27

## 2020-05-19 RX ADMIN — SUCRALFATE 1 G: 1 TABLET ORAL at 21:57

## 2020-05-19 RX ADMIN — APIXABAN 2.5 MG: 2.5 TABLET, FILM COATED ORAL at 17:36

## 2020-05-19 RX ADMIN — ROTIGOTINE 1 PATCH: 2 PATCH, EXTENDED RELEASE TRANSDERMAL at 08:43

## 2020-05-19 RX ADMIN — APIXABAN 2.5 MG: 2.5 TABLET, FILM COATED ORAL at 05:53

## 2020-05-19 RX ADMIN — HYDRALAZINE HYDROCHLORIDE 5 MG: 20 INJECTION, SOLUTION INTRAMUSCULAR; INTRAVENOUS at 05:56

## 2020-05-19 RX ADMIN — PANTOPRAZOLE SODIUM 40 MG: 40 TABLET, DELAYED RELEASE ORAL at 05:53

## 2020-05-19 RX ADMIN — METOPROLOL TARTRATE 25 MG: 25 TABLET, FILM COATED ORAL at 21:57

## 2020-05-19 RX ADMIN — SODIUM CHLORIDE TAB 1 GM 2 G: 1 TAB at 23:10

## 2020-05-19 RX ADMIN — HYDRALAZINE HYDROCHLORIDE 10 MG: 20 INJECTION, SOLUTION INTRAMUSCULAR; INTRAVENOUS at 07:06

## 2020-05-19 RX ADMIN — ISOSORBIDE DINITRATE 20 MG: 20 TABLET ORAL at 13:15

## 2020-05-19 RX ADMIN — ACETAMINOPHEN 650 MG: 325 TABLET ORAL at 08:40

## 2020-05-19 RX ADMIN — ONDANSETRON HYDROCHLORIDE 4 MG: 2 SOLUTION INTRAMUSCULAR; INTRAVENOUS at 07:00

## 2020-05-19 RX ADMIN — HYDRALAZINE HYDROCHLORIDE 50 MG: 50 TABLET, FILM COATED ORAL at 21:57

## 2020-05-19 RX ADMIN — ASPIRIN 81 MG: 81 TABLET ORAL at 08:44

## 2020-05-19 RX ADMIN — DILTIAZEM HYDROCHLORIDE 30 MG: 30 TABLET, FILM COATED ORAL at 05:53

## 2020-05-19 ASSESSMENT — ENCOUNTER SYMPTOMS
VOMITING: 0
SHORTNESS OF BREATH: 1
NAUSEA: 0
COUGH: 0
BACK PAIN: 0
DIARRHEA: 0
ABDOMINAL PAIN: 0
EYES NEGATIVE: 1

## 2020-05-19 ASSESSMENT — PAIN DESCRIPTION - ORIENTATION: ORIENTATION: RIGHT;LEFT

## 2020-05-19 ASSESSMENT — PAIN DESCRIPTION - FREQUENCY: FREQUENCY: CONTINUOUS

## 2020-05-19 ASSESSMENT — PAIN SCALES - GENERAL
PAINLEVEL_OUTOF10: 6
PAINLEVEL_OUTOF10: 0
PAINLEVEL_OUTOF10: 0
PAINLEVEL_OUTOF10: 7
PAINLEVEL_OUTOF10: 7
PAINLEVEL_OUTOF10: 0

## 2020-05-19 ASSESSMENT — PAIN DESCRIPTION - PAIN TYPE: TYPE: CHRONIC PAIN

## 2020-05-19 ASSESSMENT — PAIN DESCRIPTION - PROGRESSION: CLINICAL_PROGRESSION: GRADUALLY WORSENING

## 2020-05-19 ASSESSMENT — PAIN DESCRIPTION - LOCATION: LOCATION: KNEE

## 2020-05-19 ASSESSMENT — PAIN DESCRIPTION - ONSET: ONSET: ON-GOING

## 2020-05-19 ASSESSMENT — PAIN DESCRIPTION - DESCRIPTORS: DESCRIPTORS: DULL;CONSTANT

## 2020-05-19 NOTE — PROGRESS NOTES
diabetes, CHF came to ER due to shortness of breath, and lower extremity swelling. Patient states this is been ongoing for several days. She apparently ran out of her Lasix few days ago and have time to get refill. She did call her PCP yesterday but was not able to get the prescription picked up on time. She states that with ambulation her shortness of breath is worsened. She also noticed more swelling of her legs. Denies any chest pain, fevers, chills. No nausea, vomiting, diarrhea. She does feel weak and more sleepy. In ER, patient's blood pressure was elevated. Chest x-ray shows pulmonary vascular congestion, small to moderate left pleural effusion consistent with CHF. Patient's BNP was elevated as well. Patient was given IV Lasix and admitted. Subjective:- (Last 24 hours)  Was weaned off oxygen last night however needing back 1 to 2 L of oxygen because she became hypoxic  Reviewed the echocardiogram reports with the patient  Blood pressures are uncontrolled  Has a headache and loss of appetite    Past medical history, family history, social history and allergies reviewed again and is unchanged since admission. ROS (12 point review of systems completed. Pertinent positives noted.  Otherwise ROS is negative)      Scheduled Meds:   hydrALAZINE  50 mg Oral 3 times per day    isosorbide dinitrate  20 mg Oral TID    metoprolol tartrate  25 mg Oral BID    [Held by provider] furosemide  40 mg Oral Daily    apixaban  2.5 mg Oral BID    aspirin  81 mg Oral Daily    atorvastatin  20 mg Oral Daily    carbidopa-levodopa  2 tablet Oral 4x Daily    dilTIAZem  30 mg Oral 3 times per day    rotigotine  1 patch Transdermal Daily    pantoprazole  40 mg Oral QAM AC    sucralfate  1 g Oral 4x Daily    magnesium replacement protocol   Other RX Placeholder    potassium replacement protocol   Other RX Placeholder    insulin lispro  0-6 Units Subcutaneous TID WC    insulin lispro  0-3 Units CREATININE 0.4  --  0.4 0.4   CALCIUM 8.7  --  8.8 9.1     Recent Labs     05/16/20  1600   AST 18   ALT <5*   BILITOT 0.6   ALKPHOS 71     Recent Labs     05/16/20  1600   INR 1.42*     No results for input(s): Edmund Olivia in the last 72 hours. Urinalysis:    Lab Results   Component Value Date    NITRU NEGATIVE 11/26/2018    WBCUA 25-50 11/26/2018    BACTERIA NONE 11/26/2018    RBCUA 3-5 11/26/2018    BLOODU Negative 12/28/2019    SPECGRAV 1.015 12/28/2019    GLUCOSEU NEGATIVE 11/26/2018       Radiology:   XR CHEST STANDARD (2 VW)   Final Result   1. Mild cardiomegaly. Metallic sternotomy sutures. 2. Tiny effusion right side. Small effusion left side. Moderate bibasilar atelectasis/pneumonia/edema. Appearance improved from prior. No pulmonary vascular congestion seen at this time. **This report has been created using voice recognition software. It may contain minor errors which are inherent in voice recognition technology. **      Final report electronically signed by Dr. Usha Ace on 5/19/2020 8:15 AM      XR CHEST PORTABLE   Final Result   1. There is worsened pulmonary vascular congestion with bilateral perihilar and the basilar infiltrates. There are bilateral pleural effusions, moderate on the left and small on the right. Findings suggest worsening of congestive heart failure. Follow-up    chest radiograph recommended to confirm complete resolution. **This report has been created using voice recognition software. It may contain minor errors which are inherent in voice recognition technology. **      Final report electronically signed by Dr. Summer Moore on 5/17/2020 9:00 PM      XR CHEST PORTABLE   Final Result   1. There is pulmonary vascular congestion with bilateral perihilar and the basilar infiltrates. There is opacification of the left lower chest suggesting a small to moderate left pleural effusion.  The hazy attenuation at the right lung base may represent resolution. **This report has been created using voice recognition software. It may contain minor errors which are inherent in voice recognition technology. ** Final report electronically signed by Dr. Real Girard on 5/16/2020 4:28 PM      Electronically signed by Juanita Menchaca MD on 5/19/2020 at 2:49 PM

## 2020-05-19 NOTE — CARE COORDINATION
5/19/20, 2:18 PM EDT    DISCHARGE ONGOING EVALUATION:     [place] Ochoaa day: 3  Location: 6K-07/007-A Reason for admit: Systolic CHF, acute on chronic [facility]) [I50.23]   Treatment Plan of Care: Hospitalist and cardio following. nephro consult for hyponatremia: Na+ 121. Lasix held. PT/OT. Palliative care consult. Echo completed: ef 55-60%. Barriers to Discharge: Await medical clearance. PCP: Loly Padilla MD  Readmission Risk Score: 18%  Patient Goals/Plan/Treatment Preferences: Planning to return to [place] [id number] following.

## 2020-05-19 NOTE — PROGRESS NOTES
(!) 180/56 Comment: manual  Pulse 57   Temp 97.5 °F (36.4 °C) (Oral)   Resp 22   Ht 5' 2\" (1.575 m)   Wt 130 lb 1.6 oz (59 kg)   SpO2 92%   BMI 23.80 kg/m²        TELEMETRY: AFB CVR     Physical Exam:  General Appearance: alert and oriented to person, place and time, in no acute distress  Cardiovascular: irregular rhythm, normal S1 and S2, no murmurs, rubs, clicks, or gallops, distal pulses intact,  Pulmonary/Chest: clear upper - diminished lower bases posterior - no wheezes, rales or rhonchi, normal air movement, no respiratory distress  Abdomen: soft, non-tender, non-distended, normal bowel sounds, no masses Extremities: no cyanosis, clubbing or edema, pulses present    Skin: warm and dry, no rash or erythema  Head: normocephalic and atraumatic  Musculoskeletal: normal range of motion, no joint swelling, deformity or tenderness  Neurological: alert, oriented, normal speech, no focal findings or movement disorder noted    Medications:    hydrALAZINE  25 mg Oral 3 times per day    apixaban  2.5 mg Oral BID    aspirin  81 mg Oral Daily    atorvastatin  20 mg Oral Daily    carbidopa-levodopa  2 tablet Oral 4x Daily    dilTIAZem  30 mg Oral 3 times per day    rotigotine  1 patch Transdermal Daily    pantoprazole  40 mg Oral QAM AC    sucralfate  1 g Oral 4x Daily    magnesium replacement protocol   Other RX Placeholder    potassium replacement protocol   Other RX Placeholder    metoprolol tartrate  50 mg Oral BID    insulin lispro  0-6 Units Subcutaneous TID WC    insulin lispro  0-3 Units Subcutaneous Nightly    sodium chloride flush  10 mL Intravenous 2 times per day       hydrALAZINE, 10 mg, Q6H PRN  hydrALAZINE, 5 mg, Q6H PRN  sodium chloride flush, 10 mL, PRN  acetaminophen, 650 mg, Q6H PRN    Or  acetaminophen, 650 mg, Q6H PRN  polyethylene glycol, 17 g, Daily PRN  promethazine, 12.5 mg, Q6H PRN    Or  ondansetron, 4 mg, Q6H PRN  perflutren lipid microspheres, 1.5 mL, ONCE leaflet separation. DOPPLER: The transmitral velocity was within the normal range with no   evidence for mitral stenosis. There was no evidence of mitral   regurgitation. Aortic Valve   The aortic valve leaflets were not well visualized. Tricuspid Valve   The tricuspid valve structure was normal with normal leaflet separation. DOPPLER: There was no evidence of tricuspid stenosis. There was evidence   of moderate tricuspid regurgitation. Pulmonic Valve   The pulmonic valve leaflets exhibited normal thickness, no calcification,   and normal cuspal separation. Evidence for moderate pulmonic   regurgitation. Left Atrium   Left atrial size was severely dilated. Left Ventricle   Normal left ventricle size and systolic function. Ejection fraction was   estimated at 60%. There were no regional left ventricular wall motion   abnormalities and wall thickness was within normal limits. Right Atrium   Right atrial size was moderately dilated. Right Ventricle   The right ventricular size was normal with normal systolic function and   wall thickness. Pericardial Effusion   The pericardium was normal in appearance with no evidence of a pericardial   effusion. Pleural Effusion   No evidence of pleural effusion. Aorta / Great Vessels   -Aortic root dimension within normal limits.   -The Pulmonary artery is within normal limits. -IVC size is within normal limits with normal respiratory phasic changes. Stress:   Conclusions      Summary   Lexiscan EKG stress test is not suggestive for ischemia. Calculated gated LVEF 66 %. The T.I.D. ratio was 1.03 . Myocardial perfusion imaging is not suggestive for myocardial ischemia. This study was negative for ischemia. Recommendation   Clinical correlation is recommended. Aggressive risk factor management. Medical management.       Signatures      ---------------------------------------------------------------- Electronically signed by Monika Graham MD (Interpreting   Cardiologist) on 11/01/2018     Left Heart Cath: 1/12/2012  VG- PDA - patent   Patent left system   EF wnl    -baki    Lab Data:    Cardiac Enzymes:  No results for input(s): CKTOTAL, CKMB, CKMBINDEX, TROPONINI in the last 72 hours. CBC:   Lab Results   Component Value Date    WBC 7.5 05/17/2020    RBC 3.81 05/17/2020    RBC 0-5 09/21/2018    HGB 11.0 05/17/2020    HCT 32.4 05/17/2020     05/17/2020       CMP:    Lab Results   Component Value Date     05/19/2020    K 3.8 05/19/2020    K 3.9 05/16/2020    CL 84 05/19/2020    CO2 23 05/19/2020    BUN 9 05/19/2020    CREATININE 0.4 05/19/2020    LABGLOM >90 05/19/2020    GLUCOSE 209 05/19/2020    GLUCOSE 179 01/25/2018    CALCIUM 9.1 05/19/2020       Hepatic Function Panel:    Lab Results   Component Value Date    ALKPHOS 71 05/16/2020    ALT <5 05/16/2020    AST 18 05/16/2020    PROT 6.6 05/16/2020    BILITOT 0.6 05/16/2020    BILITOT NEGATIVE 09/21/2018    BILIDIR <0.2 10/22/2018    LABALBU 4.2 05/16/2020    LABALBU 4.1 01/12/2012       Magnesium:    Lab Results   Component Value Date    MG 1.5 05/19/2020       PT/INR:    Lab Results   Component Value Date    INR 1.42 05/16/2020       HgBA1c:    Lab Results   Component Value Date    LABA1C 7.0 01/15/2020       FLP:    Lab Results   Component Value Date    TRIG 112 01/25/2018    HDL 45 01/25/2018    LDLCALC 89 01/25/2018    LDLDIRECT 220.21 03/07/2016    LABVLDL 22 01/25/2018       TSH:    Lab Results   Component Value Date    TSH 4.040 05/28/2014         Assessment:  Continued SOB despite adequate diuresed   Severe PHTN by echo      Acute on chronic diastolic chf - secondary to NC with diuretic therapy   Pleural effusions LT> RT -- per CXR improved - Tiny effusion right side.  Small effusion left side    Hyponatremia    HLP  HTN - uncontrolled   DM II    Chronic LBBB  Chronic AFB - rate controlled    On eliquis       Hx CABG - VG- PDA

## 2020-05-19 NOTE — PROGRESS NOTES
[facility] PROGRESS NOTE      Patient: Barbara Fisher  Room #: 3X-87/058-F            YOB: 1932  Age: 80 y.o. Gender: female            Admit Date & Time: 5/16/2020  3:45 PM    Assessment:  The 80 yr old patient was coping with systolic CHF acute on chronic. The pt does have a Advance Directive and listed as a Full Code. Interventions:  I offered words of encouragement and hope. Outcomes: The pt is coping and expressed their thanks. Plan:  Spiritual support would be beneficial to the patient and her family.     Electronically signed by Darren Blackman 5/19/2020 at 3:27 PM.  [address] [facility]  [phone]

## 2020-05-19 NOTE — FLOWSHEET NOTE
05/19/20 1602   Provider Notification   Reason for Communication Patient request;Critical Value (comment)   Provider Name Dr. Criselda Patrick   Provider Notification Physician   Method of Communication Secure Message   Response No new orders     Notified Physician regarding patient being anxious. Patient states she becomes short of breath when BP is checked. Will continue to monitor blood pressure Q4H as ordered . No new orders at this time. Also notified Dr. Criselda Patrick and Dr. Timi Lopez of Critical Sodium Level : 119 .

## 2020-05-19 NOTE — CONSULTS
[facility]          [facility]        [address]        [address] [facility] [address] [facility]        [phone]           Inpatient Initial consult note         5/19/2020 4:40 PM    Patient Name:   Martha Avendano  YOB: 1932  Primary Care Physician:  Fernando Stallings MD     History Obtained From:  patient     Consultation requested by : Mary Marie*    requested for  : Evaluation of  hyponatremia and  fluid overload     History of presentingillness   Martha Avendano is a 80 y.o.   female with Past Medical History as stated below presented with a chief complaint of Shortness of Breath   on 5/16/2020 . 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 Symptoms moderate severity, associated with swelling bilateral lower extremities. She also did have some mild chest pain. No specific modifying factors. Patient apparently takes Lasix at home, however she ran out of the medications at least 3 days prior. Upon arrival to the ER patient blood pressure was elevated, chest x-ray showed pulmonary vascular congestion and moderate left pleural effusion. She was started on IV Lasix. Currently patient states that she is feeling much better. However patient presented with a sodium of 126 on 5/16. She has been getting some IV Lasix. Patient's symptoms have been improving however the sodium has decreased to 121 this morning and subsequently to 119 at 3 PM today. Nephrology has been consulted for evaluation and management of above. As per the nursing staff the last 2 hours patient has drank around 500 mL of fluids.      Past History      Past Medical History:   Diagnosis Date    Arthritis     Atypical chest pain     CAD (coronary artery disease)     Chronic LBBB (left bundle branch block)     Diabetes mellitus type II     Esophageal stricture     History of esophageal MG/24HR Place 1 patch onto the skin daily 5/10/19   ROWDY Crump - CNP   Misc. Devices MISC 1 each by Does not apply route once for 1 dose Param Glucose Meter; Diagnosis:E11.65 4/15/19 4/15/19  Marissa Bland MD     Allergies: Latex; Lisinopril; Tape [adhesive tape]; Keflex [cephalexin]; Lactulose; Morphine; and Polysporin [bacitracin-polymyxin b]  IP meds : Scheduled Meds:   hydrALAZINE  50 mg Oral 3 times per day    isosorbide dinitrate  20 mg Oral TID    metoprolol tartrate  25 mg Oral BID    [Held by provider] furosemide  40 mg Oral Daily    apixaban  2.5 mg Oral BID    aspirin  81 mg Oral Daily    atorvastatin  20 mg Oral Daily    carbidopa-levodopa  2 tablet Oral 4x Daily    dilTIAZem  30 mg Oral 3 times per day    rotigotine  1 patch Transdermal Daily    pantoprazole  40 mg Oral QAM AC    sucralfate  1 g Oral 4x Daily    magnesium replacement protocol   Other RX Placeholder    potassium replacement protocol   Other RX Placeholder    insulin lispro  0-6 Units Subcutaneous TID WC    insulin lispro  0-3 Units Subcutaneous Nightly    sodium chloride flush  10 mL Intravenous 2 times per day     Continuous Infusions:  Review of Systems Physical Exam   Review of Systems   Constitutional: Negative for chills, fatigue and fever. HENT: Negative. Eyes: Negative. Respiratory: Positive for shortness of breath. Negative for cough. Cardiovascular: Positive for chest pain and leg swelling. Gastrointestinal: Negative for abdominal pain, diarrhea, nausea and vomiting. Genitourinary: Negative for dysuria, frequency and hematuria. Musculoskeletal: Negative for back pain and neck pain. Skin: Negative for rash. Neurological: Negative for dizziness and headaches. Psychiatric/Behavioral: Negative for agitation and confusion. Physical Exam  Vitals signs reviewed. Constitutional:       General: She is not in acute distress. Appearance: Normal appearance.  She is not

## 2020-05-19 NOTE — PROGRESS NOTES
[address] [place] [address]  PHYSICAL THERAPY MISSED TREATMENT NOTE  ACUTE CARE  [facility] RENAL TELEMETRY 6K              Missed Treatment    Attempted to see patient this AM, however RN reporting patient nauseated and recommending to attempt later this date. PT to attempt later this date as able.     Coby Yuan, [address] [place] [address]

## 2020-05-19 NOTE — PROGRESS NOTES
completion. changing gowns, clothing orientationand cues for sequencing  Lower Extremity Dressing: Maximum Assistance. donning/doffing socks  Toileting: Minimal Assistance. stadning balance for hygiene, min A for clothing management  Toilet Transfer: Minimal Assistance. Keny Ace BALANCE:  Sitting Balance:  Supervision. Standing Balance: Contact Guard Assistance, Minimal Assistance. BED MOBILITY:  Not Tested    TRANSFERS:  Sit to Stand:  Minimal Assistance. cues for 4ww Safety and hand placement  Stand to Sit: Minimal Assistance. to control descent into chair    FUNCTIONAL MOBILITY:  Assistive Device: 4 Wheeled Walker  Assist Level:  Contact Guard Assistance. Distance: To and from bathroom    ASSESSMENT:     Activity Tolerance:  Patient tolerance of  treatment: fair. Discharge Recommendations: Patient would benefit from continued therapy after discharge, Continue to assess pending progress  Equipment Recommendations: Equipment Needed: No  Plan: Times per week: 5x    Patient Education  Patient Education: Role of OT, ADL's and Assistive Device Safety    Goals  Short term goals  Time Frame for Short term goals: by discharge  Short term goal 1: Pt to complete bathing tasks with min A and no vcs for safety   Short term goal 2: Pt to increaase endurance to navigate throughout room using AD with SBA and no increase in SOB or decrease in sats during to icnrease ease of copleting ADLs in her apartment  Short term goal 3: Pt to dmeo dynamic standing > 3 min with CGA and no UE support to reach outside base of support into closet and cupboards/drwers with no LOB   Short term goal 4: Pt to complete LB dressing tasks with min A     Following session, patient left in safe position with all fall risk precautions in place.

## 2020-05-20 LAB
ANION GAP SERPL CALCULATED.3IONS-SCNC: 13 MEQ/L (ref 8–16)
ANION GAP SERPL CALCULATED.3IONS-SCNC: 8 MEQ/L (ref 8–16)
BUN BLDV-MCNC: 11 MG/DL (ref 7–22)
BUN BLDV-MCNC: 13 MG/DL (ref 7–22)
CALCIUM SERPL-MCNC: 8.5 MG/DL (ref 8.5–10.5)
CALCIUM SERPL-MCNC: 9.1 MG/DL (ref 8.5–10.5)
CHLORIDE BLD-SCNC: 81 MEQ/L (ref 98–111)
CHLORIDE BLD-SCNC: 82 MEQ/L (ref 98–111)
CO2: 21 MEQ/L (ref 23–33)
CO2: 26 MEQ/L (ref 23–33)
CREAT SERPL-MCNC: 0.3 MG/DL (ref 0.4–1.2)
CREAT SERPL-MCNC: 0.4 MG/DL (ref 0.4–1.2)
GFR SERPL CREATININE-BSD FRML MDRD: > 90 ML/MIN/1.73M2
GFR SERPL CREATININE-BSD FRML MDRD: > 90 ML/MIN/1.73M2
GLUCOSE BLD-MCNC: 159 MG/DL (ref 70–108)
GLUCOSE BLD-MCNC: 162 MG/DL (ref 70–108)
GLUCOSE BLD-MCNC: 194 MG/DL (ref 70–108)
GLUCOSE BLD-MCNC: 214 MG/DL (ref 70–108)
GLUCOSE BLD-MCNC: 221 MG/DL (ref 70–108)
GLUCOSE BLD-MCNC: 226 MG/DL (ref 70–108)
GLUCOSE BLD-MCNC: 294 MG/DL (ref 70–108)
MAGNESIUM: 1.6 MG/DL (ref 1.6–2.4)
MAGNESIUM: 1.6 MG/DL (ref 1.6–2.4)
OSMOLALITY URINE: 487 MOSMOL/KG (ref 250–750)
POTASSIUM SERPL-SCNC: 3.9 MEQ/L (ref 3.5–5.2)
POTASSIUM SERPL-SCNC: 4.6 MEQ/L (ref 3.5–5.2)
SODIUM BLD-SCNC: 115 MEQ/L (ref 135–145)
SODIUM BLD-SCNC: 115 MEQ/L (ref 135–145)
SODIUM BLD-SCNC: 116 MEQ/L (ref 135–145)
SODIUM BLD-SCNC: 116 MEQ/L (ref 135–145)
SODIUM BLD-SCNC: 119 MEQ/L (ref 135–145)
VANCOMYCIN RESISTANT ENTEROCOCCUS: NEGATIVE

## 2020-05-20 PROCEDURE — 99233 SBSQ HOSP IP/OBS HIGH 50: CPT | Performed by: INTERNAL MEDICINE

## 2020-05-20 PROCEDURE — 80048 BASIC METABOLIC PNL TOTAL CA: CPT

## 2020-05-20 PROCEDURE — 6370000000 HC RX 637 (ALT 250 FOR IP): Performed by: NURSE PRACTITIONER

## 2020-05-20 PROCEDURE — 83735 ASSAY OF MAGNESIUM: CPT

## 2020-05-20 PROCEDURE — 87081 CULTURE SCREEN ONLY: CPT

## 2020-05-20 PROCEDURE — 2580000003 HC RX 258: Performed by: INTERNAL MEDICINE

## 2020-05-20 PROCEDURE — 2000000000 HC ICU R&B

## 2020-05-20 PROCEDURE — 6360000002 HC RX W HCPCS: Performed by: INTERNAL MEDICINE

## 2020-05-20 PROCEDURE — 36415 COLL VENOUS BLD VENIPUNCTURE: CPT

## 2020-05-20 PROCEDURE — 84295 ASSAY OF SERUM SODIUM: CPT

## 2020-05-20 PROCEDURE — 6370000000 HC RX 637 (ALT 250 FOR IP): Performed by: INTERNAL MEDICINE

## 2020-05-20 PROCEDURE — 94760 N-INVAS EAR/PLS OXIMETRY 1: CPT

## 2020-05-20 PROCEDURE — 6360000002 HC RX W HCPCS: Performed by: PHYSICIAN ASSISTANT

## 2020-05-20 PROCEDURE — 87147 CULTURE TYPE IMMUNOLOGIC: CPT

## 2020-05-20 PROCEDURE — 2700000000 HC OXYGEN THERAPY PER DAY

## 2020-05-20 PROCEDURE — 87500 VANOMYCIN DNA AMP PROBE: CPT

## 2020-05-20 PROCEDURE — 2500000003 HC RX 250 WO HCPCS: Performed by: INTERNAL MEDICINE

## 2020-05-20 PROCEDURE — 2709999900 HC NON-CHARGEABLE SUPPLY

## 2020-05-20 PROCEDURE — 2580000003 HC RX 258: Performed by: NURSE PRACTITIONER

## 2020-05-20 PROCEDURE — 82948 REAGENT STRIP/BLOOD GLUCOSE: CPT

## 2020-05-20 RX ORDER — DEXTROSE MONOHYDRATE 50 MG/ML
100 INJECTION, SOLUTION INTRAVENOUS PRN
Status: DISCONTINUED | OUTPATIENT
Start: 2020-05-20 | End: 2020-05-28 | Stop reason: HOSPADM

## 2020-05-20 RX ORDER — SODIUM CHLORIDE 9 MG/ML
INJECTION, SOLUTION INTRAVENOUS CONTINUOUS
Status: DISCONTINUED | OUTPATIENT
Start: 2020-05-20 | End: 2020-05-20 | Stop reason: ALTCHOICE

## 2020-05-20 RX ORDER — BUMETANIDE 0.25 MG/ML
1 INJECTION, SOLUTION INTRAMUSCULAR; INTRAVENOUS ONCE
Status: COMPLETED | OUTPATIENT
Start: 2020-05-20 | End: 2020-05-20

## 2020-05-20 RX ORDER — DEXTROSE MONOHYDRATE 25 G/50ML
12.5 INJECTION, SOLUTION INTRAVENOUS PRN
Status: DISCONTINUED | OUTPATIENT
Start: 2020-05-20 | End: 2020-05-28 | Stop reason: HOSPADM

## 2020-05-20 RX ORDER — SODIUM CHLORIDE 1000 MG
2 TABLET, SOLUBLE MISCELLANEOUS ONCE
Status: COMPLETED | OUTPATIENT
Start: 2020-05-20 | End: 2020-05-20

## 2020-05-20 RX ORDER — 3% SODIUM CHLORIDE 3 G/100ML
40 INJECTION, SOLUTION INTRAVENOUS CONTINUOUS
Status: DISCONTINUED | OUTPATIENT
Start: 2020-05-20 | End: 2020-05-20

## 2020-05-20 RX ORDER — NICOTINE POLACRILEX 4 MG
15 LOZENGE BUCCAL PRN
Status: DISCONTINUED | OUTPATIENT
Start: 2020-05-20 | End: 2020-05-28 | Stop reason: HOSPADM

## 2020-05-20 RX ADMIN — DILTIAZEM HYDROCHLORIDE 30 MG: 30 TABLET, FILM COATED ORAL at 13:11

## 2020-05-20 RX ADMIN — ROTIGOTINE 1 PATCH: 2 PATCH, EXTENDED RELEASE TRANSDERMAL at 08:21

## 2020-05-20 RX ADMIN — ISOSORBIDE DINITRATE 20 MG: 20 TABLET ORAL at 08:21

## 2020-05-20 RX ADMIN — METOPROLOL TARTRATE 25 MG: 25 TABLET, FILM COATED ORAL at 08:21

## 2020-05-20 RX ADMIN — APIXABAN 2.5 MG: 2.5 TABLET, FILM COATED ORAL at 05:08

## 2020-05-20 RX ADMIN — SUCRALFATE 1 G: 1 TABLET ORAL at 13:11

## 2020-05-20 RX ADMIN — SUCRALFATE 1 G: 1 TABLET ORAL at 08:21

## 2020-05-20 RX ADMIN — SODIUM CHLORIDE: 9 INJECTION, SOLUTION INTRAVENOUS at 01:36

## 2020-05-20 RX ADMIN — DILTIAZEM HYDROCHLORIDE 30 MG: 30 TABLET, FILM COATED ORAL at 05:08

## 2020-05-20 RX ADMIN — ACETAMINOPHEN 650 MG: 325 TABLET ORAL at 16:39

## 2020-05-20 RX ADMIN — ASPIRIN 81 MG: 81 TABLET ORAL at 08:21

## 2020-05-20 RX ADMIN — ISOSORBIDE DINITRATE 20 MG: 20 TABLET ORAL at 13:11

## 2020-05-20 RX ADMIN — ONDANSETRON HYDROCHLORIDE 4 MG: 2 SOLUTION INTRAMUSCULAR; INTRAVENOUS at 13:21

## 2020-05-20 RX ADMIN — CARBIDOPA AND LEVODOPA 2 TABLET: 25; 100 TABLET ORAL at 19:37

## 2020-05-20 RX ADMIN — CARBIDOPA AND LEVODOPA 2 TABLET: 25; 100 TABLET ORAL at 08:21

## 2020-05-20 RX ADMIN — BUMETANIDE 1 MG: 0.25 INJECTION INTRAMUSCULAR; INTRAVENOUS at 07:13

## 2020-05-20 RX ADMIN — ATORVASTATIN CALCIUM 20 MG: 20 TABLET, FILM COATED ORAL at 08:21

## 2020-05-20 RX ADMIN — APIXABAN 2.5 MG: 2.5 TABLET, FILM COATED ORAL at 19:37

## 2020-05-20 RX ADMIN — ISOSORBIDE DINITRATE 20 MG: 20 TABLET ORAL at 19:59

## 2020-05-20 RX ADMIN — SODIUM CHLORIDE TAB 1 GM 2 G: 1 TAB at 01:42

## 2020-05-20 RX ADMIN — INSULIN LISPRO 2 UNITS: 100 INJECTION, SOLUTION INTRAVENOUS; SUBCUTANEOUS at 19:59

## 2020-05-20 RX ADMIN — SODIUM CHLORIDE TAB 1 GM 2 G: 1 TAB at 07:32

## 2020-05-20 RX ADMIN — METOPROLOL TARTRATE 25 MG: 25 TABLET, FILM COATED ORAL at 19:58

## 2020-05-20 RX ADMIN — HYDRALAZINE HYDROCHLORIDE 50 MG: 50 TABLET, FILM COATED ORAL at 13:11

## 2020-05-20 RX ADMIN — SODIUM CHLORIDE 40 ML/HR: 3 INJECTION, SOLUTION INTRAVENOUS at 13:06

## 2020-05-20 RX ADMIN — HYDRALAZINE HYDROCHLORIDE 10 MG: 20 INJECTION, SOLUTION INTRAMUSCULAR; INTRAVENOUS at 06:18

## 2020-05-20 RX ADMIN — CARBIDOPA AND LEVODOPA 2 TABLET: 25; 100 TABLET ORAL at 13:11

## 2020-05-20 RX ADMIN — HYDRALAZINE HYDROCHLORIDE 50 MG: 50 TABLET, FILM COATED ORAL at 05:08

## 2020-05-20 RX ADMIN — ACETAMINOPHEN 650 MG: 325 TABLET ORAL at 07:17

## 2020-05-20 RX ADMIN — Medication 10 ML: at 08:22

## 2020-05-20 RX ADMIN — Medication 10 ML: at 19:59

## 2020-05-20 RX ADMIN — PANTOPRAZOLE SODIUM 40 MG: 40 TABLET, DELAYED RELEASE ORAL at 05:08

## 2020-05-20 RX ADMIN — SUCRALFATE 1 G: 1 TABLET ORAL at 19:37

## 2020-05-20 ASSESSMENT — PAIN DESCRIPTION - LOCATION
LOCATION: HEAD
LOCATION: HEAD

## 2020-05-20 ASSESSMENT — PAIN DESCRIPTION - PAIN TYPE
TYPE: ACUTE PAIN
TYPE: ACUTE PAIN

## 2020-05-20 ASSESSMENT — PAIN SCALES - GENERAL
PAINLEVEL_OUTOF10: 2
PAINLEVEL_OUTOF10: 0
PAINLEVEL_OUTOF10: 8
PAINLEVEL_OUTOF10: 0
PAINLEVEL_OUTOF10: 3

## 2020-05-20 ASSESSMENT — PAIN DESCRIPTION - DESCRIPTORS
DESCRIPTORS: ACHING
DESCRIPTORS: ACHING

## 2020-05-20 ASSESSMENT — PAIN DESCRIPTION - ONSET: ONSET: GRADUAL

## 2020-05-20 ASSESSMENT — PAIN DESCRIPTION - PROGRESSION: CLINICAL_PROGRESSION: GRADUALLY IMPROVING

## 2020-05-20 ASSESSMENT — PAIN DESCRIPTION - FREQUENCY: FREQUENCY: CONTINUOUS

## 2020-05-20 NOTE — PROGRESS NOTES
compliance and age related. Start Isordil and increase Hydralazine. Low Na diet. 6. Parkinson disease: cont Sinemet   7. CAD s/p CABG: Continue ASA, Statin  8. Chronic Atrial Fib: rate controlled, on Eliquis. Monitor on telemetry. 9. Type 2 DM: Humalog sliding scale. Diabetic Diet. Accu-checks      Dispo: will sent to ICU for 3% NSS, will bring back to floor once done         Chief Complaint: sob, increased LES     Hospital Course: 26-year-old lady with past medical history of CAD status post CABG, A. fib, diabetes, CHF came to ER due to shortness of breath, and lower extremity swelling. Patient states this is been ongoing for several days. She apparently ran out of her Lasix few days ago and have time to get refill. She did call her PCP yesterday but was not able to get the prescription picked up on time. She states that with ambulation her shortness of breath is worsened. She also noticed more swelling of her legs. Denies any chest pain, fevers, chills. No nausea, vomiting, diarrhea. She does feel weak and more sleepy.     In ER, patient's blood pressure was elevated. Chest x-ray shows pulmonary vascular congestion, small to moderate left pleural effusion consistent with CHF. Patient's BNP was elevated as well. Patient was given IV Lasix and admitted.       Subjective: no acute events overnight. No fevers or chills. Pt still sob. No chest pain. No diarrhea. Mental status on and off.        Medications:  Reviewed    Infusion Medications    sodium chloride       Scheduled Medications    hydrALAZINE  50 mg Oral 3 times per day    isosorbide dinitrate  20 mg Oral TID    metoprolol tartrate  25 mg Oral BID    [Held by provider] furosemide  40 mg Oral Daily    apixaban  2.5 mg Oral BID    aspirin  81 mg Oral Daily    atorvastatin  20 mg Oral Daily    carbidopa-levodopa  2 tablet Oral 4x Daily    dilTIAZem  30 mg Oral 3 times per day    rotigotine  1 patch Transdermal Daily    pantoprazole  40

## 2020-05-20 NOTE — PROGRESS NOTES
[facility]    Renal inpatient Progress Note  5/20/2020 10:24 AM      Pt Name:   Marlen Tomlin  YOB: 1932  Attending:   Samuel Avila MD    Chief Complaint:   Marlen Tomlin is a 80 y.o. female being followed by nephrology for hyponatremia and CHF     Interval History : patient seen and examined by me. Feels ok. Denies any CP/ SOB is better. Mild leg swelling still noted   Received several doses of salt tabs and also NSS without much benefit. Na trending down still. She was lightly SOB this AM and so given a dose of bumex .  Has 200 ml UO since the bumex     Scheduled Medications :   hydrALAZINE  50 mg Oral 3 times per day    isosorbide dinitrate  20 mg Oral TID    metoprolol tartrate  25 mg Oral BID    [Held by provider] furosemide  40 mg Oral Daily    apixaban  2.5 mg Oral BID    aspirin  81 mg Oral Daily    atorvastatin  20 mg Oral Daily    carbidopa-levodopa  2 tablet Oral 4x Daily    dilTIAZem  30 mg Oral 3 times per day    rotigotine  1 patch Transdermal Daily    pantoprazole  40 mg Oral QAM AC    sucralfate  1 g Oral 4x Daily    magnesium replacement protocol   Other RX Placeholder    potassium replacement protocol   Other RX Placeholder    insulin lispro  0-6 Units Subcutaneous TID WC    insulin lispro  0-3 Units Subcutaneous Nightly    sodium chloride flush  10 mL Intravenous 2 times per day          Vitals :  BP (!) 156/75 Comment: Manual  Pulse 76   Temp 97.6 °F (36.4 °C) (Oral)   Resp 22   Ht 5' 2\" (1.575 m)   Wt 133 lb 6.4 oz (60.5 kg)   SpO2 94%   BMI 24.40 kg/m²     24HR INTAKE/OUTPUT:      Intake/Output Summary (Last 24 hours) at 5/20/2020 1024  Last data filed at 5/20/2020 0615  Gross per 24 hour   Intake 859.4 ml   Output 375 ml   Net 484.4 ml     Last 3 weights  Wt Readings from Last 3 Encounters:   05/20/20 133 lb 6.4 oz (60.5 kg)   01/07/20 123 lb (55.8 kg)   12/28/19 123 lb (55.8 kg)        Physical Exam :  General Appearance:  Well

## 2020-05-20 NOTE — PROGRESS NOTES
0945: To unit to see pt and discussed with Dr. Sherin Van. He states he plans to assess pt and speak to family this am and Palliative Care to follow up later today. Will await input of his discussion with family about plan/goals of care.

## 2020-05-20 NOTE — PROGRESS NOTES
[facility]  OCCUPATIONAL THERAPY MISSED TREATMENT NOTE  [facility] RENAL TELEMETRY 6K  6K-07/007-A      Date: 2020  Patient Name: Alberto Castaneda        CSN: [id number]   : 1932  (80 y.o.)  Gender: female   Referring Practitioner: Dr. Atiya Pritchett  Diagnosis: systolic CHF          REASON FOR MISSED TREATMENT: Hold OT treatment at this tyime per RN- stating pt with increased SOB and confusion this AM. Will attempt as pt appropriate.

## 2020-05-21 ENCOUNTER — TELEPHONE (OUTPATIENT)
Dept: CARDIOLOGY CLINIC | Age: 85
End: 2020-05-21

## 2020-05-21 LAB
ANION GAP SERPL CALCULATED.3IONS-SCNC: 10 MEQ/L (ref 8–16)
BUN BLDV-MCNC: 12 MG/DL (ref 7–22)
CALCIUM SERPL-MCNC: 8.9 MG/DL (ref 8.5–10.5)
CHLORIDE BLD-SCNC: 86 MEQ/L (ref 98–111)
CO2: 24 MEQ/L (ref 23–33)
CREAT SERPL-MCNC: 0.6 MG/DL (ref 0.4–1.2)
GFR SERPL CREATININE-BSD FRML MDRD: > 90 ML/MIN/1.73M2
GLUCOSE BLD-MCNC: 153 MG/DL (ref 70–108)
GLUCOSE BLD-MCNC: 165 MG/DL (ref 70–108)
GLUCOSE BLD-MCNC: 202 MG/DL (ref 70–108)
GLUCOSE BLD-MCNC: 225 MG/DL (ref 70–108)
GLUCOSE BLD-MCNC: 237 MG/DL (ref 70–108)
MAGNESIUM: 1.4 MG/DL (ref 1.6–2.4)
POTASSIUM SERPL-SCNC: 3.9 MEQ/L (ref 3.5–5.2)
SODIUM BLD-SCNC: 119 MEQ/L (ref 135–145)
SODIUM BLD-SCNC: 120 MEQ/L (ref 135–145)
SODIUM BLD-SCNC: 120 MEQ/L (ref 135–145)
SODIUM BLD-SCNC: 121 MEQ/L (ref 135–145)
SODIUM BLD-SCNC: 122 MEQ/L (ref 135–145)
SODIUM BLD-SCNC: 122 MEQ/L (ref 135–145)
SODIUM BLD-SCNC: 123 MEQ/L (ref 135–145)
SODIUM BLD-SCNC: 124 MEQ/L (ref 135–145)

## 2020-05-21 PROCEDURE — 6370000000 HC RX 637 (ALT 250 FOR IP): Performed by: INTERNAL MEDICINE

## 2020-05-21 PROCEDURE — 2709999900 HC NON-CHARGEABLE SUPPLY

## 2020-05-21 PROCEDURE — 97535 SELF CARE MNGMENT TRAINING: CPT

## 2020-05-21 PROCEDURE — 84295 ASSAY OF SERUM SODIUM: CPT

## 2020-05-21 PROCEDURE — 2580000003 HC RX 258: Performed by: INTERNAL MEDICINE

## 2020-05-21 PROCEDURE — 36415 COLL VENOUS BLD VENIPUNCTURE: CPT

## 2020-05-21 PROCEDURE — 99233 SBSQ HOSP IP/OBS HIGH 50: CPT | Performed by: INTERNAL MEDICINE

## 2020-05-21 PROCEDURE — 6370000000 HC RX 637 (ALT 250 FOR IP): Performed by: NURSE PRACTITIONER

## 2020-05-21 PROCEDURE — 97530 THERAPEUTIC ACTIVITIES: CPT

## 2020-05-21 PROCEDURE — 82948 REAGENT STRIP/BLOOD GLUCOSE: CPT

## 2020-05-21 PROCEDURE — 2000000000 HC ICU R&B

## 2020-05-21 PROCEDURE — 6360000002 HC RX W HCPCS: Performed by: INTERNAL MEDICINE

## 2020-05-21 PROCEDURE — 99232 SBSQ HOSP IP/OBS MODERATE 35: CPT | Performed by: NURSE PRACTITIONER

## 2020-05-21 PROCEDURE — APPSS180 APP SPLIT SHARED TIME > 60 MINUTES: Performed by: NURSE PRACTITIONER

## 2020-05-21 PROCEDURE — 99223 1ST HOSP IP/OBS HIGH 75: CPT | Performed by: INTERNAL MEDICINE

## 2020-05-21 PROCEDURE — 94760 N-INVAS EAR/PLS OXIMETRY 1: CPT

## 2020-05-21 PROCEDURE — 83735 ASSAY OF MAGNESIUM: CPT

## 2020-05-21 PROCEDURE — 80048 BASIC METABOLIC PNL TOTAL CA: CPT

## 2020-05-21 RX ORDER — MAGNESIUM SULFATE IN WATER 40 MG/ML
2 INJECTION, SOLUTION INTRAVENOUS ONCE
Status: COMPLETED | OUTPATIENT
Start: 2020-05-21 | End: 2020-05-21

## 2020-05-21 RX ORDER — 3% SODIUM CHLORIDE 3 G/100ML
40 INJECTION, SOLUTION INTRAVENOUS CONTINUOUS
Status: DISCONTINUED | OUTPATIENT
Start: 2020-05-21 | End: 2020-05-22

## 2020-05-21 RX ORDER — HYDRALAZINE HYDROCHLORIDE 25 MG/1
25 TABLET, FILM COATED ORAL EVERY 8 HOURS SCHEDULED
Status: DISCONTINUED | OUTPATIENT
Start: 2020-05-21 | End: 2020-05-28

## 2020-05-21 RX ADMIN — SUCRALFATE 1 G: 1 TABLET ORAL at 12:54

## 2020-05-21 RX ADMIN — APIXABAN 2.5 MG: 2.5 TABLET, FILM COATED ORAL at 05:39

## 2020-05-21 RX ADMIN — DILTIAZEM HYDROCHLORIDE 30 MG: 30 TABLET, FILM COATED ORAL at 15:30

## 2020-05-21 RX ADMIN — SUCRALFATE 1 G: 1 TABLET ORAL at 20:22

## 2020-05-21 RX ADMIN — SUCRALFATE 1 G: 1 TABLET ORAL at 08:53

## 2020-05-21 RX ADMIN — SUCRALFATE 1 G: 1 TABLET ORAL at 17:30

## 2020-05-21 RX ADMIN — ISOSORBIDE DINITRATE 20 MG: 20 TABLET ORAL at 08:53

## 2020-05-21 RX ADMIN — ISOSORBIDE DINITRATE 20 MG: 20 TABLET ORAL at 15:29

## 2020-05-21 RX ADMIN — PANTOPRAZOLE SODIUM 40 MG: 40 TABLET, DELAYED RELEASE ORAL at 05:39

## 2020-05-21 RX ADMIN — MAGNESIUM SULFATE HEPTAHYDRATE 2 G: 40 INJECTION, SOLUTION INTRAVENOUS at 05:40

## 2020-05-21 RX ADMIN — ISOSORBIDE DINITRATE 20 MG: 20 TABLET ORAL at 20:27

## 2020-05-21 RX ADMIN — HYDRALAZINE HYDROCHLORIDE 25 MG: 50 TABLET, FILM COATED ORAL at 15:29

## 2020-05-21 RX ADMIN — INSULIN LISPRO 1 UNITS: 100 INJECTION, SOLUTION INTRAVENOUS; SUBCUTANEOUS at 20:22

## 2020-05-21 RX ADMIN — ASPIRIN 81 MG: 81 TABLET ORAL at 08:53

## 2020-05-21 RX ADMIN — HYDRALAZINE HYDROCHLORIDE 25 MG: 50 TABLET, FILM COATED ORAL at 22:48

## 2020-05-21 RX ADMIN — CARBIDOPA AND LEVODOPA 2 TABLET: 25; 100 TABLET ORAL at 12:54

## 2020-05-21 RX ADMIN — ATORVASTATIN CALCIUM 20 MG: 20 TABLET, FILM COATED ORAL at 08:53

## 2020-05-21 RX ADMIN — HYDRALAZINE HYDROCHLORIDE 5 MG: 20 INJECTION, SOLUTION INTRAMUSCULAR; INTRAVENOUS at 04:09

## 2020-05-21 RX ADMIN — METOPROLOL TARTRATE 25 MG: 25 TABLET, FILM COATED ORAL at 08:53

## 2020-05-21 RX ADMIN — HYDRALAZINE HYDROCHLORIDE 5 MG: 20 INJECTION, SOLUTION INTRAMUSCULAR; INTRAVENOUS at 02:45

## 2020-05-21 RX ADMIN — APIXABAN 2.5 MG: 2.5 TABLET, FILM COATED ORAL at 17:30

## 2020-05-21 RX ADMIN — CARBIDOPA AND LEVODOPA 2 TABLET: 25; 100 TABLET ORAL at 08:53

## 2020-05-21 RX ADMIN — Medication 10 ML: at 08:55

## 2020-05-21 RX ADMIN — DILTIAZEM HYDROCHLORIDE 30 MG: 30 TABLET, FILM COATED ORAL at 05:41

## 2020-05-21 RX ADMIN — HYDRALAZINE HYDROCHLORIDE 50 MG: 50 TABLET, FILM COATED ORAL at 05:42

## 2020-05-21 RX ADMIN — CARBIDOPA AND LEVODOPA 2 TABLET: 25; 100 TABLET ORAL at 17:30

## 2020-05-21 RX ADMIN — CARBIDOPA AND LEVODOPA 2 TABLET: 25; 100 TABLET ORAL at 20:22

## 2020-05-21 RX ADMIN — SODIUM CHLORIDE 25 ML/HR: 3 INJECTION, SOLUTION INTRAVENOUS at 10:27

## 2020-05-21 ASSESSMENT — ENCOUNTER SYMPTOMS
TROUBLE SWALLOWING: 0
NAUSEA: 0
SORE THROAT: 0
CHEST TIGHTNESS: 0
WHEEZING: 0
COLOR CHANGE: 0
SHORTNESS OF BREATH: 0
VOMITING: 0
ABDOMINAL PAIN: 0
ANAL BLEEDING: 0
PHOTOPHOBIA: 0

## 2020-05-21 ASSESSMENT — PAIN SCALES - GENERAL
PAINLEVEL_OUTOF10: 0

## 2020-05-21 NOTE — PROGRESS NOTES
Case discussed with cardiology ELIZABETH Bennett and Dr. Italo Cummings. Dr. Italo Cummings will be updating patient's daughter today and would like palliative care to follow up with her tomorrow. Primary RN Sagar Marin updated.

## 2020-05-21 NOTE — CONSULTS
admitted to medical floor. [address] [place] [address] cardiology was consulted. Continued with diuresis. Possible need for thoracentesis. Free water was restricted. 5/18 she continued on 1 L nasal cannula. She had diuresed. 5/19 nephrology was consulted for hyponatremia and fluid overload. Diuretics were held and patient was placed on a 1200 mL fluid restriction and given salt tabs and IV fluids. She did have a significant decline in her sodium 11 M EQ's in 48 hours. On 5/20 she was sent to the ICU for 3% saline. 5/21 3% saline was continued. Patient did have improvement of sodium, goal sodium is 125. Patient will remain in ICU until reach that goal.    Past Medical History: Per HPI  Family History: Mother: Heart disease, diabetes Father: Stroke  Social History: Lifetime non-smoker, denies EtOH use, denies drug use. Review of Systems   Constitutional: Positive for fatigue. Negative for fever. HENT: Negative for sore throat and trouble swallowing. Eyes: Negative for photophobia and visual disturbance. Respiratory: Negative for chest tightness, shortness of breath and wheezing. Cardiovascular: Positive for leg swelling (+ 1). Negative for chest pain. Gastrointestinal: Negative for abdominal pain, anal bleeding, nausea and vomiting. Endocrine: Negative for polydipsia and polyphagia. Genitourinary: Negative for decreased urine volume and flank pain. Musculoskeletal: Negative for gait problem and neck pain. Skin: Negative for color change, pallor and rash. Allergic/Immunologic: Negative for food allergies and immunocompromised state. Neurological: Positive for weakness. Negative for dizziness and numbness. Hematological: Negative for adenopathy. Psychiatric/Behavioral: Negative for agitation and confusion. The patient is not nervous/anxious.             Scheduled Meds:   hydrALAZINE  50 mg Oral 3 times per day    isosorbide dinitrate  20 mg Oral TID    metoprolol tartrate  25 mg Oral BID  [Held by provider] furosemide  40 mg Oral Daily    apixaban  2.5 mg Oral BID    aspirin  81 mg Oral Daily    atorvastatin  20 mg Oral Daily    carbidopa-levodopa  2 tablet Oral 4x Daily    dilTIAZem  30 mg Oral 3 times per day    pantoprazole  40 mg Oral QAM AC    sucralfate  1 g Oral 4x Daily    magnesium replacement protocol   Other RX Placeholder    potassium replacement protocol   Other RX Placeholder    insulin lispro  0-6 Units Subcutaneous TID WC    insulin lispro  0-3 Units Subcutaneous Nightly    sodium chloride flush  10 mL Intravenous 2 times per day     Continuous Infusions:   sodium chloride 25 mL/hr (05/21/20 1027)    dextrose         PHYSICAL EXAMINATION:  T:  97.9.  P:  57. RR:  20. B/P:  98/34. FiO2:  1. O2 Sat:  92.  I/O:  365/450  Body mass index is 23.67 kg/m². General:   Acute on chronically ill-appearing white female  HEENT:  normocephalic and atraumatic. No scleral icterus. PERR  Neck: supple. No Thyromegaly. Lungs: clear, diminished to auscultation. No retractions  Cardiac: atrial fib. No JVD. Abdomen: soft. Nontender. Extremities:  No clubbing, cyanosis. Trace edema bilateral lower extremities   Vasculature: capillary refill < 3 seconds. Palpable dorsalis pedis pulses. Skin:  warm and dry. Psych:  Alert and oriented x3. Affect appropriate  Lymph:  No supraclavicular adenopathy. Neurologic:  No focal deficit. No seizures. Data: (All radiographs, tracings, PFTs, and imaging are personally viewed and interpreted unless otherwise noted).  Sodium 121, potassium 3.9, chloride 86, CO2 24, BUN 12, creatinine 0.6, anion gap 10.0, magnesium 1.4.   WBC 7.5, hemoglobin 11.0, hematocrit 32.4, platelet count 936.  Telemetry shows   Chest x-ray 5/19/2020 reports mild cardiomegaly, tiny effusion right side. Small effusion left side. Moderate bibasilar atelectasis.    Echocardiogram 5/18/2020 reports left ventricular systolic function normal.  Ejection fraction 55 to 60%. Moderately dilated left atrium, mildly dilated right ventricle. Severe pulmonary hypertension. Meets Continued ICU Level Care Criteria:    [x] Yes   [] No - Transfer Planned to listed location:  [] HOSPITALIST CONTACTED-      Case and plan discussed with Dr. Beth Staley and Dr. Dread Gallego. Electronically signed by Usha Ace. ROWDY [facility]  CRITICAL CARE SPECIALIST  Patient seen by me. Case discussed with nurse practitioner. Case discussed with Dr. Dread Gallego. On 5/21/2020, I discussed the case with the patient's daughter. We reviewed her circumstances including baseline functional capacity and baseline function of her Parkinson's disease. Family recognizes that the patient is in a declining state related to age and chronic disease status. We reviewed parameters of care. They do not want to pursue aggressive or invasive work-ups. In basic medical conditions is preferred. Intermatic relief is also appropriate. Prolonged hospitalizations are not. The goal is to keep the patient out of the hospital and at home for as long as possible. They do not desire to have an invasive work-up including cardiac work-up or pulmonary work-up. 3 with palliative care consultation further define parameters of care. Patient made a DNR CC with some clarification. Electronically signed by Montrell Campos Junior, MD.

## 2020-05-21 NOTE — CARE COORDINATION
5/21/20, 2:25 PM EDT    DISCHARGE ON GOING EVALUATION    [facility] day: 5  Location: -17/017-A Reason for admit: Systolic CHF, acute on chronic [facility]) [I50.23]   Treatment Plan of Care: Transferred to ICU on 5/20 for management of 3% saline for hyponatremia. Q2H Na+ levels. Afebrile. Afib 60's. On room air. Ox4. Follows commands. Intensivist, Nephrology, and Cardiology following. Palliative Care consulted. PT/OT. Telemetry, I&O, daily weight, external catheter care. 3% saline @ 25 ml/hr, eliquis, asa, lipitor, sinemet, cardizem, prn IV hydralazine, hydralazine, SSI ACHS, isordil, lopressor, protonix, prn zofran, carafate, Electrolyte replacement protocols. Na+ 123, mg 1.4  Barriers to Discharge: not medically ready  PCP: Cheli Almanzar MD  Readmission Risk Score: 16%  Patient Goals/Plan/Treatment Preferences: Return to [facility]. SW on case. Continue to monitor PT/OT evals.

## 2020-05-21 NOTE — PROGRESS NOTES
[facility]    Renal inpatient Progress Note  5/21/2020 10:02 AM      Pt Name:   Sergey Bhandari  YOB: 1932  Attending:   Jeannette Meigs, MD    Chief Complaint:   Sergey Bhandari is a 80 y.o. female being followed by nephrology for hyponatremia and CHF     Interval History : patient seen and examined by me. Feels ok. Denies any CP/ SOB is better. No significant edema noted. Scheduled Medications :   hydrALAZINE  50 mg Oral 3 times per day    isosorbide dinitrate  20 mg Oral TID    metoprolol tartrate  25 mg Oral BID    [Held by provider] furosemide  40 mg Oral Daily    apixaban  2.5 mg Oral BID    aspirin  81 mg Oral Daily    atorvastatin  20 mg Oral Daily    carbidopa-levodopa  2 tablet Oral 4x Daily    dilTIAZem  30 mg Oral 3 times per day    pantoprazole  40 mg Oral QAM AC    sucralfate  1 g Oral 4x Daily    magnesium replacement protocol   Other RX Placeholder    potassium replacement protocol   Other RX Placeholder    insulin lispro  0-6 Units Subcutaneous TID WC    insulin lispro  0-3 Units Subcutaneous Nightly    sodium chloride flush  10 mL Intravenous 2 times per day      sodium chloride      dextrose          Vitals :  BP (!) 143/51   Pulse 64   Temp 97.9 °F (36.6 °C) (Oral)   Resp 22   Ht 5' 2\" (1.575 m)   Wt 129 lb 6.6 oz (58.7 kg)   SpO2 94%   BMI 23.67 kg/m²     24HR INTAKE/OUTPUT:      Intake/Output Summary (Last 24 hours) at 5/21/2020 1002  Last data filed at 5/21/2020 0853  Gross per 24 hour   Intake 375 ml   Output 450 ml   Net -75 ml     Last 3 weights  Wt Readings from Last 3 Encounters:   05/21/20 129 lb 6.6 oz (58.7 kg)   01/07/20 123 lb (55.8 kg)   12/28/19 123 lb (55.8 kg)        Physical Exam :  General Appearance:  Well developed.  No distress  Mouth/Throat:  Oral mucosa moist  Neck:  Supple, no JVD  Lungs:  Breath sounds: clear  Heart[de-identified]  S1,S2 heard  Abdomen:  Soft, non - tender  Musculoskeletal:  Edema -not significant     Last

## 2020-05-21 NOTE — TELEPHONE ENCOUNTER
Sally Grewal To  [facility] Clinical Support Pool Sent  5/21/2020  9:24 AM   My mom has been in the hospital since last Saturday, 16th. She is alone and is very confused as her Sodium level is 116 and she is in ICU. Is there anyway if your in the hospital can you pop your smiley face in to say HI? Thanks if not.      Adolfo Anaya, the one from [place]! lol

## 2020-05-21 NOTE — PROGRESS NOTES
mg, PRN  dextrose, 100 mL/hr, PRN  hydrALAZINE, 10 mg, Q6H PRN  hydrALAZINE, 5 mg, Q6H PRN  sodium chloride flush, 10 mL, PRN  acetaminophen, 650 mg, Q6H PRN    Or  acetaminophen, 650 mg, Q6H PRN  polyethylene glycol, 17 g, Daily PRN  promethazine, 12.5 mg, Q6H PRN    Or  ondansetron, 4 mg, Q6H PRN  perflutren lipid microspheres, 1.5 mL, ONCE PRN        Diagnostics:  EKG:    AFB / LBBB    Echo:   Electronically signed by Queenie Flynn MD (Interpreting   physician) on 05/18/2020 at 04:53 PM   ----------------------------------------------------------------      Findings      Mitral Valve   Calcification of the mitral valve noted. DOPPLER: The transmitral velocity was within the normal range with no   evidence for mitral stenosis. Mild to Moderate mitral regurgitation is present. Aortic Valve   Mildly calcified and thickened aortic valve. The aortic valve leaflets were not well visualized. DOPPLER: Transaortic velocity was within the normal range with no evidence   of aortic stenosis. There was no evidence of aortic regurgitation. Tricuspid Valve   The tricuspid valve structure is normal with normal leaflet separation. DOPPLER: There is no evidence of tricuspid stenosis. Moderate tricuspid regurgitation. Pulmonic Valve   The pulmonic valve was not well visualized . Left Atrium   Moderately dilated left atrium. Left Ventricle   Left ventricular size and systolic function is normal. Ejection fraction   was estimated at 55-60%. LV wall thickness is within normal limits. Right Atrium   Right atrial size was normal.      Right Ventricle   Mildly dilated right ventricle. Right ventricular systolic pressure of 37-78 mm Hg consistent with severe   pulmonary hypertension. Pericardial Effusion   The pericardium appears normal with no evidence of a pericardial effusion. Pleural Effusion   Left pleural effusion.       Aorta / Great Vessels   -Aortic root dimension within normal limits. -IVC size is within normal limits with normal respiratory phasic changes. Electronically signed by Drake Sanchez MD (Interpreting   physician) on 10/15/2018 at 02:04 PM   ----------------------------------------------------------------      Findings      Mitral Valve   The mitral valve structure was normal with normal leaflet separation. DOPPLER: The transmitral velocity was within the normal range with no   evidence for mitral stenosis. There was no evidence of mitral   regurgitation. Aortic Valve   The aortic valve leaflets were not well visualized. Tricuspid Valve   The tricuspid valve structure was normal with normal leaflet separation. DOPPLER: There was no evidence of tricuspid stenosis. There was evidence   of moderate tricuspid regurgitation. Pulmonic Valve   The pulmonic valve leaflets exhibited normal thickness, no calcification,   and normal cuspal separation. Evidence for moderate pulmonic   regurgitation. Left Atrium   Left atrial size was severely dilated. Left Ventricle   Normal left ventricle size and systolic function. Ejection fraction was   estimated at 60%. There were no regional left ventricular wall motion   abnormalities and wall thickness was within normal limits. Right Atrium   Right atrial size was moderately dilated. Right Ventricle   The right ventricular size was normal with normal systolic function and   wall thickness. Pericardial Effusion   The pericardium was normal in appearance with no evidence of a pericardial   effusion. Pleural Effusion   No evidence of pleural effusion. Aorta / Great Vessels   -Aortic root dimension within normal limits.   -The Pulmonary artery is within normal limits. -IVC size is within normal limits with normal respiratory phasic changes. Stress:   Conclusions      Summary   Lexiscan EKG stress test is not suggestive for ischemia. Calculated gated LVEF 66 %.

## 2020-05-22 LAB
ANION GAP SERPL CALCULATED.3IONS-SCNC: 9 MEQ/L (ref 8–16)
BUN BLDV-MCNC: 16 MG/DL (ref 7–22)
CALCIUM SERPL-MCNC: 8.8 MG/DL (ref 8.5–10.5)
CHLORIDE BLD-SCNC: 91 MEQ/L (ref 98–111)
CHLORIDE, URINE: 55 MEQ/L
CO2: 23 MEQ/L (ref 23–33)
CREAT SERPL-MCNC: 0.6 MG/DL (ref 0.4–1.2)
ERYTHROCYTE [DISTWIDTH] IN BLOOD BY AUTOMATED COUNT: 13.3 % (ref 11.5–14.5)
ERYTHROCYTE [DISTWIDTH] IN BLOOD BY AUTOMATED COUNT: 43.2 FL (ref 35–45)
GFR SERPL CREATININE-BSD FRML MDRD: > 90 ML/MIN/1.73M2
GLUCOSE BLD-MCNC: 171 MG/DL (ref 70–108)
GLUCOSE BLD-MCNC: 185 MG/DL (ref 70–108)
GLUCOSE BLD-MCNC: 215 MG/DL (ref 70–108)
GLUCOSE BLD-MCNC: 289 MG/DL (ref 70–108)
HCT VFR BLD CALC: 33.1 % (ref 37–47)
HEMOGLOBIN: 10.6 GM/DL (ref 12–16)
MAGNESIUM: 1.9 MG/DL (ref 1.6–2.4)
MCH RBC QN AUTO: 28.3 PG (ref 26–33)
MCHC RBC AUTO-ENTMCNC: 32 GM/DL (ref 32.2–35.5)
MCV RBC AUTO: 88.3 FL (ref 81–99)
MRSA SCREEN: NORMAL
OSMOLALITY URINE: 429 MOSMOL/KG (ref 250–750)
OSMOLALITY: 270 MOSMOL/KG (ref 275–295)
PLATELET # BLD: 240 THOU/MM3 (ref 130–400)
PMV BLD AUTO: 8.9 FL (ref 9.4–12.4)
POTASSIUM SERPL-SCNC: 4.4 MEQ/L (ref 3.5–5.2)
RBC # BLD: 3.75 MILL/MM3 (ref 4.2–5.4)
SODIUM BLD-SCNC: 120 MEQ/L (ref 135–145)
SODIUM BLD-SCNC: 123 MEQ/L (ref 135–145)
SODIUM BLD-SCNC: 123 MEQ/L (ref 135–145)
SODIUM BLD-SCNC: 124 MEQ/L (ref 135–145)
SODIUM BLD-SCNC: 124 MEQ/L (ref 135–145)
SODIUM URINE: 39 MEQ/L
WBC # BLD: 7.9 THOU/MM3 (ref 4.8–10.8)

## 2020-05-22 PROCEDURE — 6360000002 HC RX W HCPCS: Performed by: PHYSICIAN ASSISTANT

## 2020-05-22 PROCEDURE — 97110 THERAPEUTIC EXERCISES: CPT

## 2020-05-22 PROCEDURE — 83935 ASSAY OF URINE OSMOLALITY: CPT

## 2020-05-22 PROCEDURE — 6370000000 HC RX 637 (ALT 250 FOR IP): Performed by: NURSE PRACTITIONER

## 2020-05-22 PROCEDURE — 6370000000 HC RX 637 (ALT 250 FOR IP): Performed by: INTERNAL MEDICINE

## 2020-05-22 PROCEDURE — 94761 N-INVAS EAR/PLS OXIMETRY MLT: CPT

## 2020-05-22 PROCEDURE — 83735 ASSAY OF MAGNESIUM: CPT

## 2020-05-22 PROCEDURE — 80048 BASIC METABOLIC PNL TOTAL CA: CPT

## 2020-05-22 PROCEDURE — 85027 COMPLETE CBC AUTOMATED: CPT

## 2020-05-22 PROCEDURE — 99233 SBSQ HOSP IP/OBS HIGH 50: CPT | Performed by: INTERNAL MEDICINE

## 2020-05-22 PROCEDURE — 84300 ASSAY OF URINE SODIUM: CPT

## 2020-05-22 PROCEDURE — 36415 COLL VENOUS BLD VENIPUNCTURE: CPT

## 2020-05-22 PROCEDURE — 2580000003 HC RX 258: Performed by: INTERNAL MEDICINE

## 2020-05-22 PROCEDURE — 99232 SBSQ HOSP IP/OBS MODERATE 35: CPT | Performed by: NURSE PRACTITIONER

## 2020-05-22 PROCEDURE — 1200000000 HC SEMI PRIVATE

## 2020-05-22 PROCEDURE — 99232 SBSQ HOSP IP/OBS MODERATE 35: CPT | Performed by: INTERNAL MEDICINE

## 2020-05-22 PROCEDURE — 2700000000 HC OXYGEN THERAPY PER DAY

## 2020-05-22 PROCEDURE — 83930 ASSAY OF BLOOD OSMOLALITY: CPT

## 2020-05-22 PROCEDURE — 82948 REAGENT STRIP/BLOOD GLUCOSE: CPT

## 2020-05-22 PROCEDURE — 97116 GAIT TRAINING THERAPY: CPT

## 2020-05-22 PROCEDURE — 84295 ASSAY OF SERUM SODIUM: CPT

## 2020-05-22 PROCEDURE — 1200000003 HC TELEMETRY R&B

## 2020-05-22 PROCEDURE — 6360000002 HC RX W HCPCS: Performed by: INTERNAL MEDICINE

## 2020-05-22 PROCEDURE — 94760 N-INVAS EAR/PLS OXIMETRY 1: CPT

## 2020-05-22 PROCEDURE — 82436 ASSAY OF URINE CHLORIDE: CPT

## 2020-05-22 RX ORDER — BUMETANIDE 1 MG/1
2 TABLET ORAL ONCE
Status: COMPLETED | OUTPATIENT
Start: 2020-05-22 | End: 2020-05-22

## 2020-05-22 RX ORDER — SODIUM CHLORIDE 1000 MG
2 TABLET, SOLUBLE MISCELLANEOUS
Status: DISCONTINUED | OUTPATIENT
Start: 2020-05-22 | End: 2020-05-23

## 2020-05-22 RX ORDER — SODIUM CHLORIDE 1000 MG
2 TABLET, SOLUBLE MISCELLANEOUS ONCE
Status: COMPLETED | OUTPATIENT
Start: 2020-05-22 | End: 2020-05-22

## 2020-05-22 RX ADMIN — SUCRALFATE 1 G: 1 TABLET ORAL at 14:58

## 2020-05-22 RX ADMIN — ASPIRIN 81 MG: 81 TABLET ORAL at 09:17

## 2020-05-22 RX ADMIN — DILTIAZEM HYDROCHLORIDE 30 MG: 30 TABLET, FILM COATED ORAL at 14:58

## 2020-05-22 RX ADMIN — DILTIAZEM HYDROCHLORIDE 30 MG: 30 TABLET, FILM COATED ORAL at 05:03

## 2020-05-22 RX ADMIN — CARBIDOPA AND LEVODOPA 2 TABLET: 25; 100 TABLET ORAL at 17:49

## 2020-05-22 RX ADMIN — SUCRALFATE 1 G: 1 TABLET ORAL at 17:49

## 2020-05-22 RX ADMIN — Medication 10 ML: at 09:16

## 2020-05-22 RX ADMIN — SUCRALFATE 1 G: 1 TABLET ORAL at 09:16

## 2020-05-22 RX ADMIN — PANTOPRAZOLE SODIUM 40 MG: 40 TABLET, DELAYED RELEASE ORAL at 09:16

## 2020-05-22 RX ADMIN — HYDRALAZINE HYDROCHLORIDE 5 MG: 20 INJECTION, SOLUTION INTRAMUSCULAR; INTRAVENOUS at 07:15

## 2020-05-22 RX ADMIN — ISOSORBIDE DINITRATE 20 MG: 20 TABLET ORAL at 14:58

## 2020-05-22 RX ADMIN — HYDRALAZINE HYDROCHLORIDE 25 MG: 50 TABLET, FILM COATED ORAL at 14:58

## 2020-05-22 RX ADMIN — ATORVASTATIN CALCIUM 20 MG: 20 TABLET, FILM COATED ORAL at 09:17

## 2020-05-22 RX ADMIN — METOPROLOL TARTRATE 25 MG: 25 TABLET, FILM COATED ORAL at 09:17

## 2020-05-22 RX ADMIN — HYDRALAZINE HYDROCHLORIDE 25 MG: 50 TABLET, FILM COATED ORAL at 05:32

## 2020-05-22 RX ADMIN — APIXABAN 2.5 MG: 2.5 TABLET, FILM COATED ORAL at 17:48

## 2020-05-22 RX ADMIN — FUROSEMIDE 60 MG: 40 TABLET ORAL at 17:49

## 2020-05-22 RX ADMIN — ISOSORBIDE DINITRATE 20 MG: 20 TABLET ORAL at 20:35

## 2020-05-22 RX ADMIN — INSULIN LISPRO 1 UNITS: 100 INJECTION, SOLUTION INTRAVENOUS; SUBCUTANEOUS at 20:35

## 2020-05-22 RX ADMIN — SUCRALFATE 1 G: 1 TABLET ORAL at 20:35

## 2020-05-22 RX ADMIN — BUMETANIDE 2 MG: 1 TABLET ORAL at 23:55

## 2020-05-22 RX ADMIN — SODIUM CHLORIDE TAB 1 GM 2 G: 1 TAB at 13:32

## 2020-05-22 RX ADMIN — CARBIDOPA AND LEVODOPA 2 TABLET: 25; 100 TABLET ORAL at 14:58

## 2020-05-22 RX ADMIN — CARBIDOPA AND LEVODOPA 2 TABLET: 25; 100 TABLET ORAL at 22:02

## 2020-05-22 RX ADMIN — HYDRALAZINE HYDROCHLORIDE 10 MG: 20 INJECTION, SOLUTION INTRAMUSCULAR; INTRAVENOUS at 00:40

## 2020-05-22 RX ADMIN — SODIUM CHLORIDE TAB 1 GM 2 G: 1 TAB at 14:58

## 2020-05-22 RX ADMIN — ISOSORBIDE DINITRATE 20 MG: 20 TABLET ORAL at 09:17

## 2020-05-22 RX ADMIN — CARBIDOPA AND LEVODOPA 2 TABLET: 25; 100 TABLET ORAL at 09:17

## 2020-05-22 RX ADMIN — SODIUM CHLORIDE TAB 1 GM 2 G: 1 TAB at 17:48

## 2020-05-22 RX ADMIN — METOPROLOL TARTRATE 25 MG: 25 TABLET, FILM COATED ORAL at 20:40

## 2020-05-22 RX ADMIN — DILTIAZEM HYDROCHLORIDE 30 MG: 30 TABLET, FILM COATED ORAL at 22:15

## 2020-05-22 RX ADMIN — APIXABAN 2.5 MG: 2.5 TABLET, FILM COATED ORAL at 05:05

## 2020-05-22 RX ADMIN — Medication 10 ML: at 20:40

## 2020-05-22 RX ADMIN — ACETAMINOPHEN 650 MG: 325 TABLET ORAL at 09:22

## 2020-05-22 ASSESSMENT — ENCOUNTER SYMPTOMS
SORE THROAT: 0
WHEEZING: 0
BACK PAIN: 0
PHOTOPHOBIA: 0
CHEST TIGHTNESS: 0
ABDOMINAL PAIN: 0
SHORTNESS OF BREATH: 0
VOMITING: 0
TROUBLE SWALLOWING: 0
NAUSEA: 0
COLOR CHANGE: 0

## 2020-05-22 ASSESSMENT — PAIN SCALES - GENERAL
PAINLEVEL_OUTOF10: 5
PAINLEVEL_OUTOF10: 0
PAINLEVEL_OUTOF10: 0

## 2020-05-22 NOTE — PROGRESS NOTES
[address] [address]  INPATIENT PHYSICAL THERAPY  DAILY NOTE  [facility] RENAL TELEMETRY 6K - 6K-01/001-A    Time In: 0945  Time Out: 1013  Timed Code Treatment Minutes: 28 Minutes  Minutes: 28          Date: 2020  Patient Name: Andrew Parson,  Gender:  female        MRN: [id number]  : 1932  (80 y.o.)  Referral Date : 20  Referring Practitioner: Joelle Alcantara MD  Diagnosis: Systolic CHF, acute on chronic  Additional Pertinent Hx: Per H&P: 49-year-old lady with past medical history of CAD status post CABG, A. fib, diabetes, CHF came to ER due to shortness of breath, and lower extremity swelling. Patient states this is been ongoing for several days. She apparently ran out of her Lasix few days ago and have time to get refill. She did call her PCP yesterday but was not able to get the prescription picked up on time. She states that with ambulation her shortness of breath is worsened. She also noticed more swelling of her legs. Denies any chest pain, fevers, chills. No nausea, vomiting, diarrhea. She does feel weak and more sleepy. Prior Level of Function:  Lives With: Alone  Type of Home: Assisted living([place])  Home Layout: One level  Home Access: Level entry  Home Equipment: 4 wheeled walker   Bathroom Shower/Tub: Walk-in shower, Shower chair with back  Bathroom Toilet: Handicap height  Bathroom Accessibility: Accessible    ADL Assistance: Independent  Ambulation Assistance: Independent  Transfer Assistance: Independent  Additional Comments: Pt stating she was indep with all ADL's and mobility at [facility]     Restrictions/Precautions:  Restrictions/Precautions: Fall Risk, General Precautions    SUBJECTIVE: Pt in bed in ICU, will be transferring to another floor  Following session.     PAIN: 5/10: headache    OBJECTIVE:  Bed Mobility:  Supine to Sit: Contact Guard Assistance, Minimal Assistance  Scooting: Contact Guard Assistance    Transfers:  Sit to Stand: Contact Guard Assistance to Min A, tactile cues for hand placement   Stand to [facility] 68, with increased time for completion, cues for hand placement, to/from chair without arms    Ambulation:  Contact Guard Assistance, Minimal Assistance, X 1, with increased time for completion  Distance: 20 feet X 1 and 10 feet X 1  Surface: Level Tile  Device:4 Wheeled Walker  Gait Deviations: Forward Flexed Posture, Slow Deidre, Decreased Step Length Bilaterally, Decreased Gait Speed and Unsteady Gait    Balance:  Static Sitting Balance:  Stand By Assistance  Static Standing Balance: Stand By Assistance, Contact Guard Assistance  Dynamic Standing Balance: Contact Guard Assistance, Minimal Assistance    Exercise:  Patient was guided in 1 set(s) 10 reps of exercise to both lower extremities. Ankle pumps, Glut sets, Quad sets, Heelslides, Short arc quads, Seated hip flexion and Seated heel/toe raises. Exercises were completed for increased independence with functional mobility. Functional Outcome Measures: Completed  AM-PAC Inpatient Mobility without Stair Climbing Raw Score : 15  AM-PAC Inpatient without Stair Climbing T-Scale Score : 43.03    ASSESSMENT:  Assessment: Patient progressing toward established goals. Activity Tolerance:  Patient tolerance of  treatment: good.       Equipment Recommendations:Equipment Needed: No  Discharge Recommendations:  Continue to assess pending progress, Patient would benefit from continued therapy after discharge    Plan: Times per week: 5x GM  Times per day: Daily  Current Treatment Recommendations: Strengthening, Transfer Training, Endurance Training, Neuromuscular Re-education, Patient/Caregiver Education & Training, Balance Training, Gait Training, Home Exercise Program, Functional Mobility Training, Safety Education & Training    Patient Education  Patient Education: Plan of Care, Home Exercise Program, [employer] Mobility, Transfers, Reviewed Prior Education, Gait    Goals:  Patient goals : return home, decrease shortness of breath  Short term goals  Time Frame for Short term goals: at discharge  Short term goal 1: Patient will complete supine < > sit with modified independence to transfer in/out of bed safely. Short term goal 2: Patient will complete sit < > stand with supervision to stand to ambulate safely. Short term goal 3: Patient will ambulate 80' with a 4WW and SBA to navigate living environment. Long term goals  Time Frame for Long term goals : N/A due to short estimated length of stay    Following session, patient left in safe position with all fall risk precautions in place.

## 2020-05-22 NOTE — PROGRESS NOTES
Equipment Needed: No  Plan: Times per week: 5x    Patient Education  Patient Education: Home Exercise Program    Goals  Short term goals  Time Frame for Short term goals: by discharge  Short term goal 1: Pt to complete bathing tasks with min A and no vcs for safety   Short term goal 2: Pt to increaase endurance to navigate throughout room using AD with SBA and no increase in SOB or decrease in sats during to icnrease ease of copleting ADLs in her apartment  Short term goal 3: Pt to dmeo dynamic standing > 3 min with CGA and no UE support to reach outside base of support into closet and cupboards/drwers with no LOB   Short term goal 4: Pt to complete LB dressing tasks with min A     Following session, patient left in safe position with all fall risk precautions in place.

## 2020-05-23 LAB
ANION GAP SERPL CALCULATED.3IONS-SCNC: 11 MEQ/L (ref 8–16)
BUN BLDV-MCNC: 13 MG/DL (ref 7–22)
CALCIUM SERPL-MCNC: 9 MG/DL (ref 8.5–10.5)
CHLORIDE BLD-SCNC: 92 MEQ/L (ref 98–111)
CO2: 27 MEQ/L (ref 23–33)
CREAT SERPL-MCNC: 0.6 MG/DL (ref 0.4–1.2)
GFR SERPL CREATININE-BSD FRML MDRD: > 90 ML/MIN/1.73M2
GLUCOSE BLD-MCNC: 174 MG/DL (ref 70–108)
GLUCOSE BLD-MCNC: 183 MG/DL (ref 70–108)
GLUCOSE BLD-MCNC: 189 MG/DL (ref 70–108)
GLUCOSE BLD-MCNC: 216 MG/DL (ref 70–108)
GLUCOSE BLD-MCNC: 229 MG/DL (ref 70–108)
MRSA SCREEN: NORMAL
POTASSIUM SERPL-SCNC: 3.9 MEQ/L (ref 3.5–5.2)
SODIUM BLD-SCNC: 125 MEQ/L (ref 135–145)
SODIUM BLD-SCNC: 125 MEQ/L (ref 135–145)
SODIUM BLD-SCNC: 126 MEQ/L (ref 135–145)
SODIUM BLD-SCNC: 127 MEQ/L (ref 135–145)
SODIUM BLD-SCNC: 130 MEQ/L (ref 135–145)

## 2020-05-23 PROCEDURE — 36415 COLL VENOUS BLD VENIPUNCTURE: CPT

## 2020-05-23 PROCEDURE — 6370000000 HC RX 637 (ALT 250 FOR IP): Performed by: NURSE PRACTITIONER

## 2020-05-23 PROCEDURE — 1200000003 HC TELEMETRY R&B

## 2020-05-23 PROCEDURE — 99233 SBSQ HOSP IP/OBS HIGH 50: CPT | Performed by: INTERNAL MEDICINE

## 2020-05-23 PROCEDURE — 6370000000 HC RX 637 (ALT 250 FOR IP): Performed by: INTERNAL MEDICINE

## 2020-05-23 PROCEDURE — 82948 REAGENT STRIP/BLOOD GLUCOSE: CPT

## 2020-05-23 PROCEDURE — 80048 BASIC METABOLIC PNL TOTAL CA: CPT

## 2020-05-23 PROCEDURE — 1200000000 HC SEMI PRIVATE

## 2020-05-23 PROCEDURE — 99232 SBSQ HOSP IP/OBS MODERATE 35: CPT | Performed by: NURSE PRACTITIONER

## 2020-05-23 PROCEDURE — 2580000003 HC RX 258: Performed by: INTERNAL MEDICINE

## 2020-05-23 PROCEDURE — 84295 ASSAY OF SERUM SODIUM: CPT

## 2020-05-23 RX ORDER — DEXTROSE MONOHYDRATE 50 MG/ML
INJECTION, SOLUTION INTRAVENOUS CONTINUOUS
Status: DISCONTINUED | OUTPATIENT
Start: 2020-05-23 | End: 2020-05-23

## 2020-05-23 RX ORDER — SODIUM CHLORIDE 1000 MG
2 TABLET, SOLUBLE MISCELLANEOUS ONCE
Status: COMPLETED | OUTPATIENT
Start: 2020-05-23 | End: 2020-05-23

## 2020-05-23 RX ORDER — BUMETANIDE 1 MG/1
1 TABLET ORAL ONCE
Status: COMPLETED | OUTPATIENT
Start: 2020-05-23 | End: 2020-05-23

## 2020-05-23 RX ORDER — DEXTROSE MONOHYDRATE 50 MG/ML
250 INJECTION, SOLUTION INTRAVENOUS ONCE
Status: COMPLETED | OUTPATIENT
Start: 2020-05-23 | End: 2020-05-23

## 2020-05-23 RX ADMIN — CARBIDOPA AND LEVODOPA 2 TABLET: 25; 100 TABLET ORAL at 16:42

## 2020-05-23 RX ADMIN — ISOSORBIDE DINITRATE 20 MG: 20 TABLET ORAL at 09:30

## 2020-05-23 RX ADMIN — Medication 10 ML: at 21:40

## 2020-05-23 RX ADMIN — ISOSORBIDE DINITRATE 20 MG: 20 TABLET ORAL at 14:10

## 2020-05-23 RX ADMIN — PANTOPRAZOLE SODIUM 40 MG: 40 TABLET, DELAYED RELEASE ORAL at 05:45

## 2020-05-23 RX ADMIN — APIXABAN 2.5 MG: 2.5 TABLET, FILM COATED ORAL at 05:45

## 2020-05-23 RX ADMIN — BUMETANIDE 1 MG: 1 TABLET ORAL at 23:55

## 2020-05-23 RX ADMIN — METOPROLOL TARTRATE 25 MG: 25 TABLET, FILM COATED ORAL at 09:30

## 2020-05-23 RX ADMIN — HYDRALAZINE HYDROCHLORIDE 25 MG: 50 TABLET, FILM COATED ORAL at 05:50

## 2020-05-23 RX ADMIN — DILTIAZEM HYDROCHLORIDE 30 MG: 30 TABLET, FILM COATED ORAL at 05:45

## 2020-05-23 RX ADMIN — DILTIAZEM HYDROCHLORIDE 30 MG: 30 TABLET, FILM COATED ORAL at 21:38

## 2020-05-23 RX ADMIN — ISOSORBIDE DINITRATE 20 MG: 20 TABLET ORAL at 21:38

## 2020-05-23 RX ADMIN — SUCRALFATE 1 G: 1 TABLET ORAL at 16:42

## 2020-05-23 RX ADMIN — SODIUM CHLORIDE TAB 1 GM 2 G: 1 TAB at 23:55

## 2020-05-23 RX ADMIN — POLYETHYLENE GLYCOL 3350 17 G: 17 POWDER, FOR SOLUTION ORAL at 11:34

## 2020-05-23 RX ADMIN — CARBIDOPA AND LEVODOPA 2 TABLET: 25; 100 TABLET ORAL at 21:38

## 2020-05-23 RX ADMIN — HYDRALAZINE HYDROCHLORIDE 25 MG: 50 TABLET, FILM COATED ORAL at 14:10

## 2020-05-23 RX ADMIN — APIXABAN 2.5 MG: 2.5 TABLET, FILM COATED ORAL at 16:43

## 2020-05-23 RX ADMIN — HYDRALAZINE HYDROCHLORIDE 25 MG: 50 TABLET, FILM COATED ORAL at 21:38

## 2020-05-23 RX ADMIN — INSULIN LISPRO 1 UNITS: 100 INJECTION, SOLUTION INTRAVENOUS; SUBCUTANEOUS at 21:32

## 2020-05-23 RX ADMIN — DILTIAZEM HYDROCHLORIDE 30 MG: 30 TABLET, FILM COATED ORAL at 14:10

## 2020-05-23 RX ADMIN — ASPIRIN 81 MG: 81 TABLET ORAL at 09:30

## 2020-05-23 RX ADMIN — ATORVASTATIN CALCIUM 20 MG: 20 TABLET, FILM COATED ORAL at 09:30

## 2020-05-23 RX ADMIN — DEXTROSE MONOHYDRATE: 50 INJECTION, SOLUTION INTRAVENOUS at 07:15

## 2020-05-23 RX ADMIN — SUCRALFATE 1 G: 1 TABLET ORAL at 21:38

## 2020-05-23 RX ADMIN — SUCRALFATE 1 G: 1 TABLET ORAL at 14:10

## 2020-05-23 RX ADMIN — METOPROLOL TARTRATE 25 MG: 25 TABLET, FILM COATED ORAL at 21:38

## 2020-05-23 RX ADMIN — SUCRALFATE 1 G: 1 TABLET ORAL at 09:30

## 2020-05-23 RX ADMIN — CARBIDOPA AND LEVODOPA 2 TABLET: 25; 100 TABLET ORAL at 14:10

## 2020-05-23 RX ADMIN — DEXTROSE MONOHYDRATE 250 ML: 50 INJECTION, SOLUTION INTRAVENOUS at 06:10

## 2020-05-23 RX ADMIN — CARBIDOPA AND LEVODOPA 2 TABLET: 25; 100 TABLET ORAL at 09:30

## 2020-05-23 ASSESSMENT — PAIN SCALES - GENERAL
PAINLEVEL_OUTOF10: 0

## 2020-05-23 NOTE — PROGRESS NOTES
contacted for further evaluation, treatment, and management. Subjective (past 24 hours):   Patient sitting up in chair at the time of the interview. Currently denies any physical complaints. She was updated on the current plan of care and had no other needs or questions at this time. Past medical history, family history, social history and allergies reviewed again and is unchanged since admission. ROS (14 point review of systems completed. Pertinent positives noted. Otherwise ROS is negative) :  GENERAL: No fever,chills, or night sweats. SKIN: No lesions or rashes. HEAD: No headaches or recent injury. EYES: No acute changes in vision, no diplopia or blurred vision. EARS: No hearing loss, no tinnitus. NOSE/THROAT: No rhinorrhea or pharyngitis, no nasal drainage. NECK: No lumps or unusual neck stiffness. PULMONARY: Respirations easy and non-labored, no acute distress. CARDIAC: No chest pain, pressure, Negative for lower leg edema. GI: Abdomen is soft and non-tender, non-distended. PERIPHERAL VASCULAR: No intermittent claudication or unusual leg cramps. MUSCULOSKELETAL: Occasional arthralgias, myalgias. NEUROLOGICAL: Denies any headache, near syncope, seizures or syncope. HEMATOLOGIC:  No unusual bruising or bleeding. PSYCH: Denies any homicidal or suicidial ideations.     Medications:  Reviewed    Infusion Medications    dextrose 125 mL/hr at 05/23/20 0715    dextrose       Scheduled Medications    sodium chloride  2 g Oral TID WC    hydrALAZINE  25 mg Oral 3 times per day    isosorbide dinitrate  20 mg Oral TID    metoprolol tartrate  25 mg Oral BID    [Held by provider] furosemide  40 mg Oral Daily    apixaban  2.5 mg Oral BID    aspirin  81 mg Oral Daily    atorvastatin  20 mg Oral Daily    carbidopa-levodopa  2 tablet Oral 4x Daily    dilTIAZem  30 mg Oral 3 times per day    pantoprazole  40 mg Oral QAM AC    sucralfate  1 g Oral 4x Daily    magnesium replacement protocol Other RX Placeholder    potassium replacement protocol   Other RX Placeholder    insulin lispro  0-6 Units Subcutaneous TID WC    insulin lispro  0-3 Units Subcutaneous Nightly    sodium chloride flush  10 mL Intravenous 2 times per day     PRN Meds: glucose, dextrose, glucagon (rDNA), dextrose, hydrALAZINE, hydrALAZINE, sodium chloride flush, acetaminophen **OR** acetaminophen, polyethylene glycol, promethazine **OR** ondansetron, perflutren lipid microspheres      Intake/Output Summary (Last 24 hours) at 5/23/2020 0936  Last data filed at 5/23/2020 0545  Gross per 24 hour   Intake 860 ml   Output 3580 ml   Net -2720 ml       Diet:  Dietary Nutrition Supplements: Diabetic Oral Supplement  DIET CARB CONTROL; Daily Fluid Restriction: 1200 ml    Exam:  BP (!) 148/65   Pulse 63   Temp 97.9 °F (36.6 °C) (Oral)   Resp 18   Ht 5' 2\" (1.575 m)   Wt 133 lb 11.2 oz (60.6 kg)   SpO2 92%   BMI 24.45 kg/m²     General appearance: Alert and appropriate, pleasant geriatric female. No apparent distress, appears stated age and cooperative. HEENT: Pupils equal, round, and reactive to light. Conjunctivae/corneas clear. Neck: Supple, with full range of motion. No jugular venous distention. Trachea midline. Respiratory:  Normal respiratory effort. Clear to auscultation, bilaterally without Rales/Wheezes/Rhonchi. Cardiovascular: Regular rate and rhythm with normal S1/S2 without murmurs, rubs or gallops. Abdomen: Soft, non-tender, non-distended with normal bowel sounds. Musculoskeletal: Passive and active ROM x 4 extremities. Skin: Skin color, texture, turgor normal.  No rashes or lesions. Neurologic:  Neurovascularly intact without any focal sensory/motor deficits. Cranial nerves: II-XII intact, grossly non-focal.  Psychiatric: Alert and oriented to person, place, time, and situation.  Thought content appropriate, normal insight  Capillary Refill: Brisk,< 3 seconds   Peripheral Pulses: +2 palpable, equal bilaterally which is partially obscured. 2. There is stable atheromatous calcification at the thoracic aorta. LUNG FIELDS: 1. There is worsened pulmonary vascular congestion with bilateral perihilar and the basilar infiltrates. There are bilateral pleural effusions, moderate on the left and small on the right. Findings suggest worsening of congestive heart failure. Follow-up  chest radiograph recommended to confirm complete resolution. OTHER: None. PNEUMOTHORAX:  None. OSSEOUS STRUCTURES: 1. No acute osseous abnormality. 2. The bony structures are osteopenic. 3. There is spurring along the undersurface of the acromion bilaterally. 1. There is worsened pulmonary vascular congestion with bilateral perihilar and the basilar infiltrates. There are bilateral pleural effusions, moderate on the left and small on the right. Findings suggest worsening of congestive heart failure. Follow-up  chest radiograph recommended to confirm complete resolution. **This report has been created using voice recognition software. It may contain minor errors which are inherent in voice recognition technology. ** Final report electronically signed by Dr. Heriberto Delacruz on 5/17/2020 9:00 PM    Xr Chest Portable    Result Date: 5/16/2020  PROCEDURE: XR CHEST PORTABLE CLINICAL INFORMATION: Shortness of breath; congestive heart failure. COMPARISON: 10/14/2018. TECHNIQUE: AP portable chest radiograph performed. FINDINGS: POSTSURGICAL CHANGES: 1. There are stable median sternotomy wires. LINES/TUBES/MECHANICAL DEVICES: None. TRACHEA/HEART/MEDIASTINUM/HILUM: 1. There is stable mild enlargement of the cardiac silhouette which is partially obscured. LUNG CARROLL: 1. There is pulmonary vascular congestion with bilateral perihilar and the basilar infiltrates. There is opacification of the left lower chest suggesting a small to moderate left pleural effusion. The hazy attenuation at the right lung base may represent  layering pleural fluid.  The right clinical setting findings are consistent with congestive heart failure. Follow-up chest radiograph recommended to confirm complete resolution. OTHER: None. PNEUMOTHORAX:  None. OSSEOUS STRUCTURES: 1. No acute osseous abnormality. 2. The bony structures are osteopenic. 3. There are spurs along the undersurface of the acromion bilaterally. 1. There is pulmonary vascular congestion with bilateral perihilar and the basilar infiltrates. There is opacification of the left lower chest suggesting a small to moderate left pleural effusion. The hazy attenuation at the right lung base may represent  layering pleural fluid. The right clinical setting findings are consistent with congestive heart failure. Follow-up chest radiograph recommended to confirm complete resolution. **This report has been created using voice recognition software. It may contain minor errors which are inherent in voice recognition technology. ** Final report electronically signed by Dr. Dinesh James on 5/16/2020 4:28 PM      **This report has been created using voice recognition software. It may contain minor errors which are inherent in voice recognition technology. **  Electronically signed by ROWDY Alfaro CNP on 5/23/2020 at 9:36 AM

## 2020-05-23 NOTE — PROGRESS NOTES
[facility]   Nephrology progress note  5/23/2020, 1:34 PM      Pt Name:    Alberto Castaneda  MRN:     [id number]     YOB: 1932  Admit Date:    5/16/2020  3:45 PM  Primary Care Physician:  Rod Urban MD   Room number  6K-01/001-A    Chief Complaint: Nephrology following for Hyponatremia    Subjective:  Patient seen and examined. Sodium levels were followed closely throughout the night  Patient is awake  No acute distress  No chest pain or any shortness of breath reported  Urine output improved with loop diuretics yesterday      Objective:  24HR INTAKE/OUTPUT:      Intake/Output Summary (Last 24 hours) at 5/23/2020 1334  Last data filed at 5/23/2020 0545  Gross per 24 hour   Intake 860 ml   Output 3580 ml   Net -2720 ml     I/O last 3 completed shifts: In: 18 [P.O.:850; I.V.:10]  Out: 3580 [Urine:3580]  No intake/output data recorded. Admission weight: 138 lb (62.6 kg)  Wt Readings from Last 3 Encounters:   05/23/20 133 lb 11.2 oz (60.6 kg)   01/07/20 123 lb (55.8 kg)   12/28/19 123 lb (55.8 kg)     Body mass index is 24.45 kg/m².     Physical examination  VITALS:     Vitals:    05/23/20 0326 05/23/20 0545 05/23/20 0915 05/23/20 1106   BP: (!) 169/74 (!) 175/67 (!) 148/65 (!) 148/68   Pulse: 58 72 63 65   Resp: 18 18 18   Temp: 97.9 °F (36.6 °C)  97.9 °F (36.6 °C) 98.3 °F (36.8 °C)   TempSrc: Oral  Oral Oral   SpO2: 91%  92% 93%   Weight: 133 lb 11.2 oz (60.6 kg)      Height:         General Appearance: alert and cooperative with exam, appears comfortable, no distress  Mouth/Throat: Oral mucosa moist  Neck: No JVD  Lungs: Air entry B/L, no rales, no use of accessory muscles  Heart:  S1, S2 heard  GI: soft, non-tender, no guarding  Extremities: No sig LE edema      Lab Data  CBC:   Recent Labs     05/22/20 0419   WBC 7.9   HGB 10.6*   HCT 33.1*        BMP:  Recent Labs     05/21/20  0313  05/22/20  0419  05/23/20  0237 05/23/20  0350 05/23/20  0854   *   < > 123*

## 2020-05-24 PROBLEM — E44.0 MODERATE MALNUTRITION (HCC): Status: ACTIVE | Noted: 2020-05-24

## 2020-05-24 LAB
ANION GAP SERPL CALCULATED.3IONS-SCNC: 12 MEQ/L (ref 8–16)
ANION GAP SERPL CALCULATED.3IONS-SCNC: 9 MEQ/L (ref 8–16)
BUN BLDV-MCNC: 12 MG/DL (ref 7–22)
BUN BLDV-MCNC: 15 MG/DL (ref 7–22)
CALCIUM SERPL-MCNC: 9 MG/DL (ref 8.5–10.5)
CALCIUM SERPL-MCNC: 9.2 MG/DL (ref 8.5–10.5)
CHLORIDE BLD-SCNC: 86 MEQ/L (ref 98–111)
CHLORIDE BLD-SCNC: 89 MEQ/L (ref 98–111)
CHLORIDE, URINE: 32 MEQ/L
CO2: 25 MEQ/L (ref 23–33)
CO2: 28 MEQ/L (ref 23–33)
CREAT SERPL-MCNC: 0.5 MG/DL (ref 0.4–1.2)
CREAT SERPL-MCNC: 0.7 MG/DL (ref 0.4–1.2)
GFR SERPL CREATININE-BSD FRML MDRD: 79 ML/MIN/1.73M2
GFR SERPL CREATININE-BSD FRML MDRD: > 90 ML/MIN/1.73M2
GLUCOSE BLD-MCNC: 162 MG/DL (ref 70–108)
GLUCOSE BLD-MCNC: 175 MG/DL (ref 70–108)
GLUCOSE BLD-MCNC: 202 MG/DL (ref 70–108)
GLUCOSE BLD-MCNC: 213 MG/DL (ref 70–108)
GLUCOSE BLD-MCNC: 244 MG/DL (ref 70–108)
GLUCOSE BLD-MCNC: 287 MG/DL (ref 70–108)
MAGNESIUM: 1.2 MG/DL (ref 1.6–2.4)
OSMOLALITY URINE: 346 MOSMOL/KG (ref 250–750)
OSMOLALITY: 272 MOSMOL/KG (ref 275–295)
PHOSPHORUS: 3.5 MG/DL (ref 2.4–4.7)
POTASSIUM REFLEX MAGNESIUM: 3.8 MEQ/L (ref 3.5–5.2)
POTASSIUM SERPL-SCNC: 3.8 MEQ/L (ref 3.5–5.2)
POTASSIUM, URINE: 32.1 MEQ/L
SODIUM BLD-SCNC: 123 MEQ/L (ref 135–145)
SODIUM BLD-SCNC: 126 MEQ/L (ref 135–145)
SODIUM URINE: 35 MEQ/L

## 2020-05-24 PROCEDURE — 99233 SBSQ HOSP IP/OBS HIGH 50: CPT | Performed by: INTERNAL MEDICINE

## 2020-05-24 PROCEDURE — 82436 ASSAY OF URINE CHLORIDE: CPT

## 2020-05-24 PROCEDURE — 6370000000 HC RX 637 (ALT 250 FOR IP): Performed by: NURSE PRACTITIONER

## 2020-05-24 PROCEDURE — 2580000003 HC RX 258: Performed by: INTERNAL MEDICINE

## 2020-05-24 PROCEDURE — 83935 ASSAY OF URINE OSMOLALITY: CPT

## 2020-05-24 PROCEDURE — 83930 ASSAY OF BLOOD OSMOLALITY: CPT

## 2020-05-24 PROCEDURE — 36415 COLL VENOUS BLD VENIPUNCTURE: CPT

## 2020-05-24 PROCEDURE — 6370000000 HC RX 637 (ALT 250 FOR IP): Performed by: INTERNAL MEDICINE

## 2020-05-24 PROCEDURE — 94760 N-INVAS EAR/PLS OXIMETRY 1: CPT

## 2020-05-24 PROCEDURE — 83735 ASSAY OF MAGNESIUM: CPT

## 2020-05-24 PROCEDURE — 84100 ASSAY OF PHOSPHORUS: CPT

## 2020-05-24 PROCEDURE — 6360000002 HC RX W HCPCS: Performed by: PHYSICIAN ASSISTANT

## 2020-05-24 PROCEDURE — 84300 ASSAY OF URINE SODIUM: CPT

## 2020-05-24 PROCEDURE — 99232 SBSQ HOSP IP/OBS MODERATE 35: CPT | Performed by: NURSE PRACTITIONER

## 2020-05-24 PROCEDURE — 6360000002 HC RX W HCPCS: Performed by: INTERNAL MEDICINE

## 2020-05-24 PROCEDURE — 51701 INSERT BLADDER CATHETER: CPT

## 2020-05-24 PROCEDURE — 6370000000 HC RX 637 (ALT 250 FOR IP): Performed by: PHYSICIAN ASSISTANT

## 2020-05-24 PROCEDURE — 1200000003 HC TELEMETRY R&B

## 2020-05-24 PROCEDURE — 82948 REAGENT STRIP/BLOOD GLUCOSE: CPT

## 2020-05-24 PROCEDURE — 80048 BASIC METABOLIC PNL TOTAL CA: CPT

## 2020-05-24 PROCEDURE — 84133 ASSAY OF URINE POTASSIUM: CPT

## 2020-05-24 RX ORDER — DOCUSATE SODIUM 100 MG/1
100 CAPSULE, LIQUID FILLED ORAL 2 TIMES DAILY
Status: DISCONTINUED | OUTPATIENT
Start: 2020-05-24 | End: 2020-05-28 | Stop reason: HOSPADM

## 2020-05-24 RX ORDER — MAGNESIUM SULFATE IN WATER 40 MG/ML
4 INJECTION, SOLUTION INTRAVENOUS ONCE
Status: COMPLETED | OUTPATIENT
Start: 2020-05-24 | End: 2020-05-24

## 2020-05-24 RX ORDER — MULTIVITAMIN WITH IRON
1 TABLET ORAL DAILY
Status: DISCONTINUED | OUTPATIENT
Start: 2020-05-24 | End: 2020-05-28 | Stop reason: HOSPADM

## 2020-05-24 RX ORDER — SODIUM CHLORIDE 9 MG/ML
INJECTION, SOLUTION INTRAVENOUS CONTINUOUS
Status: DISCONTINUED | OUTPATIENT
Start: 2020-05-24 | End: 2020-05-26

## 2020-05-24 RX ORDER — SODIUM CHLORIDE 1000 MG
2 TABLET, SOLUBLE MISCELLANEOUS EVERY 6 HOURS
Status: DISCONTINUED | OUTPATIENT
Start: 2020-05-24 | End: 2020-05-26

## 2020-05-24 RX ADMIN — METOPROLOL TARTRATE 25 MG: 25 TABLET, FILM COATED ORAL at 20:46

## 2020-05-24 RX ADMIN — CARBIDOPA AND LEVODOPA 2 TABLET: 25; 100 TABLET ORAL at 20:46

## 2020-05-24 RX ADMIN — MAGNESIUM SULFATE HEPTAHYDRATE 4 G: 40 INJECTION, SOLUTION INTRAVENOUS at 16:43

## 2020-05-24 RX ADMIN — ISOSORBIDE DINITRATE 20 MG: 20 TABLET ORAL at 20:46

## 2020-05-24 RX ADMIN — CARBIDOPA AND LEVODOPA 2 TABLET: 25; 100 TABLET ORAL at 13:09

## 2020-05-24 RX ADMIN — HYDRALAZINE HYDROCHLORIDE 25 MG: 50 TABLET, FILM COATED ORAL at 20:46

## 2020-05-24 RX ADMIN — CARBIDOPA AND LEVODOPA 2 TABLET: 25; 100 TABLET ORAL at 08:34

## 2020-05-24 RX ADMIN — SUCRALFATE 1 G: 1 TABLET ORAL at 20:46

## 2020-05-24 RX ADMIN — DILTIAZEM HYDROCHLORIDE 30 MG: 30 TABLET, FILM COATED ORAL at 20:46

## 2020-05-24 RX ADMIN — SODIUM CHLORIDE TAB 1 GM 2 G: 1 TAB at 20:57

## 2020-05-24 RX ADMIN — HYDRALAZINE HYDROCHLORIDE 25 MG: 50 TABLET, FILM COATED ORAL at 13:09

## 2020-05-24 RX ADMIN — SUCRALFATE 1 G: 1 TABLET ORAL at 13:09

## 2020-05-24 RX ADMIN — PANTOPRAZOLE SODIUM 40 MG: 40 TABLET, DELAYED RELEASE ORAL at 05:30

## 2020-05-24 RX ADMIN — SUCRALFATE 1 G: 1 TABLET ORAL at 17:41

## 2020-05-24 RX ADMIN — POLYETHYLENE GLYCOL 3350 17 G: 17 POWDER, FOR SOLUTION ORAL at 11:24

## 2020-05-24 RX ADMIN — DOCUSATE SODIUM 100 MG: 100 CAPSULE, LIQUID FILLED ORAL at 21:32

## 2020-05-24 RX ADMIN — HYDRALAZINE HYDROCHLORIDE 25 MG: 50 TABLET, FILM COATED ORAL at 05:30

## 2020-05-24 RX ADMIN — SODIUM CHLORIDE: 9 INJECTION, SOLUTION INTRAVENOUS at 21:00

## 2020-05-24 RX ADMIN — SUCRALFATE 1 G: 1 TABLET ORAL at 08:34

## 2020-05-24 RX ADMIN — Medication 10 ML: at 08:34

## 2020-05-24 RX ADMIN — ATORVASTATIN CALCIUM 20 MG: 20 TABLET, FILM COATED ORAL at 08:34

## 2020-05-24 RX ADMIN — HYDRALAZINE HYDROCHLORIDE 10 MG: 20 INJECTION, SOLUTION INTRAMUSCULAR; INTRAVENOUS at 17:42

## 2020-05-24 RX ADMIN — ISOSORBIDE DINITRATE 20 MG: 20 TABLET ORAL at 08:34

## 2020-05-24 RX ADMIN — THERA TABS 1 TABLET: TAB at 20:45

## 2020-05-24 RX ADMIN — APIXABAN 2.5 MG: 2.5 TABLET, FILM COATED ORAL at 05:30

## 2020-05-24 RX ADMIN — Medication 10 ML: at 20:47

## 2020-05-24 RX ADMIN — DILTIAZEM HYDROCHLORIDE 30 MG: 30 TABLET, FILM COATED ORAL at 13:09

## 2020-05-24 RX ADMIN — ISOSORBIDE DINITRATE 20 MG: 20 TABLET ORAL at 13:09

## 2020-05-24 RX ADMIN — CARBIDOPA AND LEVODOPA 2 TABLET: 25; 100 TABLET ORAL at 17:41

## 2020-05-24 RX ADMIN — ACETAMINOPHEN 650 MG: 325 TABLET ORAL at 14:27

## 2020-05-24 RX ADMIN — ASPIRIN 81 MG: 81 TABLET ORAL at 08:34

## 2020-05-24 RX ADMIN — METOPROLOL TARTRATE 25 MG: 25 TABLET, FILM COATED ORAL at 08:34

## 2020-05-24 RX ADMIN — DILTIAZEM HYDROCHLORIDE 30 MG: 30 TABLET, FILM COATED ORAL at 05:30

## 2020-05-24 RX ADMIN — APIXABAN 2.5 MG: 2.5 TABLET, FILM COATED ORAL at 17:41

## 2020-05-24 ASSESSMENT — PAIN DESCRIPTION - PROGRESSION: CLINICAL_PROGRESSION: GRADUALLY IMPROVING

## 2020-05-24 ASSESSMENT — PAIN SCALES - GENERAL
PAINLEVEL_OUTOF10: 0
PAINLEVEL_OUTOF10: 0
PAINLEVEL_OUTOF10: 7
PAINLEVEL_OUTOF10: 5
PAINLEVEL_OUTOF10: 0
PAINLEVEL_OUTOF10: 0

## 2020-05-24 NOTE — PROGRESS NOTES
Hospitalist Progress Note      Patient:  Grace Blanc    Unit/Bed:6K-01/001-A  YOB: 1932  MRN: [id number]   Acct: [de-identified]   PCP: Willam Vivar MD  Date of Admission: 5/16/2020    Assessment/Plan:    1. Acute diastolic CHF exacerbation: Echo performed on 5/18/2020 demonstrates a normal left ventricular size and systolic function with an EF of 55-60%. S/p diuresis, patient was noncompliant with diuretic therapy at home. Maintain strict I&O's and a low-sodium cardiac diet with fluid restriction. 2. Severe pulmonary hypertension: Identified on echocardiogram performed on 5/18/2020 with a right ventricular systolic pressure 60 to 65 mmHg. 3. Acute hyponatremia: Prerenal in nature, patient was recently in the ICU for 3% saline. Given the patient's diastolic CHF with elevated BNP, loop diuretics were given and the patient had a good response to the second dose of loop diuretics. The patient went into a free water diuresis with some rapid correction of sodium, 250 mL bolus of D5W followed by a maintenance rate of 125 mL/HR was given. Sodium corrected nicely and D5W was discontinued. Current sodium level 123.  Nephrology managing, appreciate assistance. 4. Normocytic anemia: Mild, stable. No indication for acute transfusion. Current H&H 10.6/33.1 with an MCV of 88.3.  Monitor. 5. Hypertension: History, stable. Currently normotensive. Continue hydralazine 50 mg 3 times daily, as well as Lopressor and Isordil. 6. Parkinson's disease: History. Continue Sinemet. 7. CAD: S/p CABG. Continue ASA  8. Atrial fibrillation: History, stable. Currently rate controlled. Continue Eliquis and Cardizem. 9. Diabetes mellitus type 2: Hemoglobin A1c 7.0 on 1/15/2020. Continue Accu-Cheks before meals and at bedtime with sliding scale insulin coverage (medium-dose corrective algorithm ).   Hypoglycemia protocol in place, maintain carb without murmurs, rubs or gallops. Abdomen: Soft, non-tender, non-distended with normal bowel sounds. Musculoskeletal: Passive and active ROM x 4 extremities. Skin: Skin color, texture, turgor normal.  No rashes or lesions. Neurologic:  Neurovascularly intact without any focal sensory/motor deficits. Cranial nerves: II-XII intact, grossly non-focal.  Psychiatric: Alert and oriented to person, place, time, and situation. Thought content appropriate, normal insight  Capillary Refill: Brisk,< 3 seconds   Peripheral Pulses: +2 palpable, equal bilaterally     Labs:   Recent Labs     05/22/20 0419   WBC 7.9   HGB 10.6*   HCT 33.1*        Recent Labs     05/22/20  0419  05/23/20  0350  05/23/20  1259 05/23/20 2020 05/24/20  0207   *   < > 130*   < > 127* 125* 126*   K 4.4  --  3.9  --   --   --  3.8   CL 91*  --  92*  --   --   --  89*   CO2 23  --  27  --   --   --  25   BUN 16  --  13  --   --   --  12   CREATININE 0.6  --  0.6  --   --   --  0.5   CALCIUM 8.8  --  9.0  --   --   --  9.0   PHOS  --   --   --   --   --   --  3.5    < > = values in this interval not displayed. No results for input(s): AST, ALT, BILIDIR, BILITOT, ALKPHOS in the last 72 hours. No results for input(s): INR in the last 72 hours. No results for input(s): [place] in the last 72 hours.     Microbiology:    Blood culture #1:   Lab Results   Component Value Date    BC No growth-preliminary  No growth   10/17/2018       Blood culture #2:No results found for: Rashel Singh    Organism:  Lab Results   Component Value Date    ORG Mixed Growth 11/26/2018       No results found for: LABGRAM    MRSA culture only:No results found for: [facility]    Urine culture:   Lab Results   Component Value Date    LABURIN No growth-preliminary  No growth   12/14/2018       Respiratory culture: No results found for: CULTRESP    Aerobic and Anaerobic :  No results found for: LABAERO  No results found for: LABANAE    Urinalysis:      Lab Results TECHNIQUE: AP portable chest radiograph performed. FINDINGS: POSTSURGICAL CHANGES: 1. There are stable median sternotomy wires. LINES/TUBES/MECHANICAL DEVICES: None. TRACHEA/HEART/MEDIASTINUM/HILUM: 1. There is stable mild enlargement of the cardiac silhouette which is partially obscured. LUNG CARROLL: 1. There is pulmonary vascular congestion with bilateral perihilar and the basilar infiltrates. There is opacification of the left lower chest suggesting a small to moderate left pleural effusion. The hazy attenuation at the right lung base may represent  layering pleural fluid. The right clinical setting findings are consistent with congestive heart failure. Follow-up chest radiograph recommended to confirm complete resolution. OTHER: None. PNEUMOTHORAX:  None. OSSEOUS STRUCTURES: 1. No acute osseous abnormality. 2. The bony structures are osteopenic. 3. There are spurs along the undersurface of the acromion bilaterally. 1. There is pulmonary vascular congestion with bilateral perihilar and the basilar infiltrates. There is opacification of the left lower chest suggesting a small to moderate left pleural effusion. The hazy attenuation at the right lung base may represent  layering pleural fluid. The right clinical setting findings are consistent with congestive heart failure. Follow-up chest radiograph recommended to confirm complete resolution. **This report has been created using voice recognition software. It may contain minor errors which are inherent in voice recognition technology. ** Final report electronically signed by Dr. Arthur Weeks on 5/16/2020 4:28 PM      **This report has been created using voice recognition software. It may contain minor errors which are inherent in voice recognition technology. **  Electronically signed by ROWDY Levine CNP on 5/24/2020 at 12:34 PM

## 2020-05-24 NOTE — PROGRESS NOTES
Nutrition Assessment    Type and Reason for Visit: Reassess, Consult(supplement recommendations, malnutrition assessment)    Nutrition Recommendations: Continue current diet, ONS. Will send Activia yogurt TID to help with bowels. Suggest continue Glycolax. Consider MVI. Nutrition Assessment:   Pt improving from a nutritional standpoint AEB improving appetite and intake, acceptance of ONS. Remains at risk for further nutritional compromise r/t moderate malnutrition, constipation and underlying medical condition (hx CAD, DM, esophageal stricture and Parkinson's disease). Nutrition recommendations/interventions as per above. Malnutrition Assessment:  · Malnutrition Status: Meets the criteria for moderate malnutrition  · Context: Acute illness or injury  · Findings of the 6 clinical characteristics of malnutrition (Minimum of 2 out of 6 clinical characteristics is required to make the diagnosis of moderate or severe Protein Calorie Malnutrition based on AND/ASPEN Guidelines):  1. Energy Intake-Less than or equal to 50% of estimated energy requirement, Greater than or equal to 5 days    2. Weight Loss-No significant weight loss,    3. Fat Loss-No significant subcutaneous fat loss,    4. Muscle Loss-Mild muscle mass loss, Temples (temporalis muscle), Clavicles (pectoralis and deltoids)  5. Fluid Accumulation-Moderate to severe fluid accumulation,    6.  Strength-Not measured    Nutrition Risk Level: Moderate    Nutrient Needs:  · Estimated Daily Total Kcal: 3477-6314 kcals (22-25 kcal/kgm- 63kgm 5/16)  · Estimated Daily Protein (g): 60-75 grams (1.2-1.5 grams protein/kgm- ideal 50kgm)  · Estimated Daily Total Fluid (ml/day):  per MD    Nutrition Diagnosis:   · Problem:  Moderate malnutrition  · Etiology: related to Insufficient energy/nutrient consumption     Signs and symptoms:  as evidenced by Diet history of poor intake, Mild muscle loss    Objective Information:  · Nutrition-Focused Physical

## 2020-05-25 LAB
ANION GAP SERPL CALCULATED.3IONS-SCNC: 12 MEQ/L (ref 8–16)
BUN BLDV-MCNC: 16 MG/DL (ref 7–22)
CALCIUM SERPL-MCNC: 9.2 MG/DL (ref 8.5–10.5)
CHLORIDE BLD-SCNC: 90 MEQ/L (ref 98–111)
CO2: 27 MEQ/L (ref 23–33)
CREAT SERPL-MCNC: 0.6 MG/DL (ref 0.4–1.2)
GFR SERPL CREATININE-BSD FRML MDRD: > 90 ML/MIN/1.73M2
GLUCOSE BLD-MCNC: 157 MG/DL (ref 70–108)
GLUCOSE BLD-MCNC: 170 MG/DL (ref 70–108)
GLUCOSE BLD-MCNC: 192 MG/DL (ref 70–108)
GLUCOSE BLD-MCNC: 192 MG/DL (ref 70–108)
GLUCOSE BLD-MCNC: 241 MG/DL (ref 70–108)
POTASSIUM SERPL-SCNC: 3.6 MEQ/L (ref 3.5–5.2)
SODIUM BLD-SCNC: 129 MEQ/L (ref 135–145)

## 2020-05-25 PROCEDURE — 94760 N-INVAS EAR/PLS OXIMETRY 1: CPT

## 2020-05-25 PROCEDURE — 2580000003 HC RX 258: Performed by: INTERNAL MEDICINE

## 2020-05-25 PROCEDURE — 6370000000 HC RX 637 (ALT 250 FOR IP): Performed by: NURSE PRACTITIONER

## 2020-05-25 PROCEDURE — 6370000000 HC RX 637 (ALT 250 FOR IP): Performed by: INTERNAL MEDICINE

## 2020-05-25 PROCEDURE — 1200000003 HC TELEMETRY R&B

## 2020-05-25 PROCEDURE — 36415 COLL VENOUS BLD VENIPUNCTURE: CPT

## 2020-05-25 PROCEDURE — 99232 SBSQ HOSP IP/OBS MODERATE 35: CPT | Performed by: NURSE PRACTITIONER

## 2020-05-25 PROCEDURE — 99233 SBSQ HOSP IP/OBS HIGH 50: CPT | Performed by: INTERNAL MEDICINE

## 2020-05-25 PROCEDURE — 6370000000 HC RX 637 (ALT 250 FOR IP): Performed by: PHYSICIAN ASSISTANT

## 2020-05-25 PROCEDURE — 80048 BASIC METABOLIC PNL TOTAL CA: CPT

## 2020-05-25 PROCEDURE — 82948 REAGENT STRIP/BLOOD GLUCOSE: CPT

## 2020-05-25 RX ADMIN — SODIUM CHLORIDE TAB 1 GM 2 G: 1 TAB at 08:13

## 2020-05-25 RX ADMIN — APIXABAN 2.5 MG: 2.5 TABLET, FILM COATED ORAL at 05:36

## 2020-05-25 RX ADMIN — ISOSORBIDE DINITRATE 20 MG: 20 TABLET ORAL at 08:13

## 2020-05-25 RX ADMIN — SUCRALFATE 1 G: 1 TABLET ORAL at 13:45

## 2020-05-25 RX ADMIN — CARBIDOPA AND LEVODOPA 2 TABLET: 25; 100 TABLET ORAL at 08:13

## 2020-05-25 RX ADMIN — APIXABAN 2.5 MG: 2.5 TABLET, FILM COATED ORAL at 17:23

## 2020-05-25 RX ADMIN — SODIUM CHLORIDE TAB 1 GM 2 G: 1 TAB at 16:33

## 2020-05-25 RX ADMIN — CARBIDOPA AND LEVODOPA 2 TABLET: 25; 100 TABLET ORAL at 13:45

## 2020-05-25 RX ADMIN — HYDRALAZINE HYDROCHLORIDE 25 MG: 50 TABLET, FILM COATED ORAL at 13:45

## 2020-05-25 RX ADMIN — SUCRALFATE 1 G: 1 TABLET ORAL at 20:26

## 2020-05-25 RX ADMIN — DILTIAZEM HYDROCHLORIDE 30 MG: 30 TABLET, FILM COATED ORAL at 20:26

## 2020-05-25 RX ADMIN — METOPROLOL TARTRATE 25 MG: 25 TABLET, FILM COATED ORAL at 08:13

## 2020-05-25 RX ADMIN — DOCUSATE SODIUM 100 MG: 100 CAPSULE, LIQUID FILLED ORAL at 20:26

## 2020-05-25 RX ADMIN — CARBIDOPA AND LEVODOPA 2 TABLET: 25; 100 TABLET ORAL at 16:33

## 2020-05-25 RX ADMIN — SUCRALFATE 1 G: 1 TABLET ORAL at 08:13

## 2020-05-25 RX ADMIN — DILTIAZEM HYDROCHLORIDE 30 MG: 30 TABLET, FILM COATED ORAL at 05:36

## 2020-05-25 RX ADMIN — DOCUSATE SODIUM 100 MG: 100 CAPSULE, LIQUID FILLED ORAL at 08:13

## 2020-05-25 RX ADMIN — ISOSORBIDE DINITRATE 20 MG: 20 TABLET ORAL at 13:45

## 2020-05-25 RX ADMIN — SODIUM CHLORIDE TAB 1 GM 2 G: 1 TAB at 20:26

## 2020-05-25 RX ADMIN — SODIUM CHLORIDE: 9 INJECTION, SOLUTION INTRAVENOUS at 16:43

## 2020-05-25 RX ADMIN — ATORVASTATIN CALCIUM 20 MG: 20 TABLET, FILM COATED ORAL at 20:25

## 2020-05-25 RX ADMIN — DILTIAZEM HYDROCHLORIDE 30 MG: 30 TABLET, FILM COATED ORAL at 13:45

## 2020-05-25 RX ADMIN — ISOSORBIDE DINITRATE 20 MG: 20 TABLET ORAL at 20:26

## 2020-05-25 RX ADMIN — METOPROLOL TARTRATE 25 MG: 25 TABLET, FILM COATED ORAL at 20:25

## 2020-05-25 RX ADMIN — THERA TABS 1 TABLET: TAB at 08:13

## 2020-05-25 RX ADMIN — HYDRALAZINE HYDROCHLORIDE 25 MG: 50 TABLET, FILM COATED ORAL at 05:36

## 2020-05-25 RX ADMIN — SODIUM CHLORIDE TAB 1 GM 2 G: 1 TAB at 04:01

## 2020-05-25 RX ADMIN — CARBIDOPA AND LEVODOPA 2 TABLET: 25; 100 TABLET ORAL at 20:25

## 2020-05-25 RX ADMIN — ASPIRIN 81 MG: 81 TABLET ORAL at 08:13

## 2020-05-25 RX ADMIN — HYDRALAZINE HYDROCHLORIDE 25 MG: 50 TABLET, FILM COATED ORAL at 20:26

## 2020-05-25 RX ADMIN — SUCRALFATE 1 G: 1 TABLET ORAL at 16:33

## 2020-05-25 RX ADMIN — PANTOPRAZOLE SODIUM 40 MG: 40 TABLET, DELAYED RELEASE ORAL at 05:36

## 2020-05-25 ASSESSMENT — PAIN SCALES - GENERAL
PAINLEVEL_OUTOF10: 0
PAINLEVEL_OUTOF10: 0

## 2020-05-25 NOTE — PROGRESS NOTES
sounds: clear  Heart[de-identified]  S1,S2 heard  Abdomen:  Soft, non - tender  Musculoskeletal:  Edema -not significant     Last 3 CBC  No results for input(s): WBC, RBC, HGB, HCT, PLT in the last 72 hours. Last 3 CMP  Recent Labs     05/24/20  0207 05/24/20  1402 05/25/20  0526   * 123* 129*   K 3.8 3.8 3.6   CL 89* 86* 90*   CO2 25 28 27   BUN 12 15 16   CREATININE 0.5 0.7 0.6   CALCIUM 9.0 9.2 9.2        Assessment / Plan   Renal - renal fx is stable at baseline.     Hyponatremia - as per urine lytes it looks more pre-renal/cardio-renal  · Sodium improving it is 129 this morning. · Repeat urine lites yesterday showed probable intravascular volume depletion/cardiorenal however she had significant diuresis prior and not much edema or shortness of breath. So started the patient back on normal saline and salt tablets currently appears to be improving will continue for now. Essential hypertension-currently running well continue current medications  Acute on chronic congestive heart failure-status post diuretics volume status appears to be well   Diabetes mellitus  meds reviewed and D/W patient     DRAGAN Chun D.  [facility].

## 2020-05-25 NOTE — FLOWSHEET NOTE
05/24/20 2113   Provider Notification   Reason for Communication Review case   Provider Name Ana Bobo   Provider Notification Physician Assistant   Method of Communication Secure Message   Response See orders   Notification Time 2108     Notified PA about the patient having c/o constipation and only have an order for glycolax. PA ordered Colace to help. See orders.

## 2020-05-25 NOTE — PROGRESS NOTES
drainage. NECK: No lumps or unusual neck stiffness. PULMONARY: Respirations easy and non-labored, no acute distress. CARDIAC: No chest pain, pressure, Negative for lower leg edema. GI: Abdomen is soft and non-tender, non-distended. PERIPHERAL VASCULAR: No intermittent claudication or unusual leg cramps. MUSCULOSKELETAL: Occasional arthralgias, myalgias. NEUROLOGICAL: Denies any headache, near syncope, seizures or syncope. HEMATOLOGIC:  No unusual bruising or bleeding. PSYCH: Denies any homicidal or suicidial ideations.     Medications:  Reviewed    Infusion Medications    sodium chloride 50 mL/hr at 05/24/20 2100    dextrose       Scheduled Medications    insulin lispro  0-12 Units Subcutaneous TID WC    insulin lispro  0-6 Units Subcutaneous Nightly    multivitamin  1 tablet Oral Daily    sodium chloride  2 g Oral Q6H    docusate sodium  100 mg Oral BID    hydrALAZINE  25 mg Oral 3 times per day    isosorbide dinitrate  20 mg Oral TID    metoprolol tartrate  25 mg Oral BID    [Held by provider] furosemide  40 mg Oral Daily    apixaban  2.5 mg Oral BID    aspirin  81 mg Oral Daily    atorvastatin  20 mg Oral Daily    carbidopa-levodopa  2 tablet Oral 4x Daily    dilTIAZem  30 mg Oral 3 times per day    pantoprazole  40 mg Oral QAM AC    sucralfate  1 g Oral 4x Daily    magnesium replacement protocol   Other RX Placeholder    potassium replacement protocol   Other RX Placeholder    sodium chloride flush  10 mL Intravenous 2 times per day     PRN Meds: glucose, dextrose, glucagon (rDNA), dextrose, hydrALAZINE, hydrALAZINE, sodium chloride flush, acetaminophen **OR** acetaminophen, polyethylene glycol, promethazine **OR** ondansetron, perflutren lipid microspheres      Intake/Output Summary (Last 24 hours) at 5/25/2020 1313  Last data filed at 5/25/2020 0358  Gross per 24 hour   Intake 1007.07 ml   Output 250 ml   Net 757.07 ml       Diet:  Dietary Nutrition Supplements: Diabetic Oral Supplement  DIET CARB CONTROL; Daily Fluid Restriction: 1000 ml  Dietary Nutrition Supplements: Other Oral Supplement (see comment)    Exam:  /65   Pulse 58   Temp 98 °F (36.7 °C) (Oral)   Resp 16   Ht 5' 2\" (1.575 m)   Wt 128 lb (58.1 kg)   SpO2 95%   BMI 23.41 kg/m²     General appearance: Alert and appropriate, pleasant geriatric female. Juli Garcia apparent distress, appears stated age and cooperative. HEENT: Pupils equal, round, and reactive to light. Conjunctivae/corneas clear. Neck: Supple, with full range of motion. No jugular venous distention. Trachea midline. Respiratory:  Normal respiratory effort. Clear to auscultation, bilaterally without Rales/Wheezes/Rhonchi. Cardiovascular: Regular rate and rhythm with normal S1/S2 without murmurs, rubs or gallops. Abdomen: Soft, non-tender, non-distended with normal bowel sounds. Musculoskeletal: Passive and active ROM x 4 extremities. Skin: Skin color, texture, turgor normal.  No rashes or lesions. Neurologic:  Neurovascularly intact without any focal sensory/motor deficits. Cranial nerves: II-XII intact, grossly non-focal.  Psychiatric: Alert and oriented to person, place, time, and situation. Thought content appropriate, normal insight  Capillary Refill: Brisk,< 3 seconds   Peripheral Pulses: +2 palpable, equal bilaterally    Labs:   No results for input(s): WBC, HGB, HCT, PLT in the last 72 hours. Recent Labs     05/24/20  0207 05/24/20  1402 05/25/20  0526   * 123* 129*   K 3.8 3.8 3.6   CL 89* 86* 90*   CO2 25 28 27   BUN 12 15 16   CREATININE 0.5 0.7 0.6   CALCIUM 9.0 9.2 9.2   PHOS 3.5  --   --      No results for input(s): AST, ALT, BILIDIR, BILITOT, ALKPHOS in the last 72 hours. No results for input(s): INR in the last 72 hours. No results for input(s): Thena Gourd in the last 72 hours.     Microbiology:    Blood culture #1:   Lab Results   Component Value Date    BC No growth-preliminary  No growth   10/17/2018       Blood of congestive heart failure. Follow-up  chest radiograph recommended to confirm complete resolution. **This report has been created using voice recognition software. It may contain minor errors which are inherent in voice recognition technology. ** Final report electronically signed by Dr. Linda Perez on 5/17/2020 9:00 PM    Xr Chest Portable    Result Date: 5/16/2020  PROCEDURE: XR CHEST PORTABLE CLINICAL INFORMATION: Shortness of breath; congestive heart failure. COMPARISON: 10/14/2018. TECHNIQUE: AP portable chest radiograph performed. FINDINGS: POSTSURGICAL CHANGES: 1. There are stable median sternotomy wires. LINES/TUBES/MECHANICAL DEVICES: None. TRACHEA/HEART/MEDIASTINUM/HILUM: 1. There is stable mild enlargement of the cardiac silhouette which is partially obscured. LUNG CARROLL: 1. There is pulmonary vascular congestion with bilateral perihilar and the basilar infiltrates. There is opacification of the left lower chest suggesting a small to moderate left pleural effusion. The hazy attenuation at the right lung base may represent  layering pleural fluid. The right clinical setting findings are consistent with congestive heart failure. Follow-up chest radiograph recommended to confirm complete resolution. OTHER: None. PNEUMOTHORAX:  None. OSSEOUS STRUCTURES: 1. No acute osseous abnormality. 2. The bony structures are osteopenic. 3. There are spurs along the undersurface of the acromion bilaterally. 1. There is pulmonary vascular congestion with bilateral perihilar and the basilar infiltrates. There is opacification of the left lower chest suggesting a small to moderate left pleural effusion. The hazy attenuation at the right lung base may represent  layering pleural fluid. The right clinical setting findings are consistent with congestive heart failure. Follow-up chest radiograph recommended to confirm complete resolution. **This report has been created using voice recognition software.   It may contain

## 2020-05-26 LAB
ANION GAP SERPL CALCULATED.3IONS-SCNC: 11 MEQ/L (ref 8–16)
BUN BLDV-MCNC: 12 MG/DL (ref 7–22)
CALCIUM SERPL-MCNC: 8.8 MG/DL (ref 8.5–10.5)
CHLORIDE BLD-SCNC: 97 MEQ/L (ref 98–111)
CO2: 26 MEQ/L (ref 23–33)
CREAT SERPL-MCNC: 0.6 MG/DL (ref 0.4–1.2)
ERYTHROCYTE [DISTWIDTH] IN BLOOD BY AUTOMATED COUNT: 13.2 % (ref 11.5–14.5)
ERYTHROCYTE [DISTWIDTH] IN BLOOD BY AUTOMATED COUNT: 43.3 FL (ref 35–45)
GFR SERPL CREATININE-BSD FRML MDRD: > 90 ML/MIN/1.73M2
GLUCOSE BLD-MCNC: 143 MG/DL (ref 70–108)
GLUCOSE BLD-MCNC: 175 MG/DL (ref 70–108)
GLUCOSE BLD-MCNC: 202 MG/DL (ref 70–108)
GLUCOSE BLD-MCNC: 236 MG/DL (ref 70–108)
HCT VFR BLD CALC: 31.2 % (ref 37–47)
HEMOGLOBIN: 10 GM/DL (ref 12–16)
MCH RBC QN AUTO: 28.7 PG (ref 26–33)
MCHC RBC AUTO-ENTMCNC: 32.1 GM/DL (ref 32.2–35.5)
MCV RBC AUTO: 89.7 FL (ref 81–99)
PLATELET # BLD: 224 THOU/MM3 (ref 130–400)
PMV BLD AUTO: 9.1 FL (ref 9.4–12.4)
POTASSIUM SERPL-SCNC: 3.6 MEQ/L (ref 3.5–5.2)
RBC # BLD: 3.48 MILL/MM3 (ref 4.2–5.4)
SODIUM BLD-SCNC: 134 MEQ/L (ref 135–145)
WBC # BLD: 6 THOU/MM3 (ref 4.8–10.8)

## 2020-05-26 PROCEDURE — 1200000003 HC TELEMETRY R&B

## 2020-05-26 PROCEDURE — U0002 COVID-19 LAB TEST NON-CDC: HCPCS

## 2020-05-26 PROCEDURE — 97116 GAIT TRAINING THERAPY: CPT

## 2020-05-26 PROCEDURE — 94761 N-INVAS EAR/PLS OXIMETRY MLT: CPT

## 2020-05-26 PROCEDURE — 99232 SBSQ HOSP IP/OBS MODERATE 35: CPT | Performed by: INTERNAL MEDICINE

## 2020-05-26 PROCEDURE — 36415 COLL VENOUS BLD VENIPUNCTURE: CPT

## 2020-05-26 PROCEDURE — 97110 THERAPEUTIC EXERCISES: CPT

## 2020-05-26 PROCEDURE — 6370000000 HC RX 637 (ALT 250 FOR IP): Performed by: PHYSICIAN ASSISTANT

## 2020-05-26 PROCEDURE — 6360000002 HC RX W HCPCS: Performed by: INTERNAL MEDICINE

## 2020-05-26 PROCEDURE — 82948 REAGENT STRIP/BLOOD GLUCOSE: CPT

## 2020-05-26 PROCEDURE — 6370000000 HC RX 637 (ALT 250 FOR IP): Performed by: NURSE PRACTITIONER

## 2020-05-26 PROCEDURE — 6370000000 HC RX 637 (ALT 250 FOR IP): Performed by: INTERNAL MEDICINE

## 2020-05-26 PROCEDURE — 85027 COMPLETE CBC AUTOMATED: CPT

## 2020-05-26 PROCEDURE — 99232 SBSQ HOSP IP/OBS MODERATE 35: CPT | Performed by: NURSE PRACTITIONER

## 2020-05-26 PROCEDURE — 80048 BASIC METABOLIC PNL TOTAL CA: CPT

## 2020-05-26 PROCEDURE — 97530 THERAPEUTIC ACTIVITIES: CPT

## 2020-05-26 PROCEDURE — 2580000003 HC RX 258: Performed by: INTERNAL MEDICINE

## 2020-05-26 RX ORDER — SODIUM CHLORIDE 1000 MG
2 TABLET, SOLUBLE MISCELLANEOUS 2 TIMES DAILY WITH MEALS
Status: DISCONTINUED | OUTPATIENT
Start: 2020-05-26 | End: 2020-05-28 | Stop reason: HOSPADM

## 2020-05-26 RX ORDER — FUROSEMIDE 10 MG/ML
40 INJECTION INTRAMUSCULAR; INTRAVENOUS ONCE
Status: COMPLETED | OUTPATIENT
Start: 2020-05-26 | End: 2020-05-26

## 2020-05-26 RX ORDER — SODIUM CHLORIDE 1000 MG
2 TABLET, SOLUBLE MISCELLANEOUS
Status: DISCONTINUED | OUTPATIENT
Start: 2020-05-26 | End: 2020-05-26

## 2020-05-26 RX ADMIN — SUCRALFATE 1 G: 1 TABLET ORAL at 22:15

## 2020-05-26 RX ADMIN — ISOSORBIDE DINITRATE 20 MG: 20 TABLET ORAL at 09:08

## 2020-05-26 RX ADMIN — APIXABAN 2.5 MG: 2.5 TABLET, FILM COATED ORAL at 05:03

## 2020-05-26 RX ADMIN — SUCRALFATE 1 G: 1 TABLET ORAL at 11:56

## 2020-05-26 RX ADMIN — CARBIDOPA AND LEVODOPA 2 TABLET: 25; 100 TABLET ORAL at 22:15

## 2020-05-26 RX ADMIN — CARBIDOPA AND LEVODOPA 2 TABLET: 25; 100 TABLET ORAL at 17:27

## 2020-05-26 RX ADMIN — DILTIAZEM HYDROCHLORIDE 30 MG: 30 TABLET, FILM COATED ORAL at 22:15

## 2020-05-26 RX ADMIN — SODIUM CHLORIDE TAB 1 GM 2 G: 1 TAB at 04:29

## 2020-05-26 RX ADMIN — SODIUM CHLORIDE TAB 1 GM 2 G: 1 TAB at 09:08

## 2020-05-26 RX ADMIN — ATORVASTATIN CALCIUM 20 MG: 20 TABLET, FILM COATED ORAL at 09:08

## 2020-05-26 RX ADMIN — ASPIRIN 81 MG: 81 TABLET ORAL at 09:08

## 2020-05-26 RX ADMIN — HYDRALAZINE HYDROCHLORIDE 25 MG: 50 TABLET, FILM COATED ORAL at 22:15

## 2020-05-26 RX ADMIN — METOPROLOL TARTRATE 25 MG: 25 TABLET, FILM COATED ORAL at 22:15

## 2020-05-26 RX ADMIN — CARBIDOPA AND LEVODOPA 2 TABLET: 25; 100 TABLET ORAL at 09:09

## 2020-05-26 RX ADMIN — METOPROLOL TARTRATE 25 MG: 25 TABLET, FILM COATED ORAL at 09:08

## 2020-05-26 RX ADMIN — SUCRALFATE 1 G: 1 TABLET ORAL at 17:29

## 2020-05-26 RX ADMIN — ISOSORBIDE DINITRATE 20 MG: 20 TABLET ORAL at 22:15

## 2020-05-26 RX ADMIN — FUROSEMIDE 40 MG: 10 INJECTION, SOLUTION INTRAMUSCULAR; INTRAVENOUS at 15:29

## 2020-05-26 RX ADMIN — SODIUM CHLORIDE TAB 1 GM 2 G: 1 TAB at 17:26

## 2020-05-26 RX ADMIN — CARBIDOPA AND LEVODOPA 2 TABLET: 25; 100 TABLET ORAL at 11:56

## 2020-05-26 RX ADMIN — Medication 10 ML: at 22:16

## 2020-05-26 RX ADMIN — HYDRALAZINE HYDROCHLORIDE 25 MG: 50 TABLET, FILM COATED ORAL at 15:30

## 2020-05-26 RX ADMIN — APIXABAN 2.5 MG: 2.5 TABLET, FILM COATED ORAL at 17:26

## 2020-05-26 RX ADMIN — THERA TABS 1 TABLET: TAB at 09:08

## 2020-05-26 RX ADMIN — DOCUSATE SODIUM 100 MG: 100 CAPSULE, LIQUID FILLED ORAL at 09:08

## 2020-05-26 RX ADMIN — ISOSORBIDE DINITRATE 20 MG: 20 TABLET ORAL at 15:30

## 2020-05-26 RX ADMIN — PANTOPRAZOLE SODIUM 40 MG: 40 TABLET, DELAYED RELEASE ORAL at 05:03

## 2020-05-26 RX ADMIN — SUCRALFATE 1 G: 1 TABLET ORAL at 09:09

## 2020-05-26 RX ADMIN — DILTIAZEM HYDROCHLORIDE 30 MG: 30 TABLET, FILM COATED ORAL at 15:29

## 2020-05-26 NOTE — DISCHARGE INSTR - COC
preserved LV function w/estimated EF at 55%. No significant MR. Patent left main coronary artery. Tortuous LAD which appeared to be patent. Patent left circumflex artery. High-grade stenosis around 99% in very large dominant RCA w/heavy calcification at the ostium. Normal LV function.  DOPPLER ECHOCARDIOGRAPHY  4 09 2009    Global LV function w/in lower limits of normal, with mild anteroseptal hypokinesis. EF of 50-55%. Light LVH. Mild to moderate left atrial enlargement. Calcific aortic and mitral valve w/no obvious stenosis. No obvious pericardial effusion.  DOPPLER ECHOCARDIOGRAPHY  7 10 2007    LV function w/in lower limits of normal w/peridoxical septal wall motion. Moderate left atrial enlargement. Mild MR. Mild TR. Calcified valves w/no obvious stenosis. No pericardial effusion.  DOPPLER ECHOCARDIOGRAPHY  4 14 2006    There appears to be significant improvement from previous echo w/complete resolution of pericardial effusion and normal LV function. EF of 60%.  DOPPLER ECHOCARDIOGRAPHY  3 21 2006    Normal LV function. Mild LV hypertrophy. Mild biatrial enlargement. Mildly calcific aortic and mitral valve w/no stenosis. Mild MR. Mild TR. Minimal residual pericardial effusion w/significant improvement from previous echo.  DOPPLER ECHOCARDIOGRAPHY  3 16 2006    Interval change from previous echocardiogram w/worsening of effusion. Correlation clinically. Consideration of pericardial centesis. Will obtain a [facility] consult.  DOPPLER ECHOCARDIOGRAPHY  1 31 2006    No significant change from previous echo. The 2D echo showed moderate degree of pericardial effusion. It does seem to be worst or better than previous echo. No evidence of tamponade.  DOPPLER ECHOCARDIOGRAPHY  1 27 2006    Normal global LV function. Mild biatrial enlargement. Mildly sclerotic aortic & mitral valve w/no stenosis. Mild MR. Mild TR. Small to moderate pericardial effusion w/no obvious tamponade.      JOINT REPLACEMENT M48.02    Cervical arthritis M47.812    Arteriosclerosis I70.90    Osteopenia M85.80    Mixed incontinence urge and stress N39.46    Overactive bladder N32.81    Acute on chronic systolic congestive heart failure (HCC) I50.23    Acute on chronic diastolic CHF (congestive heart failure), NYHA class 3 (HCC) I50.33    Pleural effusion J90    Uncontrolled hypertension I10    History of coronary artery bypass graft x 1 Z95.1    Chronic atrial fibrillation I48.20    Pulmonary hypertension, moderate to severe (HCC) I27.20    SOB (shortness of breath) R06.02    Moderate malnutrition (HCC) E44.0       Isolation/Infection:   Isolation          No Isolation        Patient Infection Status     Infection Onset Added Last Indicated Last Indicated By Review Planned Expiration Resolved Resolved By    COVID-19 Rule Out 05/26/20 05/26/20 05/26/20 COVID-19 (Ordered)              Nurse Assessment:  Last Vital Signs: BP (!) 155/58   Pulse 71   Temp 98.4 °F (36.9 °C) (Oral)   Resp 18   Ht 5' 2\" (1.575 m)   Wt 133 lb 12.8 oz (60.7 kg)   SpO2 90%   BMI 24.47 kg/m²     Last documented pain score (0-10 scale): Pain Level: 0  Last Weight:   Wt Readings from Last 1 Encounters:   05/27/20 133 lb 12.8 oz (60.7 kg)     Mental Status:  thought processes intact alert and oriented     IV Access:  - None    Nursing Mobility/ADLs:  Walking   Independent  Transfer  Independent  Bathing  Assisted  Dressing  Assisted  Toileting  Independent  Feeding  Independent  Med Admin  Independent  Med Delivery   prefers mixed with applesauce    Wound Care Documentation and Therapy:  Incision 12/04/18 Back (Active)   Number of days: 539       Wound 10/19/18 Breast Left excoriated (Active)   Number of days: 586       Wound 10/19/18 Breast Right excoritated (Active)   Number of days: 586       Wound 05/20/20 Coccyx Mid;Left;Right Red, skin intact.  Does not milena (Active)   Wound Pressure Stage  1 5/23/2020  9:30 PM   Dressing/Treatment Open to air 5/27/2020  7:49 AM   Wound Assessment Clean;Dry;Non-blanchable erythema;Red 5/27/2020  7:49 AM   Drainage Amount None 5/22/2020  9:00 AM   Odor None 5/22/2020  9:00 AM   Amanda-wound Assessment Blanchable erythema 5/27/2020  7:49 AM   Red%Wound Bed 100 5/20/2020  4:00 PM   Number of days: 6        Elimination:  Continence:   · Bowel: Yes  · Bladder: Yes  Urinary Catheter: None   Colostomy/Ileostomy/Ileal Conduit: No       Date of Last BM: 05/27/2020    Intake/Output Summary (Last 24 hours) at 5/27/2020 0900  Last data filed at 5/27/2020 0425  Gross per 24 hour   Intake 756 ml   Output 1350 ml   Net -594 ml     I/O last 3 completed shifts: In: 174 [P.O.:640; I.V.:356]  Out: 1350 [Urine:1350]    Safety Concerns: At Risk for Falls    Impairments/Disabilities:      None    Nutrition Therapy:  Current Nutrition Therapy:       Routes of Feeding: Oral  Liquids: No Restrictions  Daily Fluid Restriction: 2000ml  Last Modified Barium Swallow with Video (Video Swallowing Test): not done    Treatments at the Time of Hospital Discharge:   Respiratory Treatments: none  Oxygen Therapy: room air  Ventilator:    - No ventilator support    Rehab Therapies: Physical Therapy and Occupational Therapy  Weight Bearing Status/Restrictions: No weight bearing restirctions  Other Medical Equipment (for information only, NOT a DME order):  walker  Other Treatments: none    Patient's personal belongings (please select all that are sent with patient):  None    RN SIGNATURE:  Michele Sanford RN  CASE MANAGEMENT/SOCIAL WORK SECTION    Inpatient Status Date: 05/16/2020    Readmission Risk Assessment Score:  Readmission Risk              Risk of Unplanned Readmission:        19           Discharging to Facility/ Agency   · Name: [facility]  · Address: [address] [facility].   Emile Fernandez, [address]  · Phone:[phone]  · Fax:[phone]    Dialysis Facility (if applicable)   · Name:  · Address:  · Dialysis Schedule:  · Phone:  · Fax:    Case Manager/ signature: Electronically signed by ANDRAE Myers on 5/26/20 at 9:00 AM EDT    PHYSICIAN SECTION    Prognosis: Fair    Condition at Discharge: Stable    Rehab Potential (if transferring to Rehab): Good    Recommended Labs or Other Treatments After Discharge:     Physician Certification: I certify the above information and transfer of Lizzie Roth  is necessary for the continuing treatment of the diagnosis listed and that she requires [place] for less 30 days.      Update Admission H&P: No change in H&P    PHYSICIAN SIGNATURE:  Electronically signed by Jacinta Salter MD on 5/28/2020 at 2:54 PM

## 2020-05-26 NOTE — PLAN OF CARE
Consult received. Please see progress note 05/18/20.
Nutrition Problem: Inadequate oral intake  Intervention: Food and/or Nutrient Delivery: Continue current diet, Start ONS  Nutritional Goals: Patient will consume 75% or more of meals during LOS.     Problem: Nutrition  Goal: Optimal nutrition therapy  Outcome: Ongoing
Abnormal:  Goal: Ability to maintain appropriate glucose levels has stabilized  Description: Ability to maintain appropriate glucose levels has stabilized  Note: Pt receives SS insulin with meals   Care plan reviewed with patient . Patient  verbalize understanding of the plan of care and contribute to goal setting.
Patient's continuum of care needs are met  5/22/2020 0236 by Bethanie Hung RN  Outcome: Ongoing  Note: Plan to return to [facility] once d/c     Problem: Nutrition  Goal: Optimal nutrition therapy  5/22/2020 0236 by Bethanie Hung RN  Outcome: Ongoing  Note: Patient is on a fluid restricted diet, she has a minimal appetite. Continues to eat small amounts of meals     Problem: Serum Glucose Level - Abnormal:  Goal: Ability to maintain appropriate glucose levels has stabilized  Description: Ability to maintain appropriate glucose levels has stabilized  5/22/2020 0236 by Bethanie Hung RN  Outcome: Ongoing  Note: Patient is on low dose sliding scale  Care plan reviewed with patient. Patient  verbalize understanding of the plan of care and contribute to goal setting.    No family at beside at this time
and daughter. Patient and daughter verbalize understanding of the plan of care and contribute to goal setting.
arise. No concerns stated. Problem: Serum Glucose Level - Abnormal:  Goal: Ability to maintain appropriate glucose levels has stabilized  Description: Ability to maintain appropriate glucose levels has stabilized  Outcome: Ongoing  Note: Blood sugars checked ACHS. Insulin also ordered per sliding scale. Care plan reviewed with patient. Will review and discuss care plan with family when available.
improve  Description: Hemodynamic stability will improve  5/20/2020 1941 by Jacqui Ma RN  Outcome: Ongoing  Note: Patient has received fluid bolus, at this time watching electrolytes due to low sodiums     Problem: Fluid Volume:  Goal: Ability to maintain a balanced intake and output will improve  Description: Ability to maintain a balanced intake and output will improve  5/20/2020 1941 by Jacqui Ma RN  Outcome: Ongoing  Note: Accurate I&Os are completed TID     Problem: Respiratory:  Goal: Respiratory status will improve  Description: Respiratory status will improve  5/20/2020 1941 by Jacqui Ma RN  Outcome: Ongoing  Note: Patient has 1L of oxygen on for sleeping, some crackles in lower bases     Problem: DISCHARGE BARRIERS  Goal: Patient's continuum of care needs are met  5/20/2020 1941 by Jacqui Ma RN  Outcome: Ongoing  Note: Plan to return back to [facility] after discharge     Problem: Nutrition  Goal: Optimal nutrition therapy  5/20/2020 1941 by Jacqui Ma RN  Outcome: Ongoing  Note: Patient has had minimal intact in due to lack of hunger, continue to push fluids   Care plan reviewed with patient and family. Patient and family verbalize understanding of the plan of care and contribute to goal setting.

## 2020-05-26 NOTE — PROGRESS NOTES
[address] RENAL TELEMETRY 6K  Occupational Therapy  Daily Note  Time:   Time In: 820  Time Out:   Timed Code Treatment Minutes: 45 Minutes  Minutes: 38          Date: 2020  Patient Name: Fredi Mayo,   Gender: female      Room: [facility]-A  MRN: [id number]  : 1932  (80 y.o.)  Referring Practitioner: Dr. Mirna Fernández  Diagnosis: systolic CHF   Additional Pertinent Hx: 80-year-old lady with past medical history of CAD status post CABG, A. fib, diabetes, CHF came to ER on 20 due to shortness of breath, and lower extremity swelling. Patient states this is been ongoing for several days. She apparently ran out of her Lasix few days ago and have time to get refill. She did call her PCP yesterday but was not able to get the prescription picked up on time. She states that with ambulation her shortness of breath is worsened. She also noticed more swelling of her legs    Restrictions/Precautions:  Restrictions/Precautions: Fall Risk, General Precautions    SUBJECTIVE: Nurse Hitesh chaidez session, In bed upon arrival, agreeable to OT session, Lengthy rest breaks due to recovering to find \"Normal\" breathing pattern    PAIN: 4/10: headache    COGNITION: Impaired Memory    ADL:   Grooming: with set-up. Combed hair sitting in bedside chair . BALANCE:  Standing Balance: Contact Guard Assistance. x 2 minutes with B UE support, task completed to increase Indpendence with toileting    BED MOBILITY:  Supine to Sit: Stand By Assistance, with head of bed raised, with increased time for completion  used bedrail    TRANSFERS:  Sit to Stand:  Contact Guard Assistance. EOB and bedside chair  Stand to Sit: Contact Guard Assistance. FUNCTIONAL MOBILITY:  Assistive Device: 4 Wheeled Walker  Assist Level:  Contact Guard Assistance.    Distance: EOB to bedside chair  No LOB     ADDITIONAL ACTIVITIES:  Pt tolerated BUE AROM this date x10 reps x1 set in chair in order to increase BUE strength and

## 2020-05-26 NOTE — PROGRESS NOTES
Assistance  Distance: 20 ft x 3 and 70 ft  Surface: Level Tile  Device:Rolling Walker  Gait Deviations: Forward Flexed Posture, Slow Deidre, Decreased Step Length Bilaterally and Decreased Gait Speed    Balance:  grossly steady including with standing to wash hands at sink. Exercise:  Patient was guided in 1 set(s) 10 reps of exercise to both lower extremities. Ankle pumps, Glut sets, Quad sets, Long arc quads, Hip abduction/adduction and Seated hip flexion. Exercises were completed for increased independence with functional mobility. Functional Outcome Measures: Completed  -PAC Inpatient Mobility without Stair Climbing Raw Score : 15  AM-PAC Inpatient without Stair Climbing T-Scale Score : 43.03    ASSESSMENT:  Assessment: Patient progressing toward established goals. and ambulating well with sessions. Activity Tolerance:  Patient tolerance of  treatment: good. Equipment Recommendations:Equipment Needed: No  Discharge Recommendations:   Continue to assess pending progress, Patient would benefit from continued therapy after discharge    Plan: Times per week: 5x GM  Times per day: Daily  Current Treatment Recommendations: Strengthening, Transfer Training, Endurance Training, Neuromuscular Re-education, Patient/Caregiver Education & Training, Balance Training, Gait Training, Home Exercise Program, Functional Mobility Training, Safety Education & Training    Patient Education  Patient Education: Plan of Care, Home Exercise Program    Goals:  Patient goals : return home, decrease shortness of breath  Short term goals  Time Frame for Short term goals: at discharge  Short term goal 1: Patient will complete supine < > sit with modified independence to transfer in/out of bed safely. Short term goal 2: Patient will complete sit < > stand with supervision to stand to ambulate safely. Short term goal 3: Patient will ambulate 80' with a 4WW and SBA to navigate living environment.   Long term goals  Time

## 2020-05-26 NOTE — PROGRESS NOTES
[facility]  SPEECH THERAPY MISSED TREATMENT NOTE  [facility] RENAL TELEMETRY 6K      Date: 2020  Patient Name: Nena Purdy        MRN: [id number]    : 1932  (80 y.o.)    REASON FOR MISSED TREATMENT:  Received new order to complete cognitive evaluation; upon attempt patient unavailable d/t working with PT Cale Berry. LUCAS Proctor reports good success with PO consumption.  ST to plan to re-attempt     VICENTE Hidalgo

## 2020-05-27 LAB
ANION GAP SERPL CALCULATED.3IONS-SCNC: 11 MEQ/L (ref 8–16)
BUN BLDV-MCNC: 14 MG/DL (ref 7–22)
CALCIUM SERPL-MCNC: 9 MG/DL (ref 8.5–10.5)
CHLORIDE BLD-SCNC: 98 MEQ/L (ref 98–111)
CO2: 26 MEQ/L (ref 23–33)
CREAT SERPL-MCNC: 0.5 MG/DL (ref 0.4–1.2)
GFR SERPL CREATININE-BSD FRML MDRD: > 90 ML/MIN/1.73M2
GLUCOSE BLD-MCNC: 187 MG/DL (ref 70–108)
GLUCOSE BLD-MCNC: 189 MG/DL (ref 70–108)
GLUCOSE BLD-MCNC: 193 MG/DL (ref 70–108)
GLUCOSE BLD-MCNC: 197 MG/DL (ref 70–108)
GLUCOSE BLD-MCNC: 246 MG/DL (ref 70–108)
PERFORMING LAB: NORMAL
POTASSIUM SERPL-SCNC: 3.5 MEQ/L (ref 3.5–5.2)
REPORT: NORMAL
SARS-COV-2, NAAT: NOT DETECTED
SARS-COV-2: NOT DETECTED
SODIUM BLD-SCNC: 135 MEQ/L (ref 135–145)

## 2020-05-27 PROCEDURE — 36415 COLL VENOUS BLD VENIPUNCTURE: CPT

## 2020-05-27 PROCEDURE — 6370000000 HC RX 637 (ALT 250 FOR IP): Performed by: NURSE PRACTITIONER

## 2020-05-27 PROCEDURE — 80048 BASIC METABOLIC PNL TOTAL CA: CPT

## 2020-05-27 PROCEDURE — 6370000000 HC RX 637 (ALT 250 FOR IP): Performed by: INTERNAL MEDICINE

## 2020-05-27 PROCEDURE — 97110 THERAPEUTIC EXERCISES: CPT

## 2020-05-27 PROCEDURE — 99232 SBSQ HOSP IP/OBS MODERATE 35: CPT | Performed by: PHYSICIAN ASSISTANT

## 2020-05-27 PROCEDURE — 97116 GAIT TRAINING THERAPY: CPT

## 2020-05-27 PROCEDURE — 1200000003 HC TELEMETRY R&B

## 2020-05-27 PROCEDURE — 97535 SELF CARE MNGMENT TRAINING: CPT

## 2020-05-27 PROCEDURE — 2580000003 HC RX 258: Performed by: INTERNAL MEDICINE

## 2020-05-27 PROCEDURE — 94760 N-INVAS EAR/PLS OXIMETRY 1: CPT

## 2020-05-27 PROCEDURE — 6370000000 HC RX 637 (ALT 250 FOR IP): Performed by: PHYSICIAN ASSISTANT

## 2020-05-27 PROCEDURE — U0002 COVID-19 LAB TEST NON-CDC: HCPCS

## 2020-05-27 PROCEDURE — 97129 THER IVNTJ 1ST 15 MIN: CPT

## 2020-05-27 PROCEDURE — 99232 SBSQ HOSP IP/OBS MODERATE 35: CPT | Performed by: INTERNAL MEDICINE

## 2020-05-27 PROCEDURE — 92523 SPEECH SOUND LANG COMPREHEN: CPT

## 2020-05-27 PROCEDURE — 82948 REAGENT STRIP/BLOOD GLUCOSE: CPT

## 2020-05-27 RX ORDER — HYDRALAZINE HYDROCHLORIDE 25 MG/1
25 TABLET, FILM COATED ORAL EVERY 8 HOURS SCHEDULED
Qty: 90 TABLET | Refills: 3 | DISCHARGE
Start: 2020-05-27 | End: 2020-05-28

## 2020-05-27 RX ORDER — FUROSEMIDE 40 MG/1
40 TABLET ORAL DAILY
Status: DISCONTINUED | OUTPATIENT
Start: 2020-05-27 | End: 2020-05-28 | Stop reason: HOSPADM

## 2020-05-27 RX ORDER — METOPROLOL TARTRATE 50 MG/1
25 TABLET, FILM COATED ORAL 2 TIMES DAILY
Qty: 180 TABLET | Refills: 3 | Status: ON HOLD | OUTPATIENT
Start: 2020-05-27 | End: 2021-03-02 | Stop reason: HOSPADM

## 2020-05-27 RX ORDER — POTASSIUM CHLORIDE 20 MEQ/1
20 TABLET, EXTENDED RELEASE ORAL DAILY
Qty: 30 TABLET | Refills: 1 | DISCHARGE
Start: 2020-05-27 | End: 2020-06-03

## 2020-05-27 RX ORDER — SODIUM CHLORIDE 1000 MG
2 TABLET, SOLUBLE MISCELLANEOUS 2 TIMES DAILY WITH MEALS
Qty: 120 TABLET | Refills: 1 | DISCHARGE
Start: 2020-05-27 | End: 2020-06-23

## 2020-05-27 RX ORDER — ISOSORBIDE DINITRATE 20 MG/1
20 TABLET ORAL 3 TIMES DAILY
Qty: 90 TABLET | Refills: 3 | DISCHARGE
Start: 2020-05-27

## 2020-05-27 RX ORDER — MULTIVITAMIN WITH IRON
1 TABLET ORAL DAILY
Qty: 30 TABLET | Refills: 0 | DISCHARGE
Start: 2020-05-28 | End: 2021-01-05 | Stop reason: SDUPTHER

## 2020-05-27 RX ADMIN — PANTOPRAZOLE SODIUM 40 MG: 40 TABLET, DELAYED RELEASE ORAL at 05:00

## 2020-05-27 RX ADMIN — SUCRALFATE 1 G: 1 TABLET ORAL at 07:54

## 2020-05-27 RX ADMIN — APIXABAN 2.5 MG: 2.5 TABLET, FILM COATED ORAL at 04:55

## 2020-05-27 RX ADMIN — HYDRALAZINE HYDROCHLORIDE 25 MG: 50 TABLET, FILM COATED ORAL at 04:55

## 2020-05-27 RX ADMIN — DOCUSATE SODIUM 100 MG: 100 CAPSULE, LIQUID FILLED ORAL at 21:04

## 2020-05-27 RX ADMIN — SUCRALFATE 1 G: 1 TABLET ORAL at 21:04

## 2020-05-27 RX ADMIN — DOCUSATE SODIUM 100 MG: 100 CAPSULE, LIQUID FILLED ORAL at 07:58

## 2020-05-27 RX ADMIN — DILTIAZEM HYDROCHLORIDE 30 MG: 30 TABLET, FILM COATED ORAL at 14:04

## 2020-05-27 RX ADMIN — HYDRALAZINE HYDROCHLORIDE 25 MG: 50 TABLET, FILM COATED ORAL at 14:04

## 2020-05-27 RX ADMIN — SUCRALFATE 1 G: 1 TABLET ORAL at 16:29

## 2020-05-27 RX ADMIN — CARBIDOPA AND LEVODOPA 2 TABLET: 25; 100 TABLET ORAL at 16:28

## 2020-05-27 RX ADMIN — CARBIDOPA AND LEVODOPA 2 TABLET: 25; 100 TABLET ORAL at 14:04

## 2020-05-27 RX ADMIN — ASPIRIN 81 MG: 81 TABLET ORAL at 07:58

## 2020-05-27 RX ADMIN — APIXABAN 2.5 MG: 2.5 TABLET, FILM COATED ORAL at 16:28

## 2020-05-27 RX ADMIN — CARBIDOPA AND LEVODOPA 2 TABLET: 25; 100 TABLET ORAL at 07:55

## 2020-05-27 RX ADMIN — ATORVASTATIN CALCIUM 20 MG: 20 TABLET, FILM COATED ORAL at 07:54

## 2020-05-27 RX ADMIN — DILTIAZEM HYDROCHLORIDE 30 MG: 30 TABLET, FILM COATED ORAL at 04:55

## 2020-05-27 RX ADMIN — ISOSORBIDE DINITRATE 20 MG: 20 TABLET ORAL at 21:04

## 2020-05-27 RX ADMIN — METOPROLOL TARTRATE 25 MG: 25 TABLET, FILM COATED ORAL at 21:04

## 2020-05-27 RX ADMIN — HYDRALAZINE HYDROCHLORIDE 25 MG: 50 TABLET, FILM COATED ORAL at 21:04

## 2020-05-27 RX ADMIN — ISOSORBIDE DINITRATE 20 MG: 20 TABLET ORAL at 14:04

## 2020-05-27 RX ADMIN — Medication 10 ML: at 07:59

## 2020-05-27 RX ADMIN — SUCRALFATE 1 G: 1 TABLET ORAL at 14:04

## 2020-05-27 RX ADMIN — ISOSORBIDE DINITRATE 20 MG: 20 TABLET ORAL at 07:55

## 2020-05-27 RX ADMIN — DILTIAZEM HYDROCHLORIDE 30 MG: 30 TABLET, FILM COATED ORAL at 21:04

## 2020-05-27 RX ADMIN — FUROSEMIDE 40 MG: 40 TABLET ORAL at 12:42

## 2020-05-27 RX ADMIN — THERA TABS 1 TABLET: TAB at 07:55

## 2020-05-27 RX ADMIN — CARBIDOPA AND LEVODOPA 2 TABLET: 25; 100 TABLET ORAL at 21:05

## 2020-05-27 RX ADMIN — SODIUM CHLORIDE TAB 1 GM 2 G: 1 TAB at 07:54

## 2020-05-27 RX ADMIN — METOPROLOL TARTRATE 25 MG: 25 TABLET, FILM COATED ORAL at 07:54

## 2020-05-27 RX ADMIN — SODIUM CHLORIDE TAB 1 GM 2 G: 1 TAB at 16:28

## 2020-05-27 NOTE — PROGRESS NOTES
[address] RENAL TELEMETRY 6K  Occupational Therapy  Daily Note  Time:  Time In: 809  Time Out:   Timed Code Treatment Minutes: 30 Minutes  Minutes: 30  Date: 2020  Patient Name: Laban Angelucci,   Gender: female      Room: [facility]001-A  MRN: [id number]  : 1932  (80 y.o.)  Referring Practitioner: Dr. Marylene Pine  Diagnosis: systolic CHF   Additional Pertinent Hx: 59-year-old lady with past medical history of CAD status post CABG, A. fib, diabetes, CHF came to ER on 20 due to shortness of breath, and lower extremity swelling. Patient states this is been ongoing for several days. She apparently ran out of her Lasix few days ago and have time to get refill. She did call her PCP yesterday but was not able to get the prescription picked up on time. She states that with ambulation her shortness of breath is worsened. She also noticed more swelling of her legs    Restrictions/Precautions:  Restrictions/Precautions: Fall Risk, General Precautions    SUBJECTIVE: Upon arrival RN with patient, ambulating to recliner with 4WW. Pt agreeable to therapy. PAIN: No c/o    COGNITION: Impaired Memory    ADL:   Grooming: Stand By Assistance. Pt completed brushing teeth, washing face, and brushing hair seated sink side on 4WW with VC for safety. Lower Extremity Dressing: Supervision. Donning depends. Toileting: Stand By Assistance for clothing mgmt. Toilet Transfer: Stand By Assistance. Prachi Bazzi BALANCE:  Sitting Balance:  Independent. Standing Balance: Stand By Assistance. BED MOBILITY:  Not Tested    TRANSFERS:  Sit to Stand:  Stand By Assistance. from recliner, 3ZR  Stand to Sit: Stand By Assistance. to recliner, 4IJ   VC for locking/unlocking brakes    FUNCTIONAL MOBILITY:  Assistive Device: 4 Wheeled Walker  Assist Level:  Stand By Assistance. Distance: To and from bathroom       ASSESSMENT:     Activity Tolerance:  Patient tolerance of  treatment: good.  Pt required Mod rest

## 2020-05-27 NOTE — PROGRESS NOTES
Hospitalist Progress Note      Patient:  Barbara Fisher    Unit/Bed:6K-01/001-A  YOB: 1932  MRN: [id number]   Acct: [de-identified]   PCP: Mauri Joseph MD  Date of Admission: 5/16/2020    Assessment/Plan:    1. Acute Hyponatremia: Sodium 135 on d/c. Pre-renal/cardiorenal in origin. Required 3% NS in ICU for period of time during admission. Patient's sodium improved to 135. Nephrology following with recommendations to continue sodium tablets 2g BID, potassium 20mEq daily, and can resume Lasix. 2. HFpEF with acute exacerbation: Improving. Non-compliant with diuretic therapy at home, s/p IV diuresis here, PO diuretics currently held s/p HANNAH. . -1.396 L and -4lbs since admission. Resuming Lasix 20mg daily upon discharge with initiation of potassium 20mEq daily. 3. Severe pulmonary hypertension: Noted on echo 5/18/20 with right ventricular systolic pressure 74-44 mmHg. 4. Normocytic Anemia: Mld and stable. Hgb 10.0, MCV 89.7. No signs of active bleeding. 5. Benign Essential HTN: Continue Lopressor, Isordil, and Hydralazine. 6. Parkinson's Disease: Continue Sinemet, stable. 7. History of CAD s/p CABG: Continue ASA/Statin/BB. 8. Atrial Fibrillation: Currently rate controlled on telemetry. Continue Eliquis, Lopressor, and Cardizem. 9. Type 2 Diabetes Mellitus: Hgb A1c 7.0% on 1/15/20. Has been on medium dose SSI during inpatient stay with stable blood glucose. Resume Januvia/Metformin. 10. History of Hyperlipidemia: Continue Lipitor. 11. GERD: Continue Protonix and Carafate. 12. Acute Hypoxic Respiratory Failure: Resolved. Likely secondary to severe pulmonary HTN and acute diastolic HF exacerbation. Maintaining O2 saturation on room air. 13. Hypomagnesemia: Continue Mag Oxide on discharge. 14. Possible Dementia: SLP MMSE pending. 15. Moderate Malnutrition: BMI 24.5.  Recommend patient continue dietitian recommendations outpatient Date    NITRU NEGATIVE 11/26/2018    WBCUA 25-50 11/26/2018    BACTERIA NONE 11/26/2018    RBCUA 3-5 11/26/2018    BLOODU Negative 12/28/2019    SPECGRAV 1.015 12/28/2019    GLUCOSEU NEGATIVE 11/26/2018       Radiology:  XR CHEST STANDARD (2 VW)   Final Result   1. Mild cardiomegaly. Metallic sternotomy sutures. 2. Tiny effusion right side. Small effusion left side. Moderate bibasilar atelectasis/pneumonia/edema. Appearance improved from prior. No pulmonary vascular congestion seen at this time. **This report has been created using voice recognition software. It may contain minor errors which are inherent in voice recognition technology. **      Final report electronically signed by Dr. Ilene Jane on 5/19/2020 8:15 AM      XR CHEST PORTABLE   Final Result   1. There is worsened pulmonary vascular congestion with bilateral perihilar and the basilar infiltrates. There are bilateral pleural effusions, moderate on the left and small on the right. Findings suggest worsening of congestive heart failure. Follow-up    chest radiograph recommended to confirm complete resolution. **This report has been created using voice recognition software. It may contain minor errors which are inherent in voice recognition technology. **      Final report electronically signed by Dr. Kyaw Toro on 5/17/2020 9:00 PM      XR CHEST PORTABLE   Final Result   1. There is pulmonary vascular congestion with bilateral perihilar and the basilar infiltrates. There is opacification of the left lower chest suggesting a small to moderate left pleural effusion. The hazy attenuation at the right lung base may represent    layering pleural fluid. The right clinical setting findings are consistent with congestive heart failure. Follow-up chest radiograph recommended to confirm complete resolution. **This report has been created using voice recognition software.   It may contain minor errors which are inherent in voice

## 2020-05-27 NOTE — PROGRESS NOTES
Wheeled Walker  Gait Deviations:  Slow william however noted good step length and heel strike at tere LEs, pt did c/o of fatigue at this distance         Exercise:  Patient was guided in 1 set(s) 15 reps of exercise to both lower extremities. Long arc quads, Seated hip flexion, Seated hamstring curls, Seated heel/toe raises and hip abd/add. Exercises were completed for increased independence with functional mobility. Functional Outcome Measures: Completed  AM-PAC Inpatient Mobility without Stair Climbing Raw Score : 15  AM-PAC Inpatient without Stair Climbing T-Scale Score : 43.03    ASSESSMENT:  Assessment: Patient progressing toward established goals. Activity Tolerance:  Patient tolerance of  treatment: good. Equipment Recommendations:Equipment Needed: No  Discharge Recommendations:    Continue to assess pending progress, Patient would benefit from continued therapy after discharge    Plan: Times per week: 5x GM  Times per day: Daily  Current Treatment Recommendations: Strengthening, Transfer Training, Endurance Training, Neuromuscular Re-education, Patient/Caregiver Education & Training, Balance Training, Gait Training, Home Exercise Program, Functional Mobility Training, Safety Education & Training    Patient Education  Patient Education: Plan of Care    Goals:  Patient goals : return home, decrease shortness of breath  Short term goals  Time Frame for Short term goals: at discharge  Short term goal 1: Patient will complete supine < > sit with modified independence to transfer in/out of bed safely. Short term goal 2: Patient will complete sit < > stand with supervision to stand to ambulate safely. Short term goal 3: Patient will ambulate 80' with a 4WW and SBA to navigate living environment. Long term goals  Time Frame for Long term goals : N/A due to short estimated length of stay    Following session, patient left in safe position with all fall risk precautions in place.

## 2020-05-27 NOTE — PROGRESS NOTES
[facility]    Renal inpatient Progress Note  5/27/2020 7:44 AM      Pt Name:   Edilma Wilkerson  YOB: 1932  Attending:   Nannette Melgoza PA-C    Chief Complaint:   Edilma Wilkerson is a 80 y.o. female being followed by nephrology for hyponatremia and CHF     Interval History : patient seen and examined by me. Feels ok. Denies any CP or SOB at rest does complain of some shortness of breath with some ambulation  No significant edema noted. Eating and drinking ok. Scheduled Medications :   sodium chloride  2 g Oral BID WC    insulin lispro  0-12 Units Subcutaneous TID WC    insulin lispro  0-6 Units Subcutaneous Nightly    multivitamin  1 tablet Oral Daily    docusate sodium  100 mg Oral BID    hydrALAZINE  25 mg Oral 3 times per day    isosorbide dinitrate  20 mg Oral TID    metoprolol tartrate  25 mg Oral BID    [Held by provider] furosemide  40 mg Oral Daily    apixaban  2.5 mg Oral BID    aspirin  81 mg Oral Daily    atorvastatin  20 mg Oral Daily    carbidopa-levodopa  2 tablet Oral 4x Daily    dilTIAZem  30 mg Oral 3 times per day    pantoprazole  40 mg Oral QAM AC    sucralfate  1 g Oral 4x Daily    magnesium replacement protocol   Other RX Placeholder    potassium replacement protocol   Other RX Placeholder    sodium chloride flush  10 mL Intravenous 2 times per day      dextrose          Vitals :  BP (!) 182/83   Pulse 51   Temp 98.1 °F (36.7 °C) (Oral)   Resp 16   Ht 5' 2\" (1.575 m)   Wt 133 lb 12.8 oz (60.7 kg)   SpO2 94%   BMI 24.47 kg/m²     24HR INTAKE/OUTPUT:      Intake/Output Summary (Last 24 hours) at 5/27/2020 0744  Last data filed at 5/27/2020 0425  Gross per 24 hour   Intake 996 ml   Output 1350 ml   Net -354 ml     Last 3 weights  Wt Readings from Last 3 Encounters:   05/27/20 133 lb 12.8 oz (60.7 kg)   01/07/20 123 lb (55.8 kg)   12/28/19 123 lb (55.8 kg)        Physical Exam :  General Appearance:  Well developed.  No distress  Mouth/Throat:  Oral mucosa moist  Neck:  Supple, no JVD  Lungs:  Breath sounds: clear  Heart[de-identified]  S1,S2 heard  Abdomen:  Soft, non - tender  Musculoskeletal:  Edema -none     Last 3 CBC  Recent Labs     05/26/20  0613   WBC 6.0   RBC 3.48*   HGB 10.0*   HCT 31.2*        Last 3 CMP  Recent Labs     05/25/20  0526 05/26/20  0613 05/27/20  0546   * 134* 135   K 3.6 3.6 3.5   CL 90* 97* 98   CO2 27 26 26   BUN 16 12 14   CREATININE 0.6 0.6 0.5   CALCIUM 9.2 8.8 9.0        Assessment / Plan   Renal - renal fx is stable at baseline.     Hyponatremia - as per urine lytes it looks more pre-renal/cardio-renal  · Sodium improved to almost 135 today    Essential hypertension-currently running higher. Continue current medications and resume diuretics  Borderline potassium start the patient on 20 mEq of KCl  Acute on chronic congestive heart failure-status post diuretics volume status appears to be well   Hx Diabetes mellitus  meds reviewed and D/W patient     Okay from renal standpoint for discharge please start the patient on potassium 20 mEq and salt tabs 2 g twice daily upon discharge follow-up in my clinic in 3 to 4 weeks with a BMP prior    DRAGAN Khan D.  [facility]. Immature thermoregulation

## 2020-05-27 NOTE — PROGRESS NOTES
Discharge teaching and instructions for diagnosis/procedure of SOB completed with patient using teachback method. AVS reviewed. Printed prescriptions given to patient. Patient voiced understanding regarding prescriptions, follow up appointments, and care of self at home. Discharged ambulatory to  skilled nursing per family. Report given to nurse informed still waiting for COVID results this evening. Staff stated OK to come later this evening.   Family will be here to sit with pt until dc

## 2020-05-27 NOTE — PROGRESS NOTES
recall and word finding  INTERVENTIONS: ST provided brief education re: compensatory memory and expressive language strategies to improve word finding. Patient with excellent implementation and use of strategies.      Goal 5: Monitor swallow function and complete evaluation as indicated  INTERVENTIONS:  DNT secondary to focus on cognitive tx    LONG TERM GOALS:  No LTGs due to [address]

## 2020-05-27 NOTE — CARE COORDINATION
5/27/20, 12:24 PM EDT    Patient goals/plan/ treatment preferences discussed by  and . Patient goals/plan/ treatment preferences reviewed with patient/ family. Patient/ family verbalize understanding of discharge plan and are in agreement with goal/plan/treatment preferences. Understanding was demonstrated using the teach back method. AVS provided by RN at time of discharge, which includes all necessary medical information pertaining to the patients current course of illness, treatment, post-discharge goals of care, and treatment preferences. Services After Discharge  Services At/After Discharge: Skilled Therapy, Nursing Services, Aide Services([facility])   IMM Letter  IMM Letter given to Patient/Family/Significant other/Guardian/POA/by[de-identified] care manager  IMM Letter date given[de-identified] 05/26/20  IMM Letter time given[de-identified] 4753     JOAO spoke with Amy Allison at [facility], they will accept pt today once Covid test results are back. [place] [facility] [place]. pts daughter, Aline Roman is aware and will transport. Blue envelope on chart with phone and fax numbers for ECF. RN will fax Covid results to ECF along with AVS.  LUCAS Proctor is aware.

## 2020-05-28 ENCOUNTER — APPOINTMENT (OUTPATIENT)
Dept: GENERAL RADIOLOGY | Age: 85
DRG: 291 | End: 2020-05-28
Payer: MEDICARE

## 2020-05-28 VITALS
WEIGHT: 133.8 LBS | SYSTOLIC BLOOD PRESSURE: 137 MMHG | BODY MASS INDEX: 24.62 KG/M2 | RESPIRATION RATE: 18 BRPM | HEIGHT: 62 IN | OXYGEN SATURATION: 94 % | HEART RATE: 71 BPM | DIASTOLIC BLOOD PRESSURE: 65 MMHG | TEMPERATURE: 97.4 F

## 2020-05-28 LAB
ANION GAP SERPL CALCULATED.3IONS-SCNC: 14 MEQ/L (ref 8–16)
BUN BLDV-MCNC: 11 MG/DL (ref 7–22)
CALCIUM SERPL-MCNC: 9.4 MG/DL (ref 8.5–10.5)
CHLORIDE BLD-SCNC: 95 MEQ/L (ref 98–111)
CO2: 26 MEQ/L (ref 23–33)
CREAT SERPL-MCNC: 0.5 MG/DL (ref 0.4–1.2)
EKG ATRIAL RATE: 153 BPM
EKG Q-T INTERVAL: 420 MS
EKG QRS DURATION: 144 MS
EKG QTC CALCULATION (BAZETT): 484 MS
EKG R AXIS: -48 DEGREES
EKG T AXIS: 111 DEGREES
EKG VENTRICULAR RATE: 80 BPM
GFR SERPL CREATININE-BSD FRML MDRD: > 90 ML/MIN/1.73M2
GLUCOSE BLD-MCNC: 221 MG/DL (ref 70–108)
GLUCOSE BLD-MCNC: 242 MG/DL (ref 70–108)
GLUCOSE BLD-MCNC: 344 MG/DL (ref 70–108)
GLUCOSE BLD-MCNC: 98 MG/DL (ref 70–108)
POTASSIUM SERPL-SCNC: 3.4 MEQ/L (ref 3.5–5.2)
PRO-BNP: 3461 PG/ML (ref 0–1800)
SODIUM BLD-SCNC: 135 MEQ/L (ref 135–145)

## 2020-05-28 PROCEDURE — 93005 ELECTROCARDIOGRAM TRACING: CPT | Performed by: PHYSICIAN ASSISTANT

## 2020-05-28 PROCEDURE — 97110 THERAPEUTIC EXERCISES: CPT

## 2020-05-28 PROCEDURE — 36415 COLL VENOUS BLD VENIPUNCTURE: CPT

## 2020-05-28 PROCEDURE — 80048 BASIC METABOLIC PNL TOTAL CA: CPT

## 2020-05-28 PROCEDURE — 6370000000 HC RX 637 (ALT 250 FOR IP): Performed by: PHYSICIAN ASSISTANT

## 2020-05-28 PROCEDURE — 97535 SELF CARE MNGMENT TRAINING: CPT

## 2020-05-28 PROCEDURE — 6370000000 HC RX 637 (ALT 250 FOR IP): Performed by: NURSE PRACTITIONER

## 2020-05-28 PROCEDURE — 6360000002 HC RX W HCPCS: Performed by: PHYSICIAN ASSISTANT

## 2020-05-28 PROCEDURE — 93010 ELECTROCARDIOGRAM REPORT: CPT | Performed by: INTERNAL MEDICINE

## 2020-05-28 PROCEDURE — 97530 THERAPEUTIC ACTIVITIES: CPT

## 2020-05-28 PROCEDURE — 99239 HOSP IP/OBS DSCHRG MGMT >30: CPT | Performed by: PHYSICIAN ASSISTANT

## 2020-05-28 PROCEDURE — 94760 N-INVAS EAR/PLS OXIMETRY 1: CPT

## 2020-05-28 PROCEDURE — 71045 X-RAY EXAM CHEST 1 VIEW: CPT

## 2020-05-28 PROCEDURE — 97116 GAIT TRAINING THERAPY: CPT

## 2020-05-28 PROCEDURE — 99232 SBSQ HOSP IP/OBS MODERATE 35: CPT | Performed by: INTERNAL MEDICINE

## 2020-05-28 PROCEDURE — 6370000000 HC RX 637 (ALT 250 FOR IP): Performed by: INTERNAL MEDICINE

## 2020-05-28 PROCEDURE — 82948 REAGENT STRIP/BLOOD GLUCOSE: CPT

## 2020-05-28 PROCEDURE — 83880 ASSAY OF NATRIURETIC PEPTIDE: CPT

## 2020-05-28 PROCEDURE — 2580000003 HC RX 258: Performed by: INTERNAL MEDICINE

## 2020-05-28 RX ORDER — HYDRALAZINE HYDROCHLORIDE 50 MG/1
50 TABLET, FILM COATED ORAL EVERY 8 HOURS SCHEDULED
Status: DISCONTINUED | OUTPATIENT
Start: 2020-05-28 | End: 2020-05-28 | Stop reason: HOSPADM

## 2020-05-28 RX ORDER — FUROSEMIDE 10 MG/ML
20 INJECTION INTRAMUSCULAR; INTRAVENOUS ONCE
Status: COMPLETED | OUTPATIENT
Start: 2020-05-28 | End: 2020-05-28

## 2020-05-28 RX ORDER — FUROSEMIDE 10 MG/ML
40 INJECTION INTRAMUSCULAR; INTRAVENOUS ONCE
Status: COMPLETED | OUTPATIENT
Start: 2020-05-28 | End: 2020-05-28

## 2020-05-28 RX ORDER — HYDRALAZINE HYDROCHLORIDE 25 MG/1
50 TABLET, FILM COATED ORAL EVERY 8 HOURS SCHEDULED
Qty: 90 TABLET | Refills: 3 | DISCHARGE
Start: 2020-05-28 | End: 2020-12-07

## 2020-05-28 RX ADMIN — FUROSEMIDE 20 MG: 10 INJECTION, SOLUTION INTRAMUSCULAR; INTRAVENOUS at 16:11

## 2020-05-28 RX ADMIN — ISOSORBIDE DINITRATE 20 MG: 20 TABLET ORAL at 09:22

## 2020-05-28 RX ADMIN — CARBIDOPA AND LEVODOPA 2 TABLET: 25; 100 TABLET ORAL at 09:22

## 2020-05-28 RX ADMIN — THERA TABS 1 TABLET: TAB at 09:22

## 2020-05-28 RX ADMIN — ISOSORBIDE DINITRATE 20 MG: 20 TABLET ORAL at 13:01

## 2020-05-28 RX ADMIN — Medication 10 ML: at 09:23

## 2020-05-28 RX ADMIN — POTASSIUM BICARBONATE 20 MEQ: 782 TABLET, EFFERVESCENT ORAL at 12:58

## 2020-05-28 RX ADMIN — PANTOPRAZOLE SODIUM 40 MG: 40 TABLET, DELAYED RELEASE ORAL at 05:09

## 2020-05-28 RX ADMIN — DOCUSATE SODIUM 100 MG: 100 CAPSULE, LIQUID FILLED ORAL at 09:22

## 2020-05-28 RX ADMIN — DILTIAZEM HYDROCHLORIDE 30 MG: 30 TABLET, FILM COATED ORAL at 05:08

## 2020-05-28 RX ADMIN — SODIUM CHLORIDE TAB 1 GM 2 G: 1 TAB at 09:22

## 2020-05-28 RX ADMIN — SODIUM CHLORIDE TAB 1 GM 2 G: 1 TAB at 17:18

## 2020-05-28 RX ADMIN — SUCRALFATE 1 G: 1 TABLET ORAL at 17:18

## 2020-05-28 RX ADMIN — APIXABAN 2.5 MG: 2.5 TABLET, FILM COATED ORAL at 17:18

## 2020-05-28 RX ADMIN — ATORVASTATIN CALCIUM 20 MG: 20 TABLET, FILM COATED ORAL at 09:22

## 2020-05-28 RX ADMIN — FUROSEMIDE 40 MG: 40 TABLET ORAL at 06:48

## 2020-05-28 RX ADMIN — FUROSEMIDE 40 MG: 10 INJECTION, SOLUTION INTRAMUSCULAR; INTRAVENOUS at 09:22

## 2020-05-28 RX ADMIN — APIXABAN 2.5 MG: 2.5 TABLET, FILM COATED ORAL at 05:08

## 2020-05-28 RX ADMIN — HYDRALAZINE HYDROCHLORIDE 10 MG: 20 INJECTION, SOLUTION INTRAMUSCULAR; INTRAVENOUS at 03:56

## 2020-05-28 RX ADMIN — HYDRALAZINE HYDROCHLORIDE 25 MG: 50 TABLET, FILM COATED ORAL at 05:08

## 2020-05-28 RX ADMIN — SUCRALFATE 1 G: 1 TABLET ORAL at 09:22

## 2020-05-28 RX ADMIN — HYDRALAZINE HYDROCHLORIDE 50 MG: 25 TABLET, FILM COATED ORAL at 15:31

## 2020-05-28 RX ADMIN — CARBIDOPA AND LEVODOPA 2 TABLET: 25; 100 TABLET ORAL at 13:00

## 2020-05-28 RX ADMIN — ASPIRIN 81 MG: 81 TABLET ORAL at 09:22

## 2020-05-28 RX ADMIN — CARBIDOPA AND LEVODOPA 2 TABLET: 25; 100 TABLET ORAL at 17:18

## 2020-05-28 RX ADMIN — DILTIAZEM HYDROCHLORIDE 30 MG: 30 TABLET, FILM COATED ORAL at 13:01

## 2020-05-28 RX ADMIN — SUCRALFATE 1 G: 1 TABLET ORAL at 13:00

## 2020-05-28 RX ADMIN — METOPROLOL TARTRATE 25 MG: 25 TABLET, FILM COATED ORAL at 09:22

## 2020-05-28 ASSESSMENT — PAIN SCALES - GENERAL: PAINLEVEL_OUTOF10: 0

## 2020-05-28 NOTE — PROGRESS NOTES
Discharge teaching and instructions for diagnosis/procedure of CHFcompleted with patient using teachback method. AVS reviewed. Printed prescriptions given to patient. Patient voiced understanding regarding prescriptions, follow up appointments, and care of self at home. Discharged in a wheelchair to  assisted living per family     This RN called facility and notified of discharge and gave report to nurse Alvarez. AVS, last dose mar, outpatient labwork, and negativeg covid test faxed as well as sent with patient in blue folder. No further questions.

## 2020-05-28 NOTE — PROGRESS NOTES
[facility]    Renal inpatient Progress Note  5/28/2020 9:17 AM      Pt Name:   Fredi Mayo  YOB: 1932  Attending:   Yeison Maier PA-C    Chief Complaint:   Fredi Mayo is a 80 y.o. female being followed by nephrology for hyponatremia and CHF     Interval History : patient seen and examined by me. Feels ok. Denies any CP or SOB now. However she was apparently short of breath last night and her oxygen saturation was also low  CXR showed some congestion. .     Scheduled Medications :   furosemide  40 mg Intravenous Once    hydrALAZINE  50 mg Oral 3 times per day    [Held by provider] furosemide  40 mg Oral Daily    sodium chloride  2 g Oral BID WC    insulin lispro  0-12 Units Subcutaneous TID WC    insulin lispro  0-6 Units Subcutaneous Nightly    multivitamin  1 tablet Oral Daily    docusate sodium  100 mg Oral BID    isosorbide dinitrate  20 mg Oral TID    metoprolol tartrate  25 mg Oral BID    apixaban  2.5 mg Oral BID    aspirin  81 mg Oral Daily    atorvastatin  20 mg Oral Daily    carbidopa-levodopa  2 tablet Oral 4x Daily    dilTIAZem  30 mg Oral 3 times per day    pantoprazole  40 mg Oral QAM AC    sucralfate  1 g Oral 4x Daily    magnesium replacement protocol   Other RX Placeholder    potassium replacement protocol   Other RX Placeholder    sodium chloride flush  10 mL Intravenous 2 times per day      dextrose          Vitals :  BP (!) 168/72 Comment: manual   Pulse 80   Temp 98 °F (36.7 °C) (Oral)   Resp 20   Ht 5' 2\" (1.575 m)   Wt 133 lb 12.8 oz (60.7 kg)   SpO2 94%   BMI 24.47 kg/m²     24HR INTAKE/OUTPUT:      Intake/Output Summary (Last 24 hours) at 5/28/2020 0917  Last data filed at 5/27/2020 2028  Gross per 24 hour   Intake 320 ml   Output 0 ml   Net 320 ml     Last 3 weights  Wt Readings from Last 3 Encounters:   05/27/20 133 lb 12.8 oz (60.7 kg)   01/07/20 123 lb (55.8 kg)   12/28/19 123 lb (55.8 kg)        Physical Exam :  General

## 2020-05-28 NOTE — DISCHARGE INSTR - DIET
daily intake, or drain them well before serving. · Space your liquids throughout the day. Then you won't be tempted to drink more than the amount your doctor recommends. · To relieve thirst without taking in extra water, try chewing gum, sucking on hard candy (sugarless if you have diabetes), or rinsing your mouth with water and spitting it out. Where can you learn more? Go to [email or web address].[email or web address] org and sign in to your [employer] account. Enter O951 in the VideoBurst box to learn more about \"Fluid Restriction: Care Instructions. \"     If you do not have an account, please click on the \"Sign Up Now\" link. Current as of: December 16, 2019               Content Version: 12.5  © 1597-5174 [employer]wise, Incorporated. Care instructions adapted under license by [facility] ([facility]). If you have questions about a medical condition or this instruction, always ask your healthcare professional. [place] [address] any warranty or liability for your use of this information.

## 2020-05-28 NOTE — PROGRESS NOTES
[address] [address]  INPATIENT PHYSICAL THERAPY  DAILY NOTE  [facility] RENAL TELEMETRY 6K - 6K-01/001-A      Time In: 0805  Time Out: 7745  Timed Code Treatment Minutes: 44 Minutes  Minutes: 39          Date: 2020  Patient Name: Fredi Mayo,  Gender:  female        MRN: [id number]  : 1932  (80 y.o.)  Referral Date : 20  Referring Practitioner: Ana Gary MD  Diagnosis: Systolic CHF, acute on chronic  Additional Pertinent Hx: Per H&P: 72-year-old lady with past medical history of CAD status post CABG, A. fib, diabetes, CHF came to ER due to shortness of breath, and lower extremity swelling. Patient states this is been ongoing for several days. She apparently ran out of her Lasix few days ago and have time to get refill. She did call her PCP yesterday but was not able to get the prescription picked up on time. She states that with ambulation her shortness of breath is worsened. She also noticed more swelling of her legs. Denies any chest pain, fevers, chills. No nausea, vomiting, diarrhea. She does feel weak and more sleepy.      Prior Level of Function:  Lives With: Alone  Type of Home: Assisted living([place])  Home Layout: One level  Home Access: Level entry  Home Equipment: 4 wheeled walker   Bathroom Shower/Tub: Walk-in shower, Shower chair with back  Bathroom Toilet: Handicap height  Bathroom Accessibility: Accessible    ADL Assistance: Independent  Ambulation Assistance: Independent  Transfer Assistance: Independent  Additional Comments: Pt stating she was indep with all ADL's and mobility at [facility]     Restrictions/Precautions:  Restrictions/Precautions: Fall Risk, General Precautions    SUBJECTIVE: pt up in bathroom on arrival nursing reported that she had been SOB this am, pt agreeable and eager for therapy she did state that she has been feeling anxious about leaving today, she needed cues for deep breathing and slower breaths throughout session did monitor her sats during

## 2020-05-28 NOTE — PROGRESS NOTES
[address] [address]  SPEECH THERAPY MISSED TREATMENT NOTE  [facility] RENAL TELEMETRY 6K      Date: 2020  Patient Name: Laban Angelucci        MRN: [id number]    : 1932  (80 y.o.)    REASON FOR MISSED TREATMENT:  Attempted to see patient for skilled speech therapy treatment; upon attempt RN reporting \"discharged to [facility] within the hour. \" ST entered patient's room; briefly reviewed [address] [place] [address] evaluation completed . Patient's daughter inquiring about swallowing therapy; reporting \"patient does well if she taker her time. \"  ST recommending follow up speech therapy services at [facility] to complete BSE + Cognitive evaluation. Patient and daughter; highly receptive to follow up therapy services.      Nicol Patel M.S. David Ferrari

## 2020-05-28 NOTE — DISCHARGE SUMMARY
Hospitalist Discharge Summary        Patient: Corey Patterson  YOB: 1932  MRN: [id number]   Acct: [de-identified]    Primary Care Physician: Garvin Merlin, MD    Admit date  5/16/2020    Discharge date:  5/28/2020 2:40 PM    Chief Complaint on presentation :-  Hyponatremia    Discharge Assessment and Plan:-   1. Acute Hyponatremia: Sodium 135 on d/c. Pre-renal/cardiorenal in origin. Required 3% NS in ICU for period of time during admission. Patient's sodium improved to 135. Nephrology following with recommendations to continue sodium tablets 2g BID, potassium 20mEq daily, and resume home dose of Lasix. Repeat BMP in 3 weeks, f/u with nephrology in 4 weeks. 2. HFpEF with acute exacerbation: Improving. Non-compliant with diuretic therapy at home, s/p IV diuresis here, PO diuretics were held for several days due to HANNAH. She developed SOB on 5/28/20 and CXR showed pulmonary edema. Patient received IV Lasix x2 doses and was maintaining O2 saturation >90% on RA in addition to while ambulating with therapy. Patient stable for d/c with recommendations to continue outpatient diuresis. Resuming Lasix 20mg daily upon discharge with initiation of potassium 20mEq daily.   3. Severe pulmonary hypertension: Noted on echo 5/18/20 with right ventricular systolic pressure 79-08 mmHg. On hydralazine/Isordil  4. Normocytic Anemia: Mild and stable. Hgb 10.0, MCV 89.7. No signs of active bleeding.   5. Benign Essential HTN: Continue Lopressor, Isordil, and Hydralazine.    6. Parkinson's Disease: Continue Sinemet, stable.    7. History of CAD s/p CABG: Continue ASA/Statin/BB.   8. Atrial Fibrillation: Currently rate controlled on telemetry. Continue Eliquis, Lopressor, and Cardizem.   9. Type 2 Diabetes Mellitus: Hgb A1c 7.0% on 1/15/20. Has been on medium dose SSI during inpatient stay with stable blood glucose, last check 98. Resume Januvia/Metformin on d/c.   10. History of Hyperlipidemia: Continue Lipitor.   11.  GERD: Continue Protonix and Carafate. 12. Acute Hypoxic Respiratory Failure: Resolved. Likely secondary to severe pulmonary HTN and acute diastolic HF exacerbation. Maintaining O2 saturation on room air.   13. Hypomagnesemia: Continue Mag Oxide on discharge.    14. Possible Dementia: SLP MMSE on 5/28/20 with [facility] 18/30. Will recommend continued ST evaluation at [facility] ([place], [place]). 15. Moderate Malnutrition: BMI 24.5. Recommend patient continue dietitian recommendations outpatient including ONS, Activia yogurt, and multivitamin    Initial H and P and Hospital course:-  \"Tabitha Carter is an 80-year-old  female, lifetime non-smoker, with a medical history of arthritis, history of atypical chest pain, coronary artery disease, chronic left bundle branch block, type 2 diabetes mellitus, esophageal stricture, coronary artery disease, GERD, history of gastritis, history of skin cancer, hypercholesterolemia, hyperlipidemia, hypertension, imbalance, lactose intolerance, Parkinson's disease, restless leg syndrome, history of CABG, and a history of small cell carcinoma. Patient presented to [facility] on 5/16/2020 complaints of shortness of breath and lower extremity swelling.  Patient this is been now for several days and apparently ran out of her Lasix a few days prior to coming into the hospital. Le Avila did report calling her PCP the day prior to coming into the ER she was not able to get the prescription picked up at that time. Le Avila stated she is becoming short of breath with ambulation notice more swelling in her legs.  The patient did have an elevated blood pressure upon arrival to the ER and chest x-ray demonstrated pulmonary vascular congestion.  She was placed on IV Lasix and admitted to the medical floor. **From chart review:  Memo Dove cardiology was consulted.  Continued with diuresis.  Possible need for thoracentesis.  Free water was restricted.  5/18 she continued on 1 L nasal cannula.  She had diuresed.  5/19 nephrology was consulted for hyponatremia and fluid overload.  Diuretics were held and patient was placed on a 1200 mL fluid restriction and given salt tabs and IV fluids.  She did have a significant decline in her sodium 11 M EQ's in 48 hours.  On 5/20 she was sent to the ICU for 3% saline.  5/21 3% saline was continued.  Patient did have improvement of sodium, goal sodium is 125.  Patient will remain in ICU until reach that goal.  5/22 extensive discussion with JERED Villalba, patient is a DNR CCA.  No plans for invasive work-up at this time. The hospitalist service was contacted for further evaluation, treatment, and management. \"     On 5/22/20, hypertonic saline was discontinued as sodium had begun to improve to 125. On 5/23/20 patient had second dose of loop diuretics, patient had rapid correction of sodium requiring D5W with sodium then correcting nicely and repeat sodium 123. Nephrology following. On 5/25 sodium had improved to 129, urine electrolytes were indicating possible intravascular volume depletion. Normal saline was resumed with salt tablets per nephrology On 5/26, sodium had improved to 134. Nephrology discontinued IVF at that time and patient appeared to be euvolemic. Discussion was made to have patient discharged to [facility] pending covid results. On 5/27/20 her sodium improved to 135. Potassium 3.5. Case was discussed with nephrology who agreed with patient resuming Lasix and being discharged home with sodium tablets 2g BID and potassium 20mEq daily. Patient was to be discharged to [facility] this evening pending negative covid test with plans for repeat BMP in 3 weeks and f/u with nephrology in 3-4 week. Patient stayed overnight 5/27/20 due to covid test not resulting. Resulted on 5/28/2020 is negative. Overnight patient developed acute onset shortness of breath requiring 2 L oxygen for short period of time.   Chest x-ray showed pulmonary edema, this is likely due EQUIVALENT TO  APIXABAN     aspirin 81 MG EC tablet     atorvastatin 20 MG tablet  Commonly known as:  LIPITOR  TAKE 1 TABLET DAILY     blood glucose test strips strip  Commonly known as:  Contour Test  USE TO TEST BLOOD SUGAR TWICE DAILY. carbidopa-levodopa  MG per tablet  Commonly known as:  SINEMET  Take 2 tablets by mouth 4 times daily     Cranberry 500 MG Caps     furosemide 20 MG tablet  Commonly known as:  LASIX  TAKE 1 TABLET BY MOUTH  DAILY     magnesium oxide 400 MG tablet  Commonly known as:  MAG-OX     metFORMIN 850 MG tablet  Commonly known as:  GLUCOPHAGE  Take 1 tablet by mouth 2 times daily (with meals)     Misc.  Devices Misc  1 each by Does not apply route once for 1 dose Param Glucose Meter; Diagnosis:E11.65     omeprazole 20 MG delayed release capsule  Commonly known as:  PRILOSEC  Take 1 capsule by mouth daily     PROBIOTIC ADVANCED PO     sucralfate 1 GM tablet  Commonly known as:  CARAFATE  Take 1 tablet by mouth 4 times daily     VITAMIN D3 PO        STOP taking these medications    NONFORMULARY     rotigotine 2 MG/24HR  Commonly known as:  Neupro           Where to Get Your Medications      These medications were sent to [id number] - [place], [place] - [address] [facility] P [phone] - F [phone]  . [address], [facility] [address]    Phone:  [phone]   · metoprolol tartrate 50 MG tablet     Information about where to get these medications is not yet available    Ask your nurse or doctor about these medications  · hydrALAZINE 25 MG tablet  · isosorbide dinitrate 20 MG tablet  · multivitamin Tabs tablet  · potassium chloride 20 MEQ extended release tablet  · sodium chloride 1 g tablet          Labs :-  Recent Results (from the past 72 hour(s))   POCT glucose    Collection Time: 05/25/20  4:00 PM   Result Value Ref Range    POC Glucose 170 (H) 70 - 108 mg/dl   POCT glucose    Collection Time: 05/25/20  8:51 PM   Result Value Ref Range    POC Glucose 157 (H) 70 - 108 mg/dl   Basic Metabolic Panel    Collection Time: 05/26/20  6:13 AM   Result Value Ref Range    Sodium 134 (L) 135 - 145 meq/L    Potassium 3.6 3.5 - 5.2 meq/L    Chloride 97 (L) 98 - 111 meq/L    CO2 26 23 - 33 meq/L    Glucose 175 (H) 70 - 108 mg/dL    BUN 12 7 - 22 mg/dL    CREATININE 0.6 0.4 - 1.2 mg/dL    Calcium 8.8 8.5 - 10.5 mg/dL   CBC    Collection Time: 05/26/20  6:13 AM   Result Value Ref Range    WBC 6.0 4.8 - 10.8 thou/mm3    RBC 3.48 (L) 4.20 - 5.40 mill/mm3    Hemoglobin 10.0 (L) 12.0 - 16.0 gm/dl    Hematocrit 31.2 (L) 37.0 - 47.0 %    MCV 89.7 81.0 - 99.0 fL    MCH 28.7 26.0 - 33.0 pg    MCHC 32.1 (L) 32.2 - 35.5 gm/dl    RDW-CV 13.2 11.5 - 14.5 %    RDW-SD 43.3 35.0 - 45.0 fL    Platelets 903 735 - 183 thou/mm3    MPV 9.1 (L) 9.4 - 12.4 fL   Anion Gap    Collection Time: 05/26/20  6:13 AM   Result Value Ref Range    Anion Gap 11.0 8.0 - 16.0 meq/L   Glomerular Filtration Rate, Estimated    Collection Time: 05/26/20  6:13 AM   Result Value Ref Range    Est, Glom Filt Rate >90 ml/min/1.73m2   COVID-19    Collection Time: 05/26/20 10:15 AM   Result Value Ref Range    SARS-CoV-2 NOT DETECTED NOT DETECT    Report SEE REPORT     Performing Lab [facility]    POCT glucose    Collection Time: 05/26/20 11:20 AM   Result Value Ref Range    POC Glucose 236 (H) 70 - 108 mg/dl   POCT glucose    Collection Time: 05/26/20  4:19 PM   Result Value Ref Range    POC Glucose 143 (H) 70 - 108 mg/dl   POCT glucose    Collection Time: 05/26/20 10:12 PM   Result Value Ref Range    POC Glucose 202 (H) 70 - 108 mg/dl   Basic Metabolic Panel    Collection Time: 05/27/20  5:46 AM   Result Value Ref Range    Sodium 135 135 - 145 meq/L    Potassium 3.5 3.5 - 5.2 meq/L    Chloride 98 98 - 111 meq/L    CO2 26 23 - 33 meq/L    Glucose 193 (H) 70 - 108 mg/dL    BUN 14 7 - 22 mg/dL    CREATININE 0.5 0.4 - 1.2 mg/dL    Calcium 9.0 8.5 - 10.5 mg/dL   Anion Gap    Collection Time: 05/27/20  5:46 AM   Result Value Ref Range    Anion Gap 11.0 Microbiology:    Blood culture #1:   Lab Results   Component Value Date    BC No growth-preliminary  No growth   10/17/2018       Blood culture #2:No results found for: Elia Katz    Organism:  No results found for: LABGRAM    MRSA culture only:No results found for: [facility]    Urine culture:   Lab Results   Component Value Date    LABURIN No growth-preliminary  No growth   12/14/2018     Lab Results   Component Value Date    ORG Mixed Growth 11/26/2018        Respiratory culture: No results found for: CULTRESP    Aerobic and Anaerobic :  No results found for: LABAERO  No results found for: LABANAE    Urinalysis:      Lab Results   Component Value Date    NITRU NEGATIVE 11/26/2018    WBCUA 25-50 11/26/2018    BACTERIA NONE 11/26/2018    RBCUA 3-5 11/26/2018    BLOODU Negative 12/28/2019    SPECGRAV 1.015 12/28/2019    GLUCOSEU NEGATIVE 11/26/2018       Radiology:-  Xr Chest Portable    Result Date: 5/17/2020  PROCEDURE: XR CHEST PORTABLE CLINICAL INFORMATION: Dyspnea; congestive heart failure. COMPARISON: 5/16/2020. TECHNIQUE: AP portable chest radiograph performed. FINDINGS: POSTSURGICAL CHANGES: 1. There are stable median sternotomy wires. 2. There is a stable suture anchors right humeral head. LINES/TUBES/MECHANICAL DEVICES: None. TRACHEA/HEART/MEDIASTINUM/HILUM: 1. There is stable mild prominence of the cardiac silhouette which is partially obscured. 2. There is stable atheromatous calcification at the thoracic aorta. LUNG FIELDS: 1. There is worsened pulmonary vascular congestion with bilateral perihilar and the basilar infiltrates. There are bilateral pleural effusions, moderate on the left and small on the right. Findings suggest worsening of congestive heart failure. Follow-up  chest radiograph recommended to confirm complete resolution. OTHER: None. PNEUMOTHORAX:  None. OSSEOUS STRUCTURES: 1. No acute osseous abnormality. 2. The bony structures are osteopenic.  3. There is spurring along the undersurface

## 2020-05-28 NOTE — PROGRESS NOTES
[address] RENAL TELEMETRY 6K  Occupational Therapy  Daily Note  Time:   Time In: 1500  Time Out: 1523  Timed Code Treatment Minutes: 23 Minutes  Minutes: 23          Date: 2020  Patient Name: Kyung Shah,   Gender: female      [facility]: [facility]  MRN: [id number]  : 1932  (80 y.o.)  Referring Practitioner: Dr. Regina Carlson  Diagnosis: systolic CHF   Additional Pertinent Hx: 80-year-old lady with past medical history of CAD status post CABG, A. fib, diabetes, CHF came to ER on 20 due to shortness of breath, and lower extremity swelling. Patient states this is been ongoing for several days. She apparently ran out of her Lasix few days ago and have time to get refill. She did call her PCP yesterday but was not able to get the prescription picked up on time. She states that with ambulation her shortness of breath is worsened. She also noticed more swelling of her legs    Restrictions/Precautions:  Restrictions/Precautions: Fall Risk, General Precautions    SUBJECTIVE: Nurse Eddi dixon'yelena session, Up in chair upon arrival, agreeable to OT session, daughter present    PAIN: 0/10:     COGNITION: WFL    ADL:   Grooming: Stand By Assistance. Washed hands standing sinkside  Lower Extremity Dressing: Stand By Assistance. for changing depends  Toileting: Stand By Assistance. for clothing management  Toilet Transfer: Stand By Assistance. STS, used grab bar. Incontinent of bladder upon arrival  BED MOBILITY:  Not Tested    TRANSFERS:  Sit to Stand:  Stand By Assistance. bedside chair  Stand to Sit: Stand By Assistance. FUNCTIONAL MOBILITY:  Assistive Device: 4 wheeled Walker  Assist Level:  Contact Guard Assistance. Distance: To and from bathroom      ASSESSMENT:     Activity Tolerance:  Patient tolerance of  treatment: good.        Discharge Recommendations: 24 hour supervision or assist, Subacute/Skilled Nursing Facility, Continue to assess pending progress  Equipment Recommendations: Equipment Needed: No  Plan: Times per week: 5x    Patient Education  Patient Education: Deep breathing    Goals  Short term goals  Time Frame for Short term goals: by discharge  Short term goal 1: Pt to complete bathing tasks with min A and no vcs for safety   Short term goal 2: Pt to increaase endurance to navigate throughout room using AD with SBA and no increase in SOB or decrease in sats during to icnrease ease of copleting ADLs in her apartment  Short term goal 3: Pt to dmeo dynamic standing > 3 min with CGA and no UE support to reach outside base of support into closet and cupboards/drwers with no LOB   Short term goal 4: Pt to complete LB dressing tasks with min A     Following session, patient left in safe position with all fall risk precautions in place.

## 2020-06-01 ENCOUNTER — TELEPHONE (OUTPATIENT)
Dept: FAMILY MEDICINE CLINIC | Age: 85
End: 2020-06-01

## 2020-06-01 NOTE — TELEPHONE ENCOUNTER
[facility] Up Call    Call within 2 business days of discharge: Yes     Patient: Maria Eugenia Foreman Patient : 1932   MRN: [id number]  Reason for Admission: Hyponatremia  Discharge Date: 20 RARS: Readmission Risk Score: 20       Spoke with: Orly(daughter)    Discharge department/facility: [facility]    Non-face-to-face services provided:  Scheduled appointment with PCP-Dr Ty Wyatt, 06/15/2020    Follow Up  Future Appointments   Date Time Provider J Carlos Patterson   6/3/2020  1:30 PM ROWDY Schaeffer - CNP SRPX CHF P - [place]   2020  9:20 AM MD ADMIR Gonzalez [facility] [email or web address]   8/3/2020  1:45 PM Logan Ashby MD [address] [place] [address] [facility] AM [email or web address]   2020 11:00 AM Lul Lane MD [address] [place] [address]

## 2020-06-03 ENCOUNTER — OFFICE VISIT (OUTPATIENT)
Dept: CARDIOLOGY CLINIC | Age: 85
End: 2020-06-03
Payer: MEDICARE

## 2020-06-03 ENCOUNTER — TELEPHONE (OUTPATIENT)
Dept: CARDIOLOGY CLINIC | Age: 85
End: 2020-06-03

## 2020-06-03 VITALS
SYSTOLIC BLOOD PRESSURE: 128 MMHG | BODY MASS INDEX: 25.42 KG/M2 | OXYGEN SATURATION: 93 % | WEIGHT: 139 LBS | DIASTOLIC BLOOD PRESSURE: 84 MMHG | HEART RATE: 60 BPM

## 2020-06-03 LAB
ANION GAP SERPL CALCULATED.3IONS-SCNC: 12 MEQ/L (ref 8–16)
BUN BLDV-MCNC: 8 MG/DL (ref 7–22)
CALCIUM SERPL-MCNC: 9 MG/DL (ref 8.5–10.5)
CHLORIDE BLD-SCNC: 99 MEQ/L (ref 98–111)
CO2: 24 MEQ/L (ref 23–33)
CREAT SERPL-MCNC: 0.5 MG/DL (ref 0.4–1.2)
GFR SERPL CREATININE-BSD FRML MDRD: > 90 ML/MIN/1.73M2
GLUCOSE BLD-MCNC: 170 MG/DL (ref 70–108)
MAGNESIUM: 1.1 MG/DL (ref 1.6–2.4)
POTASSIUM SERPL-SCNC: 4.1 MEQ/L (ref 3.5–5.2)
SODIUM BLD-SCNC: 135 MEQ/L (ref 135–145)

## 2020-06-03 PROCEDURE — G8417 CALC BMI ABV UP PARAM F/U: HCPCS | Performed by: NURSE PRACTITIONER

## 2020-06-03 PROCEDURE — 4040F PNEUMOC VAC/ADMIN/RCVD: CPT | Performed by: NURSE PRACTITIONER

## 2020-06-03 PROCEDURE — 99214 OFFICE O/P EST MOD 30 MIN: CPT | Performed by: NURSE PRACTITIONER

## 2020-06-03 PROCEDURE — 1123F ACP DISCUSS/DSCN MKR DOCD: CPT | Performed by: NURSE PRACTITIONER

## 2020-06-03 PROCEDURE — 36415 COLL VENOUS BLD VENIPUNCTURE: CPT | Performed by: NURSE PRACTITIONER

## 2020-06-03 PROCEDURE — G8427 DOCREV CUR MEDS BY ELIG CLIN: HCPCS | Performed by: NURSE PRACTITIONER

## 2020-06-03 PROCEDURE — 1090F PRES/ABSN URINE INCON ASSESS: CPT | Performed by: NURSE PRACTITIONER

## 2020-06-03 PROCEDURE — 1111F DSCHRG MED/CURRENT MED MERGE: CPT | Performed by: NURSE PRACTITIONER

## 2020-06-03 PROCEDURE — 1036F TOBACCO NON-USER: CPT | Performed by: NURSE PRACTITIONER

## 2020-06-03 RX ORDER — FUROSEMIDE 20 MG/1
20 TABLET ORAL 2 TIMES DAILY
Qty: 30 TABLET | Refills: 0
Start: 2020-06-03 | End: 2020-06-17

## 2020-06-03 RX ORDER — MAGNESIUM OXIDE 400 MG/1
400 TABLET ORAL 3 TIMES DAILY
Qty: 30 TABLET | Refills: 3
Start: 2020-06-03

## 2020-06-03 RX ORDER — ACETAMINOPHEN 160 MG
TABLET,CHEWABLE ORAL
COMMUNITY
End: 2020-06-03 | Stop reason: SDUPTHER

## 2020-06-03 RX ORDER — POTASSIUM CHLORIDE 20 MEQ/1
10 TABLET, EXTENDED RELEASE ORAL 3 TIMES DAILY
Qty: 30 TABLET | Refills: 1 | Status: ON HOLD
Start: 2020-06-03 | End: 2021-08-13

## 2020-06-03 ASSESSMENT — ENCOUNTER SYMPTOMS
COLOR CHANGE: 0
COUGH: 0
SHORTNESS OF BREATH: 0
NAUSEA: 0
ABDOMINAL PAIN: 0
WHEEZING: 0
CHEST TIGHTNESS: 0
APNEA: 0
ABDOMINAL DISTENTION: 0

## 2020-06-03 NOTE — PATIENT INSTRUCTIONS
You may receive a survey regarding the care you received during your visit. Your input is valuable to us. We encourage you to complete and return your survey. We hope you will choose us in the future for your healthcare needs. Continue:  · Take all medications as prescribed   · Daily weights and record - please try to weigh yourself upon awakening before eating or drinking   · Fluid restriction of 2 Liters per day (64 oz)  · Limit sodium in diet to around 7695-5233 mg/day  · Monitor BP  · Activity as tolerated     Call the [employer] for any of the following symptoms: [phone]   Weight gain of 3 pounds in 1 day or 5 pounds in 1 week   Increased shortness of breath   Shortness of breath while laying down   Cough   Chest pain   Swelling in feet, ankles or legs   Tenderness or bloating in the abdomen   Fatigue    Decreased appetite or feeling \"full\"    Nausea    Confusion     **PLEASE bring all medications in original bottles with you to your next appointment**    Educational websites:    http://[email or web address]-[email or web address] org (American Heart Association)    PromotionalReview.nl. com ([employer])

## 2020-06-03 NOTE — PROGRESS NOTES
[facility] [place]       Visit Date: 6/3/2020  Cardiologist:  Dr. Amy Combs  Primary Care Physician: Dr. Xiomy Bourgeois MD    Maria Eugenia Foreman is a 80 y.o. female who presents today for:  Chief Complaint   Patient presents with    Congestive Heart Failure     new       HPI: Obtained from patient and chart    Maria Eugenia Foreman is a 80 y.o. female who presents to the office for a new patient visit in the heart failure clinic. Hx A fib on Eliquis, EFpEF 55-60% RSVP 60-65 mmHg, CAD CABG, Negative stress 11/2018, HTN, DM, Parkinson's. Recent admission for hyponatremia. Apparently was noncompliant with her diuretics at home [facility] (ran out?), also had CHF. She is now at [facility] for rehab. She has been working with PT, walks the dunne and rode the stationary bike for 20 minutes. She has increased LE edema over the past few days. She is wearing compression socks. She was sent home on sodium chloride tabs. She can perform ADLs without SOB or fatigue. Sleeps in a hospital bed with [facility] elevated 30 degrees. No CP pr palpitations. Accompanied by: daughter Jan Moss  Last hospital admission related to Heart Failure:  May 2020  Chest Pain: no  Worsening SOB: no  Worsening Orthopnea/PND: no  Edema: yes  Any extra diuretic use: no  Weight gain: no  Fatigue: no  Abdominal bloating: no  Appetite: good  DEBO: no  Cough: no  Compliant checking home weight: yes  Compliant checking blood pressure: yes      Past Medical History:   Diagnosis Date    Arthritis     Atypical chest pain     CAD (coronary artery disease)     Chronic LBBB (left bundle branch block)     Diabetes mellitus type II     Esophageal stricture     History of esophageal stricture.  FH: CAD (coronary artery disease)     Mom and dad both with heart disease at mid to late age.      GERD (gastroesophageal reflux disease)     History of gastritis     History of skin cancer     Hypercholesterolemia     Hyperlipidemia  Hypertension     Imbalance     As far as imbalance is concerned, asked patient to follow-up with Dr. Ananth Deras since she follows with him on a regular basis.  Lactose intolerance     Nummular dermatitis     Parkinson's disease ([address] 2009    Restless legs syndrome (RLS)     S/P CABG (coronary artery bypass graft)     SCC (squamous cell carcinoma)      Past Surgical History:   Procedure Laterality Date    CARDIAC SURGERY      CARDIOVASCULAR STRESS TEST  4 09 2009    Gated SPECT images revealed normal global wall motion with EF of 60%. Persantine test associated w/nonspecific symptoms. EKG is nondiagnostic w/baseline LBBB. No obvious stress-induced ischemia by Cardiolite. EF within normal limits.  CARDIOVASCULAR STRESS TEST  7 10 2007    The gated SPECT images revealed normal wall motion with EF of 69%. Poor exercise tolerance w/SOB on exertion. EKG is nondiagnostic. Cardiolite scan revealing no obvious stress-induced ischemia. EF 60%.  CARDIOVASCULAR STRESS TEST  2 03 2006    Fair exercise tolerance w/SOB on exertion. No EKG evidence of ischemia. Patient should be able to proceed with phase II cardiac rehab.  CATARACT REMOVAL      CATARACT REMOVAL  06/08/2016    AND 6/29/16    DIAGNOSTIC CARDIAC CATH LAB PROCEDURE  12 23 2005    LV was obtained. AGEE projection showed preserved LV function w/estimated EF at 55%. No significant MR. Patent left main coronary artery. Tortuous LAD which appeared to be patent. Patent left circumflex artery. High-grade stenosis around 99% in very large dominant RCA w/heavy calcification at the ostium. Normal LV function.  DOPPLER ECHOCARDIOGRAPHY  4 09 2009    Global LV function w/in lower limits of normal, with mild anteroseptal hypokinesis. EF of 50-55%. Light LVH. Mild to moderate left atrial enlargement. Calcific aortic and mitral valve w/no obvious stenosis. No obvious pericardial effusion.      DOPPLER ECHOCARDIOGRAPHY  7 10 2007    LV function w/in lower limits of normal w/peridoxical septal wall motion. Moderate left atrial enlargement. Mild MR. Mild TR. Calcified valves w/no obvious stenosis. No pericardial effusion.  DOPPLER ECHOCARDIOGRAPHY  4 14 2006    There appears to be significant improvement from previous echo w/complete resolution of pericardial effusion and normal LV function. EF of 60%.  DOPPLER ECHOCARDIOGRAPHY  3 21 2006    Normal LV function. Mild LV hypertrophy. Mild biatrial enlargement. Mildly calcific aortic and mitral valve w/no stenosis. Mild MR. Mild TR. Minimal residual pericardial effusion w/significant improvement from previous echo.  DOPPLER ECHOCARDIOGRAPHY  3 16 2006    Interval change from previous echocardiogram w/worsening of effusion. Correlation clinically. Consideration of pericardial centesis. Will obtain a [facility] consult.  DOPPLER ECHOCARDIOGRAPHY  1 31 2006    No significant change from previous echo. The 2D echo showed moderate degree of pericardial effusion. It does seem to be worst or better than previous echo. No evidence of tamponade.  DOPPLER ECHOCARDIOGRAPHY  1 27 2006    Normal global LV function. Mild biatrial enlargement. Mildly sclerotic aortic & mitral valve w/no stenosis. Mild MR. Mild TR. Small to moderate pericardial effusion w/no obvious tamponade.      JOINT REPLACEMENT      MOHS SURGERY Right 2/13/2019    MOHS DEFECT REPAIR CYSTIC SCC RIGHT LOWER LATERAL LEG WITH SKIN GRAFT FROM RIGHT GROIN (FULL THICKNESS ) performed by Ramses Mora MD at [facility] / [address] [place] [address] Left 12/20/13    left leg lesion excision x2, frozen section, skin graft closure    ROTATOR CUFF REPAIR  09/2001    RIGHT    ROTATOR CUFF REPAIR  04/15/2009    LEFT     SKIN CANCER EXCISION  06/2006      Rt leg    SPINAL CORD STIMULATOR IMPLANTATION N/A 12/4/2018    PERMANENT INTERSTIM performed by Danae Kirby MD at [facility] History   Problem 4,000 Units by mouth daily 4 sprays twice a day on tongue      Probiotic Product (PROBIOTIC ADVANCED PO) Take 1 tablet by mouth daily      Cranberry 500 MG CAPS Take 500 mg by mouth daily 2 tab      aspirin 81 MG EC tablet Take 81 mg by mouth daily.  blood glucose test strips (CONTOUR TEST) strip USE TO TEST BLOOD SUGAR TWICE DAILY. 200 strip 3    Misc. Devices MISC 1 each by Does not apply route once for 1 dose Param Glucose Meter; Diagnosis:E11.65 1 Device 0     No current facility-administered medications for this visit. Allergies   Allergen Reactions    Latex Rash    Lisinopril Swelling     mouth    Tape Osie Tu Tape] Itching    Keflex [Cephalexin] Nausea And Vomiting and Rash    Lactulose Diarrhea    Morphine Rash    Polysporin [Bacitracin-Polymyxin B] Rash       SUBJECTIVE:   Review of Systems   Constitutional: Negative for activity change, appetite change, fatigue and fever. HENT: Negative for congestion. Respiratory: Negative for apnea, cough, chest tightness, shortness of breath and wheezing. Cardiovascular: Positive for leg swelling. Negative for chest pain and palpitations. Gastrointestinal: Negative for abdominal distention, abdominal pain and nausea. Genitourinary: Negative for difficulty urinating and dysuria. Musculoskeletal: Positive for gait problem (walker). Negative for arthralgias. Skin: Negative for color change. Neurological: Negative for dizziness, numbness and headaches. Psychiatric/Behavioral: Negative for agitation, confusion and sleep disturbance. The patient is not nervous/anxious. OBJECTIVE:   Today's Vitals:  /84 (Site: Right Upper Arm)   Pulse 60   Wt 139 lb (63 kg)   SpO2 93%   BMI 25.42 kg/m²     Physical Exam  Vitals signs reviewed. Constitutional:       Appearance: She is well-developed. HENT:      Head: Normocephalic and atraumatic. Eyes:      Pupils: Pupils are equal, round, and reactive to light.    Neck: Findings      Mitral Valve   Calcification of the mitral valve noted. DOPPLER: The transmitral velocity was within the normal range with no   evidence for mitral stenosis. Mild to Moderate mitral regurgitation is present. Aortic Valve   Mildly calcified and thickened aortic valve. The aortic valve leaflets were not well visualized. DOPPLER: Transaortic velocity was within the normal range with no evidence   of aortic stenosis. There was no evidence of aortic regurgitation. Tricuspid Valve   The tricuspid valve structure is normal with normal leaflet separation. DOPPLER: There is no evidence of tricuspid stenosis. Moderate tricuspid regurgitation. Pulmonic Valve   The pulmonic valve was not well visualized . Left Atrium   Moderately dilated left atrium. Left Ventricle   Left ventricular size and systolic function is normal. Ejection fraction   was estimated at 55-60%. LV wall thickness is within normal limits. Right Atrium   Right atrial size was normal.      Right Ventricle   Mildly dilated right ventricle. Right ventricular systolic pressure of 80-56 mm Hg consistent with severe   pulmonary hypertension. Pericardial Effusion   The pericardium appears normal with no evidence of a pericardial effusion. Pleural Effusion   Left pleural effusion. Aorta / Great Vessels   -Aortic root dimension within normal limits. -IVC size is within normal limits with normal respiratory phasic changes.          Results reviewed:  BNP:   Lab Results   Component Value Date    PROBNP 3461.0 (H) 05/28/2020     CBC:   Lab Results   Component Value Date    WBC 6.0 05/26/2020    RBC 3.48 05/26/2020    RBC 0-5 09/21/2018    HGB 10.0 05/26/2020    HCT 31.2 05/26/2020     05/26/2020     CMP:    Lab Results   Component Value Date     06/03/2020    K 4.1 06/03/2020    K 3.8 05/24/2020    CL 99 06/03/2020    CO2 24 06/03/2020    BUN 8 06/03/2020    CREATININE 0.5 06/03/2020

## 2020-06-04 ENCOUNTER — TELEPHONE (OUTPATIENT)
Dept: CARDIOLOGY CLINIC | Age: 85
End: 2020-06-04

## 2020-06-04 ENCOUNTER — HOSPITAL ENCOUNTER (OUTPATIENT)
Dept: NURSING | Age: 85
Discharge: HOME | End: 2020-06-04
Payer: COMMERCIAL

## 2020-06-04 VITALS
SYSTOLIC BLOOD PRESSURE: 192 MMHG | HEART RATE: 74 BPM | RESPIRATION RATE: 22 BRPM | TEMPERATURE: 97.7 F | DIASTOLIC BLOOD PRESSURE: 78 MMHG | OXYGEN SATURATION: 94 %

## 2020-06-04 PROCEDURE — 96365 THER/PROPH/DIAG IV INF INIT: CPT

## 2020-06-04 PROCEDURE — 6360000002 HC RX W HCPCS: Performed by: NURSE PRACTITIONER

## 2020-06-04 PROCEDURE — 96366 THER/PROPH/DIAG IV INF ADDON: CPT

## 2020-06-04 RX ORDER — MAGNESIUM SULFATE IN WATER 40 MG/ML
4 INJECTION, SOLUTION INTRAVENOUS ONCE
Status: COMPLETED | OUTPATIENT
Start: 2020-06-04 | End: 2020-06-04

## 2020-06-04 RX ADMIN — MAGNESIUM SULFATE IN WATER 4 G: 40 INJECTION, SOLUTION INTRAVENOUS at 08:57

## 2020-06-04 NOTE — PROGRESS NOTES
1305:  To restroom with assistance. Void large amt.    1310: Tolerated infusion well and pt discharged via wc in care of daughter. Instructions given with verbal understanding.     _m___ Safety:       (Environmental)   [place] to environment   Ensure ID band is correct and in place/ allergy band as needed   Assess for fall risk   Initiate fall precautions as applicable (fall band, side rails, etc.)   Call light within reach   Bed in low position/ wheels locked    __m__ Pain:        Assess pain level and characteristics   Administer analgesics as ordered   Assess effectiveness of pain management and report to MD as needed    m____ Knowledge Deficit:   Assess baseline knowledge   Provide teaching at level of understanding   Provide teaching via preferred learning method   Evaluate teaching effectiveness    _m___ Hemodynamic/Respiratory Status:       (Pre and Post Procedure Monitoring)   Assess/Monitor vital signs and LOC   Assess Baseline SpO2 prior to any sedation   Obtain weight/height   Assess vital signs/ LOC until patient meets discharge criteria   Monitor procedure site and notify MD of any issues    _

## 2020-06-04 NOTE — PROGRESS NOTES
0958 pt arrives to [facility] for Magnesium infusion. All questions and concerns addressed. Family at bedside  3098 PATIENT RIGHTS AND RESPONSIBILITIES SHEET OFFERED TO PT TO READ.  0900 call light in reach, side rail up x1.  Warm blankets given  0930 _m___ Safety:       (Environmental)   [place] to environment   Ensure ID band is correct and in place/ allergy band as needed   Assess for fall risk   Initiate fall precautions as applicable (fall band, side rails, etc.)   Call light within reach   Bed in low position/ wheels locked    _m___ Pain:        Assess pain level and characteristics   Administer analgesics as ordered   Assess effectiveness of pain management and report to MD as needed    __m__ Knowledge Deficit:   Assess baseline knowledge   Provide teaching at level of understanding   Provide teaching via preferred learning method   Evaluate teaching effectiveness    _m___ Hemodynamic/Respiratory Status:       (Pre and Post Procedure Monitoring)   Assess/Monitor vital signs and LOC   Assess Baseline SpO2 prior to any sedation   Obtain weight/height   Assess vital signs/ LOC until patient meets discharge criteria   Monitor procedure site and notify MD of any issues    __m__ Infection-Risk of Central Venous Catheter:   Monitor for infection signs and symptoms (catheter site redness, temperature elevation, etc)   Assess for infection risks   Educate regarding infection prevention   Manage central venous catheter (flushes/ dressing changes per protocol)  1000 tolerating snack, call light in reach  1022 up to restroom with 2 assist. Large void noted  1115 up to restroom with 2 assist, large void noted  1215 denies needs

## 2020-06-09 ENCOUNTER — TELEPHONE (OUTPATIENT)
Dept: CARDIOLOGY CLINIC | Age: 85
End: 2020-06-09

## 2020-06-09 LAB
BUN BLDV-MCNC: 8 MG/DL
CALCIUM SERPL-MCNC: 8.7 MG/DL
CHLORIDE BLD-SCNC: 100 MMOL/L
CO2: 27 MMOL/L
CREAT SERPL-MCNC: 0.6 MG/DL
GFR CALCULATED: 95
GLUCOSE BLD-MCNC: 163 MG/DL
MAGNESIUM: 1.5 MG/DL
POTASSIUM SERPL-SCNC: 4.3 MMOL/L
SODIUM BLD-SCNC: 134 MMOL/L

## 2020-06-10 NOTE — TELEPHONE ENCOUNTER
Sounds like she is improving  Will defer the knee pain to her PCP  No changes from my standpoint  Please confirm the ECF is weighing daily and to notify us for 3 lb weight gain/day or 5 lb/week, increased swelling, bloating or SOB or orthopnea

## 2020-06-17 RX ORDER — FUROSEMIDE 20 MG/1
TABLET ORAL
Qty: 30 TABLET | Refills: 2 | Status: SHIPPED | OUTPATIENT
Start: 2020-06-17 | End: 2020-06-23

## 2020-06-19 LAB
BUN BLDV-MCNC: 11 MG/DL
CALCIUM SERPL-MCNC: 9.8 MG/DL
CHLORIDE BLD-SCNC: 98 MMOL/L
CO2: 26 MMOL/L
CREAT SERPL-MCNC: 0.7 MG/DL
GFR CALCULATED: 79
GLUCOSE BLD-MCNC: 194 MG/DL
POTASSIUM SERPL-SCNC: 4.1 MMOL/L
SODIUM BLD-SCNC: 136 MMOL/L

## 2020-06-23 ENCOUNTER — OFFICE VISIT (OUTPATIENT)
Dept: NEPHROLOGY | Age: 85
End: 2020-06-23
Payer: MEDICARE

## 2020-06-23 VITALS
OXYGEN SATURATION: 97 % | HEART RATE: 101 BPM | WEIGHT: 131 LBS | SYSTOLIC BLOOD PRESSURE: 134 MMHG | TEMPERATURE: 97.2 F | BODY MASS INDEX: 23.96 KG/M2 | DIASTOLIC BLOOD PRESSURE: 59 MMHG

## 2020-06-23 PROCEDURE — G8427 DOCREV CUR MEDS BY ELIG CLIN: HCPCS | Performed by: INTERNAL MEDICINE

## 2020-06-23 PROCEDURE — 4040F PNEUMOC VAC/ADMIN/RCVD: CPT | Performed by: INTERNAL MEDICINE

## 2020-06-23 PROCEDURE — 1036F TOBACCO NON-USER: CPT | Performed by: INTERNAL MEDICINE

## 2020-06-23 PROCEDURE — 99213 OFFICE O/P EST LOW 20 MIN: CPT | Performed by: INTERNAL MEDICINE

## 2020-06-23 PROCEDURE — G8420 CALC BMI NORM PARAMETERS: HCPCS | Performed by: INTERNAL MEDICINE

## 2020-06-23 PROCEDURE — 1090F PRES/ABSN URINE INCON ASSESS: CPT | Performed by: INTERNAL MEDICINE

## 2020-06-23 PROCEDURE — 1123F ACP DISCUSS/DSCN MKR DOCD: CPT | Performed by: INTERNAL MEDICINE

## 2020-06-23 PROCEDURE — 1111F DSCHRG MED/CURRENT MED MERGE: CPT | Performed by: INTERNAL MEDICINE

## 2020-06-23 RX ORDER — SODIUM CHLORIDE 1000 MG
1 TABLET, SOLUBLE MISCELLANEOUS 2 TIMES DAILY WITH MEALS
Qty: 120 TABLET | Refills: 1 | Status: SHIPPED | OUTPATIENT
Start: 2020-06-23 | End: 2020-12-22

## 2020-06-23 RX ORDER — FUROSEMIDE 40 MG/1
40 TABLET ORAL DAILY
COMMUNITY
End: 2020-07-24 | Stop reason: ALTCHOICE

## 2020-06-23 NOTE — PROGRESS NOTES
pericardial centesis. Will obtain a [facility] consult.  DOPPLER ECHOCARDIOGRAPHY  1 31 2006    No significant change from previous echo. The 2D echo showed moderate degree of pericardial effusion. It does seem to be worst or better than previous echo. No evidence of tamponade.  DOPPLER ECHOCARDIOGRAPHY  1 27 2006    Normal global LV function. Mild biatrial enlargement. Mildly sclerotic aortic & mitral valve w/no stenosis. Mild MR. Mild TR. Small to moderate pericardial effusion w/no obvious tamponade.      JOINT REPLACEMENT      MOHS SURGERY Right 2/13/2019    MOHS DEFECT REPAIR CYSTIC SCC RIGHT LOWER LATERAL LEG WITH SKIN GRAFT FROM RIGHT GROIN (FULL THICKNESS ) performed by Manuel Redmond MD at [address] [place] [address] [facility] / [address] [place] [address] Left 12/20/13    left leg lesion excision x2, frozen section, skin graft closure    ROTATOR CUFF REPAIR  09/2001    RIGHT    ROTATOR CUFF REPAIR  04/15/2009    LEFT     SKIN CANCER EXCISION  06/2006      Rt leg    SPINAL CORD STIMULATOR IMPLANTATION N/A 12/4/2018    [facility]        Medications :     Outpatient Medications Marked as Taking for the 6/23/20 encounter (Office Visit) with Irving Vergara MD   Medication Sig Dispense Refill    lactase (LACTAID ULTRA) 9000 units TABS Take 9,000 Units by mouth 3 times daily (before meals)      furosemide (LASIX) 40 MG tablet Take 40 mg by mouth daily      potassium chloride (KLOR-CON M) 20 MEQ extended release tablet Take 0.5 tablets by mouth 3 times daily 30 tablet 1    magnesium oxide (MAG-OX) 400 MG tablet Take 1 tablet by mouth 3 times daily 30 tablet 3    hydrALAZINE (APRESOLINE) 25 MG tablet Take 2 tablets by mouth every 8 hours 90 tablet 3    isosorbide dinitrate (ISORDIL) 20 MG tablet Take 1 tablet by mouth 3 times daily 90 tablet 3    metoprolol tartrate (LOPRESSOR) 50 MG tablet Take 0.5 tablets by mouth 2 times daily 180

## 2020-07-08 ENCOUNTER — TELEPHONE (OUTPATIENT)
Dept: FAMILY MEDICINE CLINIC | Age: 85
End: 2020-07-08

## 2020-07-08 LAB
MAGNESIUM: 1.7 MG/DL
SODIUM BLD-SCNC: 134 MMOL/L

## 2020-07-08 NOTE — TELEPHONE ENCOUNTER
Recent kidney problems, and allergy to morphine limit pain med options.   May trial voltaren gel, apply to knees qid, rx escribed

## 2020-07-08 NOTE — TELEPHONE ENCOUNTER
Patient's caregiver Abdi Natarajan called today at the request of Huong Lepe that patient is requesting a stronger pain med for knee and leg pain. She did get injections while at [address] [place] [address] Con by Dr Janet Ga but those did not help. Taking Tylenol ES presently. She had a virtual visit with you yesterday.

## 2020-07-09 ENCOUNTER — PATIENT MESSAGE (OUTPATIENT)
Dept: FAMILY MEDICINE CLINIC | Age: 85
End: 2020-07-09

## 2020-07-09 ENCOUNTER — TELEPHONE (OUTPATIENT)
Dept: CARDIOLOGY CLINIC | Age: 85
End: 2020-07-09

## 2020-07-09 NOTE — TELEPHONE ENCOUNTER
From: Rain Mcarthur  To: Kelsey Garnett MD  Sent: 7/9/2020 7:11 AM EDT  Subject: Visit Follow-Up Question    Sorry for several emails, but I talked to my mom and her weight is 116lbs, it was 119 the day before. When she saw Dr. Tess Spatz on 6/23/20 her weight was 131, and he said in his office note maintain her weight well. He also mentioned he might repeat a sodium level in 2 weeks. Should I reach out to him about my concerns? My mom said yes, her knees used to hurt her before but nothing like what they hurt now and it's just not her knee's its her legs her back her muscles, she just aches/hurts all over.     Moncho Andrade

## 2020-07-15 ENCOUNTER — OFFICE VISIT (OUTPATIENT)
Dept: FAMILY MEDICINE CLINIC | Age: 85
End: 2020-07-15
Payer: MEDICARE

## 2020-07-15 VITALS
TEMPERATURE: 98.1 F | DIASTOLIC BLOOD PRESSURE: 58 MMHG | OXYGEN SATURATION: 95 % | HEART RATE: 68 BPM | BODY MASS INDEX: 22.2 KG/M2 | RESPIRATION RATE: 16 BRPM | SYSTOLIC BLOOD PRESSURE: 110 MMHG | WEIGHT: 121.4 LBS

## 2020-07-15 PROCEDURE — 1036F TOBACCO NON-USER: CPT | Performed by: FAMILY MEDICINE

## 2020-07-15 PROCEDURE — 1090F PRES/ABSN URINE INCON ASSESS: CPT | Performed by: FAMILY MEDICINE

## 2020-07-15 PROCEDURE — G8420 CALC BMI NORM PARAMETERS: HCPCS | Performed by: FAMILY MEDICINE

## 2020-07-15 PROCEDURE — G8427 DOCREV CUR MEDS BY ELIG CLIN: HCPCS | Performed by: FAMILY MEDICINE

## 2020-07-15 PROCEDURE — 1123F ACP DISCUSS/DSCN MKR DOCD: CPT | Performed by: FAMILY MEDICINE

## 2020-07-15 PROCEDURE — 4040F PNEUMOC VAC/ADMIN/RCVD: CPT | Performed by: FAMILY MEDICINE

## 2020-07-15 PROCEDURE — 99214 OFFICE O/P EST MOD 30 MIN: CPT | Performed by: FAMILY MEDICINE

## 2020-07-15 RX ORDER — HYDRALAZINE HYDROCHLORIDE 50 MG/1
TABLET, FILM COATED ORAL
COMMUNITY
Start: 2020-07-03 | End: 2020-07-15

## 2020-07-15 RX ORDER — METFORMIN HYDROCHLORIDE 500 MG/1
500 TABLET, EXTENDED RELEASE ORAL 2 TIMES DAILY
Qty: 180 TABLET | Refills: 3 | Status: SHIPPED | OUTPATIENT
Start: 2020-07-15

## 2020-07-15 RX ORDER — POTASSIUM CHLORIDE 750 MG/1
CAPSULE, EXTENDED RELEASE ORAL
COMMUNITY
Start: 2020-06-26 | End: 2020-07-15

## 2020-07-15 ASSESSMENT — PATIENT HEALTH QUESTIONNAIRE - PHQ9
2. FEELING DOWN, DEPRESSED OR HOPELESS: 1
SUM OF ALL RESPONSES TO PHQ QUESTIONS 1-9: 2
SUM OF ALL RESPONSES TO PHQ QUESTIONS 1-9: 2
1. LITTLE INTEREST OR PLEASURE IN DOING THINGS: 1
SUM OF ALL RESPONSES TO PHQ9 QUESTIONS 1 & 2: 2

## 2020-07-16 ASSESSMENT — ENCOUNTER SYMPTOMS
DIARRHEA: 1
SHORTNESS OF BREATH: 0
ABDOMINAL PAIN: 0
SORE THROAT: 0
BACK PAIN: 0
EYES NEGATIVE: 1
CHEST TIGHTNESS: 0
COUGH: 0
NAUSEA: 0
VOMITING: 0
RHINORRHEA: 0

## 2020-07-16 NOTE — PROGRESS NOTES
[address] [place] [facility] [address]  Dept: [phone]  Dept Fax: [phone]  Loc: [phone]  PROGRESS NOTE      VisitDate: 7/15/2020    Valente Carmona is a 80 y.o. female who presents today for:     Chief Complaint   Patient presents with    Diarrhea     ongoing for a couple months, has bowel and urinary incontinence. Has been taking a new protien powder and has had some cramping with it.  Knee Pain     has had cortisone shots but has not helped. Whole body hurts, achey and weak.  Other     has a sore on buttock that she has had for a few months         Subjective:  HPI  Patient is brought in by daughter. She has been having diarrhea intermittently for months. She is on immediate release Metformin. History of Parkinson's stable. Having diffuse joint pain most notably in the knees but also experiencing in the shoulders elbows hands. States she hurts all over. Also has a sore on her buttocks that needs checked. Review of Systems   Constitutional: Negative for activity change, appetite change, fatigue and fever. HENT: Negative for congestion, rhinorrhea and sore throat. Eyes: Negative. Respiratory: Negative for cough, chest tightness and shortness of breath. Cardiovascular: Negative for chest pain and palpitations. Gastrointestinal: Positive for diarrhea. Negative for abdominal pain, nausea and vomiting. Genitourinary: Negative for dysuria and urgency. Musculoskeletal: Positive for arthralgias and gait problem. Negative for back pain. Skin: Positive for wound. Neurological: Positive for tremors. Negative for dizziness and headaches. Psychiatric/Behavioral: Negative for dysphoric mood. The patient is not nervous/anxious.       Past Medical History:   Diagnosis Date    Arthritis     Atypical chest pain     CAD (coronary artery disease)     Chronic LBBB (left bundle branch block)     Diabetes mellitus type II     Esophageal stricture     History of esophageal stricture.  FH: CAD (coronary artery disease)     Mom and dad both with heart disease at mid to late age.  GERD (gastroesophageal reflux disease)     History of gastritis     History of skin cancer     Hypercholesterolemia     Hyperlipidemia     Hypertension     Imbalance     As far as imbalance is concerned, asked patient to follow-up with Dr. Jed Garsia since she follows with him on a regular basis.  Lactose intolerance     Nummular dermatitis     Parkinson's disease ([address] 2009    Restless legs syndrome (RLS)     S/P CABG (coronary artery bypass graft)     SCC (squamous cell carcinoma)       Past Surgical History:   Procedure Laterality Date    CARDIAC SURGERY      CARDIOVASCULAR STRESS TEST  4 09 2009    Gated SPECT images revealed normal global wall motion with EF of 60%. Persantine test associated w/nonspecific symptoms. EKG is nondiagnostic w/baseline LBBB. No obvious stress-induced ischemia by Cardiolite. EF within normal limits.  CARDIOVASCULAR STRESS TEST  7 10 2007    The gated SPECT images revealed normal wall motion with EF of 69%. Poor exercise tolerance w/SOB on exertion. EKG is nondiagnostic. Cardiolite scan revealing no obvious stress-induced ischemia. EF 60%.  CARDIOVASCULAR STRESS TEST  2 03 2006    Fair exercise tolerance w/SOB on exertion. No EKG evidence of ischemia. Patient should be able to proceed with phase II cardiac rehab.  CATARACT REMOVAL      CATARACT REMOVAL  06/08/2016    AND 6/29/16    DIAGNOSTIC CARDIAC CATH LAB PROCEDURE  12 23 2005    LV was obtained. AGEE projection showed preserved LV function w/estimated EF at 55%. No significant MR. Patent left main coronary artery. Tortuous LAD which appeared to be patent. Patent left circumflex artery. High-grade stenosis around 99% in very large dominant RCA w/heavy calcification at the ostium. Normal LV function.      DOPPLER ECHOCARDIOGRAPHY  4 09 2009    Global LV function w/in lower limits of normal, with mild anteroseptal hypokinesis. EF of 50-55%. Light LVH. Mild to moderate left atrial enlargement. Calcific aortic and mitral valve w/no obvious stenosis. No obvious pericardial effusion.  DOPPLER ECHOCARDIOGRAPHY  7 10 2007    LV function w/in lower limits of normal w/peridoxical septal wall motion. Moderate left atrial enlargement. Mild MR. Mild TR. Calcified valves w/no obvious stenosis. No pericardial effusion.  DOPPLER ECHOCARDIOGRAPHY  4 14 2006    There appears to be significant improvement from previous echo w/complete resolution of pericardial effusion and normal LV function. EF of 60%.  DOPPLER ECHOCARDIOGRAPHY  3 21 2006    Normal LV function. Mild LV hypertrophy. Mild biatrial enlargement. Mildly calcific aortic and mitral valve w/no stenosis. Mild MR. Mild TR. Minimal residual pericardial effusion w/significant improvement from previous echo.  DOPPLER ECHOCARDIOGRAPHY  3 16 2006    Interval change from previous echocardiogram w/worsening of effusion. Correlation clinically. Consideration of pericardial centesis. Will obtain a [facility] consult.  DOPPLER ECHOCARDIOGRAPHY  1 31 2006    No significant change from previous echo. The 2D echo showed moderate degree of pericardial effusion. It does seem to be worst or better than previous echo. No evidence of tamponade.  DOPPLER ECHOCARDIOGRAPHY  1 27 2006    Normal global LV function. Mild biatrial enlargement. Mildly sclerotic aortic & mitral valve w/no stenosis. Mild MR. Mild TR. Small to moderate pericardial effusion w/no obvious tamponade.      JOINT REPLACEMENT      MOHS SURGERY Right 2/13/2019    MOHS DEFECT REPAIR CYSTIC SCC RIGHT LOWER LATERAL LEG WITH SKIN GRAFT FROM RIGHT GROIN (FULL THICKNESS ) performed by Shar Lanza MD at [facility] / [address] [place] [address] Left 12/20/13    left leg lesion excision x2, frozen section, skin graft closure    ROTATOR CUFF REPAIR  09/2001    RIGHT    ROTATOR CUFF REPAIR  04/15/2009    LEFT     SKIN CANCER EXCISION  06/2006      Rt leg    SPINAL CORD STIMULATOR IMPLANTATION N/A 12/4/2018    PERMANENT INTERSTIM performed by Vernon Dominguez MD at [address] [place] [address] History   Problem Relation Age of Onset    Heart Disease Mother     Diabetes Mother     Stroke Father     Diabetes Sister     Lung Cancer Brother      Social History     Tobacco Use    Smoking status: Never Smoker    Smokeless tobacco: Never Used   Substance Use Topics    Alcohol use: No      Current Outpatient Medications   Medication Sig Dispense Refill    metFORMIN (GLUCOPHAGE-XR) 500 MG extended release tablet Take 1 tablet by mouth 2 times daily 180 tablet 3    diclofenac sodium (VOLTAREN) 1 % GEL Apply 4 g topically 4 times daily 4 Tube 5    lactase (LACTAID ULTRA) 9000 units TABS Take 9,000 Units by mouth 3 times daily (before meals)      furosemide (LASIX) 40 MG tablet Take 40 mg by mouth daily      sodium chloride 1 g tablet Take 1 tablet by mouth 2 times daily (with meals) 120 tablet 1    potassium chloride (KLOR-CON M) 20 MEQ extended release tablet Take 0.5 tablets by mouth 3 times daily 30 tablet 1    magnesium oxide (MAG-OX) 400 MG tablet Take 1 tablet by mouth 3 times daily 30 tablet 3    hydrALAZINE (APRESOLINE) 25 MG tablet Take 2 tablets by mouth every 8 hours 90 tablet 3    isosorbide dinitrate (ISORDIL) 20 MG tablet Take 1 tablet by mouth 3 times daily 90 tablet 3    metoprolol tartrate (LOPRESSOR) 50 MG tablet Take 0.5 tablets by mouth 2 times daily 180 tablet 3    Multiple Vitamin (MULTIVITAMIN) TABS tablet Take 1 tablet by mouth daily 30 tablet 0    atorvastatin (LIPITOR) 20 MG tablet TAKE 1 TABLET DAILY 90 tablet 1    carbidopa-levodopa (SINEMET)  MG per tablet Take 2 tablets by mouth 4 times daily 720 tablet 0    apixaban (ELIQUIS) 2.5 MG TABS tablet TAKE 1 TABLET BY MOUTH 2  TIMES DAILY, EQUIVALENT TO  APIXABAN 180 tablet 1    dilTIAZem (CARDIZEM) 30 MG tablet TAKE 1 TABLET BY MOUTH 3  TIMES A  tablet 3    SITagliptin (JANUVIA) 50 MG tablet TAKE 1 TABLET BY MOUTH  DAILY 90 tablet 1    blood glucose test strips (CONTOUR TEST) strip USE TO TEST BLOOD SUGAR TWICE DAILY. 200 strip 3    sucralfate (CARAFATE) 1 GM tablet Take 1 tablet by mouth 4 times daily 360 tablet 3    omeprazole (PRILOSEC) 20 MG delayed release capsule Take 1 capsule by mouth daily 90 capsule 3    Cholecalciferol (VITAMIN D3 PO) Take 4,000 Units by mouth 2 times daily       Probiotic Product (PROBIOTIC ADVANCED PO) Take 1 tablet by mouth daily      Cranberry 500 MG CAPS Take 500 mg by mouth daily 2 tab      aspirin 81 MG EC tablet Take 81 mg by mouth daily.  Misc. Devices MISC 1 each by Does not apply route once for 1 dose Paarm Glucose Meter; Diagnosis:E11.65 1 Device 0     No current facility-administered medications for this visit. Allergies   Allergen Reactions    Latex Rash    Lisinopril Swelling     mouth    Tape Sarah Beth Bongo Tape] Itching    Keflex [Cephalexin] Nausea And Vomiting and Rash    Lactulose Diarrhea    Morphine Rash    Polysporin [Bacitracin-Polymyxin B] Rash     Health Maintenance   Topic Date Due    DTaP/Tdap/Td vaccine (1 - Tdap) 09/24/1951    Shingles Vaccine (2 of 3) 06/15/2013    Lipid screen  01/25/2019    Annual Wellness Visit (AWV)  05/29/2019    Flu vaccine (1) 09/01/2020    Potassium monitoring  06/19/2021    Creatinine monitoring  06/19/2021    Pneumococcal 65+ years Vaccine  Completed    Hepatitis A vaccine  Aged Out    Hib vaccine  Aged Out    Meningococcal (ACWY) vaccine  Aged Out         Objective:     Physical Exam  Constitutional:       General: She is not in acute distress. Appearance: She is well-developed. She is not diaphoretic. HENT:      Head: Normocephalic and atraumatic.    Eyes:      General: No scleral icterus. Conjunctiva/sclera: Conjunctivae normal.   Neck:      Thyroid: No thyromegaly. Vascular: No JVD. Comments: No bruits  Cardiovascular:      Rate and Rhythm: Normal rate and regular rhythm. Heart sounds: Normal heart sounds. Pulmonary:      Effort: Pulmonary effort is normal. No respiratory distress. Breath sounds: Normal breath sounds. No wheezing or rales. Abdominal:      Palpations: Abdomen is soft. There is no mass. Tenderness: There is no abdominal tenderness. There is no guarding. Musculoskeletal:         General: Tenderness and deformity present. Comments: Some tenderness around the knees, diffuse osteoarthritic changes   Skin:     General: Skin is warm and dry. Findings: No rash. Comments: 1 cm area of superficial skin inflammation with early breakdown in the buttocks/gluteal fold   Neurological:      Mental Status: She is alert and oriented to person, place, and time. Cranial Nerves: No cranial nerve deficit. Motor: Weakness present. BP (!) 110/58   Pulse 68   Temp 98.1 °F (36.7 °C) (Temporal)   Resp 16   Wt 121 lb 6.4 oz (55.1 kg)   SpO2 95%   BMI 22.20 kg/m²       Impression/Plan:  1. Primary osteoarthritis involving multiple joints    2. Parkinson's disease ([facility] [address]    3. Diarrhea, unspecified type    4. Pressure injury of skin of sacral region, unspecified injury stage      Requested Prescriptions     Signed Prescriptions Disp Refills    metFORMIN (GLUCOPHAGE-XR) 500 MG extended release tablet 180 tablet 3     Sig: Take 1 tablet by mouth 2 times daily     No orders of the defined types were placed in this encounter. Try Voltaren gel for her arthritis. Will change to extended release Metformin use pink salve on her rectum/pressure sore    Patient giveneducational materials - see patient instructions. Discussed use, benefit, and side effects of prescribed medications. All patient questions answered. Pt voiced understanding. Reviewed health maintenance. Patient agreedwith treatment plan. Follow up as directed. **This report has been created using voice recognition software. It may contain minor errorswhich are inherent in voice recognition technology. **       Electronically signed by Geroge Hashimoto, MD on 7/16/2020 at 8:44 AM

## 2020-07-21 ENCOUNTER — OFFICE VISIT (OUTPATIENT)
Dept: CARDIOLOGY CLINIC | Age: 85
End: 2020-07-21
Payer: MEDICARE

## 2020-07-21 VITALS
WEIGHT: 125.2 LBS | SYSTOLIC BLOOD PRESSURE: 128 MMHG | HEIGHT: 62 IN | OXYGEN SATURATION: 95 % | BODY MASS INDEX: 23.04 KG/M2 | HEART RATE: 67 BPM | DIASTOLIC BLOOD PRESSURE: 62 MMHG

## 2020-07-21 PROCEDURE — G8420 CALC BMI NORM PARAMETERS: HCPCS | Performed by: NURSE PRACTITIONER

## 2020-07-21 PROCEDURE — 99214 OFFICE O/P EST MOD 30 MIN: CPT | Performed by: NURSE PRACTITIONER

## 2020-07-21 PROCEDURE — 4040F PNEUMOC VAC/ADMIN/RCVD: CPT | Performed by: NURSE PRACTITIONER

## 2020-07-21 PROCEDURE — 1090F PRES/ABSN URINE INCON ASSESS: CPT | Performed by: NURSE PRACTITIONER

## 2020-07-21 PROCEDURE — G8427 DOCREV CUR MEDS BY ELIG CLIN: HCPCS | Performed by: NURSE PRACTITIONER

## 2020-07-21 PROCEDURE — 1123F ACP DISCUSS/DSCN MKR DOCD: CPT | Performed by: NURSE PRACTITIONER

## 2020-07-21 PROCEDURE — 1036F TOBACCO NON-USER: CPT | Performed by: NURSE PRACTITIONER

## 2020-07-21 RX ORDER — ANTIOX #8/OM3/DHA/EPA/LUT/ZEAX 250-2.5 MG
2 CAPSULE ORAL 2 TIMES DAILY
COMMUNITY
End: 2021-01-05 | Stop reason: SDUPTHER

## 2020-07-21 RX ORDER — LOPERAMIDE HYDROCHLORIDE 2 MG/1
2 CAPSULE ORAL EVERY 6 HOURS PRN
Status: ON HOLD | COMMUNITY
End: 2021-03-02 | Stop reason: HOSPADM

## 2020-07-21 RX ORDER — SENNOSIDES 8.6 MG
650 CAPSULE ORAL 3 TIMES DAILY
COMMUNITY

## 2020-07-21 ASSESSMENT — ENCOUNTER SYMPTOMS
COUGH: 0
APNEA: 0
CHEST TIGHTNESS: 0
NAUSEA: 0
SHORTNESS OF BREATH: 0
ABDOMINAL PAIN: 0
COLOR CHANGE: 0
WHEEZING: 0
ABDOMINAL DISTENTION: 0

## 2020-07-21 NOTE — PATIENT INSTRUCTIONS
You may receive a survey regarding the care you received during your visit. Your input is valuable to us. We encourage you to complete and return your survey. We hope you will choose us in the future for your healthcare needs. Continue:  · Take all medications as prescribed   · Daily weights and record - please try to weigh yourself upon awakening before eating or drinking   · Fluid restriction of 2 Liters per day (64 oz)  · Limit sodium in diet to around 3556-2101 mg/day  · Monitor BP  · Activity as tolerated     Call the [facility] for any of the following symptoms: [phone]   Weight gain of 3 pounds in 1 day or 5 pounds in 1 week   Increased shortness of breath   Shortness of breath while laying down   Cough   Chest pain   Swelling in feet, ankles or legs   Tenderness or bloating in the abdomen   Fatigue    Decreased appetite or feeling \"full\"    Nausea    Confusion     **PLEASE bring all medications in original bottles with you to your next appointment**    Educational websites:    http://www.Wiki-PR.[email or web address] org (American Heart Association)    PromotionalReview.nl. com ([employer])

## 2020-07-21 NOTE — PROGRESS NOTES
[facility]       Visit Date: 7/21/2020  Cardiologist:  Dr. Carmen Olguin  Primary Care Physician: Dr. Ingrid Lanza MD    Duong Irvin is a 80 y.o. female who presents today for:  Chief Complaint   Patient presents with    Congestive Heart Failure       HPI: Obtained from patient and chart    Duong Irvin is a 80 y.o. female who presents to the office for a follow up visit in the heart failure clinic. Hx A fib on Eliquis, EFpEF 55-60% RSVP 60-65 mmHg, CAD CABG, Negative stress 11/2018, HTN, DM, Parkinson's. Recent admission for hyponatremia. Apparently was noncompliant with her diuretics at home [facility] (ran out?), also had CHF. She was at their rehab now back to assisted living. Weight is down 14 pounds since her last clinic visit although she has edema in her legs today she states started 3 days ago. She can perform ADLs without SOB or fatigue. She sleeps in a bed with the [facility] elevated 30 degrees (not new). She usually wears compression socks but did not wear them today so I could see the swelling. Accompanied by: diamond  Last hospital admission related to Heart Failure:  May 2020  Chest Pain: no  Worsening SOB: no  Worsening Orthopnea/PND: no  Edema: yes  Any extra diuretic use: no  Weight gain: no  Fatigue: no  Abdominal bloating: no  Appetite: good  DEBO: no  Cough: no  Compliant checking home weight: yes today 122.2 lbs  normally 118-120 lbs  Compliant checking blood pressure: yes variable 100-170 per ECF records       Past Medical History:   Diagnosis Date    Arthritis     Atypical chest pain     CAD (coronary artery disease)     Chronic LBBB (left bundle branch block)     Diabetes mellitus type II     Esophageal stricture     History of esophageal stricture.  FH: CAD (coronary artery disease)     Mom and dad both with heart disease at mid to late age.      GERD (gastroesophageal reflux disease)     History of gastritis     History of skin cancer     Hypercholesterolemia     Hyperlipidemia     Hypertension     Imbalance     As far as imbalance is concerned, asked patient to follow-up with Dr. Joseph Jordan since she follows with him on a regular basis.  Lactose intolerance     Nummular dermatitis     Parkinson's disease ([address] 2009    Restless legs syndrome (RLS)     S/P CABG (coronary artery bypass graft)     SCC (squamous cell carcinoma)      Past Surgical History:   Procedure Laterality Date    CARDIAC SURGERY      CARDIOVASCULAR STRESS TEST  4 09 2009    Gated SPECT images revealed normal global wall motion with EF of 60%. Persantine test associated w/nonspecific symptoms. EKG is nondiagnostic w/baseline LBBB. No obvious stress-induced ischemia by Cardiolite. EF within normal limits.  CARDIOVASCULAR STRESS TEST  7 10 2007    The gated SPECT images revealed normal wall motion with EF of 69%. Poor exercise tolerance w/SOB on exertion. EKG is nondiagnostic. Cardiolite scan revealing no obvious stress-induced ischemia. EF 60%.  CARDIOVASCULAR STRESS TEST  2 03 2006    Fair exercise tolerance w/SOB on exertion. No EKG evidence of ischemia. Patient should be able to proceed with phase II cardiac rehab.  CATARACT REMOVAL      CATARACT REMOVAL  06/08/2016    AND 6/29/16    DIAGNOSTIC CARDIAC CATH LAB PROCEDURE  12 23 2005    LV was obtained. AGEE projection showed preserved LV function w/estimated EF at 55%. No significant MR. Patent left main coronary artery. Tortuous LAD which appeared to be patent. Patent left circumflex artery. High-grade stenosis around 99% in very large dominant RCA w/heavy calcification at the ostium. Normal LV function.  DOPPLER ECHOCARDIOGRAPHY  4 09 2009    Global LV function w/in lower limits of normal, with mild anteroseptal hypokinesis. EF of 50-55%. Light LVH. Mild to moderate left atrial enlargement. Calcific aortic and mitral valve w/no obvious stenosis. No obvious pericardial effusion.      DOPPLER ECHOCARDIOGRAPHY  7 10 2007    LV function w/in lower limits of normal w/peridoxical septal wall motion. Moderate left atrial enlargement. Mild MR. Mild TR. Calcified valves w/no obvious stenosis. No pericardial effusion.  DOPPLER ECHOCARDIOGRAPHY  4 14 2006    There appears to be significant improvement from previous echo w/complete resolution of pericardial effusion and normal LV function. EF of 60%.  DOPPLER ECHOCARDIOGRAPHY  3 21 2006    Normal LV function. Mild LV hypertrophy. Mild biatrial enlargement. Mildly calcific aortic and mitral valve w/no stenosis. Mild MR. Mild TR. Minimal residual pericardial effusion w/significant improvement from previous echo.  DOPPLER ECHOCARDIOGRAPHY  3 16 2006    Interval change from previous echocardiogram w/worsening of effusion. Correlation clinically. Consideration of pericardial centesis. Will obtain a [facility] consult.  DOPPLER ECHOCARDIOGRAPHY  1 31 2006    No significant change from previous echo. The 2D echo showed moderate degree of pericardial effusion. It does seem to be worst or better than previous echo. No evidence of tamponade.  DOPPLER ECHOCARDIOGRAPHY  1 27 2006    Normal global LV function. Mild biatrial enlargement. Mildly sclerotic aortic & mitral valve w/no stenosis. Mild MR. Mild TR. Small to moderate pericardial effusion w/no obvious tamponade.      JOINT REPLACEMENT      MOHS SURGERY Right 2/13/2019    MOHS DEFECT REPAIR CYSTIC SCC RIGHT LOWER LATERAL LEG WITH SKIN GRAFT FROM RIGHT GROIN (FULL THICKNESS ) performed by Luis Lilly MD at [facility] / [address] [place] [address] Left 12/20/13    left leg lesion excision x2, frozen section, skin graft closure    ROTATOR CUFF REPAIR  09/2001    RIGHT    ROTATOR CUFF REPAIR  04/15/2009    LEFT     SKIN CANCER EXCISION  06/2006      Rt leg    SPINAL CORD STIMULATOR IMPLANTATION N/A 12/4/2018    PERMANENT INTERSTIM performed by Abhijeet Gomez Carolyn Khan MD at [facility] History   Problem Relation Age of Onset    Heart Disease Mother     Diabetes Mother     Stroke Father     Diabetes Sister     Lung Cancer Brother      Social History     Tobacco Use    Smoking status: Never Smoker    Smokeless tobacco: Never Used   Substance Use Topics    Alcohol use: No     Current Outpatient Medications   Medication Sig Dispense Refill    loperamide (IMODIUM) 2 MG capsule Take 2 mg by mouth every 6 hours as needed for Diarrhea      Multiple Vitamins-Minerals (PRESERVISION AREDS 2) CAPS Take 2 capsules by mouth 2 times daily      acetaminophen (TYLENOL 8 HOUR ARTHRITIS PAIN) 650 MG extended release tablet Take 650 mg by mouth 3 times daily      metFORMIN (GLUCOPHAGE-XR) 500 MG extended release tablet Take 1 tablet by mouth 2 times daily 180 tablet 3    diclofenac sodium (VOLTAREN) 1 % GEL Apply 4 g topically 4 times daily 4 Tube 5    lactase (LACTAID ULTRA) 9000 units TABS Take 9,000 Units by mouth 3 times daily (before meals)      furosemide (LASIX) 40 MG tablet Take 40 mg by mouth daily      sodium chloride 1 g tablet Take 1 tablet by mouth 2 times daily (with meals) 120 tablet 1    potassium chloride (KLOR-CON M) 20 MEQ extended release tablet Take 0.5 tablets by mouth 3 times daily 30 tablet 1    magnesium oxide (MAG-OX) 400 MG tablet Take 1 tablet by mouth 3 times daily 30 tablet 3    hydrALAZINE (APRESOLINE) 25 MG tablet Take 2 tablets by mouth every 8 hours 90 tablet 3    isosorbide dinitrate (ISORDIL) 20 MG tablet Take 1 tablet by mouth 3 times daily 90 tablet 3    metoprolol tartrate (LOPRESSOR) 50 MG tablet Take 0.5 tablets by mouth 2 times daily 180 tablet 3    Multiple Vitamin (MULTIVITAMIN) TABS tablet Take 1 tablet by mouth daily 30 tablet 0    atorvastatin (LIPITOR) 20 MG tablet TAKE 1 TABLET DAILY 90 tablet 1    carbidopa-levodopa (SINEMET)  MG per tablet Take 2 tablets by mouth 4 times daily 720 tablet 0    apixaban (ELIQUIS) 2.5 MG TABS tablet TAKE 1 TABLET BY MOUTH 2  TIMES DAILY, EQUIVALENT TO  APIXABAN 180 tablet 1    dilTIAZem (CARDIZEM) 30 MG tablet TAKE 1 TABLET BY MOUTH 3  TIMES A  tablet 3    SITagliptin (JANUVIA) 50 MG tablet TAKE 1 TABLET BY MOUTH  DAILY 90 tablet 1    sucralfate (CARAFATE) 1 GM tablet Take 1 tablet by mouth 4 times daily 360 tablet 3    omeprazole (PRILOSEC) 20 MG delayed release capsule Take 1 capsule by mouth daily 90 capsule 3    Cholecalciferol (VITAMIN D3 PO) Take 4,000 Units by mouth 2 times daily       Probiotic Product (PROBIOTIC ADVANCED PO) Take 1 tablet by mouth daily      Cranberry 500 MG CAPS Take 500 mg by mouth daily 2 tab      aspirin 81 MG EC tablet Take 81 mg by mouth daily.  blood glucose test strips (CONTOUR TEST) strip USE TO TEST BLOOD SUGAR TWICE DAILY. 200 strip 3    Misc. Devices MISC 1 each by Does not apply route once for 1 dose Param Glucose Meter; Diagnosis:E11.65 1 Device 0     No current facility-administered medications for this visit. Allergies   Allergen Reactions    Latex Rash    Lisinopril Swelling     mouth    Tape Arthurine Fuchs Tape] Itching    Keflex [Cephalexin] Nausea And Vomiting and Rash    Lactulose Diarrhea    Morphine Rash    Polysporin [Bacitracin-Polymyxin B] Rash       SUBJECTIVE:   Review of Systems   Constitutional: Negative for activity change, appetite change, fatigue and fever. HENT: Negative for congestion. Respiratory: Negative for apnea, cough, chest tightness, shortness of breath and wheezing. Cardiovascular: Positive for leg swelling. Negative for chest pain and palpitations. Gastrointestinal: Negative for abdominal distention, abdominal pain and nausea. Genitourinary: Negative for difficulty urinating and dysuria. Musculoskeletal: Positive for gait problem (walker). Negative for arthralgias. Skin: Negative for color change. Neurological: Negative for dizziness, numbness and headaches. Psychiatric/Behavioral: Negative for agitation, confusion and sleep disturbance. The patient is not nervous/anxious. OBJECTIVE:   Today's Vitals:  /62   Pulse 67   Ht 5' 2\" (1.575 m)   Wt 125 lb 3.2 oz (56.8 kg)   SpO2 95%   BMI 22.90 kg/m²     Physical Exam  Vitals signs reviewed. Constitutional:       Appearance: She is well-developed. HENT:      Head: Normocephalic and atraumatic. Eyes:      Pupils: Pupils are equal, round, and reactive to light. Neck:      Musculoskeletal: Normal range of motion. Vascular: No JVD. Cardiovascular:      Rate and Rhythm: Normal rate and regular rhythm. Heart sounds: Normal heart sounds. No murmur. Pulmonary:      Effort: Pulmonary effort is normal. No respiratory distress. Breath sounds: No rales. Abdominal:      General: There is no distension. Palpations: Abdomen is soft. Tenderness: There is no abdominal tenderness. Musculoskeletal:         General: No tenderness. Right lower leg: Edema (2+) present. Left lower leg: Edema (2+) present. Skin:     General: Skin is warm and dry. Capillary Refill: Capillary refill takes less than 2 seconds. Neurological:      Mental Status: She is alert and oriented to person, place, and time. Psychiatric:         Mood and Affect: Mood normal.         Behavior: Behavior normal.         Wt Readings from Last 3 Encounters:   07/21/20 125 lb 3.2 oz (56.8 kg)   07/15/20 121 lb 6.4 oz (55.1 kg)   06/23/20 131 lb (59.4 kg)     BP Readings from Last 3 Encounters:   07/21/20 128/62   07/15/20 (!) 110/58   06/23/20 (!) 134/59     Pulse Readings from Last 3 Encounters:   07/21/20 67   07/15/20 68   06/23/20 101     Body mass index is 22.9 kg/m². ECHO:   5/18/20   Summary   Left ventricular size and systolic function is normal. Ejection fraction   was estimated at 55-60%. LV wall thickness is within normal limits. Moderately dilated left atrium.    Mildly dilated right ventricle. Right ventricular systolic pressure of 75-17 mm Hg consistent with severe   pulmonary hypertension. Mildly calcified and thickened aortic valve. Mild to Moderate mitral regurgitation is present. Moderate tricuspid regurgitation. Signature      ----------------------------------------------------------------   Electronically signed by Zeeshan Arboleda MD (Interpreting   physician) on 05/18/2020 at 04:53 PM   ----------------------------------------------------------------      Findings      Mitral Valve   Calcification of the mitral valve noted. DOPPLER: The transmitral velocity was within the normal range with no   evidence for mitral stenosis. Mild to Moderate mitral regurgitation is present. Aortic Valve   Mildly calcified and thickened aortic valve. The aortic valve leaflets were not well visualized. DOPPLER: Transaortic velocity was within the normal range with no evidence   of aortic stenosis. There was no evidence of aortic regurgitation. Tricuspid Valve   The tricuspid valve structure is normal with normal leaflet separation. DOPPLER: There is no evidence of tricuspid stenosis. Moderate tricuspid regurgitation. Pulmonic Valve   The pulmonic valve was not well visualized . Left Atrium   Moderately dilated left atrium. Left Ventricle   Left ventricular size and systolic function is normal. Ejection fraction   was estimated at 55-60%. LV wall thickness is within normal limits. Right Atrium   Right atrial size was normal.      Right Ventricle   Mildly dilated right ventricle. Right ventricular systolic pressure of 95-39 mm Hg consistent with severe   pulmonary hypertension. Pericardial Effusion   The pericardium appears normal with no evidence of a pericardial effusion. Pleural Effusion   Left pleural effusion. Aorta / Great Vessels   -Aortic root dimension within normal limits.    -IVC size is within normal limits with normal respiratory phasic changes. Results reviewed:  BNP:   Lab Results   Component Value Date    PROBNP 3461.0 (H) 05/28/2020     CBC:   Lab Results   Component Value Date    WBC 6.0 05/26/2020    RBC 3.48 05/26/2020    RBC 0-5 09/21/2018    HGB 10.0 05/26/2020    HCT 31.2 05/26/2020     05/26/2020     CMP:    Lab Results   Component Value Date     07/08/2020    K 4.1 06/19/2020    K 3.8 05/24/2020    CL 98 06/19/2020    CO2 26 06/19/2020    BUN 11 06/19/2020    CREATININE 0.7 06/19/2020    LABGLOM 79 06/19/2020    LABGLOM >90 06/03/2020    GLUCOSE 194 06/19/2020    GLUCOSE 179 01/25/2018    CALCIUM 9.8 06/19/2020     Hepatic Function Panel:    Lab Results   Component Value Date    ALKPHOS 71 05/16/2020    ALT <5 05/16/2020    AST 18 05/16/2020    PROT 6.6 05/16/2020    BILITOT 0.6 05/16/2020    BILITOT NEGATIVE 09/21/2018    BILIDIR <0.2 10/22/2018    LABALBU 4.2 05/16/2020    LABALBU 4.1 01/12/2012     Magnesium:    Lab Results   Component Value Date    MG 1.7 07/08/2020     PT/INR:    Lab Results   Component Value Date    INR 1.42 05/16/2020     Lipids:    Lab Results   Component Value Date    TRIG 112 01/25/2018    HDL 45 01/25/2018    LDLCALC 89 01/25/2018    LDLDIRECT 220.21 03/07/2016    LABVLDL 22 01/25/2018       ASSESSMENT AND PLAN:   The patient's condition/symptoms are Stable      Diagnosis Orders   1. CHF (congestive heart failure), NYHA class II, chronic, diastolic (HCC)     2. Paroxysmal atrial fibrillation ([facility] [address]     3. Essential hypertension     4.  Pulmonary hypertension, moderate to severe (HCC)         Plan:  · GDMT   · ACE/ARB/ARNi: no  · Beta Blocker: Metoprolol 25 mg bid  · Aldosterone antagonist: no  · Diuretic/Potassium: Lasix 40 mg daily, Potassium 10 mEq tid  · Hydralazine/Nitrate: Hydralazine 50 mg tid, Isordil 20 mg tid  · Other: Eliquis, ASA 81 mg, Lipitor, Cardizem, Mag ox 400 mg tid, Sodium chloride per Nephrology   · Her weight is up 3 lbs from her baseline at the [facility] with LE edema that started 3 days ago. I will have them give an extra Lasix 20 mg x 2 days and have them call us on Thursday with an update. · HF Zones reviewed. · Daily weights  · Fluid restriction of 2 Liters per day (64 oz)   · Limit sodium in diet to around 4244-0194 mg/day  · Monitor BP  · Activity as tolerated     Patient was instructed to call the [address] for changes in the following symptoms:    Weight gain of 3 pounds in 1 day or 5 pounds in 1 week   Increased shortness of breath   Shortness of breath while laying down   Cough   Chest pain   Swelling in feet, ankles or legs   Tenderness or bloating in the abdomen   Fatigue    Decreased appetite or feeling \"full\"   Nausea    Confusion      Return in about 2 months (around 9/21/2020). or sooner if needed     Patient given educational materials - see patient instructions. We discussed the importance of weighing oneself and recording daily. We also discussed the importance of a low sodium diet, higher sodium foods to avoid and appropriate low sodium food choices. Discussed use, benefit, and side effects of prescribed medications. All patient questions answered. Patient verbalizes understanding of plan of care using teach back method, and is agreeable to the treatment plan. Greater then 30 minutes of time was spent reviewing the chart and educating the patient about HF, medications, diet, exercise, and discussing the plan of care. I personally spent more then 50% of the appt time face to face with the patient counseling/coordinating patient's care.     Electronicallysigned by ROWDY Catherine CNP on 7/21/2020 at 11:16 AM

## 2020-07-24 ENCOUNTER — TELEPHONE (OUTPATIENT)
Dept: CARDIOLOGY CLINIC | Age: 85
End: 2020-07-24

## 2020-07-24 RX ORDER — BUMETANIDE 1 MG/1
1 TABLET ORAL DAILY
Qty: 30 TABLET | Refills: 3
Start: 2020-07-24 | End: 2020-12-22

## 2020-07-24 NOTE — TELEPHONE ENCOUNTER
Received update fax from [facility]  Patient has 3+ pitting up to knees  Wears ALLY hose  Lungs clear SPO2 94%  Denies SOB, cough, orthopnea  Wt today 120

## 2020-07-28 ENCOUNTER — OFFICE VISIT (OUTPATIENT)
Dept: FAMILY MEDICINE CLINIC | Age: 85
End: 2020-07-28
Payer: MEDICARE

## 2020-07-28 VITALS
WEIGHT: 123.8 LBS | BODY MASS INDEX: 22.78 KG/M2 | HEART RATE: 67 BPM | HEIGHT: 62 IN | DIASTOLIC BLOOD PRESSURE: 60 MMHG | TEMPERATURE: 97.2 F | SYSTOLIC BLOOD PRESSURE: 100 MMHG | RESPIRATION RATE: 16 BRPM

## 2020-07-28 PROCEDURE — 1090F PRES/ABSN URINE INCON ASSESS: CPT | Performed by: FAMILY MEDICINE

## 2020-07-28 PROCEDURE — 99213 OFFICE O/P EST LOW 20 MIN: CPT | Performed by: FAMILY MEDICINE

## 2020-07-28 PROCEDURE — 1036F TOBACCO NON-USER: CPT | Performed by: FAMILY MEDICINE

## 2020-07-28 PROCEDURE — 1123F ACP DISCUSS/DSCN MKR DOCD: CPT | Performed by: FAMILY MEDICINE

## 2020-07-28 PROCEDURE — G8427 DOCREV CUR MEDS BY ELIG CLIN: HCPCS | Performed by: FAMILY MEDICINE

## 2020-07-28 PROCEDURE — 4040F PNEUMOC VAC/ADMIN/RCVD: CPT | Performed by: FAMILY MEDICINE

## 2020-07-28 PROCEDURE — G8420 CALC BMI NORM PARAMETERS: HCPCS | Performed by: FAMILY MEDICINE

## 2020-07-28 RX ORDER — METOLAZONE 2.5 MG/1
2.5 TABLET ORAL DAILY
COMMUNITY
End: 2020-08-21

## 2020-07-28 NOTE — TELEPHONE ENCOUNTER
Late entry   Spoke with nurse Enio Saavedra on 7/27  Patient had extra Bumex but were unable to get Metolazone until 7/28  They will give Metolazone on 7/28 and call office on 7/29

## 2020-07-29 ASSESSMENT — ENCOUNTER SYMPTOMS
EYES NEGATIVE: 1
DIARRHEA: 0
SHORTNESS OF BREATH: 1
VOMITING: 0
ABDOMINAL PAIN: 0
BACK PAIN: 0
NAUSEA: 0
RHINORRHEA: 0
COUGH: 0
CHEST TIGHTNESS: 0
SORE THROAT: 0

## 2020-07-29 NOTE — PROGRESS NOTES
[address] [address] [address] [facility] [address]  Dept: [phone]  Dept Fax: [phone]  Loc: [phone]  PROGRESS NOTE      VisitDate: 7/28/2020    Kole Weber is a 80 y.o. female who presents today for:     Chief Complaint   Patient presents with    2 Week Follow-Up     diarrhea has improved.  Shortness of Breath     this morning. couldn't sleep - unsure if she woke up due to this. Subjective:  HPI  Follow-up CHF, edema her lower extremities has improved but not resolved. Still having some shortness of breath. She was at the CHF clinic they have been adjusting her diuretics however the timing to when those orders were given to when they are executed is off. Today she just received her metaxalone that was ordered last week. And just now transitioning to Bumex    Review of Systems   Constitutional: Negative for activity change, appetite change, fatigue and fever. HENT: Negative for congestion, rhinorrhea and sore throat. Eyes: Negative. Respiratory: Positive for shortness of breath. Negative for cough and chest tightness. Cardiovascular: Positive for leg swelling. Negative for chest pain and palpitations. Gastrointestinal: Negative for abdominal pain, diarrhea, nausea and vomiting. Genitourinary: Negative for dysuria and urgency. Musculoskeletal: Negative for arthralgias and back pain. Neurological: Negative for dizziness and headaches. Psychiatric/Behavioral: Negative for dysphoric mood. The patient is not nervous/anxious. Past Medical History:   Diagnosis Date    Arthritis     Atypical chest pain     CAD (coronary artery disease)     Chronic LBBB (left bundle branch block)     Diabetes mellitus type II     Esophageal stricture     History of esophageal stricture.  FH: CAD (coronary artery disease)     Mom and dad both with heart disease at mid to late age.      GERD (gastroesophageal reflux disease)     History of gastritis     History of skin cancer     Hypercholesterolemia     Hyperlipidemia     Hypertension     Imbalance     As far as imbalance is concerned, asked patient to follow-up with Dr. Cecilia Marcos since she follows with him on a regular basis.  Lactose intolerance     Nummular dermatitis     Parkinson's disease ([address] 2009    Restless legs syndrome (RLS)     S/P CABG (coronary artery bypass graft)     SCC (squamous cell carcinoma)       Past Surgical History:   Procedure Laterality Date    CARDIAC SURGERY      CARDIOVASCULAR STRESS TEST  4 09 2009    Gated SPECT images revealed normal global wall motion with EF of 60%. Persantine test associated w/nonspecific symptoms. EKG is nondiagnostic w/baseline LBBB. No obvious stress-induced ischemia by Cardiolite. EF within normal limits.  CARDIOVASCULAR STRESS TEST  7 10 2007    The gated SPECT images revealed normal wall motion with EF of 69%. Poor exercise tolerance w/SOB on exertion. EKG is nondiagnostic. Cardiolite scan revealing no obvious stress-induced ischemia. EF 60%.  CARDIOVASCULAR STRESS TEST  2 03 2006    Fair exercise tolerance w/SOB on exertion. No EKG evidence of ischemia. Patient should be able to proceed with phase II cardiac rehab.  CATARACT REMOVAL      CATARACT REMOVAL  06/08/2016    AND 6/29/16    DIAGNOSTIC CARDIAC CATH LAB PROCEDURE  12 23 2005    LV was obtained. AGEE projection showed preserved LV function w/estimated EF at 55%. No significant MR. Patent left main coronary artery. Tortuous LAD which appeared to be patent. Patent left circumflex artery. High-grade stenosis around 99% in very large dominant RCA w/heavy calcification at the ostium. Normal LV function.  DOPPLER ECHOCARDIOGRAPHY  4 09 2009    Global LV function w/in lower limits of normal, with mild anteroseptal hypokinesis. EF of 50-55%. Light LVH. Mild to moderate left atrial enlargement.  Calcific aortic and mitral valve w/no TABLET DAILY 90 tablet 1    carbidopa-levodopa (SINEMET)  MG per tablet Take 2 tablets by mouth 4 times daily 720 tablet 0    apixaban (ELIQUIS) 2.5 MG TABS tablet TAKE 1 TABLET BY MOUTH 2  TIMES DAILY, EQUIVALENT TO  APIXABAN 180 tablet 1    dilTIAZem (CARDIZEM) 30 MG tablet TAKE 1 TABLET BY MOUTH 3  TIMES A  tablet 3    SITagliptin (JANUVIA) 50 MG tablet TAKE 1 TABLET BY MOUTH  DAILY 90 tablet 1    blood glucose test strips (CONTOUR TEST) strip USE TO TEST BLOOD SUGAR TWICE DAILY. 200 strip 3    sucralfate (CARAFATE) 1 GM tablet Take 1 tablet by mouth 4 times daily 360 tablet 3    omeprazole (PRILOSEC) 20 MG delayed release capsule Take 1 capsule by mouth daily 90 capsule 3    Cholecalciferol (VITAMIN D3 PO) Take 4,000 Units by mouth 2 times daily       Probiotic Product (PROBIOTIC ADVANCED PO) Take 1 tablet by mouth daily      Cranberry 500 MG CAPS Take 500 mg by mouth daily 2 tab      aspirin 81 MG EC tablet Take 81 mg by mouth daily.  Misc. Devices MISC 1 each by Does not apply route once for 1 dose Param Glucose Meter; Diagnosis:E11.65 1 Device 0     No current facility-administered medications for this visit.       Allergies   Allergen Reactions    Latex Rash    Lisinopril Swelling     mouth    Tape [place] Tape] Itching    Keflex [Cephalexin] Nausea And Vomiting and Rash    Lactulose Diarrhea    Morphine Rash    Polysporin [Bacitracin-Polymyxin B] Rash     Health Maintenance   Topic Date Due    DTaP/Tdap/Td vaccine (1 - Tdap) 09/24/1951    Shingles Vaccine (2 of 3) 06/15/2013    Lipid screen  01/25/2019    Annual Wellness Visit (AWV)  05/29/2019    Flu vaccine (1) 09/01/2020    Potassium monitoring  06/19/2021    Creatinine monitoring  06/19/2021    Pneumococcal 65+ years Vaccine  Completed    Hepatitis A vaccine  Aged Out    Hib vaccine  Aged Out    Meningococcal (ACWY) vaccine  Aged Out         Objective:     Physical Exam  Constitutional:       General: She is not in acute distress. Appearance: She is well-developed. She is not diaphoretic. HENT:      Head: Normocephalic and atraumatic. Eyes:      General: No scleral icterus. Conjunctiva/sclera: Conjunctivae normal.   Neck:      Thyroid: No thyromegaly. Vascular: No JVD. Comments: No bruits  Cardiovascular:      Rate and Rhythm: Normal rate. Heart sounds: Normal heart sounds. Pulmonary:      Effort: Pulmonary effort is normal. No respiratory distress. Breath sounds: Normal breath sounds. No wheezing or rales. Abdominal:      Palpations: Abdomen is soft. There is no mass. Tenderness: There is no abdominal tenderness. There is no guarding. Musculoskeletal:         General: No tenderness. Right lower leg: Edema present. Left lower leg: Edema present. Skin:     General: Skin is warm and dry. Findings: No rash. Neurological:      Mental Status: She is alert and oriented to person, place, and time. Cranial Nerves: No cranial nerve deficit. /60   Pulse 67   Temp 97.2 °F (36.2 °C) (Oral)   Resp 16   Ht 5' 2\" (1.575 m)   Wt 123 lb 12.8 oz (56.2 kg)   BMI 22.64 kg/m²       Impression/Plan:  1. Acute on chronic diastolic CHF (congestive heart failure), NYHA class 3 ([address]    2. SOB (shortness of breath)      Requested Prescriptions      No prescriptions requested or ordered in this encounter     No orders of the defined types were placed in this encounter. Discussed that with the medication adjustment she should see continued improvement in her edema. We will follow-up based on basic metabolic panel that was ordered through the CHF clinic. Her diarrhea has resolved    Patient giveneducational materials - see patient instructions. Discussed use, benefit, and side effects of prescribed medications. All patient questions answered. Pt voiced understanding. Reviewed health maintenance. Patient agreedwith treatment plan. Follow up as directed. **This report has been created using voice recognition software. It may contain minor errorswhich are inherent in voice recognition technology. **       Electronically signed by Peggy Martinez MD on 7/29/2020 at 10:05 AM

## 2020-07-31 LAB
BUN BLDV-MCNC: 11 MG/DL
CALCIUM SERPL-MCNC: 10.1 MG/DL
CHLORIDE BLD-SCNC: 96 MMOL/L
CO2: 25 MMOL/L
CREAT SERPL-MCNC: 0.7 MG/DL
GFR CALCULATED: 79
GLUCOSE BLD-MCNC: 170 MG/DL
POTASSIUM SERPL-SCNC: 3.7 MMOL/L
SODIUM BLD-SCNC: 130 MMOL/L

## 2020-08-03 ENCOUNTER — OFFICE VISIT (OUTPATIENT)
Dept: NEUROLOGY | Age: 85
End: 2020-08-03
Payer: MEDICARE

## 2020-08-03 VITALS
HEART RATE: 64 BPM | WEIGHT: 121.6 LBS | BODY MASS INDEX: 22.38 KG/M2 | HEIGHT: 62 IN | DIASTOLIC BLOOD PRESSURE: 70 MMHG | SYSTOLIC BLOOD PRESSURE: 122 MMHG

## 2020-08-03 PROCEDURE — G8427 DOCREV CUR MEDS BY ELIG CLIN: HCPCS | Performed by: PSYCHIATRY & NEUROLOGY

## 2020-08-03 PROCEDURE — 99213 OFFICE O/P EST LOW 20 MIN: CPT | Performed by: PSYCHIATRY & NEUROLOGY

## 2020-08-03 PROCEDURE — 1090F PRES/ABSN URINE INCON ASSESS: CPT | Performed by: PSYCHIATRY & NEUROLOGY

## 2020-08-03 PROCEDURE — 4040F PNEUMOC VAC/ADMIN/RCVD: CPT | Performed by: PSYCHIATRY & NEUROLOGY

## 2020-08-03 PROCEDURE — G8420 CALC BMI NORM PARAMETERS: HCPCS | Performed by: PSYCHIATRY & NEUROLOGY

## 2020-08-03 PROCEDURE — 1123F ACP DISCUSS/DSCN MKR DOCD: CPT | Performed by: PSYCHIATRY & NEUROLOGY

## 2020-08-03 PROCEDURE — 1036F TOBACCO NON-USER: CPT | Performed by: PSYCHIATRY & NEUROLOGY

## 2020-08-03 NOTE — PATIENT INSTRUCTIONS
1. Continue with Sinemet 25/100 mg take 2 tablets four times a day. Refill given. 2. Stay Active  3. Speech therapy re Parkinsons. 4. Physical therapy evaluation for gait safety  5. Follow up in 12 months or sooner if needed via Virtual visit. 6. Call if any questions or concerns.

## 2020-08-03 NOTE — PROGRESS NOTES
NEUROLOGY OUT PATIENT FOLLOW UP NOTE:  8/3/94775:21 PM    Volodymyr Fonseca is here for follow up for Parkinson's disease. She is using the walker for long distances. There is no freezing with ambulation. She denies dysphagia. No recent falls. No hallucination. No dysphagia. She is on Sinemet 25/100mg 2 tablets three times a day in addition to the Neupro 2 mg day. No side effects reported to the medications. She comes in with her daughter to discuss plan of care going forward. ROS:  Cardiac: no chest pain. No palpitations.   Renal : no flank pain, no hematuria    Skin: no rash      Allergies   Allergen Reactions    Latex Rash    Lisinopril Swelling     mouth    Tape [place] Tape] Itching    Keflex [Cephalexin] Nausea And Vomiting and Rash    Lactulose Diarrhea    Morphine Rash    Polysporin [Bacitracin-Polymyxin B] Rash       Current Outpatient Medications:     metOLazone (ZAROXOLYN) 2.5 MG tablet, Take 2.5 mg by mouth daily, Disp: , Rfl:     bumetanide (BUMEX) 1 MG tablet, Take 1 tablet by mouth daily, Disp: 30 tablet, Rfl: 3    loperamide (IMODIUM) 2 MG capsule, Take 2 mg by mouth every 6 hours as needed for Diarrhea, Disp: , Rfl:     Multiple Vitamins-Minerals (PRESERVISION AREDS 2) CAPS, Take 2 capsules by mouth 2 times daily, Disp: , Rfl:     acetaminophen (TYLENOL 8 HOUR ARTHRITIS PAIN) 650 MG extended release tablet, Take 650 mg by mouth 3 times daily, Disp: , Rfl:     metFORMIN (GLUCOPHAGE-XR) 500 MG extended release tablet, Take 1 tablet by mouth 2 times daily, Disp: 180 tablet, Rfl: 3    diclofenac sodium (VOLTAREN) 1 % GEL, Apply 4 g topically 4 times daily, Disp: 4 Tube, Rfl: 5    lactase (LACTAID ULTRA) 9000 units TABS, Take 9,000 Units by mouth 3 times daily (before meals), Disp: , Rfl:     sodium chloride 1 g tablet, Take 1 tablet by mouth 2 times daily (with meals), Disp: 120 tablet, Rfl: 1    potassium chloride (KLOR-CON M) 20 MEQ extended release tablet, Take 0.5 tablets by mouth 3 times daily, Disp: 30 tablet, Rfl: 1    magnesium oxide (MAG-OX) 400 MG tablet, Take 1 tablet by mouth 3 times daily, Disp: 30 tablet, Rfl: 3    hydrALAZINE (APRESOLINE) 25 MG tablet, Take 2 tablets by mouth every 8 hours, Disp: 90 tablet, Rfl: 3    isosorbide dinitrate (ISORDIL) 20 MG tablet, Take 1 tablet by mouth 3 times daily, Disp: 90 tablet, Rfl: 3    metoprolol tartrate (LOPRESSOR) 50 MG tablet, Take 0.5 tablets by mouth 2 times daily, Disp: 180 tablet, Rfl: 3    Multiple Vitamin (MULTIVITAMIN) TABS tablet, Take 1 tablet by mouth daily, Disp: 30 tablet, Rfl: 0    atorvastatin (LIPITOR) 20 MG tablet, TAKE 1 TABLET DAILY, Disp: 90 tablet, Rfl: 1    carbidopa-levodopa (SINEMET)  MG per tablet, Take 2 tablets by mouth 4 times daily, Disp: 720 tablet, Rfl: 0    apixaban (ELIQUIS) 2.5 MG TABS tablet, TAKE 1 TABLET BY MOUTH 2  TIMES DAILY, EQUIVALENT TO  APIXABAN, Disp: 180 tablet, Rfl: 1    dilTIAZem (CARDIZEM) 30 MG tablet, TAKE 1 TABLET BY MOUTH 3  TIMES A DAY, Disp: 270 tablet, Rfl: 3    SITagliptin (JANUVIA) 50 MG tablet, TAKE 1 TABLET BY MOUTH  DAILY, Disp: 90 tablet, Rfl: 1    blood glucose test strips (CONTOUR TEST) strip, USE TO TEST BLOOD SUGAR TWICE DAILY. , Disp: 200 strip, Rfl: 3    sucralfate (CARAFATE) 1 GM tablet, Take 1 tablet by mouth 4 times daily, Disp: 360 tablet, Rfl: 3    omeprazole (PRILOSEC) 20 MG delayed release capsule, Take 1 capsule by mouth daily, Disp: 90 capsule, Rfl: 3    Cholecalciferol (VITAMIN D3 PO), Take 4,000 Units by mouth 2 times daily , Disp: , Rfl:     Misc. Devices MISC, 1 each by Does not apply route once for 1 dose Param Glucose Meter; Diagnosis:E11.65, Disp: 1 Device, Rfl: 0    Probiotic Product (PROBIOTIC ADVANCED PO), Take 1 tablet by mouth daily, Disp: , Rfl:     Cranberry 500 MG CAPS, Take 500 mg by mouth daily 2 tab, Disp: , Rfl:     aspirin 81 MG EC tablet, Take 81 mg by mouth daily.   , Disp: , Rfl:       PE:   Vitals:    08/03/20 1408   BP: 122/70   Site: Left Upper Arm   Position: Sitting   Cuff Size: Small Adult   Pulse: 64   Weight: 121 lb 9.6 oz (55.2 kg)   Height: 5' 2\" (1.575 m)     General Appearance:  awake, alert, oriented, in no acute distress  Gen: NAD, Language is Intact. Head: no rash, no icterus  Neck: There is no carotid bruits. The Neck is supple. Neuro: CN 2-12 grossly intact with no focal deficits. Power 5/5 Throughout symmetric, Reflexes are  symmetric. Long tracts are intact. Cerebellar exam is Intact. Sensory exam is intact to light touch. Mild gait shuffling, she uses wheeled walker. No resting tremor. She is stooped forward. +ve bradykinesia, +ve hypophonia, she is slow exiting the chair. No freezing of her gait  Musculoskeletal:  Has no hand arthritis, no limitation of ROM in any of the four extremities. Lower extremities no edema        DATA:  reviewed   Results for orders placed during the hospital encounter of 10/14/18   CT HEAD WO CONTRAST    Narrative PROCEDURE: CT HEAD WO CONTRAST    CLINICAL INFORMATION: Pain following a fall. COMPARISON: CT head dated 7/1/2011. TECHNIQUE:   5 mm axial imaging through the head without IV contrast.     All CT scans at this facility use dose modulation, iterative reconstruction, and/or weight based dosing when appropriate to reduce the radiation dose to as low as reasonably achievable. FINDINGS:   POSTOPERATIVE CHANGES: None. BRAIN VOLUME:   1. Normal for the patient's age. VENTRICLES/CISTERNS:   1. Normal for the patient's age. INFARCT/WHITE MATTER DISEASE:   1. There is no acute large vessel infarct. 2. Stable small basal ganglion calcifications bilaterally. 3. Stable moderately severe white matter disease within the periventricular deep white matter and centrum semiovale bilaterally. INTRACRANIAL HEMORRHAGE: None. MASS/MASS EFFECT/MIDLINE SHIFT: None. INTRA-AXIAL/EXTRA-AXIAL FLUID COLLECTIONS: None.     INTRAORBITAL CONTENTS:   1. No acute abnormality. OTHER:   1. Atheromatous calcification distal vertebral arteries and intracranial internal carotid arteries bilaterally. SKULL/SCALP:   1. There is generalized osteopenia. 2. There is no skull fracture. PARANASAL SINUSES:   1. There is a small mucous retention cyst or polyp within the right maxillary sinus. 2. There is mild mucosal thickening within the ethmoid air cells bilaterally. Impression 1. There is no acute intracranial abnormality. 2. Additional findings as described in the body the report. **This report has been created using voice recognition software. It may contain minor errors which are inherent in voice recognition technology. **    Final report electronically signed by Dr. Albaro Danielle on 10/14/2018 4:57 PM          Assessment:     Diagnosis Orders   1. Parkinson's disease ([facility] [address]          She is doing the same to better compared to last evalaution. She is using the walker for long distances. There is no freezing with ambulation. She denies dysphagia. No recent falls. No hallucination. No dysphagia. She is on Sinemet 25/100mg 2 tablets three times a day in addition to the Neupro 2 mg day. No side effects reported to the medications. She is not interested in changing her medications as she feels its too soon. After detailed discussion with patient and her daughter we agreed on the following plan. Plan:     1. Continue with Sinemet 25/100 mg take 2 tablets four times a day. Refill given. 2. Stay Active  3. Speech therapy re Parkinsons. 4. Physical therapy evaluation for gait safety  5. Follow up in 12 months or sooner if needed via Virtual visit. 6. Call if any questions or concerns. Time spent evaluating patient, counseling, reviewing records was 15 min.       Antonio Blackwell MD

## 2020-08-03 NOTE — TELEPHONE ENCOUNTER
Dr Satnam Cody  Pt nurse called in with pitting edema and weight gain  I started Bumex (see below) her edema has resolved  Kidney function ok but Sodium is lower 130 now  She is on Sodium Cl 1 gm bid  Any changes from your standpoint?

## 2020-08-18 LAB — SODIUM BLD-SCNC: 131 MMOL/L

## 2020-08-19 ENCOUNTER — TELEPHONE (OUTPATIENT)
Dept: NEPHROLOGY | Age: 85
End: 2020-08-19

## 2020-08-19 NOTE — TELEPHONE ENCOUNTER
[address] contacted. Margie COELHO notified. Was unsure of patients current dose, but would note the change and call us with any questions.

## 2020-08-19 NOTE — TELEPHONE ENCOUNTER
Daughter called back, concerned with increasing salt tablets d/t patient having issues with edema currently. Stated the assisted living is monitoring weights daily. Call placed back to [address] [place] [address] and asked for a current medication list and also daily weights.

## 2020-08-20 NOTE — TELEPHONE ENCOUNTER
Spoke to [facility]. They are faxed us med list and weights for review. They did start the Sodium tbalets 2 tablets BID today.

## 2020-09-14 ENCOUNTER — TELEPHONE (OUTPATIENT)
Dept: CARDIOLOGY CLINIC | Age: 85
End: 2020-09-14

## 2020-09-14 ENCOUNTER — PATIENT MESSAGE (OUTPATIENT)
Dept: CARDIOLOGY CLINIC | Age: 85
End: 2020-09-14

## 2020-09-14 NOTE — TELEPHONE ENCOUNTER
Pain in leg is bruised area on leg  Blisters have closed and swelling is gone  Per nurse Janeth Elena patient has always had SOB with exertion

## 2020-09-14 NOTE — TELEPHONE ENCOUNTER
Coral at [place] states pt swelling is gone in the legs, however she is still having pain and sob.

## 2020-09-14 NOTE — TELEPHONE ENCOUNTER
From: Mckenzie Busch  To: Karinwarenato Scales, APRN - CNP  Sent: 9/14/2020 3:10 PM EDT  Subject: Non-Urgent Medical Question    Part two, I have attached a picture of her leg that has the spots on it that she complains a lot about. She wanted me to take a picture and send it to you. The nurse & I told her she got those because of the swelling & the ten hose that's why they increased the Bumex & put ace bandage on your legs. She is just wanting to know will her legs ever stop swelling up and because of this is her heart going to give out from working so hard? I'm going to see if I can be on the phone FaceTime for the visit with my sister's phone, who will be at the visit. Thanks for your help.      Nadege

## 2020-09-17 ENCOUNTER — OFFICE VISIT (OUTPATIENT)
Dept: CARDIOLOGY CLINIC | Age: 85
End: 2020-09-17
Payer: MEDICARE

## 2020-09-17 VITALS
SYSTOLIC BLOOD PRESSURE: 138 MMHG | HEART RATE: 64 BPM | WEIGHT: 121 LBS | BODY MASS INDEX: 22.26 KG/M2 | HEIGHT: 62 IN | DIASTOLIC BLOOD PRESSURE: 58 MMHG | OXYGEN SATURATION: 94 %

## 2020-09-17 PROCEDURE — 4040F PNEUMOC VAC/ADMIN/RCVD: CPT | Performed by: NURSE PRACTITIONER

## 2020-09-17 PROCEDURE — G8420 CALC BMI NORM PARAMETERS: HCPCS | Performed by: NURSE PRACTITIONER

## 2020-09-17 PROCEDURE — 1036F TOBACCO NON-USER: CPT | Performed by: NURSE PRACTITIONER

## 2020-09-17 PROCEDURE — G8427 DOCREV CUR MEDS BY ELIG CLIN: HCPCS | Performed by: NURSE PRACTITIONER

## 2020-09-17 PROCEDURE — 1090F PRES/ABSN URINE INCON ASSESS: CPT | Performed by: NURSE PRACTITIONER

## 2020-09-17 PROCEDURE — 1123F ACP DISCUSS/DSCN MKR DOCD: CPT | Performed by: NURSE PRACTITIONER

## 2020-09-17 PROCEDURE — 99214 OFFICE O/P EST MOD 30 MIN: CPT | Performed by: NURSE PRACTITIONER

## 2020-09-17 RX ORDER — SPIRONOLACTONE 25 MG/1
25 TABLET ORAL DAILY
Qty: 30 TABLET | Refills: 3
Start: 2020-09-17

## 2020-09-17 ASSESSMENT — ENCOUNTER SYMPTOMS
SHORTNESS OF BREATH: 1
CHEST TIGHTNESS: 0
APNEA: 0
COUGH: 0
COLOR CHANGE: 0
NAUSEA: 0
ABDOMINAL PAIN: 0
ABDOMINAL DISTENTION: 1
WHEEZING: 0

## 2020-09-17 NOTE — PROGRESS NOTES
[facility]       Visit Date: 9/17/2020  Cardiologist:  Dr. Alexis Valera  Primary Care Physician: Dr. Fransico Modi MD    Henry Vail is a 80 y.o. female who presents today for:  No chief complaint on file. HPI: Obtained from patient and chart    Henry Vail is a 80 y.o. female who presents to the office for a follow up visit in the heart failure clinic. Hx A fib on Eliquis, EFpEF 55-60% RSVP 60-65 mmHg, CAD CABG, Negative stress 11/2018, HTN, DM, Parkinson's. Recent admission for hyponatremia. Resides at [facility]. She has been complaining of increased SOB with activity and sometimes some conversational dyspnea. LE edema has improved with the wrapping of the legs with ACE but still has 1+. Her abdomen is distended today. She did have a BM this morning and they have been regular. She sleeps in bed with 2 pillows. She does feel SOB with ADLs. She can sit in a chair and do chair exercises for 30 minutes. Accompanied by: daughter   Last hospital admission related to Heart Failure:  May 2020  Chest Pain: no  Worsening SOB: no  Worsening Orthopnea/PND: no  Edema: improved  Any extra diuretic use: see phone encounters  Weight gain: no  Fatigue: no  Abdominal bloating: no  Appetite: good  DEBO: no  Cough: no  Compliant checking home weight: yes 116-118 lbs  Compliant checking blood pressure: yes morning BPs 140-150 after meds 120-130      Past Medical History:   Diagnosis Date    Arthritis     Atypical chest pain     CAD (coronary artery disease)     Chronic LBBB (left bundle branch block)     Diabetes mellitus type II     Esophageal stricture     History of esophageal stricture.  FH: CAD (coronary artery disease)     Mom and dad both with heart disease at mid to late age.      GERD (gastroesophageal reflux disease)     History of gastritis     History of skin cancer     Hypercholesterolemia     Hyperlipidemia     Hypertension     Imbalance     As far wall motion. Moderate left atrial enlargement. Mild MR. Mild TR. Calcified valves w/no obvious stenosis. No pericardial effusion.  DOPPLER ECHOCARDIOGRAPHY  4 14 2006    There appears to be significant improvement from previous echo w/complete resolution of pericardial effusion and normal LV function. EF of 60%.  DOPPLER ECHOCARDIOGRAPHY  3 21 2006    Normal LV function. Mild LV hypertrophy. Mild biatrial enlargement. Mildly calcific aortic and mitral valve w/no stenosis. Mild MR. Mild TR. Minimal residual pericardial effusion w/significant improvement from previous echo.  DOPPLER ECHOCARDIOGRAPHY  3 16 2006    Interval change from previous echocardiogram w/worsening of effusion. Correlation clinically. Consideration of pericardial centesis. Will obtain a [facility] consult.  DOPPLER ECHOCARDIOGRAPHY  1 31 2006    No significant change from previous echo. The 2D echo showed moderate degree of pericardial effusion. It does seem to be worst or better than previous echo. No evidence of tamponade.  DOPPLER ECHOCARDIOGRAPHY  1 27 2006    Normal global LV function. Mild biatrial enlargement. Mildly sclerotic aortic & mitral valve w/no stenosis. Mild MR. Mild TR. Small to moderate pericardial effusion w/no obvious tamponade.      JOINT REPLACEMENT      MOHS SURGERY Right 2/13/2019    MOHS DEFECT REPAIR CYSTIC SCC RIGHT LOWER LATERAL LEG WITH SKIN GRAFT FROM RIGHT GROIN (FULL THICKNESS ) performed by Alejandra Canseco MD at [facility] / [address] [place] [address] Left 12/20/13    left leg lesion excision x2, frozen section, skin graft closure    ROTATOR CUFF REPAIR  09/2001    RIGHT    ROTATOR CUFF REPAIR  04/15/2009    LEFT     SKIN CANCER EXCISION  06/2006      Rt leg    SPINAL CORD STIMULATOR IMPLANTATION N/A 12/4/2018    PERMANENT INTERSTIM performed by Dorene Jones MD at [facility] History   Problem Relation Age of Onset    Heart Disease Mother  Diabetes Mother     Stroke Father     Diabetes Sister     Lung Cancer Brother      Social History     Tobacco Use    Smoking status: Never Smoker    Smokeless tobacco: Never Used   Substance Use Topics    Alcohol use: No     Current Outpatient Medications   Medication Sig Dispense Refill    spironolactone (ALDACTONE) 25 MG tablet Take 1 tablet by mouth daily 30 tablet 3    bumetanide (BUMEX) 1 MG tablet Take 1 tablet by mouth daily 30 tablet 3    loperamide (IMODIUM) 2 MG capsule Take 2 mg by mouth every 6 hours as needed for Diarrhea      Multiple Vitamins-Minerals (PRESERVISION AREDS 2) CAPS Take 2 capsules by mouth 2 times daily      acetaminophen (TYLENOL 8 HOUR ARTHRITIS PAIN) 650 MG extended release tablet Take 650 mg by mouth 3 times daily      metFORMIN (GLUCOPHAGE-XR) 500 MG extended release tablet Take 1 tablet by mouth 2 times daily 180 tablet 3    diclofenac sodium (VOLTAREN) 1 % GEL Apply 4 g topically 4 times daily 4 Tube 5    lactase (LACTAID ULTRA) 9000 units TABS Take 9,000 Units by mouth 3 times daily (before meals)      sodium chloride 1 g tablet Take 1 tablet by mouth 2 times daily (with meals) (Patient taking differently: Take 2 g by mouth 2 times daily (with meals) ) 120 tablet 1    potassium chloride (KLOR-CON M) 20 MEQ extended release tablet Take 0.5 tablets by mouth 3 times daily 30 tablet 1    magnesium oxide (MAG-OX) 400 MG tablet Take 1 tablet by mouth 3 times daily 30 tablet 3    hydrALAZINE (APRESOLINE) 25 MG tablet Take 2 tablets by mouth every 8 hours 90 tablet 3    isosorbide dinitrate (ISORDIL) 20 MG tablet Take 1 tablet by mouth 3 times daily 90 tablet 3    metoprolol tartrate (LOPRESSOR) 50 MG tablet Take 0.5 tablets by mouth 2 times daily 180 tablet 3    Multiple Vitamin (MULTIVITAMIN) TABS tablet Take 1 tablet by mouth daily 30 tablet 0    atorvastatin (LIPITOR) 20 MG tablet TAKE 1 TABLET DAILY 90 tablet 1    carbidopa-levodopa (SINEMET)  MG per limits. Moderately dilated left atrium. Mildly dilated right ventricle. Right ventricular systolic pressure of 80-61 mm Hg consistent with severe   pulmonary hypertension. Mildly calcified and thickened aortic valve. Mild to Moderate mitral regurgitation is present. Moderate tricuspid regurgitation. Signature      ----------------------------------------------------------------   Electronically signed by Radha Christianson MD (Interpreting   physician) on 05/18/2020 at 04:53 PM   ----------------------------------------------------------------      Findings      Mitral Valve   Calcification of the mitral valve noted. DOPPLER: The transmitral velocity was within the normal range with no   evidence for mitral stenosis. Mild to Moderate mitral regurgitation is present. Aortic Valve   Mildly calcified and thickened aortic valve. The aortic valve leaflets were not well visualized. DOPPLER: Transaortic velocity was within the normal range with no evidence   of aortic stenosis. There was no evidence of aortic regurgitation. Tricuspid Valve   The tricuspid valve structure is normal with normal leaflet separation. DOPPLER: There is no evidence of tricuspid stenosis. Moderate tricuspid regurgitation. Pulmonic Valve   The pulmonic valve was not well visualized . Left Atrium   Moderately dilated left atrium. Left Ventricle   Left ventricular size and systolic function is normal. Ejection fraction   was estimated at 55-60%. LV wall thickness is within normal limits. Right Atrium   Right atrial size was normal.      Right Ventricle   Mildly dilated right ventricle. Right ventricular systolic pressure of 28-61 mm Hg consistent with severe   pulmonary hypertension. Pericardial Effusion   The pericardium appears normal with no evidence of a pericardial effusion. Pleural Effusion   Left pleural effusion.       Aorta / Great Vessels   -Aortic root dimension within

## 2020-09-17 NOTE — PATIENT INSTRUCTIONS
You may receive a survey regarding the care you received during your visit. Your input is valuable to us. We encourage you to complete and return your survey. We hope you will choose us in the future for your healthcare needs. Continue:  · Take all medications as prescribed   · Daily weights and record - please try to weigh yourself upon awakening before eating or drinking   · Fluid restriction of 2 Liters per day (64 oz)  · Limit sodium in diet to around 7647-6182 mg/day  · Monitor BP - take BP 1 hour after meds  · Increase activity as tolerated     Call the Heart Failure Clinic for any of the following symptoms: [phone]   Weight gain of 3 pounds in 1 day or 5 pounds in 1 week   Increased shortness of breath   Shortness of breath while laying down   Cough   Chest pain   Swelling in feet, ankles or legs   Tenderness or bloating in the abdomen   Fatigue    Decreased appetite or feeling \"full\"    Nausea    Confusion     **PLEASE bring all medications in original bottles with you to your next appointment**    Educational websites:    http://www.Mysafeplace.Nuka Indstries/. org (American Heart Association)    Promotionstylemarks. com (Entresto/Novartis)    [employer].com (CardioMEMS)    Text LEARN to 6-659-Rkwhe-34 ([phone])  -- to learn more about CardioMEMS   By enrolling in this education series you are giving permission to [employer] to contact you via SMS (text message). You can opt out of receiving this information by replying STOP at any time in the SMS correspondence that you  receive. To ensure your privacy, [employer] and its subcontractors will not sell or share your personal information  with any third parties except as required by law. [address] [facility], [address] [facility], Tel: 1 [phone]  [employer]. com  Rx Only  Brief Summary: Prior to using these devices, please review the Instructions for Use for a complete listing of  indications, contraindications, warnings, precautions, potential adverse events and directions for use.  Indicates a trademark of the [employer] group of [employer]. Bluetooth and Bluetooth logo are registered trademarks of [employer].  © 2020 [employer]. All Rights Reserved. [address] [id number] v1.0  Item approved for U.[place] use only.

## 2020-09-19 ENCOUNTER — PATIENT MESSAGE (OUTPATIENT)
Dept: FAMILY MEDICINE CLINIC | Age: 85
End: 2020-09-19

## 2020-09-21 NOTE — TELEPHONE ENCOUNTER
From: Sadaf Wong  To: Jessica Armstrong MD  Sent: 9/19/2020 12:20 PM EDT  Subject: Non-Urgent Medical Question    Dr. Kishor Moore my mom saw [facility] on Thursday, of last week, and she said we needed to reach out to you to make sure that you are aware of what my moms fasting sugar numbers are. I am going to attached her numbers for the last 20 days.  Thanks,    Jennifer Ellis

## 2020-09-24 ENCOUNTER — TELEPHONE (OUTPATIENT)
Dept: CARDIOLOGY CLINIC | Age: 85
End: 2020-09-24

## 2020-09-24 LAB
BUN BLDV-MCNC: 15 MG/DL
CALCIUM SERPL-MCNC: 9.9 MG/DL
CHLORIDE BLD-SCNC: 99 MMOL/L
CO2: 28 MMOL/L
CREAT SERPL-MCNC: 0.7 MG/DL
GFR CALCULATED: 79
GLUCOSE BLD-MCNC: 117 MG/DL
POTASSIUM SERPL-SCNC: 4.2 MMOL/L
SODIUM BLD-SCNC: 134 MMOL/L

## 2020-09-24 RX ORDER — SITAGLIPTIN 100 MG/1
TABLET, FILM COATED ORAL
Qty: 90 TABLET | Refills: 2 | Status: SHIPPED | OUTPATIENT
Start: 2020-09-24

## 2020-09-24 NOTE — TELEPHONE ENCOUNTER
Update from [address]:        BP/HR/Weights:  9/23 154/65 58 119.4 lbs   9/22 156/57 (10:25) 128/60 (19:28) 64 (10:25)  49 (19:28) 117.6 lbs   9/21 143/67 (09:55)  119/52 (18:41) 65 (09:55)  59 (18:41) 115.4 lbs   9/20 183/74 (07:03)  108/58 (18:27) 66 (07:03)  62 (18:27) 116.4 lbs   9/19 148/62 (08:55)  127/73 (21:11) 64 (08:55)  67 (21:11) 117.6 lbs   9/18 149/79 (10:39)  142/69 (20:22) 61 (10:39)  63 (20:22) 119.4 lbs

## 2020-09-29 ENCOUNTER — OFFICE VISIT (OUTPATIENT)
Dept: CARDIOLOGY CLINIC | Age: 85
End: 2020-09-29
Payer: MEDICARE

## 2020-09-29 VITALS
BODY MASS INDEX: 21.27 KG/M2 | WEIGHT: 115.6 LBS | OXYGEN SATURATION: 96 % | HEART RATE: 50 BPM | SYSTOLIC BLOOD PRESSURE: 130 MMHG | DIASTOLIC BLOOD PRESSURE: 76 MMHG | HEIGHT: 62 IN

## 2020-09-29 PROCEDURE — 1123F ACP DISCUSS/DSCN MKR DOCD: CPT | Performed by: NURSE PRACTITIONER

## 2020-09-29 PROCEDURE — 1090F PRES/ABSN URINE INCON ASSESS: CPT | Performed by: NURSE PRACTITIONER

## 2020-09-29 PROCEDURE — 1036F TOBACCO NON-USER: CPT | Performed by: NURSE PRACTITIONER

## 2020-09-29 PROCEDURE — G8420 CALC BMI NORM PARAMETERS: HCPCS | Performed by: NURSE PRACTITIONER

## 2020-09-29 PROCEDURE — 4040F PNEUMOC VAC/ADMIN/RCVD: CPT | Performed by: NURSE PRACTITIONER

## 2020-09-29 PROCEDURE — G8427 DOCREV CUR MEDS BY ELIG CLIN: HCPCS | Performed by: NURSE PRACTITIONER

## 2020-09-29 PROCEDURE — 99214 OFFICE O/P EST MOD 30 MIN: CPT | Performed by: NURSE PRACTITIONER

## 2020-09-29 ASSESSMENT — ENCOUNTER SYMPTOMS
COUGH: 0
CHEST TIGHTNESS: 0
ABDOMINAL PAIN: 0
SHORTNESS OF BREATH: 0
ABDOMINAL DISTENTION: 0
APNEA: 0
NAUSEA: 0
WHEEZING: 0
COLOR CHANGE: 0

## 2020-09-29 NOTE — PATIENT INSTRUCTIONS
You may receive a survey regarding the care you received during your visit. Your input is valuable to us. We encourage you to complete and return your survey. We hope you will choose us in the future for your healthcare needs. Continue:  · Take all medications as prescribed   · Daily weights and record - please try to weigh yourself upon awakening before eating or drinking   · Fluid restriction of 2 Liters per day (64 oz)  · Limit sodium in diet to around 9232-0892 mg/day  · Monitor BP - take BP 1 hour after meds  · Increase activity as tolerated     Call the Heart Failure Clinic for any of the following symptoms: [phone]   Weight gain of 3 pounds in 1 day or 5 pounds in 1 week   Increased shortness of breath   Shortness of breath while laying down   Cough   Chest pain   Swelling in feet, ankles or legs   Tenderness or bloating in the abdomen   Fatigue    Decreased appetite or feeling \"full\"    Nausea    Confusion     **PLEASE bring all medications in original bottles with you to your next appointment**    Educational websites:    http://www.Hachiko.microDimensions/. org (American Heart Association)    PromotionGingersoft Media. com (Entresto/Novartis)    Teez.by.com (CardioMEMS)    Text LEARN to 2-569-Bpjjb-34 ([phone])  -- to learn more about CardioMEMS   By enrolling in this education series you are giving permission to [employer] to contact you via SMS (text message). You can opt out of receiving this information by replying STOP at any time in the SMS correspondence that you  receive. To ensure your privacy, [employer] and its subcontractors will not sell or share your personal information  with any third parties except as required by law. [address] Ismael Santiago, [address] [facility], Tel: 1 [phone]  [employer]GE[employer]. com  Rx Only  Brief Summary: Prior to using these devices, please review the Instructions for Use for a complete listing of  indications, contraindications, warnings, precautions, potential adverse events and directions for use.  Indicates a trademark of the [employer]. Bluetooth and Bluetooth logo are registered trademarks of [employer].  © 2020 [employer]. All Rights Reserved. 76352 [id number] v1.0  Item approved for U.S. use only.

## 2020-09-29 NOTE — PROGRESS NOTES
[place]       Visit Date: 9/29/2020  Cardiologist:   [facility]  Primary Care Physician: Dr. Rojelio Ulloa MD    Honey Humphrey is a 80 y.o. female who presents today for:  Chief Complaint   Patient presents with    Congestive Heart Failure       HPI: Obtained from patient and chart    Honey Humphrey is a 80 y.o. female who presents to the office for a follow up visit in the heart failure clinic. Hx A fib on Eliquis, EFpEF 55-60% RSVP 60-65 mmHg, CAD CABG, Negative stress 11/2018, HTN, DM, Parkinson's. Recent admission for hyponatremia. [facility] at [facility]. At her last OV 2 weeks ago we had her take extra Bumex and added Aldactone. Her weight is down 6 pounds. She can talk in complete sentences. She is not as SOB. She can perform ADLs without SOB or fatigue. Leg swelling is much improved. Accompanied by: daughter   Last hospital admission related to Heart Failure:  May 2020  Chest Pain: no  Worsening SOB: no  Worsening Orthopnea/PND: no  Edema: improving  Any extra diuretic use: see hpi  Weight gain: no  Fatigue: no  Abdominal bloating: improved  Appetite: good  DEBO: no  Cough: no  Compliant checking home weight: yes 113-115 lbs  Compliant checking blood pressure: yes 130-140      Past Medical History:   Diagnosis Date    Arthritis     Atypical chest pain     CAD (coronary artery disease)     Chronic LBBB (left bundle branch block)     Diabetes mellitus type II     Esophageal stricture     History of esophageal stricture.  FH: CAD (coronary artery disease)     Mom and dad both with heart disease at mid to late age.  GERD (gastroesophageal reflux disease)     History of gastritis     History of skin cancer     Hypercholesterolemia     Hyperlipidemia     Hypertension     Imbalance     As far as imbalance is concerned, asked patient to follow-up with Dr. Bernard Rowe since she follows with him on a regular basis.      Lactose intolerance     Nummular dermatitis     Parkinson's disease ([facility] [address] 2009    Restless legs syndrome (RLS)     S/P CABG (coronary artery bypass graft)     SCC (squamous cell carcinoma)      Past Surgical History:   Procedure Laterality Date    CARDIAC SURGERY      CARDIOVASCULAR STRESS TEST  4 09 2009    Gated SPECT images revealed normal global wall motion with EF of 60%. Persantine test associated w/nonspecific symptoms. EKG is nondiagnostic w/baseline LBBB. No obvious stress-induced ischemia by Cardiolite. EF within normal limits.  CARDIOVASCULAR STRESS TEST  7 10 2007    The gated SPECT images revealed normal wall motion with EF of 69%. Poor exercise tolerance w/SOB on exertion. EKG is nondiagnostic. Cardiolite scan revealing no obvious stress-induced ischemia. EF 60%.  CARDIOVASCULAR STRESS TEST  2 03 2006    Fair exercise tolerance w/SOB on exertion. No EKG evidence of ischemia. Patient should be able to proceed with phase II cardiac rehab.  CATARACT REMOVAL      CATARACT REMOVAL  06/08/2016    AND 6/29/16    DIAGNOSTIC CARDIAC CATH LAB PROCEDURE  12 23 2005    LV was obtained. AGEE projection showed preserved LV function w/estimated EF at 55%. No significant MR. Patent left main coronary artery. Tortuous LAD which appeared to be patent. Patent left circumflex artery. High-grade stenosis around 99% in very large dominant RCA w/heavy calcification at the ostium. Normal LV function.  DOPPLER ECHOCARDIOGRAPHY  4 09 2009    Global LV function w/in lower limits of normal, with mild anteroseptal hypokinesis. EF of 50-55%. Light LVH. Mild to moderate left atrial enlargement. Calcific aortic and mitral valve w/no obvious stenosis. No obvious pericardial effusion.  DOPPLER ECHOCARDIOGRAPHY  7 10 2007    LV function w/in lower limits of normal w/peridoxical septal wall motion. Moderate left atrial enlargement. Mild MR. Mild TR. Calcified valves w/no obvious stenosis. No pericardial effusion.      DOPPLER ECHOCARDIOGRAPHY  4 14 2006    There appears to be significant improvement from previous echo w/complete resolution of pericardial effusion and normal LV function. EF of 60%.  DOPPLER ECHOCARDIOGRAPHY  3 21 2006    Normal LV function. Mild LV hypertrophy. Mild biatrial enlargement. Mildly calcific aortic and mitral valve w/no stenosis. Mild MR. Mild TR. Minimal residual pericardial effusion w/significant improvement from previous echo.  DOPPLER ECHOCARDIOGRAPHY  3 16 2006    Interval change from previous echocardiogram w/worsening of effusion. Correlation clinically. Consideration of pericardial centesis. Will obtain a [facility] consult.  DOPPLER ECHOCARDIOGRAPHY  1 31 2006    No significant change from previous echo. The 2D echo showed moderate degree of pericardial effusion. It does seem to be worst or better than previous echo. No evidence of tamponade.  DOPPLER ECHOCARDIOGRAPHY  1 27 2006    Normal global LV function. Mild biatrial enlargement. Mildly sclerotic aortic & mitral valve w/no stenosis. Mild MR. Mild TR. Small to moderate pericardial effusion w/no obvious tamponade.      JOINT REPLACEMENT      MOHS SURGERY Right 2/13/2019    MOHS DEFECT REPAIR CYSTIC SCC RIGHT LOWER LATERAL LEG WITH SKIN GRAFT FROM RIGHT GROIN (FULL THICKNESS ) performed by Chip Gonzales MD at [facility] / [address] [place] [address] Left 12/20/13    left leg lesion excision x2, frozen section, skin graft closure    ROTATOR CUFF REPAIR  09/2001    RIGHT    ROTATOR CUFF REPAIR  04/15/2009    LEFT     SKIN CANCER EXCISION  06/2006      Rt leg    SPINAL CORD STIMULATOR IMPLANTATION N/A 12/4/2018    PERMANENT INTERSTIM performed by Duane Mood, MD at [facility] History   Problem Relation Age of Onset    Heart Disease Mother     Diabetes Mother     Stroke Father     Diabetes Sister     Lung Cancer Brother      Social History     Tobacco Use    Smoking status: Never Smoker    Smokeless tobacco: Never Used   Substance Use Topics    Alcohol use: No     Current Outpatient Medications   Medication Sig Dispense Refill    JANUVIA 100 MG tablet TAKE 1 TABLET BY MOUTH DAILY 90 tablet 2    SITagliptin (JANUVIA) 100 MG tablet Take 1 tablet by mouth daily 30 tablet 2    spironolactone (ALDACTONE) 25 MG tablet Take 1 tablet by mouth daily 30 tablet 3    bumetanide (BUMEX) 1 MG tablet Take 1 tablet by mouth daily 30 tablet 3    loperamide (IMODIUM) 2 MG capsule Take 2 mg by mouth every 6 hours as needed for Diarrhea      Multiple Vitamins-Minerals (PRESERVISION AREDS 2) CAPS Take 2 capsules by mouth 2 times daily      acetaminophen (TYLENOL 8 HOUR ARTHRITIS PAIN) 650 MG extended release tablet Take 650 mg by mouth 3 times daily      metFORMIN (GLUCOPHAGE-XR) 500 MG extended release tablet Take 1 tablet by mouth 2 times daily 180 tablet 3    diclofenac sodium (VOLTAREN) 1 % GEL Apply 4 g topically 4 times daily 4 Tube 5    lactase (LACTAID ULTRA) 9000 units TABS Take 9,000 Units by mouth 3 times daily (before meals)      sodium chloride 1 g tablet Take 1 tablet by mouth 2 times daily (with meals) (Patient taking differently: Take 2 g by mouth 2 times daily (with meals) ) 120 tablet 1    potassium chloride (KLOR-CON M) 20 MEQ extended release tablet Take 0.5 tablets by mouth 3 times daily 30 tablet 1    magnesium oxide (MAG-OX) 400 MG tablet Take 1 tablet by mouth 3 times daily 30 tablet 3    hydrALAZINE (APRESOLINE) 25 MG tablet Take 2 tablets by mouth every 8 hours (Patient taking differently: Take 50 mg by mouth 3 times daily ) 90 tablet 3    isosorbide dinitrate (ISORDIL) 20 MG tablet Take 1 tablet by mouth 3 times daily 90 tablet 3    metoprolol tartrate (LOPRESSOR) 50 MG tablet Take 0.5 tablets by mouth 2 times daily 180 tablet 3    Multiple Vitamin (MULTIVITAMIN) TABS tablet Take 1 tablet by mouth daily 30 tablet 0    atorvastatin (LIPITOR) 20 MG tablet TAKE 1 TABLET DAILY 90 tablet 1    carbidopa-levodopa (SINEMET)  MG per tablet Take 2 tablets by mouth 4 times daily 720 tablet 0    apixaban (ELIQUIS) 2.5 MG TABS tablet TAKE 1 TABLET BY MOUTH 2  TIMES DAILY, EQUIVALENT TO  APIXABAN 180 tablet 1    dilTIAZem (CARDIZEM) 30 MG tablet TAKE 1 TABLET BY MOUTH 3  TIMES A  tablet 3    blood glucose test strips (CONTOUR TEST) strip USE TO TEST BLOOD SUGAR TWICE DAILY. 200 strip 3    sucralfate (CARAFATE) 1 GM tablet Take 1 tablet by mouth 4 times daily 360 tablet 3    omeprazole (PRILOSEC) 20 MG delayed release capsule Take 1 capsule by mouth daily 90 capsule 3    Cholecalciferol (VITAMIN D3 PO) Take 4,000 Units by mouth 2 times daily       Probiotic Product (PROBIOTIC ADVANCED PO) Take 1 tablet by mouth daily      Cranberry 500 MG CAPS Take 500 mg by mouth daily 2 tab      aspirin 81 MG EC tablet Take 81 mg by mouth daily.  Misc. Devices MISC 1 each by Does not apply route once for 1 dose Param Glucose Meter; Diagnosis:E11.65 1 Device 0     No current facility-administered medications for this visit. Allergies   Allergen Reactions    Latex Rash    Lisinopril Swelling     mouth    Tape Honey Craft Tape] Itching    Keflex [Cephalexin] Nausea And Vomiting and Rash    Lactulose Diarrhea    Morphine Rash    Polysporin [Bacitracin-Polymyxin B] Rash       SUBJECTIVE:   Review of Systems   Constitutional: Negative for activity change, appetite change, fatigue and fever. HENT: Negative for congestion. Respiratory: Negative for apnea, cough, chest tightness, shortness of breath and wheezing. Cardiovascular: Negative for chest pain, palpitations and leg swelling. Gastrointestinal: Negative for abdominal distention, abdominal pain and nausea. Genitourinary: Negative for difficulty urinating and dysuria. Musculoskeletal: Negative for arthralgias and gait problem (walker). Skin: Negative for color change.    Neurological: Negative for dizziness, numbness and headaches. Psychiatric/Behavioral: Negative for agitation, confusion and sleep disturbance. The patient is not nervous/anxious. OBJECTIVE:   Today's Vitals:  /76   Pulse 50   Ht 5' 2\" (1.575 m)   Wt 115 lb 9.6 oz (52.4 kg)   SpO2 96%   BMI 21.14 kg/m²     Physical Exam  Vitals signs reviewed. Constitutional:       Appearance: She is well-developed. HENT:      Head: Normocephalic and atraumatic. Eyes:      Pupils: Pupils are equal, round, and reactive to light. Neck:      Musculoskeletal: Normal range of motion. Vascular: No JVD. Cardiovascular:      Rate and Rhythm: Normal rate and regular rhythm. Heart sounds: Normal heart sounds. No murmur. Pulmonary:      Effort: Pulmonary effort is normal. No respiratory distress. Breath sounds: No rales. Abdominal:      General: There is no distension. Palpations: Abdomen is soft. Tenderness: There is no abdominal tenderness. Musculoskeletal:         General: No tenderness. Right lower leg: Edema (trace) present. Left lower leg: Edema (trace) present. Skin:     General: Skin is warm and dry. Capillary Refill: Capillary refill takes less than 2 seconds. Neurological:      Mental Status: She is alert and oriented to person, place, and time. Psychiatric:         Mood and Affect: Mood normal.         Behavior: Behavior normal.         Wt Readings from Last 3 Encounters:   09/29/20 115 lb 9.6 oz (52.4 kg)   09/17/20 121 lb (54.9 kg)   08/03/20 121 lb 9.6 oz (55.2 kg)     BP Readings from Last 3 Encounters:   09/29/20 130/76   09/17/20 (!) 138/58   08/03/20 122/70     Pulse Readings from Last 3 Encounters:   09/29/20 50   09/17/20 64   08/03/20 64     Body mass index is 21.14 kg/m². ECHO:   5/18/20  Summary   Left ventricular size and systolic function is normal. Ejection fraction   was estimated at 55-60%. LV wall thickness is within normal limits.    Moderately dilated left atrium. Mildly dilated right ventricle. Right ventricular systolic pressure of 35-95 mm Hg consistent with severe   pulmonary hypertension. Mildly calcified and thickened aortic valve. Mild to Moderate mitral regurgitation is present. Moderate tricuspid regurgitation. Signature      ----------------------------------------------------------------   Electronically signed by Bandar Givens MD (Interpreting   physician) on 05/18/2020 at 04:53 PM   ----------------------------------------------------------------      Findings      Mitral Valve   Calcification of the mitral valve noted. DOPPLER: The transmitral velocity was within the normal range with no   evidence for mitral stenosis. Mild to Moderate mitral regurgitation is present. Aortic Valve   Mildly calcified and thickened aortic valve. The aortic valve leaflets were not well visualized. DOPPLER: Transaortic velocity was within the normal range with no evidence   of aortic stenosis. There was no evidence of aortic regurgitation. Tricuspid Valve   The tricuspid valve structure is normal with normal leaflet separation. DOPPLER: There is no evidence of tricuspid stenosis. Moderate tricuspid regurgitation. Pulmonic Valve   The pulmonic valve was not well visualized . Left Atrium   Moderately dilated left atrium. Left Ventricle   Left ventricular size and systolic function is normal. Ejection fraction   was estimated at 55-60%. LV wall thickness is within normal limits. Right Atrium   Right atrial size was normal.      Right Ventricle   Mildly dilated right ventricle. Right ventricular systolic pressure of 43-47 mm Hg consistent with severe   pulmonary hypertension. Pericardial Effusion   The pericardium appears normal with no evidence of a pericardial effusion. Pleural Effusion   Left pleural effusion. Aorta / Great Vessels   -Aortic root dimension within normal limits.    -IVC size is within mg tid  · Other: Eliquis, Lipitor, Cardizem, Mag ox, Sodium Chloride 2 gm tid, ASA 81 mg  · No signs of fluid overload today, she looks a lot better. BMP 9/24/20 reviewed. Sodium is improving 134. She is down 6 pounds. Leg swelling is much improved. Continue to wrap legs. We reviewed HF Zones and they will notify us with any changes and we can treat with prn Bumex if needed. · HF Zones reviewed. · Daily weights and record  · Fluid restriction of 2 Liters per day (64 oz)   · Limit sodium in diet to 3464-0652 mg/day  · Healthier food options were discussed   · Monitor BP daily 1 hour after meds are taken  · Increase activity as tolerated     Patient was instructed to call the [address] for changes in the following symptoms:    Weight gain of 3 pounds in 1 day or 5 pounds in 1 week   Increased shortness of breath   Shortness of breath while laying down   Cough   Chest pain   Swelling in feet, ankles or legs   Tenderness or bloating in the abdomen   Fatigue    Decreased appetite or feeling \"full\"   Nausea    Confusion      Return in about 3 months (around 12/29/2020). or sooner if needed     Patient given educational materials - see patient instructions. We discussed the importance of weighing oneself and recording daily. We also discussed the importance of a low sodium diet, higher sodium foods to avoid and appropriate low sodium food choices. Discussed use, benefit, and side effects of prescribed medications. All patient questions answered. Patient verbalizes understanding of plan of care using teach back method, and is agreeable to the treatment plan. Greater then 40 minutes of time was spent reviewing the chart and educating the patient about HF, medications, diet, exercise, and discussing the plan of care. I personally spent more then 50% of the appt time face to face with the patient counseling/coordinating patient's care.       Electronicallysigned by ROWDY Machado - CNP on 9/29/2020 at 4:03 PM

## 2020-10-05 ENCOUNTER — HOSPITAL ENCOUNTER (OUTPATIENT)
Age: 85
Discharge: HOME | End: 2020-10-05
Payer: MEDICARE

## 2020-10-05 ENCOUNTER — OFFICE VISIT (OUTPATIENT)
Dept: CARDIOLOGY CLINIC | Age: 85
End: 2020-10-05
Payer: MEDICARE

## 2020-10-05 VITALS
SYSTOLIC BLOOD PRESSURE: 108 MMHG | WEIGHT: 112 LBS | HEIGHT: 62 IN | BODY MASS INDEX: 20.61 KG/M2 | DIASTOLIC BLOOD PRESSURE: 50 MMHG | HEART RATE: 57 BPM

## 2020-10-05 LAB
ERYTHROCYTE [DISTWIDTH] IN BLOOD BY AUTOMATED COUNT: 14.1 % (ref 11.5–14.5)
ERYTHROCYTE [DISTWIDTH] IN BLOOD BY AUTOMATED COUNT: 44.8 FL (ref 35–45)
HCT VFR BLD CALC: 36.7 % (ref 37–47)
HEMOGLOBIN: 11.4 GM/DL (ref 12–16)
MCH RBC QN AUTO: 27.1 PG (ref 26–33)
MCHC RBC AUTO-ENTMCNC: 31.1 GM/DL (ref 32.2–35.5)
MCV RBC AUTO: 87.4 FL (ref 81–99)
PLATELET # BLD: 232 THOU/MM3 (ref 130–400)
PMV BLD AUTO: 10 FL (ref 9.4–12.4)
RBC # BLD: 4.2 MILL/MM3 (ref 4.2–5.4)
WBC # BLD: 6.9 THOU/MM3 (ref 4.8–10.8)

## 2020-10-05 PROCEDURE — 99214 OFFICE O/P EST MOD 30 MIN: CPT | Performed by: NUCLEAR MEDICINE

## 2020-10-05 PROCEDURE — 4040F PNEUMOC VAC/ADMIN/RCVD: CPT | Performed by: NUCLEAR MEDICINE

## 2020-10-05 PROCEDURE — G8427 DOCREV CUR MEDS BY ELIG CLIN: HCPCS | Performed by: NUCLEAR MEDICINE

## 2020-10-05 PROCEDURE — G8420 CALC BMI NORM PARAMETERS: HCPCS | Performed by: NUCLEAR MEDICINE

## 2020-10-05 PROCEDURE — 1123F ACP DISCUSS/DSCN MKR DOCD: CPT | Performed by: NUCLEAR MEDICINE

## 2020-10-05 PROCEDURE — 1090F PRES/ABSN URINE INCON ASSESS: CPT | Performed by: NUCLEAR MEDICINE

## 2020-10-05 PROCEDURE — 1036F TOBACCO NON-USER: CPT | Performed by: NUCLEAR MEDICINE

## 2020-10-05 PROCEDURE — G8484 FLU IMMUNIZE NO ADMIN: HCPCS | Performed by: NUCLEAR MEDICINE

## 2020-10-05 PROCEDURE — 36415 COLL VENOUS BLD VENIPUNCTURE: CPT

## 2020-10-05 PROCEDURE — 85027 COMPLETE CBC AUTOMATED: CPT

## 2020-10-05 NOTE — PROGRESS NOTES
[address] [address]  Dept: [phone]  Dept Fax: [phone]  Loc: [phone]    Visit Date: 10/5/2020    Annmarie Meng is a 80 y.o. female who presents todayfor:  Chief Complaint   Patient presents with    1 Year Follow Up    Coronary Artery Disease    Check-Up    Hypertension    Shortness of Breath     Some dyspnea  More than usual   Looks pale  Known RCA CABG   Getting older  Slow fatigue   She is anemic  No dizziness  No syncope  Bp is stable    Some chest pain at times  No use of nitro   Very limited patient   Does have severe pulmonary HTN     HPI:  HPI  Past Medical History:   Diagnosis Date    Arthritis     Atypical chest pain     CAD (coronary artery disease)     Chronic LBBB (left bundle branch block)     Diabetes mellitus type II     Esophageal stricture     History of esophageal stricture.  FH: CAD (coronary artery disease)     Mom and dad both with heart disease at mid to late age.  GERD (gastroesophageal reflux disease)     History of gastritis     History of skin cancer     Hypercholesterolemia     Hyperlipidemia     Hypertension     Imbalance     As far as imbalance is concerned, asked patient to follow-up with Dr. Jesus Lora since she follows with him on a regular basis.  Lactose intolerance     Nummular dermatitis     Parkinson's disease ([facility] [address] 2009    Restless legs syndrome (RLS)     S/P CABG (coronary artery bypass graft)     SCC (squamous cell carcinoma)       Past Surgical History:   Procedure Laterality Date    CARDIAC SURGERY      CARDIOVASCULAR STRESS TEST  4 09 2009    Gated SPECT images revealed normal global wall motion with EF of 60%. Persantine test associated w/nonspecific symptoms. EKG is nondiagnostic w/baseline LBBB. No obvious stress-induced ischemia by Cardiolite. EF within normal limits.      CARDIOVASCULAR STRESS TEST  7 10 2007    The gated SPECT images revealed normal wall motion with EF of 69%. Poor exercise tolerance w/SOB on exertion. EKG is nondiagnostic. Cardiolite scan revealing no obvious stress-induced ischemia. EF 60%.  CARDIOVASCULAR STRESS TEST  2 03 2006    Fair exercise tolerance w/SOB on exertion. No EKG evidence of ischemia. Patient should be able to proceed with phase II cardiac rehab.  CATARACT REMOVAL      CATARACT REMOVAL  06/08/2016    AND 6/29/16    DIAGNOSTIC CARDIAC CATH LAB PROCEDURE  12 23 2005    LV was obtained. AGEE projection showed preserved LV function w/estimated EF at 55%. No significant MR. Patent left main coronary artery. Tortuous LAD which appeared to be patent. Patent left circumflex artery. High-grade stenosis around 99% in very large dominant RCA w/heavy calcification at the ostium. Normal LV function.  DOPPLER ECHOCARDIOGRAPHY  4 09 2009    Global LV function w/in lower limits of normal, with mild anteroseptal hypokinesis. EF of 50-55%. Light LVH. Mild to moderate left atrial enlargement. Calcific aortic and mitral valve w/no obvious stenosis. No obvious pericardial effusion.  DOPPLER ECHOCARDIOGRAPHY  7 10 2007    LV function w/in lower limits of normal w/peridoxical septal wall motion. Moderate left atrial enlargement. Mild MR. Mild TR. Calcified valves w/no obvious stenosis. No pericardial effusion.  DOPPLER ECHOCARDIOGRAPHY  4 14 2006    There appears to be significant improvement from previous echo w/complete resolution of pericardial effusion and normal LV function. EF of 60%.  DOPPLER ECHOCARDIOGRAPHY  3 21 2006    Normal LV function. Mild LV hypertrophy. Mild biatrial enlargement. Mildly calcific aortic and mitral valve w/no stenosis. Mild MR. Mild TR. Minimal residual pericardial effusion w/significant improvement from previous echo.  DOPPLER ECHOCARDIOGRAPHY  3 16 2006    Interval change from previous echocardiogram w/worsening of effusion. Correlation clinically.  Consideration of pericardial centesis. Will obtain a [facility] consult.  DOPPLER ECHOCARDIOGRAPHY  1 31 2006    No significant change from previous echo. The 2D echo showed moderate degree of pericardial effusion. It does seem to be worst or better than previous echo. No evidence of tamponade.  DOPPLER ECHOCARDIOGRAPHY  1 27 2006    Normal global LV function. Mild biatrial enlargement. Mildly sclerotic aortic & mitral valve w/no stenosis. Mild MR. Mild TR. Small to moderate pericardial effusion w/no obvious tamponade.      JOINT REPLACEMENT      MOHS SURGERY Right 2/13/2019    MOHS DEFECT REPAIR CYSTIC SCC RIGHT LOWER LATERAL LEG WITH SKIN GRAFT FROM RIGHT GROIN (FULL THICKNESS ) performed by Jeri Kent MD at [facility] / [facility] Left 12/20/13    left leg lesion excision x2, frozen section, skin graft closure    ROTATOR CUFF REPAIR  09/2001    RIGHT    ROTATOR CUFF REPAIR  04/15/2009    LEFT     SKIN CANCER EXCISION  06/2006      Rt leg    SPINAL CORD STIMULATOR IMPLANTATION N/A 12/4/2018    PERMANENT INTERSTIM performed by Deja So MD at [facility] History   Problem Relation Age of Onset    Heart Disease Mother     Diabetes Mother     Stroke Father     Diabetes Sister     Lung Cancer Brother      Social History     Tobacco Use    Smoking status: Never Smoker    Smokeless tobacco: Never Used   Substance Use Topics    Alcohol use: No      Current Outpatient Medications   Medication Sig Dispense Refill    JANUVIA 100 MG tablet TAKE 1 TABLET BY MOUTH DAILY 90 tablet 2    SITagliptin (JANUVIA) 100 MG tablet Take 1 tablet by mouth daily 30 tablet 2    spironolactone (ALDACTONE) 25 MG tablet Take 1 tablet by mouth daily 30 tablet 3    bumetanide (BUMEX) 1 MG tablet Take 1 tablet by mouth daily 30 tablet 3    loperamide (IMODIUM) 2 MG capsule Take 2 mg by mouth every 6 hours as needed for Diarrhea      Multiple Vitamins-Minerals (PRESERVISION AREDS 2) CAPS Take 2 capsules by mouth 2 times daily      acetaminophen (TYLENOL 8 HOUR ARTHRITIS PAIN) 650 MG extended release tablet Take 650 mg by mouth 3 times daily      metFORMIN (GLUCOPHAGE-XR) 500 MG extended release tablet Take 1 tablet by mouth 2 times daily 180 tablet 3    diclofenac sodium (VOLTAREN) 1 % GEL Apply 4 g topically 4 times daily 4 Tube 5    lactase (LACTAID ULTRA) 9000 units TABS Take 9,000 Units by mouth 3 times daily (before meals)      sodium chloride 1 g tablet Take 1 tablet by mouth 2 times daily (with meals) (Patient taking differently: Take 2 g by mouth 2 times daily (with meals) ) 120 tablet 1    potassium chloride (KLOR-CON M) 20 MEQ extended release tablet Take 0.5 tablets by mouth 3 times daily 30 tablet 1    magnesium oxide (MAG-OX) 400 MG tablet Take 1 tablet by mouth 3 times daily 30 tablet 3    hydrALAZINE (APRESOLINE) 25 MG tablet Take 2 tablets by mouth every 8 hours (Patient taking differently: Take 50 mg by mouth 3 times daily ) 90 tablet 3    isosorbide dinitrate (ISORDIL) 20 MG tablet Take 1 tablet by mouth 3 times daily 90 tablet 3    metoprolol tartrate (LOPRESSOR) 50 MG tablet Take 0.5 tablets by mouth 2 times daily 180 tablet 3    Multiple Vitamin (MULTIVITAMIN) TABS tablet Take 1 tablet by mouth daily 30 tablet 0    atorvastatin (LIPITOR) 20 MG tablet TAKE 1 TABLET DAILY 90 tablet 1    carbidopa-levodopa (SINEMET)  MG per tablet Take 2 tablets by mouth 4 times daily 720 tablet 0    apixaban (ELIQUIS) 2.5 MG TABS tablet TAKE 1 TABLET BY MOUTH 2  TIMES DAILY, EQUIVALENT TO  APIXABAN 180 tablet 1    dilTIAZem (CARDIZEM) 30 MG tablet TAKE 1 TABLET BY MOUTH 3  TIMES A  tablet 3    blood glucose test strips (CONTOUR TEST) strip USE TO TEST BLOOD SUGAR TWICE DAILY.  200 strip 3    sucralfate (CARAFATE) 1 GM tablet Take 1 tablet by mouth 4 times daily 360 tablet 3    omeprazole (PRILOSEC) 20 MG delayed release capsule Take 1 capsule by mouth daily 90 capsule 3    Cholecalciferol (VITAMIN D3 PO) Take 4,000 Units by mouth 2 times daily       Misc. Devices MISC 1 each by Does not apply route once for 1 dose Param Glucose Meter; Diagnosis:E11.65 1 Device 0    Probiotic Product (PROBIOTIC ADVANCED PO) Take 1 tablet by mouth daily      Cranberry 500 MG CAPS Take 500 mg by mouth daily 2 tab      aspirin 81 MG EC tablet Take 81 mg by mouth daily. No current facility-administered medications for this visit. Allergies   Allergen Reactions    Latex Rash    Lisinopril Swelling     mouth    Tape [place].Li Tape] Itching    Keflex [Cephalexin] Nausea And Vomiting and Rash    Lactulose Diarrhea    Morphine Rash    Polysporin [Bacitracin-Polymyxin B] Rash     Health Maintenance   Topic Date Due    DTaP/Tdap/Td vaccine (1 - Tdap) 09/24/1951    Shingles Vaccine (2 of 3) 06/15/2013    Lipid screen  01/25/2019    Annual Wellness Visit (AWV)  05/29/2019    Flu vaccine (1) 09/01/2020    Statin Therapy  05/04/2021    Potassium monitoring  09/24/2021    Creatinine monitoring  09/24/2021    Pneumococcal 65+ years Vaccine  Completed    Hepatitis A vaccine  Aged Out    Hib vaccine  Aged Out    Meningococcal (ACWY) vaccine  Aged Out       Subjective:  Review of Systems  General:   No fever, no chills, some fatigue or weight loss  Pulmonary:    some dyspnea, no wheezing  Cardiac:    Denies recent chest pain,   GI:     No nausea or vomiting, no abdominal pain  Neuro:     No dizziness or light headedness,   Musculoskeletal:  No recent active issues  Extremities:   No edema, no obvious claudication       Objective:  Physical Exam  BP (!) 108/50   Pulse 57   Ht 5' 2\" (1.575 m)   Wt 112 lb (50.8 kg)   BMI 20.49 kg/m²   General:   Well developed, well nourished  Lungs:    Clear to auscultation  Heart:    Normal S1 S2, Slight murmur.  no rubs, no gallops  Abdomen:   Soft, non tender, no organomegalies, positive bowel sounds  Extremities:   No edema, no cyanosis, good peripheral pulses  Neurological:   Awake, alert, oriented. No obvious focal deficits  Musculoskelatal:  No obvious deformities    Assessment:      Diagnosis Orders   1. Coronary artery disease involving coronary bypass graft of native heart with angina pectoris ([facility] [address]     2. Essential hypertension     3. Shortness of breath     4. Familial hypercholesterolemia     higher risk patient  ?/ anemia  Vs CHF  Vs angina     Plan:  No follow-ups on file. As above  Discussed options   ?/ stress test vs medical Rx   Check CBC  Family is to think about options   Continue risk factor modification and medical management  Thank you for allowing me to participate in the care of your patient. Please don't hesitate to contact me regarding any further issues related to the patient care    Orders Placed:  No orders of the defined types were placed in this encounter. Medications Prescribed:  No orders of the defined types were placed in this encounter. Discussed use, benefit, and side effects of prescribed medications. All patient questions answered. Pt voicedunderstanding. Instructed to continue current medications, diet and exercise. Continue risk factor modification and medical management. Patient agreed with treatment plan. Follow up as directed.     Electronically signedby Maynard Brunner, MD on 10/5/2020 at 1:59 PM

## 2020-10-06 ENCOUNTER — TELEPHONE (OUTPATIENT)
Dept: CARDIOLOGY CLINIC | Age: 85
End: 2020-10-06

## 2020-10-06 NOTE — TELEPHONE ENCOUNTER
----- Message from Yoshi Pena MD sent at 10/6/2020 11:45 AM EDT -----  Tell her blood count is good  She is supposed to decide medical RX vs consider a lexiscan and echo   Left it up to the family and patient to decide

## 2020-10-06 NOTE — TELEPHONE ENCOUNTER
Message sent through My Chart Radha Hopper said Lawson, pt's daughter, communicates through My Chart)

## 2020-10-06 NOTE — TELEPHONE ENCOUNTER
My Chart Message-  Carmen Urbano, mom saw Dr Nabil Waite yesterday and he was concerned about her SOB, light headed and chest pain she has been having. He's wondering if she could have blockage. Mom and the family is to let him know if she wants to move forward with a Lexiscan Stress Test with possible Cath and stint. We would like your opinion.      Nadege

## 2020-10-09 ENCOUNTER — TELEPHONE (OUTPATIENT)
Dept: CARDIOLOGY CLINIC | Age: 85
End: 2020-10-09

## 2020-10-09 NOTE — TELEPHONE ENCOUNTER
FYI:  Message from My Chart. From:Tabitha Talamantes       Sent:10/9/2020  8:27 AM EDT         To:Nurse Peggy Warren    Subject:Blood work    Asif Crowley, mom does not want any testing at this time. I have attached a video of her telling me, to make it official. Well it's not letting me attach it. If she needs to call in let me know.     Thanks  "[employer]

## 2020-11-03 PROBLEM — I10 HYPERTENSION: Status: RESOLVED | Noted: 2020-11-03 | Resolved: 2020-11-03

## 2020-11-12 ENCOUNTER — TELEPHONE (OUTPATIENT)
Dept: FAMILY MEDICINE CLINIC | Age: 85
End: 2020-11-12

## 2020-11-12 ENCOUNTER — HOSPITAL ENCOUNTER (EMERGENCY)
Age: 85
Discharge: HOME | End: 2020-11-12
Attending: EMERGENCY MEDICINE
Payer: MEDICARE

## 2020-11-12 VITALS
HEART RATE: 71 BPM | SYSTOLIC BLOOD PRESSURE: 151 MMHG | OXYGEN SATURATION: 98 % | TEMPERATURE: 97.8 F | DIASTOLIC BLOOD PRESSURE: 71 MMHG | RESPIRATION RATE: 16 BRPM

## 2020-11-12 PROCEDURE — 99282 EMERGENCY DEPT VISIT SF MDM: CPT

## 2020-11-12 RX ORDER — TRANEXAMIC ACID 100 MG/ML
1000 INJECTION, SOLUTION INTRAVENOUS ONCE
Status: DISCONTINUED | OUTPATIENT
Start: 2020-11-12 | End: 2020-11-12 | Stop reason: HOSPADM

## 2020-11-12 RX ORDER — TRANEXAMIC ACID 100 MG/ML
INJECTION, SOLUTION INTRAVENOUS
Status: DISCONTINUED
Start: 2020-11-12 | End: 2020-11-12 | Stop reason: HOSPADM

## 2020-11-12 ASSESSMENT — PAIN SCALES - GENERAL: PAINLEVEL_OUTOF10: 2

## 2020-11-12 ASSESSMENT — PAIN DESCRIPTION - PAIN TYPE: TYPE: ACUTE PAIN

## 2020-11-12 NOTE — ED PROVIDER NOTES
[address] [facility] [address] [address] EMERGENCY DEPT    EMERGENCY MEDICINE     Pt Name: Alberto Russell  MRN: [id number]  [place] 9/24/1932  Date of evaluation: 11/12/2020  Provider: Carlitos Guzman DO, [address]       Chief Complaint   Patient presents with    Dental Problem     teeth pulled yesterday; excessive bleeding        HISTORY OF PRESENT ILLNESS    Alberto Russell is a pleasant 80 y.o. female who presents to the emergency department from her dentist for continued bleeding 24hrs post dental extraction. Denies lightheadedness or dizziness. No fever or pain. Triage notes and Nursing notes were reviewed by myself. Any discrepancies are addressed above. PAST MEDICAL HISTORY     Past Medical History:   Diagnosis Date    Arthritis     Atypical chest pain     CAD (coronary artery disease)     Chronic LBBB (left bundle branch block)     Diabetes mellitus type II     Esophageal stricture     History of esophageal stricture.  FH: CAD (coronary artery disease)     Mom and dad both with heart disease at mid to late age.  GERD (gastroesophageal reflux disease)     History of gastritis     History of skin cancer     Hypercholesterolemia     Hyperlipidemia     Hypertension     Imbalance     As far as imbalance is concerned, asked patient to follow-up with Dr. Rickie Blackburn since she follows with him on a regular basis.  Lactose intolerance     Nummular dermatitis     Parkinson's disease ([facility] [address] 2009    Restless legs syndrome (RLS)     S/P CABG (coronary artery bypass graft)     SCC (squamous cell carcinoma)        SURGICAL HISTORY       Past Surgical History:   Procedure Laterality Date    CARDIAC SURGERY      CARDIOVASCULAR STRESS TEST  4 09 2009    Gated SPECT images revealed normal global wall motion with EF of 60%. Persantine test associated w/nonspecific symptoms. EKG is nondiagnostic w/baseline LBBB. No obvious stress-induced ischemia by Cardiolite. EF within normal limits.      CARDIOVASCULAR STRESS TEST  7 10 2007    The gated SPECT images revealed normal wall motion with EF of 69%. Poor exercise tolerance w/SOB on exertion. EKG is nondiagnostic. Cardiolite scan revealing no obvious stress-induced ischemia. EF 60%.  CARDIOVASCULAR STRESS TEST  2 03 2006    Fair exercise tolerance w/SOB on exertion. No EKG evidence of ischemia. Patient should be able to proceed with phase II cardiac rehab.  CATARACT REMOVAL      CATARACT REMOVAL  06/08/2016    AND 6/29/16    DIAGNOSTIC CARDIAC CATH LAB PROCEDURE  12 23 2005    LV was obtained. AGEE projection showed preserved LV function w/estimated EF at 55%. No significant MR. Patent left main coronary artery. Tortuous LAD which appeared to be patent. Patent left circumflex artery. High-grade stenosis around 99% in very large dominant RCA w/heavy calcification at the ostium. Normal LV function.  DOPPLER ECHOCARDIOGRAPHY  4 09 2009    Global LV function w/in lower limits of normal, with mild anteroseptal hypokinesis. EF of 50-55%. Light LVH. Mild to moderate left atrial enlargement. Calcific aortic and mitral valve w/no obvious stenosis. No obvious pericardial effusion.  DOPPLER ECHOCARDIOGRAPHY  7 10 2007    LV function w/in lower limits of normal w/peridoxical septal wall motion. Moderate left atrial enlargement. Mild MR. Mild TR. Calcified valves w/no obvious stenosis. No pericardial effusion.  DOPPLER ECHOCARDIOGRAPHY  4 14 2006    There appears to be significant improvement from previous echo w/complete resolution of pericardial effusion and normal LV function. EF of 60%.  DOPPLER ECHOCARDIOGRAPHY  3 21 2006    Normal LV function. Mild LV hypertrophy. Mild biatrial enlargement. Mildly calcific aortic and mitral valve w/no stenosis. Mild MR. Mild TR. Minimal residual pericardial effusion w/significant improvement from previous echo.      DOPPLER ECHOCARDIOGRAPHY  3 16 2006    Interval change from previous echocardiogram 2 times dailyHistorical Med      acetaminophen (TYLENOL 8 HOUR ARTHRITIS PAIN) 650 MG extended release tablet Take 650 mg by mouth 3 times dailyHistorical Med      metFORMIN (GLUCOPHAGE-XR) 500 MG extended release tablet Take 1 tablet by mouth 2 times daily, Disp-180 tablet,R-3Print      diclofenac sodium (VOLTAREN) 1 % GEL Apply 4 g topically 4 times daily, Topical, 4 TIMES DAILY Starting Wed 7/8/2020, Disp-4 Tube, R-5, Normal      lactase (LACTAID ULTRA) 9000 units TABS Take 9,000 Units by mouth 3 times daily (before meals)Historical Med      sodium chloride 1 g tablet Take 1 tablet by mouth 2 times daily (with meals), Disp-120 tablet, R-1Normal      potassium chloride (KLOR-CON M) 20 MEQ extended release tablet Take 0.5 tablets by mouth 3 times daily, Disp-30 tablet, R-1NO PRINT      magnesium oxide (MAG-OX) 400 MG tablet Take 1 tablet by mouth 3 times daily, Disp-30 tablet, R-3NO PRINT      hydrALAZINE (APRESOLINE) 25 MG tablet Take 2 tablets by mouth every 8 hours, Disp-90 tablet, R-3DC to SNF      isosorbide dinitrate (ISORDIL) 20 MG tablet Take 1 tablet by mouth 3 times daily, Disp-90 tablet, R-3DC to SNF      metoprolol tartrate (LOPRESSOR) 50 MG tablet Take 0.5 tablets by mouth 2 times daily, Disp-180 tablet, R-3Normal      Multiple Vitamin (MULTIVITAMIN) TABS tablet Take 1 tablet by mouth daily, Disp-30 tablet, R-0DC to SNF      atorvastatin (LIPITOR) 20 MG tablet TAKE 1 TABLET DAILY, Disp-90 tablet, R-1Normal      carbidopa-levodopa (SINEMET)  MG per tablet Take 2 tablets by mouth 4 times daily, Disp-720 tablet, R-0Normal      apixaban (ELIQUIS) 2.5 MG TABS tablet TAKE 1 TABLET BY MOUTH 2  TIMES DAILY, EQUIVALENT TO  APIXABAN, Disp-180 tablet, R-1Normal      dilTIAZem (CARDIZEM) 30 MG tablet TAKE 1 TABLET BY MOUTH 3  TIMES A DAY, Disp-270 tablet, R-3Normal      blood glucose test strips (CONTOUR TEST) strip Disp-200 strip, R-3, NormalUSE TO TEST BLOOD SUGAR TWICE DAILY.       sucralfate (CARAFATE) 1 organizations: None     Relationship status: None    Intimate partner violence     Fear of current or ex partner: None     Emotionally abused: None     Physically abused: None     Forced sexual activity: None   Other Topics Concern    None   Social History Narrative    None       REVIEW OF SYSTEMS     Review of Systems   Constitutional: Negative for chills and fever. HENT: Positive for dental problem. Except as noted above the remainder of the review of systems was reviewed and is. PHYSICAL EXAM    (up to 7 for level 4, 8 or more for level 5)     ED Triage Vitals [11/12/20 1020]   BP Temp Temp Source Pulse Resp SpO2 Height Weight   (!) 151/71 97.8 °F (36.6 °C) Axillary 71 16 98 % -- --       Physical Exam  Vitals signs and nursing note reviewed. Constitutional:       Appearance: She is well-developed. HENT:      Head: Normocephalic. Mouth/Throat:      Mouth: Mucous membranes are moist.      Pharynx: Oropharynx is clear. Comments: #7&8 gums there is a large clot present in the socket. Minimalamount of oozing noted  No trismus no ludwgs  Eyes:      Conjunctiva/sclera: Conjunctivae normal.      Pupils: Pupils are equal, round, and reactive to light. Neck:      Musculoskeletal: Neck supple. Trachea: No tracheal deviation. Pulmonary:      Effort: Pulmonary effort is normal.   Musculoskeletal: Normal range of motion. Skin:     General: Skin is warm and dry. Neurological:      Mental Status: She is alert and oriented to person, place, and time. Cranial Nerves: No cranial nerve deficit.          DIAGNOSTIC RESULTS     EKG:(none if blank)  All EKG's are interpreted [facility] Phys[facility] who either signs or Co-signs this chart in the absence of a cardiologist.        RADIOLOGY: (none if blank)   Interpretation per the Radiologistbelow, if available at the time of this note:    No orders to display       LABS:  Labs Reviewed - No data to display    All other labs were within normal range or not returned as of this dictation. Please note, any cultures that may have been sent were not resulted at the time of this patient visit. EMERGENCY DEPARTMENT COURSE andMedical Decision Making:     MDM/   Placed gauze soaked w/ TXA on clot; soaked x1hr  No rebleeding  Pt discharged  Instructed to apply teabags and some gelfoam also sent with pt  She has no active hemorrhage  Already on abx    The patient was evaluated during the global COVID-19 pandemic, and that diagnosis was considered upon their initial presentation. Their evaluation, treatment and testing was consistent with current guidelines for patients who present with complaints or symptoms that may be related to COVID-19. Strict returnprecautions and follow up instructions were discussed with the patient with which the patient agrees        ED Medications administered this visit:  Medications - No data to display      Procedures: (None if blank)       CLINICAL       1.  Bleeding gums          DISPOSITION/PLAN   DISPOSITION Decision To Discharge 11/12/2020 12:36:52 PM      PATIENT REFERRED TO:  with your dentist    In 2 days        DISCHARGE MEDICATIONS:  Discharge Medication List as of 11/12/2020 12:51 PM                 (Please note that portions of this note were completed with a voice recognition program.  Efforts were made to edit the dictations but occasionallywords are mis-transcribed.)      Pool Fried DO,FACEP (electronically signed)  Attending Physician, Emergency [address] [place] [address] DO Yoselyn  11/12/20 [id number]

## 2020-11-12 NOTE — ED TRIAGE NOTES
Pt to the ED s/p complication from ental surgery yesterday, Dr. Katey Hutchinson [phone]. Had 2 roots of teeth pulled on both sides of mouth. There was infection in the root. On ATB. No complications until this early AM. Resides at [facility]. Daughter brought pt to the dentist this AM where he attempted to stop the bleeding, but sent her to the ED bc he cold not get the bleeding to stop. Reports some pain.

## 2020-11-18 LAB
ALBUMIN SERPL-MCNC: 4.1 G/DL
ALP BLD-CCNC: 77 U/L
ALT SERPL-CCNC: 8 U/L
ANION GAP SERPL CALCULATED.3IONS-SCNC: NORMAL MMOL/L
AST SERPL-CCNC: 11 U/L
BASOPHILS ABSOLUTE: 0.1 /ΜL
BASOPHILS RELATIVE PERCENT: 1.5 %
BILIRUB SERPL-MCNC: 0.5 MG/DL (ref 0.1–1.4)
BUN BLDV-MCNC: 13 MG/DL
CALCIUM SERPL-MCNC: 9.6 MG/DL
CHLORIDE BLD-SCNC: 101 MMOL/L
CHOLESTEROL, TOTAL: 118 MG/DL
CHOLESTEROL/HDL RATIO: 1.6
CO2: 31 MMOL/L
CREAT SERPL-MCNC: 0.7 MG/DL
EOSINOPHILS ABSOLUTE: 0.1 /ΜL
EOSINOPHILS RELATIVE PERCENT: 1.9 %
GFR CALCULATED: 79
GLUCOSE BLD-MCNC: 153 MG/DL
HCT VFR BLD CALC: 30.9 % (ref 36–46)
HDLC SERPL-MCNC: 31 MG/DL (ref 35–70)
HEMOGLOBIN: 9.9 G/DL (ref 12–16)
LDL CHOLESTEROL CALCULATED: 50 MG/DL (ref 0–160)
LYMPHOCYTES ABSOLUTE: 1.2 /ΜL
LYMPHOCYTES RELATIVE PERCENT: 20.2 %
MCH RBC QN AUTO: 26.4 PG
MCHC RBC AUTO-ENTMCNC: 32.1 G/DL
MCV RBC AUTO: 82.2 FL
MONOCYTES ABSOLUTE: 0.6 /ΜL
MONOCYTES RELATIVE PERCENT: 10.4 %
NEUTROPHILS ABSOLUTE: 3.8 /ΜL
NEUTROPHILS RELATIVE PERCENT: 66 %
NONHDLC SERPL-MCNC: ABNORMAL MG/DL
PLATELET # BLD: 246 K/ΜL
PMV BLD AUTO: 8.2 FL
POTASSIUM SERPL-SCNC: 4.9 MMOL/L
RBC # BLD: 3.77 10^6/ΜL
SODIUM BLD-SCNC: 137 MMOL/L
TOTAL PROTEIN: 6.3
TRIGL SERPL-MCNC: 183 MG/DL
VLDLC SERPL CALC-MCNC: 37 MG/DL
WBC # BLD: 5.7 10^3/ML

## 2020-11-19 ENCOUNTER — TELEPHONE (OUTPATIENT)
Dept: CARDIOLOGY CLINIC | Age: 85
End: 2020-11-19

## 2020-11-19 ENCOUNTER — HOSPITAL ENCOUNTER (EMERGENCY)
Age: 85
Discharge: HOME | End: 2020-11-19
Attending: FAMILY MEDICINE
Payer: MEDICARE

## 2020-11-19 VITALS
RESPIRATION RATE: 16 BRPM | SYSTOLIC BLOOD PRESSURE: 174 MMHG | DIASTOLIC BLOOD PRESSURE: 59 MMHG | OXYGEN SATURATION: 97 % | HEART RATE: 70 BPM

## 2020-11-19 PROCEDURE — 99283 EMERGENCY DEPT VISIT LOW MDM: CPT

## 2020-11-19 PROCEDURE — 2500000003 HC RX 250 WO HCPCS: Performed by: STUDENT IN AN ORGANIZED HEALTH CARE EDUCATION/TRAINING PROGRAM

## 2020-11-19 RX ORDER — LIDOCAINE HYDROCHLORIDE AND EPINEPHRINE 10; 10 MG/ML; UG/ML
INJECTION, SOLUTION INFILTRATION; PERINEURAL
Status: DISCONTINUED
Start: 2020-11-19 | End: 2020-11-19 | Stop reason: HOSPADM

## 2020-11-19 RX ORDER — TRANEXAMIC ACID 100 MG/ML
INJECTION, SOLUTION INTRAVENOUS
Status: DISCONTINUED
Start: 2020-11-19 | End: 2020-11-19 | Stop reason: HOSPADM

## 2020-11-19 RX ORDER — TRANEXAMIC ACID 100 MG/ML
1000 INJECTION, SOLUTION INTRAVENOUS ONCE
Status: COMPLETED | OUTPATIENT
Start: 2020-11-19 | End: 2020-11-19

## 2020-11-19 RX ORDER — LIDOCAINE HYDROCHLORIDE AND EPINEPHRINE BITARTRATE 20; .01 MG/ML; MG/ML
INJECTION, SOLUTION SUBCUTANEOUS
Status: DISCONTINUED
Start: 2020-11-19 | End: 2020-11-19 | Stop reason: WASHOUT

## 2020-11-19 RX ADMIN — THROMBIN, TOPICAL (BOVINE): KIT at 10:48

## 2020-11-19 RX ADMIN — TRANEXAMIC ACID 1000 MG: 1 INJECTION, SOLUTION INTRAVENOUS at 08:44

## 2020-11-19 ASSESSMENT — ENCOUNTER SYMPTOMS
SINUS PAIN: 0
BACK PAIN: 0
SINUS PRESSURE: 0
ABDOMINAL PAIN: 0
SORE THROAT: 0
CHEST TIGHTNESS: 0
CONSTIPATION: 0
EYE DISCHARGE: 0
RHINORRHEA: 0
COUGH: 0
DIARRHEA: 0
WHEEZING: 0
VOMITING: 0
EYE REDNESS: 0
NAUSEA: 0
COLOR CHANGE: 0
SHORTNESS OF BREATH: 0
EYE PAIN: 0

## 2020-11-19 NOTE — TELEPHONE ENCOUNTER
Spoke with Xin Tavarez nurse at [facility] and informed her labs were not ordered by Monica Carrillo CNP. Nurse will find ordering provider and route results to them.

## 2020-11-19 NOTE — ED NOTES
Pt assisted to use BSC- tolerated well- guaze packing still in place, scant blood noted.       Cr Copeland, RN  11/19/20 9929

## 2020-11-19 NOTE — ED NOTES
txa soaked guaze placed in mouth. Pt instructed to keep it in place as long as possible. Will continue to monitor.      Huong Lira RN  11/19/20 [id number]

## 2020-11-23 ENCOUNTER — TELEPHONE (OUTPATIENT)
Dept: FAMILY MEDICINE CLINIC | Age: 85
End: 2020-11-23

## 2020-11-23 NOTE — TELEPHONE ENCOUNTER
Rec'd call from Dr Karuna Manuel office. Patient is due to have sutures removed from tooth extraction and they are inquiring how long she should hold her Eliquis prior.

## 2020-12-14 LAB
BUN BLDV-MCNC: 25 MG/DL
CALCIUM SERPL-MCNC: 9.7 MG/DL
CHLORIDE BLD-SCNC: 101 MMOL/L
CO2: 24 MMOL/L
CREAT SERPL-MCNC: 0.8 MG/DL
GFR CALCULATED: 68
GLUCOSE BLD-MCNC: 237 MG/DL
POTASSIUM SERPL-SCNC: 4.6 MMOL/L
SODIUM BLD-SCNC: 138 MMOL/L

## 2020-12-22 ENCOUNTER — OFFICE VISIT (OUTPATIENT)
Dept: NEPHROLOGY | Age: 85
End: 2020-12-22
Payer: MEDICARE

## 2020-12-22 VITALS
OXYGEN SATURATION: 100 % | HEART RATE: 64 BPM | WEIGHT: 105 LBS | BODY MASS INDEX: 19.2 KG/M2 | SYSTOLIC BLOOD PRESSURE: 123 MMHG | TEMPERATURE: 97.2 F | DIASTOLIC BLOOD PRESSURE: 59 MMHG

## 2020-12-22 PROCEDURE — 4040F PNEUMOC VAC/ADMIN/RCVD: CPT | Performed by: INTERNAL MEDICINE

## 2020-12-22 PROCEDURE — 1123F ACP DISCUSS/DSCN MKR DOCD: CPT | Performed by: INTERNAL MEDICINE

## 2020-12-22 PROCEDURE — G8484 FLU IMMUNIZE NO ADMIN: HCPCS | Performed by: INTERNAL MEDICINE

## 2020-12-22 PROCEDURE — 1036F TOBACCO NON-USER: CPT | Performed by: INTERNAL MEDICINE

## 2020-12-22 PROCEDURE — G8428 CUR MEDS NOT DOCUMENT: HCPCS | Performed by: INTERNAL MEDICINE

## 2020-12-22 PROCEDURE — G8420 CALC BMI NORM PARAMETERS: HCPCS | Performed by: INTERNAL MEDICINE

## 2020-12-22 PROCEDURE — 99213 OFFICE O/P EST LOW 20 MIN: CPT | Performed by: INTERNAL MEDICINE

## 2020-12-22 PROCEDURE — 1090F PRES/ABSN URINE INCON ASSESS: CPT | Performed by: INTERNAL MEDICINE

## 2020-12-22 RX ORDER — SODIUM CHLORIDE 1000 MG
1 TABLET, SOLUBLE MISCELLANEOUS DAILY
Qty: 120 TABLET | Refills: 1 | Status: ON HOLD
Start: 2020-12-22 | End: 2021-08-18 | Stop reason: HOSPADM

## 2020-12-22 RX ORDER — BUMETANIDE 1 MG/1
0.5 TABLET ORAL DAILY
Qty: 30 TABLET | Refills: 3
Start: 2020-12-22 | End: 2021-02-04

## 2020-12-22 NOTE — PROGRESS NOTES
(GLUCOPHAGE-XR) 500 MG extended release tablet Take 1 tablet by mouth 2 times daily 180 tablet 3    diclofenac sodium (VOLTAREN) 1 % GEL Apply 4 g topically 4 times daily 4 Tube 5    lactase (LACTAID ULTRA) 9000 units TABS Take 9,000 Units by mouth 3 times daily (before meals)      sodium chloride 1 g tablet Take 1 tablet by mouth 2 times daily (with meals) (Patient taking differently: Take 2 g by mouth 2 times daily (with meals) ) 120 tablet 1    potassium chloride (KLOR-CON M) 20 MEQ extended release tablet Take 0.5 tablets by mouth 3 times daily 30 tablet 1    magnesium oxide (MAG-OX) 400 MG tablet Take 1 tablet by mouth 3 times daily 30 tablet 3    isosorbide dinitrate (ISORDIL) 20 MG tablet Take 1 tablet by mouth 3 times daily 90 tablet 3    metoprolol tartrate (LOPRESSOR) 50 MG tablet Take 0.5 tablets by mouth 2 times daily 180 tablet 3    Multiple Vitamin (MULTIVITAMIN) TABS tablet Take 1 tablet by mouth daily 30 tablet 0    atorvastatin (LIPITOR) 20 MG tablet TAKE 1 TABLET DAILY 90 tablet 1    carbidopa-levodopa (SINEMET)  MG per tablet Take 2 tablets by mouth 4 times daily 720 tablet 0    apixaban (ELIQUIS) 2.5 MG TABS tablet TAKE 1 TABLET BY MOUTH 2  TIMES DAILY, EQUIVALENT TO  APIXABAN 180 tablet 1    dilTIAZem (CARDIZEM) 30 MG tablet TAKE 1 TABLET BY MOUTH 3  TIMES A  tablet 3    blood glucose test strips (CONTOUR TEST) strip USE TO TEST BLOOD SUGAR TWICE DAILY. 200 strip 3    sucralfate (CARAFATE) 1 GM tablet Take 1 tablet by mouth 4 times daily 360 tablet 3    omeprazole (PRILOSEC) 20 MG delayed release capsule Take 1 capsule by mouth daily 90 capsule 3    Cholecalciferol (VITAMIN D3 PO) Take 4,000 Units by mouth 2 times daily       Probiotic Product (PROBIOTIC ADVANCED PO) Take 1 tablet by mouth daily      Cranberry 500 MG CAPS Take 500 mg by mouth daily 2 tab      aspirin 81 MG EC tablet Take 81 mg by mouth daily.            Vitals     BP (!) 123/59 (Site: Left Upper Arm, Position: Sitting, Cuff Size: Medium Adult)   Pulse 64   Temp 97.2 °F (36.2 °C) (Temporal)   Wt 105 lb (47.6 kg)   SpO2 100%   BMI 19.20 kg/m²  Wt Readings from Last 3 Encounters:   12/22/20 105 lb (47.6 kg)   10/05/20 112 lb (50.8 kg)   09/29/20 115 lb 9.6 oz (52.4 kg)        Physical Exam     General -- no distress  Lungs -- clear  Heart -- S1, S2 heard, JVD - no  Abdomen - soft, non-tender  Extremities -- no edema   CNS - awake and alert    Labs, Radiology and Tests    Labs -    6/20 12/20          Sodium 136 138          Potassium 4.1 4.6          BUN 11 25          Creatinine 0.7 0.8          eGFR 79 68                      UPCR            [facility]                          Renal Ultrasound Scan -- not done       Assessment    1. Renal -renal function appears stable at baseline  2. Hyponatremia-mostly was due to hypervolemic hyponatremia improved with diuretics and salt tabs  -Currently maintaining well. Decrease salt tabs to 1 gm daily and bumex to 0.5 ,g po daily  -   3. Acute on chronic congestive heart failure currently reasonably well compensated continue current dose of diuretics and potassium. Give additional dses if needed  4. Hx of diabetes mellitus  5. meds reviewed and D/W patient and daughter in detail    Tests and orders placed this Encounter     Orders Placed This Encounter   Procedures   Bertrand Burnett M.D  [facility].

## 2020-12-30 ENCOUNTER — PATIENT MESSAGE (OUTPATIENT)
Dept: FAMILY MEDICINE CLINIC | Age: 85
End: 2020-12-30

## 2020-12-30 DIAGNOSIS — D50.9 IRON DEFICIENCY ANEMIA, UNSPECIFIED IRON DEFICIENCY ANEMIA TYPE: Primary | ICD-10-CM

## 2020-12-31 RX ORDER — FERROUS SULFATE 325(65) MG
325 TABLET ORAL 2 TIMES DAILY
Qty: 180 TABLET | Refills: 1 | Status: SHIPPED | OUTPATIENT
Start: 2020-12-31

## 2020-12-31 NOTE — TELEPHONE ENCOUNTER
From: Nabila Conner  To: Emelyn Del Cid MD  Sent: 12/30/2020 9:36 PM EST  Subject: Non-Urgent Medical Question    Good Evening. I saw that mom's blood work was scanned into her chart. Was just checking to see what the game plan is if what I'm reading is correct, that some of her levels are low, or is that normal for her? The report from the Dr. Claudia Posada, are there any issues with that as well? She does have an appointment with Manuel Murry for January 20th. Thank you!     Nadege

## 2021-01-05 RX ORDER — ANTIOX #8/OM3/DHA/EPA/LUT/ZEAX 250-2.5 MG
2 CAPSULE ORAL 2 TIMES DAILY
Qty: 360 CAPSULE | Refills: 3 | Status: SHIPPED | OUTPATIENT
Start: 2021-01-05

## 2021-01-05 RX ORDER — MULTIVITAMIN WITH IRON
1 TABLET ORAL DAILY
Qty: 90 TABLET | Refills: 3 | Status: SHIPPED | OUTPATIENT
Start: 2021-01-05

## 2021-01-19 ENCOUNTER — PATIENT MESSAGE (OUTPATIENT)
Dept: FAMILY MEDICINE CLINIC | Age: 86
End: 2021-01-19

## 2021-01-19 NOTE — TELEPHONE ENCOUNTER
From: Aurea Salguero  To: Jonathan Prince MD  Sent: 1/19/2021 11:02 AM EST  Subject: Non-Urgent Medical Question    I would like to confirm what local pharmacy is on file for my mom? The last one went to [facility] in [facility] and we would like to use [facility] on the corner of [address] [place] [address].      Thanks    [employer]

## 2021-01-22 ENCOUNTER — PATIENT MESSAGE (OUTPATIENT)
Dept: FAMILY MEDICINE CLINIC | Age: 86
End: 2021-01-22

## 2021-01-22 LAB
BUN BLDV-MCNC: NORMAL MG/DL
CALCIUM SERPL-MCNC: NORMAL MG/DL
CHLORIDE BLD-SCNC: NORMAL MMOL/L
CO2: NORMAL
CREAT SERPL-MCNC: NORMAL MG/DL
GFR CALCULATED: NORMAL
GLUCOSE BLD-MCNC: NORMAL MG/DL
POTASSIUM SERPL-SCNC: NORMAL MMOL/L
SODIUM BLD-SCNC: NORMAL MMOL/L

## 2021-01-22 RX ORDER — CIPROFLOXACIN 500 MG/1
500 TABLET, FILM COATED ORAL 2 TIMES DAILY
Qty: 20 TABLET | Refills: 0 | Status: SHIPPED | OUTPATIENT
Start: 2021-01-22 | End: 2021-02-01

## 2021-01-22 NOTE — TELEPHONE ENCOUNTER
Proceed with sensitivity. Bactrim DS 1 twice daily for 10 days. It appears that Brock Rodriguez had ordered a blood count.

## 2021-01-22 NOTE — TELEPHONE ENCOUNTER
From: Ting Lemon  To: Dalia Banuelos MD  Sent: 1/22/2021 7:52 AM EST  Subject: Non-Urgent Medical Question    Good morning! I see her culture is back but not sure what it says. Mom said you ordered blood this morning, what is that for?      Thanks  [employer]

## 2021-01-26 ENCOUNTER — TELEPHONE (OUTPATIENT)
Dept: FAMILY MEDICINE CLINIC | Age: 86
End: 2021-01-26

## 2021-01-26 NOTE — TELEPHONE ENCOUNTER
Albin from [facility] called stating patient just received her covid vaccine the other day and today she has some nausea.  She has some Zofran 4mg on hand but needs an order for it faxed to [phone]

## 2021-02-02 ENCOUNTER — PATIENT MESSAGE (OUTPATIENT)
Dept: FAMILY MEDICINE CLINIC | Age: 86
End: 2021-02-02

## 2021-02-02 DIAGNOSIS — R19.7 DIARRHEA, UNSPECIFIED TYPE: Primary | ICD-10-CM

## 2021-02-02 NOTE — TELEPHONE ENCOUNTER
From: Harl Angelucci  To: Madonna Cervantes MD  Sent: 2/2/2021 8:21 AM EST  Subject: Non-Urgent Medical Question    Manju Sic, this is Antonetta Collet, Tabitha's daughter. I wanted to let you know that I told [facility] that they need to reach out to you because it's been a week that mom has been having diarrhea. Last Tuesday, January 26th she started with Vomiting and diarrhea, and since then it's just been diarrhea. She woke up this morning at 4:30 and when she stood up it just ran out of her, water. ... sorry. She told me it was really bad and lasted a half an hour. Her weight is only 101.0, which I thought it might be less. I just had a thought, what about the antibiotic that she has been on, could it be from that? What did you put her on, for her leg?     Nadege

## 2021-02-02 NOTE — TELEPHONE ENCOUNTER
Get stool for C. difficile, have them give us an update on her leg wound and gastrointestinal symptoms.

## 2021-02-04 ENCOUNTER — OFFICE VISIT (OUTPATIENT)
Dept: FAMILY MEDICINE CLINIC | Age: 86
End: 2021-02-04
Payer: MEDICARE

## 2021-02-04 VITALS
WEIGHT: 106 LBS | BODY MASS INDEX: 19.39 KG/M2 | SYSTOLIC BLOOD PRESSURE: 106 MMHG | DIASTOLIC BLOOD PRESSURE: 60 MMHG | RESPIRATION RATE: 20 BRPM | TEMPERATURE: 97.6 F | HEART RATE: 64 BPM

## 2021-02-04 DIAGNOSIS — R19.7 DIARRHEA, UNSPECIFIED TYPE: ICD-10-CM

## 2021-02-04 DIAGNOSIS — R63.4 WEIGHT LOSS: ICD-10-CM

## 2021-02-04 DIAGNOSIS — L97.221 SKIN ULCER OF LEFT CALF, LIMITED TO BREAKDOWN OF SKIN (HCC): Primary | ICD-10-CM

## 2021-02-04 PROCEDURE — 1090F PRES/ABSN URINE INCON ASSESS: CPT | Performed by: FAMILY MEDICINE

## 2021-02-04 PROCEDURE — G8427 DOCREV CUR MEDS BY ELIG CLIN: HCPCS | Performed by: FAMILY MEDICINE

## 2021-02-04 PROCEDURE — 1123F ACP DISCUSS/DSCN MKR DOCD: CPT | Performed by: FAMILY MEDICINE

## 2021-02-04 PROCEDURE — 4040F PNEUMOC VAC/ADMIN/RCVD: CPT | Performed by: FAMILY MEDICINE

## 2021-02-04 PROCEDURE — G8420 CALC BMI NORM PARAMETERS: HCPCS | Performed by: FAMILY MEDICINE

## 2021-02-04 PROCEDURE — 1036F TOBACCO NON-USER: CPT | Performed by: FAMILY MEDICINE

## 2021-02-04 PROCEDURE — 99214 OFFICE O/P EST MOD 30 MIN: CPT | Performed by: FAMILY MEDICINE

## 2021-02-04 PROCEDURE — G8484 FLU IMMUNIZE NO ADMIN: HCPCS | Performed by: FAMILY MEDICINE

## 2021-02-04 RX ORDER — ONDANSETRON 4 MG/1
4 TABLET, FILM COATED ORAL EVERY 6 HOURS
COMMUNITY
Start: 2021-01-26

## 2021-02-04 RX ORDER — PNV NO.95/FERROUS FUM/FOLIC AC 28MG-0.8MG
TABLET ORAL
COMMUNITY
End: 2021-03-10

## 2021-02-05 LAB
ANION GAP SERPL CALCULATED.3IONS-SCNC: 11 MMOL/L (ref 5–15)
ATYPICAL LYMPHOCYTES: 1 %
BANDS PRESENT: 2 %
BASOPHILS # BLD: 1 %
BASOPHILS ABSOLUTE: 0.1 X10E9/L (ref 0–0.2)
BUN BLDV-MCNC: 22 MG/DL (ref 5–27)
CALCIUM SERPL-MCNC: 10.7 MG/DL (ref 8.5–10.5)
CHLORIDE BLD-SCNC: 96 MMOL/L (ref 98–109)
CO2: 28 MMOL/L (ref 22–32)
CREAT SERPL-MCNC: 0.78 MG/DL (ref 0.4–1)
EGFR AFRICAN AMERICAN: >60 ML/MIN/1.73SQ.M
EGFR IF NONAFRICAN AMERICAN: >60 ML/MIN/1.73SQ.M
EOSINOPHIL # BLD: 10.2 %
EOSINOPHILS ABSOLUTE: 0.7 X10E9/L (ref 0–0.4)
GLUCOSE: 180 MG/DL (ref 65–99)
HCT VFR BLD CALC: 33.3 % (ref 35–47)
HEMOGLOBIN: 10.8 G/DL (ref 11.7–15.5)
LYMPHOCYTES # BLD: 31.6 %
LYMPHOCYTES ABSOLUTE: 2.2 X10E9/L (ref 1–3.5)
MCH RBC QN AUTO: 24.8 PG (ref 27–34)
MCHC RBC AUTO-ENTMCNC: 32.5 G/DL (ref 32–36)
MCV RBC AUTO: 76 FL (ref 80–100)
MONOCYTES # BLD: 13.3 %
MONOCYTES ABSOLUTE: 0.9 X10E9/L (ref 0–0.9)
NEUTROPHILS ABSOLUTE: 2.9 X10E9/L (ref 1.5–6.6)
NEUTROPHILS RELATIVE PERCENT: 40.9 %
PDW BLD-RTO: 24.2 % (ref 11.5–15)
PLATELETS: 252 X10E9/L (ref 150–450)
PMV BLD AUTO: 8.3 FL (ref 7–12)
POTASSIUM SERPL-SCNC: 4.8 MMOL/L (ref 3.5–5)
RBC # BLD: ABNORMAL 10*6/UL
RBC: 4.36 X10E12/L (ref 3.8–5.2)
SODIUM BLD-SCNC: 135 MMOL/L (ref 134–146)
T4 FREE: 0.87 NG/DL (ref 0.61–1.6)
TSH SERPL DL<=0.05 MIU/L-ACNC: 3.09 UIU/ML (ref 0.49–4.67)
WBC: 6.8 X10E9/L (ref 4–11)

## 2021-02-07 ASSESSMENT — ENCOUNTER SYMPTOMS
ABDOMINAL PAIN: 0
EYES NEGATIVE: 1
DIARRHEA: 1
NAUSEA: 0
BACK PAIN: 0
COUGH: 0
RHINORRHEA: 0
CHEST TIGHTNESS: 0
VOMITING: 0
SORE THROAT: 0
SHORTNESS OF BREATH: 0

## 2021-02-07 NOTE — PROGRESS NOTES
[address] [place] [facility] [facility] [address]  Dept: [phone]  Dept Fax: [phone]  Loc: [phone]  PROGRESS NOTE      VisitDate: 2/4/2021    Ting Lemon is a 80 y.o. female who presents today for:     Chief Complaint   Patient presents with    Wound Check     LLL-applying bactroban bid    Diarrhea     vomiting-2-3 times per day for 1 wk. Denies any today. Imodium helps    Other     did call facility to verify which covid vaccine pt received. Subjective:  HPI  Patient presents for concerns regarding the left lower extremity wound diarrhea weight loss. She is accompanied by her daughter and another joints by face time. She had a skin tear to her left lower extremity started out the size of a dime and is now enlarged however it seems to have gotten smaller over the past few days. We did receive reports from the nursing staff there is yellow discharge she was started on antibiotics. Shortly for this she started developed diarrhea. She had continued weight loss, records from the [facility] care facility reviewed her weight is continued to decline. She reports not eating much. She eats about half of what is served to her. This does not seem to trigger her diarrhea but her diarrhea does improve when she takes Imodium  Review of Systems   Constitutional: Positive for appetite change and unexpected weight change. Negative for activity change, fatigue and fever. HENT: Negative for congestion, rhinorrhea and sore throat. Eyes: Negative. Respiratory: Negative for cough, chest tightness and shortness of breath. Cardiovascular: Negative for chest pain and palpitations. Gastrointestinal: Positive for diarrhea. Negative for abdominal pain, nausea and vomiting. Genitourinary: Negative for dysuria and urgency. Musculoskeletal: Negative for arthralgias and back pain. Skin: Positive for wound.    Neurological: Negative for dizziness and headaches. Psychiatric/Behavioral: Negative for dysphoric mood. The patient is not nervous/anxious. Past Medical History:   Diagnosis Date    Arthritis     Atypical chest pain     CAD (coronary artery disease)     Chronic LBBB (left bundle branch block)     Diabetes mellitus type II     Esophageal stricture     History of esophageal stricture.  FH: CAD (coronary artery disease)     Mom and dad both with heart disease at mid to late age.  GERD (gastroesophageal reflux disease)     History of gastritis     History of skin cancer     Hypercholesterolemia     Hyperlipidemia     Hypertension     Imbalance     As far as imbalance is concerned, asked patient to follow-up with Dr. Richar Stockton since she follows with him on a regular basis.  Lactose intolerance     Nummular dermatitis     Parkinson's disease ([facility] [address] 2009    Restless legs syndrome (RLS)     S/P CABG (coronary artery bypass graft)     SCC (squamous cell carcinoma)       Past Surgical History:   Procedure Laterality Date    CARDIAC SURGERY      CARDIOVASCULAR STRESS TEST  4 09 2009    Gated SPECT images revealed normal global wall motion with EF of 60%. Persantine test associated w/nonspecific symptoms. EKG is nondiagnostic w/baseline LBBB. No obvious stress-induced ischemia by Cardiolite. EF within normal limits.  CARDIOVASCULAR STRESS TEST  7 10 2007    The gated SPECT images revealed normal wall motion with EF of 69%. Poor exercise tolerance w/SOB on exertion. EKG is nondiagnostic. Cardiolite scan revealing no obvious stress-induced ischemia. EF 60%.  CARDIOVASCULAR STRESS TEST  2 03 2006    Fair exercise tolerance w/SOB on exertion. No EKG evidence of ischemia. Patient should be able to proceed with phase II cardiac rehab.  CATARACT REMOVAL      CATARACT REMOVAL  06/08/2016    AND 6/29/16    DIAGNOSTIC CARDIAC CATH LAB PROCEDURE  12 23 2005    LV was obtained.  AGEE projection showed preserved LV function w/estimated EF at 55%. No significant MR. Patent left main coronary artery. Tortuous LAD which appeared to be patent. Patent left circumflex artery. High-grade stenosis around 99% in very large dominant RCA w/heavy calcification at the ostium. Normal LV function.  DOPPLER ECHOCARDIOGRAPHY  4 09 2009    Global LV function w/in lower limits of normal, with mild anteroseptal hypokinesis. EF of 50-55%. Light LVH. Mild to moderate left atrial enlargement. Calcific aortic and mitral valve w/no obvious stenosis. No obvious pericardial effusion.  DOPPLER ECHOCARDIOGRAPHY  7 10 2007    LV function w/in lower limits of normal w/peridoxical septal wall motion. Moderate left atrial enlargement. Mild MR. Mild TR. Calcified valves w/no obvious stenosis. No pericardial effusion.  DOPPLER ECHOCARDIOGRAPHY  4 14 2006    There appears to be significant improvement from previous echo w/complete resolution of pericardial effusion and normal LV function. EF of 60%.  DOPPLER ECHOCARDIOGRAPHY  3 21 2006    Normal LV function. Mild LV hypertrophy. Mild biatrial enlargement. Mildly calcific aortic and mitral valve w/no stenosis. Mild MR. Mild TR. Minimal residual pericardial effusion w/significant improvement from previous echo.  DOPPLER ECHOCARDIOGRAPHY  3 16 2006    Interval change from previous echocardiogram w/worsening of effusion. Correlation clinically. Consideration of pericardial centesis. Will obtain a [facility] consult.  DOPPLER ECHOCARDIOGRAPHY  1 31 2006    No significant change from previous echo. The 2D echo showed moderate degree of pericardial effusion. It does seem to be worst or better than previous echo. No evidence of tamponade.  DOPPLER ECHOCARDIOGRAPHY  1 27 2006    Normal global LV function. Mild biatrial enlargement. Mildly sclerotic aortic & mitral valve w/no stenosis. Mild MR. Mild TR. Small to moderate pericardial effusion w/no obvious tamponade.      JOINT REPLACEMENT      MOHS SURGERY Right 2/13/2019    MOHS DEFECT REPAIR CYSTIC SCC RIGHT LOWER LATERAL LEG WITH SKIN GRAFT FROM RIGHT GROIN (FULL THICKNESS ) performed by Cl Aguirre MD at [facility] PRE-MAL[facility]NT / [facility] Left 12/20/13    left leg lesion excision x2, frozen section, skin graft closure    ROTATOR CUFF REPAIR  09/2001    RIGHT    ROTATOR CUFF REPAIR  04/15/2009    LEFT     SKIN CANCER EXCISION  06/2006      Rt leg    SPINAL CORD STIMULATOR IMPLANTATION N/A 12/4/2018    PERMANENT INTERSTIM performed by Madhu Mancera MD at [facility] History   Problem Relation Age of Onset    Heart Disease Mother     Diabetes Mother     Stroke Father     Diabetes Sister     Lung Cancer Brother      Social History     Tobacco Use    Smoking status: Never Smoker    Smokeless tobacco: Never Used   Substance Use Topics    Alcohol use: No      Current Outpatient Medications   Medication Sig Dispense Refill    ondansetron (ZOFRAN) 4 MG tablet Take 4 mg by mouth every 6 hours       Ferrous Sulfate (IRON) 325 (65 Fe) MG TABS Take by mouth 4 sprays in mouth every morning. In addition to tablets.       Multiple Vitamin (MULTIVITAMIN) TABS tablet Take 1 tablet by mouth daily 90 tablet 3    Multiple Vitamins-Minerals (PRESERVISION AREDS 2) CAPS Take 2 capsules by mouth 2 times daily 360 capsule 3    Cyanocobalamin 200 MCG/SPRAY LIQD Take 4 sprays by mouth 2 times daily Vitamist - Vitamin B 12 spray 90 mL 3    ferrous sulfate (IRON 325) 325 (65 Fe) MG tablet Take 1 tablet by mouth 2 times daily 180 tablet 1    sodium chloride 1 g tablet Take 1 tablet by mouth daily 120 tablet 1    hydrALAZINE (APRESOLINE) 10 MG tablet Take 1 tablet by mouth every 8 hours 90 tablet 3    JANUVIA 100 MG tablet TAKE 1 TABLET BY MOUTH DAILY 90 tablet 2    spironolactone (ALDACTONE) 25 MG tablet Take 1 tablet by mouth daily 30 tablet 3    loperamide (IMODIUM) 2 MG capsule Take 2 mg by mouth facility-administered medications for this visit. Allergies   Allergen Reactions    Latex Rash    Lisinopril Swelling     mouth    Tape [place] Tape] Itching    Keflex [Cephalexin] Nausea And Vomiting and Rash    Lactulose Diarrhea    Morphine Rash    Polysporin [Bacitracin-Polymyxin B] Rash     Health Maintenance   Topic Date Due    DTaP/Tdap/Td vaccine (1 - Tdap) 09/24/1951    Shingles Vaccine (2 of 3) 06/15/2013    Annual Wellness Visit (AWV)  05/29/2019    Flu vaccine (1) 09/01/2020    Lipid screen  11/18/2021    Potassium monitoring  02/04/2022    Creatinine monitoring  02/04/2022    Pneumococcal 65+ years Vaccine  Completed    COVID-19 Vaccine  Completed    Hepatitis A vaccine  Aged Out    Hib vaccine  Aged Out    Meningococcal (ACWY) vaccine  Aged Out         Objective:     Physical Exam  Constitutional:       General: She is not in acute distress. Appearance: She is well-developed. She is not diaphoretic. HENT:      Head: Normocephalic and atraumatic. Eyes:      General: No scleral icterus. Conjunctiva/sclera: Conjunctivae normal.   Neck:      Thyroid: No thyromegaly. Vascular: No JVD. Comments: No bruits  Cardiovascular:      Rate and Rhythm: Normal rate and regular rhythm. Heart sounds: Normal heart sounds. Pulmonary:      Effort: Pulmonary effort is normal. No respiratory distress. Breath sounds: Normal breath sounds. No wheezing or rales. Abdominal:      Palpations: Abdomen is soft. There is no mass. Tenderness: There is no abdominal tenderness. There is no guarding. Musculoskeletal:         General: No tenderness. Skin:     General: Skin is warm and dry. Findings: No rash. Comments: Is open area of the left lower extremity 3 x 4 cm superficial wound edges appear to show signs of healing there is no active discharge or bleeding   Neurological:      Mental Status: She is alert and oriented to person, place, and time. Cranial Nerves: No cranial nerve deficit. /60   Pulse 64   Temp 97.6 °F (36.4 °C) (Oral)   Resp 20   Wt 106 lb (48.1 kg)   BMI 19.39 kg/m²       Impression/Plan:  1. Skin ulcer of left calf, limited to breakdown of skin ([address]    2. Weight loss    3. Diarrhea, unspecified type      Requested Prescriptions      No prescriptions requested or ordered in this encounter     Orders Placed This Encounter   Procedures    Basic Metabolic Panel     Standing Status:   Future     Standing Expiration Date:   2/4/2022    CBC With Auto Differential     Standing Status:   Future     Standing Expiration Date:   2/4/2022    T4, Free     Standing Status:   Future     Standing Expiration Date:   2/4/2022    TSH without Reflex     Standing Status:   Future     Standing Expiration Date:   2/4/2022    Basic Metabolic Panel    CBC    TSH without Reflex    FREE T4    [facility] Wound Care     Referral Priority:   Routine     Referral Type:   Eval and Treat     Referral Reason:   Specialty Services Required     Requested Specialty:   Wound Care     Number of Visits Requested:   1     Plan was discussed with her family. 30 minutes face-to-face time officially time spent on counseling and coordination of care  Patient giveneducational materials - see patient instructions. Discussed use, benefit, and side effects of prescribed medications. All patient questions answered. Pt voiced understanding. Reviewed health maintenance. Patient agreedwith treatment plan. Follow up as directed. **This report has been created using voice recognition software. It may contain minor errorswhich are inherent in voice recognition technology. **       Electronically signed by Lusi Faye MD on 2/7/2021 at 12:34 PM

## 2021-02-09 ENCOUNTER — TELEPHONE (OUTPATIENT)
Dept: FAMILY MEDICINE CLINIC | Age: 86
End: 2021-02-09

## 2021-02-09 NOTE — TELEPHONE ENCOUNTER
Martina Son from [facility] called and left a voicemail concerned about pt's weight gain since stopping bumex, 2/3/21 the pt weighed 98lb, today they report her weight as 107.4lb.  Please advise

## 2021-02-12 ENCOUNTER — HOSPITAL ENCOUNTER (OUTPATIENT)
Dept: WOUND CARE | Age: 86
Discharge: HOME | End: 2021-02-12
Payer: MEDICARE

## 2021-02-12 VITALS
OXYGEN SATURATION: 98 % | HEART RATE: 45 BPM | RESPIRATION RATE: 16 BRPM | BODY MASS INDEX: 19.65 KG/M2 | SYSTOLIC BLOOD PRESSURE: 139 MMHG | HEIGHT: 62 IN | WEIGHT: 106.8 LBS | DIASTOLIC BLOOD PRESSURE: 61 MMHG | TEMPERATURE: 97.3 F

## 2021-02-12 DIAGNOSIS — R60.0 EDEMA OF LEFT LOWER LEG DUE TO PERIPHERAL VENOUS INSUFFICIENCY: Chronic | ICD-10-CM

## 2021-02-12 DIAGNOSIS — S81.802D TRAUMATIC OPEN WOUND OF LEFT LOWER LEG WITH DELAYED HEALING: ICD-10-CM

## 2021-02-12 DIAGNOSIS — I87.2 EDEMA OF LEFT LOWER LEG DUE TO PERIPHERAL VENOUS INSUFFICIENCY: Chronic | ICD-10-CM

## 2021-02-12 PROCEDURE — 99213 OFFICE O/P EST LOW 20 MIN: CPT

## 2021-02-12 PROCEDURE — 99213 OFFICE O/P EST LOW 20 MIN: CPT | Performed by: NURSE PRACTITIONER

## 2021-02-12 NOTE — PROGRESS NOTES
[facility]  Consult and Procedure Note      Cipriano Chavez RECORD NUMBER:  [id number]  AGE: 80 y.o. GENDER: female  : 1932  EPISODE DATE:  2021    SUBJECTIVE:     Chief Complaint   Patient presents with    Wound Check     left leg         HISTORY OF PRESENT ILLNESS      Manolo Santiago is a 80 y.o. female who presents today for evaluation of left lower leg wound. Wound first occurred in December while placing ALLY hose. Lisset Farr reports that wound has since failed to heal.  Current wound care includes Mupirocin ointment and dry gauze, changed twice daily. She complains of pain with dressing changes and occasionally at night. Improved with rest.  Minimal drainage reported from wound. Denies any fever, chills. Does report diarrhea last week, resolved at this time. Lisset Farr also complains of pain to her bilateral heels and coccyx. States that facility staff has been applying pinxav to area. She does inability to ambulate distances that she used to. Has been using donut cushion to sit on in chair. Has been following with Dr. Annette Dye for weight and appetite changes. She denies and fever, chills. Denies any further needs or concerns. PAST MEDICAL HISTORY             Diagnosis Date    Arthritis     Atypical chest pain     CAD (coronary artery disease)     Chronic LBBB (left bundle branch block)     Diabetes mellitus type II     Esophageal stricture     History of esophageal stricture.  FH: CAD (coronary artery disease)     Mom and dad both with heart disease at mid to late age.  GERD (gastroesophageal reflux disease)     History of gastritis     History of skin cancer     Hypercholesterolemia     Hyperlipidemia     Hypertension     Imbalance     As far as imbalance is concerned, asked patient to follow-up with Dr. Kamila Null since she follows with him on a regular basis.      Lactose intolerance     Nummular dermatitis     Parkinson's disease ([facility] [address]     Restless legs syndrome (RLS)     S/P CABG (coronary artery bypass graft)     SCC (squamous cell carcinoma)      PAST SURGICAL HISTORY     Past Surgical History:   Procedure Laterality Date    CARDIAC SURGERY      CARDIOVASCULAR STRESS TEST  4 09 2009    Gated SPECT images revealed normal global wall motion with EF of 60%. Persantine test associated w/nonspecific symptoms. EKG is nondiagnostic w/baseline LBBB. No obvious stress-induced ischemia by Cardiolite. EF within normal limits.  CARDIOVASCULAR STRESS TEST  7 10 2007    The gated SPECT images revealed normal wall motion with EF of 69%. Poor exercise tolerance w/SOB on exertion. EKG is nondiagnostic. Cardiolite scan revealing no obvious stress-induced ischemia. EF 60%.  CARDIOVASCULAR STRESS TEST  2 03 2006    Fair exercise tolerance w/SOB on exertion. No EKG evidence of ischemia. Patient should be able to proceed with phase II cardiac rehab.  CATARACT REMOVAL      CATARACT REMOVAL  06/08/2016    AND 6/29/16    DIAGNOSTIC CARDIAC CATH LAB PROCEDURE  12 23 2005    LV was obtained. AGEE projection showed preserved LV function w/estimated EF at 55%. No significant MR. Patent left main coronary artery. Tortuous LAD which appeared to be patent. Patent left circumflex artery. High-grade stenosis around 99% in very large dominant RCA w/heavy calcification at the ostium. Normal LV function.  DOPPLER ECHOCARDIOGRAPHY  4 09 2009    Global LV function w/in lower limits of normal, with mild anteroseptal hypokinesis. EF of 50-55%. Light LVH. Mild to moderate left atrial enlargement. Calcific aortic and mitral valve w/no obvious stenosis. No obvious pericardial effusion.  DOPPLER ECHOCARDIOGRAPHY  7 10 2007    LV function w/in lower limits of normal w/peridoxical septal wall motion. Moderate left atrial enlargement. Mild MR. Mild TR. Calcified valves w/no obvious stenosis. No pericardial effusion.      DOPPLER ECHOCARDIOGRAPHY  4 14 2006    There appears to be significant improvement from previous echo w/complete resolution of pericardial effusion and normal LV function. EF of 60%.  DOPPLER ECHOCARDIOGRAPHY  3 21 2006    Normal LV function. Mild LV hypertrophy. Mild biatrial enlargement. Mildly calcific aortic and mitral valve w/no stenosis. Mild MR. Mild TR. Minimal residual pericardial effusion w/significant improvement from previous echo.  DOPPLER ECHOCARDIOGRAPHY  3 16 2006    Interval change from previous echocardiogram w/worsening of effusion. Correlation clinically. Consideration of pericardial centesis. Will obtain a [facility] consult.  DOPPLER ECHOCARDIOGRAPHY  1 31 2006    No significant change from previous echo. The 2D echo showed moderate degree of pericardial effusion. It does seem to be worst or better than previous echo. No evidence of tamponade.  DOPPLER ECHOCARDIOGRAPHY  1 27 2006    Normal global LV function. Mild biatrial enlargement. Mildly sclerotic aortic & mitral valve w/no stenosis. Mild MR. Mild TR. Small to moderate pericardial effusion w/no obvious tamponade.      JOINT REPLACEMENT      MOHS SURGERY Right 2/13/2019    MOHS DEFECT REPAIR CYSTIC SCC RIGHT LOWER LATERAL LEG WITH SKIN GRAFT FROM RIGHT GROIN (FULL THICKNESS ) performed by Melchor Morales MD at [facility] / [address] [place] [address] Left 12/20/13    left leg lesion excision x2, frozen section, skin graft closure    ROTATOR CUFF REPAIR  09/2001    RIGHT    ROTATOR CUFF REPAIR  04/15/2009    LEFT     SKIN CANCER EXCISION  06/2006      Rt leg    SPINAL CORD STIMULATOR IMPLANTATION N/A 12/4/2018    PERMANENT INTERSTIM performed by Rafael Espinal MD at [address] [facility] [address]     Family History   Problem Relation Age of Onset    Heart Disease Mother     Diabetes Mother     Stroke Father     Diabetes Sister     Lung Cancer Brother        SOCIAL HISTORY     Social History     Tobacco Use    Smoking status: Never Smoker    Smokeless tobacco: Never Used   Substance Use Topics    Alcohol use: No    Drug use: No       ALLERGIES     Allergies   Allergen Reactions    Latex Rash    Lisinopril Swelling     mouth    Tape Trenia Bunnlevel Tape] Itching    Keflex [Cephalexin] Nausea And Vomiting and Rash    Lactulose Diarrhea    Morphine Rash    Polysporin [Bacitracin-Polymyxin B] Rash       MEDICATIONS     Current Outpatient Medications on File Prior to Encounter   Medication Sig Dispense Refill    ondansetron (ZOFRAN) 4 MG tablet Take 4 mg by mouth every 6 hours       Ferrous Sulfate (IRON) 325 (65 Fe) MG TABS Take by mouth 4 sprays in mouth every morning. In addition to tablets.       Multiple Vitamin (MULTIVITAMIN) TABS tablet Take 1 tablet by mouth daily 90 tablet 3    Multiple Vitamins-Minerals (PRESERVISION AREDS 2) CAPS Take 2 capsules by mouth 2 times daily 360 capsule 3    Cyanocobalamin 200 MCG/SPRAY LIQD Take 4 sprays by mouth 2 times daily Vitamist - Vitamin B 12 spray 90 mL 3    ferrous sulfate (IRON 325) 325 (65 Fe) MG tablet Take 1 tablet by mouth 2 times daily 180 tablet 1    sodium chloride 1 g tablet Take 1 tablet by mouth daily 120 tablet 1    hydrALAZINE (APRESOLINE) 10 MG tablet Take 1 tablet by mouth every 8 hours 90 tablet 3    JANUVIA 100 MG tablet TAKE 1 TABLET BY MOUTH DAILY 90 tablet 2    spironolactone (ALDACTONE) 25 MG tablet Take 1 tablet by mouth daily 30 tablet 3    loperamide (IMODIUM) 2 MG capsule Take 2 mg by mouth every 6 hours as needed for Diarrhea      acetaminophen (TYLENOL 8 HOUR ARTHRITIS PAIN) 650 MG extended release tablet Take 650 mg by mouth 3 times daily      metFORMIN (GLUCOPHAGE-XR) 500 MG extended release tablet Take 1 tablet by mouth 2 times daily 180 tablet 3    diclofenac sodium (VOLTAREN) 1 % GEL Apply 4 g topically 4 times daily 4 Tube 5    lactase (LACTAID ULTRA) 9000 units TABS Take 9,000 Units by mouth 3 times daily (before meals)      potassium chloride (KLOR-CON M) 20 MEQ extended release tablet Take 0.5 tablets by mouth 3 times daily 30 tablet 1    magnesium oxide (MAG-OX) 400 MG tablet Take 1 tablet by mouth 3 times daily 30 tablet 3    isosorbide dinitrate (ISORDIL) 20 MG tablet Take 1 tablet by mouth 3 times daily 90 tablet 3    metoprolol tartrate (LOPRESSOR) 50 MG tablet Take 0.5 tablets by mouth 2 times daily 180 tablet 3    atorvastatin (LIPITOR) 20 MG tablet TAKE 1 TABLET DAILY 90 tablet 1    carbidopa-levodopa (SINEMET)  MG per tablet Take 2 tablets by mouth 4 times daily 720 tablet 0    apixaban (ELIQUIS) 2.5 MG TABS tablet TAKE 1 TABLET BY MOUTH 2  TIMES DAILY, EQUIVALENT TO  APIXABAN 180 tablet 1    dilTIAZem (CARDIZEM) 30 MG tablet TAKE 1 TABLET BY MOUTH 3  TIMES A  tablet 3    sucralfate (CARAFATE) 1 GM tablet Take 1 tablet by mouth 4 times daily 360 tablet 3    omeprazole (PRILOSEC) 20 MG delayed release capsule Take 1 capsule by mouth daily 90 capsule 3    Cholecalciferol (VITAMIN D3 PO) Take 4,000 Units by mouth 2 times daily       Probiotic Product (PROBIOTIC ADVANCED PO) Take 1 tablet by mouth daily      Cranberry 500 MG CAPS Take 500 mg by mouth daily 2 tab      aspirin 81 MG EC tablet Take 81 mg by mouth daily.  blood glucose test strips (CONTOUR TEST) strip USE TO TEST BLOOD SUGAR TWICE DAILY. 200 strip 3    Misc. Devices MISC 1 each by Does not apply route once for 1 dose Param Glucose Meter; Diagnosis:E11.65 1 Device 0     No current facility-administered medications on file prior to encounter.         REVIEW OF SYSTEMS:     Constitutional: +weight changes, decreased endurance Denies fever, chills, night sweats, loss of appetite   Head: Denies headache,  dizziness, loss of consciousness  Respiratory: Denies shortness of breath, cough, wheezing, dyspnea with exertion  Cardiovascular:+BLE edema Denies chest pain, palpitations  Gastrointestinal: Denies nausea, vomiting, constipation, diarrhea, abdominal pain   LOKESH: Denies dysuria, frequency, urgency, hematuria  Musculoskeletal: +coccyx pain, bilateral heel pain, decreased mobility   Endocrine: Denies polyuria, polydipsia, cold or heat intolerance  Hematology: Denies easy brusing or bleeding, hx of clotting disorder  Dermatology: +left leg wound Denies skin rash, eczema, pruritis    PHYSICAL EXAM:     /61   Pulse (!) 45   Temp 97.3 °F (36.3 °C) (Infrared)   Resp 16   Ht 5' 2\" (1.575 m)   Wt 106 lb 12.8 oz (48.4 kg)   SpO2 98%   BMI 19.53 kg/m²   Wt Readings from Last 3 Encounters:   02/12/21 106 lb 12.8 oz (48.4 kg)   02/04/21 106 lb (48.1 kg)   12/22/20 105 lb (47.6 kg)       General:  Awake, alert, no apparent distress. Appears stated age  [de-identified]: conjuctivae are clear without exudate or hemorrhage, anicteric sclera, moist oral mucosa. Chest:  Respirations regular, non-labored. Chest rise and fall equal bilaterally. Neurological: Awake, alert  Psychiatric:  Appropriate mood and affect  Extremities:  2+ edema to LLE, hemosiderin staining, capillary refill less than 3 seconds, 2+ pedal and posterior tibial pulses. Skin:  Warm and dry  Coccyx shows blanchable erythema, no open wounds  Bilateral heels show blanchable erythema, soft to the touch  Wound:     Location: L lower leg anterior   Classification/stage: traumatic   Tunneling/undermining: Not Present   Wound bed: granular   Exudate:  moderate, serosanguinous   Amanda-wound:dry, edematous, hemosiderin staining   Complications[de-identified] uncomplicated   Pain:Pain worsens with touch, dressing changes and Pain is relieved/improved with rest, elevation      wound margins intact and healing well. No signs of infection. Incision 12/04/18 Back (Active)   Number of days: 758       Wound 05/20/20 Coccyx Mid;Left;Right Red, skin intact.  Does not milena (Active)   Number of days: 268       Wound 02/12/21 Leg Left #1 (Active)   Wound Image   02/12/21 1332   Wound Etiology Skin Tear 02/12/21 4146 Dressing Status Intact; Old drainage noted 02/12/21 1337   Wound Cleansed Cleansed with saline 02/12/21 1337   Wound Length (cm) 2 cm 02/12/21 1337   Wound Width (cm) 2.9 cm 02/12/21 1337   Wound Depth (cm) 0.1 cm 02/12/21 1337   Wound Surface Area (cm^2) 5.8 cm^2 02/12/21 1337   Wound Volume (cm^3) 0.58 cm^3 02/12/21 1337   Wound Assessment Granulation tissue;Slough 02/12/21 1337   Drainage Amount Moderate 02/12/21 1337   Drainage Description Serosanguinous 02/12/21 1337   Odor None 02/12/21 1337   Amanda-wound Assessment Intact;Dry/flaky; Hemosiderin staining (brown yellow) 02/12/21 1337   Margins Attached edges 02/12/21 1337   Wound Thickness Description not for Pressure Injury Full thickness 02/12/21 1337   Number of days: 0         LABS/IMAGING     Micro:   Lab Results   Component Value Date    BC No growth-preliminary  No growth   10/17/2018     ASSESSMENT     -Traumatic open wound of LLE with delayed healing  -Edema of LLE with venous insufficiency    Ulcer Identification:  Ulcer Type: traumatic  Contributing Factors: edema, venous stasis, decreased mobility and shear force    Depth of Diabetic/Pressure/Non Pressure Ulcers or Wound:  Wound, full thickness     Patient Active Problem List   Diagnosis Code    CAD (coronary artery disease) I25.10    S/P CABG (coronary artery bypass graft) Z95.1    Hyperlipidemia E78.5    Parkinson's disease ([facility] [address] G20    Chronic LBBB (left bundle branch block) I44.7    History of skin cancer Z85.828    Imbalance R26.89    Atypical chest pain R07.89    Hypercholesterolemia E78.00    Diabetes mellitus, type II (HCC) E11.9    Esophageal stricture K22.2    FH: CAD (coronary artery disease) Z82.49    History of gastritis Z87.19    Lactose intolerance E73.9    Hyponatremia E87.1    Abdominal pain R10.9    Melanotic stools K92.1    Recurrent UTI N39.0    Urinary tract infection without hematuria N39.0    Urgency-frequency syndrome 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.81    Atrial fibrillation with RVR ([facility] [address] I48.91    Pyelonephritis N12    Paroxysmal atrial fibrillation (HCC) I48.0    Sepsis due to Escherichia coli (HCC) A41.51    Physical deconditioning R53.81    Cervical spinal stenosis M48.02    Cervical arthritis M47.812    Arteriosclerosis I70.90    Osteopenia M85.80    Mixed incontinence urge and stress N39.46    Overactive bladder N32.81    Acute on chronic systolic congestive heart failure (HCC) I50.23    Acute on chronic diastolic CHF (congestive heart failure), NYHA class 3 (HCC) I50.33    Pleural effusion J90    Uncontrolled hypertension I10    History of coronary artery bypass graft x 1 Z95.1    Chronic atrial fibrillation (HCC) I48.20    Pulmonary hypertension, moderate to severe (HCC) I27.20    SOB (shortness of breath) R06.02    Moderate malnutrition (HCC) E44.0         PLAN     Patient examined and evaluated. All available lab work, radiology studies, and progress notes pertaining to [place] reviewed prior to or during patient visit today. -Traumatic open wound of LLE with delayed healing- complicated by venous insufficiency and ongoing edema. Wound is granular, appears to be healing well. Will discontinue current dressings as frequency is leading to increased pain.      -Will order wound care as follows:  Apply a silicone bordered foam to the open wound. Change 3 times per week  Apply compression hose to both legs every morning and remove at bedtime     -Discussed importance of control of edema via compression and elevation in wound healing.      -Coccyx, bilateral heels- Continue pinxav ointment to coccyx. Discussed importance of offloading areas to prevent development of wounds. Pain is an indication that you have stayed in position for too long. Discontinue use of donut cushion as this can lead to increased pressure to coccyx. Start use of waffle cusion (provided at today's visit). Do not sit upright in chair for extended periods of time.   Should not stay following, please call the Wound Care Service during business hours: Monday through Friday 8:00 am - 4:30 pm  ([phone]). *Increase in pain   *Temperature over 101   *Increase in drainage from your wound or a foul odor   *Uncontrolled swelling   *Need for compression bandage changes due to slippage, breakthrough drainage    If you need medical attention outside of business hours, please contact your Primary Care Doctor or go to the nearest emergency room.                    Electronically signed by ROWDY Glasgow CNP on 2/12/2021 at 3:31 PM

## 2021-02-26 ENCOUNTER — TELEPHONE (OUTPATIENT)
Dept: WOUND CARE | Age: 86
End: 2021-02-26

## 2021-02-27 ENCOUNTER — HOSPITAL ENCOUNTER (INPATIENT)
Age: 86
LOS: 3 days | Discharge: HOME | DRG: 391 | End: 2021-03-02
Attending: EMERGENCY MEDICINE | Admitting: INTERNAL MEDICINE
Payer: MEDICARE

## 2021-02-27 ENCOUNTER — APPOINTMENT (OUTPATIENT)
Dept: CT IMAGING | Age: 86
DRG: 391 | End: 2021-02-27
Payer: MEDICARE

## 2021-02-27 ENCOUNTER — APPOINTMENT (OUTPATIENT)
Dept: GENERAL RADIOLOGY | Age: 86
DRG: 391 | End: 2021-02-27
Payer: MEDICARE

## 2021-02-27 DIAGNOSIS — E86.0 DEHYDRATION: Primary | ICD-10-CM

## 2021-02-27 DIAGNOSIS — I48.91 ATRIAL FIBRILLATION WITH SLOW VENTRICULAR RESPONSE (HCC): ICD-10-CM

## 2021-02-27 DIAGNOSIS — R19.7 DIARRHEA, UNSPECIFIED TYPE: ICD-10-CM

## 2021-02-27 PROBLEM — R11.2 NON-INTRACTABLE VOMITING WITH NAUSEA: Status: ACTIVE | Noted: 2021-02-27

## 2021-02-27 PROBLEM — J90 BILATERAL PLEURAL EFFUSION: Status: ACTIVE | Noted: 2021-02-27

## 2021-02-27 PROBLEM — R00.1 SYMPTOMATIC SINUS BRADYCARDIA: Status: ACTIVE | Noted: 2021-02-27

## 2021-02-27 LAB
ALBUMIN SERPL-MCNC: 3.7 G/DL (ref 3.5–5.1)
ALLEN TEST: POSITIVE
ALP BLD-CCNC: 79 U/L (ref 38–126)
ALT SERPL-CCNC: < 5 U/L (ref 11–66)
AMMONIA: 45 UMOL/L (ref 11–60)
ANION GAP SERPL CALCULATED.3IONS-SCNC: 9 MEQ/L (ref 8–16)
ANISOCYTOSIS: PRESENT
AST SERPL-CCNC: 14 U/L (ref 5–40)
ATYPICAL LYMPHOCYTES: ABNORMAL %
AVERAGE GLUCOSE: 123 MG/DL (ref 70–126)
BASE EXCESS (CALCULATED): -1.8 MMOL/L (ref -2.5–2.5)
BASOPHILS # BLD: 0.6 %
BASOPHILS ABSOLUTE: 0 THOU/MM3 (ref 0–0.1)
BILIRUB SERPL-MCNC: 0.6 MG/DL (ref 0.3–1.2)
BILIRUBIN URINE: NEGATIVE
BLOOD, URINE: NEGATIVE
BUN BLDV-MCNC: 29 MG/DL (ref 7–22)
CALCIUM SERPL-MCNC: 9.1 MG/DL (ref 8.5–10.5)
CHARACTER, URINE: CLEAR
CHLORIDE BLD-SCNC: 105 MEQ/L (ref 98–111)
CO2: 23 MEQ/L (ref 23–33)
COLLECTED BY:: ABNORMAL
COLOR: YELLOW
CREAT SERPL-MCNC: 0.8 MG/DL (ref 0.4–1.2)
EOSINOPHIL # BLD: 1.7 %
EOSINOPHILS ABSOLUTE: 0.1 THOU/MM3 (ref 0–0.4)
ERYTHROCYTE [DISTWIDTH] IN BLOOD BY AUTOMATED COUNT: 25 % (ref 11.5–14.5)
ERYTHROCYTE [DISTWIDTH] IN BLOOD BY AUTOMATED COUNT: 73.2 FL (ref 35–45)
GFR SERPL CREATININE-BSD FRML MDRD: 68 ML/MIN/1.73M2
GLUCOSE BLD-MCNC: 119 MG/DL (ref 70–108)
GLUCOSE BLD-MCNC: 188 MG/DL (ref 70–108)
GLUCOSE BLD-MCNC: 95 MG/DL (ref 70–108)
GLUCOSE URINE: NEGATIVE MG/DL
GLUCOSE, WHOLE BLOOD: 121 MG/DL (ref 70–108)
HBA1C MFR BLD: 6.1 % (ref 4.4–6.4)
HCO3: 23 MMOL/L (ref 23–28)
HCT VFR BLD CALC: 36.9 % (ref 37–47)
HEMOGLOBIN: 10.8 GM/DL (ref 12–16)
IMMATURE GRANS (ABS): 0.01 THOU/MM3 (ref 0–0.07)
IMMATURE GRANULOCYTES: 0.2 %
KETONES, URINE: NEGATIVE
LACTIC ACID: 1.6 MMOL/L (ref 0.5–2.2)
LEUKOCYTE ESTERASE, URINE: NEGATIVE
LIPASE: 16.7 U/L (ref 5.6–51.3)
LYMPHOCYTES # BLD: 6.7 %
LYMPHOCYTES ABSOLUTE: 0.3 THOU/MM3 (ref 1–4.8)
MAGNESIUM: 1.9 MG/DL (ref 1.6–2.4)
MCH RBC QN AUTO: 24.1 PG (ref 26–33)
MCHC RBC AUTO-ENTMCNC: 29.3 GM/DL (ref 32.2–35.5)
MCV RBC AUTO: 82.4 FL (ref 81–99)
MONOCYTES # BLD: 7.7 %
MONOCYTES ABSOLUTE: 0.4 THOU/MM3 (ref 0.4–1.3)
MRSA SCREEN RT-PCR: NEGATIVE
NITRITE, URINE: NEGATIVE
NUCLEATED RED BLOOD CELLS: 0 /100 WBC
O2 SATURATION: 93 %
OSMOLALITY CALCULATION: 284.6 MOSMOL/KG (ref 275–300)
PCO2: 38 MMHG (ref 35–45)
PH BLOOD GAS: 7.39 (ref 7.35–7.45)
PH UA: 5.5 (ref 5–9)
PLATELET # BLD: 210 THOU/MM3 (ref 130–400)
PMV BLD AUTO: 9.2 FL (ref 9.4–12.4)
PO2: 69 MMHG (ref 71–104)
POTASSIUM REFLEX MAGNESIUM: 4.7 MEQ/L (ref 3.5–5.2)
PRO-BNP: 4493 PG/ML (ref 0–1800)
PROTEIN UA: NEGATIVE
RBC # BLD: 4.48 MILL/MM3 (ref 4.2–5.4)
SCAN OF BLOOD SMEAR: NORMAL
SEG NEUTROPHILS: 83.1 %
SEGMENTED NEUTROPHILS ABSOLUTE COUNT: 4 THOU/MM3 (ref 1.8–7.7)
SODIUM BLD-SCNC: 137 MEQ/L (ref 135–145)
SOURCE, BLOOD GAS: ABNORMAL
SPECIFIC GRAVITY, URINE: 1.02 (ref 1–1.03)
TOTAL PROTEIN: 6.6 G/DL (ref 6.1–8)
TROPONIN T: < 0.01 NG/ML
UROBILINOGEN, URINE: 0.2 EU/DL (ref 0–1)
VANCOMYCIN RESISTANT ENTEROCOCCUS: NEGATIVE
WBC # BLD: 4.8 THOU/MM3 (ref 4.8–10.8)

## 2021-02-27 PROCEDURE — 83690 ASSAY OF LIPASE: CPT

## 2021-02-27 PROCEDURE — 2500000003 HC RX 250 WO HCPCS: Performed by: INTERNAL MEDICINE

## 2021-02-27 PROCEDURE — 83880 ASSAY OF NATRIURETIC PEPTIDE: CPT

## 2021-02-27 PROCEDURE — 36600 WITHDRAWAL OF ARTERIAL BLOOD: CPT

## 2021-02-27 PROCEDURE — 74176 CT ABD & PELVIS W/O CONTRAST: CPT

## 2021-02-27 PROCEDURE — 93005 ELECTROCARDIOGRAM TRACING: CPT | Performed by: EMERGENCY MEDICINE

## 2021-02-27 PROCEDURE — 83605 ASSAY OF LACTIC ACID: CPT

## 2021-02-27 PROCEDURE — 80053 COMPREHEN METABOLIC PANEL: CPT

## 2021-02-27 PROCEDURE — 82947 ASSAY GLUCOSE BLOOD QUANT: CPT

## 2021-02-27 PROCEDURE — 6370000000 HC RX 637 (ALT 250 FOR IP): Performed by: INTERNAL MEDICINE

## 2021-02-27 PROCEDURE — 2580000003 HC RX 258: Performed by: EMERGENCY MEDICINE

## 2021-02-27 PROCEDURE — 99221 1ST HOSP IP/OBS SF/LOW 40: CPT | Performed by: INTERNAL MEDICINE

## 2021-02-27 PROCEDURE — 93005 ELECTROCARDIOGRAM TRACING: CPT | Performed by: INTERNAL MEDICINE

## 2021-02-27 PROCEDURE — 83036 HEMOGLOBIN GLYCOSYLATED A1C: CPT

## 2021-02-27 PROCEDURE — 83735 ASSAY OF MAGNESIUM: CPT

## 2021-02-27 PROCEDURE — 82803 BLOOD GASES ANY COMBINATION: CPT

## 2021-02-27 PROCEDURE — 87641 MR-STAPH DNA AMP PROBE: CPT

## 2021-02-27 PROCEDURE — 71045 X-RAY EXAM CHEST 1 VIEW: CPT

## 2021-02-27 PROCEDURE — 2060000000 HC ICU INTERMEDIATE R&B

## 2021-02-27 PROCEDURE — 84484 ASSAY OF TROPONIN QUANT: CPT

## 2021-02-27 PROCEDURE — 81003 URINALYSIS AUTO W/O SCOPE: CPT

## 2021-02-27 PROCEDURE — 99223 1ST HOSP IP/OBS HIGH 75: CPT | Performed by: INTERNAL MEDICINE

## 2021-02-27 PROCEDURE — 99284 EMERGENCY DEPT VISIT MOD MDM: CPT

## 2021-02-27 PROCEDURE — 87081 CULTURE SCREEN ONLY: CPT

## 2021-02-27 PROCEDURE — 82140 ASSAY OF AMMONIA: CPT

## 2021-02-27 PROCEDURE — 87500 VANOMYCIN DNA AMP PROBE: CPT

## 2021-02-27 PROCEDURE — 82948 REAGENT STRIP/BLOOD GLUCOSE: CPT

## 2021-02-27 PROCEDURE — 96360 HYDRATION IV INFUSION INIT: CPT

## 2021-02-27 PROCEDURE — 85025 COMPLETE CBC W/AUTO DIFF WBC: CPT

## 2021-02-27 PROCEDURE — 36415 COLL VENOUS BLD VENIPUNCTURE: CPT

## 2021-02-27 RX ORDER — ASPIRIN 81 MG/1
81 TABLET ORAL DAILY
Status: DISCONTINUED | OUTPATIENT
Start: 2021-02-28 | End: 2021-03-02 | Stop reason: HOSPADM

## 2021-02-27 RX ORDER — ONDANSETRON 2 MG/ML
4 INJECTION INTRAMUSCULAR; INTRAVENOUS EVERY 6 HOURS PRN
Status: DISCONTINUED | OUTPATIENT
Start: 2021-02-27 | End: 2021-03-02 | Stop reason: HOSPADM

## 2021-02-27 RX ORDER — POTASSIUM CHLORIDE 20 MEQ/1
40 TABLET, EXTENDED RELEASE ORAL PRN
Status: DISCONTINUED | OUTPATIENT
Start: 2021-02-27 | End: 2021-03-02 | Stop reason: HOSPADM

## 2021-02-27 RX ORDER — ATORVASTATIN CALCIUM 20 MG/1
20 TABLET, FILM COATED ORAL NIGHTLY
Status: DISCONTINUED | OUTPATIENT
Start: 2021-02-27 | End: 2021-03-02 | Stop reason: HOSPADM

## 2021-02-27 RX ORDER — BUMETANIDE 0.25 MG/ML
0.5 INJECTION, SOLUTION INTRAMUSCULAR; INTRAVENOUS ONCE
Status: COMPLETED | OUTPATIENT
Start: 2021-02-27 | End: 2021-02-27

## 2021-02-27 RX ORDER — PROMETHAZINE HYDROCHLORIDE 25 MG/1
12.5 TABLET ORAL EVERY 6 HOURS PRN
Status: DISCONTINUED | OUTPATIENT
Start: 2021-02-27 | End: 2021-03-02 | Stop reason: HOSPADM

## 2021-02-27 RX ORDER — ISOSORBIDE DINITRATE 20 MG/1
20 TABLET ORAL 3 TIMES DAILY
Status: DISCONTINUED | OUTPATIENT
Start: 2021-02-27 | End: 2021-03-02 | Stop reason: HOSPADM

## 2021-02-27 RX ORDER — POTASSIUM CHLORIDE 7.45 MG/ML
10 INJECTION INTRAVENOUS PRN
Status: DISCONTINUED | OUTPATIENT
Start: 2021-02-27 | End: 2021-03-02 | Stop reason: HOSPADM

## 2021-02-27 RX ORDER — SODIUM CHLORIDE 0.9 % (FLUSH) 0.9 %
10 SYRINGE (ML) INJECTION EVERY 12 HOURS SCHEDULED
Status: DISCONTINUED | OUTPATIENT
Start: 2021-02-27 | End: 2021-03-02 | Stop reason: HOSPADM

## 2021-02-27 RX ORDER — SODIUM CHLORIDE 0.9 % (FLUSH) 0.9 %
10 SYRINGE (ML) INJECTION PRN
Status: DISCONTINUED | OUTPATIENT
Start: 2021-02-27 | End: 2021-03-02 | Stop reason: HOSPADM

## 2021-02-27 RX ORDER — ACETAMINOPHEN 650 MG/1
650 SUPPOSITORY RECTAL EVERY 6 HOURS PRN
Status: DISCONTINUED | OUTPATIENT
Start: 2021-02-27 | End: 2021-03-02 | Stop reason: HOSPADM

## 2021-02-27 RX ORDER — ACETAMINOPHEN 325 MG/1
650 TABLET ORAL 3 TIMES DAILY
Status: DISCONTINUED | OUTPATIENT
Start: 2021-02-27 | End: 2021-03-02 | Stop reason: HOSPADM

## 2021-02-27 RX ORDER — SPIRONOLACTONE 25 MG/1
25 TABLET ORAL DAILY
Status: DISCONTINUED | OUTPATIENT
Start: 2021-02-28 | End: 2021-03-02 | Stop reason: HOSPADM

## 2021-02-27 RX ORDER — NICOTINE POLACRILEX 4 MG
15 LOZENGE BUCCAL PRN
Status: DISCONTINUED | OUTPATIENT
Start: 2021-02-27 | End: 2021-03-02 | Stop reason: HOSPADM

## 2021-02-27 RX ORDER — ACETAMINOPHEN 325 MG/1
650 TABLET ORAL EVERY 6 HOURS PRN
Status: DISCONTINUED | OUTPATIENT
Start: 2021-02-27 | End: 2021-03-02 | Stop reason: HOSPADM

## 2021-02-27 RX ORDER — ANTIOX #8/OM3/DHA/EPA/LUT/ZEAX 250-2.5 MG
2 CAPSULE ORAL 2 TIMES DAILY
Status: DISCONTINUED | OUTPATIENT
Start: 2021-02-27 | End: 2021-02-27 | Stop reason: RX

## 2021-02-27 RX ORDER — DEXTROSE MONOHYDRATE 25 G/50ML
12.5 INJECTION, SOLUTION INTRAVENOUS PRN
Status: DISCONTINUED | OUTPATIENT
Start: 2021-02-27 | End: 2021-03-02 | Stop reason: HOSPADM

## 2021-02-27 RX ORDER — PANTOPRAZOLE SODIUM 40 MG/1
40 TABLET, DELAYED RELEASE ORAL
Status: DISCONTINUED | OUTPATIENT
Start: 2021-02-28 | End: 2021-03-01

## 2021-02-27 RX ORDER — DEXTROSE MONOHYDRATE 50 MG/ML
100 INJECTION, SOLUTION INTRAVENOUS PRN
Status: DISCONTINUED | OUTPATIENT
Start: 2021-02-27 | End: 2021-03-02 | Stop reason: HOSPADM

## 2021-02-27 RX ORDER — 0.9 % SODIUM CHLORIDE 0.9 %
250 INTRAVENOUS SOLUTION INTRAVENOUS ONCE
Status: COMPLETED | OUTPATIENT
Start: 2021-02-27 | End: 2021-02-27

## 2021-02-27 RX ORDER — MAGNESIUM SULFATE IN WATER 40 MG/ML
2000 INJECTION, SOLUTION INTRAVENOUS PRN
Status: DISCONTINUED | OUTPATIENT
Start: 2021-02-27 | End: 2021-03-02 | Stop reason: HOSPADM

## 2021-02-27 RX ORDER — HYDRALAZINE HYDROCHLORIDE 10 MG/1
10 TABLET, FILM COATED ORAL EVERY 8 HOURS SCHEDULED
Status: DISCONTINUED | OUTPATIENT
Start: 2021-02-27 | End: 2021-03-02 | Stop reason: HOSPADM

## 2021-02-27 RX ADMIN — CARBIDOPA AND LEVODOPA 2 TABLET: 25; 100 TABLET ORAL at 22:18

## 2021-02-27 RX ADMIN — ACETAMINOPHEN 650 MG: 325 TABLET ORAL at 14:39

## 2021-02-27 RX ADMIN — BUMETANIDE 0.5 MG: 0.25 INJECTION, SOLUTION INTRAMUSCULAR; INTRAVENOUS at 14:39

## 2021-02-27 RX ADMIN — HYDRALAZINE HYDROCHLORIDE 10 MG: 10 TABLET, FILM COATED ORAL at 22:19

## 2021-02-27 RX ADMIN — ATORVASTATIN CALCIUM 20 MG: 20 TABLET, FILM COATED ORAL at 22:18

## 2021-02-27 RX ADMIN — ACETAMINOPHEN 650 MG: 325 TABLET ORAL at 22:19

## 2021-02-27 RX ADMIN — SODIUM CHLORIDE 250 ML: 9 INJECTION, SOLUTION INTRAVENOUS at 08:59

## 2021-02-27 RX ADMIN — MAGNESIUM GLUCONATE 500 MG ORAL TABLET 400 MG: 500 TABLET ORAL at 22:19

## 2021-02-27 RX ADMIN — CARBIDOPA AND LEVODOPA 2 TABLET: 25; 100 TABLET ORAL at 15:56

## 2021-02-27 RX ADMIN — ISOSORBIDE DINITRATE 20 MG: 20 TABLET ORAL at 22:18

## 2021-02-27 RX ADMIN — DILTIAZEM HYDROCHLORIDE 30 MG: 30 TABLET, FILM COATED ORAL at 22:18

## 2021-02-27 ASSESSMENT — ENCOUNTER SYMPTOMS
SORE THROAT: 0
NAUSEA: 1
VOMITING: 1
DIARRHEA: 1
COUGH: 0
ABDOMINAL PAIN: 0
SHORTNESS OF BREATH: 0
BACK PAIN: 0

## 2021-02-27 ASSESSMENT — PAIN SCALES - GENERAL: PAINLEVEL_OUTOF10: 2

## 2021-02-27 NOTE — ED NOTES
Patient resting in bed. Respirations easy and unlabored. No distress noted. Call light within reach.        Jonnathan Crandall RN  02/27/21 9711

## 2021-02-27 NOTE — H&P
Hospitalist H+P      Patient:  Re Aden    Unit/Bed:04/004A  YOB: 1932  MRN: [id number]   Acct: [de-identified]     PCP: Mady Dye MD  Date of Admission: 2/27/2021        Assessment and Plan:        1. Diarrhea we will obtain a stool for PCR of enteric pathogens, will not start IV antibiotics at this time, will check a lactic acid to rule out ischemic bowel, if lactic acid is elevated will consult general surgery/GI  2. Nausea and vomiting we will start with gentle fluid rehydration p.o. and antiemetics  3. Symptomatic bradycardia we will hold patient's beta-blocker-Lopressor at this time, will place her on telemetry, will consult cardiology. 4. Diabetes mellitus type 2 we will check a hemoglobin A1c and urine for microalbumin  5. Parkinson's disease: Stable we will continue home medications  6. Bilateral pleural effusions may need to have IR do a thoracentesis, will need to hold anticoagulation, may be due to #7  7. Acute on chronic congestive heart failure: We will add Bumex 0.5 mg IV 1 time daily to see if we can facilitate fluid shift  8. Pulmonary hypertension: Continue with home medications    CC: Diarrhea and vomiting    HPI: 77-year-old thin white female who presents to the ED with complaint of diarrhea and emesis that started 2 days ago.,  Denies any pain, patient is alert and oriented. She knows her physicians. Patient's past medical history includes congestive heart failure pulmonary hypertension, hyperlipidemia Parkinson's disease, diabetes mellitus type 2, pleural effusions, chronic atrial fibrillation, urinary incontinence, coronary artery disease, chronic left bundle branch block, and electrolyte imbalance    ROS (14 point review of systems completed. Pertinent positives noted.  Otherwise ROS is negative) : Diarrhea and emesis x2 days    PMH:  Per HPI and       Diagnosis Date    Arthritis     Atypical chest pain     CAD (coronary artery disease)     Chronic LBBB (left bundle branch block)     Diabetes mellitus type II     Esophageal stricture     History of esophageal stricture.  FH: CAD (coronary artery disease)     Mom and dad both with heart disease at mid to late age.  GERD (gastroesophageal reflux disease)     History of gastritis     History of skin cancer     Hypercholesterolemia     Hyperlipidemia     Hypertension     Imbalance     As far as imbalance is concerned, asked patient to follow-up with Dr. Drew Alex since she follows with him on a regular basis.  Lactose intolerance     Nummular dermatitis     Parkinson's disease ([address] 2009    Restless legs syndrome (RLS)     S/P CABG (coronary artery bypass graft)     SCC (squamous cell carcinoma)      SHX:        Procedure Laterality Date    CARDIAC SURGERY      CARDIOVASCULAR STRESS TEST  4 09 2009    Gated SPECT images revealed normal global wall motion with EF of 60%. Persantine test associated w/nonspecific symptoms. EKG is nondiagnostic w/baseline LBBB. No obvious stress-induced ischemia by Cardiolite. EF within normal limits.  CARDIOVASCULAR STRESS TEST  7 10 2007    The gated SPECT images revealed normal wall motion with EF of 69%. Poor exercise tolerance w/SOB on exertion. EKG is nondiagnostic. Cardiolite scan revealing no obvious stress-induced ischemia. EF 60%.  CARDIOVASCULAR STRESS TEST  2 03 2006    Fair exercise tolerance w/SOB on exertion. No EKG evidence of ischemia. Patient should be able to proceed with phase II cardiac rehab.  CATARACT REMOVAL      CATARACT REMOVAL  06/08/2016    AND 6/29/16    DIAGNOSTIC CARDIAC CATH LAB PROCEDURE  12 23 2005    LV was obtained. AGEE projection showed preserved LV function w/estimated EF at 55%. No significant MR. Patent left main coronary artery. Tortuous LAD which appeared to be patent. Patent left circumflex artery. High-grade stenosis around 99% in very large dominant RCA w/heavy calcification at the ostium. Normal LV function.  DOPPLER ECHOCARDIOGRAPHY  4 09 2009    Global LV function w/in lower limits of normal, with mild anteroseptal hypokinesis. EF of 50-55%. Light LVH. Mild to moderate left atrial enlargement. Calcific aortic and mitral valve w/no obvious stenosis. No obvious pericardial effusion.  DOPPLER ECHOCARDIOGRAPHY  7 10 2007    LV function w/in lower limits of normal w/peridoxical septal wall motion. Moderate left atrial enlargement. Mild MR. Mild TR. Calcified valves w/no obvious stenosis. No pericardial effusion.  DOPPLER ECHOCARDIOGRAPHY  4 14 2006    There appears to be significant improvement from previous echo w/complete resolution of pericardial effusion and normal LV function. EF of 60%.  DOPPLER ECHOCARDIOGRAPHY  3 21 2006    Normal LV function. Mild LV hypertrophy. Mild biatrial enlargement. Mildly calcific aortic and mitral valve w/no stenosis. Mild MR. Mild TR. Minimal residual pericardial effusion w/significant improvement from previous echo.  DOPPLER ECHOCARDIOGRAPHY  3 16 2006    Interval change from previous echocardiogram w/worsening of effusion. Correlation clinically. Consideration of pericardial centesis. Will obtain a [facility] consult.  DOPPLER ECHOCARDIOGRAPHY  1 31 2006    No significant change from previous echo. The 2D echo showed moderate degree of pericardial effusion. It does seem to be worst or better than previous echo. No evidence of tamponade.  DOPPLER ECHOCARDIOGRAPHY  1 27 2006    Normal global LV function. Mild biatrial enlargement. Mildly sclerotic aortic & mitral valve w/no stenosis. Mild MR. Mild TR. Small to moderate pericardial effusion w/no obvious tamponade.      JOINT REPLACEMENT      MOHS SURGERY Right 2/13/2019    MOHS DEFECT REPAIR CYSTIC SCC RIGHT LOWER LATERAL LEG WITH SKIN GRAFT FROM RIGHT GROIN (FULL THICKNESS ) performed by Sarahy Denson MD at [facility] / [address] [place] [address] Left 12/20/13    left leg lesion excision x2, frozen section, skin graft closure    ROTATOR CUFF REPAIR  09/2001    RIGHT    ROTATOR CUFF REPAIR  04/15/2009    LEFT     SKIN CANCER EXCISION  06/2006      Rt leg    SPINAL CORD STIMULATOR IMPLANTATION N/A 12/4/2018    PERMANENT INTERSTIM performed by Nasir Cage MD at [address] [place] [address]:       Problem Relation Age of Onset    Heart Disease Mother     Diabetes Mother     Stroke Father     Diabetes Sister     Lung Cancer Brother      SOCHX:   Social History     Socioeconomic History    Marital status:      Spouse name: None    Number of children: None    Years of education: None    Highest education level: None   Occupational History    None   Social Needs    Financial resource strain: None    Food insecurity     Worry: None     Inability: None    Transportation needs     Medical: None     Non-medical: None   Tobacco Use    Smoking status: Never Smoker    Smokeless tobacco: Never Used   Substance and Sexual Activity    Alcohol use: No    Drug use: No    Sexual activity: None   Lifestyle    Physical activity     Days per week: None     Minutes per session: None    Stress: None   Relationships    Social connections     Talks on phone: None     Gets together: None     Attends Sikh service: None     Active member of club or organization: None     Attends meetings of clubs or organizations: None     Relationship status: None    Intimate partner violence     Fear of current or ex partner: None     Emotionally abused: None     Physically abused: None     Forced sexual activity: None   Other Topics Concern    None   Social History Narrative    None      Allergies: Latex, Lisinopril, Tape [adhesive tape], Penicillins, Keflex [cephalexin], Lactulose, Morphine, and Polysporin [bacitracin-polymyxin b]  Medications:        Prior to Admission medications    Medication Sig Start Date End Date Taking?  Authorizing Provider   ondansetron (ZOFRAN) 4 MG tablet Take 4 mg by mouth every 6 hours  1/26/21   Historical Provider, MD   Ferrous Sulfate (IRON) 325 (65 Fe) MG TABS Take by mouth 4 sprays in mouth every morning. In addition to tablets.     Historical Provider, MD   Multiple Vitamin (MULTIVITAMIN) TABS tablet Take 1 tablet by mouth daily 1/5/21   Celena Smith MD   Multiple Vitamins-Minerals (PRESERVISION AREDS 2) CAPS Take 2 capsules by mouth 2 times daily 1/5/21   Celena Smith MD   Cyanocobalamin 200 MCG/SPRAY LIQD Take 4 sprays by mouth 2 times daily [facility] - Vitamin B 12 spray 1/5/21   Celena Smith MD   ferrous sulfate (IRON 325) 325 (65 Fe) MG tablet Take 1 tablet by mouth 2 times daily 12/31/20   Celena Smith MD   sodium chloride 1 g tablet Take 1 tablet by mouth daily 12/22/20 4/21/21  Alejandra Oliveira MD   hydrALAZINE (APRESOLINE) 10 MG tablet Take 1 tablet by mouth every 8 hours 12/7/20   ROWDY Aguirre CNP   JANUVIA 100 MG tablet TAKE 1 TABLET BY MOUTH DAILY 9/24/20   Celena Smith MD   spironolactone (ALDACTONE) 25 MG tablet Take 1 tablet by mouth daily 9/17/20   ROWDY Aguirre CNP   loperamide (IMODIUM) 2 MG capsule Take 2 mg by mouth every 6 hours as needed for Diarrhea    Historical Provider, MD   acetaminophen (TYLENOL 8 HOUR ARTHRITIS PAIN) 650 MG extended release tablet Take 650 mg by mouth 3 times daily    Historical Provider, MD   metFORMIN (GLUCOPHAGE-XR) 500 MG extended release tablet Take 1 tablet by mouth 2 times daily 7/15/20   Celena Smith MD   diclofenac sodium (VOLTAREN) 1 % GEL Apply 4 g topically 4 times daily 7/8/20   Celena Smith MD   lactase (LACTAID ULTRA) 9000 units TABS Take 9,000 Units by mouth 3 times daily (before meals)    Historical Provider, MD   potassium chloride (KLOR-CON M) 20 MEQ extended release tablet Take 0.5 tablets by mouth 3 times daily 6/3/20   ROWDY Aguirre CNP   magnesium oxide (MAG-OX) 400 MG tablet Take 1 tablet by mouth 3 times daily 6/3/20   Gisselle Lua, ROWDY - CNP clear.  Neck: Supple, with full range of motion. no jugular venous distention. Trachea midline. no carotid bruits  Respiratory:  Normal respiratory effort. Decreased breath sounds one third of the way up posteriorly, occasional crackles  Cardiovascular: Bradycardic rate rate and rhythm with normal S1/S2 with 2/6 murmur. -ve rubs or gallops. PMI non displaced  Abdomen: Soft, non-tender, non-distended with normal bowel sounds. No guarding, rebound. Musculoskeletal:  No clubbing, cyanosis or edema bilaterally. Full range of motion without deformity. Skin: Skin color, texture, turgor normal.  No rashes or lesions, or suspicious lesions. Neurologic:  Neurovascularly intact without any focal sensory/motor deficits. Cranial nerves: II-XII intact, grossly non-focal.  Cogwheel rigidity  Psychiatric:  Alert and oriented, thought content appropriate, normal insight  Capillary Refill: Brisk,< 2 seconds   Peripheral Pulses: +2 palpable, equal bilaterally upper and lower extremities  Lymphatics: no lymphadenopathy    Data: (All radiographs, tracings, PFTs, and imaging are personally viewed and interpreted unless otherwise noted).  WBC 4.8   Hemoglobin 10.8   Platelets 810   Sodium 137   Potassium 4.7   Creatinine 0.8   GFR 68   Serum osmolality 285   BNP 4493   Troponin less than 0.01  Recent Labs     02/27/21  0830   WBC 4.8   HGB 10.8*   HCT 36.9*         Recent Labs     02/27/21  0830      K 4.7      CO2 23   BUN 29*   CREATININE 0.8   CALCIUM 9.1      Recent Labs     02/27/21  0830   AST 14   ALT <5*   BILITOT 0.6   ALKPHOS 79       No results for input(s): INR in the last 72 hours.  No results for input(s): Renetta Foster in the last 72 hours. Radiology reports-is reviewed in PACS  Ct Abdomen Pelvis Wo Contrast    Result Date: 2/27/2021  CT ABDOMEN AND PELVIS WITHOUT CONTRAST ENHANCEMENT: CLINICAL INFORMATION: Diarrhea and emesis that started 2 days ago. . COMPARISON: 10/15/2018 TECHNIQUE: Multiple axial 5 mm images of the abdomen, pelvis, and lung bases were obtained without the administration of oral or intravenous contrast material. Computer generated sagittal and coronal images of the abdomen and pelvis were also reconstructed. ALL CT SCANS AT THIS FACILITY use dose modulation, iterative reconstruction, and/or weight-based dosing when appropriate to reduce radiation dose to as low as reasonably achievable. FINDINGS: Lung bases: Moderate-sized bilateral pleural effusions, larger on the right. Mild adjacent bibasilar atelectasis/pneumonia. Densely calcified mitral annulus, consistent with atherosclerotic mitral valve disease. Liver: Liver unremarkable. The gallbladder is moderately distended, likely related to not eating recently. The gallbladder Wall is not thickened or edematous. No gallstones are seen. Pancreas, spleen and adrenal glands: Unremarkable Kidneys:  Unremarkable. No renal calculi. No cysts. No mass lesions. No hydronephrosis. . Bowel/Peritoneum: There appears be moderate thickening of the gastric wall. This may simple be due to underdistention of the stomach but cannot exclude gastritis. There is mild fluid distention of multiple small bowel loops, consistent with ileus/enteritis/malabsorption. There are a few air-fluid levels in the ascending and transverse colon, consistent with impending diarrhea. There is some radiodense material in the cecum, possibly milk of magnesia. Radiodense material extends into the appendix. The appendix is not engorged or edematous. No free air. No free fluid in the abdomen. No abnormally enlarged para-aortic lymph nodes are seen. There are extensive vascular calcifications involving the entire abdominal aorta, celiac artery, SMA,  and renal arteries. Bones : No evidence for acute fracture or bone destruction. Demineralization of the bones, consistent with osteoporosis. Moderate disc space narrowing and degenerative disc disease L4-5 and L5-S1. Pelvis: Urinary bladder unremarkable. There is an InterStim device in the pelvis with leads extending through a mid sacral foramina left side. There is moderate calcification in the uterus, likely representing fibroid calcifications. 1. Moderate-sized bilateral pleural effusions, larger on the right. Mild adjacent bibasilar atelectasis/pneumonia. 2. Findings of ileus/enteritis/malabsorption involving several small bowel loops. Findings of impending diarrhea. Cannot exclude gastritis. Calcifications scattered in the uterus, likely fibroid calcifications. **This report has been created using voice recognition software. It may contain minor errors which are inherent in voice recognition technology. ** Final report electronically signed by Dr. Ronald Jordan on 2/27/2021 9:57 AM    Xr Chest Portable    Result Date: 2/27/2021  PROCEDURE: XR CHEST PORTABLE CLINICAL INFORMATION: BRADYCARDIA COMPARISON: 5/20/2020 TECHNIQUE: A single mobile view of the chest was obtained. 1. Mild cardiac megaly. Metallic sternotomy sutures. 2. Tiny bilateral pleural effusions. Moderate bibasilar atelectasis/pneumonia. 3. Overall appearance of chest has improved since prior. **This report has been created using voice recognition software. It may contain minor errors which are inherent in voice recognition technology. ** Final report electronically signed by Dr. Ronald Jordan on 2/27/2021 11:07 AM      Electronically signed by Shayla Ware DO on 2/27/2021 at 12:07 PM

## 2021-02-27 NOTE — ED TRIAGE NOTES
Presents to ED with c/o diarrhea and emesis that started 2 days ago. Denies pain. Alert and oriented. Respirations easy and unlabored. Daughter at bedside. Patient was to go to wound clinic yesterday but was unable to due to being sick.

## 2021-02-27 NOTE — Clinical Note
Patient Class: Inpatient [101]   REQUIRED: Diagnosis: Symptomatic sinus bradycardia [[id number]]   Estimated Length of Stay: Estimated stay of more than 2 midnights   Admitting Provider: Dante Arreola [[id number]]   Preferred Department: stepdown   Telemetry Bed Required?: Yes

## 2021-02-27 NOTE — CONSULTS
[facility]          [facility]        [address]        [place], [address]        [phone]           Inpatient Initial consult note         2/27/2021 3:22 PM    Patient Name:   Sharla Nageotte  YOB: 1932  Primary Care Physician:  Luis Faye MD     History Obtained From:  patient     Consultation requested by : Lizzie Shukla DO    requested for  : Evaluation of  Fluid management     History of presentingillness   Sharla Nageotte is a 80 y.o.   female with Past Medical History as stated below presented with a chief complaint of Diarrhea and Emesis   on 2/27/2021 . Pt presented with chief complaints of diarrhea since 2 days, moderate severity associated with nausea and vomiting, 3 episodes per day and watery in nature. No mdf factors. She has a history of hyponatremia and fluid overload in the past and she was on salt tabs and diuretic as well. The diuretic was stopped by PCP few weeks ago  family at bedside     Past History      Past Medical History:   Diagnosis Date    Arthritis     Atypical chest pain     CAD (coronary artery disease)     Chronic LBBB (left bundle branch block)     Diabetes mellitus type II     Esophageal stricture     History of esophageal stricture.  FH: CAD (coronary artery disease)     Mom and dad both with heart disease at mid to late age.  GERD (gastroesophageal reflux disease)     History of gastritis     History of skin cancer     Hypercholesterolemia     Hyperlipidemia     Hypertension     Imbalance     As far as imbalance is concerned, asked patient to follow-up with Dr. Aamir Cisneros since she follows with him on a regular basis.      Lactose intolerance     Nummular dermatitis     Parkinson's disease ([facility] [address] 2009    Restless legs syndrome (RLS)     S/P CABG (coronary artery bypass graft)     SCC (squamous cell carcinoma)      Past Surgical History:   Procedure Laterality Date    CARDIAC SURGERY      CARDIOVASCULAR STRESS TEST  4 09 2009    Gated SPECT images revealed normal global wall motion with EF of 60%. Persantine test associated w/nonspecific symptoms. EKG is nondiagnostic w/baseline LBBB. No obvious stress-induced ischemia by Cardiolite. EF within normal limits.  CARDIOVASCULAR STRESS TEST  7 10 2007    The gated SPECT images revealed normal wall motion with EF of 69%. Poor exercise tolerance w/SOB on exertion. EKG is nondiagnostic. Cardiolite scan revealing no obvious stress-induced ischemia. EF 60%.  CARDIOVASCULAR STRESS TEST  2 03 2006    Fair exercise tolerance w/SOB on exertion. No EKG evidence of ischemia. Patient should be able to proceed with phase II cardiac rehab.  CATARACT REMOVAL      CATARACT REMOVAL  06/08/2016    AND 6/29/16    DIAGNOSTIC CARDIAC CATH LAB PROCEDURE  12 23 2005    LV was obtained. AGEE projection showed preserved LV function w/estimated EF at 55%. No significant MR. Patent left main coronary artery. Tortuous LAD which appeared to be patent. Patent left circumflex artery. High-grade stenosis around 99% in very large dominant RCA w/heavy calcification at the ostium. Normal LV function.  DOPPLER ECHOCARDIOGRAPHY  4 09 2009    Global LV function w/in lower limits of normal, with mild anteroseptal hypokinesis. EF of 50-55%. Light LVH. Mild to moderate left atrial enlargement. Calcific aortic and mitral valve w/no obvious stenosis. No obvious pericardial effusion.  DOPPLER ECHOCARDIOGRAPHY  7 10 2007    LV function w/in lower limits of normal w/peridoxical septal wall motion. Moderate left atrial enlargement. Mild MR. Mild TR. Calcified valves w/no obvious stenosis. No pericardial effusion.  DOPPLER ECHOCARDIOGRAPHY  4 14 2006    There appears to be significant improvement from previous echo w/complete resolution of pericardial effusion and normal LV function. EF of 60%.  DOPPLER ECHOCARDIOGRAPHY  3 21 2006    Normal LV function. Mild LV hypertrophy. Mild biatrial enlargement. Mildly calcific aortic and mitral valve w/no stenosis. Mild MR. Mild TR. Minimal residual pericardial effusion w/significant improvement from previous echo.  DOPPLER ECHOCARDIOGRAPHY  3 16 2006    Interval change from previous echocardiogram w/worsening of effusion. Correlation clinically. Consideration of pericardial centesis. Will obtain a [facility] consult.  DOPPLER ECHOCARDIOGRAPHY  1 31 2006    No significant change from previous echo. The 2D echo showed moderate degree of pericardial effusion. It does seem to be worst or better than previous echo. No evidence of tamponade.  DOPPLER ECHOCARDIOGRAPHY  1 27 2006    Normal global LV function. Mild biatrial enlargement. Mildly sclerotic aortic & mitral valve w/no stenosis. Mild MR. Mild TR. Small to moderate pericardial effusion w/no obvious tamponade.  JOINT REPLACEMENT      MOHS SURGERY Right 2/13/2019    MOHS DEFECT REPAIR CYSTIC SCC RIGHT LOWER LATERAL LEG WITH SKIN GRAFT FROM RIGHT GROIN (FULL THICKNESS ) performed by Miguel Wolfe MD at [facility] / [address] [place] [address] Left 12/20/13    left leg lesion excision x2, frozen section, skin graft closure    ROTATOR CUFF REPAIR  09/2001    RIGHT    ROTATOR CUFF REPAIR  04/15/2009    LEFT     SKIN CANCER EXCISION  06/2006      Rt leg    SPINAL CORD STIMULATOR IMPLANTATION N/A 12/4/2018    PERMANENT INTERSTIM performed by Moncho Carroll MD at [address] History     Socioeconomic History    Marital status:       Spouse name: Not on file    Number of children: Not on file    Years of education: Not on file    Highest education level: Not on file   Occupational History    Not on file   Social Needs    Financial resource strain: Not on file    Food insecurity     Worry: Not on file     Inability: Not on file    Transportation needs     Medical: Not on file     Non-medical: Not on file   Tobacco Use    Smoking status: Never Smoker    Smokeless tobacco: Never Used   Substance and Sexual Activity    Alcohol use: No    Drug use: No    Sexual activity: Not on file   Lifestyle    Physical activity     Days per week: Not on file     Minutes per session: Not on file    Stress: Not on file   Relationships    Social connections     Talks on phone: Not on file     Gets together: Not on file     Attends Church service: Not on file     Active member of club or organization: Not on file     Attends meetings of clubs or organizations: Not on file     Relationship status: Not on file    Intimate partner violence     Fear of current or ex partner: Not on file     Emotionally abused: Not on file     Physically abused: Not on file     Forced sexual activity: Not on file   Other Topics Concern    Not on file   Social History Narrative    Not on file     Family History   Problem Relation Age of Onset    Heart Disease Mother     Diabetes Mother     Stroke Father     Diabetes Sister     Lung Cancer Brother      Medications & Allergies      Prior to Admission medications    Medication Sig Start Date End Date Taking?  Authorizing Provider   Multiple Vitamin (MULTIVITAMIN) TABS tablet Take 1 tablet by mouth daily 1/5/21  Yes Rani Christina MD   Multiple Vitamins-Minerals (PRESERVISION AREDS 2) CAPS Take 2 capsules by mouth 2 times daily 1/5/21  Yes Rani Christina MD   ferrous sulfate (IRON 325) 325 (65 Fe) MG tablet Take 1 tablet by mouth 2 times daily 12/31/20  Yes Rani Christina MD   sodium chloride 1 g tablet Take 1 tablet by mouth daily 12/22/20 4/21/21 Yes Johnnie Infante MD   hydrALAZINE (APRESOLINE) 10 MG tablet Take 1 tablet by mouth every 8 hours 12/7/20  Yes ROWDY Aguirre - CNP   JANUVIA 100 MG tablet TAKE 1 TABLET BY MOUTH DAILY 9/24/20  Yes Rani Christina MD   spironolactone (ALDACTONE) 25 MG tablet Take 1 tablet by mouth daily 9/17/20  Yes ROWDY Howe CNP   metFORMIN ([place]) 500 MG extended release tablet Take 1 tablet by mouth 2 times daily 7/15/20  Yes Aracelis Chaparro MD   potassium chloride (KLOR-CON M) 20 MEQ extended release tablet Take 0.5 tablets by mouth 3 times daily 6/3/20  Yes ROWDY Aguirre CNP   magnesium oxide (MAG-OX) 400 MG tablet Take 1 tablet by mouth 3 times daily 6/3/20  Yes ORWDY Augirre CNP   isosorbide dinitrate (ISORDIL) 20 MG tablet Take 1 tablet by mouth 3 times daily 5/27/20  Yes Jinny Ashley PA-C   metoprolol tartrate (LOPRESSOR) 50 MG tablet Take 0.5 tablets by mouth 2 times daily 5/27/20  Yes Jinny Ashley PA-C   carbidopa-levodopa (SINEMET)  MG per tablet Take 2 tablets by mouth 4 times daily 4/23/20  Yes ROWDY Kidd CNP   apixaban (ELIQUIS) 2.5 MG TABS tablet TAKE 1 TABLET BY MOUTH 2  TIMES DAILY, EQUIVALENT TO  APIXABAN 4/13/20  Yes Guerrero Harkins MD   dilTIAZem (CARDIZEM) 30 MG tablet TAKE 1 TABLET BY MOUTH 3  TIMES A DAY 2/25/20  Yes Aracelis Chaparro MD   sucralfate (CARAFATE) 1 GM tablet Take 1 tablet by mouth 4 times daily 1/7/20  Yes Aracelis Chaparro MD   omeprazole (PRILOSEC) 20 MG delayed release capsule Take 1 capsule by mouth daily 1/7/20  Yes Aracelis Chaparro MD   Cholecalciferol (VITAMIN D3 PO) Take 4,000 Units by mouth 2 times daily    Yes Historical Provider, MD   Probiotic Product (PROBIOTIC ADVANCED PO) Take 1 tablet by mouth daily   Yes Historical Provider, MD   aspirin 81 MG EC tablet Take 81 mg by mouth daily. Yes Historical Provider, MD   ondansetron (ZOFRAN) 4 MG tablet Take 4 mg by mouth every 6 hours  1/26/21   Historical Provider, MD   Ferrous Sulfate (IRON) 325 (65 Fe) MG TABS Take by mouth 4 sprays in mouth every morning. In addition to tablets.     Historical Provider, MD   Cyanocobalamin 200 MCG/SPRAY LIQD Take 4 sprays by mouth 2 times daily Vitamist - Vitamin B 12 spray 1/5/21   Aracelis Chaparro MD   loperamide (IMODIUM) 2 MG capsule Take 2 mg by mouth every 6 hours as needed for Cardiovascular: Negative for chest pain and leg swelling. Gastrointestinal: Positive for diarrhea, nausea and vomiting. Negative for abdominal pain. Genitourinary: Negative for flank pain, frequency and hematuria. Musculoskeletal: Negative for back pain and neck pain. Skin: Negative for rash and wound. Neurological: Negative for dizziness and headaches. Psychiatric/Behavioral: Negative for agitation and confusion. Physical Exam  Vitals signs reviewed. Constitutional:       General: She is not in acute distress. Appearance: Normal appearance. HENT:      Head: Normocephalic and atraumatic. Right Ear: External ear normal.      Left Ear: External ear normal.      Mouth/Throat:      Mouth: Mucous membranes are moist.   Eyes:      Comments: Lids normal appearence   Neck:      Musculoskeletal: Normal range of motion and neck supple. Thyroid: No thyromegaly. Vascular: No JVD. Cardiovascular:      Rate and Rhythm: Normal rate. Heart sounds: Normal heart sounds. No murmur. No friction rub. No gallop. Pulmonary:      Effort: Pulmonary effort is normal.      Breath sounds: Normal breath sounds. Chest:      Chest wall: No tenderness. Abdominal:      General: Bowel sounds are normal. There is no distension. Palpations: Abdomen is soft. Tenderness: There is no abdominal tenderness. Musculoskeletal:         General: No swelling. Right lower leg: No edema. Left lower leg: No edema. Skin:     General: Skin is warm and dry. Findings: No erythema or rash. Neurological:      General: No focal deficit present. Mental Status: She is alert and oriented to person, place, and time.    Psychiatric:         Mood and Affect: Mood normal.         Behavior: Behavior normal.           Vitals:    02/27/21 1258   BP: (!) 140/67   Pulse: (!) 48   Resp: 16   Temp: 97.8 °F (36.6 °C)   SpO2: 95%     Labs, Radiology and Tests       Recent Labs     02/27/21  0822 WBC 4.8   RBC 4.48   HGB 10.8*   HCT 36.9*   MCV 82.4   MCH 24.1*   MCHC 29.3*        Recent Labs     02/27/21  0830      K 4.7      CO2 23   BUN 29*   CREATININE 0.8   CALCIUM 9.1   PROT 6.6   LABALBU 3.7   BILITOT 0.6   ALKPHOS 79   AST 14   ALT <5*       Radiology : Chest x-ray-personally reviewed by me appears clear no congestion no consolidation small pleural effusion noted more prominent on the right side    Other : All lab data have been reviewed and records from my office noted and patient salt tablets were decreased to 1 g daily and Bumex to 0.5 mg daily in December 2020    Echocardiogram 5/20-shows ejection fraction 55-60%, right ventricular systolic pressure of 98-45 systolic with severe pulmonary hypertension    Assessment    1 Renal - renal fx stable at baseline  2 Hyponatremia maintaining well. Continue salt tabs for katy  ? Volume status appears reasonable and she is not in fluid overload  ? No fluids for now  ? Prn diuretics as needed    3 Essential hypertension   4 Hx of dementia  5 Hx of diabetes Mellitus  6 Nausea/vomiting/diarrhea - no appears to be resolved  7 Meds reviewed and discussed with patient and family at bedside      **This report has been created using voice recognition software. It maycontain minor  errors which are inherent in voice recognition technology. **    Gilson Hernandez M.D  [facility]. Detail Level: Detailed

## 2021-02-27 NOTE — ED NOTES
ED to inpatient nurses report    Chief Complaint   Patient presents with    Diarrhea    Emesis      Present to ED from nursing home  LOC: alert and orientated to name, place, date  Vital signs   Vitals:    02/27/21 0924 02/27/21 1020 02/27/21 1035 02/27/21 1107   BP: (!) 104/34 (!) 134/38 (!) 128/54 (!) 132/44   Pulse: (!) 44 (!) 40 (!) 44 (!) 47   Resp: 21 22 18 20   Temp:       TempSrc:       SpO2: 92% 96% 91% 96%   Weight:       Height:          Oxygen Baseline room air    Current needs required room air Bipap/Cpap No  LDAs:   Peripheral IV 02/27/21 Left Antecubital (Active)   Site Assessment Clean;Dry; Intact 02/27/21 0829   Dressing Status Clean;Dry; Intact 02/27/21 0829     Mobility: Requires assistance * 1  Present condition: stable    Electronically signed by Ambar Abarca RN on 2/27/2021 at 11:46 AM       Ambar Abarca RN  02/27/21 114

## 2021-02-27 NOTE — ED NOTES
Patient resting in bed. Respirations easy and unlabored. No distress noted. Call light within reach.        Benjamin Heredia RN  02/27/21 [id number]

## 2021-02-27 NOTE — ED PROVIDER NOTES
[facility] ICU STEPDOWN TELEMETRY 4K      CHIEF COMPLAINT       Chief Complaint   Patient presents with    Diarrhea    Emesis       Nurses Notes reviewed and I agree except as noted in the HPI. HISTORY OF PRESENT ILLNESS    Shahbaz Gtz is a 80 y.o. female who presents with complaint of emesis and diarrhea for the past 1 week. Patient has no bloody stool or vomitus. She has diffuse abdominal pain. She reports poor oral intake. Patient has history of A. fib, on Cardizem and metoprolol. She does not take blood thinners. Onset: Acute  Duration: 1 week  Timing: Constant  Location of Pain: Generalized abdominal pain  Intesity/severity: Moderate  Modifying Factors: None  Relieved by;  Previous Episodes; Tx Before arrival: None  REVIEW OF SYSTEMS      Review of Systems   Constitutional: Negative for fever, chills, diaphoresis and fatigue. HENT: Negative for congestion, drooling, facial swelling and sore throat. Eyes: Negative for photophobia, pain and discharge. Respiratory: Negative for cough, shortness of breath, wheezing and stridor. Cardiovascular: Negative for chest pain, palpitations and leg swelling. Gastrointestinal: Positive for abdominal pain/nausea/vomiting/diarrhea. Negative for blood in stool and abdominal distention. Endocrine: Negative for cold intolerance, heat intolerance, polydipsia and polyuria. Genitourinary: Negative for dysuria, urgency, hematuria and difficulty urinating. Musculoskeletal: Negative for gait problem, neck pain and neck stiffness. Skin; No rash, No itching  Neurological: Negative for seizures, weakness and numbness. Psychiatric/Behavioral: Negative for hallucinations, confusion and agitation.      PAST MEDICAL HISTORY    has a past medical history of Arthritis, Atypical chest pain, CAD (coronary artery disease), Chronic LBBB (left bundle branch block), Diabetes mellitus type II, Esophageal stricture, FH: CAD (coronary artery disease), GERD (gastroesophageal reflux disease), History of gastritis, History of skin cancer, Hypercholesterolemia, Hyperlipidemia, Hypertension, Imbalance, Lactose intolerance, Nummular dermatitis, Parkinson's disease ([address] Restless legs syndrome (RLS), S/P CABG (coronary artery bypass graft), and SCC (squamous cell carcinoma). SURGICAL HISTORY      has a past surgical history that includes doppler echocardiography (4 09 2009); cardiovascular stress test (4 09 2009); doppler echocardiography (7 10 2007); cardiovascular stress test (7 10 2007); doppler echocardiography (4 14 2006); doppler echocardiography (3 21 2006); doppler echocardiography (3 16 2006); cardiovascular stress test (2 03 2006); doppler echocardiography (1 31 2006); doppler echocardiography (1 27 2006); Diagnostic Cardiac Cath Lab Procedure (12 23 2005); Cardiac surgery; pre-malignant / benign skin lesion excision (Left, 12/20/13); Cataract removal; Rotator cuff repair (09/2001); Rotator cuff repair (04/15/2009); Cataract removal (06/08/2016); Skin cancer excision (06/2006); joint replacement; spinal cord stimulator implantation (N/A, 12/4/2018); and Mohs surgery (Right, 2/13/2019).     CURRENT MEDICATIONS       Current Discharge Medication List      CONTINUE these medications which have NOT CHANGED    Details   Multiple Vitamin (MULTIVITAMIN) TABS tablet Take 1 tablet by mouth daily  Qty: 90 tablet, Refills: 3      Multiple Vitamins-Minerals (PRESERVISION AREDS 2) CAPS Take 2 capsules by mouth 2 times daily  Qty: 360 capsule, Refills: 3      !! ferrous sulfate (IRON 325) 325 (65 Fe) MG tablet Take 1 tablet by mouth 2 times daily  Qty: 180 tablet, Refills: 1    Associated Diagnoses: Iron deficiency anemia, unspecified iron deficiency anemia type      sodium chloride 1 g tablet Take 1 tablet by mouth daily  Qty: 120 tablet, Refills: 1      hydrALAZINE (APRESOLINE) 10 MG tablet Take 1 tablet by mouth every 8 hours  Qty: 90 tablet, Refills: 3      JANUVIA 100 MG tablet TAKE 1 TABLET BY MOUTH DAILY  Qty: 90 tablet, Refills: 2    Comments: **Patient requests 90 days supply**      spironolactone (ALDACTONE) 25 MG tablet Take 1 tablet by mouth daily  Qty: 30 tablet, Refills: 3      metFORMIN (GLUCOPHAGE-XR) 500 MG extended release tablet Take 1 tablet by mouth 2 times daily  Qty: 180 tablet, Refills: 3      potassium chloride (KLOR-CON M) 20 MEQ extended release tablet Take 0.5 tablets by mouth 3 times daily  Qty: 30 tablet, Refills: 1      magnesium oxide (MAG-OX) 400 MG tablet Take 1 tablet by mouth 3 times daily  Qty: 30 tablet, Refills: 3      isosorbide dinitrate (ISORDIL) 20 MG tablet Take 1 tablet by mouth 3 times daily  Qty: 90 tablet, Refills: 3      metoprolol tartrate (LOPRESSOR) 50 MG tablet Take 0.5 tablets by mouth 2 times daily  Qty: 180 tablet, Refills: 3      carbidopa-levodopa (SINEMET)  MG per tablet Take 2 tablets by mouth 4 times daily  Qty: 720 tablet, Refills: 0    Associated Diagnoses: Parkinson's disease (HCC)      apixaban (ELIQUIS) 2.5 MG TABS tablet TAKE 1 TABLET BY MOUTH 2  TIMES DAILY, EQUIVALENT TO  APIXABAN  Qty: 180 tablet, Refills: 1      dilTIAZem (CARDIZEM) 30 MG tablet TAKE 1 TABLET BY MOUTH 3  TIMES A DAY  Qty: 270 tablet, Refills: 3      sucralfate (CARAFATE) 1 GM tablet Take 1 tablet by mouth 4 times daily  Qty: 360 tablet, Refills: 3      omeprazole (PRILOSEC) 20 MG delayed release capsule Take 1 capsule by mouth daily  Qty: 90 capsule, Refills: 3      Cholecalciferol (VITAMIN D3 PO) Take 4,000 Units by mouth 2 times daily       Probiotic Product (PROBIOTIC ADVANCED PO) Take 1 tablet by mouth daily      aspirin 81 MG EC tablet Take 81 mg by mouth daily. ondansetron (ZOFRAN) 4 MG tablet Take 4 mg by mouth every 6 hours       !! Ferrous Sulfate (IRON) 325 (65 Fe) MG TABS Take by mouth 4 sprays in mouth every morning. In addition to tablets.       Cyanocobalamin 200 MCG/SPRAY LIQD Take 4 sprays by mouth 2 times daily Vitamist - Vitamin B 12 spray  Qty: 90 mL, Refills: 3      loperamide (IMODIUM) 2 MG capsule Take 2 mg by mouth every 6 hours as needed for Diarrhea      acetaminophen (TYLENOL 8 HOUR ARTHRITIS PAIN) 650 MG extended release tablet Take 650 mg by mouth 3 times daily      diclofenac sodium (VOLTAREN) 1 % GEL Apply 4 g topically 4 times daily  Qty: 4 Tube, Refills: 5      lactase (LACTAID ULTRA) 9000 units TABS Take 9,000 Units by mouth 3 times daily (before meals)      atorvastatin (LIPITOR) 20 MG tablet TAKE 1 TABLET DAILY  Qty: 90 tablet, Refills: 1      blood glucose test strips (CONTOUR TEST) strip USE TO TEST BLOOD SUGAR TWICE DAILY. Qty: 200 strip, Refills: 3      Misc. Devices MISC 1 each by Does not apply route once for 1 dose Param Glucose Meter; Diagnosis:E11.65  Qty: 1 Device, Refills: 0      Cranberry 500 MG CAPS Take 500 mg by mouth daily 2 tab       !! - Potential duplicate medications found. Please discuss with provider. ALLERGIES     is allergic to latex; lisinopril; tape [adhesive tape]; bacitracin; penicillins; polymyxin b; keflex [cephalexin]; lactulose; morphine; and polysporin [bacitracin-polymyxin b]. FAMILY HISTORY     She indicated that her mother is . She indicated that her father is . She indicated that the status of her sister is unknown. She indicated that the status of her brother is unknown.   family history includes Diabetes in her mother and sister; Heart Disease in her mother; Mathew Tee in her brother; Stroke in her father. SOCIAL HISTORY      reports that she has never smoked. She has never used smokeless tobacco. She reports that she does not drink alcohol or use drugs. PHYSICAL EXAM     INITIAL VITALS:  height is 5' 2\" (1.575 m) and weight is 107 lb (48.5 kg). Her oral temperature is 97.8 °F (36.6 °C). Her blood pressure is 140/67 (abnormal) and her pulse is 48 (abnormal). Her respiration is 16 and oxygen saturation is 95%.     Physical Exam   Constitutional: well-developed and well-nourished. HENT: Head: Normocephalic, atraumatic, Bilateral external ears normal, Oropharynx dry, No oral exudates, Nose normal.   Eyes: PERRL, EOMI, Conjunctiva normal, No discharge. No scleral icterus  Neck: Normal range of motion, No tenderness, Supple  Cardiovascular: Normal rate, regular rhythm, S1 normal and S2 normal.  Exam reveals no gallop. Pulmonary/Chest: Effort normal and breath sounds normal. No accessory muscle usage or stridor. No respiratory distress. no wheezes. has no rales. exhibits no tenderness. Abdominal: Soft. Bowel sounds are normal.  exhibits no distension. There is diffuse tenderness. There is no rebound and no guarding. Extremities: No edema, no tenderness, no cyanosis, no clubbing. Musculoskeletal: Good range of motion in major joints is observed. No major deformities noted. Neurological: Alert and oriented ×3, normal motor function, normal sensory function, no focal deficits. GCS 15  Skin: Skin is warm, dry and intact. No rash noted. No erythema. Psychiatric: Affect normal, judgment normal, mood normal.  DIFFERENTIAL DIAGNOSIS:       DIAGNOSTIC RESULTS     EKG: All EKG's are interpreted by the Emergency Department Physician who either signs or Co-signs this chart in the absence of a cardiologist.      RADIOLOGY: non-plain film images(s) such as CT, Ultrasound and MRI are read by the radiologist.  Plain radiographic images are visualized and preliminarily interpreted by the emergency physician unless otherwise stated below.       LABS:   Labs Reviewed   COMPREHENSIVE METABOLIC PANEL W/ REFLEX TO MG FOR LOW K - Abnormal; Notable for the following components:       Result Value    Glucose 188 (*)     BUN 29 (*)     ALT <5 (*)     All other components within normal limits   CBC WITH AUTO DIFFERENTIAL - Abnormal; Notable for the following components:    Hemoglobin 10.8 (*)     Hematocrit 36.9 (*)     MCH 24.1 (*)     MCHC 29.3 (*)     RDW-CV 25.0 (*) RDW-SD 73.2 (*)     MPV 9.2 (*)     Lymphocytes Absolute 0.3 (*)     All other components within normal limits   GLOMERULAR FILTRATION RATE, ESTIMATED - Abnormal; Notable for the following components:    Est, Glom Filt Rate 68 (*)     All other components within normal limits   BRAIN NATRIURETIC PEPTIDE - Abnormal; Notable for the following components:    Pro-BNP 4493.0 (*)     All other components within normal limits   CULTURE, MRSA, SCREENING   VRE SCREEN BY PCR   LIPASE   ANION GAP   OSMOLALITY   TROPONIN   SCAN OF BLOOD SMEAR   LACTIC ACID, PLASMA   MAGNESIUM   HEMOGLOBIN A1C   MRSA BY PCR   POCT GLUCOSE   POCT GLUCOSE       EMERGENCY DEPARTMENT COURSE:   Vitals:    Vitals:    02/27/21 1020 02/27/21 1035 02/27/21 1107 02/27/21 1258   BP: (!) 134/38 (!) 128/54 (!) 132/44 (!) 140/67   Pulse: (!) 40 (!) 44 (!) 47 (!) 48   Resp: 22 18 20 16   Temp:    97.8 °F (36.6 °C)   TempSrc:    Oral   SpO2: 96% 91% 96% 95%   Weight:    107 lb (48.5 kg)   Height:    5' 2\" (1.575 m)     Patient presenting with complaint of diffuse abdominal pain, nausea vomiting diarrhea. Nonbloody vomitus/diarrhea. CT abdomen nonacute. Patient atrial fibrillation with slow ventricular response, stable blood pressure. Patient given gentle rehydration, admitted to the hospitalist for further care. CRITICAL CARE:       CONSULTS:  None    PROCEDURES:  None    FINAL IMPRESSION      1. Dehydration    2. Diarrhea, unspecified type    3. Atrial fibrillation with slow ventricular response ([address]          DISPOSITION/PLAN   Decision To Discharge    PATIENT REFERRED TO:  No follow-up provider specified.     DISCHARGE MEDICATIONS:  Current Discharge Medication List          (Please note that portions of this note were completed with a voice recognition program.  Efforts were made to edit the dictations but occasionally words are mis-transcribed.)    DO Lianne Higginbotham DO  02/27/21 1458

## 2021-02-28 LAB
ANION GAP SERPL CALCULATED.3IONS-SCNC: 7 MEQ/L (ref 8–16)
ANISOCYTOSIS: PRESENT
BASOPHILS # BLD: 0.5 %
BASOPHILS ABSOLUTE: 0 THOU/MM3 (ref 0–0.1)
BUN BLDV-MCNC: 24 MG/DL (ref 7–22)
CALCIUM SERPL-MCNC: 8.5 MG/DL (ref 8.5–10.5)
CHLORIDE BLD-SCNC: 104 MEQ/L (ref 98–111)
CO2: 26 MEQ/L (ref 23–33)
CREAT SERPL-MCNC: 0.7 MG/DL (ref 0.4–1.2)
EKG ATRIAL RATE: 43 BPM
EKG ATRIAL RATE: 48 BPM
EKG Q-T INTERVAL: 484 MS
EKG Q-T INTERVAL: 536 MS
EKG QRS DURATION: 130 MS
EKG QRS DURATION: 136 MS
EKG QTC CALCULATION (BAZETT): 431 MS
EKG QTC CALCULATION (BAZETT): 467 MS
EKG R AXIS: -54 DEGREES
EKG R AXIS: -66 DEGREES
EKG T AXIS: 116 DEGREES
EKG T AXIS: 133 DEGREES
EKG VENTRICULAR RATE: 39 BPM
EKG VENTRICULAR RATE: 56 BPM
EOSINOPHIL # BLD: 9.6 %
EOSINOPHILS ABSOLUTE: 0.4 THOU/MM3 (ref 0–0.4)
ERYTHROCYTE [DISTWIDTH] IN BLOOD BY AUTOMATED COUNT: 24.6 % (ref 11.5–14.5)
ERYTHROCYTE [DISTWIDTH] IN BLOOD BY AUTOMATED COUNT: 72.7 FL (ref 35–45)
GFR SERPL CREATININE-BSD FRML MDRD: 79 ML/MIN/1.73M2
GLUCOSE BLD-MCNC: 118 MG/DL (ref 70–108)
GLUCOSE BLD-MCNC: 123 MG/DL (ref 70–108)
GLUCOSE BLD-MCNC: 132 MG/DL (ref 70–108)
GLUCOSE BLD-MCNC: 149 MG/DL (ref 70–108)
HCT VFR BLD CALC: 34.9 % (ref 37–47)
HEMOGLOBIN: 10.2 GM/DL (ref 12–16)
IMMATURE GRANS (ABS): 0 THOU/MM3 (ref 0–0.07)
IMMATURE GRANULOCYTES: 0 %
LYMPHOCYTES # BLD: 26.8 %
LYMPHOCYTES ABSOLUTE: 1.2 THOU/MM3 (ref 1–4.8)
MCH RBC QN AUTO: 24.5 PG (ref 26–33)
MCHC RBC AUTO-ENTMCNC: 29.2 GM/DL (ref 32.2–35.5)
MCV RBC AUTO: 83.7 FL (ref 81–99)
MONOCYTES # BLD: 12.4 %
MONOCYTES ABSOLUTE: 0.5 THOU/MM3 (ref 0.4–1.3)
NUCLEATED RED BLOOD CELLS: 0 /100 WBC
PLATELET # BLD: 177 THOU/MM3 (ref 130–400)
PMV BLD AUTO: 9.6 FL (ref 9.4–12.4)
POTASSIUM REFLEX MAGNESIUM: 4.6 MEQ/L (ref 3.5–5.2)
RBC # BLD: 4.17 MILL/MM3 (ref 4.2–5.4)
SEG NEUTROPHILS: 50.7 %
SEGMENTED NEUTROPHILS ABSOLUTE COUNT: 2.2 THOU/MM3 (ref 1.8–7.7)
SODIUM BLD-SCNC: 137 MEQ/L (ref 135–145)
WBC # BLD: 4.3 THOU/MM3 (ref 4.8–10.8)

## 2021-02-28 PROCEDURE — 6370000000 HC RX 637 (ALT 250 FOR IP): Performed by: INTERNAL MEDICINE

## 2021-02-28 PROCEDURE — 82948 REAGENT STRIP/BLOOD GLUCOSE: CPT

## 2021-02-28 PROCEDURE — 93010 ELECTROCARDIOGRAM REPORT: CPT | Performed by: NUCLEAR MEDICINE

## 2021-02-28 PROCEDURE — 99232 SBSQ HOSP IP/OBS MODERATE 35: CPT | Performed by: INTERNAL MEDICINE

## 2021-02-28 PROCEDURE — 2580000003 HC RX 258: Performed by: INTERNAL MEDICINE

## 2021-02-28 PROCEDURE — 99231 SBSQ HOSP IP/OBS SF/LOW 25: CPT | Performed by: INTERNAL MEDICINE

## 2021-02-28 PROCEDURE — 80048 BASIC METABOLIC PNL TOTAL CA: CPT

## 2021-02-28 PROCEDURE — 85025 COMPLETE CBC W/AUTO DIFF WBC: CPT

## 2021-02-28 PROCEDURE — 1200000003 HC TELEMETRY R&B

## 2021-02-28 PROCEDURE — 36415 COLL VENOUS BLD VENIPUNCTURE: CPT

## 2021-02-28 PROCEDURE — 99223 1ST HOSP IP/OBS HIGH 75: CPT | Performed by: INTERNAL MEDICINE

## 2021-02-28 RX ORDER — SODIUM CHLORIDE 1000 MG
1 TABLET, SOLUBLE MISCELLANEOUS DAILY
Status: DISCONTINUED | OUTPATIENT
Start: 2021-03-01 | End: 2021-03-02 | Stop reason: HOSPADM

## 2021-02-28 RX ORDER — BUMETANIDE 1 MG/1
0.5 TABLET ORAL DAILY
Status: DISCONTINUED | OUTPATIENT
Start: 2021-02-28 | End: 2021-03-02 | Stop reason: HOSPADM

## 2021-02-28 RX ADMIN — HYDRALAZINE HYDROCHLORIDE 10 MG: 10 TABLET, FILM COATED ORAL at 12:50

## 2021-02-28 RX ADMIN — MAGNESIUM GLUCONATE 500 MG ORAL TABLET 400 MG: 500 TABLET ORAL at 12:49

## 2021-02-28 RX ADMIN — ISOSORBIDE DINITRATE 20 MG: 20 TABLET ORAL at 09:48

## 2021-02-28 RX ADMIN — HYDRALAZINE HYDROCHLORIDE 10 MG: 10 TABLET, FILM COATED ORAL at 20:20

## 2021-02-28 RX ADMIN — ATORVASTATIN CALCIUM 20 MG: 20 TABLET, FILM COATED ORAL at 20:20

## 2021-02-28 RX ADMIN — APIXABAN 2.5 MG: 2.5 TABLET, FILM COATED ORAL at 20:20

## 2021-02-28 RX ADMIN — SODIUM CHLORIDE, PRESERVATIVE FREE 10 ML: 5 INJECTION INTRAVENOUS at 20:19

## 2021-02-28 RX ADMIN — PANTOPRAZOLE SODIUM 40 MG: 40 TABLET, DELAYED RELEASE ORAL at 05:56

## 2021-02-28 RX ADMIN — CARBIDOPA AND LEVODOPA 2 TABLET: 25; 100 TABLET ORAL at 09:48

## 2021-02-28 RX ADMIN — SODIUM CHLORIDE, PRESERVATIVE FREE 10 ML: 5 INJECTION INTRAVENOUS at 09:49

## 2021-02-28 RX ADMIN — MAGNESIUM GLUCONATE 500 MG ORAL TABLET 400 MG: 500 TABLET ORAL at 09:48

## 2021-02-28 RX ADMIN — ASPIRIN 81 MG: 81 TABLET, COATED ORAL at 09:48

## 2021-02-28 RX ADMIN — ISOSORBIDE DINITRATE 20 MG: 20 TABLET ORAL at 20:20

## 2021-02-28 RX ADMIN — ACETAMINOPHEN 650 MG: 325 TABLET ORAL at 09:48

## 2021-02-28 RX ADMIN — MAGNESIUM GLUCONATE 500 MG ORAL TABLET 400 MG: 500 TABLET ORAL at 20:20

## 2021-02-28 RX ADMIN — ISOSORBIDE DINITRATE 20 MG: 20 TABLET ORAL at 12:50

## 2021-02-28 RX ADMIN — HYDRALAZINE HYDROCHLORIDE 10 MG: 10 TABLET, FILM COATED ORAL at 05:56

## 2021-02-28 RX ADMIN — ACETAMINOPHEN 650 MG: 325 TABLET ORAL at 20:20

## 2021-02-28 RX ADMIN — DILTIAZEM HYDROCHLORIDE 30 MG: 30 TABLET, FILM COATED ORAL at 12:50

## 2021-02-28 RX ADMIN — CARBIDOPA AND LEVODOPA 2 TABLET: 25; 100 TABLET ORAL at 16:45

## 2021-02-28 RX ADMIN — INSULIN LISPRO 1 UNITS: 100 INJECTION, SOLUTION INTRAVENOUS; SUBCUTANEOUS at 12:47

## 2021-02-28 RX ADMIN — ACETAMINOPHEN 650 MG: 325 TABLET ORAL at 12:53

## 2021-02-28 RX ADMIN — CARBIDOPA AND LEVODOPA 2 TABLET: 25; 100 TABLET ORAL at 12:50

## 2021-02-28 RX ADMIN — SPIRONOLACTONE 25 MG: 25 TABLET ORAL at 09:49

## 2021-02-28 RX ADMIN — BUMETANIDE 0.5 MG: 1 TABLET ORAL at 14:47

## 2021-02-28 RX ADMIN — DILTIAZEM HYDROCHLORIDE 30 MG: 30 TABLET, FILM COATED ORAL at 05:56

## 2021-02-28 RX ADMIN — CARBIDOPA AND LEVODOPA 2 TABLET: 25; 100 TABLET ORAL at 20:20

## 2021-02-28 ASSESSMENT — PAIN SCALES - GENERAL
PAINLEVEL_OUTOF10: 2
PAINLEVEL_OUTOF10: 0

## 2021-02-28 NOTE — CONSULTS
[address] [place] [address]                                  CONSULTATION    PATIENT NAME: Nadira Velazquez                       :        1932  MED REC NO:   [id number]                           ROOM:       [id number]  ACCOUNT NO:   [de-identified]                           ADMIT DATE: 2021  PROVIDER:     Vicki Mendoza. JOSELYN Mccainon:  2021    PRIMARY CARDIOLOGIST:  Mary Trent MD    REASON OF CONSULTATION:  AFib with slow ventricular response and  bradycardia in an 43-year-old female patient presented from nursing home  because of nausea, vomiting and diarrhea over the last couple of days. HISTORY OF PRESENT ILLNESS:  This is an 43-year-old  female  patient, was in usual state of health until Friday when she started to  have nausea and vomiting and diarrhea. The diarrhea was watery, several  times and the vomiting was also of ingested material with several times  and was not taking or drinking any fluid and was eating because of  nausea, vomiting and diarrhea. So, in view of that, presented yesterday  morning to the emergency room and got admitted. During admission, she  has been noted also with AFib with slow ventricular response, underlying  left bundle-branch block and she was bradycardic and Cardiology  evaluation was sought in view of the bradycardia. She was noted also  BNP was elevated and chest x-ray showed bilateral pleural  effusions/atelectasis and so, she has history of congestive heart  failure and diuretics one dose has been given. In view of the recurrent nausea, vomiting and diarrhea, she has been  having episodes of nausea, vomiting and diarrhea in the last several  weeks and three weeks ago, her diuretics was stopped because of nausea,  vomiting, diarrhea and she tends to be on the dehydration side.   She was  taking Bumex 1 mg daily and that has been stopped three weeks ago, and  she is taking only spironolactone at this time. As I stated, the patient denied any chest pain. No shortness of breath. No orthopnea, paroxysmal nocturnal dyspnea. She had leg swelling  yesterday when she came. She has been given diuretics IV, and overnight  the short of breath and the leg swelling seems to be resolved. No fever  or chills. No syncope. No dizziness. She has been on Cardizem and  metoprolol, and both the Cardizem and metoprolol has been held overnight  and so at this time, her heart rate came up to the 60 to 63 beats per  minute. She was on metoprolol 25 twice a day and she was on Deltasone  30 mg twice a day. So, currently she is comfortable. The diarrhea had  subsided. No diarrhea since admission. Heart rate comes up and blood  pressure is a little on the higher side. Renal function seems to be  stable. As I stated, Cardiology evaluation was sought in view of the  bradycardia and AFib with slow ventricular response. REVIEW OF SYSTEMS. Negative except the above-mentioned ones. History  obtained from the family, from the record and I talked to her three  daughters. PAST MEDICAL HISTORY:  History of coronary artery disease, status post  bypass; history of hypertension; hyperlipidemia; gastroesophageal reflux  disease; congestive heart failure, under CHF clinic followup; left  bundle-branch block; diabetes; Parkinson; hyponatremia. PAST SURGICAL HISTORY:  Significant for coronary artery bypass, rotator  cuff and cataract surgery and Mohs surgery. FAMILY HISTORY:  Positive for heart disease and diabetes and stroke. SOCIAL HISTORY:  She never smoked. No alcohol. No drug use. ALLERGIES:  She has multiple drug allergies. LISINOPRIL, TAPE,  BACITRACIN, PENICILLIN, POLYMIXIN, KEFLEX, LACTULOSE, MORPHINE, _____.     HOME MEDICATION:  Prior to current admission includes the following:   Eliquis 2.5 mg; aspirin 81; Lipitor; Sinemet; cholecalciferol;  diclofenac gel; Cardizem, she is taking Cardizem 30 mg three times a  day; ferrous sulfate, hydralazine 10 mg every eight hours; Imdur 20 mg  three times a day; Lactase; Imodium; magnesium oxide; Glucophage;  Lopressor 25 mg twice a day; multivitamin; potassium chloride. Now also  she is taking sodium tablet 1 gm daily and Carafate and spironolactone  25 mg daily. She used to take Bumex 1 mg daily before and until stopped  three weeks ago. PHYSICAL EXAMINATION:  GENERAL:  Looks sick, in no form of distress. VITAL SIGNS:  Blood pressure 153/63, pulse rate 63, now respiratory rate  16, temperature 97.8. HEENT:  Pink conjunctivae. Nonicteric sclerae. Pupils are equal and  reactive bilaterally to light. NECK:  No lymphadenopathy. No goiter. No JVD. CHEST:  Clear to auscultation and resonant to percussion. CARDIOVASCULAR:  Arteries are felt; carotid, femoral pedal.  No bruits. S1, S2 well heard. No murmur or gallop rhythm. ABDOMEN:  Soft, nontender, nondistended. Bowel sounds are positive. GENITOURINARY:  No CVA, flank or suprapubic tenderness. LOCOMOTOR:  No cyanosis, clubbing, or muscle or joint tenderness. SKIN:  No rash, ulcers, or nodules. CNS:  Alert, awake, and oriented to time, person, and place. Cranial  nerves are intact. Sensation intact to light touch and pain. Motor:   Normal muscle strength and tone. Appropriate mood and affect. WORKUP:  EKG, atrial fibrillation with slow ventricular response of 39  beats per minute with underlying left bundle-branch block, left axis  deviation. Blood chemistry:  Sodium 137, potassium 4.6, BUN 24, creatinine 0.7. GFR 79. She has history of hyponatremia. Sodium as low as 131, now  back to normal.  BNP 4493, elevated and troponin x1 less than 0.01. Hematology:  White blood cell 4.3, hemoglobin 10, and platelets 782. She had an echocardiogram in 05/2020, ejection fraction 55%, right  ventricular systolic pressure of 65 mmHg, not significant otherwise.    Mild to moderate valve lesions noted. She had a stress test in 2018 that revealed no evidence of ischemia and  she had a CT of the abdomen yesterday, moderate-sized bilateral pleural  effusion larger on the right and mild adjacent atelectasis/pneumonia. Findings of ileus, enteritis and malabsorption involving several small  bowel loops has been noted. Finding of impending diarrhea cannot be  excluded. Gastritis has been noted. The chest x-ray also done yesterday reported as mild cardiomegaly, tiny  pleural effusions/atelectasis with increased bronchovascular marking. ASSESSMENT:  1. Gastroenteritis with nausea, vomiting and diarrhea for the last two  days and PCR of the enteric pathogens has been ordered and further  management per the hospitalist.  2.  Atrial fibrillation with slow ventricular response with underlying  left bundle-branch block with wide QRS complex. The rate has been as  slow as 39 beats per minute on arrival.  She is on Lopressor 25 twice a  day and Cardizem 30 mg three times a day. So, that has been held. Overnight, the heart rate went up to 63, now on telemetry I see heart  rate of 63.  2.  Mild bilateral pleural effusion noted on the chest CT. 3.  Acute-on-chronic diastolic congestive heart failure with mild  exacerbation, IV diuretics has been given x1 and Bumex, with that seems  to be much better, leg edema has resolved already. So, at this level,  she has a well-compensated congestive heart failure but may need her  diuretics at home at a lower dose and the diuretic was stopped because  of nausea, vomiting and diarrhea. 4.  History of hyponatremia, corrected. History of pulmonary  hypertension. 5.  History of Parkinson's disease. 6.  Diabetes type 2.  7.  Coronary artery disease, status post previous bypass, stable. 8.  As I stated, atrial fibrillation with slow ventricular response, so  probably related to the medication.     Overall, this is an 72-year-old female patient presented with  gastroenteritis and found to be with AFib with slow ventricular response  bradycardia, rate of 39 beats per minute related to the medication and  underlying conduction system disease and also mild CHF exacerbation  noted. PLAN AND RECOMMENDATION:  1. At this level, we will hold the Cardizem and Lopressor and adjust  the need down the line. If the patient needs, basically we will stop  the Cardizem altogether and if at all she needs, we may start her at a  lower dose of Lopressor down the line if the heart rate goes up to 80 to  90s at rest and on activity. If the heart rate goes more than 100, we  may start on a lower dose of Lopressor. Otherwise, at this level, the  patient does not need any pacemaker because the heart rate comes up once  she missed one dose of Cardizem and Lopressor. So, we will follow  closely and do fine tuning and get the happy medium and the right dose  for the rate control with regard to AFib. Continue the Eliquis. 2.  We will resume at a lower dose of the diuretics, Bumex, she used to  be on 1 mg three weeks ago. It has been stopped because of diarrhea. We will start her on 0.5 mg p.o. daily. Continue Aldactone. Close  follow up with the sodium level. Now, she is on sodium tablet also for  hyponatremia. Hyponatremia has already corrected. 3.  Coronary artery disease, stable. Continue the current management. 4.  GI workup. I will leave it to the hospitalist to decide further on  that. 5.  Follow up with the CHF Clinic as outpatient. Follow up with Dr. Ina Brown as outpatient. 6.  I recommend basically two weeks' continuous cardiac monitor on  discharge. Thank you for allowing me to participate in the care of this patient. I  will follow up with you. Rachel Alva.  Lyndsey De M.D.    D: 02/28/2021 Danis Dumont       T: 02/28/2021 13:01:38     SUNNI/HAO  Job#: [id number]     Doc#: [id number]    CC:

## 2021-02-28 NOTE — CONSULTS
atr fib with SVR  Bardycardia now rate 63  Gastroenteritis  chf acute on chronic mild  Cad s/p CABG  Hx of ckd    Plan    Resume lower dose of bumex at 0.5 po qd  Cont aldactonr  Hold cardizem and lopressor- consider to resume lower dose of lopressor if HR goes higher than 80  Gi work up  np pacer needed at this time  Zio patch on D/c for 2 weeks    [id number]    Madonna Martin MD

## 2021-02-28 NOTE — PROGRESS NOTES
[facility]         Renal Inpatient Follow-Up note         2/28/2021 11:38 AM    Pt Name:   Harl Angelucci  YOB: 1932  Attending:   Anthony Lr DO    Chief Complaint : Harl Angelucci is a 80 y.o. female being followed by nephrology for hyponatremia and fluid management    Interval History :   Patient seen and examined by me. No distress  Feels well denies any chest pain shortness of breath no diarrhea     Scheduled Medications :    bumetanide  0.5 mg Oral Daily    acetaminophen  650 mg Oral TID    [Held by provider] apixaban  2.5 mg Oral BID    aspirin  81 mg Oral Daily    atorvastatin  20 mg Oral Nightly    carbidopa-levodopa  2 tablet Oral 4x Daily    dilTIAZem  30 mg Oral 3 times per day    isosorbide dinitrate  20 mg Oral TID    magnesium oxide  400 mg Oral TID    pantoprazole  40 mg Oral QAM AC    spironolactone  25 mg Oral Daily    hydrALAZINE  10 mg Oral 3 times per day    sodium chloride flush  10 mL Intravenous 2 times per day    insulin lispro  0-6 Units Subcutaneous TID WC    insulin lispro  0-3 Units Subcutaneous Nightly      dextrose         Vitals :  BP (!) 153/65   Pulse 52   Temp 97.8 °F (36.6 °C) (Oral)   Resp 16   Ht 5' 2\" (1.575 m)   Wt 108 lb 4.8 oz (49.1 kg)   SpO2 95%   BMI 19.81 kg/m²     24HR INTAKE/OUTPUT:      Intake/Output Summary (Last 24 hours) at 2/28/2021 1138  Last data filed at 2/27/2021 2203  Gross per 24 hour   Intake --   Output 301 ml   Net -301 ml     Last 3 weights  Wt Readings from Last 3 Encounters:   02/28/21 108 lb 4.8 oz (49.1 kg)   02/12/21 106 lb 12.8 oz (48.4 kg)   02/04/21 106 lb (48.1 kg)           Physical Exam :  General Appearance:  Well developed.  No distress  Mouth/Throat:  Oral mucosa moist  Neck:  Supple, no JVD  Lungs:  Breath sounds: clear  Heart[de-identified]  S1,S2 heard  Abdomen:  Soft, non - tender  Musculoskeletal:  Edema -no edema noted         Last 3 CBC   Recent Labs     02/27/21  0830 02/28/21  0501   WBC 4.8 4.3*   RBC 4.48 4.17*   HGB 10.8* 10.2*   HCT 36.9* 34.9*    177     Last 3 CMP  Recent Labs     02/27/21  0830 02/28/21  0501    137   K 4.7 4.6    104   CO2 23 26   BUN 29* 24*   CREATININE 0.8 0.7   CALCIUM 9.1 8.5   LABALBU 3.7  --    BILITOT 0.6  --              Assessment    1. Renal - renal fx stable at baseline  2. Hyponatremia maintaining well. Continue salt tabs for now  ? Volume status appears reasonable and she is not in fluid overload  ? Currently on a low-dose of Bumex okay to continue for now  ? Monitor renal function closely     3. Essential hypertension   4. Hx of dementia  5. Hx of diabetes Mellitus  6. Nausea/vomiting/diarrhea - now appears to be resolved  7. Small bilateral pleural effusions  8. Meds reviewed and discussed with patient and family at bedside      DRAGAN Maier D.  [facility].

## 2021-02-28 NOTE — PROGRESS NOTES
Hospitalist Progress Note      Patient:  Cherri Lassiter    Unit/Bed:4K-22/022-A  YOB: 1932  MRN: [id number]   Acct: [de-identified]   PCP: Johnathan Carroll MD  Date of Admission: 2/27/2021    Assessment/Plan:    1. Acute on chronic watery diarrhea -infectious versus inflammatory -sent GI PCR panel, fecal lactoferrin, calprotectin, FOBT. Lactate, Ammonia, lipase NL. Afebrile, no leukocytosis. Endorses melanotic stools on iron, questionable BRBPR and 30 pound weight loss. Last colonoscopy in 2014 with Dr. Yudith Cm -TTG negative, stool studies negative infection. Will consult GI tmrw. 2. A. fib with new-onset SVR -new onset SVR when compared to EKG 5/16/2020. On Eliquis. Holding home Lopressor and Cardizem, continue telemetry, cardiology consulted/Dr Rose, appreciate his assistance. Patient sees Dr. Trino Morales OP. 3. Nausea and vomiting -resolved. Patient endorses 1-week history of nausea and vomiting around the same onset as the diarrhea. Gentle fluid hydration. 4. B/l pleural effusions -may be 2/2 #5, potentially needs b/l thoracentesis. Will do a bedside echo and possibly tap, to send pleural fluid for cytology and cultures. Holding anticoagulation. 5. Acute systolic on chronic diastolic HFpEF 40-81% NYHA 3 - hx CAD w/CABG RCA. In ED, negative trops, proBNP 4493.0. Last echo 5/16/2020. Continue Bumex 0.5 mg p.o. qd.  Strict I's and O's.  6. Severe PAH, group 2 -RSVP 60-65 mmHg on last echo. 7. DM II, controlled -HgbA1c 6.1. Continue SSI  8. Parkinson's disease -stable. Continue home meds. 9. Hyperlipidemia, unspecified type -lipid profile on 11/18/2020 unremarkable. Chief Complaint: Vomiting and diarrhea    Initial H and P:-    80year-old thin white F presents to [facility] ED from [facility] c/o diarrhea and emesis started approximately 2 days ago.   Patient's daughter endorses pt having bouts of diarrhea which may last for weeks at a time and seems to be a chronic problem. Patient endorses 30 pound weight loss. Positive for melena in the setting of iron use, unsure if BRBPR. Denies fever, recent travel, contact with sick people, headache. History A. fib on Eliquis, HFpEF 55 to 60% RSVP 60-65 mmHg, CAD CABG RCA, negative stress 11/2018, HTN, DM, Parkinson's. Previous admission for hyponatremia. Subjective (past 24 hours):   No acute events overnight. Patient feels fatigued, chronically short of breath. No BMs while inpatient yet. Nausea controlled. Past medical history, family history, social history and allergies reviewed again and is unchanged since admission. ROS (12 point review of systems completed. Pertinent positives noted.  Otherwise ROS is negative)     Medications:  Reviewed    Infusion Medications    dextrose       Scheduled Medications    bumetanide  0.5 mg Oral Daily    acetaminophen  650 mg Oral TID    [Held by provider] apixaban  2.5 mg Oral BID    aspirin  81 mg Oral Daily    atorvastatin  20 mg Oral Nightly    carbidopa-levodopa  2 tablet Oral 4x Daily    dilTIAZem  30 mg Oral 3 times per day    isosorbide dinitrate  20 mg Oral TID    magnesium oxide  400 mg Oral TID    pantoprazole  40 mg Oral QAM AC    spironolactone  25 mg Oral Daily    hydrALAZINE  10 mg Oral 3 times per day    sodium chloride flush  10 mL Intravenous 2 times per day    insulin lispro  0-6 Units Subcutaneous TID WC    insulin lispro  0-3 Units Subcutaneous Nightly     PRN Meds: glucose, dextrose, glucagon (rDNA), dextrose, sodium chloride flush, promethazine **OR** ondansetron, acetaminophen **OR** acetaminophen, magnesium sulfate, potassium chloride **OR** potassium alternative oral replacement **OR** potassium chloride      Intake/Output Summary (Last 24 hours) at 2/28/2021 1233  Last data filed at 2/27/2021 2203  Gross per 24 hour   Intake --   Output 301 ml   Net -301 ml       Diet:  DIET CARB CONTROL; Carb Control: 4 carb choices [facility]    Urine culture:   Lab Results   Component Value Date    LABURIN No growth-preliminary  No growth   12/14/2018       Respiratory culture: No results found for: CULTRESP    Aerobic and Anaerobic :  No results found for: LABAERO  No results found for: LABANAE    Urinalysis:      Lab Results   Component Value Date    NITRU NEGATIVE 02/27/2021    WBCUA 25-50 11/26/2018    BACTERIA NONE 11/26/2018    RBCUA 3-5 11/26/2018    BLOODU NEGATIVE 02/27/2021    SPECGRAV 1.015 12/28/2019    GLUCOSEU NEGATIVE 02/27/2021       Radiology:  XR CHEST PORTABLE   Final Result   1. Mild cardiac megaly. Metallic sternotomy sutures. 2. Tiny bilateral pleural effusions. Moderate bibasilar atelectasis/pneumonia. 3. Overall appearance of chest has improved since prior. **This report has been created using voice recognition software. It may contain minor errors which are inherent in voice recognition technology. **      Final report electronically signed by Dr. Hamilton Zheng on 2/27/2021 11:07 AM      CT ABDOMEN PELVIS WO CONTRAST   Final Result   1. Moderate-sized bilateral pleural effusions, larger on the right. Mild adjacent bibasilar atelectasis/pneumonia. 2. Findings of ileus/enteritis/malabsorption involving several small bowel loops. Findings of impending diarrhea. Cannot exclude gastritis. Calcifications scattered in the uterus, likely fibroid calcifications. **This report has been created using voice recognition software. It may contain minor errors which are inherent in voice recognition technology. **      Final report electronically signed by Dr. Hamilton Zheng on 2/27/2021 9:57 AM        Ct Abdomen Pelvis Wo Contrast    Result Date: 2/27/2021  CT ABDOMEN AND PELVIS WITHOUT CONTRAST ENHANCEMENT: CLINICAL INFORMATION: Diarrhea and emesis that started 2 days ago. . COMPARISON: 10/15/2018 TECHNIQUE: Multiple axial 5 mm images of the abdomen, pelvis, and lung bases were obtained without the administration of oral or intravenous contrast material. Computer generated sagittal and coronal images of the abdomen and pelvis were also reconstructed. ALL CT SCANS AT THIS FACILITY use dose modulation, iterative reconstruction, and/or weight-based dosing when appropriate to reduce radiation dose to as low as reasonably achievable. FINDINGS: Lung bases: Moderate-sized bilateral pleural effusions, larger on the right. Mild adjacent bibasilar atelectasis/pneumonia. Densely calcified mitral annulus, consistent with atherosclerotic mitral valve disease. Liver: Liver unremarkable. The gallbladder is moderately distended, likely related to not eating recently. The gallbladder Wall is not thickened or edematous. No gallstones are seen. Pancreas, spleen and adrenal glands: Unremarkable Kidneys:  Unremarkable. No renal calculi. No cysts. No mass lesions. No hydronephrosis. . Bowel/Peritoneum: There appears be moderate thickening of the gastric wall. This may simple be due to underdistention of the stomach but cannot exclude gastritis. There is mild fluid distention of multiple small bowel loops, consistent with ileus/enteritis/malabsorption. There are a few air-fluid levels in the ascending and transverse colon, consistent with impending diarrhea. There is some radiodense material in the cecum, possibly milk of magnesia. Radiodense material extends into the appendix. The appendix is not engorged or edematous. No free air. No free fluid in the abdomen. No abnormally enlarged para-aortic lymph nodes are seen. There are extensive vascular calcifications involving the entire abdominal aorta, celiac artery, SMA,  and renal arteries. Bones : No evidence for acute fracture or bone destruction. Demineralization of the bones, consistent with osteoporosis. Moderate disc space narrowing and degenerative disc disease L4-5 and L5-S1. Pelvis: Urinary bladder unremarkable.  There is an InterStim device in the pelvis with leads extending through a mid sacral foramina left side. There is moderate calcification in the uterus, likely representing fibroid calcifications. 1. Moderate-sized bilateral pleural effusions, larger on the right. Mild adjacent bibasilar atelectasis/pneumonia. 2. Findings of ileus/enteritis/malabsorption involving several small bowel loops. Findings of impending diarrhea. Cannot exclude gastritis. Calcifications scattered in the uterus, likely fibroid calcifications. **This report has been created using voice recognition software. It may contain minor errors which are inherent in voice recognition technology. ** Final report electronically signed by Dr. Carol Dykes on 2/27/2021 9:57 AM    Xr Chest Portable    Result Date: 2/27/2021  PROCEDURE: XR CHEST PORTABLE CLINICAL INFORMATION: BRADYCARDIA COMPARISON: 5/20/2020 TECHNIQUE: A single mobile view of the chest was obtained. 1. Mild cardiac megaly. Metallic sternotomy sutures. 2. Tiny bilateral pleural effusions. Moderate bibasilar atelectasis/pneumonia. 3. Overall appearance of chest has improved since prior. **This report has been created using voice recognition software. It may contain minor errors which are inherent in voice recognition technology. ** Final report electronically signed by Dr. Carol Dykes on 2/27/2021 11:07 AM    12-lead EKG 2/27/21:    VR 39bpm, AR 43bpm, , Q-Tc Bazett 431ms    A fib slow ventricular response   Left axis deviation  Left bundle branch block  Abnormal ECG  When compared with ECG of 27-FEB-2021 08:25,  no changes   Confirmed by Symone Infante ([id number]) on 2/28/2021 8:48:20 AM      Electronically signed by Sudeep Galindo MD on 2/28/2021 at 12:33 PM

## 2021-03-01 PROBLEM — E43 SEVERE MALNUTRITION (HCC): Chronic | Status: ACTIVE | Noted: 2021-03-01

## 2021-03-01 LAB
ANION GAP SERPL CALCULATED.3IONS-SCNC: 10 MEQ/L (ref 8–16)
ANISOCYTOSIS: PRESENT
BASOPHILS # BLD: 0.2 %
BASOPHILS ABSOLUTE: 0 THOU/MM3 (ref 0–0.1)
BUN BLDV-MCNC: 11 MG/DL (ref 7–22)
CALCIUM SERPL-MCNC: 8.9 MG/DL (ref 8.5–10.5)
CHLORIDE BLD-SCNC: 101 MEQ/L (ref 98–111)
CO2: 25 MEQ/L (ref 23–33)
CREAT SERPL-MCNC: 0.5 MG/DL (ref 0.4–1.2)
EOSINOPHIL # BLD: 5.4 %
EOSINOPHILS ABSOLUTE: 0.3 THOU/MM3 (ref 0–0.4)
ERYTHROCYTE [DISTWIDTH] IN BLOOD BY AUTOMATED COUNT: 23.7 % (ref 11.5–14.5)
ERYTHROCYTE [DISTWIDTH] IN BLOOD BY AUTOMATED COUNT: 68.6 FL (ref 35–45)
GFR SERPL CREATININE-BSD FRML MDRD: > 90 ML/MIN/1.73M2
GLUCOSE BLD-MCNC: 135 MG/DL (ref 70–108)
GLUCOSE BLD-MCNC: 139 MG/DL (ref 70–108)
HCT VFR BLD CALC: 37.1 % (ref 37–47)
HEMOGLOBIN: 11 GM/DL (ref 12–16)
IMMATURE GRANS (ABS): 0.01 THOU/MM3 (ref 0–0.07)
IMMATURE GRANULOCYTES: 0.2 %
LYMPHOCYTES # BLD: 23.1 %
LYMPHOCYTES ABSOLUTE: 1.1 THOU/MM3 (ref 1–4.8)
MCH RBC QN AUTO: 24.5 PG (ref 26–33)
MCHC RBC AUTO-ENTMCNC: 29.6 GM/DL (ref 32.2–35.5)
MCV RBC AUTO: 82.6 FL (ref 81–99)
MONOCYTES # BLD: 11.8 %
MONOCYTES ABSOLUTE: 0.6 THOU/MM3 (ref 0.4–1.3)
MRSA SCREEN: NORMAL
NUCLEATED RED BLOOD CELLS: 0 /100 WBC
PLATELET # BLD: 205 THOU/MM3 (ref 130–400)
PMV BLD AUTO: 9.5 FL (ref 9.4–12.4)
POTASSIUM REFLEX MAGNESIUM: 4.3 MEQ/L (ref 3.5–5.2)
POTASSIUM SERPL-SCNC: 4.3 MEQ/L (ref 3.5–5.2)
RBC # BLD: 4.49 MILL/MM3 (ref 4.2–5.4)
SEG NEUTROPHILS: 59.3 %
SEGMENTED NEUTROPHILS ABSOLUTE COUNT: 2.8 THOU/MM3 (ref 1.8–7.7)
SODIUM BLD-SCNC: 136 MEQ/L (ref 135–145)
WBC # BLD: 4.7 THOU/MM3 (ref 4.8–10.8)

## 2021-03-01 PROCEDURE — 99232 SBSQ HOSP IP/OBS MODERATE 35: CPT | Performed by: PHYSICIAN ASSISTANT

## 2021-03-01 PROCEDURE — 6370000000 HC RX 637 (ALT 250 FOR IP): Performed by: INTERNAL MEDICINE

## 2021-03-01 PROCEDURE — 36415 COLL VENOUS BLD VENIPUNCTURE: CPT

## 2021-03-01 PROCEDURE — 6370000000 HC RX 637 (ALT 250 FOR IP): Performed by: NURSE PRACTITIONER

## 2021-03-01 PROCEDURE — 85025 COMPLETE CBC W/AUTO DIFF WBC: CPT

## 2021-03-01 PROCEDURE — 6370000000 HC RX 637 (ALT 250 FOR IP): Performed by: STUDENT IN AN ORGANIZED HEALTH CARE EDUCATION/TRAINING PROGRAM

## 2021-03-01 PROCEDURE — 99232 SBSQ HOSP IP/OBS MODERATE 35: CPT | Performed by: INTERNAL MEDICINE

## 2021-03-01 PROCEDURE — 1200000003 HC TELEMETRY R&B

## 2021-03-01 PROCEDURE — 99231 SBSQ HOSP IP/OBS SF/LOW 25: CPT | Performed by: INTERNAL MEDICINE

## 2021-03-01 PROCEDURE — 80048 BASIC METABOLIC PNL TOTAL CA: CPT

## 2021-03-01 PROCEDURE — 2580000003 HC RX 258: Performed by: INTERNAL MEDICINE

## 2021-03-01 PROCEDURE — 82948 REAGENT STRIP/BLOOD GLUCOSE: CPT

## 2021-03-01 RX ORDER — SUCRALFATE 1 G/1
1 TABLET ORAL
Status: DISCONTINUED | OUTPATIENT
Start: 2021-03-01 | End: 2021-03-02 | Stop reason: HOSPADM

## 2021-03-01 RX ORDER — LACTOBACILLUS RHAMNOSUS GG 10B CELL
1 CAPSULE ORAL
Status: DISCONTINUED | OUTPATIENT
Start: 2021-03-01 | End: 2021-03-02 | Stop reason: HOSPADM

## 2021-03-01 RX ORDER — PANTOPRAZOLE SODIUM 40 MG/1
40 TABLET, DELAYED RELEASE ORAL
Status: DISCONTINUED | OUTPATIENT
Start: 2021-03-01 | End: 2021-03-02 | Stop reason: HOSPADM

## 2021-03-01 RX ADMIN — HYDRALAZINE HYDROCHLORIDE 10 MG: 10 TABLET, FILM COATED ORAL at 14:33

## 2021-03-01 RX ADMIN — BUMETANIDE 0.5 MG: 1 TABLET ORAL at 08:56

## 2021-03-01 RX ADMIN — CARBIDOPA AND LEVODOPA 2 TABLET: 25; 100 TABLET ORAL at 17:56

## 2021-03-01 RX ADMIN — SODIUM CHLORIDE, PRESERVATIVE FREE 10 ML: 5 INJECTION INTRAVENOUS at 19:46

## 2021-03-01 RX ADMIN — SUCRALFATE 1 G: 1 TABLET ORAL at 16:24

## 2021-03-01 RX ADMIN — SODIUM CHLORIDE, PRESERVATIVE FREE 10 ML: 5 INJECTION INTRAVENOUS at 08:58

## 2021-03-01 RX ADMIN — MAGNESIUM GLUCONATE 500 MG ORAL TABLET 400 MG: 500 TABLET ORAL at 19:46

## 2021-03-01 RX ADMIN — PANTOPRAZOLE SODIUM 40 MG: 40 TABLET, DELAYED RELEASE ORAL at 16:19

## 2021-03-01 RX ADMIN — ATORVASTATIN CALCIUM 20 MG: 20 TABLET, FILM COATED ORAL at 19:47

## 2021-03-01 RX ADMIN — ISOSORBIDE DINITRATE 20 MG: 20 TABLET ORAL at 08:51

## 2021-03-01 RX ADMIN — Medication 1 CAPSULE: at 14:33

## 2021-03-01 RX ADMIN — ISOSORBIDE DINITRATE 20 MG: 20 TABLET ORAL at 19:46

## 2021-03-01 RX ADMIN — PSYLLIUM HUSK 1 PACKET: 3.4 GRANULE ORAL at 14:36

## 2021-03-01 RX ADMIN — HYDRALAZINE HYDROCHLORIDE 10 MG: 10 TABLET, FILM COATED ORAL at 22:19

## 2021-03-01 RX ADMIN — CARBIDOPA AND LEVODOPA 2 TABLET: 25; 100 TABLET ORAL at 13:04

## 2021-03-01 RX ADMIN — CARBIDOPA AND LEVODOPA 2 TABLET: 25; 100 TABLET ORAL at 08:51

## 2021-03-01 RX ADMIN — ACETAMINOPHEN 650 MG: 325 TABLET ORAL at 19:46

## 2021-03-01 RX ADMIN — SUCRALFATE 1 G: 1 TABLET ORAL at 19:47

## 2021-03-01 RX ADMIN — ACETAMINOPHEN 650 MG: 325 TABLET ORAL at 14:33

## 2021-03-01 RX ADMIN — CARBIDOPA AND LEVODOPA 2 TABLET: 25; 100 TABLET ORAL at 19:46

## 2021-03-01 RX ADMIN — PANTOPRAZOLE SODIUM 40 MG: 40 TABLET, DELAYED RELEASE ORAL at 05:36

## 2021-03-01 RX ADMIN — ASPIRIN 81 MG: 81 TABLET, COATED ORAL at 08:51

## 2021-03-01 RX ADMIN — APIXABAN 2.5 MG: 2.5 TABLET, FILM COATED ORAL at 19:47

## 2021-03-01 RX ADMIN — ACETAMINOPHEN 650 MG: 325 TABLET ORAL at 08:57

## 2021-03-01 RX ADMIN — APIXABAN 2.5 MG: 2.5 TABLET, FILM COATED ORAL at 08:51

## 2021-03-01 RX ADMIN — METOPROLOL TARTRATE 12.5 MG: 25 TABLET ORAL at 22:19

## 2021-03-01 RX ADMIN — Medication 1 CAPSULE: at 17:56

## 2021-03-01 RX ADMIN — MAGNESIUM GLUCONATE 500 MG ORAL TABLET 400 MG: 500 TABLET ORAL at 08:52

## 2021-03-01 RX ADMIN — SPIRONOLACTONE 25 MG: 25 TABLET ORAL at 08:51

## 2021-03-01 RX ADMIN — MAGNESIUM GLUCONATE 500 MG ORAL TABLET 400 MG: 500 TABLET ORAL at 14:33

## 2021-03-01 RX ADMIN — Medication 1 G: at 08:51

## 2021-03-01 RX ADMIN — HYDRALAZINE HYDROCHLORIDE 10 MG: 10 TABLET, FILM COATED ORAL at 07:16

## 2021-03-01 RX ADMIN — ISOSORBIDE DINITRATE 20 MG: 20 TABLET ORAL at 14:33

## 2021-03-01 ASSESSMENT — PAIN SCALES - GENERAL
PAINLEVEL_OUTOF10: 0

## 2021-03-01 NOTE — PLAN OF CARE
Problem: Falls - Risk of:  Goal: Will remain free from falls  Outcome: Met This Shift  Note: Patient has been up to the bathroom and up to the chair with minimal assist and use of walker, gait steady. Problem: Daily Care:  Goal: Daily care needs are met  Outcome: Met This shift  Problem: DISCHARGE BARRIERS  Goal: Patient's continuum of care needs are met  3/1/2021 1840 by Samantha Duke RN  Outcome: Ongoing  Note: Planning on returning to assisted living at discharge. 3/1/2021 1112 by Rachel Fernandez MSW, LSW  Outcome: Ongoing  Note: Patient return to [place] [id number] at discharge. See SW notes 3/1/21. Problem: Nutrition  Goal: Optimal nutrition therapy  3/1/2021 1459 by Michael Cabrera RD, LD  Outcome: Ongoing  Patient and family participating in plan of care and goal setting.

## 2021-03-01 NOTE — PROGRESS NOTES
Comprehensive Nutrition Assessment    Type and Reason for Visit:  Initial, Positive Nutrition Screen, Consult(Chronic diarrhea, lactose intolerant but patient was consuming a yogurt twice a day, GERD; unintentional weight loss, poor appetite 5 days or more; wound)    Nutrition Recommendations/Plan:   Will send Glucerna TID (lactose free). Recommend continue with probiotic, Metamucil. Additional Rx per GI to assist with GI symptoms. Monitor GI POC. Suggest MVI. Nutrition Assessment:    Pt. severely malnourished AEB criteria listed below. At risk for further nutritional compromise r/t admit with diarrhea, vomiting/altered GI function and underlying medical condition (hx CAD,CHF, DM and Parkinsons). Nutrition recommendations/interventions as per above. Malnutrition Assessment:  Malnutrition Status:  Severe malnutrition    Context:  Chronic Illness     Findings of the 6 clinical characteristics of malnutrition:  Energy Intake:  7 - 75% or less estimated energy requirements for 1 month or longer  Weight Loss:  (-18.2% in 9.5 months)     Body Fat Loss:  1 - Mild body fat loss Orbital   Muscle Mass Loss:  Unable to assess    Fluid Accumulation:  Unable to assess     Strength:  Not Performed    Estimated Daily Nutrient Needs:  Energy (kcal):  1722-2146 kcals (25-30);  Weight Used for Energy Requirements:  (50 kgm 3/1)     Protein (g):  75+ grams (1.5+) as renal function allows; Weight Used for Protein Requirements:  (50 kgm)        Fluid (ml/day):   ; Method Used for Fluid Requirements:         Nutrition Related Findings:  Pt. seen - reports poor appetite \"on and off\" for a couple of months; states consumes 3 meals/day but small portions; reports some diarrhea at times - avoids milk but is able to tolerate yogurt- states consumes to help with her bowels; denies any specific food worsening diarrhea; Rx includes Culturelle, Psyllium, Carafate, PPI, Bumex; drinks 1 Ensure/day pta; per CT of abdomen 2/27 - Findings of ileus/enteritis/malabsorption involving several small bowel loops. Findings of impending diarrhea. Cannot exclude gastritis    Wounds:  (wound - sacrum (type not noted); skin tear - left leg)       Current Nutrition Therapies:    DIET CARB CONTROL; Carb Control: 4 carb choices (60 gms)/meal; Low Fat, Lactose Controlled, Low Caffeine  Dietary Nutrition Supplements: Diabetic Oral Supplement    Anthropometric Measures:  · Height: 5' 2\" (157.5 cm)  · Current Body Weight: 109 lb 8 oz (49.7 kg)(3/1 +1 edema)   · Admission Body Weight: 108 lb 4.8 oz (49.1 kg)(2/28 +1 edema)    · Usual Body Weight: (perEMR: 5/16/20: 133# 12.8 oz, 8/3/20: 121# 9.6 oz, 9/29/20: 115# 9.6 oz)     · Ideal Body Weight: 110 lbs;   · BMI: 20  · BMI Categories: Underweight (BMI less than 22) age over 72       Nutrition Diagnosis:   · Severe malnutrition related to inadequate protein-energy intake as evidenced by poor intake prior to admission, weight loss      Nutrition Interventions:   Food and/or Nutrient Delivery:  Continue Current Diet, Start Oral Nutrition Supplement  Nutrition Education/Counseling:  Education initiated(3/1 Encouraged po, good nutrition at best efforts. Encouraged use of probiotics.)   Coordination of Nutrition Care:  Continue to monitor while inpatient    Goals:  Pt.will tolerate and consume 75% or more of meals to promote wound healing during LOS.        Nutrition Monitoring and Evaluation:   Behavioral-Environmental Outcomes:  None Identified   Food/Nutrient Intake Outcomes:  Diet Advancement/Tolerance, Food and Nutrient Intake, Supplement Intake  Physical Signs/Symptoms Outcomes:  Biochemical Data, Chewing or Swallowing, Diarrhea, GI Status, Fluid Status or Edema, Nutrition Focused Physical Findings, Skin, Weight     Discharge Planning:    Continue Oral Nutrition Supplement     Electronically signed by Subhash Small RD, LD on 3/1/21 at 3:00 PM EST    Contact: [phone]

## 2021-03-01 NOTE — PROGRESS NOTES
Assumed care of patient from [address]. Patient wheeled to room in wheel chair. VSS at this time.

## 2021-03-01 NOTE — PROGRESS NOTES
Discussed with Dr. Wendy Hay. He was just on phone with patient's family discussing further plan of care/code status. No needs for palliative care today. We will follow.

## 2021-03-01 NOTE — CONSULTS
Consult History & Physical      Patient:  Shelbie Thomas  YOB: 1932  MRN: [id number]     Acct: [de-identified]    Chief Complaint:    Chief Complaint   Patient presents with    Diarrhea    Emesis       Date of Service: Pt seen/examined in consultation on 3/1/2021    History Of Present Illness:      80 y.o. female who we are asked to see/evaluate by Jeff Pavon DO for medical management of diarrhea, weight loss. She came to the ED yesterday from assisted living for nausea, vomiting, diarrhea, and abdominal cramping. She says this has been ongoing for about a week. She has alternating diarrhea and formed stools. She has approximately 3 stools a day. However, she has not had a bowel movement for a couple days. She has intermittent abdominal cramping, she says she does not really having pain. She endorses bloating. She denies melena and hematochezia. She has a history of lactose intolerance. She denies consuming cheese, but says she was told yogurt \"is good for you and that she should eat 2 yogurts a day. \" She takes a probiotic daily at home. Upon chart review, she has been seen in the office at [facility] for chronic diarrhea as far back as 2014. Her last colonoscopy was in 2014, for diarrhea, and demonstrated diverticulosis, hemorrhoids, and a colon polyp. Colon biopsies were negative. She says her nausea and vomiting is intermittent and denies any precipitating factors. She denies hematemesis. She describes the emesis as undigested food. She endorses frequent heartburn, reflux, and indigestion. Her daughter states she takes TUMs frequently. She is on Carafate and Prilosec daily. Per family, she has had an approximate 30 pound weight loss over the past year. Last EGD 2015 demonstrated distal esophagitis, gastritis with antral erosions and petechial hemorrhages.  CT A/P demonstrated moderate-sized bilateral pleural effusions, larger on the right, mild adjacent bibasilar atelectasis/pneumonia, findings of ileus/enteritis/malabsorption involving several small bowel loops, findings of impending diarrhea, cannot exclude gastritis. In the ED, she was noted to be in afib with slow ventricular response and was bradycardic, cardiology consulted. Past Medical History:    Past Medical History:   Diagnosis Date    Arthritis     Atypical chest pain     CAD (coronary artery disease)     Chronic LBBB (left bundle branch block)     Diabetes mellitus type II     Esophageal stricture     History of esophageal stricture.  FH: CAD (coronary artery disease)     Mom and dad both with heart disease at mid to late age.  GERD (gastroesophageal reflux disease)     History of gastritis     History of skin cancer     Hypercholesterolemia     Hyperlipidemia     Hypertension     Imbalance     As far as imbalance is concerned, asked patient to follow-up with Dr. Kellen Salazar since she follows with him on a regular basis.  Lactose intolerance     Nummular dermatitis     Parkinson's disease ([facility] [address] 2009    Restless legs syndrome (RLS)     S/P CABG (coronary artery bypass graft)     SCC (squamous cell carcinoma)        Home Medications:  Prior to Admission medications    Medication Sig Start Date End Date Taking?  Authorizing Provider   Multiple Vitamin (MULTIVITAMIN) TABS tablet Take 1 tablet by mouth daily 1/5/21  Yes Jimmy Hoover MD   Multiple Vitamins-Minerals (PRESERVISION AREDS 2) CAPS Take 2 capsules by mouth 2 times daily 1/5/21  Yes Jimmy Hoover MD   ferrous sulfate (IRON 325) 325 (65 Fe) MG tablet Take 1 tablet by mouth 2 times daily 12/31/20  Yes Jimmy Hoover MD   sodium chloride 1 g tablet Take 1 tablet by mouth daily 12/22/20 4/21/21 Yes Brandin Salazar MD   hydrALAZINE (APRESOLINE) 10 MG tablet Take 1 tablet by mouth every 8 hours 12/7/20  Yes ROWDY Aguirre - CNP   JANUVIA 100 MG tablet TAKE 1 TABLET BY MOUTH DAILY 9/24/20  Yes Jimmy Hoover MD   spironolactone (ALDACTONE) 25 MG tablet Take 1 tablet by mouth daily 9/17/20  Yes ROWDY Aguirre CNP   metFORMIN (GLUCOPHAGE-XR) 500 MG extended release tablet Take 1 tablet by mouth 2 times daily 7/15/20  Yes Daylin Barth MD   potassium chloride (KLOR-CON M) 20 MEQ extended release tablet Take 0.5 tablets by mouth 3 times daily 6/3/20  Yes ROWDY Aguirre CNP   magnesium oxide (MAG-OX) 400 MG tablet Take 1 tablet by mouth 3 times daily 6/3/20  Yes ROWDY Aguirre CNP   isosorbide dinitrate (ISORDIL) 20 MG tablet Take 1 tablet by mouth 3 times daily 5/27/20  Yes Sean Rocha PA-C   metoprolol tartrate (LOPRESSOR) 50 MG tablet Take 0.5 tablets by mouth 2 times daily 5/27/20  Yes Sean Rocha PA-C   carbidopa-levodopa (SINEMET)  MG per tablet Take 2 tablets by mouth 4 times daily 4/23/20  Yes ROWDY Bolton CNP   apixaban (ELIQUIS) 2.5 MG TABS tablet TAKE 1 TABLET BY MOUTH 2  TIMES DAILY, EQUIVALENT TO  APIXABAN 4/13/20  Yes Yan Cramer MD   dilTIAZem (CARDIZEM) 30 MG tablet TAKE 1 TABLET BY MOUTH 3  TIMES A DAY 2/25/20  Yes Daylin Barth MD   sucralfate (CARAFATE) 1 GM tablet Take 1 tablet by mouth 4 times daily 1/7/20  Yes Daylin Barth MD   omeprazole (PRILOSEC) 20 MG delayed release capsule Take 1 capsule by mouth daily 1/7/20  Yes Daylin Barth MD   Cholecalciferol (VITAMIN D3 PO) Take 4,000 Units by mouth 2 times daily    Yes Historical Provider, MD   Probiotic Product (PROBIOTIC ADVANCED PO) Take 1 tablet by mouth daily   Yes Historical Provider, MD   aspirin 81 MG EC tablet Take 81 mg by mouth daily. Yes Historical Provider, MD   ondansetron (ZOFRAN) 4 MG tablet Take 4 mg by mouth every 6 hours  1/26/21   Historical Provider, MD   Ferrous Sulfate (IRON) 325 (65 Fe) MG TABS Take by mouth 4 sprays in mouth every morning. In addition to tablets.     Historical Provider, MD   Cyanocobalamin 200 MCG/SPRAY LIQD Take 4 sprays by mouth 2 times daily Vitamist - Vitamin B 12 spray 1/5/21   Maribell Rosales MD   loperamide (IMODIUM) 2 MG capsule Take 2 mg by mouth every 6 hours as needed for Diarrhea    Historical Provider, MD   acetaminophen (TYLENOL 8 HOUR ARTHRITIS PAIN) 650 MG extended release tablet Take 650 mg by mouth 3 times daily    Historical Provider, MD   diclofenac sodium (VOLTAREN) 1 % GEL Apply 4 g topically 4 times daily 7/8/20   Maribell Rosales MD   lactase (LACTAID ULTRA) 9000 units TABS Take 9,000 Units by mouth 3 times daily (before meals)    Historical Provider, MD   atorvastatin (LIPITOR) 20 MG tablet TAKE 1 TABLET DAILY 5/4/20   Maribell Rosales MD   blood glucose test strips (CONTOUR TEST) strip USE TO TEST BLOOD SUGAR TWICE DAILY. 2/3/20   ROWDY Salmeron - CNP   Misc. Devices MISC 1 each by Does not apply route once for 1 dose Param Glucose Meter; Diagnosis:E11.65 4/15/19 10/5/20  Maribell Rosales MD   Cranberry 500 MG CAPS Take 500 mg by mouth daily 2 tab    Historical Provider, MD       Surgical History:  Past Surgical History:   Procedure Laterality Date    CARDIAC SURGERY      CARDIOVASCULAR STRESS TEST  4 09 2009    Gated SPECT images revealed normal global wall motion with EF of 60%. Persantine test associated w/nonspecific symptoms. EKG is nondiagnostic w/baseline LBBB. No obvious stress-induced ischemia by Cardiolite. EF within normal limits.  CARDIOVASCULAR STRESS TEST  7 10 2007    The gated SPECT images revealed normal wall motion with EF of 69%. Poor exercise tolerance w/SOB on exertion. EKG is nondiagnostic. Cardiolite scan revealing no obvious stress-induced ischemia. EF 60%.  CARDIOVASCULAR STRESS TEST  2 03 2006    Fair exercise tolerance w/SOB on exertion. No EKG evidence of ischemia. Patient should be able to proceed with phase II cardiac rehab.  CATARACT REMOVAL      CATARACT REMOVAL  06/08/2016    AND 6/29/16    DIAGNOSTIC CARDIAC CATH LAB PROCEDURE  12 23 2005    LV was obtained.  AGEE projection showed preserved LV function w/estimated EF at 55%. No significant MR. Patent left main coronary artery. Tortuous LAD which appeared to be patent. Patent left circumflex artery. High-grade stenosis around 99% in very large dominant RCA w/heavy calcification at the ostium. Normal LV function.  DOPPLER ECHOCARDIOGRAPHY  4 09 2009    Global LV function w/in lower limits of normal, with mild anteroseptal hypokinesis. EF of 50-55%. Light LVH. Mild to moderate left atrial enlargement. Calcific aortic and mitral valve w/no obvious stenosis. No obvious pericardial effusion.  DOPPLER ECHOCARDIOGRAPHY  7 10 2007    LV function w/in lower limits of normal w/peridoxical septal wall motion. Moderate left atrial enlargement. Mild MR. Mild TR. Calcified valves w/no obvious stenosis. No pericardial effusion.  DOPPLER ECHOCARDIOGRAPHY  4 14 2006    There appears to be significant improvement from previous echo w/complete resolution of pericardial effusion and normal LV function. EF of 60%.  DOPPLER ECHOCARDIOGRAPHY  3 21 2006    Normal LV function. Mild LV hypertrophy. Mild biatrial enlargement. Mildly calcific aortic and mitral valve w/no stenosis. Mild MR. Mild TR. Minimal residual pericardial effusion w/significant improvement from previous echo.  DOPPLER ECHOCARDIOGRAPHY  3 16 2006    Interval change from previous echocardiogram w/worsening of effusion. Correlation clinically. Consideration of pericardial centesis. Will obtain a [facility] consult.  DOPPLER ECHOCARDIOGRAPHY  1 31 2006    No significant change from previous echo. The 2D echo showed moderate degree of pericardial effusion. It does seem to be worst or better than previous echo. No evidence of tamponade.  DOPPLER ECHOCARDIOGRAPHY  1 27 2006    Normal global LV function. Mild biatrial enlargement. Mildly sclerotic aortic & mitral valve w/no stenosis. Mild MR. Mild TR. Small to moderate pericardial effusion w/no obvious tamponade.      JOINT REPLACEMENT      MOHS SURGERY Right 2/13/2019    MOHS DEFECT REPAIR CYSTIC SCC RIGHT LOWER LATERAL LEG WITH SKIN GRAFT FROM RIGHT GROIN (FULL THICKNESS ) performed by Alber Tran MD at [address] [place] [address] PRE-MALIGNANT / Soni Estonian Left 12/20/13    left leg lesion excision x2, frozen section, skin graft closure    ROTATOR CUFF REPAIR  09/2001    RIGHT    ROTATOR CUFF REPAIR  04/15/2009    LEFT     SKIN CANCER EXCISION  06/2006      Rt leg    SPINAL CORD STIMULATOR IMPLANTATION N/A 12/4/2018    PERMANENT INTERSTIM performed by Carmen Sotomayor MD at [facility] History:  Family History   Problem Relation Age of Onset    Heart Disease Mother     Diabetes Mother     Stroke Father     Diabetes Sister     Lung Cancer Brother        Past GI History:  Chronic diarrhea, lactose intolerance, gastritis, GERD, distal esophagitis, colon polyps, diverticulosis, hemorrhoids, EGD, colonoscopy, chronic anemia, ANALI  Dr. Maryjo Meneses patient    Allergies:  Latex, Lisinopril, Tape Grayce Crisp tape], Bacitracin, Penicillins, Polymyxin b, Keflex [cephalexin], Lactulose, Morphine, and Polysporin [bacitracin-polymyxin b]    Social History:   TOBACCO:   reports that she has never smoked. She has never used smokeless tobacco.  ETOH:   reports no history of alcohol use. Review Of Systems  GENERAL: +weight loss  EYES:  No  blurred vision, double vision   CARDIOVASCULAR: No chest pain or palpitations. RESPIRATORY:  No dyspnea or cough. GI:  See HPI  MUSCULOSKELETAL: No new painful or swollen joints or myalgias. :   No dysuria or hematuria. SKIN:  No rashes or jaundice. NEUROLOGIC:  No headaches or seizures, numbness or tingling of arms, or legs. PSYCH:  No anxiety or depression. ENDOCRINE:  No polyuria or polydipsia.     BLOOD:  +anemia    PHYSICAL EXAM:  /61   Pulse 83   Temp 98.4 °F (36.9 °C) (Oral)   Resp 18   Ht 5' 2\" (1.575 m)   Wt 109 lb 8 oz (49.7 kg)   SpO2 93%   BMI 20.03 kg/m²     General appearance: Chronically ill appearing female  HEENT: Normal cephalic, atraumatic without obvious deformity. Pupils equal, round, and reactive to light. Neck: Supple, with full range of motion. No jugular venous distention. Trachea midline. Respiratory:  Normal respiratory effort. Clear to auscultation, bilaterally without Rales/Wheezes/Rhonchi. Cardiovascular: Regular rate and rhythm without murmurs, rubs or gallops. Abdomen: Soft, non-tender, mildly distended with active bowel sounds. Musculoskeletal: No clubbing, cyanosis or edema bilaterally. Skin: Pink, warm, dry. No rashes or lesions. Psychiatric: Alert and oriented, thought content appropriate, normal insight    Labs:   Recent Labs     03/01/21  0539   WBC 4.7*   HGB 11.0*   HCT 37.1        Recent Labs     03/01/21  0539      K 4.3      CO2 25   BUN 11   CREATININE 0.5   CALCIUM 8.9     Recent Labs     02/27/21  0830   AST 14   ALT <5*   BILITOT 0.6   ALKPHOS 79     Radiology:   CT abdomen/pelvis WO contrast 02/27/21      Impression   1. Moderate-sized bilateral pleural effusions, larger on the right. Mild adjacent bibasilar atelectasis/pneumonia. 2. Findings of ileus/enteritis/malabsorption involving several small bowel loops. Findings of impending diarrhea. Cannot exclude gastritis. Calcifications scattered in the uterus, likely fibroid calcifications. CXR 02/27/21     Impression   1. Mild cardiac megaly. Metallic sternotomy sutures. 2. Tiny bilateral pleural effusions. Moderate bibasilar atelectasis/pneumonia. 3. Overall appearance of chest has improved since prior. Code Status: DNR-CCA    ASSESSMENT:  1. Acute on chronic diarrhea- none since admission  2. Nausea & vomiting  3. GERD  4. Unplanned weight loss, 30# over the past year  5. Afib SVR- on Eliquis  6. Lactose intolerant- she was consuming 1 yogurt BID  7. CAD s/p CABG- on aspirin  8. DM  9. H/O HTN  10. 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  Chronic anemia  12. H/O HTN  13. HLD  14. Parkinson's disease    PLAN:     Monitor H & H, transfuse prn   Nursing to monitor stool output & document   Continue iron   Increase PPI to BID   Sucralfate slurry ACHS   Metamucil daily   Probiotic TID   Consult dietitian   Low lactose, low caffeine, low fat   Stool studies   EGD discussed, including risks/benefits/code reversal with patient & family. They have agreed to proceed with medical management at at this time.  Strict I & O   Electrolyte management per nephrology   Nephrology & cardiology on board   Supportive care per primary team  Will follow       Case reviewed and impression/plan reviewed in collaboration with Dr. Odette Blankenship  Electronically signed by ROWDY Lyon CNP on 3/1/2021 at 1:12 PM    [facility]  Thank you for the consultation. Approximately 40 minutes spent doing the following: Examination, patient evaluation, 24 chart review done; all tests, results, and plan of care reviewed with the patient & familyt; all questions answered. Greater than 50% of the time was spent counseling and educated the patient & family.

## 2021-03-01 NOTE — PLAN OF CARE
Problem: Falls - Risk of:  Goal: Will remain free from falls  Description: Will remain free from falls  Outcome: Met This Shift  Goal: Absence of physical injury  Description: Absence of physical injury  Outcome: Met This Shift     Problem: Safety:  Intervention: Keep Call Light within reach  Flowsheets (Taken 3/1/2021 4879)  Call Light Within Reach:  Yes

## 2021-03-01 NOTE — FLOWSHEET NOTE
[facility]      Patient: Blanca Chapman  Room #: 9L-79/447-K            YOB: 1932  Age: 80 y.o. Gender: female            Admit Date & Time: 2/27/2021  8:21 AM    Assessment:  Matilde Jennings is an 80year old female who is sitting up in a chair on 6k. Her son and daughter in law are also in the room with her. The  had anointed her earlier today but her family asked if she can have communion. This was checked out with the nurse who said, she can. A staff member from the spiritual care office will try and give her communion today    Interventions:  Our prayer card was given    Outcomes:  encouraged    Plan: 1. Care Plan:  Continue spiritual and emotional care for patient and family. Including prayers.       03/01/21 1235   Encounter Summary   Services provided to: Patient and family together   Referral/Consult From: [facility];Family members   Place of [address] [place] [address] Visiting Yes  (3/1 wants communion)   Complexity of Encounter Low   Length of Encounter 15 minutes   Routine   Type Initial   Spiritual/Muslim   Type Spiritual support     Electronically signed by Elliot Salazar on 3/1/2021 at 12:37 PM.  [facility]  [phone]

## 2021-03-01 NOTE — CARE COORDINATION
3/1/21, 8:25 AM EST  DISCHARGE PLANNING EVALUATION:    Bethany Bellamy       Admitted: 2/27/2021/ [address] day: 2   Location: [facility]23/023-A Reason for admit: Symptomatic sinus bradycardia [R00.1]   PMH:  has a past medical history of Arthritis, Atypical chest pain, CAD (coronary artery disease), Chronic LBBB (left bundle branch block), Diabetes mellitus type II, Esophageal stricture, FH: CAD (coronary artery disease), GERD (gastroesophageal reflux disease), History of gastritis, History of skin cancer, Hypercholesterolemia, Hyperlipidemia, Hypertension, Imbalance, Lactose intolerance, Nummular dermatitis, Parkinson's disease ([facility] [address] Restless legs syndrome (RLS), S/P CABG (coronary artery bypass graft), and SCC (squamous cell carcinoma). Procedure: na  CXR:  . Mild cardiac megaly. Metallic sternotomy sutures. Tiny bilateral pleural effusions. Moderate bibasilar atelectasis/pneumonia. Barriers to Discharge:  pesents to the ED with complaint of diarrhea and emesis that started 2 days ago, slow HR 48/min. Telemetry Atrial Fib, cardiology consulted, Bumex 0.5 mg with strict I/O, daily weights, Eliquis, held Cardizem, Apresoline po, B/P 175/70, heart rate 60 - 80's, nephrology consulted for fluid overload, salt tablets, accu checks as ordered. PCP: Jimmy Hoover MD  Readmission Risk Score: 25%    Patient Goals/Plan/Treatment Preferences: Lior Bowser is from [place] [address]; [facility] updated and will follow. CHF clinic at discharge and to wear 2 week event monitor per cardiology. Transportation/Food Security/Housekeeping Addressed:  No issues identified.

## 2021-03-01 NOTE — PROGRESS NOTES
[facility]         Renal Inpatient Follow-Up note         3/1/2021 11:51 AM    Pt Name:   Fanny Lujan  YOB: 1932  Attending:   Savannah Taylor DO    Chief Complaint : Fanny Lujan is a 80 y.o. female being followed by nephrology for hyponatremia and fluid management    Interval History :   Patient seen and examined by me. No distress  Feels well denies any chest pain shortness of breath no diarrhea  Overall no issues quite comfortable     Scheduled Medications :    bumetanide  0.5 mg Oral Daily    sodium chloride  1 g Oral Daily    acetaminophen  650 mg Oral TID    apixaban  2.5 mg Oral BID    aspirin  81 mg Oral Daily    atorvastatin  20 mg Oral Nightly    carbidopa-levodopa  2 tablet Oral 4x Daily    [Held by provider] dilTIAZem  30 mg Oral 3 times per day    isosorbide dinitrate  20 mg Oral TID    magnesium oxide  400 mg Oral TID    pantoprazole  40 mg Oral QAM AC    spironolactone  25 mg Oral Daily    hydrALAZINE  10 mg Oral 3 times per day    sodium chloride flush  10 mL Intravenous 2 times per day      dextrose         Vitals :  BP (!) 175/70   Pulse 82   Temp 98.4 °F (36.9 °C) (Oral)   Resp 18   Ht 5' 2\" (1.575 m)   Wt 109 lb 8 oz (49.7 kg)   SpO2 93%   BMI 20.03 kg/m²     24HR INTAKE/OUTPUT:      Intake/Output Summary (Last 24 hours) at 3/1/2021 1151  Last data filed at 2/28/2021 1936  Gross per 24 hour   Intake 420 ml   Output 0 ml   Net 420 ml     Last 3 weights  Wt Readings from Last 3 Encounters:   03/01/21 109 lb 8 oz (49.7 kg)   02/12/21 106 lb 12.8 oz (48.4 kg)   02/04/21 106 lb (48.1 kg)           Physical Exam :  General Appearance:  Well developed.  No distress  Mouth/Throat:  Oral mucosa moist  Neck:  Supple, no JVD  Lungs:  Breath sounds: clear  Heart[de-identified]  S1,S2 heard  Abdomen:  Soft, non - tender  Musculoskeletal:  Edema -no edema noted         Last 3 CBC   Recent Labs     02/27/21  0830 02/28/21  0501 03/01/21  0539   WBC 4.8 4.3* 4.7*   RBC 4.48 4.17* 4.49   HGB 10.8* 10.2* 11.0*   HCT 36.9* 34.9* 37.1    177 205     Last 3 CMP  Recent Labs     02/27/21  0830 02/28/21  0501 03/01/21  0539    137 136   K 4.7 4.6 4.3    104 101   CO2 23 26 25   BUN 29* 24* 11   CREATININE 0.8 0.7 0.5   CALCIUM 9.1 8.5 8.9   LABALBU 3.7  --   --    BILITOT 0.6  --   --              Assessment    1. Renal - renal fx stable at baseline  2. Hyponatremia maintaining well. Continue salt tabs for now  ? Volume status appears reasonable and she is not in fluid overload  ? Currently on a low-dose of Bumex okay to continue for now  ? BUN improved as well.     3. Essential hypertension   4. Hx of dementia  5. Hx of diabetes Mellitus  6. Nausea/vomiting/diarrhea - now appears to be resolved  7. Small bilateral pleural effusions  8. Meds reviewed and discussed with patient and family at bedside     Overall no renal issues will sign off please call if situation changes      DRAGAN Monge D.  [facility].

## 2021-03-01 NOTE — CARE COORDINATION
DISCHARGE/PLANNING EVALUATION  3/1/21, 11:08 AM EST    Reason for Referral: From [facility]  Mental Status: Patient is alert and oriented  Decision Making: Patient is making own decisions, assisted by family as needed. Family/Social/Home Environment: Patient is from [facility] and has been there for about 2 years. Patient is assisted with medications and meals. Patient is hopeful to return to AL at discharge. Current Services including food security, transportation and housekeeping:  Assisted by AL staff  Current Equipment: walker  Payment Source: Medicare  Concerns or Barriers to Discharge:  Not at this time  Post acute provider list with quality measures, geographic area and applicable managed care information provided. Questions regarding selection process answered: offered and from AL    Teach Back Method used with Patient regarding care plan and discharge plan. Patient and family verbalize understanding of the plan of care and contribute to goal setting. Patient goals, treatment preferences and discharge plan: Patient hopeful to return to AL at discharge, family does transport. SW spoke with [facility] Garcia and if rehab is needed they will be able to accommodate.      Electronically signed by MOO Anaya, ANDRAE on 3/1/2021 at 11:08 AM

## 2021-03-01 NOTE — PROGRESS NOTES
Hospitalist Progress Note      Patient:  Rayshawn Badillo    Unit/Bed:6K-23/023-A  YOB: 1932  MRN: [id number]   Acct: [de-identified]   PCP: Arnol Viveros MD  Date of Admission: 2/27/2021    Assessment/Plan:    1. Acute on chronic watery diarrhea - resolved. Per GI, will increase protonix to twice a day, sucralfate slurry ACHS, metamucil daily, probiotic TID. At this time, patient is not considering EGD and will proceed with medical management. 2. A. fib with new-onset SVR -new onset SVR when compared to EKG 5/16/2020. On Eliquis. 3/1/21 Will add Lopressor 12.5 mg BID with holding parameters. Continue to monitor heart rate. Cardiology following. 14 day event monitor upon discharge. 3. Nausea and vomiting -resolved. Patient endorses 1-week history of nausea and vomiting around the same onset as the diarrhea. Gentle fluid hydration. 4. B/l pleural effusions -may be 2/2 to CHF. At this time she does not require thoracentesis. Patient is on room air, SpO2 93-95%. It is reasonable to recheck CXR for resolution of effusions. 5. Acute systolic on chronic diastolic HFpEF 57-34% NYHA 3 - hx CAD w/CABG RCA. In ED, negative trops, proBNP 4493.0. Last echo 5/16/2020. Continue Bumex 0.5 mg p.o. qd.  Strict I's and O's.  6. Severe PAH, group 2 -RSVP 60-65 mmHg on last echo. 7. DM II, controlled -HgbA1c 6.1. Continue SSI  8. Parkinson's disease -stable. Continue home meds. 9. Hyperlipidemia, unspecified type -lipid profile on 11/18/2020 unremarkable. Chief Complaint: Vomiting and diarrhea    Initial H and P:-    80year-old thin white F presents to [facility] ED from [facility] c/o diarrhea and emesis started approximately 2 days ago. Patient's daughter endorses pt having bouts of diarrhea which may last for weeks at a time and seems to be a chronic problem. Patient endorses 30 pound weight loss.   Positive for melena in the setting of iron use, unsure if BRBPR. Denies fever, recent travel, contact with sick people, headache. History A. fib on Eliquis, HFpEF 55 to 60% RSVP 60-65 mmHg, CAD CABG RCA, negative stress 11/2018, HTN, DM, Parkinson's. Previous admission for hyponatremia. Subjective (past 24 hours):   Patient seen and examined this morning. Family members at bedside. Patient denies chest pain, shortness of breath, abdominal pain, nausea or vomiting. States that she has not had any diarrhea episodes. Past medical history, family history, social history and allergies reviewed again and is unchanged since admission. ROS (12 point review of systems completed. Pertinent positives noted.  Otherwise ROS is negative)     Medications:  Reviewed    Infusion Medications    dextrose       Scheduled Medications    pantoprazole  40 mg Oral BID AC    sucralfate  1 g Oral 4x Daily AC & HS    psyllium  1 packet Oral Daily    lactobacillus  1 capsule Oral TID WC    bumetanide  0.5 mg Oral Daily    sodium chloride  1 g Oral Daily    acetaminophen  650 mg Oral TID    apixaban  2.5 mg Oral BID    aspirin  81 mg Oral Daily    atorvastatin  20 mg Oral Nightly    carbidopa-levodopa  2 tablet Oral 4x Daily    [Held by provider] dilTIAZem  30 mg Oral 3 times per day    isosorbide dinitrate  20 mg Oral TID    magnesium oxide  400 mg Oral TID    spironolactone  25 mg Oral Daily    hydrALAZINE  10 mg Oral 3 times per day    sodium chloride flush  10 mL Intravenous 2 times per day     PRN Meds: glucose, dextrose, glucagon (rDNA), dextrose, sodium chloride flush, promethazine **OR** ondansetron, acetaminophen **OR** acetaminophen, magnesium sulfate, potassium chloride **OR** potassium alternative oral replacement **OR** potassium chloride      Intake/Output Summary (Last 24 hours) at 3/1/2021 1500  Last data filed at 3/1/2021 1305  Gross per 24 hour   Intake 720 ml   Output 0 ml   Net 720 ml       Diet:  DIET CARB CONTROL; Carb Control: 4 carb choices (60 gms)/meal; Low Fat, Lactose Controlled, Low Caffeine  Dietary Nutrition Supplements: Diabetic Oral Supplement    Exam:  /61   Pulse 83   Temp 98.4 °F (36.9 °C) (Oral)   Resp 18   Ht 5' 2\" (1.575 m)   Wt 109 lb 8 oz (49.7 kg)   SpO2 93%   BMI 20.03 kg/m²   General appearance: Frail white elderly female, cooperative. HEENT: Pupils equal, round, and reactive to light. Conjunctivae/corneas clear. Neck: Supple, with full range of motion. No jugular venous distention. Trachea midline. Respiratory:  Normal respiratory effort. Bilaterally without Wheezes. Cardiovascular: Bradycardic and irregular rhythm. Heart sounds without gallops. Abdomen: Soft, mild tenderness right lower quadrant and right upper quadrant of the abdomen, non-distended with normal bowel sounds. Musculoskeletal: passive and active ROM x 4 extremities. Skin: Skin color, texture, turgor normal.  No rashes or lesions. Neurologic:  Neurovascularly intact without any focal sensory/motor deficits. Cranial nerves: II-XII intact, grossly non-focal.  Psychiatric: Alert and oriented, thought content appropriate, normal insight  Capillary Refill: Brisk,< 3 seconds   Peripheral Pulses: +2 palpable, equal bilaterally     Labs:   Recent Labs     02/27/21  0830 02/28/21  0501 03/01/21  0539   WBC 4.8 4.3* 4.7*   HGB 10.8* 10.2* 11.0*   HCT 36.9* 34.9* 37.1    177 205     Recent Labs     02/27/21  0830 02/28/21  0501 03/01/21  0539    137 136   K 4.7 4.6 4.3  4.3    104 101   CO2 23 26 25   BUN 29* 24* 11   CREATININE 0.8 0.7 0.5   CALCIUM 9.1 8.5 8.9     Recent Labs     02/27/21  0830   AST 14   ALT <5*   BILITOT 0.6   ALKPHOS 79     No results for input(s): INR in the last 72 hours. No results for input(s): Joshua Melanie in the last 72 hours.     Microbiology:    Blood culture #1:   Lab Results   Component Value Date    BC No growth-preliminary  No growth   10/17/2018       Blood culture #2:No results found for: BLOODCULT2    Organism:  Lab Results   Component Value Date    ORG Mixed Growth 11/26/2018       No results found for: LABGRAM    MRSA culture only:No results found for: [facility]    Urine culture:   Lab Results   Component Value Date    LABURIN No growth-preliminary  No growth   12/14/2018       Respiratory culture: No results found for: CULTRESP    Aerobic and Anaerobic :  No results found for: LABAERO  No results found for: LABANAE    Urinalysis:      Lab Results   Component Value Date    NITRU NEGATIVE 02/27/2021    WBCUA 25-50 11/26/2018    BACTERIA NONE 11/26/2018    RBCUA 3-5 11/26/2018    BLOODU NEGATIVE 02/27/2021    SPECGRAV 1.015 12/28/2019    GLUCOSEU NEGATIVE 02/27/2021       Radiology:  XR CHEST PORTABLE   Final Result   1. Mild cardiac megaly. Metallic sternotomy sutures. 2. Tiny bilateral pleural effusions. Moderate bibasilar atelectasis/pneumonia. 3. Overall appearance of chest has improved since prior. **This report has been created using voice recognition software. It may contain minor errors which are inherent in voice recognition technology. **      Final report electronically signed by Dr. Cora Lantigua on 2/27/2021 11:07 AM      CT ABDOMEN PELVIS WO CONTRAST   Final Result   1. Moderate-sized bilateral pleural effusions, larger on the right. Mild adjacent bibasilar atelectasis/pneumonia. 2. Findings of ileus/enteritis/malabsorption involving several small bowel loops. Findings of impending diarrhea. Cannot exclude gastritis. Calcifications scattered in the uterus, likely fibroid calcifications. **This report has been created using voice recognition software. It may contain minor errors which are inherent in voice recognition technology. **      Final report electronically signed by Dr. Cora Lantigua on 2/27/2021 9:57 AM        Ct Abdomen Pelvis Wo Contrast    Result Date: 2/27/2021  CT ABDOMEN AND PELVIS WITHOUT CONTRAST ENHANCEMENT: CLINICAL INFORMATION: Diarrhea and emesis that started 2 days ago. . COMPARISON: 10/15/2018 TECHNIQUE: Multiple axial 5 mm images of the abdomen, pelvis, and lung bases were obtained without the administration of oral or intravenous contrast material. Computer generated sagittal and coronal images of the abdomen and pelvis were also reconstructed. ALL CT SCANS AT THIS FACILITY use dose modulation, iterative reconstruction, and/or weight-based dosing when appropriate to reduce radiation dose to as low as reasonably achievable. FINDINGS: Lung bases: Moderate-sized bilateral pleural effusions, larger on the right. Mild adjacent bibasilar atelectasis/pneumonia. Densely calcified mitral annulus, consistent with atherosclerotic mitral valve disease. Liver: Liver unremarkable. The gallbladder is moderately distended, likely related to not eating recently. The gallbladder Wall is not thickened or edematous. No gallstones are seen. Pancreas, spleen and adrenal glands: Unremarkable Kidneys:  Unremarkable. No renal calculi. No cysts. No mass lesions. No hydronephrosis. . Bowel/Peritoneum: There appears be moderate thickening of the gastric wall. This may simple be due to underdistention of the stomach but cannot exclude gastritis. There is mild fluid distention of multiple small bowel loops, consistent with ileus/enteritis/malabsorption. There are a few air-fluid levels in the ascending and transverse colon, consistent with impending diarrhea. There is some radiodense material in the cecum, possibly milk of magnesia. Radiodense material extends into the appendix. The appendix is not engorged or edematous. No free air. No free fluid in the abdomen. No abnormally enlarged para-aortic lymph nodes are seen. There are extensive vascular calcifications involving the entire abdominal aorta, celiac artery, SMA,  and renal arteries. Bones : No evidence for acute fracture or bone destruction. Demineralization of the bones, consistent with osteoporosis.  Moderate disc space narrowing and degenerative disc disease L4-5 and L5-S1. Pelvis: Urinary bladder unremarkable. There is an InterStim device in the pelvis with leads extending through a mid sacral foramina left side. There is moderate calcification in the uterus, likely representing fibroid calcifications. 1. Moderate-sized bilateral pleural effusions, larger on the right. Mild adjacent bibasilar atelectasis/pneumonia. 2. Findings of ileus/enteritis/malabsorption involving several small bowel loops. Findings of impending diarrhea. Cannot exclude gastritis. Calcifications scattered in the uterus, likely fibroid calcifications. **This report has been created using voice recognition software. It may contain minor errors which are inherent in voice recognition technology. ** Final report electronically signed by Dr. Cris Arevalo on 2/27/2021 9:57 AM    Xr Chest Portable    Result Date: 2/27/2021  PROCEDURE: XR CHEST PORTABLE CLINICAL INFORMATION: BRADYCARDIA COMPARISON: 5/20/2020 TECHNIQUE: A single mobile view of the chest was obtained. 1. Mild cardiac megaly. Metallic sternotomy sutures. 2. Tiny bilateral pleural effusions. Moderate bibasilar atelectasis/pneumonia. 3. Overall appearance of chest has improved since prior. **This report has been created using voice recognition software. It may contain minor errors which are inherent in voice recognition technology. ** Final report electronically signed by Dr. Cris Arevalo on 2/27/2021 11:07 AM    12-lead EKG 2/27/21:    VR 39bpm, AR 43bpm, , Q-Tc Bazett 431ms    A fib slow ventricular response   Left axis deviation  Left bundle branch block  Abnormal ECG  When compared with ECG of 27-FEB-2021 08:25,  no changes   Confirmed by Jenifer Morales ([id number]) on 2/28/2021 8:48:20 AM      Electronically signed by Jenifer Dykes MD PGY-1 Internal Medicine Resident on 3/1/2021 at 3:00 PM

## 2021-03-01 NOTE — PROGRESS NOTES
Cardiology Progress Note      Patient:  Shelbie Thomas  YOB: 1932  MRN: [id number]   Acct: [de-identified]  Admit Date:  2/27/2021  Primary Cardiologist: Carlos Cole MD    Note per dr Lillie Cedillo OF CONSULTATION:  AFib with slow ventricular response and  bradycardia in an 72-year-old female patient presented from nursing home  because of nausea, vomiting and diarrhea over the last couple of days.     HISTORY OF PRESENT ILLNESS:  This is an 72-year-old  female  patient, was in usual state of health until Friday when she started to  have nausea and vomiting and diarrhea. The diarrhea was watery, several  times and the vomiting was also of ingested material with several times  and was not taking or drinking any fluid and was eating because of  nausea, vomiting and diarrhea. So, in view of that, presented yesterday  morning to the emergency room and got admitted. During admission, she  has been noted also with AFib with slow ventricular response, underlying  left bundle-branch block and she was bradycardic and Cardiology  evaluation was sought in view of the bradycardia. She was noted also  BNP was elevated and chest x-ray showed bilateral pleural  effusions/atelectasis and so, she has history of congestive heart  failure and diuretics one dose has been given.     In view of the recurrent nausea, vomiting and diarrhea, she has been  having episodes of nausea, vomiting and diarrhea in the last several  weeks and three weeks ago, her diuretics was stopped because of nausea,  vomiting, diarrhea and she tends to be on the dehydration side. She was  taking Bumex 1 mg daily and that has been stopped three weeks ago, and  she is taking only spironolactone at this time.     As I stated, the patient denied any chest pain. No shortness of breath. No orthopnea, paroxysmal nocturnal dyspnea. She had leg swelling  yesterday when she came.   She has been given diuretics IV, and overnight  the short of breath and the leg swelling seems to be resolved. No fever  or chills. No syncope. No dizziness. She has been on Cardizem and  metoprolol, and both the Cardizem and metoprolol has been held overnight  and so at this time, her heart rate came up to the 60 to 63 beats per  minute. She was on metoprolol 25 twice a day and she was on Deltasone  30 mg twice a day. So, currently she is comfortable. The diarrhea had  subsided. No diarrhea since admission. Heart rate comes up and blood  pressure is a little on the higher side. Renal function seems to be  stable. As I stated, Cardiology evaluation was sought in view of the  bradycardia and AFib with slow ventricular response. \"    Subjective (Events in last 24 hours): pt awake and alert. NAD.  No cp or sob      Objective:   /61   Pulse 83   Temp 98.4 °F (36.9 °C) (Oral)   Resp 18   Ht 5' 2\" (1.575 m)   Wt 109 lb 8 oz (49.7 kg)   SpO2 93%   BMI 20.03 kg/m²        TELEMETRY: nsr 70s    Physical Exam:  General Appearance: alert and oriented to person, place and time, in no acute distress  Cardiovascular: normal rate, regular rhythm, normal S1 and S2, no murmurs, rubs, clicks, or gallops, distal pulses intact, no carotid bruits, no JVD  Pulmonary/Chest: clear to auscultation bilaterally- no wheezes, rales or rhonchi, normal air movement, no respiratory distress  Abdomen: soft, non-tender, non-distended, normal bowel sounds, no masses Extremities: no cyanosis, clubbing or edema, pulse   Skin: warm and dry, no rash or erythema  Head: normocephalic and atraumatic  Eyes: pupils equal, round, and reactive to light  Neck: supple and non-tender without mass, no thyromegaly   Musculoskeletal: normal range of motion, no joint swelling, deformity or tenderness  Neurological: alert, oriented, normal speech, no focal findings or movement disorder noted    Medications:    pantoprazole  40 mg Oral BID AC    sucralfate  1 g Oral 4x Daily AC & HS    psyllium  1 packet Oral Daily    lactobacillus  1 capsule Oral TID     bumetanide  0.5 mg Oral Daily    sodium chloride  1 g Oral Daily    acetaminophen  650 mg Oral TID    apixaban  2.5 mg Oral BID    aspirin  81 mg Oral Daily    atorvastatin  20 mg Oral Nightly    carbidopa-levodopa  2 tablet Oral 4x Daily    [Held by provider] dilTIAZem  30 mg Oral 3 times per day    isosorbide dinitrate  20 mg Oral TID    magnesium oxide  400 mg Oral TID    spironolactone  25 mg Oral Daily    hydrALAZINE  10 mg Oral 3 times per day    sodium chloride flush  10 mL Intravenous 2 times per day      dextrose           glucose, 15 g, PRN      dextrose, 12.5 g, PRN      glucagon (rDNA), 1 mg, PRN      dextrose, 100 mL/hr, PRN      sodium chloride flush, 10 mL, PRN      promethazine, 12.5 mg, Q6H PRN    Or      ondansetron, 4 mg, Q6H PRN      acetaminophen, 650 mg, Q6H PRN    Or      acetaminophen, 650 mg, Q6H PRN      magnesium sulfate, 2,000 mg, PRN      potassium chloride, 40 mEq, PRN    Or      potassium alternative oral replacement, 40 mEq, PRN    Or      potassium chloride, 10 mEq, PRN      Lab Data:    Cardiac Enzymes:  No results for input(s): CKTOTAL, CKMB, CKMBINDEX, TROPONINI in the last 72 hours.     CBC:   Lab Results   Component Value Date    WBC 4.7 03/01/2021    RBC 4.49 03/01/2021    RBC 4.36 02/04/2021    HGB 11.0 03/01/2021    HCT 37.1 03/01/2021     03/01/2021       CMP:    Lab Results   Component Value Date     03/01/2021    K 4.3 03/01/2021     03/01/2021    CO2 25 03/01/2021    BUN 11 03/01/2021    CREATININE 0.5 03/01/2021    LABGLOM >90 03/01/2021    GLUCOSE 139 03/01/2021    GLUCOSE 180 02/04/2021    CALCIUM 8.9 03/01/2021       Hepatic Function Panel:    Lab Results   Component Value Date    ALKPHOS 79 02/27/2021    ALT <5 02/27/2021    AST 14 02/27/2021    PROT 6.6 02/27/2021    BILITOT 0.6 02/27/2021    BILITOT NEGATIVE 09/21/2018    BILIDIR <0.2 10/22/2018    LABALBU 3.7 02/27/2021    LABALBU 4.1 01/12/2012       Magnesium:    Lab Results   Component Value Date    MG 1.9 02/27/2021       PT/INR:    Lab Results   Component Value Date    INR 1.42 05/16/2020       HgBA1c:    Lab Results   Component Value Date    LABA1C 6.1 02/27/2021       FLP:    Lab Results   Component Value Date    TRIG 183 11/18/2020    HDL 31 11/18/2020    LDLCALC 50 11/18/2020    LDLDIRECT 220.21 03/07/2016    LABVLDL 22 01/25/2018       TSH:    Lab Results   Component Value Date    TSH 3.09 02/04/2021         Assessment:    Gastroenteritis with nausea/vomiting/diarrhea  - attending following  afib SVR - improved to 70s   On eliquis prior to admission  Acute on chronic diastolic CHF  Bilateral pleural effusions  Hx hyponatremia  Hx CAD - s/p CABG  Ef 55-60 per TTE 5/18/20  Severe PHTN  DM  Hx parkinsons       Plan:    Keep mag >2 and K >4  Normal tsh 2/4/21  Cont asa/statin/isordil/hydralazine/bumex/aldactone  BB and cdz stopped  If HR >80, consider lower dose of lopressor of 12.5 bid   14 day Event monitor upon dc  Will see prn  F/up dr Traci Corea 3-4 weeks  Call with any questions of concerns         Electronically signed by Katt Higuera PA-C on 3/1/2021 at 1:40 PM

## 2021-03-01 NOTE — PROGRESS NOTES
Patient transferred to [facility] at this time, vital signs stable. Care relinquished to [address] [place] [address].

## 2021-03-01 NOTE — PLAN OF CARE
Problem: Nutrition  Goal: Optimal nutrition therapy  Outcome: Ongoing  Nutrition Problem #1: Severe malnutrition  Intervention: Food and/or Nutrient Delivery: Continue Current Diet, Start Oral Nutrition Supplement  Nutritional Goals: Pt.will tolerate and consume 75% or more of meals to promote wound healing during LOS.

## 2021-03-02 VITALS
SYSTOLIC BLOOD PRESSURE: 126 MMHG | HEIGHT: 62 IN | OXYGEN SATURATION: 96 % | HEART RATE: 64 BPM | RESPIRATION RATE: 16 BRPM | BODY MASS INDEX: 19.47 KG/M2 | DIASTOLIC BLOOD PRESSURE: 59 MMHG | WEIGHT: 105.82 LBS | TEMPERATURE: 98.2 F

## 2021-03-02 LAB
ANION GAP SERPL CALCULATED.3IONS-SCNC: 8 MEQ/L (ref 8–16)
ANISOCYTOSIS: PRESENT
BASOPHILS # BLD: 0.4 %
BASOPHILS ABSOLUTE: 0 THOU/MM3 (ref 0–0.1)
BUN BLDV-MCNC: 11 MG/DL (ref 7–22)
CALCIUM SERPL-MCNC: 9.1 MG/DL (ref 8.5–10.5)
CHLORIDE BLD-SCNC: 98 MEQ/L (ref 98–111)
CO2: 29 MEQ/L (ref 23–33)
CREAT SERPL-MCNC: 0.6 MG/DL (ref 0.4–1.2)
EOSINOPHIL # BLD: 5.6 %
EOSINOPHILS ABSOLUTE: 0.3 THOU/MM3 (ref 0–0.4)
ERYTHROCYTE [DISTWIDTH] IN BLOOD BY AUTOMATED COUNT: 23.3 % (ref 11.5–14.5)
ERYTHROCYTE [DISTWIDTH] IN BLOOD BY AUTOMATED COUNT: 66.9 FL (ref 35–45)
GFR SERPL CREATININE-BSD FRML MDRD: > 90 ML/MIN/1.73M2
GLUCOSE BLD-MCNC: 136 MG/DL (ref 70–108)
HCT VFR BLD CALC: 36.5 % (ref 37–47)
HEMOGLOBIN: 10.9 GM/DL (ref 12–16)
IMMATURE GRANS (ABS): 0.02 THOU/MM3 (ref 0–0.07)
IMMATURE GRANULOCYTES: 0.4 %
LYMPHOCYTES # BLD: 28.7 %
LYMPHOCYTES ABSOLUTE: 1.4 THOU/MM3 (ref 1–4.8)
MCH RBC QN AUTO: 24.5 PG (ref 26–33)
MCHC RBC AUTO-ENTMCNC: 29.9 GM/DL (ref 32.2–35.5)
MCV RBC AUTO: 82 FL (ref 81–99)
MONOCYTES # BLD: 13.4 %
MONOCYTES ABSOLUTE: 0.6 THOU/MM3 (ref 0.4–1.3)
NUCLEATED RED BLOOD CELLS: 0 /100 WBC
PLATELET # BLD: 203 THOU/MM3 (ref 130–400)
PMV BLD AUTO: 9.4 FL (ref 9.4–12.4)
POTASSIUM REFLEX MAGNESIUM: 4.1 MEQ/L (ref 3.5–5.2)
RBC # BLD: 4.45 MILL/MM3 (ref 4.2–5.4)
SEG NEUTROPHILS: 51.5 %
SEGMENTED NEUTROPHILS ABSOLUTE COUNT: 2.5 THOU/MM3 (ref 1.8–7.7)
SODIUM BLD-SCNC: 135 MEQ/L (ref 135–145)
WBC # BLD: 4.8 THOU/MM3 (ref 4.8–10.8)

## 2021-03-02 PROCEDURE — 36415 COLL VENOUS BLD VENIPUNCTURE: CPT

## 2021-03-02 PROCEDURE — 6370000000 HC RX 637 (ALT 250 FOR IP): Performed by: INTERNAL MEDICINE

## 2021-03-02 PROCEDURE — 2580000003 HC RX 258: Performed by: INTERNAL MEDICINE

## 2021-03-02 PROCEDURE — 97162 PT EVAL MOD COMPLEX 30 MIN: CPT

## 2021-03-02 PROCEDURE — 80048 BASIC METABOLIC PNL TOTAL CA: CPT

## 2021-03-02 PROCEDURE — 97165 OT EVAL LOW COMPLEX 30 MIN: CPT

## 2021-03-02 PROCEDURE — 99238 HOSP IP/OBS DSCHRG MGMT 30/<: CPT | Performed by: INTERNAL MEDICINE

## 2021-03-02 PROCEDURE — 93246 EXT ECG>7D<15D RECORDING: CPT

## 2021-03-02 PROCEDURE — 6370000000 HC RX 637 (ALT 250 FOR IP): Performed by: NURSE PRACTITIONER

## 2021-03-02 PROCEDURE — 97530 THERAPEUTIC ACTIVITIES: CPT

## 2021-03-02 PROCEDURE — 97110 THERAPEUTIC EXERCISES: CPT

## 2021-03-02 PROCEDURE — 85025 COMPLETE CBC W/AUTO DIFF WBC: CPT

## 2021-03-02 PROCEDURE — 6370000000 HC RX 637 (ALT 250 FOR IP): Performed by: STUDENT IN AN ORGANIZED HEALTH CARE EDUCATION/TRAINING PROGRAM

## 2021-03-02 RX ORDER — LACTOBACILLUS RHAMNOSUS GG 10B CELL
1 CAPSULE ORAL
Qty: 20 CAPSULE | Refills: 0 | Status: SHIPPED | OUTPATIENT
Start: 2021-03-02 | End: 2021-12-09

## 2021-03-02 RX ORDER — PANTOPRAZOLE SODIUM 40 MG/1
40 TABLET, DELAYED RELEASE ORAL
Qty: 30 TABLET | Refills: 1 | Status: SHIPPED | OUTPATIENT
Start: 2021-03-02

## 2021-03-02 RX ORDER — SUCRALFATE 1 G/1
1 TABLET ORAL
Qty: 120 TABLET | Refills: 1 | Status: SHIPPED | OUTPATIENT
Start: 2021-03-02 | End: 2021-03-02

## 2021-03-02 RX ORDER — SUCRALFATE 1 G/1
1 TABLET ORAL
Qty: 120 TABLET | Refills: 1 | Status: SHIPPED | OUTPATIENT
Start: 2021-03-02

## 2021-03-02 RX ADMIN — BUMETANIDE 0.5 MG: 1 TABLET ORAL at 08:43

## 2021-03-02 RX ADMIN — SODIUM CHLORIDE, PRESERVATIVE FREE 10 ML: 5 INJECTION INTRAVENOUS at 08:44

## 2021-03-02 RX ADMIN — HYDRALAZINE HYDROCHLORIDE 10 MG: 10 TABLET, FILM COATED ORAL at 06:17

## 2021-03-02 RX ADMIN — CARBIDOPA AND LEVODOPA 2 TABLET: 25; 100 TABLET ORAL at 11:23

## 2021-03-02 RX ADMIN — MAGNESIUM GLUCONATE 500 MG ORAL TABLET 400 MG: 500 TABLET ORAL at 14:18

## 2021-03-02 RX ADMIN — ISOSORBIDE DINITRATE 20 MG: 20 TABLET ORAL at 08:43

## 2021-03-02 RX ADMIN — METOPROLOL TARTRATE 12.5 MG: 25 TABLET ORAL at 08:43

## 2021-03-02 RX ADMIN — SUCRALFATE 1 G: 1 TABLET ORAL at 11:23

## 2021-03-02 RX ADMIN — MAGNESIUM GLUCONATE 500 MG ORAL TABLET 400 MG: 500 TABLET ORAL at 08:42

## 2021-03-02 RX ADMIN — ASPIRIN 81 MG: 81 TABLET, COATED ORAL at 08:43

## 2021-03-02 RX ADMIN — SPIRONOLACTONE 25 MG: 25 TABLET ORAL at 08:42

## 2021-03-02 RX ADMIN — CARBIDOPA AND LEVODOPA 2 TABLET: 25; 100 TABLET ORAL at 08:43

## 2021-03-02 RX ADMIN — PANTOPRAZOLE SODIUM 40 MG: 40 TABLET, DELAYED RELEASE ORAL at 06:17

## 2021-03-02 RX ADMIN — APIXABAN 2.5 MG: 2.5 TABLET, FILM COATED ORAL at 08:43

## 2021-03-02 RX ADMIN — PSYLLIUM HUSK 1 PACKET: 3.4 GRANULE ORAL at 08:43

## 2021-03-02 RX ADMIN — ISOSORBIDE DINITRATE 20 MG: 20 TABLET ORAL at 14:18

## 2021-03-02 RX ADMIN — Medication 1 CAPSULE: at 11:23

## 2021-03-02 RX ADMIN — Medication 1 CAPSULE: at 08:43

## 2021-03-02 RX ADMIN — SUCRALFATE 1 G: 1 TABLET ORAL at 06:17

## 2021-03-02 RX ADMIN — HYDRALAZINE HYDROCHLORIDE 10 MG: 10 TABLET, FILM COATED ORAL at 14:18

## 2021-03-02 RX ADMIN — Medication 1 G: at 08:42

## 2021-03-02 ASSESSMENT — PAIN SCALES - GENERAL
PAINLEVEL_OUTOF10: 0

## 2021-03-02 NOTE — PLAN OF CARE
Problem: Falls - Risk of:  Goal: Will remain free from falls  Description: Will remain free from falls  3/2/2021 0143 by Sheryle Pares, RN  Outcome: Met This Shift  3/1/2021 1840 by Gregory Garrett RN  Outcome: Met This Shift  Note: Patient has been up to the bathroom and up to the chair with minimal assist and use of walker, gait steady. Goal: Absence of physical injury  Description: Absence of physical injury  Outcome: Met This Shift     Problem: Daily Care:  Goal: Daily care needs are met  Description: Daily care needs are met  3/2/2021 0143 by Sheryle Pares, RN  Outcome: Met This Shift  3/1/2021 1840 by Gregory Garrett RN  Outcome: Met This Shift     Problem: DISCHARGE BARRIERS  Goal: Patient's continuum of care needs are met  3/2/2021 0143 by Sheryle Pares, RN  Outcome: Ongoing  3/1/2021 1840 by Gregory Garrett RN  Outcome: Ongoing  Note: Planning on returning to assisted living at discharge.      Problem: Nutrition  Goal: Optimal nutrition therapy  3/2/2021 0143 by Sheryle Pares, RN  Outcome: Ongoing  3/1/2021 1459 by Adelita Tuttle RD, LD  Outcome: Ongoing

## 2021-03-02 NOTE — PROGRESS NOTES
Madeleine Washington 60  INPATIENT OCCUPATIONAL THERAPY  [facility] RENAL TELEMETRY 6K  EVALUATION    Time:   Time In: 1430  Time Out: 1500  Timed Code Treatment Minutes: 15 Minutes  Minutes: 30          Date: 3/2/2021  Patient Name: Bailey Foreman,   Gender: female      MRN: [id number]  : 1932  (80 y.o.)  Referring Practitioner: Dr. Dariusz Baker  Diagnosis: symptomatic sinus bradycardia  Additional Pertinent Hx: Per ER note on 2021:88 y.o. female who presents with complaint of emesis and diarrhea for the past 1 week. Restrictions/Precautions:  Restrictions/Precautions: Fall Risk  Position Activity Restriction  Other position/activity restrictions: h/o Parkinson's    Vitals: Vitals not assessed per clinical judgement, see nursing flowsheet    Subjective  Chart Reviewed: Yes, Orders, History and Physical  Patient assessed for rehabilitation services?: Yes    Subjective: Pleasant and cooperative  Comments: RN approved session. Pt was being discharged to assisted living. Pt denied pain. She had already completed some walking and leg exercises today and had already dressed. She wanted to try to have a BM. She demonstrated some pelvic and abdominal exercises. Pain:  Pain Assessment  Patient Currently in Pain: Denies    Social/Functional History:  Lives With: Alone  Type of Home: Assisted living  Home Layout: One level  Home Access: Level entry  Home Equipment: 4 wheeled walker   Bathroom Shower/Tub: Walk-in shower  Bathroom Toilet: Standard  Bathroom Equipment: Grab bars in shower  Bathroom Accessibility: [id number] Help From: Other (comment)(staff as available)  ADL Assistance: Needs assistance  Homemaking Assistance: Needs assistance  Homemaking Responsibilities: No  Ambulation Assistance: Independent  Transfer Assistance: Independent    Active : No  Patient's  Info: family assists  Leisure & Hobbies: Listening to music  Additional Comments: Pt amb with rollator I'ly.   She had help with dressing at times. She did her spongebath independently. Cognition/Orientation:  Overall Orientation Status: Within Normal Limits  Overall Cognitive Status: [facility]  Cognition Comment: Cues for new learning    ADL's:  Grooming: Stand by assistance(washing her hands at the sink)  Toileting: Stand by assistance(clothing management and wiping after having voided)  Additional Comments: None demonstrated at this time secondary to pt refusal.  Vision - Basic Assessment  Prior Vision: Wears glasses all the time    Functional Mobility:       Functional Mobility  Functional - Mobility Device: Rolling Walker  Activity: To/from bathroom  Assist Level: Stand by assistance  Functional Mobility Comments: Pt shows forward posture and small steps. No LOB.      Balance:  Balance  Sitting Balance: Supervision  Standing Balance: Stand by assistance(preparing to walk; finishing her self care in the bathroom)  Standing Balance  Time: 1.5 minutes  Activity: Finishing self care in the bathroom    Transfers:  Sit to stand: Supervision(from the recliner chair)  Stand to sit: Stand by assistance(to the W/C)  Toilet Transfers  Toilet - Technique: Ambulating  Equipment Used: Grab bars  Toilet Transfer: Supervision    Upper Extremity Assessment:Hand Dominance: Right  LUE AROM : WFL  Left Hand AROM: WFL  RUE AROM : WFL  Right Hand AROM: WFL    LUE Strength  L Hand General: 4/5  LUE Strength Comment: 3+/5 deltoid; 3+/5 pectoral; 4-/5 biceps; 4/5 triceps  RUE Strength  R Hand General: 4/5  RUE Strength Comment: 3+/5 deltoid; 3+/5 pectoral; 4-/5 biceps; 4/5 triceps    Sensation  Overall Sensation Status: [facility]  Fine Motor Skills  Fine Motor Comment: Pt was able to do her bathroom routine with difficulties using B hands       Activity Tolerance: Patient Tolerated treatment well  Pt was standing for 1.5 minutes while finishing her toileting and grooming in the bathroom    Assessment:  Assessment: Patient would benefit from continued skilled OT services to address above deficits. She presents with symptomatic sinus bradycardia. She has hx of Parkinson's and also a spinal cord stimulator for treatment of urinary frequency. She indicated that she still wakes up during the night to urinate. She was doing her own self care and ambulating with a 4 wheeled walker independently prior to admission. She walked with SBA using a rolling walker at this time. She made a trip to the bathroom and did her own toileting and grooming at the sink. Performance deficits / Impairments: Decreased functional mobility , Decreased strength, Decreased ADL status  Prognosis: Good  REQUIRES OT FOLLOW UP: Yes  Decision Making: Low Complexity    Treatment Initiated: Treatment and education initiated within context of evaluation. Evaluation time included review of current medical information, gathering information related to past medical, social and functional history, completion of standardized testing, formal and informal observation of tasks, assessment of data and development of plan of care and goals. Treatment time included skilled education and facilitation of tasks to increase safety and independence with ADL's for improved functional independence and quality of life. Pt demonstrated some exercises while in sitting: with arms folded in front of her, shoulder flexion and horizontal abduction/horizontal adduction x 5 reps each. Verbal cues provided for attending to her abdominal muscles while doing these exercises. Pt also attempted some pelvic floor exercises- butt squeezes in sitting. Pt had difficulty isolating the muscles of the pelvic floor at this time. A handout was provided. She demonstrated hip adduction while using a blanket rolled between her knees- x 5 reps of squeezing and relaxing. Cues for doing them slowly. Pt demonstrated understanding. Exercises completed to increase pt's strength for ease of doing self care such as urinary continence. Discharge Recommendations:  Patient would benefit from continued therapy after discharge, [address]    Patient Education:  OT Education: OT Role, Plan of Care, Home Exercise Program  Patient Education: trunk exercises while in sitting; pelvic floor exercises while sitting and laying down    Equipment Recommendations:  Equipment Needed: No    Plan:  Times per week: Not applicable  Plan Comment: Pt would benefit from continued skilled OT services while medically stable and discharged from Acute. OT recommended at [facility]. Specific instructions for Next Treatment: Not applicable    Goals:  Patient goals : \"Get back to being able to do things independently. \" pt states. Short term goals  Time Frame for Short term goals: By discharge  Short term goal 1: Pt will complete core strengthening exercises with demonstration and any handout needed to increase her strength for ease of maintaining continence of urine. Long term goals  Time Frame for Long term goals : None secondary to short estimated length of stay. See long-term goal time frame for expected duration of plan of care. If no long-term goals established, a short length of stay is anticipated. Following session, patient left in safe position with all fall risk precautions in place.

## 2021-03-02 NOTE — PROGRESS NOTES
Present to pt room for consult of left lower leg wound. Pt had skin tear in December to LLE from her jessie hose. Pt has very fragile skin and venous insufficiency to BLE. Pt has bordered foam dressing in place. Removed old dressing. Cleansed wound with normal saline and gauze. Pat dry with clean gauze. Wound photo and assessment below. Applied new bordered foam dressing. Recommend pt to continue current treatment of foam and compression. Follow up with outpatient wound clinic in a couple of weeks unless wound is healed, then can call and cancel. Bed in low, call light in reach.           Wound type: left lower leg traumatic wound POA  Wound size: 0.6cm x 0.7cm x 0.05cm  Undermining or Tunneling: none  Wound assessment/color: 100% pink  Drainage amount: scant  Drainage description: serosanguinous  Odor: none  Margins: attached  Amanda wound: dry, venous staining  Exposed structure: none

## 2021-03-02 NOTE — PROGRESS NOTES
[address] [address]  INPATIENT PHYSICAL THERAPY  EVALUATION  [facility]Z RENAL TELEMETRY 6K - 6K-23/023-A    Time In: 2196  Time Out: 1153  Timed Code Treatment Minutes: 23 Minutes  Minutes: 34          Date: 3/2/2021  Patient Name: Zelda Dudley,  Gender:  female        MRN: [id number]  : 1932  (80 y.o.)      Referring Practitioner: Federico Holden MD  Diagnosis: Symptomatic sinus bradycardia  Additional Pertinent Hx: 80year-old thin white F presents to [facility] ED from [facility] c/o diarrhea and emesis started approximately 2 days ago. Patient's daughter endorses pt having bouts of diarrhea which may last for weeks at a time and seems to be a chronic problem. Patient endorses 30 pound weight loss. Positive for melena in the setting of iron use, unsure if BRBPR. Denies fever, recent travel, contact with sick people, headache. History A. fib on Eliquis, HFpEF 55 to 60% RSVP 60-65 mmHg, CAD CABG RCA, negative stress 2018, HTN, DM, Parkinson's. Previous admission for hyponatremia. Restrictions/Precautions:  Restrictions/Precautions: Fall Risk  Position Activity Restriction  Other position/activity restrictions: h/o Parkinson's    Subjective:  Chart Reviewed: Yes  Patient assessed for rehabilitation services?: Yes  Family / Caregiver Present: Yes  Subjective: RN approved session, pt is supine in bed with daughter present, pleasant and agreeable to PT.     General:  Overall Orientation Status: Within Functional Limits  Follows Commands: Within Functional Limits    Vision: Impaired  Vision Exceptions: Wears glasses at all times    Hearing: Within functional limits         Pain: denies    Social/Functional History:    Type of Home: Assisted living([facility])  Home Layout: One level  Home Access: Level entry  Home Equipment: 4 wheeled walker   Ambulation Assistance: Independent  Transfer Assistance: Independent        Additional Comments: Pt amb with rollator I'ly    OBJECTIVE:  Range of Motion:  Bilateral Lower Extremity: WFL    Strength:  Bilateral Lower Extremity: Impaired - grossly 4/5    Balance:  Static Sitting Balance:  Stand By Assistance  Static Standing Balance: Contact Guard Assistance  Dynamic Standing Balance: Contact Guard Assistance; performs pericare and hand washing with CGA for balance, pt reaching within DION    Bed Mobility:  Supine to Sit: Stand By Assistance; HOB elevated, use of bed rail    Transfers:  Sit to Stand: [address]; several trials, from EOB, toilet and chair with arm rests  Stand to Sit:Contact [employer] Assistance    Ambulation:  Contact Guard Assistance  Distance: 40 feet x 2, 15 feet  Surface: Level Tile  Device:Rolling Walker  Gait Deviations: Forward Flexed Posture, Slow Deidre, Decreased Step Length Bilaterally and Decreased Gait Speed  **Slow, guarded pace, decreased step length    Exercise:  Patient was guided in 1 set(s) 10 reps of exercise to both lower extremities. Ankle pumps, Heelslides, Hip abduction/adduction and Straight leg raises. Exercises were completed for increased independence with functional mobility. Functional Outcome Measures: Completed  AM-PAC Inpatient Mobility Raw Score : 18  AM-PAC Inpatient T-Scale Score : 43.63    ASSESSMENT:  Activity Tolerance:  Patient tolerance of  treatment: good. Treatment Initiated: Treatment and education initiated within context of evaluation. Evaluation time included review of current medical information, gathering information related to past medical, social and functional history, completion of standardized testing, formal and informal observation of tasks, assessment of data and development of plan of care and goals. Treatment time included skilled education and facilitation of tasks to increase safety and independence with functional mobility for improved independence and quality of life. See above exercises, additional ambulation, balance tasks. Assessment:   Body structures, Functions, Activity limitations: Decreased functional mobility , Decreased balance, Decreased endurance, Decreased strength  Assessment: Pt tolerates session well, limited by generalized weakness, pt stating she just feels weak, requires constant CGA when up. Pt could benefit from short term rehab stay. PT to continue to progress strength and functional mobility. Prognosis: Good    REQUIRES PT FOLLOW UP: Yes    Discharge Recommendations:  Discharge Recommendations: [address]    Patient Education:  PT Education: PT Role, Plan of Care    Equipment Recommendations:  Equipment Needed: No    Plan:  Times per week: 3-5x GM  Current Treatment Recommendations: Strengthening, Functional Mobility Training, Neuromuscular Re-education, Transfer Training, Gait Training, Balance Training, Endurance Training, Patient/Caregiver Education & Training, Safety Education & Training    Goals:  Patient goals : to get stronger  Short term goals  Time Frame for Short term goals: by discharge  Short term goal 1: Pt to transfer supine <--> sit S to enable pt to get in/out of bed. Short term goal 2: Pt to transfer sit <--> stand S for increased functional mobility. Short term goal 3: Pt to ambulate >200 feet with RW SBA for household ambulation. Long term goals  Time Frame for Long term goals : NA due to short length of stay. Following session, patient left in safe position with all fall risk precautions in place.

## 2021-03-02 NOTE — CARE COORDINATION
3/2/21, 1:38 PM EST    Patient goals/plan/ treatment preferences discussed by  and . Patient goals/plan/ treatment preferences reviewed with patient/ family. Patient/ family verbalize understanding of discharge plan and are in agreement with goal/plan/treatment preferences. Understanding was demonstrated using the teach back method. AVS provided by RN at time of discharge, which includes all necessary medical information pertaining to the patients current course of illness, treatment, post-discharge goals of care, and treatment preferences. IMM Letter  IMM Letter given to Patient/Family/Significant other/Guardian/POA/by[de-identified] Jose Miguel Balbuena CM  IMM Letter date given[de-identified] 03/02/21  IMM Letter time given[de-identified] 1       JOAO spoke with pt and daughter, plan is to return to [address] [place] [address],[address]. Discharge today. Daughter to transport her. JOAO notified Stephanie Simeon at the [address] [place] [address]. JOAO will fax AVS when completed. LUCAS Canas is aware.      2:18 PM update:  AVS faxed to the Assisted Living

## 2021-03-02 NOTE — DISCHARGE SUMMARY
Hospital Medicine Discharge Summary      Patient Identification:   Bethany Bellamy   : 1932  MRN: [id number]   Account: [de-identified]      Patient's PCP: Jimmy Hoover MD    Admit Date: 2021     Discharge Date:   3/2/2021    Admitting Physician: Ck Langley DO     Discharge Physician: Tierra Bray MD PGY-1     Discharge Diagnoses:  1. Acute on chronic watery diarrhea - resolved. Patient was treated medically. No EGD was done due to patient's wishes. She will follow with GI as outpatient. 2. A. fib with new-onset SVR       3/2/21 Continue Lopressor 12.5 mg BID  14 day event monitor at discharge. 3. Nausea and vomiting -resolved. 4. B/l pleural effusions -may be 2/2 to CHF. At this time she does not require thoracentesis. Patient remained on room air throughout her hospital stay, SpO2 93-95%. It is reasonable to recheck CXR for resolution of effusions a week after discharge. 5. Acute systolic on chronic diastolic HFpEF 54-26% NYHA 3 - hx CAD w/CABG RCA. In ED, negative trops, proBNP 4493.0. Last echo 2020.    6. Severe PAH, group 2 -RSVP 60-65 mmHg on last echo. 7. DM II, controlled -HgbA1c 6.1. Continue home medications  8. Parkinson's disease -stable. Continue home meds. 9. Hyperlipidemia, unspecified type -lipid profile on 2020 unremarkable. The patient was seen and examined on day of discharge and this discharge summary is in conjunction with any daily progress note from day of discharge. Hospital Course:   Bethany Bellamy is a 80 y.o. female admitted to [address] [place] [address] on 2021 for diarrhea. She presented to [facility] ED from retirement c/o diarrhea and emesis started approximately 2 days ago. Patient's daughter endorses pt having bouts of diarrhea which may last for weeks at a time and seems to be a chronic problem. Patient endorses 30 pound weight loss. Positive for melena in the setting of iron use, unsure if BRBPR.   Denies fever, recent travel, contact with sick people, headache.     History A. fib on Eliquis, HFpEF 55 to 60% RSVP 60-65 mmHg, CAD CABG RCA, negative stress 11/2018, HTN, DM, Parkinson's. Previous admission for hyponatremia. 3/2 Patient did not have any diarrhea episodes while hospitalized. Unclear etiology of her diarrhea. Labs were ordered but there was no stool sample to test. Patient was noticed to have bradycardia and her Lopressor was changed to 12.5 mg BID prior to discharge. Cardiology and GI was consulted. Due to her code status being DNR-CCA, patient did not have EGD performed. She was medically treated. She was discharged with 14 days cardiac event monitor. Exam:     Vitals:  Vitals:    03/02/21 0450 03/02/21 0612 03/02/21 0830 03/02/21 1115   BP: (!) 145/68 (!) 169/86 (!) 139/57 (!) 126/59   Pulse: 67 60 62 64   Resp: 18 18 16 16   Temp: 97.1 °F (36.2 °C)  98 °F (36.7 °C) 98.2 °F (36.8 °C)   TempSrc: Oral  Oral Oral   SpO2: 95%  97% 96%   Weight:   105 lb 13.1 oz (48 kg)    Height:         Weight: Weight: 105 lb 13.1 oz (48 kg)     24 hour intake/output:    Intake/Output Summary (Last 24 hours) at 3/2/2021 1249  Last data filed at 3/2/2021 1240  Gross per 24 hour   Intake 1100 ml   Output 0 ml   Net 1100 ml         General appearance:  No apparent distress, appears stated age and cooperative. HEENT:  Normal cephalic, atraumatic without obvious deformity. Pupils equal, round, and reactive to light. Extra ocular muscles intact. Conjunctivae/corneas clear. Neck: Supple, with full range of motion. No jugular venous distention. Trachea midline. Respiratory:  Normal respiratory effort. Clear to auscultation, bilaterally without Rales/Wheezes/Rhonchi. Cardiovascular:  Regular rate and rhythm with normal S1/S2 without murmurs, rubs or gallops. Abdomen: Soft, non-tender, non-distended with normal bowel sounds. Musculoskeletal:  No clubbing, cyanosis or edema bilaterally. Full range of motion without deformity.   Skin: Skin color, texture, turgor normal.  No rashes or lesions. Neurologic:  Neurovascularly intact without any focal sensory/motor deficits. Cranial nerves: II-XII intact, grossly non-focal.  Psychiatric:  Alert and oriented, thought content appropriate, normal insight  Capillary Refill: Brisk,< 3 seconds   Peripheral Pulses: +2 palpable, equal bilaterally       Labs: For convenience and continuity at follow-up the following most recent labs are provided:      CBC:    Lab Results   Component Value Date    WBC 4.8 03/02/2021    HGB 10.9 03/02/2021    HCT 36.5 03/02/2021     03/02/2021       Renal:    Lab Results   Component Value Date     03/02/2021    K 4.1 03/02/2021    CL 98 03/02/2021    CO2 29 03/02/2021    BUN 11 03/02/2021    CREATININE 0.6 03/02/2021    CALCIUM 9.1 03/02/2021    PHOS 3.5 05/24/2020         Significant Diagnostic Studies    Radiology:   XR CHEST PORTABLE   Final Result   1. Mild cardiac megaly. Metallic sternotomy sutures. 2. Tiny bilateral pleural effusions. Moderate bibasilar atelectasis/pneumonia. 3. Overall appearance of chest has improved since prior. **This report has been created using voice recognition software. It may contain minor errors which are inherent in voice recognition technology. **      Final report electronically signed by Dr. Cris Arevalo on 2/27/2021 11:07 AM      CT ABDOMEN PELVIS WO CONTRAST   Final Result   1. Moderate-sized bilateral pleural effusions, larger on the right. Mild adjacent bibasilar atelectasis/pneumonia. 2. Findings of ileus/enteritis/malabsorption involving several small bowel loops. Findings of impending diarrhea. Cannot exclude gastritis. Calcifications scattered in the uterus, likely fibroid calcifications. **This report has been created using voice recognition software. It may contain minor errors which are inherent in voice recognition technology. **      Final report electronically signed by Dr. Cris Arevalo on 2/27/2021 9:57 AM             Consults:     IP CONSULT TO CARDIOLOGY  IP CONSULT TO NEPHROLOGY  IP CONSULT TO PALLIATIVE CARE  IP CONSULT TO GI  IP CONSULT TO SOCIAL WORK  IP CONSULT TO DIETITIAN    Disposition: Nora  Condition at Discharge: Stable    Code Status:  DNR-CCA     Patient Instructions:    Discharge lab work: N/A  Activity: activity as tolerated  Diet: DIET CARB CONTROL; Carb Control: 4 carb choices (60 gms)/meal; Low Fat, Lactose Controlled, Low Caffeine  Dietary Nutrition Supplements: Diabetic Oral Supplement      Follow-up visits:   Faith Cerda MD  [place] [address] [place] [address]  [phone]      appt time:    wear mask, photo ID, med list, insuance card    [address]  [phone]  On 3/8/2021  appt time:   1:30 pm  wear mask, photo ID, med list, insurance card    Mady Dye MD  [address] [address]  [address] [facility] [place], [address]  11 [facility]  [phone]    Schedule an appointment as soon as possible for a visit in 3 weeks           Discharge Medications:      Edgar Dee   Home Medication Instructions NTE:[id number]    Printed on:03/02/21 1249   Medication Information                      acetaminophen (TYLENOL 8 HOUR ARTHRITIS PAIN) 650 MG extended release tablet  Take 650 mg by mouth 3 times daily             apixaban (ELIQUIS) 2.5 MG TABS tablet  TAKE 1 TABLET BY MOUTH 2  TIMES DAILY, EQUIVALENT TO  APIXABAN             aspirin 81 MG EC tablet  Take 81 mg by mouth daily. atorvastatin (LIPITOR) 20 MG tablet  TAKE 1 TABLET DAILY             blood glucose test strips (CONTOUR TEST) strip  USE TO TEST BLOOD SUGAR TWICE DAILY.              carbidopa-levodopa (SINEMET)  MG per tablet  Take 2 tablets by mouth 4 times daily             Cholecalciferol (VITAMIN D3 PO)  Take 4,000 Units by mouth 2 times daily              Cranberry 500 MG CAPS  Take 500 mg by mouth daily 2 tab             Cyanocobalamin 200 MCG/SPRAY LIQD  Take 4 sprays by mouth 2 times daily Vitamist - Vitamin B 12 spray             diclofenac sodium (VOLTAREN) 1 % GEL  Apply 4 g topically 4 times daily             dilTIAZem (CARDIZEM) 30 MG tablet  TAKE 1 TABLET BY MOUTH 3  TIMES A DAY             ferrous sulfate (IRON 325) 325 (65 Fe) MG tablet  Take 1 tablet by mouth 2 times daily             Ferrous Sulfate (IRON) 325 (65 Fe) MG TABS  Take by mouth 4 sprays in mouth every morning. In addition to tablets. hydrALAZINE (APRESOLINE) 10 MG tablet  Take 1 tablet by mouth every 8 hours             isosorbide dinitrate (ISORDIL) 20 MG tablet  Take 1 tablet by mouth 3 times daily             JANUVIA 100 MG tablet  TAKE 1 TABLET BY MOUTH DAILY             lactase (LACTAID ULTRA) 9000 units TABS  Take 9,000 Units by mouth 3 times daily (before meals)             lactobacillus (CULTURELLE) capsule  Take 1 capsule by mouth 3 times daily (with meals)             magnesium oxide (MAG-OX) 400 MG tablet  Take 1 tablet by mouth 3 times daily             metFORMIN (GLUCOPHAGE-XR) 500 MG extended release tablet  Take 1 tablet by mouth 2 times daily             metoprolol tartrate (LOPRESSOR) 25 MG tablet  Take 0.5 tablets by mouth 2 times daily             Misc.  Devices MISC  1 each by Does not apply route once for 1 dose Param Glucose Meter; Diagnosis:E11.65             Multiple Vitamin (MULTIVITAMIN) TABS tablet  Take 1 tablet by mouth daily             Multiple Vitamins-Minerals (PRESERVISION AREDS 2) CAPS  Take 2 capsules by mouth 2 times daily             ondansetron (ZOFRAN) 4 MG tablet  Take 4 mg by mouth every 6 hours              pantoprazole (PROTONIX) 40 MG tablet  Take 1 tablet by mouth 2 times daily (before meals)             potassium chloride (KLOR-CON M) 20 MEQ extended release tablet  Take 0.5 tablets by mouth 3 times daily psyllium (KONSYL) 28.3 % PACK  Take 1 packet by mouth daily             sodium chloride 1 g tablet  Take 1 tablet by mouth daily             spironolactone (ALDACTONE) 25 MG tablet  Take 1 tablet by mouth daily             sucralfate (CARAFATE) 1 GM tablet  Take 1 tablet by mouth 4 times daily (before meals and nightly) Dissolve in 1 ounce of water to take as a slurry                 Time Spent on discharge is more than 30 minutes in the examination, evaluation, counseling and review of medications and discharge plan. Signed: Thank you Aracelis Chaparro MD for the opportunity to be involved in this patient's care.     Electronically signed by Shelbie Bailey MD PGY-1 Internal Medicine Resident on 3/2/2021 at 12:49 PM

## 2021-03-02 NOTE — PROGRESS NOTES
CLINICAL PHARMACY: DISCHARGE MED RECONCILIATION/REVIEW    [facility] ([facility] Select Patient?: Yes  Total # of Interventions Recommended: 2 - Discontinued Medication #: 1  - New Order #: 1   -   Total # Interventions Accepted: 2  Intervention Severity:   - Level 1 Intervention Present?: No   - Level 2 #: 0   - Level 3 #: 2   Time Spent (min): 15    Evelin Sheldon PharmD 3/2/2021 3:07 PM

## 2021-03-03 ENCOUNTER — CARE COORDINATION (OUTPATIENT)
Dept: CASE MANAGEMENT | Age: 86
End: 2021-03-03

## 2021-03-03 NOTE — CARE COORDINATION
Erin [address] Transitions Initial Follow Up Call    Call within 2 business days of discharge: Yes    Patient: Shahbaz Gtz Patient : 1932   MRN: [id number]  Reason for Admission: Diarrhea;  Discharge Date: 3/2/21 RARS: Readmission Risk Score: 27      Last Discharge [facility]       Complaint Diagnosis Description Type Department Provider    21 Diarrhea; Emesis Dehydration . .. ED to Hosp-Admission (Discharged) (DISCHARGE) [address] [place]; ALEJANDRO Jaquez. Spoke with: Patient    Patient states she is doing Better. She is currently at [facility]. She states that the diarrhea is gone and she had normal BM yesterday. This is Final Call.      Mathew Carson LPN    [phone]  [facility] / [address] [place] [address] Transitions 24 Hour Call    Do you have all of your prescriptions and are they filled?: Yes  Patient DME: Straight cane  Do you have support at home?: Alone  Care Transitions Interventions         Follow Up  Future Appointments   Date Time Provider J Carlos Patterson   3/8/2021  1:30 PM ROWDY Howe - CNP N SRPX CHF [facility] [place]   3/10/2021 11:30 AM Rani Christina MD SRPX [facility]O [facility] [address] [place] [address]   3/23/2021  1:30 PM ROWDY Ortiz - CNP N [address] [facility] [address] [place] [address]   2021  2:00 PM Lalo Kincaid MD N [facility] [place]   2021  1:30 PM Gillian Sunshine MD N SRPX Heart [facility] [place]   2021 11:00 AM Johnnie Infante MD [facility] - Stephen Heath LPN

## 2021-03-03 NOTE — CARE COORDINATION
[address] Transitions Initial Follow Up Call    Call within 2 business days of discharge: Yes    Patient: Aurea Salguero Patient : 1932   MRN: [id number]  Reason for Admission: Diarrhea;  Dehydration  Discharge Date: 3/2/21 RARS: Readmission Risk Score: 27      Last Discharge [address] [address]       Complaint Diagnosis Description Type Department Provider    21 Diarrhea; Emesis Dehydration . .. ED to Hosp-Admission (Discharged) (DISCHARGE) [address] DO; JIMY Gonzalez..        1st Attempt to contact patient for Initial 24 Hour Transition from hospital to home Call:   Patient not reached. Left HIPAA Compliant message on Voice Mail to call office (number given) with any questions and an update on patient's condition since discharge. Will continue to follow.       Alexy Yadav LPN    [phone]  [facility] / [address] [place] [address] Transitions 24 Hour Call    Do you have all of your prescriptions and are they filled?: Yes  Patient DME: Straight cane  Do you have support at home?: Alone  Care Transitions Interventions         Follow Up  Future Appointments   Date Time Provider J Carlos Patterson   3/8/2021  1:30 PM ROWDY Manning - CNP N SRPX CHF P - [place]   3/10/2021 11:30 AM Jonathan Prince MD SR[facility] [place]   3/23/2021  1:30 PM ROWDY Castillo CNP [facility] [place]   2021  2:00 PM MD JOSÉ Perez [facility] [place]   2021  1:30 PM MD JOSÉ Winters SRPX Heart P - [place]   2021 11:00 AM [facility]É [facility] Obdulio Rosales LPN

## 2021-03-08 ENCOUNTER — HOSPITAL ENCOUNTER (OUTPATIENT)
Age: 86
Discharge: HOME | End: 2021-03-08
Payer: MEDICARE

## 2021-03-08 ENCOUNTER — HOSPITAL ENCOUNTER (OUTPATIENT)
Dept: GENERAL RADIOLOGY | Age: 86
Discharge: HOME | End: 2021-03-08
Payer: MEDICARE

## 2021-03-08 ENCOUNTER — OFFICE VISIT (OUTPATIENT)
Dept: CARDIOLOGY CLINIC | Age: 86
End: 2021-03-08
Payer: MEDICARE

## 2021-03-08 VITALS
HEIGHT: 62 IN | WEIGHT: 109.3 LBS | DIASTOLIC BLOOD PRESSURE: 60 MMHG | HEART RATE: 68 BPM | OXYGEN SATURATION: 98 % | BODY MASS INDEX: 20.11 KG/M2 | SYSTOLIC BLOOD PRESSURE: 116 MMHG

## 2021-03-08 DIAGNOSIS — I50.32 CHF (CONGESTIVE HEART FAILURE), NYHA CLASS II, CHRONIC, DIASTOLIC (HCC): Primary | ICD-10-CM

## 2021-03-08 DIAGNOSIS — I48.0 PAROXYSMAL ATRIAL FIBRILLATION (HCC): ICD-10-CM

## 2021-03-08 DIAGNOSIS — I27.20 PULMONARY HYPERTENSION, MODERATE TO SEVERE (HCC): ICD-10-CM

## 2021-03-08 DIAGNOSIS — Z95.1 HX OF CABG: ICD-10-CM

## 2021-03-08 DIAGNOSIS — I50.32 CHF (CONGESTIVE HEART FAILURE), NYHA CLASS II, CHRONIC, DIASTOLIC (HCC): ICD-10-CM

## 2021-03-08 DIAGNOSIS — I25.709 CORONARY ARTERY DISEASE INVOLVING CORONARY BYPASS GRAFT OF NATIVE HEART WITH ANGINA PECTORIS (HCC): ICD-10-CM

## 2021-03-08 DIAGNOSIS — I10 ESSENTIAL HYPERTENSION: ICD-10-CM

## 2021-03-08 LAB
AVERAGE GLUCOSE: 134
BUN BLDV-MCNC: 12 MG/DL
CALCIUM SERPL-MCNC: 9.6 MG/DL
CHLORIDE BLD-SCNC: 96 MMOL/L
CHOLESTEROL, TOTAL: 135 MG/DL
CHOLESTEROL/HDL RATIO: 1.7
CO2: 29 MMOL/L
CREAT SERPL-MCNC: 0.6 MG/DL
GFR CALCULATED: 94
GLUCOSE BLD-MCNC: 101 MG/DL
HBA1C MFR BLD: 6.3 %
HDLC SERPL-MCNC: 42 MG/DL (ref 35–70)
LDL CHOLESTEROL CALCULATED: 70 MG/DL (ref 0–160)
NONHDLC SERPL-MCNC: NORMAL MG/DL
POTASSIUM SERPL-SCNC: 4.8 MMOL/L
SODIUM BLD-SCNC: 132 MMOL/L
TRIGL SERPL-MCNC: 117 MG/DL
VLDLC SERPL CALC-MCNC: 23 MG/DL

## 2021-03-08 PROCEDURE — 1111F DSCHRG MED/CURRENT MED MERGE: CPT | Performed by: NURSE PRACTITIONER

## 2021-03-08 PROCEDURE — G8420 CALC BMI NORM PARAMETERS: HCPCS | Performed by: NURSE PRACTITIONER

## 2021-03-08 PROCEDURE — 4040F PNEUMOC VAC/ADMIN/RCVD: CPT | Performed by: NURSE PRACTITIONER

## 2021-03-08 PROCEDURE — 1090F PRES/ABSN URINE INCON ASSESS: CPT | Performed by: NURSE PRACTITIONER

## 2021-03-08 PROCEDURE — G8427 DOCREV CUR MEDS BY ELIG CLIN: HCPCS | Performed by: NURSE PRACTITIONER

## 2021-03-08 PROCEDURE — 99214 OFFICE O/P EST MOD 30 MIN: CPT | Performed by: NURSE PRACTITIONER

## 2021-03-08 PROCEDURE — G8484 FLU IMMUNIZE NO ADMIN: HCPCS | Performed by: NURSE PRACTITIONER

## 2021-03-08 PROCEDURE — 1123F ACP DISCUSS/DSCN MKR DOCD: CPT | Performed by: NURSE PRACTITIONER

## 2021-03-08 PROCEDURE — 1036F TOBACCO NON-USER: CPT | Performed by: NURSE PRACTITIONER

## 2021-03-08 RX ORDER — ZINC SULFATE 50(220)MG
50 CAPSULE ORAL DAILY
COMMUNITY
Start: 2021-03-03 | End: 2021-05-24

## 2021-03-08 RX ORDER — MULTIVIT-MIN/IRON/FOLIC ACID/K 18-600-40
CAPSULE ORAL 2 TIMES DAILY
COMMUNITY
Start: 2021-03-02 | End: 2021-03-17

## 2021-03-08 ASSESSMENT — ENCOUNTER SYMPTOMS
ABDOMINAL DISTENTION: 1
ABDOMINAL PAIN: 0
COLOR CHANGE: 0
APNEA: 0
WHEEZING: 0
CHEST TIGHTNESS: 0
NAUSEA: 0
SHORTNESS OF BREATH: 0
COUGH: 0

## 2021-03-08 NOTE — PROGRESS NOTES
Physician Progress Note      Penelope Verma  CSN #:                  [id number]  :                       1932  ADMIT DATE:       2021 8:21 AM  DISCH DATE:        3/2/2021 4:04 PM  RESPONDING  PROVIDER #:        Carol Noyola MD          QUERY TEXT:    Patient admitted with dehydration and CHF. Dietician Saw patient 3-1 and met   criteria for Severe malnutrition. If possible, please document below and in   progress notes and discharge summary if you are evaluating and /or treating   any of the following: The medical record reflects the following:  Risk Factors: CHF  Clinical Indicators:  Nutrition Assessment:    Pt. severely malnourished AEB   criteria listed below. At risk for further nutritional compromise r/t admit   with diarrhea, vomiting/altered GI function and underlying medical condition   (hx CAD,CHF, DM and Parkinsons). Treatment: Nutrition Recommendations/Plan:  Will send Glucerna TID (lactose   free). Recommend continue with probiotic, Metamucil. Additional Rx per GI to   assist with GI symptoms. Monitor GI POC. Suggest MVI. Thank you. Please call if you have any questions. P) [phone]. Signed   by Hanna Noble RN Clinical , CRCR  Options provided:  -- Severe Protein Calorie Malnutrition confirmed POA  -- Severe Protein Calorie Malnutrition confirmed not POA  -- Severe Protein Calorie Malnutrition ruled out  -- Other - I will add my own diagnosis  -- Disagree - Not applicable / Not valid  -- Disagree - Clinically unable to determine / Unknown  -- Refer to Clinical Documentation Reviewer    PROVIDER RESPONSE TEXT:    The diagnosis of Severe Protein Calorie Malnutrition was confirmed as POA.     Query created by: Tommy Valentine on 3/8/2021 9:37 AM      Electronically signed by:  Carol Noyola MD 3/8/2021 10:47 AM

## 2021-03-08 NOTE — PROGRESS NOTES
[place]       Visit Date: 3/8/2021  Cardiologist:  Dr. Miguelangel Bone  Primary Care Physician: Dr. Maribell Rosales MD    Santosh May is a 80 y.o. female who presents today for:  Chief Complaint   Patient presents with    Congestive Heart Failure       HPI: Obtained from patient and chart    Santosh May is a 80 y.o. female who presents to the office for a follow up visit in the heart failure clinic. Hx A fib on Eliquis, EFpEF 55-60% RSVP 60-65 mmHg, CAD CABG, Negative stress 11/2018, HTN, DM, Parkinson's, hyponatremia. Resides at [facility] but is not at SNF after recent hospitalization for diarrhea/confuson/bradycardia. She is not on any diuretics, unclear why? She has been participating in cardiac rehab. She can perform ADLs without SOB or fatigue. She sleeps in a bed with the [facility] slightly elevated which is unchanged. No LE edema. Accompanied by: daughter Jeanne Marie hospital admission related to Heart Failure:  May 2020  Chest Pain: no  Worsening SOB: no  Worsening Orthopnea/PND: no  Edema: no  Any extra diuretic use: no  Weight gain: no  Fatigue: no  Abdominal bloating: some  Appetite: good  DEBO: no  Cough: no  Compliant checking home weight: yes  Compliant checking blood pressure: yes      Past Medical History:   Diagnosis Date    Arthritis     Atypical chest pain     CAD (coronary artery disease)     Chronic LBBB (left bundle branch block)     Diabetes mellitus type II     Esophageal stricture     History of esophageal stricture.  FH: CAD (coronary artery disease)     Mom and dad both with heart disease at mid to late age.  GERD (gastroesophageal reflux disease)     History of gastritis     History of skin cancer     Hypercholesterolemia     Hyperlipidemia     Hypertension     Imbalance     As far as imbalance is concerned, asked patient to follow-up with Dr. Vic Hudson since she follows with him on a regular basis.      Lactose intolerance ECHOCARDIOGRAPHY  4 14 2006    There appears to be significant improvement from previous echo w/complete resolution of pericardial effusion and normal LV function. EF of 60%.  DOPPLER ECHOCARDIOGRAPHY  3 21 2006    Normal LV function. Mild LV hypertrophy. Mild biatrial enlargement. Mildly calcific aortic and mitral valve w/no stenosis. Mild MR. Mild TR. Minimal residual pericardial effusion w/significant improvement from previous echo.  DOPPLER ECHOCARDIOGRAPHY  3 16 2006    Interval change from previous echocardiogram w/worsening of effusion. Correlation clinically. Consideration of pericardial centesis. Will obtain a [facility] consult.  DOPPLER ECHOCARDIOGRAPHY  1 31 2006    No significant change from previous echo. The 2D echo showed moderate degree of pericardial effusion. It does seem to be worst or better than previous echo. No evidence of tamponade.  DOPPLER ECHOCARDIOGRAPHY  1 27 2006    Normal global LV function. Mild biatrial enlargement. Mildly sclerotic aortic & mitral valve w/no stenosis. Mild MR. Mild TR. Small to moderate pericardial effusion w/no obvious tamponade.      JOINT REPLACEMENT      MOHS SURGERY Right 2/13/2019    MOHS DEFECT REPAIR CYSTIC SCC RIGHT LOWER LATERAL LEG WITH SKIN GRAFT FROM RIGHT GROIN (FULL THICKNESS ) performed by Becky Cloud MD at [facility] / [address] [place] [address] Left 12/20/13    left leg lesion excision x2, frozen section, skin graft closure    ROTATOR CUFF REPAIR  09/2001    RIGHT    ROTATOR CUFF REPAIR  04/15/2009    LEFT     SKIN CANCER EXCISION  06/2006      Rt leg    SPINAL CORD STIMULATOR IMPLANTATION N/A 12/4/2018    PERMANENT INTERSTIM performed by Juan Altamirano MD at [facility] History   Problem Relation Age of Onset    Heart Disease Mother     Diabetes Mother     Stroke Father     Diabetes Sister     Lung Cancer Brother      Social History     Tobacco Use    Smoking status: Never Smoker    Smokeless tobacco: Never Used   Substance Use Topics    Alcohol use: No     Current Outpatient Medications   Medication Sig Dispense Refill    Ascorbic Acid (VITAMIN C) 500 MG CAPS Take by mouth 2 times daily      zinc sulfate (ZINCATE) 220 (50 Zn) MG capsule Take 50 mg by mouth daily      lactobacillus (CULTURELLE) capsule Take 1 capsule by mouth 3 times daily (with meals) 20 capsule 0    metoprolol tartrate (LOPRESSOR) 25 MG tablet Take 0.5 tablets by mouth 2 times daily 60 tablet 0    psyllium (KONSYL) 28.3 % PACK Take 1 packet by mouth daily 30 each 1    pantoprazole (PROTONIX) 40 MG tablet Take 1 tablet by mouth 2 times daily (before meals) 30 tablet 1    sucralfate (CARAFATE) 1 GM tablet Take 1 tablet by mouth 4 times daily (before meals and nightly) Dissolve in 1 ounce of water to take as a slurry 120 tablet 1    ondansetron (ZOFRAN) 4 MG tablet Take 4 mg by mouth every 6 hours       Ferrous Sulfate (IRON) 325 (65 Fe) MG TABS Take by mouth 4 sprays in mouth every morning. In addition to tablets.       Multiple Vitamin (MULTIVITAMIN) TABS tablet Take 1 tablet by mouth daily 90 tablet 3    Multiple Vitamins-Minerals (PRESERVISION AREDS 2) CAPS Take 2 capsules by mouth 2 times daily 360 capsule 3    Cyanocobalamin 200 MCG/SPRAY LIQD Take 4 sprays by mouth 2 times daily Vitamist - Vitamin B 12 spray 90 mL 3    ferrous sulfate (IRON 325) 325 (65 Fe) MG tablet Take 1 tablet by mouth 2 times daily 180 tablet 1    sodium chloride 1 g tablet Take 1 tablet by mouth daily 120 tablet 1    hydrALAZINE (APRESOLINE) 10 MG tablet Take 1 tablet by mouth every 8 hours 90 tablet 3    JANUVIA 100 MG tablet TAKE 1 TABLET BY MOUTH DAILY 90 tablet 2    spironolactone (ALDACTONE) 25 MG tablet Take 1 tablet by mouth daily 30 tablet 3    acetaminophen (TYLENOL 8 HOUR ARTHRITIS PAIN) 650 MG extended release tablet Take 650 mg by mouth 3 times daily      metFORMIN (GLUCOPHAGE-XR) 500 MG extended release tablet Take 1 tablet by mouth 2 times daily 180 tablet 3    diclofenac sodium (VOLTAREN) 1 % GEL Apply 4 g topically 4 times daily 4 Tube 5    lactase (LACTAID ULTRA) 9000 units TABS Take 9,000 Units by mouth 3 times daily (before meals)      potassium chloride (KLOR-CON M) 20 MEQ extended release tablet Take 0.5 tablets by mouth 3 times daily 30 tablet 1    magnesium oxide (MAG-OX) 400 MG tablet Take 1 tablet by mouth 3 times daily 30 tablet 3    isosorbide dinitrate (ISORDIL) 20 MG tablet Take 1 tablet by mouth 3 times daily 90 tablet 3    atorvastatin (LIPITOR) 20 MG tablet TAKE 1 TABLET DAILY 90 tablet 1    carbidopa-levodopa (SINEMET)  MG per tablet Take 2 tablets by mouth 4 times daily 720 tablet 0    apixaban (ELIQUIS) 2.5 MG TABS tablet TAKE 1 TABLET BY MOUTH 2  TIMES DAILY, EQUIVALENT TO  APIXABAN 180 tablet 1    dilTIAZem (CARDIZEM) 30 MG tablet TAKE 1 TABLET BY MOUTH 3  TIMES A  tablet 3    Cholecalciferol (VITAMIN D3 PO) Take 4,000 Units by mouth 2 times daily       Cranberry 500 MG CAPS Take 500 mg by mouth daily 2 tab      aspirin 81 MG EC tablet Take 81 mg by mouth daily.  blood glucose test strips (CONTOUR TEST) strip USE TO TEST BLOOD SUGAR TWICE DAILY. 200 strip 3    Misc. Devices MISC 1 each by Does not apply route once for 1 dose Param Glucose Meter; Diagnosis:E11.65 1 Device 0     No current facility-administered medications for this visit. Allergies   Allergen Reactions    Latex Rash    Lisinopril Swelling     mouth    Tape [place] Tape] Itching    Bacitracin Hives    Penicillins     Polymyxin B Hives    Keflex [Cephalexin] Nausea And Vomiting and Rash    Lactulose Diarrhea    Morphine Rash    Polysporin [Bacitracin-Polymyxin B] Rash       SUBJECTIVE:   Review of Systems   Constitutional: Negative for activity change, appetite change, fatigue and fever. HENT: Negative for congestion.     Respiratory: Negative for apnea, cough, chest tightness, shortness of breath and wheezing. Cardiovascular: Negative for chest pain, palpitations and leg swelling. Gastrointestinal: Positive for abdominal distention (some bloating ). Negative for abdominal pain and nausea. Genitourinary: Negative for difficulty urinating and dysuria. Musculoskeletal: Positive for gait problem (walker). Negative for arthralgias. Skin: Negative for color change. Neurological: Negative for dizziness, numbness and headaches. Psychiatric/Behavioral: Negative for agitation, confusion and sleep disturbance. The patient is not nervous/anxious. OBJECTIVE:   Today's Vitals:  /60   Pulse 68   Ht 5' 2\" (1.575 m)   Wt 109 lb 4.8 oz (49.6 kg)   SpO2 98%   BMI 19.99 kg/m²     Physical Exam  Vitals signs reviewed. Constitutional:       Appearance: She is well-developed. HENT:      Head: Normocephalic and atraumatic. Eyes:      Pupils: Pupils are equal, round, and reactive to light. Neck:      Musculoskeletal: Normal range of motion. Vascular: No JVD. Cardiovascular:      Rate and Rhythm: Normal rate and regular rhythm. Heart sounds: Normal heart sounds. No murmur. Pulmonary:      Effort: Pulmonary effort is normal. No respiratory distress. Breath sounds: No rales. Abdominal:      General: There is no distension (slight). Palpations: Abdomen is soft. Tenderness: There is no abdominal tenderness. Musculoskeletal:         General: No tenderness. Right lower leg: No edema. Left lower leg: No edema. Skin:     General: Skin is warm and dry. Capillary Refill: Capillary refill takes less than 2 seconds. Neurological:      Mental Status: She is alert and oriented to person, place, and time.    Psychiatric:         Mood and Affect: Mood normal.         Behavior: Behavior normal.         Wt Readings from Last 3 Encounters:   03/08/21 109 lb 4.8 oz (49.6 kg)   03/02/21 105 lb 13.1 oz (48 kg)   02/12/21 106 lb 12.8 oz (48.4 kg)     BP Readings from Last 3 Encounters:   03/08/21 116/60   03/02/21 (!) 126/59   02/12/21 139/61     Pulse Readings from Last 3 Encounters:   03/08/21 68   03/02/21 64   02/12/21 (!) 45     Body mass index is 19.99 kg/m². ECHO:   5/18/20  Summary   Left ventricular size and systolic function is normal. Ejection fraction   was estimated at 55-60%. LV wall thickness is within normal limits. Moderately dilated left atrium. Mildly dilated right ventricle. Right ventricular systolic pressure of 57-94 mm Hg consistent with severe   pulmonary hypertension. Mildly calcified and thickened aortic valve. Mild to Moderate mitral regurgitation is present. Moderate tricuspid regurgitation. Signature      ----------------------------------------------------------------   Electronically signed by Quin William MD (Interpreting   physician) on 05/18/2020 at 04:53 PM   ----------------------------------------------------------------      Findings      Mitral Valve   Calcification of the mitral valve noted. DOPPLER: The transmitral velocity was within the normal range with no   evidence for mitral stenosis. Mild to Moderate mitral regurgitation is present. Aortic Valve   Mildly calcified and thickened aortic valve. The aortic valve leaflets were not well visualized. DOPPLER: Transaortic velocity was within the normal range with no evidence   of aortic stenosis. There was no evidence of aortic regurgitation. Tricuspid Valve   The tricuspid valve structure is normal with normal leaflet separation. DOPPLER: There is no evidence of tricuspid stenosis. Moderate tricuspid regurgitation. Pulmonic Valve   The pulmonic valve was not well visualized . Left Atrium   Moderately dilated left atrium. Left Ventricle   Left ventricular size and systolic function is normal. Ejection fraction   was estimated at 55-60%. LV wall thickness is within normal limits.       Right Atrium Right atrial size was normal.      Right Ventricle   Mildly dilated right ventricle. Right ventricular systolic pressure of 81-57 mm Hg consistent with severe   pulmonary hypertension. Pericardial Effusion   The pericardium appears normal with no evidence of a pericardial effusion. Pleural Effusion   Left pleural effusion. Aorta / Great Vessels   -Aortic root dimension within normal limits. -IVC size is within normal limits with normal respiratory phasic changes. Results reviewed:  BNP:   Lab Results   Component Value Date    PROBNP 4493.0 (H) 02/27/2021     CBC:   Lab Results   Component Value Date    WBC 4.8 03/02/2021    RBC 4.45 03/02/2021    RBC 4.36 02/04/2021    HGB 10.9 03/02/2021    HCT 36.5 03/02/2021     03/02/2021     CMP:    Lab Results   Component Value Date     03/02/2021    K 4.1 03/02/2021    CL 98 03/02/2021    CO2 29 03/02/2021    BUN 11 03/02/2021    CREATININE 0.6 03/02/2021    LABGLOM >90 03/02/2021    GLUCOSE 136 03/02/2021    GLUCOSE 180 02/04/2021    CALCIUM 9.1 03/02/2021     Hepatic Function Panel:    Lab Results   Component Value Date    ALKPHOS 79 02/27/2021    ALT <5 02/27/2021    AST 14 02/27/2021    PROT 6.6 02/27/2021    BILITOT 0.6 02/27/2021    BILITOT NEGATIVE 09/21/2018    BILIDIR <0.2 10/22/2018    LABALBU 3.7 02/27/2021    LABALBU 4.1 01/12/2012     Magnesium:    Lab Results   Component Value Date    MG 1.9 02/27/2021     PT/INR:    Lab Results   Component Value Date    INR 1.42 05/16/2020     Lipids:    Lab Results   Component Value Date    TRIG 183 11/18/2020    HDL 31 11/18/2020    LDLCALC 50 11/18/2020    LDLDIRECT 220.21 03/07/2016    LABVLDL 22 01/25/2018       ASSESSMENT AND PLAN:   The patient's condition/symptoms are Stable      Diagnosis Orders   1. CHF (congestive heart failure), NYHA class II, chronic, diastolic (HCC)  XR CHEST (2 VW)   2. Essential hypertension     3.  Coronary artery disease involving coronary bypass graft of verbalizes understanding of plan of care using teach back method, and is agreeable to the treatment plan. Greater then 35 minutes of time was spent reviewing the chart and educating the patient about HF, medications, diet, exercise, and discussing the plan of care. I personally spent more then 50% of the appt time face to face with the patient counseling/coordinating patient's care.       Electronicallysigned by ROWDY Paniagua CNP on 3/8/2021 at 2:18 PM

## 2021-03-08 NOTE — PATIENT INSTRUCTIONS
food.  Follow-up care is a key part of your treatment and safety. Be sure to make and go to all appointments, and call your doctor if you are having problems. It's also a good idea to know your test results and keep a list of the medicines you take. How can you care for yourself at home? Read food labels  · Read labels on cans and food packages. The labels tell you how much sodium is in each serving. Make sure that you look at the serving size. If you eat more than the serving size, you have eaten more sodium. · Food labels also tell you the Percent Daily Value for sodium. Choose products with low Percent Daily Values for sodium. · Be aware that sodium can come in forms other than salt, including monosodium glutamate (MSG), sodium citrate, and sodium bicarbonate (baking soda). MSG is often added to Asian food. When you eat out, you can sometimes ask for food without MSG or added salt. Buy low-sodium foods  · Buy foods that are labeled \"unsalted\" (no salt added), \"sodium-free\" (less than 5 mg of sodium per serving), or \"low-sodium\" (less than 140 mg of sodium per serving). Foods labeled \"reduced-sodium\" and \"light sodium\" may still have too much sodium. Be sure to read the label to see how much sodium you are getting. · Buy fresh vegetables, or frozen vegetables without added sauces. Buy low-sodium versions of canned vegetables, soups, and other canned goods. Prepare low-sodium meals  · Cut back on the amount of salt you use in cooking. This will help you adjust to the taste. Do not add salt after cooking. One teaspoon of salt has about 2,300 mg of sodium. · Take the salt shaker off the table. · Flavor your food with garlic, lemon juice, onion, vinegar, herbs, and spices. Do not use soy sauce, lite soy sauce, steak sauce, onion salt, garlic salt, celery salt, mustard, or ketchup on your food. · Use low-sodium salad dressings, sauces, and ketchup.  Or make your own salad dressings and sauces without adding

## 2021-03-10 ENCOUNTER — OFFICE VISIT (OUTPATIENT)
Dept: FAMILY MEDICINE CLINIC | Age: 86
End: 2021-03-10
Payer: MEDICARE

## 2021-03-10 VITALS
HEART RATE: 60 BPM | RESPIRATION RATE: 20 BRPM | OXYGEN SATURATION: 93 % | TEMPERATURE: 97.7 F | DIASTOLIC BLOOD PRESSURE: 60 MMHG | WEIGHT: 110 LBS | BODY MASS INDEX: 20.12 KG/M2 | SYSTOLIC BLOOD PRESSURE: 130 MMHG

## 2021-03-10 DIAGNOSIS — I27.20 PULMONARY HYPERTENSION, MODERATE TO SEVERE (HCC): ICD-10-CM

## 2021-03-10 DIAGNOSIS — I50.32 CHF (CONGESTIVE HEART FAILURE), NYHA CLASS II, CHRONIC, DIASTOLIC (HCC): ICD-10-CM

## 2021-03-10 DIAGNOSIS — E11.65 TYPE 2 DIABETES MELLITUS WITH HYPERGLYCEMIA, WITHOUT LONG-TERM CURRENT USE OF INSULIN (HCC): ICD-10-CM

## 2021-03-10 DIAGNOSIS — I48.0 PAROXYSMAL ATRIAL FIBRILLATION (HCC): Primary | ICD-10-CM

## 2021-03-10 DIAGNOSIS — J90 PLEURAL EFFUSION: ICD-10-CM

## 2021-03-10 DIAGNOSIS — G20 PARKINSON'S DISEASE (HCC): ICD-10-CM

## 2021-03-10 PROCEDURE — 99495 TRANSJ CARE MGMT MOD F2F 14D: CPT | Performed by: FAMILY MEDICINE

## 2021-03-10 PROCEDURE — 1111F DSCHRG MED/CURRENT MED MERGE: CPT | Performed by: FAMILY MEDICINE

## 2021-03-13 NOTE — PROGRESS NOTES
insufficiency    Traumatic open wound of left lower leg with delayed healing    Symptomatic sinus bradycardia    Bilateral pleural effusion    Non-intractable vomiting with nausea    Dehydration    Severe malnutrition (HCC)       Allergies   Allergen Reactions    Latex Rash    Lisinopril Swelling     mouth    Tape [place] Tape] Itching    Bacitracin Hives    Penicillins     Polymyxin B Hives    Keflex [Cephalexin] Nausea And Vomiting and Rash    Lactulose Diarrhea    Morphine Rash    Polysporin [Bacitracin-Polymyxin B] Rash       Medications listed as ordered at the time of discharge from hospital   Narciso Calvert   Home Medication Instructions BLUE:    Printed on:03/13/21 5792   Medication Information                      acetaminophen (TYLENOL 8 HOUR ARTHRITIS PAIN) 650 MG extended release tablet  Take 650 mg by mouth 3 times daily             apixaban (ELIQUIS) 2.5 MG TABS tablet  TAKE 1 TABLET BY MOUTH 2  TIMES DAILY, EQUIVALENT TO  APIXABAN             Ascorbic Acid (VITAMIN C) 500 MG CAPS  Take by mouth 2 times daily             aspirin 81 MG EC tablet  Take 81 mg by mouth daily. atorvastatin (LIPITOR) 20 MG tablet  TAKE 1 TABLET DAILY             blood glucose test strips (CONTOUR TEST) strip  USE TO TEST BLOOD SUGAR TWICE DAILY.              carbidopa-levodopa (SINEMET)  MG per tablet  Take 2 tablets by mouth 4 times daily             Cholecalciferol (VITAMIN D3 PO)  Take 4,000 Units by mouth 2 times daily              Cranberry 500 MG CAPS  Take 1,000 mg by mouth daily              Cyanocobalamin 200 MCG/SPRAY LIQD  Take 4 sprays by mouth 2 times daily Vitamist - Vitamin B 12 spray             diclofenac sodium (VOLTAREN) 1 % GEL  Apply 4 g topically 4 times daily             dilTIAZem (CARDIZEM) 30 MG tablet  TAKE 1 TABLET BY MOUTH 3  TIMES A DAY             ferrous sulfate (IRON 325) 325 (65 Fe) MG tablet  Take 1 tablet by mouth 2 times daily             hydrALAZINE time  Outpatient Medications Marked as Taking for the 3/10/21 encounter (Office Visit) with Maral Martinez MD   Medication Sig Dispense Refill    zinc oxide (PINXAV) 30 % OINT Apply topically 3 times daily as needed      Ascorbic Acid (VITAMIN C) 500 MG CAPS Take by mouth 2 times daily      zinc sulfate (ZINCATE) 220 (50 Zn) MG capsule Take 50 mg by mouth daily      lactobacillus (CULTURELLE) capsule Take 1 capsule by mouth 3 times daily (with meals) 20 capsule 0    metoprolol tartrate (LOPRESSOR) 25 MG tablet Take 0.5 tablets by mouth 2 times daily 60 tablet 0    psyllium (KONSYL) 28.3 % PACK Take 1 packet by mouth daily 30 each 1    pantoprazole (PROTONIX) 40 MG tablet Take 1 tablet by mouth 2 times daily (before meals) 30 tablet 1    sucralfate (CARAFATE) 1 GM tablet Take 1 tablet by mouth 4 times daily (before meals and nightly) Dissolve in 1 ounce of water to take as a slurry 120 tablet 1    ondansetron (ZOFRAN) 4 MG tablet Take 4 mg by mouth every 6 hours       Multiple Vitamin (MULTIVITAMIN) TABS tablet Take 1 tablet by mouth daily 90 tablet 3    Multiple Vitamins-Minerals (PRESERVISION AREDS 2) CAPS Take 2 capsules by mouth 2 times daily 360 capsule 3    Cyanocobalamin 200 MCG/SPRAY LIQD Take 4 sprays by mouth 2 times daily Vitamist - Vitamin B 12 spray 90 mL 3    ferrous sulfate (IRON 325) 325 (65 Fe) MG tablet Take 1 tablet by mouth 2 times daily 180 tablet 1    sodium chloride 1 g tablet Take 1 tablet by mouth daily 120 tablet 1    hydrALAZINE (APRESOLINE) 10 MG tablet Take 1 tablet by mouth every 8 hours 90 tablet 3    JANUVIA 100 MG tablet TAKE 1 TABLET BY MOUTH DAILY 90 tablet 2    spironolactone (ALDACTONE) 25 MG tablet Take 1 tablet by mouth daily 30 tablet 3    acetaminophen (TYLENOL 8 HOUR ARTHRITIS PAIN) 650 MG extended release tablet Take 650 mg by mouth 3 times daily      metFORMIN (GLUCOPHAGE-XR) 500 MG extended release tablet Take 1 tablet by mouth 2 times daily 180 tablet 3    diclofenac sodium (VOLTAREN) 1 % GEL Apply 4 g topically 4 times daily 4 Tube 5    lactase (LACTAID ULTRA) 9000 units TABS Take 9,000 Units by mouth 3 times daily (before meals)      potassium chloride (KLOR-CON M) 20 MEQ extended release tablet Take 0.5 tablets by mouth 3 times daily 30 tablet 1    magnesium oxide (MAG-OX) 400 MG tablet Take 1 tablet by mouth 3 times daily 30 tablet 3    isosorbide dinitrate (ISORDIL) 20 MG tablet Take 1 tablet by mouth 3 times daily 90 tablet 3    atorvastatin (LIPITOR) 20 MG tablet TAKE 1 TABLET DAILY 90 tablet 1    carbidopa-levodopa (SINEMET)  MG per tablet Take 2 tablets by mouth 4 times daily 720 tablet 0    apixaban (ELIQUIS) 2.5 MG TABS tablet TAKE 1 TABLET BY MOUTH 2  TIMES DAILY, EQUIVALENT TO  APIXABAN 180 tablet 1    dilTIAZem (CARDIZEM) 30 MG tablet TAKE 1 TABLET BY MOUTH 3  TIMES A  tablet 3    blood glucose test strips (CONTOUR TEST) strip USE TO TEST BLOOD SUGAR TWICE DAILY. 200 strip 3    Cholecalciferol (VITAMIN D3 PO) Take 4,000 Units by mouth 2 times daily       Cranberry 500 MG CAPS Take 1,000 mg by mouth daily       aspirin 81 MG EC tablet Take 81 mg by mouth daily. Medications patient taking as of now reconciled against medications ordered at time of hospital discharge: Yes    Chief Complaint   Patient presents with    Follow-Up from [facility] 2/27-3/2/21 for Dehydration, Diarrhea, A-Fib with new onset SVR, Bilateral Pleural Effusions, CHF. Was d/c on 14 day event monitor. Was d/c to [facility] and has been under Dr. Fede Coulter care. As in [place] prior to hospital          History of Present illness - Follow up of Hospital diagnosis(es):    Diagnosis Orders   1. Paroxysmal atrial fibrillation (HCC)     2. Pulmonary hypertension, moderate to severe ([address]     3. CHF (congestive heart failure), NYHA class II, chronic, diastolic ([address]     4. Pleural effusion     5. Parkinson's disease ([address]     6.  Type 2 diabetes mellitus with Pleural effusion     5. Parkinson's disease ([facility]     6. Type 2 diabetes mellitus with hyperglycemia, without long-term current use of insulin (HCC)       Requested Prescriptions      No prescriptions requested or ordered in this encounter     No orders of the defined types were placed in this encounter.           Medical Decision Making: moderate complexity

## 2021-03-18 ENCOUNTER — TELEPHONE (OUTPATIENT)
Dept: WOUND CARE | Age: 86
End: 2021-03-18

## 2021-03-19 NOTE — PROGRESS NOTES
[facility] PROFESSIONAL SERVICES  [facility]   [address]   [address] [address]   Dept: [phone]   Dept Fax: [phone]   Loc: [phone]      Chief Complaint   Patient presents with    Follow-Up from Hospital    Bradycardia     F/U hospitalization for AFB and bradycardia in 79 y/o female with history of AFB on eliquis, chronic diastolic CHF EF 12-56, CAD s/p CABG, severe Pulm HTN, HTN, DM type 2, Parkinson's. Event monitor at discharge. BB and CCB dosages decreased. Denies chest pain, MADDY, lightheadedness, dizziness or syncope. Has intermittent sob with exertion and intermittent palpitations. Her daughter is with her. Cardiologist:  Dr. Pearce Peers:   No fever, no chills, No fatigue or weight loss  Pulmonary:    +intermittent dyspnea, no wheezing  Cardiac:    Denies recent chest pain, +intermittent palpitations  GI:     No nausea or vomiting, no abdominal pain  Neuro:    No dizziness or light headedness  Musculoskeletal:  No recent active issues  Extremities:   No edema, good peripheral pulses      Past Medical History:   Diagnosis Date    Arthritis     Atypical chest pain     CAD (coronary artery disease)     Chronic LBBB (left bundle branch block)     Diabetes mellitus type II     Esophageal stricture     History of esophageal stricture.  FH: CAD (coronary artery disease)     Mom and dad both with heart disease at mid to late age.  GERD (gastroesophageal reflux disease)     History of gastritis     History of skin cancer     Hypercholesterolemia     Hyperlipidemia     Hypertension     Imbalance     As far as imbalance is concerned, asked patient to follow-up with Dr. Crum Debora since she follows with him on a regular basis.      Lactose intolerance     Nummular dermatitis     Parkinson's disease ([facility] [address] 2009    Restless legs syndrome (RLS)     S/P CABG (coronary artery bypass graft)     SCC (squamous cell carcinoma) Allergies   Allergen Reactions    Latex Rash    Lisinopril Swelling     mouth    Tape Vonna Hugo Tape] Itching    Bacitracin Hives    Penicillins     Polymyxin B Hives    Keflex [Cephalexin] Nausea And Vomiting and Rash    Lactulose Diarrhea    Morphine Rash    Polysporin [Bacitracin-Polymyxin B] Rash       Current Outpatient Medications   Medication Sig Dispense Refill    zinc oxide (PINXAV) 30 % OINT Apply topically 3 times daily as needed      lactobacillus (CULTURELLE) capsule Take 1 capsule by mouth 3 times daily (with meals) 20 capsule 0    metoprolol tartrate (LOPRESSOR) 25 MG tablet Take 0.5 tablets by mouth 2 times daily 60 tablet 0    psyllium (KONSYL) 28.3 % PACK Take 1 packet by mouth daily 30 each 1    pantoprazole (PROTONIX) 40 MG tablet Take 1 tablet by mouth 2 times daily (before meals) 30 tablet 1    sucralfate (CARAFATE) 1 GM tablet Take 1 tablet by mouth 4 times daily (before meals and nightly) Dissolve in 1 ounce of water to take as a slurry 120 tablet 1    ondansetron (ZOFRAN) 4 MG tablet Take 4 mg by mouth every 6 hours       Multiple Vitamin (MULTIVITAMIN) TABS tablet Take 1 tablet by mouth daily 90 tablet 3    Multiple Vitamins-Minerals (PRESERVISION AREDS 2) CAPS Take 2 capsules by mouth 2 times daily 360 capsule 3    Cyanocobalamin 200 MCG/SPRAY LIQD Take 4 sprays by mouth 2 times daily Vitamist - Vitamin B 12 spray 90 mL 3    ferrous sulfate (IRON 325) 325 (65 Fe) MG tablet Take 1 tablet by mouth 2 times daily 180 tablet 1    sodium chloride 1 g tablet Take 1 tablet by mouth daily 120 tablet 1    hydrALAZINE (APRESOLINE) 10 MG tablet Take 1 tablet by mouth every 8 hours 90 tablet 3    JANUVIA 100 MG tablet TAKE 1 TABLET BY MOUTH DAILY 90 tablet 2    spironolactone (ALDACTONE) 25 MG tablet Take 1 tablet by mouth daily 30 tablet 3    acetaminophen (TYLENOL 8 HOUR ARTHRITIS PAIN) 650 MG extended release tablet Take 650 mg by mouth 3 times daily      metFORMIN (GLUCOPHAGE-XR) 500 MG extended release tablet Take 1 tablet by mouth 2 times daily 180 tablet 3    diclofenac sodium (VOLTAREN) 1 % GEL Apply 4 g topically 4 times daily 4 Tube 5    lactase (LACTAID ULTRA) 9000 units TABS Take 9,000 Units by mouth 3 times daily (before meals)      potassium chloride (KLOR-CON M) 20 MEQ extended release tablet Take 0.5 tablets by mouth 3 times daily 30 tablet 1    magnesium oxide (MAG-OX) 400 MG tablet Take 1 tablet by mouth 3 times daily 30 tablet 3    isosorbide dinitrate (ISORDIL) 20 MG tablet Take 1 tablet by mouth 3 times daily 90 tablet 3    atorvastatin (LIPITOR) 20 MG tablet TAKE 1 TABLET DAILY 90 tablet 1    carbidopa-levodopa (SINEMET)  MG per tablet Take 2 tablets by mouth 4 times daily 720 tablet 0    apixaban (ELIQUIS) 2.5 MG TABS tablet TAKE 1 TABLET BY MOUTH 2  TIMES DAILY, EQUIVALENT TO  APIXABAN 180 tablet 1    dilTIAZem (CARDIZEM) 30 MG tablet TAKE 1 TABLET BY MOUTH 3  TIMES A  tablet 3    blood glucose test strips (CONTOUR TEST) strip USE TO TEST BLOOD SUGAR TWICE DAILY. 200 strip 3    Cholecalciferol (VITAMIN D3 PO) Take 4,000 Units by mouth 2 times daily       Misc. Devices MISC 1 each by Does not apply route once for 1 dose Param Glucose Meter; Diagnosis:E11.65 1 Device 0    Cranberry 500 MG CAPS Take 1,000 mg by mouth daily       aspirin 81 MG EC tablet Take 81 mg by mouth daily.  zinc sulfate (ZINCATE) 220 (50 Zn) MG capsule Take 50 mg by mouth daily       No current facility-administered medications for this visit. Social History     Socioeconomic History    Marital status:       Spouse name: None    Number of children: None    Years of education: None    Highest education level: None   Occupational History    None   Social Needs    Financial resource strain: None    Food insecurity     Worry: None     Inability: None    Transportation needs     Medical: None     Non-medical: None   Tobacco Use    Smoking status: Never Smoker    Smokeless tobacco: Never Used   Substance and Sexual Activity    Alcohol use: No    Drug use: No    Sexual activity: None   Lifestyle    Physical activity     Days per week: None     Minutes per session: None    Stress: None   Relationships    Social connections     Talks on phone: None     Gets together: None     Attends Muslim service: None     Active member of club or organization: None     Attends meetings of clubs or organizations: None     Relationship status: None    Intimate partner violence     Fear of current or ex partner: None     Emotionally abused: None     Physically abused: None     Forced sexual activity: None   Other Topics Concern    None   Social History Narrative    None       Family History   Problem Relation Age of Onset    Heart Disease Mother     Diabetes Mother     Stroke Father     Diabetes Sister     Lung Cancer Brother        Blood pressure (!) 142/67, pulse 52, height 5' 2\" (1.575 m), weight 109 lb 3.2 oz (49.5 kg). General:   Well developed, well nourished  Lungs:   Clear to auscultation  Heart:    Normal S1 S2, No murmur, rubs, or gallops  Abdomen:   Soft, non tender, no organomegalies, positive bowel sounds  Extremities:   No edema, no cyanosis, good peripheral pulses  Neurological:   Awake, alert, oriented. No obvious focal deficits  Musculoskeletal:  No obvious deformities    EK21  A fib slow ventricular response   Left axis deviation  Left bundle branch block  Abnormal ECG  When compared with ECG of 2021 08:25,  no changes   Confirmed by [place] [facility]     Echo: 20  Summary   Left ventricular size and systolic function is normal. Ejection fraction   was estimated at 55-60%. LV wall thickness is within normal limits. Moderately dilated left atrium. Mildly dilated right ventricle. Right ventricular systolic pressure of 40-93 mm Hg consistent with severe   pulmonary hypertension.    Mildly calcified and

## 2021-03-23 ENCOUNTER — TELEPHONE (OUTPATIENT)
Dept: CARDIOLOGY CLINIC | Age: 86
End: 2021-03-23

## 2021-03-23 ENCOUNTER — OFFICE VISIT (OUTPATIENT)
Dept: CARDIOLOGY CLINIC | Age: 86
End: 2021-03-23
Payer: MEDICARE

## 2021-03-23 VITALS
HEART RATE: 52 BPM | HEIGHT: 62 IN | WEIGHT: 109.2 LBS | SYSTOLIC BLOOD PRESSURE: 142 MMHG | BODY MASS INDEX: 20.09 KG/M2 | DIASTOLIC BLOOD PRESSURE: 67 MMHG

## 2021-03-23 DIAGNOSIS — I48.20 CHRONIC ATRIAL FIBRILLATION (HCC): ICD-10-CM

## 2021-03-23 DIAGNOSIS — E78.01 FAMILIAL HYPERCHOLESTEROLEMIA: ICD-10-CM

## 2021-03-23 DIAGNOSIS — I50.32 CHF (CONGESTIVE HEART FAILURE), NYHA CLASS II, CHRONIC, DIASTOLIC (HCC): ICD-10-CM

## 2021-03-23 DIAGNOSIS — R00.1 SYMPTOMATIC SINUS BRADYCARDIA: Primary | ICD-10-CM

## 2021-03-23 DIAGNOSIS — I10 ESSENTIAL HYPERTENSION: ICD-10-CM

## 2021-03-23 DIAGNOSIS — I25.810 CORONARY ARTERY DISEASE INVOLVING CORONARY BYPASS GRAFT OF NATIVE HEART WITHOUT ANGINA PECTORIS: ICD-10-CM

## 2021-03-23 PROCEDURE — 1111F DSCHRG MED/CURRENT MED MERGE: CPT | Performed by: NURSE PRACTITIONER

## 2021-03-23 PROCEDURE — G8427 DOCREV CUR MEDS BY ELIG CLIN: HCPCS | Performed by: NURSE PRACTITIONER

## 2021-03-23 PROCEDURE — 1036F TOBACCO NON-USER: CPT | Performed by: NURSE PRACTITIONER

## 2021-03-23 PROCEDURE — 99213 OFFICE O/P EST LOW 20 MIN: CPT | Performed by: NURSE PRACTITIONER

## 2021-03-23 PROCEDURE — G8484 FLU IMMUNIZE NO ADMIN: HCPCS | Performed by: NURSE PRACTITIONER

## 2021-03-23 PROCEDURE — 4040F PNEUMOC VAC/ADMIN/RCVD: CPT | Performed by: NURSE PRACTITIONER

## 2021-03-23 PROCEDURE — 1090F PRES/ABSN URINE INCON ASSESS: CPT | Performed by: NURSE PRACTITIONER

## 2021-03-23 PROCEDURE — 1123F ACP DISCUSS/DSCN MKR DOCD: CPT | Performed by: NURSE PRACTITIONER

## 2021-03-23 PROCEDURE — G8420 CALC BMI NORM PARAMETERS: HCPCS | Performed by: NURSE PRACTITIONER

## 2021-03-23 NOTE — TELEPHONE ENCOUNTER
Patient in to see Mack Mccullough and is waiting on cardiac event monitor to be read. Monitor has been sent in. Please watch for results.

## 2021-03-23 NOTE — PROGRESS NOTES
Hospital follow-up. She denies having any chest pain, dizziness, lightheadedness or MADDY. She has intermittent shortness of breath and palpitations. Patient's family with patient.

## 2021-04-06 DIAGNOSIS — E11.65 TYPE 2 DIABETES MELLITUS WITH HYPERGLYCEMIA, WITHOUT LONG-TERM CURRENT USE OF INSULIN (HCC): Primary | ICD-10-CM

## 2021-04-06 RX ORDER — CARVEDILOL 25 MG/1
TABLET, FILM COATED ORAL
Qty: 200 STRIP | Refills: 3 | Status: SHIPPED | OUTPATIENT
Start: 2021-04-06

## 2021-04-06 NOTE — TELEPHONE ENCOUNTER
Patient requesting a medication refill.   Medication: blood glucose test strips (CONTOUR TEST) strip  Pharmacy:[id number] [place] - [address]   Last office visit: 3/10/21  Next office visit: Visit date not found

## 2021-04-07 ENCOUNTER — TELEPHONE (OUTPATIENT)
Dept: CARDIOLOGY CLINIC | Age: 86
End: 2021-04-07

## 2021-04-07 DIAGNOSIS — I50.32 CHF (CONGESTIVE HEART FAILURE), NYHA CLASS II, CHRONIC, DIASTOLIC (HCC): Primary | ICD-10-CM

## 2021-04-07 NOTE — TELEPHONE ENCOUNTER
Bumex 1 mg daily x 2 days with an extra Potassium 20 mEq x 2 days  Call on Friday morning with an update

## 2021-04-08 NOTE — PROCEDURES
[address] [place] [address]                                 EVENT MONITOR    PATIENT NAME: Rayray Fontenot                       :        1932  MED REC NO:   [id number]                           ROOM:       0023  ACCOUNT NO:   [de-identified]                           ADMIT DATE: 2021  PROVIDER:     Vidhya Mota M.D. CLINICAL HISTORY AND INDICATION:  The patient with pacemaker and  bradyarrhythmia. EVENT MONITOR DESCRIPTION:  Event monitor was attached to the patient  between 2021 to 2021. EVENT MONITOR FINDINGS:  Baseline rhythm showed what looks like paced  rhythm with variable heart rate. Heart rate did go down as low as 35  beats per minute. Underlying rhythm is likely atrial fibrillation. Otherwise no prolonged pauses. The patient's lowest heart rate was  recorded on 03/15/2021 at 2122. CONCLUSION:  1. Likely underlying atrial fibrillation with what looks like paced  rhythm pretty much throughout. 2.  One episode of bradycardia that needs to be correlated with the  patient's activity that could be sleep mode of the pacemaker. 3.  No prolonged pauses. 4.  Note that the patient apparently had the event monitor for only two  days on based on the note left. Clinical correlation is recommended.         Henri Gilbert M.D.    D: 2021 7:36:35       T: 2021 14:35:15     [email or web address]  Job#: [id number]     Doc#: [id number]

## 2021-04-09 NOTE — TELEPHONE ENCOUNTER
Called to [facility]. RN did not have weights handy or assessment as how patient felt. She will get these and fax to clinic.

## 2021-04-19 ENCOUNTER — TELEPHONE (OUTPATIENT)
Dept: WOUND CARE | Age: 86
End: 2021-04-19

## 2021-04-29 ENCOUNTER — HOSPITAL ENCOUNTER (EMERGENCY)
Age: 86
Discharge: HOME | End: 2021-04-30
Payer: MEDICARE

## 2021-04-29 VITALS
BODY MASS INDEX: 19.94 KG/M2 | RESPIRATION RATE: 16 BRPM | OXYGEN SATURATION: 95 % | HEART RATE: 58 BPM | WEIGHT: 109 LBS | TEMPERATURE: 98 F | SYSTOLIC BLOOD PRESSURE: 168 MMHG | DIASTOLIC BLOOD PRESSURE: 74 MMHG

## 2021-04-29 DIAGNOSIS — M79.674 PAIN OF TOE OF RIGHT FOOT: Primary | ICD-10-CM

## 2021-04-29 PROCEDURE — 99282 EMERGENCY DEPT VISIT SF MDM: CPT

## 2021-04-30 PROCEDURE — 6370000000 HC RX 637 (ALT 250 FOR IP): Performed by: NURSE PRACTITIONER

## 2021-04-30 RX ADMIN — MUPIROCIN: 20 OINTMENT TOPICAL at 00:21

## 2021-04-30 ASSESSMENT — ENCOUNTER SYMPTOMS
VOMITING: 0
NAUSEA: 0
COLOR CHANGE: 0

## 2021-04-30 NOTE — ED PROVIDER NOTES
[facility] COMPLAINT       Chief Complaint   Patient presents with    Toe Pain       Nurses Notes reviewed and I agree except as noted in the HPI. HISTORY OF PRESENT ILLNESS    Catherine Alva is a 80 y.o. female who presents to the Emergency Department for the evaluation of pain in right great toe. Patient had procedure performed this morning at Dr. Nazario Lake: D.P.M. office for ingrown toenail. She is accompanied by her daughter, daughter states that mother was complaining of pain in her toe, patient voices more concern for blood saturating dressing. States that nerve block was completed, patient actually denies pain in her toe however voices concern due to saturated dressing. Patient is on blood thinners, does not recall the name, states that she was instructed to hold blood thinner today and tomorrow due to procedure for ingrown toenail. Patient is from nursing home, denies other complaints. The HPI was provided by the patient. REVIEW OF SYSTEMS     Review of Systems   Constitutional: Negative for chills and fever. Gastrointestinal: Negative for nausea and vomiting. Musculoskeletal: Positive for gait problem. Negative for arthralgias, myalgias and neck pain. Skin: Positive for wound (left toenail). Negative for color change, pallor and rash. Neurological: Negative for dizziness and weakness. Hematological: Bruises/bleeds easily.        PAST MEDICAL HISTORY    has a past medical history of Arthritis, Atypical chest pain, CAD (coronary artery disease), Chronic LBBB (left bundle branch block), Diabetes mellitus type II, Esophageal stricture, FH: CAD (coronary artery disease), GERD (gastroesophageal reflux disease), History of gastritis, History of skin cancer, Hypercholesterolemia, Hyperlipidemia, Hypertension, Imbalance, Lactose intolerance, Nummular dermatitis, Parkinson's disease ([facility] [address] Restless legs syndrome (RLS), S/P CABG (coronary artery bypass graft), and SCC (squamous cell carcinoma). SURGICAL HISTORY      has a past surgical history that includes doppler echocardiography (4 09 2009); cardiovascular stress test (4 09 2009); doppler echocardiography (7 10 2007); cardiovascular stress test (7 10 2007); doppler echocardiography (4 14 2006); doppler echocardiography (3 21 2006); doppler echocardiography (3 16 2006); cardiovascular stress test (2 03 2006); doppler echocardiography (1 31 2006); doppler echocardiography (1 27 2006); Diagnostic Cardiac Cath Lab Procedure (12 23 2005); Cardiac surgery; pre-malignant / benign skin lesion excision (Left, 12/20/13); Cataract removal; Rotator cuff repair (09/2001); Rotator cuff repair (04/15/2009); Cataract removal (06/08/2016); Skin cancer excision (06/2006); joint replacement; spinal cord stimulator implantation (N/A, 12/4/2018); and Mohs surgery (Right, 2/13/2019). CURRENT MEDICATIONS       Discharge Medication List as of 4/30/2021 12:26 AM      CONTINUE these medications which have NOT CHANGED    Details   blood glucose test strips (CONTOUR TEST) strip USE TO TEST BLOOD SUGAR TWICE DAILY. , Disp-200 strip, R-3Normal      zinc oxide (PINXAV) 30 % OINT Apply topically 3 times daily as neededHistorical Med      zinc sulfate (ZINCATE) 220 (50 Zn) MG capsule Take 50 mg by mouth dailyHistorical Med      lactobacillus (CULTURELLE) capsule Take 1 capsule by mouth 3 times daily (with meals), Disp-20 capsule, R-0Normal      metoprolol tartrate (LOPRESSOR) 25 MG tablet Take 0.5 tablets by mouth 2 times daily, Disp-60 tablet, R-0Normal      psyllium (KONSYL) 28.3 % PACK Take 1 packet by mouth daily, Disp-30 each, R-1Normal      pantoprazole (PROTONIX) 40 MG tablet Take 1 tablet by mouth 2 times daily (before meals), Disp-30 tablet, R-1Normal      sucralfate (CARAFATE) 1 GM tablet Take 1 tablet by mouth 4 times daily (before meals and nightly) Dissolve in 1 ounce of water to take as a slurry, Disp-120 tablet, R-1Normal      ondansetron (ZOFRAN) 4 MG tablet Take 4 mg by mouth every 6 hours Historical Med      Multiple Vitamin (MULTIVITAMIN) TABS tablet Take 1 tablet by mouth daily, Disp-90 tablet, R-3Normal      Multiple Vitamins-Minerals (PRESERVISION AREDS 2) CAPS Take 2 capsules by mouth 2 times daily, Disp-360 capsule, R-3Normal      Cyanocobalamin 200 MCG/SPRAY LIQD Take 4 sprays by mouth 2 times daily Vitamist - Vitamin B 12 spray, Disp-90 mL, R-3Normal      ferrous sulfate (IRON 325) 325 (65 Fe) MG tablet Take 1 tablet by mouth 2 times daily, Disp-180 tablet, R-1Normal      sodium chloride 1 g tablet Take 1 tablet by mouth daily, Disp-120 tablet, R-1NO PRINT      hydrALAZINE (APRESOLINE) 10 MG tablet Take 1 tablet by mouth every 8 hours, Disp-90 tablet,R-3NO PRINT      JANUVIA 100 MG tablet TAKE 1 TABLET BY MOUTH DAILY, Disp-90 tablet,R-2**Patient requests 90 days supply**Normal      spironolactone (ALDACTONE) 25 MG tablet Take 1 tablet by mouth daily, Disp-30 tablet,R-3NO PRINT      acetaminophen (TYLENOL 8 HOUR ARTHRITIS PAIN) 650 MG extended release tablet Take 650 mg by mouth 3 times dailyHistorical Med      metFORMIN (GLUCOPHAGE-XR) 500 MG extended release tablet Take 1 tablet by mouth 2 times daily, Disp-180 tablet,R-3Print      diclofenac sodium (VOLTAREN) 1 % GEL Apply 4 g topically 4 times daily, Topical, 4 TIMES DAILY Starting Wed 7/8/2020, Disp-4 Tube, R-5, Normal      lactase (LACTAID ULTRA) 9000 units TABS Take 9,000 Units by mouth 3 times daily (before meals)Historical Med      potassium chloride (KLOR-CON M) 20 MEQ extended release tablet Take 0.5 tablets by mouth 3 times daily, Disp-30 tablet, R-1NO PRINT      magnesium oxide (MAG-OX) 400 MG tablet Take 1 tablet by mouth 3 times daily, Disp-30 tablet, R-3NO PRINT      isosorbide dinitrate (ISORDIL) 20 MG tablet Take 1 tablet by mouth 3 times daily, Disp-90 tablet, R-3DC to SNF      atorvastatin (LIPITOR) 20 MG tablet TAKE 1 TABLET Nose: Nose normal.      Mouth/Throat:      Mouth: Mucous membranes are moist.      Pharynx: Oropharynx is clear. Cardiovascular:      Rate and Rhythm: Normal rate and regular rhythm. Pulses: Normal pulses. Heart sounds: Normal heart sounds. Pulmonary:      Effort: Pulmonary effort is normal.      Breath sounds: Normal breath sounds. Abdominal:      General: Abdomen is flat. Bowel sounds are normal.   Musculoskeletal:        Feet:    Skin:     General: Skin is warm and dry. Coloration: Skin is not jaundiced or pale. Findings: No bruising or erythema. Neurological:      General: No focal deficit present. Mental Status: She is alert and oriented to person, place, and time. Mental status is at baseline. DIFFERENTIAL DIAGNOSIS:   Wound infection, toe pain, postop pain    DIAGNOSTIC RESULTS     EKG: All EKG's are interpreted by the Emergency Department Physician who either signs or Co-signs this chart in the absence of a cardiologist.    None    RADIOLOGY: non-plainfilm images(s) such as CT, Ultrasound and MRI are read by the radiologist.    No orders to display       LABS:     Labs Reviewed - No data to display    EMERGENCY DEPARTMENT COURSE:   Vitals:    Vitals:    04/29/21 2318   BP: (!) 168/74   Pulse: 58   Resp: 16   Temp: 98 °F (36.7 °C)   TempSrc: Oral   SpO2: 95%   Weight: 109 lb (49.4 kg)       4:03 AM EDT: The patient was seen and evaluated. MDM:  Patient seen evaluated today for bleeding post procedure. Patient had ingrown toenail removed this morning at podiatrist office. Dressing was saturated. Dressing was removed, area does not appear infected, minimal bleeding noted. Patient has been instructed to hold her Eliquis for 2 days. Antibiotic ointment and new dressing applied. Patient instructed to follow-up with podiatry as scheduled. Daughter was amenable to this plan, no other complaints or concerns were voiced.   Patient discharged in stable condition. CRITICAL CARE:   None    CONSULTS:  None    PROCEDURES:  None    FINAL IMPRESSION      1. Pain of toe of right foot          DISPOSITION/PLAN   Discharge    PATIENT REFERRED TO:  Ketty Cnaseco DPM  [address]    Call in 1 day      [address] [facility] [address] [address] EMERGENCY DEPT  [address]  Go to   If symptoms worsen      DISCHARGE MEDICATIONS:  Discharge Medication List as of 4/30/2021 12:26 AM          (Please note that portions of this note were completed with a voice recognition program.  Efforts were made to edit the dictations but occasionally words are mis-transcribed.)    The patient was given an opportunity to see the Emergency Attending. The patient voiced understanding that I was a Mid-LevelProvider and was in agreement with being seen independently by myself. Provider:  I personally performed the services described in the documentation, reviewed and edited the documentation which was dictated to the scribe in my presence, and it accurately records my words and actions.     ROWDY Denny Do, CNP, 4/30/21, 4:09 AM       ROWDY Denny Do, CNP  04/30/21 0410

## 2021-04-30 NOTE — ED NOTES
Pt has surgery this morning on her right toe. Blood soaked through the bandage and [place] Nurse placed another bandage over it and it bleed through. At this time it does not look like the blood is continuing to soak the dressing. Pt states that she is on blood thinners.

## 2021-05-03 RX ORDER — BUMETANIDE 1 MG/1
1 TABLET ORAL
Qty: 30 TABLET | Refills: 3
Start: 2021-05-03 | End: 2021-06-18 | Stop reason: DRUGHIGH

## 2021-05-04 NOTE — TELEPHONE ENCOUNTER
Spoke with nurse Willard Zaragoza  Notified of orders and verbalized with read back  Lab slip faxed to [facility]

## 2021-05-06 ENCOUNTER — OFFICE VISIT (OUTPATIENT)
Dept: CARDIOLOGY CLINIC | Age: 86
End: 2021-05-06
Payer: MEDICARE

## 2021-05-06 VITALS
HEART RATE: 65 BPM | BODY MASS INDEX: 20.06 KG/M2 | WEIGHT: 109 LBS | SYSTOLIC BLOOD PRESSURE: 158 MMHG | DIASTOLIC BLOOD PRESSURE: 76 MMHG | HEIGHT: 62 IN

## 2021-05-06 DIAGNOSIS — I25.10 CORONARY ARTERY DISEASE INVOLVING NATIVE CORONARY ARTERY OF NATIVE HEART WITHOUT ANGINA PECTORIS: Primary | ICD-10-CM

## 2021-05-06 DIAGNOSIS — E78.01 FAMILIAL HYPERCHOLESTEROLEMIA: ICD-10-CM

## 2021-05-06 DIAGNOSIS — I10 ESSENTIAL HYPERTENSION: ICD-10-CM

## 2021-05-06 PROCEDURE — G8420 CALC BMI NORM PARAMETERS: HCPCS | Performed by: NUCLEAR MEDICINE

## 2021-05-06 PROCEDURE — 4040F PNEUMOC VAC/ADMIN/RCVD: CPT | Performed by: NUCLEAR MEDICINE

## 2021-05-06 PROCEDURE — G8427 DOCREV CUR MEDS BY ELIG CLIN: HCPCS | Performed by: NUCLEAR MEDICINE

## 2021-05-06 PROCEDURE — 1123F ACP DISCUSS/DSCN MKR DOCD: CPT | Performed by: NUCLEAR MEDICINE

## 2021-05-06 PROCEDURE — 99214 OFFICE O/P EST MOD 30 MIN: CPT | Performed by: NUCLEAR MEDICINE

## 2021-05-06 PROCEDURE — 1036F TOBACCO NON-USER: CPT | Performed by: NUCLEAR MEDICINE

## 2021-05-06 PROCEDURE — 1090F PRES/ABSN URINE INCON ASSESS: CPT | Performed by: NUCLEAR MEDICINE

## 2021-05-06 NOTE — PROGRESS NOTES
[address]  [address]  [facility] [address]  Dept: [phone]  Dept Fax: [phone]  Loc: [phone]    Visit Date: 5/6/2021    Kely Meneses is a 80 y.o. female who presents todayfor:  Chief Complaint   Patient presents with    Follow-up     event monitor    Coronary Artery Disease    Hypertension    Atrial Fibrillation     Know a fib and pacemaker  Still with dyspnea  More than usual   Admitted for bradycardia and some dehydration   Getting older   Limited patient   Almost 89   BP is stable   Ended up with monitor   No pacer needed then   Did have idioventricular rhythm   Did have pulmonary HTN    HPI:  HPI  Past Medical History:   Diagnosis Date    Arthritis     Atypical chest pain     CAD (coronary artery disease)     Chronic LBBB (left bundle branch block)     Diabetes mellitus type II     Esophageal stricture     History of esophageal stricture.  FH: CAD (coronary artery disease)     Mom and dad both with heart disease at mid to late age.  GERD (gastroesophageal reflux disease)     History of gastritis     History of skin cancer     Hypercholesterolemia     Hyperlipidemia     Hypertension     Imbalance     As far as imbalance is concerned, asked patient to follow-up with Dr. Bob Delarosa since she follows with him on a regular basis.  Lactose intolerance     Nummular dermatitis     Parkinson's disease ([facility] [address] 2009    Restless legs syndrome (RLS)     S/P CABG (coronary artery bypass graft)     SCC (squamous cell carcinoma)       Past Surgical History:   Procedure Laterality Date    CARDIAC SURGERY      CARDIOVASCULAR STRESS TEST  4 09 2009    Gated SPECT images revealed normal global wall motion with EF of 60%. Persantine test associated w/nonspecific symptoms. EKG is nondiagnostic w/baseline LBBB. No obvious stress-induced ischemia by Cardiolite. EF within normal limits.      CARDIOVASCULAR STRESS TEST  7 10 2007    The gated SPECT images revealed normal wall motion with EF of 69%. Poor exercise tolerance w/SOB on exertion. EKG is nondiagnostic. Cardiolite scan revealing no obvious stress-induced ischemia. EF 60%.  CARDIOVASCULAR STRESS TEST  2 03 2006    Fair exercise tolerance w/SOB on exertion. No EKG evidence of ischemia. Patient should be able to proceed with phase II cardiac rehab.  CATARACT REMOVAL      CATARACT REMOVAL  06/08/2016    AND 6/29/16    DIAGNOSTIC CARDIAC CATH LAB PROCEDURE  12 23 2005    LV was obtained. AGEE projection showed preserved LV function w/estimated EF at 55%. No significant MR. Patent left main coronary artery. Tortuous LAD which appeared to be patent. Patent left circumflex artery. High-grade stenosis around 99% in very large dominant RCA w/heavy calcification at the ostium. Normal LV function.  DOPPLER ECHOCARDIOGRAPHY  4 09 2009    Global LV function w/in lower limits of normal, with mild anteroseptal hypokinesis. EF of 50-55%. Light LVH. Mild to moderate left atrial enlargement. Calcific aortic and mitral valve w/no obvious stenosis. No obvious pericardial effusion.  DOPPLER ECHOCARDIOGRAPHY  7 10 2007    LV function w/in lower limits of normal w/peridoxical septal wall motion. Moderate left atrial enlargement. Mild MR. Mild TR. Calcified valves w/no obvious stenosis. No pericardial effusion.  DOPPLER ECHOCARDIOGRAPHY  4 14 2006    There appears to be significant improvement from previous echo w/complete resolution of pericardial effusion and normal LV function. EF of 60%.  DOPPLER ECHOCARDIOGRAPHY  3 21 2006    Normal LV function. Mild LV hypertrophy. Mild biatrial enlargement. Mildly calcific aortic and mitral valve w/no stenosis. Mild MR. Mild TR. Minimal residual pericardial effusion w/significant improvement from previous echo.  DOPPLER ECHOCARDIOGRAPHY  3 16 2006    Interval change from previous echocardiogram w/worsening of effusion. Correlation clinically. Consideration of pericardial centesis. Will obtain a [facility] consult.  DOPPLER ECHOCARDIOGRAPHY  1 31 2006    No significant change from previous echo. The 2D echo showed moderate degree of pericardial effusion. It does seem to be worst or better than previous echo. No evidence of tamponade.  DOPPLER ECHOCARDIOGRAPHY  1 27 2006    Normal global LV function. Mild biatrial enlargement. Mildly sclerotic aortic & mitral valve w/no stenosis. Mild MR. Mild TR. Small to moderate pericardial effusion w/no obvious tamponade.  JOINT REPLACEMENT      MOHS SURGERY Right 2/13/2019    MOHS DEFECT REPAIR CYSTIC SCC RIGHT LOWER LATERAL LEG WITH SKIN GRAFT FROM RIGHT GROIN (FULL THICKNESS ) performed by Rashad Ford MD at [facility] PRE-MALIG[facility]NT / [address] [facility] Left 12/20/13    left leg lesion excision x2, frozen section, skin graft closure    ROTATOR CUFF REPAIR  09/2001    RIGHT    ROTATOR CUFF REPAIR  04/15/2009    LEFT     SKIN CANCER EXCISION  06/2006      Rt leg    SPINAL CORD STIMULATOR IMPLANTATION N/A 12/4/2018    PERMANENT INTERSTIM performed by Davy Goldman MD at [facility] History   Problem Relation Age of Onset    Heart Disease Mother     Diabetes Mother     Stroke Father     Diabetes Sister     Lung Cancer Brother      Social History     Tobacco Use    Smoking status: Never Smoker    Smokeless tobacco: Never Used   Substance Use Topics    Alcohol use: No      Current Outpatient Medications   Medication Sig Dispense Refill    bumetanide (BUMEX) 1 MG tablet Take 1 tablet by mouth Twice a Week On Mondays and Thursdays 30 tablet 3    blood glucose test strips (CONTOUR TEST) strip USE TO TEST BLOOD SUGAR TWICE DAILY.  200 strip 3    zinc oxide (PINXAV) 30 % OINT Apply topically 3 times daily as needed      lactobacillus (CULTURELLE) capsule Take 1 capsule by mouth 3 times daily (with meals) 20 capsule 0    metoprolol tartrate (LOPRESSOR) 25 MG tablet Take 0.5 tablets by mouth 2 times daily 60 tablet 0    psyllium (KONSYL) 28.3 % PACK Take 1 packet by mouth daily 30 each 1    pantoprazole (PROTONIX) 40 MG tablet Take 1 tablet by mouth 2 times daily (before meals) 30 tablet 1    sucralfate (CARAFATE) 1 GM tablet Take 1 tablet by mouth 4 times daily (before meals and nightly) Dissolve in 1 ounce of water to take as a slurry 120 tablet 1    ondansetron (ZOFRAN) 4 MG tablet Take 4 mg by mouth every 6 hours       Multiple Vitamin (MULTIVITAMIN) TABS tablet Take 1 tablet by mouth daily 90 tablet 3    Multiple Vitamins-Minerals (PRESERVISION AREDS 2) CAPS Take 2 capsules by mouth 2 times daily 360 capsule 3    Cyanocobalamin 200 MCG/SPRAY LIQD Take 4 sprays by mouth 2 times daily Vitamist - Vitamin B 12 spray 90 mL 3    ferrous sulfate (IRON 325) 325 (65 Fe) MG tablet Take 1 tablet by mouth 2 times daily 180 tablet 1    sodium chloride 1 g tablet Take 1 tablet by mouth daily 120 tablet 1    hydrALAZINE (APRESOLINE) 10 MG tablet Take 1 tablet by mouth every 8 hours 90 tablet 3    JANUVIA 100 MG tablet TAKE 1 TABLET BY MOUTH DAILY 90 tablet 2    spironolactone (ALDACTONE) 25 MG tablet Take 1 tablet by mouth daily 30 tablet 3    acetaminophen (TYLENOL 8 HOUR ARTHRITIS PAIN) 650 MG extended release tablet Take 650 mg by mouth 3 times daily      metFORMIN (GLUCOPHAGE-XR) 500 MG extended release tablet Take 1 tablet by mouth 2 times daily 180 tablet 3    diclofenac sodium (VOLTAREN) 1 % GEL Apply 4 g topically 4 times daily 4 Tube 5    lactase (LACTAID ULTRA) 9000 units TABS Take 9,000 Units by mouth 3 times daily (before meals)      potassium chloride (KLOR-CON M) 20 MEQ extended release tablet Take 0.5 tablets by mouth 3 times daily 30 tablet 1    magnesium oxide (MAG-OX) 400 MG tablet Take 1 tablet by mouth 3 times daily 30 tablet 3    isosorbide dinitrate (ISORDIL) 20 MG tablet Take 1 tablet by mouth 3 times daily 90 Musculoskeletal:  No recent active issues  Extremities:   No edema, no obvious claudication       Objective:  Physical Exam  BP (!) 158/76   Pulse 65   Ht 5' 2\" (1.575 m)   Wt 109 lb (49.4 kg)   BMI 19.94 kg/m²   General:   Well developed, well nourished  Lungs:   Clear to auscultation  Heart:    Normal S1 S2, Slight murmur. no rubs, no gallops  Abdomen:   Soft, non tender, no organomegalies, positive bowel sounds  Extremities:   No edema, no cyanosis, good peripheral pulses  Neurological:   Awake, alert, oriented. No obvious focal deficits  Musculoskelatal:  No obvious deformities    Assessment:      Diagnosis Orders   1. Coronary artery disease involving native coronary artery of native heart without angina pectoris     2. Essential hypertension     3. Familial hypercholesterolemia     might need a pacemaker  Some slow HR   A fib is fair for now   ?/ need for a pacer   Severe pulmonary HTN       Plan:  No follow-ups on file. Consider referral to EP to assess for a pacer  ? ? Pulmonary HTN   Discussed with the family regarding her condition   She is following the CHF clinic  Continue risk factor modification and medical management  Thank you for allowing me to participate in the care of your patient. Please don't hesitate to contact me regarding any further issues related to the patient care    Orders Placed:  No orders of the defined types were placed in this encounter. Medications Prescribed:  No orders of the defined types were placed in this encounter. Discussed use, benefit, and side effects of prescribed medications. All patient questions answered. Pt voicedunderstanding. Instructed to continue current medications, diet and exercise. Continue risk factor modification and medical management. Patient agreed with treatment plan. Follow up as directed.     Electronically signedby Precious Kraus MD on 5/6/2021 at 8:50 AM

## 2021-05-11 NOTE — PROGRESS NOTES
 Atypical chest pain     CAD (coronary artery disease)     Chronic LBBB (left bundle branch block)     Diabetes mellitus type II     Esophageal stricture     History of esophageal stricture.  FH: CAD (coronary artery disease)     Mom and dad both with heart disease at mid to late age.  GERD (gastroesophageal reflux disease)     History of gastritis     History of skin cancer     Hypercholesterolemia     Hyperlipidemia     Hypertension     Imbalance     As far as imbalance is concerned, asked patient to follow-up with Dr. Lubna Sharma since she follows with him on a regular basis.  Lactose intolerance     Nummular dermatitis     Parkinson's disease ([address] 2009    Restless legs syndrome (RLS)     S/P CABG (coronary artery bypass graft)     SCC (squamous cell carcinoma)        PSH:  Past Surgical History:   Procedure Laterality Date    CARDIAC SURGERY      CARDIOVASCULAR STRESS TEST  4 09 2009    Gated SPECT images revealed normal global wall motion with EF of 60%. Persantine test associated w/nonspecific symptoms. EKG is nondiagnostic w/baseline LBBB. No obvious stress-induced ischemia by Cardiolite. EF within normal limits.  CARDIOVASCULAR STRESS TEST  7 10 2007    The gated SPECT images revealed normal wall motion with EF of 69%. Poor exercise tolerance w/SOB on exertion. EKG is nondiagnostic. Cardiolite scan revealing no obvious stress-induced ischemia. EF 60%.  CARDIOVASCULAR STRESS TEST  2 03 2006    Fair exercise tolerance w/SOB on exertion. No EKG evidence of ischemia. Patient should be able to proceed with phase II cardiac rehab.  CATARACT REMOVAL      CATARACT REMOVAL  06/08/2016    AND 6/29/16    DIAGNOSTIC CARDIAC CATH LAB PROCEDURE  12 23 2005    LV was obtained. AGEE projection showed preserved LV function w/estimated EF at 55%. No significant MR. Patent left main coronary artery. Tortuous LAD which appeared to be patent. Patent left circumflex artery.  High-grade stenosis around 99% in very large dominant RCA w/heavy calcification at the ostium. Normal LV function.  DOPPLER ECHOCARDIOGRAPHY  4 09 2009    Global LV function w/in lower limits of normal, with mild anteroseptal hypokinesis. EF of 50-55%. Light LVH. Mild to moderate left atrial enlargement. Calcific aortic and mitral valve w/no obvious stenosis. No obvious pericardial effusion.  DOPPLER ECHOCARDIOGRAPHY  7 10 2007    LV function w/in lower limits of normal w/peridoxical septal wall motion. Moderate left atrial enlargement. Mild MR. Mild TR. Calcified valves w/no obvious stenosis. No pericardial effusion.  DOPPLER ECHOCARDIOGRAPHY  4 14 2006    There appears to be significant improvement from previous echo w/complete resolution of pericardial effusion and normal LV function. EF of 60%.  DOPPLER ECHOCARDIOGRAPHY  3 21 2006    Normal LV function. Mild LV hypertrophy. Mild biatrial enlargement. Mildly calcific aortic and mitral valve w/no stenosis. Mild MR. Mild TR. Minimal residual pericardial effusion w/significant improvement from previous echo.  DOPPLER ECHOCARDIOGRAPHY  3 16 2006    Interval change from previous echocardiogram w/worsening of effusion. Correlation clinically. Consideration of pericardial centesis. Will obtain a [facility] consult.  DOPPLER ECHOCARDIOGRAPHY  1 31 2006    No significant change from previous echo. The 2D echo showed moderate degree of pericardial effusion. It does seem to be worst or better than previous echo. No evidence of tamponade.  DOPPLER ECHOCARDIOGRAPHY  1 27 2006    Normal global LV function. Mild biatrial enlargement. Mildly sclerotic aortic & mitral valve w/no stenosis. Mild MR. Mild TR. Small to moderate pericardial effusion w/no obvious tamponade.      JOINT REPLACEMENT      MOHS SURGERY Right 2/13/2019    MOHS DEFECT REPAIR CYSTIC SCC RIGHT LOWER LATERAL LEG WITH SKIN GRAFT FROM RIGHT GROIN (FULL THICKNESS ) performed by Surinder Weaver MD at [place] [facility]    PRE-MALIGNANT / BENIGN SKIN LESION EXCISION Left 12/20/13    left leg lesion excision x2, frozen section, skin graft closure    ROTATOR CUFF REPAIR  09/2001    RIGHT    ROTATOR CUFF REPAIR  04/15/2009    LEFT     SKIN CANCER EXCISION  06/2006      Rt leg    SPINAL CORD STIMULATOR IMPLANTATION N/A 12/4/2018    PERMANENT INTERSTIM performed by Tari Melgoza MD at [facility] [address]:  Family History   Problem Relation Age of Onset    Heart Disease Mother     Diabetes Mother     Stroke Father     Diabetes Sister     Lung Cancer Brother         SOCIAL HISTORY:  Denies smoking or alcohol use. She is . Lives by herself and is a children. Social History     Socioeconomic History    Marital status:       Spouse name: Not on file    Number of children: Not on file    Years of education: Not on file    Highest education level: Not on file   Occupational History    Not on file   Social Needs    Financial resource strain: Not on file    Food insecurity     Worry: Not on file     Inability: Not on file    Transportation needs     Medical: Not on file     Non-medical: Not on file   Tobacco Use    Smoking status: Never Smoker    Smokeless tobacco: Never Used   Substance and Sexual Activity    Alcohol use: No    Drug use: No    Sexual activity: Not on file   Lifestyle    Physical activity     Days per week: Not on file     Minutes per session: Not on file    Stress: Not on file   Relationships    Social connections     Talks on phone: Not on file     Gets together: Not on file     Attends Jewish service: Not on file     Active member of club or organization: Not on file     Attends meetings of clubs or organizations: Not on file     Relationship status: Not on file    Intimate partner violence     Fear of current or ex partner: Not on file     Emotionally abused: Not on file     Physically abused: Not on file     Forced sexual activity: Not on file   Other Topics Concern    Not on file   Social History Narrative    Not on file        ALLERGY HISTORY:  Allergies   Allergen Reactions    Latex Rash    Lisinopril Swelling     mouth    Tape Verl Scrape Tape] Itching    Bacitracin Hives    Penicillins     Polymyxin B Hives    Keflex [Cephalexin] Nausea And Vomiting and Rash    Lactulose Diarrhea    Morphine Rash    Polysporin [Bacitracin-Polymyxin B] Rash        MEDICATIONS:  Current Outpatient Medications   Medication Sig Dispense Refill    bumetanide (BUMEX) 1 MG tablet Take 1 tablet by mouth Twice a Week On Mondays and Thursdays 30 tablet 3    blood glucose test strips (CONTOUR TEST) strip USE TO TEST BLOOD SUGAR TWICE DAILY.  200 strip 3    zinc oxide (PINXAV) 30 % OINT Apply topically 3 times daily as needed      zinc sulfate (ZINCATE) 220 (50 Zn) MG capsule Take 50 mg by mouth daily      lactobacillus (CULTURELLE) capsule Take 1 capsule by mouth 3 times daily (with meals) 20 capsule 0    metoprolol tartrate (LOPRESSOR) 25 MG tablet Take 0.5 tablets by mouth 2 times daily 60 tablet 0    psyllium (KONSYL) 28.3 % PACK Take 1 packet by mouth daily 30 each 1    pantoprazole (PROTONIX) 40 MG tablet Take 1 tablet by mouth 2 times daily (before meals) 30 tablet 1    sucralfate (CARAFATE) 1 GM tablet Take 1 tablet by mouth 4 times daily (before meals and nightly) Dissolve in 1 ounce of water to take as a slurry 120 tablet 1    ondansetron (ZOFRAN) 4 MG tablet Take 4 mg by mouth every 6 hours       Multiple Vitamin (MULTIVITAMIN) TABS tablet Take 1 tablet by mouth daily 90 tablet 3    Multiple Vitamins-Minerals (PRESERVISION AREDS 2) CAPS Take 2 capsules by mouth 2 times daily 360 capsule 3    Cyanocobalamin 200 MCG/SPRAY LIQD Take 4 sprays by mouth 2 times daily Vitamist - Vitamin B 12 spray 90 mL 3    ferrous sulfate (IRON 325) 325 (65 Fe) MG tablet Take 1 tablet by mouth 2 times daily 180 tablet 1    sodium chloride 1 g tablet Take 1 tablet by mouth daily 120 tablet 1    hydrALAZINE (APRESOLINE) 10 MG tablet Take 1 tablet by mouth every 8 hours 90 tablet 3    JANUVIA 100 MG tablet TAKE 1 TABLET BY MOUTH DAILY 90 tablet 2    spironolactone (ALDACTONE) 25 MG tablet Take 1 tablet by mouth daily 30 tablet 3    acetaminophen (TYLENOL 8 HOUR ARTHRITIS PAIN) 650 MG extended release tablet Take 650 mg by mouth 3 times daily      metFORMIN (GLUCOPHAGE-XR) 500 MG extended release tablet Take 1 tablet by mouth 2 times daily 180 tablet 3    diclofenac sodium (VOLTAREN) 1 % GEL Apply 4 g topically 4 times daily 4 Tube 5    lactase (LACTAID ULTRA) 9000 units TABS Take 9,000 Units by mouth 3 times daily (before meals)      potassium chloride (KLOR-CON M) 20 MEQ extended release tablet Take 0.5 tablets by mouth 3 times daily 30 tablet 1    magnesium oxide (MAG-OX) 400 MG tablet Take 1 tablet by mouth 3 times daily 30 tablet 3    isosorbide dinitrate (ISORDIL) 20 MG tablet Take 1 tablet by mouth 3 times daily 90 tablet 3    atorvastatin (LIPITOR) 20 MG tablet TAKE 1 TABLET DAILY 90 tablet 1    carbidopa-levodopa (SINEMET)  MG per tablet Take 2 tablets by mouth 4 times daily 720 tablet 0    apixaban (ELIQUIS) 2.5 MG TABS tablet TAKE 1 TABLET BY MOUTH 2  TIMES DAILY, EQUIVALENT TO  APIXABAN 180 tablet 1    dilTIAZem (CARDIZEM) 30 MG tablet TAKE 1 TABLET BY MOUTH 3  TIMES A  tablet 3    Cholecalciferol (VITAMIN D3 PO) Take 4,000 Units by mouth 2 times daily       Misc. Devices MISC 1 each by Does not apply route once for 1 dose Param Glucose Meter; Diagnosis:E11.65 1 Device 0    Cranberry 500 MG CAPS Take 1,000 mg by mouth daily       aspirin 81 MG EC tablet Take 81 mg by mouth daily. No current facility-administered medications for this visit. PHYSICAL EXAM:  Vitals:    05/12/21 1238   BP: (!) 127/56   Pulse: 51   SpO2: 99%     Body mass index is 20.63 kg/m². GENERAL: Alert and oriented. No distress.   EYES: No pallor or icterus. ENT: No cyanosis. No thyromegaly or cervical LAP. VESSELS: No jugular venous distension or carotid bruits. HEART: Normal S1/S2. No murmur, rub or gallop. LUNGS: Clear to auscultation. ABDOMEN: Soft and non-tender. EXTREMITIES: No lower extremity edema. Feet are warm. NEUROLOGICAL: Grossly normal.     LABORATORY DATA AND DIAGNOSTIC DATA:  I have personally reviewed and interpreted the results of the following diagnostic testing    Lab Results   Component Value Date    WBC 4.8 03/02/2021    HGB 10.9 (L) 03/02/2021    HCT 36.5 (L) 03/02/2021     03/02/2021    CHOL 135 03/08/2021    TRIG 117 03/08/2021    HDL 42 03/08/2021    LDLDIRECT 220.21 03/07/2016    ALT <5 (L) 02/27/2021    AST 14 02/27/2021     (L) 03/08/2021    K 4.8 03/08/2021    CL 96 03/08/2021    CREATININE 0.6 03/08/2021    BUN 12 03/08/2021    CO2 29 03/08/2021    TSH 3.09 02/04/2021    INR 1.42 (H) 05/16/2020    LABA1C 6.3 03/08/2021    LABMICR < 1.20 05/27/2014   Echocardiogram dated 5/16/2020: LVEF 55 to 60%. Normal apical wall thickness. RVSP 60 to 65 mmHg, moderate mitral regurgitation and moderate expiratory sedation. ECG atrial fibrillation with slow ventricular rate. Event monitor 3/2/2021: Atrial fibrillation throughout the monitor. With one episode of bradycardia. IMPRESSION:  1. Proximal atrial fibrillation, slow ventricular rate. [id number] score 5. On apixaban. 2.  Intermittent sinus bradycardia and type II second-degree AV block. 3.  CAD/CABG, HTN, HPL, DM2, LBBB,   4.  Gait instability, spinal cord stimulator,       ASSESSMENT AND RECOMMENDATIONS:  The patient has symptomatic atrial fibrillation and bradycardia arrhythmias. She has indication for beta-blockers as well as antiarrhythmic therapy. I think she will be benefited by a pacemaker implantation. Discussed the diagnosis, management options and and my recommendation. She however ,does not seem to be interested in pacemaker.

## 2021-05-12 ENCOUNTER — OFFICE VISIT (OUTPATIENT)
Dept: CARDIOLOGY CLINIC | Age: 86
End: 2021-05-12
Payer: MEDICARE

## 2021-05-12 VITALS
SYSTOLIC BLOOD PRESSURE: 127 MMHG | HEART RATE: 51 BPM | OXYGEN SATURATION: 99 % | WEIGHT: 112.8 LBS | BODY MASS INDEX: 20.76 KG/M2 | HEIGHT: 62 IN | DIASTOLIC BLOOD PRESSURE: 56 MMHG

## 2021-05-12 DIAGNOSIS — I48.0 PAROXYSMAL ATRIAL FIBRILLATION (HCC): ICD-10-CM

## 2021-05-12 DIAGNOSIS — I25.10 CORONARY ARTERY DISEASE INVOLVING NATIVE CORONARY ARTERY OF NATIVE HEART WITHOUT ANGINA PECTORIS: Primary | ICD-10-CM

## 2021-05-12 PROCEDURE — 1123F ACP DISCUSS/DSCN MKR DOCD: CPT | Performed by: INTERNAL MEDICINE

## 2021-05-12 PROCEDURE — 4040F PNEUMOC VAC/ADMIN/RCVD: CPT | Performed by: INTERNAL MEDICINE

## 2021-05-12 PROCEDURE — 1036F TOBACCO NON-USER: CPT | Performed by: INTERNAL MEDICINE

## 2021-05-12 PROCEDURE — 1090F PRES/ABSN URINE INCON ASSESS: CPT | Performed by: INTERNAL MEDICINE

## 2021-05-12 PROCEDURE — 99204 OFFICE O/P NEW MOD 45 MIN: CPT | Performed by: INTERNAL MEDICINE

## 2021-05-12 PROCEDURE — G8420 CALC BMI NORM PARAMETERS: HCPCS | Performed by: INTERNAL MEDICINE

## 2021-05-12 PROCEDURE — G8427 DOCREV CUR MEDS BY ELIG CLIN: HCPCS | Performed by: INTERNAL MEDICINE

## 2021-05-12 PROCEDURE — 93000 ELECTROCARDIOGRAM COMPLETE: CPT | Performed by: INTERNAL MEDICINE

## 2021-05-12 NOTE — PROGRESS NOTES
Dr Kendy Lopez pt.  Here to discuss pacer    C/o SOB, occasional lightheaded, swelling lower legs/feet

## 2021-05-13 LAB
AVERAGE GLUCOSE: 143
CHOLESTEROL, TOTAL: 124 MG/DL
CHOLESTEROL/HDL RATIO: 1.2
HBA1C MFR BLD: 6.6 %
HDLC SERPL-MCNC: 49 MG/DL (ref 35–70)
LDL CHOLESTEROL CALCULATED: 60 MG/DL (ref 0–160)
NONHDLC SERPL-MCNC: NORMAL MG/DL
TRIGL SERPL-MCNC: 73 MG/DL
VLDLC SERPL CALC-MCNC: 15 MG/DL

## 2021-05-24 ENCOUNTER — OFFICE VISIT (OUTPATIENT)
Dept: FAMILY MEDICINE CLINIC | Age: 86
End: 2021-05-24
Payer: MEDICARE

## 2021-05-24 VITALS
SYSTOLIC BLOOD PRESSURE: 120 MMHG | BODY MASS INDEX: 21.03 KG/M2 | HEART RATE: 60 BPM | WEIGHT: 115 LBS | RESPIRATION RATE: 20 BRPM | DIASTOLIC BLOOD PRESSURE: 74 MMHG | TEMPERATURE: 97.4 F

## 2021-05-24 DIAGNOSIS — I48.20 CHRONIC ATRIAL FIBRILLATION (HCC): ICD-10-CM

## 2021-05-24 DIAGNOSIS — S81.801D WOUND OF RIGHT LOWER EXTREMITY, SUBSEQUENT ENCOUNTER: ICD-10-CM

## 2021-05-24 DIAGNOSIS — L85.8 CUTANEOUS HORN: Primary | ICD-10-CM

## 2021-05-24 PROCEDURE — G8427 DOCREV CUR MEDS BY ELIG CLIN: HCPCS | Performed by: FAMILY MEDICINE

## 2021-05-24 PROCEDURE — G8420 CALC BMI NORM PARAMETERS: HCPCS | Performed by: FAMILY MEDICINE

## 2021-05-24 PROCEDURE — 1036F TOBACCO NON-USER: CPT | Performed by: FAMILY MEDICINE

## 2021-05-24 PROCEDURE — 1123F ACP DISCUSS/DSCN MKR DOCD: CPT | Performed by: FAMILY MEDICINE

## 2021-05-24 PROCEDURE — 4040F PNEUMOC VAC/ADMIN/RCVD: CPT | Performed by: FAMILY MEDICINE

## 2021-05-24 PROCEDURE — 1090F PRES/ABSN URINE INCON ASSESS: CPT | Performed by: FAMILY MEDICINE

## 2021-05-24 PROCEDURE — 99213 OFFICE O/P EST LOW 20 MIN: CPT | Performed by: FAMILY MEDICINE

## 2021-05-25 ENCOUNTER — OFFICE VISIT (OUTPATIENT)
Dept: CARDIOLOGY CLINIC | Age: 86
End: 2021-05-25
Payer: MEDICARE

## 2021-05-25 VITALS
OXYGEN SATURATION: 96 % | SYSTOLIC BLOOD PRESSURE: 130 MMHG | DIASTOLIC BLOOD PRESSURE: 60 MMHG | HEIGHT: 62 IN | HEART RATE: 58 BPM | WEIGHT: 117.4 LBS | BODY MASS INDEX: 21.6 KG/M2

## 2021-05-25 DIAGNOSIS — I48.0 PAROXYSMAL ATRIAL FIBRILLATION (HCC): Primary | ICD-10-CM

## 2021-05-25 DIAGNOSIS — I50.32 CHF (CONGESTIVE HEART FAILURE), NYHA CLASS III, CHRONIC, DIASTOLIC (HCC): ICD-10-CM

## 2021-05-25 DIAGNOSIS — I10 ESSENTIAL HYPERTENSION: ICD-10-CM

## 2021-05-25 PROCEDURE — 99214 OFFICE O/P EST MOD 30 MIN: CPT | Performed by: PHYSICIAN ASSISTANT

## 2021-05-25 PROCEDURE — 1123F ACP DISCUSS/DSCN MKR DOCD: CPT | Performed by: PHYSICIAN ASSISTANT

## 2021-05-25 PROCEDURE — G8420 CALC BMI NORM PARAMETERS: HCPCS | Performed by: PHYSICIAN ASSISTANT

## 2021-05-25 PROCEDURE — 4040F PNEUMOC VAC/ADMIN/RCVD: CPT | Performed by: PHYSICIAN ASSISTANT

## 2021-05-25 PROCEDURE — G8427 DOCREV CUR MEDS BY ELIG CLIN: HCPCS | Performed by: PHYSICIAN ASSISTANT

## 2021-05-25 PROCEDURE — 1090F PRES/ABSN URINE INCON ASSESS: CPT | Performed by: PHYSICIAN ASSISTANT

## 2021-05-25 PROCEDURE — 1036F TOBACCO NON-USER: CPT | Performed by: PHYSICIAN ASSISTANT

## 2021-05-25 NOTE — PROGRESS NOTES
Heart Failure Clinic       Visit Date: 5/25/2021  Cardiologist:  Dr. Iker Singh  Primary Care Physician: Dr. Danielle Sanchez MD    Catherine Alva is a 80 y.o. female who presents today for:  Chief Complaint   Patient presents with    Congestive Heart Failure       HPI:   Catherine Alva is a 80 y.o. female who presents to the office for a follow up patient visit in the heart failure clinic. Accompanied by son  Pt overall not doing too bad. Weight has been stable, no gain. No cp.  occasional sob during night x1 per week, not too significant. No sob at rest.  Sometimes sob during showers. No edema. No orthopnea. On RA    TYPE HF: chronic diastolic CHF   Device: none  HX: CAD s/p CABG, HTN, HLD, DM, LBBB, PAF, intermittent s.b. and type II second degree AVB, gait instability, spinal cord stimulator      Hospitalization:  2/27/21-3/2/21 - diarrhea, afib svr, n/v, acute on chronic diastolic CHF, severe PHTN, DM  parkinsons    Concerns today: none  Activity: able to ambulate to restroom with walker and assistance  Diet: compliant    Patient has:  Chest Pain: no  SOB: intermittent, with exertion  Orthopnea/PND: no  DEBO: no  Edema: no  Fatigue: no  Abdominal bloating: no  Cough: no  Appetite: fair    Past Medical History:   Diagnosis Date    Arthritis     Atypical chest pain     CAD (coronary artery disease)     Chronic LBBB (left bundle branch block)     Diabetes mellitus type II     Esophageal stricture     History of esophageal stricture.  FH: CAD (coronary artery disease)     Mom and dad both with heart disease at mid to late age.  GERD (gastroesophageal reflux disease)     History of gastritis     History of skin cancer     Hypercholesterolemia     Hyperlipidemia     Hypertension     Imbalance     As far as imbalance is concerned, asked patient to follow-up with Dr. Napoleon Huffman since she follows with him on a regular basis.      Lactose intolerance     Nummular dermatitis     Parkinson's disease [facility]) 2009    Restless legs syndrome (RLS)     S/P CABG (coronary artery bypass graft)     SCC (squamous cell carcinoma)      Past Surgical History:   Procedure Laterality Date    CARDIAC SURGERY      CARDIOVASCULAR STRESS TEST  4 09 2009    Gated SPECT images revealed normal global wall motion with EF of 60%. Persantine test associated w/nonspecific symptoms. EKG is nondiagnostic w/baseline LBBB. No obvious stress-induced ischemia by Cardiolite. EF within normal limits.  CARDIOVASCULAR STRESS TEST  7 10 2007    The gated SPECT images revealed normal wall motion with EF of 69%. Poor exercise tolerance w/SOB on exertion. EKG is nondiagnostic. Cardiolite scan revealing no obvious stress-induced ischemia. EF 60%.  CARDIOVASCULAR STRESS TEST  2 03 2006    Fair exercise tolerance w/SOB on exertion. No EKG evidence of ischemia. Patient should be able to proceed with phase II cardiac rehab.  CATARACT REMOVAL      CATARACT REMOVAL  06/08/2016    AND 6/29/16    DIAGNOSTIC CARDIAC CATH LAB PROCEDURE  12 23 2005    LV was obtained. AGEE projection showed preserved LV function w/estimated EF at 55%. No significant MR. Patent left main coronary artery. Tortuous LAD which appeared to be patent. Patent left circumflex artery. High-grade stenosis around 99% in very large dominant RCA w/heavy calcification at the ostium. Normal LV function.  DOPPLER ECHOCARDIOGRAPHY  4 09 2009    Global LV function w/in lower limits of normal, with mild anteroseptal hypokinesis. EF of 50-55%. Light LVH. Mild to moderate left atrial enlargement. Calcific aortic and mitral valve w/no obvious stenosis. No obvious pericardial effusion.  DOPPLER ECHOCARDIOGRAPHY  7 10 2007    LV function w/in lower limits of normal w/peridoxical septal wall motion. Moderate left atrial enlargement. Mild MR. Mild TR. Calcified valves w/no obvious stenosis. No pericardial effusion.      DOPPLER ECHOCARDIOGRAPHY  4 14 2006    There appears to be significant improvement from previous echo w/complete resolution of pericardial effusion and normal LV function. EF of 60%.  DOPPLER ECHOCARDIOGRAPHY  3 21 2006    Normal LV function. Mild LV hypertrophy. Mild biatrial enlargement. Mildly calcific aortic and mitral valve w/no stenosis. Mild MR. Mild TR. Minimal residual pericardial effusion w/significant improvement from previous echo.  DOPPLER ECHOCARDIOGRAPHY  3 16 2006    Interval change from previous echocardiogram w/worsening of effusion. Correlation clinically. Consideration of pericardial centesis. Will obtain a [facility] consult.  DOPPLER ECHOCARDIOGRAPHY  1 31 2006    No significant change from previous echo. The 2D echo showed moderate degree of pericardial effusion. It does seem to be worst or better than previous echo. No evidence of tamponade.  DOPPLER ECHOCARDIOGRAPHY  1 27 2006    Normal global LV function. Mild biatrial enlargement. Mildly sclerotic aortic & mitral valve w/no stenosis. Mild MR. Mild TR. Small to moderate pericardial effusion w/no obvious tamponade.      JOINT REPLACEMENT      MOHS SURGERY Right 2/13/2019    MOHS DEFECT REPAIR CYSTIC SCC RIGHT LOWER LATERAL LEG WITH SKIN GRAFT FROM RIGHT GROIN (FULL THICKNESS ) performed by Sen Lyn MD at [facility] / [address] [place] [address] Left 12/20/13    left leg lesion excision x2, frozen section, skin graft closure    ROTATOR CUFF REPAIR  09/2001    RIGHT    ROTATOR CUFF REPAIR  04/15/2009    LEFT     SKIN CANCER EXCISION  06/2006      Rt leg    SPINAL CORD STIMULATOR IMPLANTATION N/A 12/4/2018    PERMANENT INTERSTIM performed by Clem Field MD at [facility] [place] History   Problem Relation Age of Onset    Heart Disease Mother     Diabetes Mother     Stroke Father     Diabetes Sister     Lung Cancer Brother      Social History     Tobacco Use    Smoking status: Never Smoker    Smokeless tobacco: Never Used Substance Use Topics    Alcohol use: No     Current Outpatient Medications   Medication Sig Dispense Refill    metoprolol tartrate (LOPRESSOR) 25 MG tablet Take 1 tablet by mouth 2 times daily 180 tablet 1    bumetanide (BUMEX) 1 MG tablet Take 1 tablet by mouth Twice a Week On Mondays and Thursdays 30 tablet 3    blood glucose test strips (CONTOUR TEST) strip USE TO TEST BLOOD SUGAR TWICE DAILY.  200 strip 3    zinc oxide (PINXAV) 30 % OINT Apply topically 3 times daily as needed      lactobacillus (CULTURELLE) capsule Take 1 capsule by mouth 3 times daily (with meals) 20 capsule 0    psyllium (KONSYL) 28.3 % PACK Take 1 packet by mouth daily (Patient taking differently: Take 1 packet by mouth daily as needed ) 30 each 1    pantoprazole (PROTONIX) 40 MG tablet Take 1 tablet by mouth 2 times daily (before meals) 30 tablet 1    sucralfate (CARAFATE) 1 GM tablet Take 1 tablet by mouth 4 times daily (before meals and nightly) Dissolve in 1 ounce of water to take as a slurry 120 tablet 1    ondansetron (ZOFRAN) 4 MG tablet Take 4 mg by mouth every 6 hours       Multiple Vitamin (MULTIVITAMIN) TABS tablet Take 1 tablet by mouth daily 90 tablet 3    Multiple Vitamins-Minerals (PRESERVISION AREDS 2) CAPS Take 2 capsules by mouth 2 times daily 360 capsule 3    Cyanocobalamin 200 MCG/SPRAY LIQD Take 4 sprays by mouth 2 times daily Vitamist - Vitamin B 12 spray 90 mL 3    ferrous sulfate (IRON 325) 325 (65 Fe) MG tablet Take 1 tablet by mouth 2 times daily 180 tablet 1    sodium chloride 1 g tablet Take 1 tablet by mouth daily 120 tablet 1    hydrALAZINE (APRESOLINE) 10 MG tablet Take 1 tablet by mouth every 8 hours 90 tablet 3    JANUVIA 100 MG tablet TAKE 1 TABLET BY MOUTH DAILY 90 tablet 2    spironolactone (ALDACTONE) 25 MG tablet Take 1 tablet by mouth daily 30 tablet 3    acetaminophen (TYLENOL 8 HOUR ARTHRITIS PAIN) 650 MG extended release tablet Take 650 mg by mouth 3 times daily      metFORMIN (GLUCOPHAGE-XR) 500 MG extended release tablet Take 1 tablet by mouth 2 times daily 180 tablet 3    diclofenac sodium (VOLTAREN) 1 % GEL Apply 4 g topically 4 times daily 4 Tube 5    lactase (LACTAID ULTRA) 9000 units TABS Take 9,000 Units by mouth 3 times daily (before meals)      potassium chloride (KLOR-CON M) 20 MEQ extended release tablet Take 0.5 tablets by mouth 3 times daily 30 tablet 1    magnesium oxide (MAG-OX) 400 MG tablet Take 1 tablet by mouth 3 times daily 30 tablet 3    isosorbide dinitrate (ISORDIL) 20 MG tablet Take 1 tablet by mouth 3 times daily 90 tablet 3    atorvastatin (LIPITOR) 20 MG tablet TAKE 1 TABLET DAILY 90 tablet 1    carbidopa-levodopa (SINEMET)  MG per tablet Take 2 tablets by mouth 4 times daily 720 tablet 0    apixaban (ELIQUIS) 2.5 MG TABS tablet TAKE 1 TABLET BY MOUTH 2  TIMES DAILY, EQUIVALENT TO  APIXABAN 180 tablet 1    Cholecalciferol (VITAMIN D3 PO) Take 4,000 Units by mouth 2 times daily       Cranberry 500 MG CAPS Take 1,000 mg by mouth daily       aspirin 81 MG EC tablet Take 81 mg by mouth daily.  Misc. Devices MISC 1 each by Does not apply route once for 1 dose Param Glucose Meter; Diagnosis:E11.65 1 Device 0     No current facility-administered medications for this visit.      Allergies   Allergen Reactions    Latex Rash    Lisinopril Swelling     mouth    Tape Margretta Sciara Tape] Itching    Bacitracin Hives    Penicillins     Polymyxin B Hives    Keflex [Cephalexin] Nausea And Vomiting and Rash    Lactulose Diarrhea    Morphine Rash    Polysporin [Bacitracin-Polymyxin B] Rash       SUBJECTIVE:   ROS:  As stated above otherwise unremarkable    OBJECTIVE:   Today's Vitals:  /60   Pulse 58   Ht 5' 2\" (1.575 m)   Wt 117 lb 6.4 oz (53.3 kg)   SpO2 96%   BMI 21.47 kg/m²     Physical Exam:  General Appearance: alert and oriented to person, place and time, in no acute distress  Cardiovascular: normal rate, regular rhythm, normal S1 and S2, no murmurs, rubs, clicks, or gallops, distal pulses intact, no carotid bruits, no JVD  Pulmonary/Chest: clear to auscultation bilaterally- no wheezes, rales or rhonchi, normal air movement, no respiratory distress  Abdomen: soft, non-tender, non-distended, normal bowel sounds, no masses   Extremities: trace ble edema, pulse   Skin: warm and dry  Head: normocephalic and atraumatic  Eyes: pupils equal, round, and reactive to light  Neck: supple and non-tender without mass, no thyromegaly     Wt Readings from Last 3 Encounters:   05/25/21 117 lb 6.4 oz (53.3 kg)   05/24/21 115 lb (52.2 kg)   05/12/21 112 lb 12.8 oz (51.2 kg)     BP Readings from Last 3 Encounters:   05/25/21 130/60   05/24/21 120/74   05/12/21 (!) 127/56     Pulse Readings from Last 3 Encounters:   05/25/21 58   05/24/21 60   05/12/21 51     Body mass index is 21.47 kg/m². ECHO:   TTE 5/18/20  Summary   Left ventricular size and systolic function is normal. Ejection fraction   was estimated at 55-60%. LV wall thickness is within normal limits. Moderately dilated left atrium. Mildly dilated right ventricle. Right ventricular systolic pressure of 40-97 mm Hg consistent with severe   pulmonary hypertension. Mildly calcified and thickened aortic valve. Mild to Moderate mitral regurgitation is present. Moderate tricuspid regurgitation. Signature      ----------------------------------------------------------------   Electronically signed by Rayne Stiles MD (Interpreting   physician) on 05/18/2020 at 04:53 PM    CATH/STRESS:   Stress test 11/1/18  Summary   Lexiscan EKG stress test is not suggestive for ischemia. Calculated gated LVEF 66 %. The T.I.D. ratio was 1.03 . Myocardial perfusion imaging is not suggestive for myocardial ischemia. This study was negative for ischemia. Recommendation   Clinical correlation is recommended. Aggressive risk factor management. Medical management.       Signatures ----------------------------------------------------------------   Electronically signed by Becca Martines MD (Interpreting   Cardiologist) on 11/01/2018 at 18:24      Results reviewed:  BNP: No results found for: BNP  CBC:   Lab Results   Component Value Date    WBC 4.8 03/02/2021    RBC 4.45 03/02/2021    RBC 4.36 02/04/2021    HGB 10.9 03/02/2021    HCT 36.5 03/02/2021     03/02/2021     CMP:    Lab Results   Component Value Date     03/08/2021    K 4.8 03/08/2021    K 4.1 03/02/2021    CL 96 03/08/2021    CO2 29 03/08/2021    BUN 12 03/08/2021    CREATININE 0.6 03/08/2021    LABGLOM 94 03/08/2021    LABGLOM >90 03/02/2021    GLUCOSE 101 03/08/2021    GLUCOSE 180 02/04/2021    CALCIUM 9.6 03/08/2021     Hepatic Function Panel:    Lab Results   Component Value Date    ALKPHOS 79 02/27/2021    ALT <5 02/27/2021    AST 14 02/27/2021    PROT 6.6 02/27/2021    BILITOT 0.6 02/27/2021    BILITOT NEGATIVE 09/21/2018    BILIDIR <0.2 10/22/2018    LABALBU 3.7 02/27/2021    LABALBU 4.1 01/12/2012     Magnesium:    Lab Results   Component Value Date    MG 1.9 02/27/2021     PT/INR:    Lab Results   Component Value Date    INR 1.42 05/16/2020     Lipids:    Lab Results   Component Value Date    TRIG 73 05/13/2021    HDL 49 05/13/2021    LDLCALC 60 05/13/2021    LDLDIRECT 220.21 03/07/2016    LABVLDL 22 01/25/2018       ASSESSMENT AND PLAN:   The patient's condition/symptoms are Stable: No clinical evidence of fluid overload today. Continue current medical regimen without changes at present time. Chronic diastolic HF  Ef 49-79 per TTE 5/18/21  Severe PHTN - RVSP 60-65 mmHg  Mod TR  PAF/hx intermittent S.B. and type II second degree AVB - seen EP - discussed PPM, pt didn't want to proceed  CAD - s/p CABG  HTN  HLD  DM     Diagnosis Orders   1. Paroxysmal atrial fibrillation (HCC)     2. Essential hypertension     3.  CHF (congestive heart failure), NYHA class III, chronic, diastolic (HCC) Continue:  GDMT:   ACE/ARB/ARNI - no   BB -  Lopressor 25 bid   Diuretic - bumex 1mg twice weekly  AA - aldactone 25 daily  SGLT2 -  no  Vasodilator - hydralazine 10 tid, isordil 20 tid  Continue Current medications  - asa 81 eliquis 2.5 bid, lipitor, potassium 10 tid, salt table 1gm daily    Awaiting BMP results from today - check K, bun/cr  ? decrease potassium  F/up chf clinic 8 months, which is about 2 months after dr Aylin Metz next appt      Tolerating above noted HF meds, no ill side effects noted. Will continue to monitor kidney function and electrolytes. Will optimize as tolerated. Pt is compliant w/ medications. Total visit time of 30 minutes has been spent with patient on education of symptoms, management, medication, and plan of care; as well as review of chart: labs, ECHO, radiology reports, etc.   I personally spent more then 50% of the appt time face to face with the patient. · Daily weights  · Fluid restriction of 2 Liters per day  · Limit sodium in diet to around 4819-3296 mg/day  · Monitor BP  · Activity as tolerated     Patient was instructed to call the [employer] for any changes in symptoms as noted in AVS.      No follow-ups on file. or sooner if needed     Patient given educational materials - see patient instructions. We discussed the importance of weighing oneself and recording daily. We also discussed the importance of a low sodium diet, higher sodium foods to avoid and better low sodium food options. Patient verbalizes understanding of plan of care using teach back method, and is agreeable to the treatment plan.        Electronically signed by Jeanette Keen PA-C on 5/25/2021 at 10:59 AM

## 2021-05-25 NOTE — PATIENT INSTRUCTIONS
.You may receive a survey regarding the care you received during your visit. Your input is valuable to us. We encourage you to complete and return your survey. We hope you will choose us in the future for your healthcare needs.     Continue:  · Continue current medications  · Daily weights and record  · Fluid restriction of 2 Liters per day  · Limit sodium in diet to around 1891-3444 mg/day  · Monitor BP  · Activity as tolerated     Call the Heart Failure Clinic for any of the following symptoms: [phone]   Weight gain of 2-3 pounds in 1 day or 5 pounds in 1 week   Increased shortness of breath   Shortness of breath while laying down   Cough   Chest pain   Swelling in feet, ankles or legs   Tenderness or bloating in the abdomen   Fatigue    Decreased appetite or nausea    Confusion

## 2021-05-26 LAB
BUN BLDV-MCNC: 23 MG/DL
CALCIUM SERPL-MCNC: 9.6 MG/DL
CHLORIDE BLD-SCNC: 98 MMOL/L
CO2: 28 MMOL/L
CREAT SERPL-MCNC: 0.7 MG/DL
GFR CALCULATED: 79
GLUCOSE BLD-MCNC: 207 MG/DL
POTASSIUM SERPL-SCNC: 4.4 MMOL/L
SODIUM BLD-SCNC: 135 MMOL/L

## 2021-05-31 ASSESSMENT — ENCOUNTER SYMPTOMS
BACK PAIN: 0
RHINORRHEA: 0
EYES NEGATIVE: 1
SORE THROAT: 0
COUGH: 0
SHORTNESS OF BREATH: 0
VOMITING: 0
DIARRHEA: 0
NAUSEA: 0
ABDOMINAL PAIN: 0
CHEST TIGHTNESS: 0

## 2021-05-31 NOTE — PROGRESS NOTES
[address] [place] [address]  Dept: [phone]  Dept Fax: [phone]  Loc: [phone]  PROGRESS NOTE      VisitDate: 5/24/2021    Pepe Garay is a 80 y.o. female who presents today for:     Chief Complaint   Patient presents with    Lesion(s)     on Right arm.  Wound Check     open wound on inside of right LL-having small amount of yellowish bloody drainage.  Discuss Medications     Metoprolol dose         Subjective:  HPI  Patient brought in by family concerned about skin lesions. On right upper extremity there is an enlarging area that is extending upward, getting taller. Starting to catch on things. She states she had a similar one on her left arm that she knocked off. Also has a wound on her right lower extremity that is been slowly healing. She has a history of chronic A. fib has been stable    Review of Systems   Constitutional: Negative for activity change, appetite change, fatigue and fever. HENT: Negative for congestion, rhinorrhea and sore throat. Eyes: Negative. Respiratory: Negative for cough, chest tightness and shortness of breath. Cardiovascular: Negative for chest pain and palpitations. Gastrointestinal: Negative for abdominal pain, diarrhea, nausea and vomiting. Genitourinary: Negative for dysuria and urgency. Musculoskeletal: Negative for arthralgias and back pain. Skin: Positive for wound. Neurological: Negative for dizziness and headaches. Psychiatric/Behavioral: Negative for dysphoric mood. The patient is not nervous/anxious. Past Medical History:   Diagnosis Date    Arthritis     Atypical chest pain     CAD (coronary artery disease)     Chronic LBBB (left bundle branch block)     Diabetes mellitus type II     Esophageal stricture     History of esophageal stricture.      FH: CAD (coronary artery disease)     Mom and dad both with heart disease at mid to late Calcific aortic and mitral valve w/no obvious stenosis. No obvious pericardial effusion.  DOPPLER ECHOCARDIOGRAPHY  7 10 2007    LV function w/in lower limits of normal w/peridoxical septal wall motion. Moderate left atrial enlargement. Mild MR. Mild TR. Calcified valves w/no obvious stenosis. No pericardial effusion.  DOPPLER ECHOCARDIOGRAPHY  4 14 2006    There appears to be significant improvement from previous echo w/complete resolution of pericardial effusion and normal LV function. EF of 60%.  DOPPLER ECHOCARDIOGRAPHY  3 21 2006    Normal LV function. Mild LV hypertrophy. Mild biatrial enlargement. Mildly calcific aortic and mitral valve w/no stenosis. Mild MR. Mild TR. Minimal residual pericardial effusion w/significant improvement from previous echo.  DOPPLER ECHOCARDIOGRAPHY  3 16 2006    Interval change from previous echocardiogram w/worsening of effusion. Correlation clinically. Consideration of pericardial centesis. Will obtain a [facility] consult.  DOPPLER ECHOCARDIOGRAPHY  1 31 2006    No significant change from previous echo. The 2D echo showed moderate degree of pericardial effusion. It does seem to be worst or better than previous echo. No evidence of tamponade.  DOPPLER ECHOCARDIOGRAPHY  1 27 2006    Normal global LV function. Mild biatrial enlargement. Mildly sclerotic aortic & mitral valve w/no stenosis. Mild MR. Mild TR. Small to moderate pericardial effusion w/no obvious tamponade.      JOINT REPLACEMENT      MOHS SURGERY Right 2/13/2019    MOHS DEFECT REPAIR CYSTIC SCC RIGHT LOWER LATERAL LEG WITH SKIN GRAFT FROM RIGHT GROIN (FULL THICKNESS ) performed by Canelo Goldberg MD at [address] [facility] / [address] [place] [address] Left 12/20/13    left leg lesion excision x2, frozen section, skin graft closure    ROTATOR CUFF REPAIR  09/2001    RIGHT    ROTATOR CUFF REPAIR  04/15/2009    LEFT     SKIN CANCER EXCISION  06/2006      Rt leg    tablet 1    hydrALAZINE (APRESOLINE) 10 MG tablet Take 1 tablet by mouth every 8 hours 90 tablet 3    JANUVIA 100 MG tablet TAKE 1 TABLET BY MOUTH DAILY 90 tablet 2    spironolactone (ALDACTONE) 25 MG tablet Take 1 tablet by mouth daily 30 tablet 3    acetaminophen (TYLENOL 8 HOUR ARTHRITIS PAIN) 650 MG extended release tablet Take 650 mg by mouth 3 times daily      metFORMIN (GLUCOPHAGE-XR) 500 MG extended release tablet Take 1 tablet by mouth 2 times daily 180 tablet 3    diclofenac sodium (VOLTAREN) 1 % GEL Apply 4 g topically 4 times daily 4 Tube 5    lactase (LACTAID ULTRA) 9000 units TABS Take 9,000 Units by mouth 3 times daily (before meals)      potassium chloride (KLOR-CON M) 20 MEQ extended release tablet Take 0.5 tablets by mouth 3 times daily 30 tablet 1    magnesium oxide (MAG-OX) 400 MG tablet Take 1 tablet by mouth 3 times daily 30 tablet 3    isosorbide dinitrate (ISORDIL) 20 MG tablet Take 1 tablet by mouth 3 times daily 90 tablet 3    atorvastatin (LIPITOR) 20 MG tablet TAKE 1 TABLET DAILY 90 tablet 1    carbidopa-levodopa (SINEMET)  MG per tablet Take 2 tablets by mouth 4 times daily 720 tablet 0    apixaban (ELIQUIS) 2.5 MG TABS tablet TAKE 1 TABLET BY MOUTH 2  TIMES DAILY, EQUIVALENT TO  APIXABAN 180 tablet 1    Cholecalciferol (VITAMIN D3 PO) Take 4,000 Units by mouth 2 times daily       Misc. Devices MISC 1 each by Does not apply route once for 1 dose Param Glucose Meter; Diagnosis:E11.65 1 Device 0    Cranberry 500 MG CAPS Take 1,000 mg by mouth daily       aspirin 81 MG EC tablet Take 81 mg by mouth daily. No current facility-administered medications for this visit.      Allergies   Allergen Reactions    Latex Rash    Lisinopril Swelling     mouth    Tape Eugune Blower Tape] Itching    Bacitracin Hives    Penicillins     Polymyxin B Hives    Keflex [Cephalexin] Nausea And Vomiting and Rash    Lactulose Diarrhea    Morphine Rash    Polysporin [Bacitracin-Polymyxin B] Rash     Health Maintenance   Topic Date Due    DTaP/Tdap/Td vaccine (1 - Tdap) Never done    Shingles Vaccine (2 of 3) 06/15/2013    Annual Wellness Visit (AWV)  Never done    Flu vaccine (Season Ended) 09/01/2021    Lipid screen  05/13/2022    Potassium monitoring  05/26/2022    Creatinine monitoring  05/26/2022    Pneumococcal 65+ years Vaccine  Completed    COVID-19 Vaccine  Completed    Hepatitis A vaccine  Aged Out    Hib vaccine  Aged Out    Meningococcal (ACWY) vaccine  Aged Out         Objective:     Physical Exam  Constitutional:       General: She is not in acute distress. Appearance: She is well-developed. She is not diaphoretic. HENT:      Head: Normocephalic and atraumatic. Eyes:      General: No scleral icterus. Conjunctiva/sclera: Conjunctivae normal.   Neck:      Thyroid: No thyromegaly. Vascular: No JVD. Comments: No bruits  Cardiovascular:      Rate and Rhythm: Normal rate and regular rhythm. Heart sounds: Normal heart sounds. Pulmonary:      Effort: Pulmonary effort is normal. No respiratory distress. Breath sounds: Normal breath sounds. No wheezing or rales. Abdominal:      Palpations: Abdomen is soft. There is no mass. Tenderness: There is no abdominal tenderness. There is no guarding. Musculoskeletal:         General: No tenderness. Skin:     General: Skin is warm and dry. Findings: No rash. Comments: Elevated hyperkeratotic lesion on the right forearm that is approximately 6 mm in diameter at its base. Lower extremity wound slowly healing see photo in chart   Neurological:      Mental Status: She is alert and oriented to person, place, and time. Cranial Nerves: No cranial nerve deficit. /74   Pulse 60   Temp 97.4 °F (36.3 °C) (Oral)   Resp 20   Wt 115 lb (52.2 kg)   BMI 21.03 kg/m²       Impression/Plan:  1. Cutaneous horn    2. Chronic atrial fibrillation (HCC)    3.  Wound of right lower extremity, subsequent encounter      Requested Prescriptions      No prescriptions requested or ordered in this encounter     Orders Placed This Encounter   Procedures   Jose Tamez MD, Dermatology, [facility]     Referral Priority:   Routine     Referral Type:   Eval and Treat     Referral Reason:   Specialty Services Required     Referred to Provider:   Kristin Blanco MD     Requested Specialty:   Dermatology     Number of Visits Requested:   1       Patient giveneducational materials - see patient instructions. Discussed use, benefit, and side effects of prescribed medications. All patient questions answered. Pt voiced understanding. Reviewed health maintenance. Patient agreedwith treatment plan. Follow up as directed. **This report has been created using voice recognition software. It may contain minor errorswhich are inherent in voice recognition technology. **       Electronically signed by Millie Parks MD on 5/31/2021 at 12:04 PM

## 2021-06-15 ENCOUNTER — TELEPHONE (OUTPATIENT)
Dept: CARDIOLOGY CLINIC | Age: 86
End: 2021-06-15

## 2021-06-17 ENCOUNTER — TELEPHONE (OUTPATIENT)
Dept: CARDIOLOGY CLINIC | Age: 86
End: 2021-06-17

## 2021-06-17 NOTE — TELEPHONE ENCOUNTER
Incoming fax from [facility] for weight gain. Taking Bumex 1 mg on Mondays and Thursdays. Attached is fax and BP/weight log. Please advise.

## 2021-06-18 RX ORDER — BUMETANIDE 1 MG/1
1 TABLET ORAL
Qty: 30 TABLET | Refills: 3 | Status: ON HOLD
Start: 2021-06-18 | End: 2021-09-14

## 2021-08-02 ENCOUNTER — OFFICE VISIT (OUTPATIENT)
Dept: NEUROLOGY | Age: 86
End: 2021-08-02
Payer: MEDICARE

## 2021-08-02 ENCOUNTER — TELEPHONE (OUTPATIENT)
Dept: CARDIOLOGY CLINIC | Age: 86
End: 2021-08-02

## 2021-08-02 VITALS
DIASTOLIC BLOOD PRESSURE: 68 MMHG | WEIGHT: 117 LBS | OXYGEN SATURATION: 96 % | HEART RATE: 57 BPM | HEIGHT: 62 IN | BODY MASS INDEX: 21.53 KG/M2 | SYSTOLIC BLOOD PRESSURE: 120 MMHG

## 2021-08-02 DIAGNOSIS — G20 PARKINSON'S DISEASE (HCC): Primary | ICD-10-CM

## 2021-08-02 DIAGNOSIS — R26.89 PRIMARY FREEZING OF GAIT: ICD-10-CM

## 2021-08-02 PROCEDURE — 99213 OFFICE O/P EST LOW 20 MIN: CPT | Performed by: PSYCHIATRY & NEUROLOGY

## 2021-08-02 PROCEDURE — G8428 CUR MEDS NOT DOCUMENT: HCPCS | Performed by: PSYCHIATRY & NEUROLOGY

## 2021-08-02 PROCEDURE — G8420 CALC BMI NORM PARAMETERS: HCPCS | Performed by: PSYCHIATRY & NEUROLOGY

## 2021-08-02 PROCEDURE — 1123F ACP DISCUSS/DSCN MKR DOCD: CPT | Performed by: PSYCHIATRY & NEUROLOGY

## 2021-08-02 PROCEDURE — 1090F PRES/ABSN URINE INCON ASSESS: CPT | Performed by: PSYCHIATRY & NEUROLOGY

## 2021-08-02 PROCEDURE — 4040F PNEUMOC VAC/ADMIN/RCVD: CPT | Performed by: PSYCHIATRY & NEUROLOGY

## 2021-08-02 PROCEDURE — 1036F TOBACCO NON-USER: CPT | Performed by: PSYCHIATRY & NEUROLOGY

## 2021-08-02 RX ORDER — AMANTADINE HYDROCHLORIDE 100 MG/1
100 CAPSULE, GELATIN COATED ORAL DAILY
Qty: 30 CAPSULE | Refills: 0 | Status: SHIPPED | OUTPATIENT
Start: 2021-08-02

## 2021-08-02 NOTE — PATIENT INSTRUCTIONS
1. Continue with Sinemet 25/100 mg take 2 tablets four times a day. Refill given. 2. Stay Active  3. Start Amantadine 100 mg daily. 4. Physical therapy evaluation for gait safety  5. Follow up in 6 months or sooner if needed via Virtual visit. 6. Call if any questions or concerns.

## 2021-08-02 NOTE — TELEPHONE ENCOUNTER
Pre op Risk Assessment    Procedure MOH on the right forearm  Physician [facility]  Date of surgery/procedure 08/09/2021    Last OV 05/06/2021  Last Stress 10/18/2018  Last Echo 05/18/2020  Last Cath None in epic  Last Stent None in epic  Is patient on blood thinners ASA, Eliquis  Hold Meds/how many days ASA 7, Eliquis 2    Fax: [phone]

## 2021-08-02 NOTE — PROGRESS NOTES
NEUROLOGY OUT PATIENT FOLLOW UP NOTE:  8/2/20212:35 PM    Atul Mejia is here for follow up for Parkinson's disease. She is using the walker for long distances. There is no freezing with ambulation. She denies dysphagia. No recent falls. No hallucination. No dysphagia. She is on Sinemet 25/100mg 2 tablets three times a day in addition to the Neupro 2 mg day. No side effects reported to the medications. She comes in with her daughter to discuss plan of care going forward. ROS:  Cardiac: no chest pain. No palpitations. Renal : no flank pain, no hematuria    Skin: no rash      Allergies   Allergen Reactions    Latex Rash    Lisinopril Swelling     mouth    Tape [place] Tape] Itching    Bacitracin Hives    Penicillins     Polymyxin B Hives    Keflex [Cephalexin] Nausea And Vomiting and Rash    Lactulose Diarrhea    Morphine Rash    Polysporin [Bacitracin-Polymyxin B] Rash       Current Outpatient Medications:     bumetanide (BUMEX) 1 MG tablet, Take 1 tablet by mouth three times a week On Mondays and Thursdays, Disp: 30 tablet, Rfl: 3    metoprolol tartrate (LOPRESSOR) 25 MG tablet, Take 1 tablet by mouth 2 times daily, Disp: 180 tablet, Rfl: 1    blood glucose test strips (CONTOUR TEST) strip, USE TO TEST BLOOD SUGAR TWICE DAILY. , Disp: 200 strip, Rfl: 3    zinc oxide (PINXAV) 30 % OINT, Apply topically 3 times daily as needed, Disp: , Rfl:     lactobacillus (CULTURELLE) capsule, Take 1 capsule by mouth 3 times daily (with meals), Disp: 20 capsule, Rfl: 0    psyllium (KONSYL) 28.3 % PACK, Take 1 packet by mouth daily (Patient taking differently: Take 1 packet by mouth daily as needed ), Disp: 30 each, Rfl: 1    pantoprazole (PROTONIX) 40 MG tablet, Take 1 tablet by mouth 2 times daily (before meals), Disp: 30 tablet, Rfl: 1    sucralfate (CARAFATE) 1 GM tablet, Take 1 tablet by mouth 4 times daily (before meals and nightly) Dissolve in 1 ounce of water to take as a slurry, Disp: 120 tablet, Rfl: 1    ondansetron (ZOFRAN) 4 MG tablet, Take 4 mg by mouth every 6 hours , Disp: , Rfl:     Multiple Vitamin (MULTIVITAMIN) TABS tablet, Take 1 tablet by mouth daily, Disp: 90 tablet, Rfl: 3    Multiple Vitamins-Minerals (PRESERVISION AREDS 2) CAPS, Take 2 capsules by mouth 2 times daily, Disp: 360 capsule, Rfl: 3    Cyanocobalamin 200 MCG/SPRAY LIQD, Take 4 sprays by mouth 2 times daily Vitamist - Vitamin B 12 spray, Disp: 90 mL, Rfl: 3    ferrous sulfate (IRON 325) 325 (65 Fe) MG tablet, Take 1 tablet by mouth 2 times daily, Disp: 180 tablet, Rfl: 1    hydrALAZINE (APRESOLINE) 10 MG tablet, Take 1 tablet by mouth every 8 hours, Disp: 90 tablet, Rfl: 3    JANUVIA 100 MG tablet, TAKE 1 TABLET BY MOUTH DAILY, Disp: 90 tablet, Rfl: 2    spironolactone (ALDACTONE) 25 MG tablet, Take 1 tablet by mouth daily, Disp: 30 tablet, Rfl: 3    acetaminophen (TYLENOL 8 HOUR ARTHRITIS PAIN) 650 MG extended release tablet, Take 650 mg by mouth 3 times daily, Disp: , Rfl:     metFORMIN (GLUCOPHAGE-XR) 500 MG extended release tablet, Take 1 tablet by mouth 2 times daily, Disp: 180 tablet, Rfl: 3    diclofenac sodium (VOLTAREN) 1 % GEL, Apply 4 g topically 4 times daily, Disp: 4 Tube, Rfl: 5    lactase (LACTAID ULTRA) 9000 units TABS, Take 9,000 Units by mouth 3 times daily (before meals), Disp: , Rfl:     potassium chloride (KLOR-CON M) 20 MEQ extended release tablet, Take 0.5 tablets by mouth 3 times daily, Disp: 30 tablet, Rfl: 1    isosorbide dinitrate (ISORDIL) 20 MG tablet, Take 1 tablet by mouth 3 times daily, Disp: 90 tablet, Rfl: 3    atorvastatin (LIPITOR) 20 MG tablet, TAKE 1 TABLET DAILY (Patient taking differently:  On hold for surgery), Disp: 90 tablet, Rfl: 1    carbidopa-levodopa (SINEMET)  MG per tablet, Take 2 tablets by mouth 4 times daily, Disp: 720 tablet, Rfl: 0    apixaban (ELIQUIS) 2.5 MG TABS tablet, TAKE 1 TABLET BY MOUTH 2  TIMES DAILY, EQUIVALENT TO  APIXABAN, Disp: 180 tablet, Rfl: 1    Cholecalciferol (VITAMIN D3 PO), Take 4,000 Units by mouth 2 times daily , Disp: , Rfl:     Cranberry 500 MG CAPS, Take 1,000 mg by mouth daily , Disp: , Rfl:     aspirin 81 MG EC tablet, Take 81 mg by mouth daily. , Disp: , Rfl:     sodium chloride 1 g tablet, Take 1 tablet by mouth daily, Disp: 120 tablet, Rfl: 1    magnesium oxide (MAG-OX) 400 MG tablet, Take 1 tablet by mouth 3 times daily, Disp: 30 tablet, Rfl: 3    Misc. Devices MISC, 1 each by Does not apply route once for 1 dose Param Glucose Meter; Diagnosis:E11.65, Disp: 1 Device, Rfl: 0      PE:   Vitals:    08/02/21 1412   BP: 120/68   Site: Left Upper Arm   Position: Sitting   Cuff Size: Medium Adult   Pulse: 57   SpO2: 96%   Weight: 117 lb (53.1 kg)   Height: 5' 2\" (1.575 m)     General Appearance:  awake, alert, oriented, in no acute distress, she is using a mask. Gen: NAD, Language is Intact. Head: no rash, no icterus  Neck: There is no carotid bruits. The Neck is supple. Neuro: CN 2-12 grossly intact with no focal deficits. Power 5/5 Throughout symmetric, Reflexes are  symmetric. Long tracts are intact. Cerebellar exam is Intact. Sensory exam is intact to light touch. Mild gait shuffling, she uses wheeled walker. No resting tremor. She is stooped forward. +ve bradykinesia, +ve hypophonia, she is slow exiting the chair. Musculoskeletal:  Has no hand arthritis, no limitation of ROM in any of the four extremities. Lower extremities no edema        DATA:  reviewed   Results for orders placed during the hospital encounter of 10/14/18   CT HEAD WO CONTRAST    Narrative PROCEDURE: CT HEAD WO CONTRAST    CLINICAL INFORMATION: Pain following a fall. COMPARISON: CT head dated 7/1/2011.     TECHNIQUE:   5 mm axial imaging through the head without IV contrast.     All CT scans at this facility use dose modulation, iterative reconstruction, and/or weight based dosing when appropriate to reduce the radiation dose to as low as reasonably achievable. FINDINGS:   POSTOPERATIVE CHANGES: None. BRAIN VOLUME:   1. Normal for the patient's age. VENTRICLES/CISTERNS:   1. Normal for the patient's age. INFARCT/WHITE MATTER DISEASE:   1. There is no acute large vessel infarct. 2. Stable small basal ganglion calcifications bilaterally. 3. Stable moderately severe white matter disease within the periventricular deep white matter and centrum semiovale bilaterally. INTRACRANIAL HEMORRHAGE: None. MASS/MASS EFFECT/MIDLINE SHIFT: None. INTRA-AXIAL/EXTRA-AXIAL FLUID COLLECTIONS: None. INTRAORBITAL CONTENTS:   1. No acute abnormality. OTHER:   1. Atheromatous calcification distal vertebral arteries and intracranial internal carotid arteries bilaterally. SKULL/SCALP:   1. There is generalized osteopenia. 2. There is no skull fracture. PARANASAL SINUSES:   1. There is a small mucous retention cyst or polyp within the right maxillary sinus. 2. There is mild mucosal thickening within the ethmoid air cells bilaterally. Impression 1. There is no acute intracranial abnormality. 2. Additional findings as described in the body the report. **This report has been created using voice recognition software. It may contain minor errors which are inherent in voice recognition technology. **    Final report electronically signed by Dr. Stanley Payan on 10/14/2018 4:57 PM          Assessment:     Diagnosis Orders   1. Parkinson's disease ([facility] [address]     2. Primary freezing of gait          Follow up for PD. She is worse with her ambulation, she denies falls. She is more hesitant and has freezing with her ambulation. Her mental state is normal she has non focal exam otherwise she has +ve bradykinesia, she uses walker. There is +ve freezing with ambulation. She denies dysphagia. No recent falls. No hallucination. No dysphagia. She is on Sinemet 25/100mg 2 tablets 4 times a day. She is off the Neupro 2 mg day.  No side effects reported to the medications. After detailed discussion with patient and her daughter we agreed on the following plan. Plan:     1. Continue with Sinemet 25/100 mg take 2 tablets four times a day. Refill given. 2. Stay Active  3. Start Amantadine 100 mg daily. 4. Physical therapy evaluation for gait safety  5. Follow up in 6 months or sooner if needed via Virtual visit. 6. Call if any questions or concerns. Time spent evaluating patient, counseling, reviewing records was 22 min.       Harman Hill MD

## 2021-08-13 ENCOUNTER — APPOINTMENT (OUTPATIENT)
Dept: GENERAL RADIOLOGY | Age: 86
DRG: 644 | End: 2021-08-13
Payer: MEDICARE

## 2021-08-13 ENCOUNTER — HOSPITAL ENCOUNTER (INPATIENT)
Age: 86
LOS: 2 days | Discharge: NURSING, REHAB OR LONG-TERM CARE | DRG: 644 | End: 2021-08-18
Attending: EMERGENCY MEDICINE | Admitting: INTERNAL MEDICINE
Payer: MEDICARE

## 2021-08-13 ENCOUNTER — APPOINTMENT (OUTPATIENT)
Dept: CT IMAGING | Age: 86
DRG: 644 | End: 2021-08-13
Payer: MEDICARE

## 2021-08-13 DIAGNOSIS — R00.1 BRADYCARDIA: ICD-10-CM
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
## 2021-08-13 DIAGNOSIS — E87.1 HYPONATREMIA: Primary | ICD-10-CM

## 2021-08-13 PROBLEM — R29.6 FALLS FREQUENTLY: Status: ACTIVE | Noted: 2021-08-13

## 2021-08-13 LAB
ANION GAP SERPL CALCULATED.3IONS-SCNC: 11 MEQ/L (ref 8–16)
BASOPHILS # BLD: 0.8 %
BASOPHILS ABSOLUTE: 0.1 THOU/MM3 (ref 0–0.1)
BUN BLDV-MCNC: 16 MG/DL (ref 7–22)
CALCIUM SERPL-MCNC: 9.8 MG/DL (ref 8.5–10.5)
CHLORIDE BLD-SCNC: 91 MEQ/L (ref 98–111)
CO2: 25 MEQ/L (ref 23–33)
CREAT SERPL-MCNC: 0.7 MG/DL (ref 0.4–1.2)
EKG Q-T INTERVAL: 474 MS
EKG QRS DURATION: 146 MS
EKG QTC CALCULATION (BAZETT): 469 MS
EKG R AXIS: -65 DEGREES
EKG T AXIS: 65 DEGREES
EKG VENTRICULAR RATE: 59 BPM
EOSINOPHIL # BLD: 4.7 %
EOSINOPHILS ABSOLUTE: 0.3 THOU/MM3 (ref 0–0.4)
ERYTHROCYTE [DISTWIDTH] IN BLOOD BY AUTOMATED COUNT: 14.1 % (ref 11.5–14.5)
ERYTHROCYTE [DISTWIDTH] IN BLOOD BY AUTOMATED COUNT: 45.1 FL (ref 35–45)
GFR SERPL CREATININE-BSD FRML MDRD: 79 ML/MIN/1.73M2
GLUCOSE BLD-MCNC: 202 MG/DL (ref 70–108)
GLUCOSE BLD-MCNC: 213 MG/DL (ref 70–108)
GLUCOSE BLD-MCNC: 214 MG/DL (ref 70–108)
HCT VFR BLD CALC: 32.5 % (ref 37–47)
HEMOGLOBIN: 10.2 GM/DL (ref 12–16)
IMMATURE GRANS (ABS): 0.05 THOU/MM3 (ref 0–0.07)
IMMATURE GRANULOCYTES: 0.7 %
LYMPHOCYTES # BLD: 9.8 %
LYMPHOCYTES ABSOLUTE: 0.7 THOU/MM3 (ref 1–4.8)
MCH RBC QN AUTO: 27.6 PG (ref 26–33)
MCHC RBC AUTO-ENTMCNC: 31.4 GM/DL (ref 32.2–35.5)
MCV RBC AUTO: 88.1 FL (ref 81–99)
MONOCYTES # BLD: 9.4 %
MONOCYTES ABSOLUTE: 0.7 THOU/MM3 (ref 0.4–1.3)
NUCLEATED RED BLOOD CELLS: 0 /100 WBC
OSMOLALITY CALCULATION: 262.8 MOSMOL/KG (ref 275–300)
PLATELET # BLD: 249 THOU/MM3 (ref 130–400)
PMV BLD AUTO: 9.1 FL (ref 9.4–12.4)
POTASSIUM SERPL-SCNC: 4.7 MEQ/L (ref 3.5–5.2)
RBC # BLD: 3.69 MILL/MM3 (ref 4.2–5.4)
SEG NEUTROPHILS: 74.6 %
SEGMENTED NEUTROPHILS ABSOLUTE COUNT: 5.5 THOU/MM3 (ref 1.8–7.7)
SODIUM BLD-SCNC: 127 MEQ/L (ref 135–145)
SODIUM URINE: 67 MEQ/L
TROPONIN T: < 0.01 NG/ML
WBC # BLD: 7.4 THOU/MM3 (ref 4.8–10.8)

## 2021-08-13 PROCEDURE — 73590 X-RAY EXAM OF LOWER LEG: CPT

## 2021-08-13 PROCEDURE — 85025 COMPLETE CBC W/AUTO DIFF WBC: CPT

## 2021-08-13 PROCEDURE — 84484 ASSAY OF TROPONIN QUANT: CPT

## 2021-08-13 PROCEDURE — 6370000000 HC RX 637 (ALT 250 FOR IP)

## 2021-08-13 PROCEDURE — 84300 ASSAY OF URINE SODIUM: CPT

## 2021-08-13 PROCEDURE — 70450 CT HEAD/BRAIN W/O DYE: CPT

## 2021-08-13 PROCEDURE — G0378 HOSPITAL OBSERVATION PER HR: HCPCS

## 2021-08-13 PROCEDURE — 80048 BASIC METABOLIC PNL TOTAL CA: CPT

## 2021-08-13 PROCEDURE — 82948 REAGENT STRIP/BLOOD GLUCOSE: CPT

## 2021-08-13 PROCEDURE — 6370000000 HC RX 637 (ALT 250 FOR IP): Performed by: INTERNAL MEDICINE

## 2021-08-13 PROCEDURE — 93005 ELECTROCARDIOGRAM TRACING: CPT | Performed by: EMERGENCY MEDICINE

## 2021-08-13 PROCEDURE — 73630 X-RAY EXAM OF FOOT: CPT

## 2021-08-13 PROCEDURE — 99284 EMERGENCY DEPT VISIT MOD MDM: CPT

## 2021-08-13 PROCEDURE — 36415 COLL VENOUS BLD VENIPUNCTURE: CPT

## 2021-08-13 PROCEDURE — 83935 ASSAY OF URINE OSMOLALITY: CPT

## 2021-08-13 PROCEDURE — 99219 PR INITIAL OBSERVATION CARE/DAY 50 MINUTES: CPT | Performed by: INTERNAL MEDICINE

## 2021-08-13 RX ORDER — NICOTINE POLACRILEX 4 MG
15 LOZENGE BUCCAL PRN
Status: DISCONTINUED | OUTPATIENT
Start: 2021-08-13 | End: 2021-08-18 | Stop reason: HOSPADM

## 2021-08-13 RX ORDER — ASPIRIN 81 MG/1
81 TABLET ORAL DAILY
Status: DISCONTINUED | OUTPATIENT
Start: 2021-08-14 | End: 2021-08-18 | Stop reason: HOSPADM

## 2021-08-13 RX ORDER — ONDANSETRON 4 MG/1
4 TABLET, FILM COATED ORAL EVERY 6 HOURS
Status: DISCONTINUED | OUTPATIENT
Start: 2021-08-13 | End: 2021-08-13 | Stop reason: ALTCHOICE

## 2021-08-13 RX ORDER — SUCRALFATE 1 G/1
1 TABLET ORAL
Status: DISCONTINUED | OUTPATIENT
Start: 2021-08-13 | End: 2021-08-18 | Stop reason: HOSPADM

## 2021-08-13 RX ORDER — ISOSORBIDE DINITRATE 20 MG/1
20 TABLET ORAL 3 TIMES DAILY
Status: DISCONTINUED | OUTPATIENT
Start: 2021-08-13 | End: 2021-08-18 | Stop reason: HOSPADM

## 2021-08-13 RX ORDER — DEXTROSE MONOHYDRATE 25 G/50ML
12.5 INJECTION, SOLUTION INTRAVENOUS PRN
Status: DISCONTINUED | OUTPATIENT
Start: 2021-08-13 | End: 2021-08-18 | Stop reason: HOSPADM

## 2021-08-13 RX ORDER — PANTOPRAZOLE SODIUM 40 MG/1
40 TABLET, DELAYED RELEASE ORAL
Status: DISCONTINUED | OUTPATIENT
Start: 2021-08-14 | End: 2021-08-18 | Stop reason: HOSPADM

## 2021-08-13 RX ORDER — SPIRONOLACTONE 25 MG/1
25 TABLET ORAL DAILY
Status: DISCONTINUED | OUTPATIENT
Start: 2021-08-14 | End: 2021-08-18 | Stop reason: HOSPADM

## 2021-08-13 RX ORDER — POTASSIUM CHLORIDE 750 MG/1
10 TABLET, EXTENDED RELEASE ORAL 3 TIMES DAILY
COMMUNITY

## 2021-08-13 RX ORDER — AMANTADINE HYDROCHLORIDE 100 MG/1
100 CAPSULE, GELATIN COATED ORAL DAILY
Status: DISCONTINUED | OUTPATIENT
Start: 2021-08-14 | End: 2021-08-18 | Stop reason: HOSPADM

## 2021-08-13 RX ORDER — ATORVASTATIN CALCIUM 20 MG/1
20 TABLET, FILM COATED ORAL DAILY
Status: DISCONTINUED | OUTPATIENT
Start: 2021-08-13 | End: 2021-08-18 | Stop reason: HOSPADM

## 2021-08-13 RX ORDER — HYDRALAZINE HYDROCHLORIDE 10 MG/1
10 TABLET, FILM COATED ORAL EVERY 8 HOURS SCHEDULED
Status: DISCONTINUED | OUTPATIENT
Start: 2021-08-13 | End: 2021-08-14

## 2021-08-13 RX ORDER — SODIUM CHLORIDE 1000 MG
1 TABLET, SOLUBLE MISCELLANEOUS DAILY
Status: DISCONTINUED | OUTPATIENT
Start: 2021-08-13 | End: 2021-08-17

## 2021-08-13 RX ORDER — ACETAMINOPHEN 325 MG/1
650 TABLET ORAL ONCE
Status: COMPLETED | OUTPATIENT
Start: 2021-08-13 | End: 2021-08-13

## 2021-08-13 RX ORDER — DEXTROSE MONOHYDRATE 50 MG/ML
100 INJECTION, SOLUTION INTRAVENOUS PRN
Status: DISCONTINUED | OUTPATIENT
Start: 2021-08-13 | End: 2021-08-18 | Stop reason: HOSPADM

## 2021-08-13 RX ADMIN — APIXABAN 2.5 MG: 2.5 TABLET, FILM COATED ORAL at 21:44

## 2021-08-13 RX ADMIN — ATORVASTATIN CALCIUM 20 MG: 20 TABLET, FILM COATED ORAL at 21:44

## 2021-08-13 RX ADMIN — Medication 1 G: at 21:44

## 2021-08-13 RX ADMIN — HYDRALAZINE HYDROCHLORIDE 10 MG: 10 TABLET, FILM COATED ORAL at 21:44

## 2021-08-13 RX ADMIN — SUCRALFATE 1 G: 1 TABLET ORAL at 21:44

## 2021-08-13 RX ADMIN — INSULIN LISPRO 2 UNITS: 100 INJECTION, SOLUTION INTRAVENOUS; SUBCUTANEOUS at 21:47

## 2021-08-13 RX ADMIN — CARBIDOPA AND LEVODOPA 2 TABLET: 25; 100 TABLET ORAL at 13:49

## 2021-08-13 RX ADMIN — ISOSORBIDE DINITRATE 20 MG: 20 TABLET ORAL at 21:44

## 2021-08-13 RX ADMIN — ACETAMINOPHEN 650 MG: 325 TABLET ORAL at 10:06

## 2021-08-13 RX ADMIN — CARBIDOPA AND LEVODOPA 2 TABLET: 25; 100 TABLET ORAL at 21:46

## 2021-08-13 RX ADMIN — CARBIDOPA AND LEVODOPA 2 TABLET: 25; 100 TABLET ORAL at 18:04

## 2021-08-13 ASSESSMENT — ENCOUNTER SYMPTOMS
CHEST TIGHTNESS: 0
BACK PAIN: 0
BLOOD IN STOOL: 0
VOMITING: 0
PHOTOPHOBIA: 0
EYE PAIN: 0
DIARRHEA: 0
SHORTNESS OF BREATH: 0
NAUSEA: 0
CONSTIPATION: 0

## 2021-08-13 ASSESSMENT — PAIN SCALES - GENERAL: PAINLEVEL_OUTOF10: 8

## 2021-08-13 NOTE — PROGRESS NOTES
Pt admitted to   in a wheelchair. Complaints: weakness. IV none infusing into the antecubital left, condition patent and no redness at IV site free of s/s of infection or infiltration. Vital signs obtained. Assessment and data collection initiated. Two nurse skin assessment performed by Nydia Louise and Thomas Johnson RN. Oriented to room. Policies and procedures for [facility] explained. Bernadette discussed hourly rounding with patient addressing 5 P's. Fall prevention and safety brochure discussed with patient. Bed alarm on. Call light in reach. The best day to schedule a follow up Dr appointment is:  Tuesday a.m. Explained patients right to have family, representative or physician notified of their admission. Patient has Declined for physician to be notified. Patient has Declined for family/representative to be notified. All questions answered with no further questions at this time.

## 2021-08-13 NOTE — ED NOTES
ED to inpatient nurses report    Chief Complaint   Patient presents with    Gait Problem    Fatigue      Present to ED from nursing home  LOC: alert and orientated to name, place, date  Vital signs   Vitals:    08/13/21 0839 08/13/21 0845 08/13/21 1005   BP:  (!) 168/58 (!) 144/51   Pulse:  60 50   Resp:  20 22   Temp:  97.7 °F (36.5 °C)    TempSrc:  Oral    SpO2:  97% 95%   Weight: 113 lb (51.3 kg)     Height: 5' 2\" (1.575 m)        Oxygen Baseline RA    Current needs required RA   LDAs:   Peripheral IV 08/13/21 Left Antecubital (Active)   Site Assessment Clean;Dry; Intact 08/13/21 0858   Line Status Blood return noted;Brisk blood return;Specimen collected; Flushed 08/13/21 0858   Dressing Status Clean;Dry; Intact 08/13/21 0858   Dressing Intervention New 08/13/21 0858     Mobility: Requires assistance * 2  Pending ED orders: N/A  Present condition: stable      Electronically signed by Sal Rooney RN on 8/13/2021 at 10:27 AM       Sal Rooney RN  08/13/21 1170

## 2021-08-13 NOTE — ED NOTES
Patient's cardiologist is Dr. Leighton Wild. Patient has a history of CHF and Parkinson's.       Denise Louie, RN  08/13/21 [address], RN  08/13/21 [address], RN  08/13/21 3654

## 2021-08-13 NOTE — FLOWSHEET NOTE
Pt was in bed as her children were visiting. She was falling at home and got to the hospital. She was hoping to be well and go home. Prayer was appreciated. 08/13/21 2902   Encounter Summary   Services provided to: Patient and family together   Referral/Consult From: [address] of [address] [place] [address] Visiting Yes  (8/13)   Complexity of Encounter Moderate   Length of Encounter 15 minutes   Spiritual/Anabaptist   Type Spiritual support   Assessment Approachable;Calm   Intervention Prayer;Nurtured hope; Active listening; Anointing;Empowerment;Sustaining presence/ Ministry of presence   Outcome Connection/belonging;Expressed gratitude;Encouraged; Hopeful;Receptive   Sacraments   Sacrament of Sick-Anointing Anointed

## 2021-08-13 NOTE — PLAN OF CARE
Problem: Falls - Risk of:  Goal: Will remain free from falls  Description: Will remain free from falls  Outcome: Ongoing  Note: Bed alarm on, Falling star program in place. Call light within reach. Problem: Falls - Risk of:  Goal: Absence of physical injury  Description: Absence of physical injury  Outcome: Ongoing     Problem: Skin Integrity:  Goal: Will show no infection signs and symptoms  Description: Will show no infection signs and symptoms  Outcome: Ongoing  Note: Monitoring for signs of infection. No signs seen. Care plan reviewed with patient and daughter. Patient and daughter verbalize understanding of the plan of care and contribute to goal setting.

## 2021-08-13 NOTE — ED PROVIDER NOTES
[place] ENCOUNTER          Pt Name: Duke Arzate  MRN: [id number]  [place] 9/24/1932  Date of evaluation: 8/13/2021  Treating Resident Physician: Rehan Lira MD  Supervising Physician: Dr. Bo Khan       Chief Complaint   Patient presents with    Gait Problem    Fatigue     History obtained from the patient and daughter      HISTORY OF PRESENT ILLNESS    HPI  Duke Arzate is a 80 y.o. female who presents to the emergency department for evaluation of bilateral lower leg pain status post fall 1 week ago. Patient states that last Saturday she had a mechanical fall while ambulating from the living area to the dining room for dinner. Patient says she fell down to her legs and caught herself did not hit her head. Patient states that she had no loss of consciousness. Patient states that there is no palpitations lightheadedness or dizziness preceding the fall that she just lost her balance. Patient says she was able to get up immediately. Patient was not evaluated for this fall. Patient said the pain began on Sunday morning with sharp intense 7 out of 10 in intensity. Located in the dorsum of the foot in the calf of the leg. Patient denies any chest pain, worsening shortness of breath, headaches, dizziness, lightheadedness, numbness, tingling, burning while urinating, abnormal vaginal discharge, vaginal bleeding. Patient does have a history of Parkinson's disease diagnosed about 15 years ago per patient's daughter. The patient has no other acute complaints at this time. REVIEW OF SYSTEMS   Review of Systems   Constitutional: Negative for chills, fatigue, fever and unexpected weight change. HENT: Negative for ear pain and hearing loss. Eyes: Negative for photophobia, pain and visual disturbance. Respiratory: Negative for chest tightness and shortness of breath.     Cardiovascular: Negative for chest pain and leg swelling. Gastrointestinal: Negative for blood in stool, constipation, diarrhea, nausea and vomiting. Endocrine: Negative for cold intolerance and heat intolerance. Genitourinary: Negative for difficulty urinating, dysuria, hematuria and vaginal bleeding. Musculoskeletal: Positive for gait problem. Negative for arthralgias and back pain. Patient has bilateral lower leg pain. Neurological: Negative for dizziness, light-headedness, numbness and headaches. Psychiatric/Behavioral: Positive for confusion. Negative for agitation and sleep disturbance. PAST MEDICAL AND SURGICAL HISTORY     Past Medical History:   Diagnosis Date    Arthritis     Atypical chest pain     CAD (coronary artery disease)     Chronic LBBB (left bundle branch block)     Diabetes mellitus type II     Esophageal stricture     History of esophageal stricture.  FH: CAD (coronary artery disease)     Mom and dad both with heart disease at mid to late age.  GERD (gastroesophageal reflux disease)     History of gastritis     History of skin cancer     Hypercholesterolemia     Hyperlipidemia     Hypertension     Imbalance     As far as imbalance is concerned, asked patient to follow-up with Dr. Belen Rajput since she follows with him on a regular basis.  Lactose intolerance     Nummular dermatitis     Parkinson's disease ([facility] [address] 2009    Restless legs syndrome (RLS)     S/P CABG (coronary artery bypass graft)     SCC (squamous cell carcinoma)      Past Surgical History:   Procedure Laterality Date    CARDIAC SURGERY      CARDIOVASCULAR STRESS TEST  4 09 2009    Gated SPECT images revealed normal global wall motion with EF of 60%. Persantine test associated w/nonspecific symptoms. EKG is nondiagnostic w/baseline LBBB. No obvious stress-induced ischemia by Cardiolite. EF within normal limits.  CARDIOVASCULAR STRESS TEST  7 10 2007    The gated SPECT images revealed normal wall motion with EF of 69%. Poor exercise tolerance w/SOB on exertion. EKG is nondiagnostic. Cardiolite scan revealing no obvious stress-induced ischemia. EF 60%.  CARDIOVASCULAR STRESS TEST  2 03 2006    Fair exercise tolerance w/SOB on exertion. No EKG evidence of ischemia. Patient should be able to proceed with phase II cardiac rehab.  CATARACT REMOVAL      CATARACT REMOVAL  06/08/2016    AND 6/29/16    DIAGNOSTIC CARDIAC CATH LAB PROCEDURE  12 23 2005    LV was obtained. AGEE projection showed preserved LV function w/estimated EF at 55%. No significant MR. Patent left main coronary artery. Tortuous LAD which appeared to be patent. Patent left circumflex artery. High-grade stenosis around 99% in very large dominant RCA w/heavy calcification at the ostium. Normal LV function.  DOPPLER ECHOCARDIOGRAPHY  4 09 2009    Global LV function w/in lower limits of normal, with mild anteroseptal hypokinesis. EF of 50-55%. Light LVH. Mild to moderate left atrial enlargement. Calcific aortic and mitral valve w/no obvious stenosis. No obvious pericardial effusion.  DOPPLER ECHOCARDIOGRAPHY  7 10 2007    LV function w/in lower limits of normal w/peridoxical septal wall motion. Moderate left atrial enlargement. Mild MR. Mild TR. Calcified valves w/no obvious stenosis. No pericardial effusion.  DOPPLER ECHOCARDIOGRAPHY  4 14 2006    There appears to be significant improvement from previous echo w/complete resolution of pericardial effusion and normal LV function. EF of 60%.  DOPPLER ECHOCARDIOGRAPHY  3 21 2006    Normal LV function. Mild LV hypertrophy. Mild biatrial enlargement. Mildly calcific aortic and mitral valve w/no stenosis. Mild MR. Mild TR. Minimal residual pericardial effusion w/significant improvement from previous echo.  DOPPLER ECHOCARDIOGRAPHY  3 16 2006    Interval change from previous echocardiogram w/worsening of effusion. Correlation clinically. Consideration of pericardial centesis. Will obtain a [facility] consult.  DOPPLER ECHOCARDIOGRAPHY  1 31 2006    No significant change from previous echo. The 2D echo showed moderate degree of pericardial effusion. It does seem to be worst or better than previous echo. No evidence of tamponade.  DOPPLER ECHOCARDIOGRAPHY  1 27 2006    Normal global LV function. Mild biatrial enlargement. Mildly sclerotic aortic & mitral valve w/no stenosis. Mild MR. Mild TR. Small to moderate pericardial effusion w/no obvious tamponade.      JOINT REPLACEMENT      MOHS SURGERY Right 2/13/2019    MOHS DEFECT REPAIR CYSTIC SCC RIGHT LOWER LATERAL LEG WITH SKIN GRAFT FROM RIGHT GROIN (FULL THICKNESS ) performed by Samuel Champion MD at [address] [facility] PRE-M[facility] / [facility] Left 12/20/13    left leg lesion excision x2, frozen section, skin graft closure    ROTATOR CUFF REPAIR  09/2001    RIGHT    ROTATOR CUFF REPAIR  04/15/2009    LEFT     SKIN BIOPSY  07/2021    face    SKIN CANCER EXCISION  06/2006      Rt leg    SPINAL CORD STIMULATOR IMPLANTATION N/A 12/4/2018    PERMANENT INTERSTIM performed by Cyril Duff MD at [address]     Current Facility-Administered Medications:     [START ON 8/14/2021] spironolactone (ALDACTONE) tablet 25 mg, 25 mg, Oral, Daily, Juan Ramon Rogers MD    [START ON 8/14/2021] aspirin EC tablet 81 mg, 81 mg, Oral, Daily, Juan Ramon Rogers MD    [START ON 8/14/2021] amantadine (SYMMETREL) capsule 100 mg, 100 mg, Oral, Daily, Juan Ramon Rogers MD    carbidopa-levodopa (SINEMET)  MG per tablet 2 tablet, 2 tablet, Oral, 4x Daily, Juan Ramon Rogers MD, 2 tablet at 08/13/21 1349      SOCIAL HISTORY     Social History     Social History Narrative    Not on file     Social History     Tobacco Use    Smoking status: Never Smoker    Smokeless tobacco: Never Used   Vaping Use    Vaping Use: Never used   Substance Use Topics    Alcohol use: No    Drug use: No         ALLERGIES     Allergies   Allergen Reactions    Latex Rash    Lisinopril Swelling     mouth    Tape Jaqueline Sicilian Tape] Itching    Bacitracin Hives    Penicillins     Polymyxin B Hives    Keflex [Cephalexin] Nausea And Vomiting and Rash    Lactulose Diarrhea    Morphine Rash    Polysporin [Bacitracin-Polymyxin B] Rash         FAMILY HISTORY     Family History   Problem Relation Age of Onset    Heart Disease Mother     Diabetes Mother     Stroke Father     Diabetes Sister     Lung Cancer Brother          PREVIOUS RECORDS   Previous records reviewed: Deviously seen April 29, 2021 for pain of the right foot. Patient's work-up was ultimately negative for acute processes and patient was discharged home. PHYSICAL EXAM     ED Triage Vitals   BP Temp Temp src Pulse Resp SpO2 Height Weight   -- -- -- -- -- -- -- --     Initial vital signs and nursing assessment reviewed and normal. Body mass index is 21.14 kg/m². Pulsoximetry is normal per my interpretation. Additional Vital Signs:  Vitals:    08/13/21 1158   BP: (!) 174/74   Pulse: 50   Resp: 18   Temp: 97.5 °F (36.4 °C)   SpO2: 93%       Physical Exam  Vitals and nursing note reviewed. Constitutional:       Appearance: She is obese. HENT:      Head: Normocephalic and atraumatic. Mouth/Throat:      Mouth: Mucous membranes are moist.      Pharynx: Oropharynx is clear. Eyes:      Extraocular Movements: Extraocular movements intact. Pupils: Pupils are equal, round, and reactive to light. Cardiovascular:      Rate and Rhythm: Normal rate. Rhythm irregularly irregular. Pulses: Normal pulses. Heart sounds: Normal heart sounds, S1 normal and S2 normal.   Pulmonary:      Effort: Pulmonary effort is normal.      Breath sounds: Normal breath sounds and air entry. Abdominal:      General: Bowel sounds are normal.      Palpations: Abdomen is soft. Musculoskeletal:        Arms:       Right lower leg: Tenderness present. No swelling or lacerations. No edema.       Left lower leg: Tenderness present. No swelling or lacerations. No edema. Right foot: Tenderness present. No swelling. Left foot: Tenderness present. No swelling. Legs:    Skin:     General: Skin is warm and dry. Neurological:      General: No focal deficit present. Mental Status: She is alert and oriented to person, place, and time. Mental status is at baseline. GCS: GCS eye subscore is 4. GCS verbal subscore is 5. GCS motor subscore is 6. Cranial Nerves: Cranial nerves are intact. Sensory: Sensation is intact. Coordination: Coordination is intact. Comments: Patient unable to walk secondary to bilateral leg pain. Patient expresses cogwheel rigidity with motion as well as stiffness on passive motion. MEDICAL DECISION MAKING   Initial Assessment:   1. Patient presented with fall on thinners 1 week ago. Exists for possible underlying subdural hematoma will need to be ruled out with CT. 2. Patient unable to walk secondary to leg and foot pain status post fall 1 week ago. Patient will need x-ray to rule out fracture. 3. Patient presenting with excessive bruising of the extremities. Concern for supratherapeutic anticoagulation.   Plan:    X-ray left foot   X-ray right foot   X-ray left tibia-fibula   X-ray right tibia-fibula   CT head   EKG   Troponins   BC   BMP           ED RESULTS   Laboratory results:  Labs Reviewed   CBC WITH AUTO DIFFERENTIAL - Abnormal; Notable for the following components:       Result Value    RBC 3.69 (*)     Hemoglobin 10.2 (*)     Hematocrit 32.5 (*)     MCHC 31.4 (*)     RDW-SD 45.1 (*)     MPV 9.1 (*)     Lymphocytes Absolute 0.7 (*)     All other components within normal limits   BASIC METABOLIC PANEL - Abnormal; Notable for the following components:    Sodium 127 (*)     Chloride 91 (*)     Glucose 214 (*)     All other components within normal limits   GLOMERULAR FILTRATION RATE, ESTIMATED - Abnormal; Notable for the following components:    Est, Glom Filt Rate 79 (*)     All other components within normal limits   OSMOLALITY - Abnormal; Notable for the following components:    Osmolality Calc 262.8 (*)     All other components within normal limits   TROPONIN   ANION GAP   SODIUM, URINE, RANDOM   OSMOLALITY, URINE       Radiologic studies results:  CT Head WO Contrast   Final Result       1. No acute findings. 2. Moderate severity chronic small vessel ischemic changes. **This report has been created using voice recognition software. It may contain minor errors which are inherent in voice recognition technology. **      Final report electronically signed by Dr. Delmy Bassett on 8/13/2021 9:52 AM      XR FOOT LEFT (MIN 3 VIEWS)   Final Result       1. Diffuse osteopenia. 2. Degenerative change. 3. No acute fracture. 4. Plantar spur. Spurring at the attachment of the Achilles tendon upon the calcaneus. 5. Vascular calcification. .               **This report has been created using voice recognition software. It may contain minor errors which are inherent in voice recognition technology. **      Final report electronically signed by DR Gi Nieto on 8/13/2021 9:41 AM      XR FOOT RIGHT (MIN 3 VIEWS)   Final Result   No acute osseous abnormality            **This report has been created using voice recognition software. It may contain minor errors which are inherent in voice recognition technology. **      Final report electronically signed by Dr. Jac Clemons on 8/13/2021 9:43 AM      XR TIBIA FIBULA RIGHT (2 VIEWS)   Final Result   No acute finding            **This report has been created using voice recognition software. It may contain minor errors which are inherent in voice recognition technology. **      Final report electronically signed by Dr. Jac Clemons on 8/13/2021 9:42 AM      XR TIBIA FIBULA LEFT (2 VIEWS)   Final Result   No acute abnormality            **This report has been created using voice recognition software. It may contain minor errors which are inherent in voice recognition technology. **      Final report electronically signed by Dr. Drew Lee on 8/13/2021 9:44 AM          ED Medications administered this visit:   Medications   spironolactone (ALDACTONE) tablet 25 mg (has no administration in time range)   aspirin EC tablet 81 mg (has no administration in time range)   amantadine (SYMMETREL) capsule 100 mg (has no administration in time range)   carbidopa-levodopa (SINEMET)  MG per tablet 2 tablet (2 tablets Oral Given 8/13/21 1349)   acetaminophen (TYLENOL) tablet 650 mg (650 mg Oral Given 8/13/21 1006)         ED COURSE     ED Course as of Aug 13 1520   Fri Aug 13, 2021   0944 X-ray left foot shows osteoporotic changes and bone spur on the right Achilles tendon. No fractures present. XR FOOT LEFT (MIN 3 VIEWS) [CAITY]   0945 XR TIBIA FIBULA RIGHT (2 VIEWS) [CAITY]   0945 XR FOOT RIGHT (MIN 3 VIEWS) [CAITY]   0949 XR TIBIA FIBULA LEFT (2 VIEWS) [CAITY]   0951 Troponin:    Troponin T < 0.010 [CAITY]   4974 Basic Metabolic Panel(!):    Sodium 127(!)   Potassium 4.7   Chloride 91(!)   CO2 25   Glucose 214(!)   BUN 16   Creatinine 0.7   Calcium 9.8 [CAITY]   0952 CBC auto differential(!):    WBC 7.4   RBC 3.69(!)   Hemoglobin Quant 10. 2(!)   Hematocrit 32.5(!)   MCV 88.1   MCH 27.6   MCHC 31.4(!)   RDW-CV 14.1   RDW-SD 45.1(!)   Platelet Count 025   MPV 9.1(!)   Seg Neutrophils 74.6   Lymphocytes 9.8   Monocytes 9.4   Eosinophils 4.7   Basophils 0.8   Immature Granulocytes 0.7   Segs Absolute 5.5   Lymphocytes Absolute 0.7(!)   Monocytes Absolute 0.7   Eosinophils Absolute 0.3   Basophils Absolute 0.1   Immature Grans (Abs) 0.05   Nucleated Red Blood Cells 0 [CAITY]   0952 Osmolality(!):    Osmolality Calc 262.8(!) [CAITY]   1006 CT Head WO Contrast [CAITY]   1016 Patient with symptomatic hyponatremia and bradycardia presenting after mechanical fall. Patient will need to be admitted for treatment and further work-up. Hospitalist was contacted for admit. [CAITY]      ED Course User Index  [CAITY] Maine Baez MD           MEDICATION CHANGES     Current Discharge Medication List            FINAL DISPOSITION     Final diagnoses:   Hyponatremia   Bradycardia   Accident due to mechanical fall without injury, initial encounter     Condition: condition: stable  Dispo: Admit to med/surg floor      This transcription was electronically signed. Parts of this transcriptions may have been dictated by use of voice recognition software and electronically transcribed, and parts may have been transcribed with the assistance of an ED scribe. The transcription may contain errors not detected in proofreading. Please refer to my supervising physician's documentation if my documentation differs.     Electronically Signed: Elie Kessler MD, 08/13/21, 3:20 PM         Maine Baez MD  Resident  08/13/21 0702

## 2021-08-13 NOTE — CARE COORDINATION
8/13/21, 2:25 PM EDT    DISCHARGE PLANNING EVALUATION    Spoke with patient and daughter and they plan for patient to return to Assisted Living apartment. Family plans to transport. Spoke with Mekhi Richards at [facility] and she stated that they are unsure if patient can return it depends if she can ambulate in her apartment. If patient is ready to discharge over the weekend facility will review with RN how she is doing prior to accepting her back. PT.OT ordered. No COVID test needed.

## 2021-08-13 NOTE — ED NOTES
Patient resting in semi fowlers position, breathing easy and unlabored. External catheter placed at this time.       Tomás Siddiqi RN  08/13/21 2090

## 2021-08-13 NOTE — CARE COORDINATION
8/13/21, 1:44 PM EDT  DISCHARGE PLANNING EVALUATION:    Malika Queen       Admitted: 8/13/2021/ Lul Medel day: 0   Location: 6-21/021-A Reason for admit: Bradycardia [R00.1]  Hyponatremia [E87.1]  Falls frequently [R29.6]  Accident due to mechanical fall without injury, initial encounter [W19. XXXA]   PMH:  has a past medical history of Arthritis, Atypical chest pain, CAD (coronary artery disease), Chronic LBBB (left bundle branch block), Diabetes mellitus type II, Esophageal stricture, FH: CAD (coronary artery disease), GERD (gastroesophageal reflux disease), History of gastritis, History of skin cancer, Hypercholesterolemia, Hyperlipidemia, Hypertension, Imbalance, Lactose intolerance, Nummular dermatitis, Parkinson's disease ([facility] [address] Restless legs syndrome (RLS), S/P CABG (coronary artery bypass graft), and SCC (squamous cell carcinoma). Procedure: none  Barriers to Discharge:  Na+ 127. Telemetry, SB 40-50s. PCP: Lucy Rashid MD   %    Patient Goals/Plan/Treatment Preferences: From [address] [facility] [place]. SW consulted. Transportation/Food Security/Housekeeping Addressed:  No issues identified.

## 2021-08-13 NOTE — ED TRIAGE NOTES
Patient presents to the ED with complaints of an unsteady gait and a fall. Patient reports that six days ago she fell while trying to put things up in the closet. Patient did not hit her head but states that she is on an anticoagulant. Patient did not see a provider the day that she fell. She reports some bilateral calf pain that she rates as an 8/10 in severity. Patient reports generalized weakness as well. EKG completed. Patient's daughter at bedside. No other concerns voiced at this time.

## 2021-08-13 NOTE — ED NOTES
Patient and family updated on plan of care.  No concerns voiced at this time     Zaira Florez RN  08/13/21 9144

## 2021-08-13 NOTE — H&P
Hospitalist - History & Physical      Patient: Sharon Andres    Unit/Bed:6K-21/021-A  YOB: 1932  MRN: [id number]   Acct: [de-identified]   PCP: Fam Tang MD    Date of Service: Pt seen/examined on 08/13/21  and Admitted to Observation with expected LOS less than two midnights due to medical therapy. Chief Complaint: Fall    Assessment and Plan:-  Provoked fall-orthostats are negative, initial radiology as part of trauma evaluation no fractures-patient is on Eliquis for atrial flutter/atrial fibrillation-CT of the head negative    History of paroxysmal atrial fibrillation CHADS2 DS 2 vASC score 5-currently on Eliquis also on Lopressor -recently evaluated by EP electrophysiologist-for pacemaker secondary to bradycardia-however patient was reluctant. - ? Watchman's procedure so she could be off of anticoagulation-patient follows with Ishmael Naylor as outpatient. 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 She has not lost her consciousness she did not have any dizziness or chest pain or shortness of breath or any prodromal symptoms.   The nurse then came and found her since she did not come w/SOB on exertion. No EKG evidence of ischemia. Patient should be able to proceed with phase II cardiac rehab.  CATARACT REMOVAL      CATARACT REMOVAL  06/08/2016    AND 6/29/16    DIAGNOSTIC CARDIAC CATH LAB PROCEDURE  12 23 2005    LV was obtained. AGEE projection showed preserved LV function w/estimated EF at 55%. No significant MR. Patent left main coronary artery. Tortuous LAD which appeared to be patent. Patent left circumflex artery. High-grade stenosis around 99% in very large dominant RCA w/heavy calcification at the ostium. Normal LV function.  DOPPLER ECHOCARDIOGRAPHY  4 09 2009    Global LV function w/in lower limits of normal, with mild anteroseptal hypokinesis. EF of 50-55%. Light LVH. Mild to moderate left atrial enlargement. Calcific aortic and mitral valve w/no obvious stenosis. No obvious pericardial effusion.  DOPPLER ECHOCARDIOGRAPHY  7 10 2007    LV function w/in lower limits of normal w/peridoxical septal wall motion. Moderate left atrial enlargement. Mild MR. Mild TR. Calcified valves w/no obvious stenosis. No pericardial effusion.  DOPPLER ECHOCARDIOGRAPHY  4 14 2006    There appears to be significant improvement from previous echo w/complete resolution of pericardial effusion and normal LV function. EF of 60%.  DOPPLER ECHOCARDIOGRAPHY  3 21 2006    Normal LV function. Mild LV hypertrophy. Mild biatrial enlargement. Mildly calcific aortic and mitral valve w/no stenosis. Mild MR. Mild TR. Minimal residual pericardial effusion w/significant improvement from previous echo.  DOPPLER ECHOCARDIOGRAPHY  3 16 2006    Interval change from previous echocardiogram w/worsening of effusion. Correlation clinically. Consideration of pericardial centesis. Will obtain a [facility] consult.  DOPPLER ECHOCARDIOGRAPHY  1 31 2006    No significant change from previous echo. The 2D echo showed moderate degree of pericardial effusion. It does seem to be worst or better than previous echo.  No evidence of tamponade.  DOPPLER ECHOCARDIOGRAPHY  1 27 2006    Normal global LV function. Mild biatrial enlargement. Mildly sclerotic aortic & mitral valve w/no stenosis. Mild MR. Mild TR. Small to moderate pericardial effusion w/no obvious tamponade.  JOINT REPLACEMENT      MOHS SURGERY Right 2/13/2019    MOHS DEFECT REPAIR CYSTIC SCC RIGHT LOWER LATERAL LEG WITH SKIN GRAFT FROM RIGHT GROIN (FULL THICKNESS ) performed by Lindsay Aranda MD at [address] [facility] PRE-MAL[facility]NT / [facility] Left 12/20/13    left leg lesion excision x2, frozen section, skin graft closure    ROTATOR CUFF REPAIR  09/2001    RIGHT    ROTATOR CUFF REPAIR  04/15/2009    LEFT     SKIN BIOPSY  07/2021    face    SKIN CANCER EXCISION  06/2006      Rt leg    SPINAL CORD STIMULATOR IMPLANTATION N/A 12/4/2018    PERMANENT INTERSTIM performed by Stevie Pizarro MD at [address] [facility] Medications:   No current facility-administered medications on file prior to encounter. Current Outpatient Medications on File Prior to Encounter   Medication Sig Dispense Refill    potassium chloride (KLOR-CON M) 10 MEQ extended release tablet Take 10 mEq by mouth 3 times daily      amantadine (SYMMETREL) 100 MG capsule Take 1 capsule by mouth daily 30 capsule 0    bumetanide (BUMEX) 1 MG tablet Take 1 tablet by mouth three times a week On Mondays and Thursdays (Patient taking differently: Take 1 mg by mouth Twice a Week On Mondays and Thursdays) 30 tablet 3    metoprolol tartrate (LOPRESSOR) 25 MG tablet Take 1 tablet by mouth 2 times daily 180 tablet 1    blood glucose test strips (CONTOUR TEST) strip USE TO TEST BLOOD SUGAR TWICE DAILY.  200 strip 3    zinc oxide (PINXAV) 30 % OINT Apply topically 3 times daily as needed      lactobacillus (CULTURELLE) capsule Take 1 capsule by mouth 3 times daily (with meals) 20 capsule 0    pantoprazole (PROTONIX) 40 MG tablet Take 1 tablet by mouth 2 times daily (before meals) 30 tablet 1    sucralfate (CARAFATE) 1 GM tablet Take 1 tablet by mouth 4 times daily (before meals and nightly) Dissolve in 1 ounce of water to take as a slurry 120 tablet 1    Multiple Vitamin (MULTIVITAMIN) TABS tablet Take 1 tablet by mouth daily 90 tablet 3    Multiple Vitamins-Minerals (PRESERVISION AREDS 2) CAPS Take 2 capsules by mouth 2 times daily 360 capsule 3    Cyanocobalamin 200 MCG/SPRAY LIQD Take 4 sprays by mouth 2 times daily Vitamist - Vitamin B 12 spray 90 mL 3    ferrous sulfate (IRON 325) 325 (65 Fe) MG tablet Take 1 tablet by mouth 2 times daily 180 tablet 1    hydrALAZINE (APRESOLINE) 10 MG tablet Take 1 tablet by mouth every 8 hours 90 tablet 3    JANUVIA 100 MG tablet TAKE 1 TABLET BY MOUTH DAILY 90 tablet 2    spironolactone (ALDACTONE) 25 MG tablet Take 1 tablet by mouth daily 30 tablet 3    acetaminophen (TYLENOL 8 HOUR ARTHRITIS PAIN) 650 MG extended release tablet Take 650 mg by mouth 3 times daily      metFORMIN (GLUCOPHAGE-XR) 500 MG extended release tablet Take 1 tablet by mouth 2 times daily 180 tablet 3    diclofenac sodium (VOLTAREN) 1 % GEL Apply 4 g topically 4 times daily 4 Tube 5    lactase (LACTAID ULTRA) 9000 units TABS Take 9,000 Units by mouth 3 times daily (before meals)      magnesium oxide (MAG-OX) 400 MG tablet Take 1 tablet by mouth 3 times daily 30 tablet 3    isosorbide dinitrate (ISORDIL) 20 MG tablet Take 1 tablet by mouth 3 times daily 90 tablet 3    atorvastatin (LIPITOR) 20 MG tablet TAKE 1 TABLET DAILY (Patient taking differently:  On hold for surgery) 90 tablet 1    carbidopa-levodopa (SINEMET)  MG per tablet Take 2 tablets by mouth 4 times daily 720 tablet 0    apixaban (ELIQUIS) 2.5 MG TABS tablet TAKE 1 TABLET BY MOUTH 2  TIMES DAILY, EQUIVALENT TO  APIXABAN 180 tablet 1    Cholecalciferol (VITAMIN D3 PO) Take 4,000 Units by mouth 2 times daily       Cranberry 500 MG CAPS Take 1,000 mg by mouth daily       aspirin 81 MG EC tablet Take 81 mg by mouth daily.  psyllium (KONSYL) 28.3 % PACK Take 1 packet by mouth daily (Patient taking differently: Take 1 packet by mouth daily as needed ) 30 each 1    ondansetron (ZOFRAN) 4 MG tablet Take 4 mg by mouth every 6 hours       sodium chloride 1 g tablet Take 1 tablet by mouth daily 120 tablet 1    Misc. Devices MISC 1 each by Does not apply route once for 1 dose Param Glucose Meter; Diagnosis:E11.65 1 Device 0       Allergies:    Latex, Lisinopril, Tape [adhesive tape], Bacitracin, Penicillins, Polymyxin b, Keflex [cephalexin], Lactulose, Morphine, and Polysporin [bacitracin-polymyxin b]    Social History:    reports that she has never smoked. She has never used smokeless tobacco. She reports that she does not drink alcohol and does not use drugs. Family History:       Problem Relation Age of Onset    Heart Disease Mother     Diabetes Mother     Stroke Father     Diabetes Sister     Lung Cancer Brother        Diet:  ADULT DIET; Regular; 4 carb choices (60 gm/meal)    Review of systems:   Pertinent positives as noted in the HPI. All other systems reviewed and negative. PHYSICAL EXAM:  BP (!) 162/60   Pulse 56   Temp 97.5 °F (36.4 °C) (Oral)   Resp 18   Ht 5' 2\" (1.575 m)   Wt 115 lb 9.6 oz (52.4 kg)   SpO2 93%   BMI 21.14 kg/m²   General appearance: No apparent distress, appears stated age and cooperative. HEENT: Normal cephalic, atraumatic without obvious deformity. Pupils equal, round, and reactive to light. Extra ocular muscles intact. Conjunctivae/corneas clear. Neck: Supple, with full range of motion. No jugular venous distention. Trachea midline. Respiratory:  Normal respiratory effort. Clear to auscultation, bilaterally without Rales/Wheezes/Rhonchi. Cardiovascular: Regular rate and rhythm with normal S1/S2 without murmurs, rubs or gallops. Abdomen: Soft, non-tender, non-distended with normal bowel sounds.   Musculoskeletal:  No clubbing, cyanosis or edema bilaterally. 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 Cranial nerves: II-XII intact, grossly non-focal.  Psychiatric: Alert and oriented, thought content appropriate, normal insight  Capillary Refill: Brisk,< 3 seconds   Peripheral Pulses: +2 palpable, equal bilaterally     Labs:   Recent Labs     08/13/21  0850   WBC 7.4   HGB 10.2*   HCT 32.5*        Recent Labs     08/13/21  0850   *   K 4.7   CL 91*   CO2 25   BUN 16   CREATININE 0.7   CALCIUM 9.8     No results for input(s): AST, ALT, BILIDIR, BILITOT, ALKPHOS in the last 72 hours. No results for input(s): INR in the last 72 hours. No results for input(s): Zettie Binet in the last 72 hours. Urinalysis:    Lab Results   Component Value Date    NITRU NEGATIVE 02/27/2021    WBCUA 25-50 11/26/2018    BACTERIA NONE 11/26/2018    RBCUA 3-5 11/26/2018    BLOODU NEGATIVE 02/27/2021    SPECGRAV 1.015 12/28/2019    GLUCOSEU NEGATIVE 02/27/2021       Radiology:   CT Head WO Contrast   Final Result       1. No acute findings. 2. Moderate severity chronic small vessel ischemic changes. **This report has been created using voice recognition software. It may contain minor errors which are inherent in voice recognition technology. **      Final report electronically signed by Dr. Troy Jackson on 8/13/2021 9:52 AM      XR FOOT LEFT (MIN 3 VIEWS)   Final Result       1. Diffuse osteopenia. 2. Degenerative change. 3. No acute fracture. 4. Plantar spur. Spurring at the attachment of the Achilles tendon upon the calcaneus. 5. Vascular calcification. .               **This report has been created using voice recognition software. It may contain minor errors which are inherent in voice recognition technology. **      Final report electronically signed by DR Franki Keen on 8/13/2021 9:41 AM      XR FOOT RIGHT (MIN 3 VIEWS)   Final Result   No acute osseous abnormality            **This report has been created using voice recognition software. It may contain minor errors which are inherent in voice recognition technology. **      Final report electronically signed by Dr. Tamica Winters on 8/13/2021 9:43 AM      XR TIBIA FIBULA RIGHT (2 VIEWS)   Final Result   No acute finding            **This report has been created using voice recognition software. It may contain minor errors which are inherent in voice recognition technology. **      Final report electronically signed by Dr. Tamica Winters on 8/13/2021 9:42 AM      XR TIBIA FIBULA LEFT (2 VIEWS)   Final Result   No acute abnormality            **This report has been created using voice recognition software. It may contain minor errors which are inherent in voice recognition technology. **      Final report electronically signed by Dr. Tamica Winters on 8/13/2021 9:44 AM        XR TIBIA FIBULA LEFT (2 VIEWS)    Result Date: 8/13/2021  PROCEDURE: XR TIBIA FIBULA LEFT (2 VIEWS) CLINICAL INFORMATION: Pain after fall . COMPARISON: No prior study. TECHNIQUE: AP and lateral FINDINGS: No fracture or bone destruction. Bones are severely osteopenic. Masses or calcifications are noted. No other soft tissue abnormality is identified. No acute abnormality **This report has been created using voice recognition software. It may contain minor errors which are inherent in voice recognition technology. ** Final report electronically signed by Dr. Tamica Winters on 8/13/2021 9:44 AM    XR TIBIA FIBULA RIGHT (2 VIEWS)    Result Date: 8/13/2021  PROCEDURE: XR TIBIA FIBULA RIGHT (2 VIEWS) CLINICAL INFORMATION: pain after fall . COMPARISON: No prior study. TECHNIQUE: AP and lateral FINDINGS: No acute fracture, or dislocation. Bones are severely osteopenic. Vascular calcifications are noted.  No distinct soft tissue abnormality is obtained through the brain. Sagittal and coronal reconstructions were obtained. All CT scans at this facility use dose modulation, iterative reconstruction, and/or weight-based dosing when appropriate to reduce radiation dose to as low as reasonably achievable. FINDINGS: There is mild global volume loss. There is no hemorrhage. There are no intra-or extra-axial collections. There is no hydrocephalus, midline shift or mass effect. There is a moderate amount of abnormal low attenuation in the white matter the brain suggesting chronic small vessel ischemic changes. This has progressed compared to prior exam. There are vascular calcifications. The paranasal sinuses and mastoid air cells are normally aerated. There is no suspicious calvarial abnormality. 1. No acute findings. 2. Moderate severity chronic small vessel ischemic changes. **This report has been created using voice recognition software. It may contain minor errors which are inherent in voice recognition technology. ** Final report electronically signed by Dr. Hi Sims on 8/13/2021 9:52 AM        EKG:Arrial flutter  with slow ventricular response  Left axis deviation  Left bundle branch block  Abnormal ECG  When compared with ECG of 27-FEB-2021 10:12,  Previous ECG has undetermined rhythm, needs review  T wave inversion less evident in Lateral leads  Confirmed by Mayra Fernandez ([id number]) on 8/13/2021 10:57:43 AM    Left bundle branch block is not new-does have history of atrial flutter  Electronically signed by Chinyere Hall MD on 8/13/2021 at 5:59 PM

## 2021-08-13 NOTE — PROGRESS NOTES
Pharmacy Medication History Note      List of current medications patient is taking is complete. Source of information: Patient medication list and dispense history    Changes made to medication list:  Medications removed (include reason, ex. therapy complete or physician discontinued):  Potassium Chloride 20 mEq    Medications added/doses adjusted:  Potassium Chloride 10 mEq  Bumex 1 mg was changed from three times a day to two times a day    Other notes (ex. Recent course of antibiotics, Coumadin dosing):  Patient was started on an antibiotic recently but the daughter was not sure what the name of it was and it did not show up in the dispense history. The patient's daughter plans to go grab the antibiotic once she has talked to the doctor about the patient. This will need to be added to her medication list once it is brought in. Denies use of other OTC or herbal medications.     Allergies reviewed: yes    Electronically signed by Marichuy Merritt on 8/13/2021 at 1:29 PM

## 2021-08-13 NOTE — ED TRIAGE NOTES
Patient's daughter states patient is currently on an antibiotic and recently had skin cancer removed from her right arm. Patient is on eliquis.

## 2021-08-14 LAB
ANION GAP SERPL CALCULATED.3IONS-SCNC: 13 MEQ/L (ref 8–16)
ANION GAP SERPL CALCULATED.3IONS-SCNC: 14 MEQ/L (ref 8–16)
BUN BLDV-MCNC: 11 MG/DL (ref 7–22)
CALCIUM SERPL-MCNC: 9.6 MG/DL (ref 8.5–10.5)
CHLORIDE BLD-SCNC: 90 MEQ/L (ref 98–111)
CHLORIDE BLD-SCNC: 92 MEQ/L (ref 98–111)
CO2: 22 MEQ/L (ref 23–33)
CO2: 22 MEQ/L (ref 23–33)
CREAT SERPL-MCNC: 0.6 MG/DL (ref 0.4–1.2)
GFR SERPL CREATININE-BSD FRML MDRD: > 90 ML/MIN/1.73M2
GLUCOSE BLD-MCNC: 151 MG/DL (ref 70–108)
GLUCOSE BLD-MCNC: 171 MG/DL (ref 70–108)
GLUCOSE BLD-MCNC: 184 MG/DL (ref 70–108)
GLUCOSE BLD-MCNC: 212 MG/DL (ref 70–108)
GLUCOSE BLD-MCNC: 262 MG/DL (ref 70–108)
MAGNESIUM: 1.3 MG/DL (ref 1.6–2.4)
OSMOLALITY URINE: 324 MOSMOL/KG (ref 250–750)
POTASSIUM REFLEX MAGNESIUM: 4.2 MEQ/L (ref 3.5–5.2)
POTASSIUM SERPL-SCNC: 4.2 MEQ/L (ref 3.5–5.2)
SODIUM BLD-SCNC: 125 MEQ/L (ref 135–145)
SODIUM BLD-SCNC: 128 MEQ/L (ref 135–145)

## 2021-08-14 PROCEDURE — 80051 ELECTROLYTE PANEL: CPT

## 2021-08-14 PROCEDURE — 99226 PR SBSQ OBSERVATION CARE/DAY 35 MINUTES: CPT | Performed by: INTERNAL MEDICINE

## 2021-08-14 PROCEDURE — 83735 ASSAY OF MAGNESIUM: CPT

## 2021-08-14 PROCEDURE — 36415 COLL VENOUS BLD VENIPUNCTURE: CPT

## 2021-08-14 PROCEDURE — G0378 HOSPITAL OBSERVATION PER HR: HCPCS

## 2021-08-14 PROCEDURE — 6370000000 HC RX 637 (ALT 250 FOR IP): Performed by: INTERNAL MEDICINE

## 2021-08-14 PROCEDURE — 82948 REAGENT STRIP/BLOOD GLUCOSE: CPT

## 2021-08-14 PROCEDURE — 80048 BASIC METABOLIC PNL TOTAL CA: CPT

## 2021-08-14 PROCEDURE — 2580000003 HC RX 258: Performed by: INTERNAL MEDICINE

## 2021-08-14 RX ORDER — FUROSEMIDE 20 MG/1
20 TABLET ORAL DAILY
Status: DISCONTINUED | OUTPATIENT
Start: 2021-08-15 | End: 2021-08-15

## 2021-08-14 RX ORDER — FUROSEMIDE 20 MG/1
20 TABLET ORAL ONCE
Status: COMPLETED | OUTPATIENT
Start: 2021-08-14 | End: 2021-08-14

## 2021-08-14 RX ORDER — TOLVAPTAN 15 MG/1
15 TABLET ORAL ONCE
Status: COMPLETED | OUTPATIENT
Start: 2021-08-14 | End: 2021-08-14

## 2021-08-14 RX ORDER — HYDRALAZINE HYDROCHLORIDE 25 MG/1
25 TABLET, FILM COATED ORAL EVERY 8 HOURS SCHEDULED
Status: DISCONTINUED | OUTPATIENT
Start: 2021-08-14 | End: 2021-08-15

## 2021-08-14 RX ORDER — SODIUM CHLORIDE 9 MG/ML
INJECTION, SOLUTION INTRAVENOUS CONTINUOUS
Status: DISCONTINUED | OUTPATIENT
Start: 2021-08-14 | End: 2021-08-15

## 2021-08-14 RX ADMIN — PANTOPRAZOLE SODIUM 40 MG: 40 TABLET, DELAYED RELEASE ORAL at 16:40

## 2021-08-14 RX ADMIN — HYDRALAZINE HYDROCHLORIDE 10 MG: 10 TABLET, FILM COATED ORAL at 03:41

## 2021-08-14 RX ADMIN — SPIRONOLACTONE 25 MG: 25 TABLET ORAL at 08:43

## 2021-08-14 RX ADMIN — ISOSORBIDE DINITRATE 20 MG: 20 TABLET ORAL at 20:49

## 2021-08-14 RX ADMIN — SODIUM CHLORIDE: 9 INJECTION, SOLUTION INTRAVENOUS at 20:54

## 2021-08-14 RX ADMIN — CARBIDOPA AND LEVODOPA 2 TABLET: 25; 100 TABLET ORAL at 08:43

## 2021-08-14 RX ADMIN — CARBIDOPA AND LEVODOPA 2 TABLET: 25; 100 TABLET ORAL at 20:49

## 2021-08-14 RX ADMIN — SUCRALFATE 1 G: 1 TABLET ORAL at 16:32

## 2021-08-14 RX ADMIN — ISOSORBIDE DINITRATE 20 MG: 20 TABLET ORAL at 14:04

## 2021-08-14 RX ADMIN — Medication 15 MG: at 11:29

## 2021-08-14 RX ADMIN — CARBIDOPA AND LEVODOPA 2 TABLET: 25; 100 TABLET ORAL at 16:32

## 2021-08-14 RX ADMIN — CARBIDOPA AND LEVODOPA 2 TABLET: 25; 100 TABLET ORAL at 14:04

## 2021-08-14 RX ADMIN — INSULIN LISPRO 2 UNITS: 100 INJECTION, SOLUTION INTRAVENOUS; SUBCUTANEOUS at 20:49

## 2021-08-14 RX ADMIN — ASPIRIN 81 MG: 81 TABLET, COATED ORAL at 09:02

## 2021-08-14 RX ADMIN — FUROSEMIDE 20 MG: 20 TABLET ORAL at 11:29

## 2021-08-14 RX ADMIN — HYDRALAZINE HYDROCHLORIDE 25 MG: 25 TABLET, FILM COATED ORAL at 22:09

## 2021-08-14 RX ADMIN — APIXABAN 2.5 MG: 2.5 TABLET, FILM COATED ORAL at 08:43

## 2021-08-14 RX ADMIN — SODIUM CHLORIDE: 9 INJECTION, SOLUTION INTRAVENOUS at 11:23

## 2021-08-14 RX ADMIN — HYDRALAZINE HYDROCHLORIDE 10 MG: 10 TABLET, FILM COATED ORAL at 14:04

## 2021-08-14 RX ADMIN — SUCRALFATE 1 G: 1 TABLET ORAL at 11:26

## 2021-08-14 RX ADMIN — ISOSORBIDE DINITRATE 20 MG: 20 TABLET ORAL at 08:43

## 2021-08-14 RX ADMIN — SUCRALFATE 1 G: 1 TABLET ORAL at 06:00

## 2021-08-14 RX ADMIN — ATORVASTATIN CALCIUM 20 MG: 20 TABLET, FILM COATED ORAL at 20:49

## 2021-08-14 RX ADMIN — AMANTADINE HYDROCHLORIDE 100 MG: 100 CAPSULE, GELATIN COATED ORAL at 08:43

## 2021-08-14 RX ADMIN — PANTOPRAZOLE SODIUM 40 MG: 40 TABLET, DELAYED RELEASE ORAL at 06:00

## 2021-08-14 RX ADMIN — Medication 1 G: at 08:43

## 2021-08-14 RX ADMIN — APIXABAN 2.5 MG: 2.5 TABLET, FILM COATED ORAL at 20:49

## 2021-08-14 RX ADMIN — SUCRALFATE 1 G: 1 TABLET ORAL at 20:48

## 2021-08-14 ASSESSMENT — PAIN SCALES - GENERAL
PAINLEVEL_OUTOF10: 0
PAINLEVEL_OUTOF10: 4
PAINLEVEL_OUTOF10: 5

## 2021-08-14 ASSESSMENT — PAIN DESCRIPTION - PAIN TYPE
TYPE: CHRONIC PAIN
TYPE: ACUTE PAIN
TYPE: ACUTE PAIN

## 2021-08-14 ASSESSMENT — PAIN DESCRIPTION - LOCATION
LOCATION: LEG
LOCATION: LEG
LOCATION: KNEE

## 2021-08-14 ASSESSMENT — PAIN DESCRIPTION - PROGRESSION: CLINICAL_PROGRESSION: NOT CHANGED

## 2021-08-14 ASSESSMENT — PAIN DESCRIPTION - FREQUENCY: FREQUENCY: CONTINUOUS

## 2021-08-14 ASSESSMENT — PAIN DESCRIPTION - ORIENTATION
ORIENTATION: LEFT;RIGHT
ORIENTATION: LEFT
ORIENTATION: RIGHT;LEFT

## 2021-08-14 ASSESSMENT — PAIN - FUNCTIONAL ASSESSMENT: PAIN_FUNCTIONAL_ASSESSMENT: ACTIVITIES ARE NOT PREVENTED

## 2021-08-14 ASSESSMENT — PAIN DESCRIPTION - DESCRIPTORS
DESCRIPTORS: ACHING;CONSTANT
DESCRIPTORS: CRAMPING
DESCRIPTORS: CRAMPING

## 2021-08-14 ASSESSMENT — PAIN DESCRIPTION - ONSET: ONSET: ON-GOING

## 2021-08-14 NOTE — PLAN OF CARE
Problem: Falls - Risk of:  Goal: Will remain free from falls  Description: Will remain free from falls  8/14/2021 0133 by Roz Piña RN  Outcome: Ongoing  Note: No falls noted this shift. Fall risk assessment completed. Hourly rounding performed. Bed locked in the lowest position, bed alarm on. Call lights and personal items within reach. Fall sign posted. Patient uses bedside commode. Problem: Falls - Risk of:  Goal: Absence of physical injury  Description: Absence of physical injury  8/14/2021 0133 by Roz Piña RN  Outcome: Ongoing  Note: There has not been any episodes of physical injury at this time in the shift. Problem: Skin Integrity:  Goal: Will show no infection signs and symptoms  Description: Will show no infection signs and symptoms  8/14/2021 0133 by Roz Piña RN  Outcome: Ongoing     Problem: Discharge Planning:  Goal: Discharged to appropriate level of care  Description: Discharged to appropriate level of care  8/14/2021 0133 by Roz Piña RN  Outcome: Ongoing  Note: Pt from assisted living and plans to return there. Problem: Skin Integrity:  Goal: Absence of new skin breakdown  Description: Absence of new skin breakdown  8/14/2021 0133 by Roz Piña RN  Outcome: Ongoing     Problem: Activity:  Goal: Capacity to carry out activities will improve  Description: Capacity to carry out activities will improve  8/14/2021 0133 by Roz Piña RN  Outcome: Ongoing     Problem: Activity:  Goal: Fatigue will decrease  Description: Fatigue will decrease  8/14/2021 0133 by Roz Piña RN  Outcome: Ongoing     Problem: Activity:  Goal: Energy level will increase  Description: Energy level will increase  8/14/2021 0133 by Roz Piña RN  Outcome: Ongoing     Problem:  Activity:  Goal: Ability to implement measures to reduce episodes of fatigue will improve  Description: Ability to implement measures to reduce episodes of fatigue will improve  8/14/2021 0133 by Alexandru Sosa Akhil RN  Outcome: Ongoing     Problem: Serum Glucose Level - Abnormal:  Goal: Ability to maintain appropriate glucose levels will improve  Description: Ability to maintain appropriate glucose levels will improve  8/14/2021 0133 by Petty Prado RN  Outcome: Ongoing  Note: Monitoring blood sugars. Insulin sliding scale given per MAR. Care plan reviewed with patient. Patient verbalize understanding of the plan of care and contribute to goal setting.

## 2021-08-14 NOTE — PROGRESS NOTES
Hospitalist Progress Note  [facility]al Telemetry [facility]       Patient: Annmarie Meng  Unit/Bed: 7X-09/296-Z  YOB: 1932  MRN: [id number]  Acct: [de-identified]   AdmittingDiagnosis: Bradycardia [R00.1]  Hyponatremia [E87.1]  Falls frequently [R29.6]  Accident due to mechanical fall without injury, initial encounter [W19. XXXA]  Admit Date: 8/13/2021  Hospital Day: 0    Subjective:    Continues to feel week and c/o pain in tere lower extremities     Objective:   BP (!) 172/73   Pulse 58   Temp 97.8 °F (36.6 °C) (Oral)   Resp 18   Ht 5' 2\" (1.575 m)   Wt 119 lb 9.6 oz (54.3 kg)   SpO2 100%   BMI 21.88 kg/m²     Intake/Output Summary (Last 24 hours) at 8/14/2021 0949  Last data filed at 8/13/2021 1928  Gross per 24 hour   Intake 100 ml   Output 0 ml   Net 100 ml     Physical Exam  Constitutional:       Appearance: Normal appearance. HENT:      Head: Normocephalic and atraumatic. Right Ear: External ear normal.      Left Ear: External ear normal.      Mouth/Throat:      Mouth: Mucous membranes are moist.      Pharynx: Oropharynx is clear. Eyes:      Conjunctiva/sclera: Conjunctivae normal.      Pupils: Pupils are equal, round, and reactive to light. Cardiovascular:      Rate and Rhythm: Normal rate. Pulmonary:      Effort: Pulmonary effort is normal.   Abdominal:      General: Bowel sounds are normal.      Palpations: Abdomen is soft. Musculoskeletal:         General: Normal range of motion. Cervical back: Normal range of motion. Skin:     General: Skin is warm. Capillary Refill: Capillary refill takes less than 2 seconds. Neurological:      General: No focal deficit present. Mental Status: She is alert. Motor: Weakness present.    Psychiatric:         Mood and Affect: Mood normal.       DVT Prophylaxis:Eliquis     Data:  CBC:   Lab Results   Component Value Date    WBC 7.4 08/13/2021    HGB 10.2 08/13/2021    HCT 32.5 08/13/2021    MCV 88.1 08/13/2021     08/13/2021     BMP:   Lab Results   Component Value Date     08/14/2021    K 4.2 08/14/2021    K 4.1 03/02/2021    CL 90 08/14/2021    CO2 22 08/14/2021    PHOS 3.5 05/24/2020    BUN 11 08/14/2021    CREATININE 0.6 08/14/2021    CALCIUM 9.6 08/14/2021     ABGs:   Lab Results   Component Value Date    PH 7.39 02/27/2021    PH 5.5 09/21/2018    PCO2 38 02/27/2021    PO2 69 02/27/2021    HCO3 23 02/27/2021    O2SAT 93 02/27/2021     Troponin: No results found for: TROPONINI   LFTs   Lab Results   Component Value Date    AST 14 02/27/2021    ALT <5 02/27/2021    BILITOT 0.6 02/27/2021    BILITOT NEGATIVE 09/21/2018    ALKPHOS 79 02/27/2021          Imaging   XR TIBIA FIBULA LEFT (2 VIEWS)    Result Date: 8/13/2021  PROCEDURE: XR TIBIA FIBULA LEFT (2 VIEWS) CLINICAL INFORMATION: Pain after fall . COMPARISON: No prior study. TECHNIQUE: AP and lateral FINDINGS: No fracture or bone destruction. Bones are severely osteopenic. Masses or calcifications are noted. No other soft tissue abnormality is identified. No acute abnormality **This report has been created using voice recognition software. It may contain minor errors which are inherent in voice recognition technology. ** Final report electronically signed by Dr. Roe Toscano on 8/13/2021 9:44 AM    XR TIBIA FIBULA RIGHT (2 VIEWS)    Result Date: 8/13/2021  PROCEDURE: XR TIBIA FIBULA RIGHT (2 VIEWS) CLINICAL INFORMATION: pain after fall . COMPARISON: No prior study. TECHNIQUE: AP and lateral FINDINGS: No acute fracture, or dislocation. Bones are severely osteopenic. Vascular calcifications are noted. No distinct soft tissue abnormality is otherwise seen. No acute finding **This report has been created using voice recognition software. It may contain minor errors which are inherent in voice recognition technology. ** Final report electronically signed by Dr. Roe Toscano on 8/13/2021 9:42 AM    XR FOOT LEFT (MIN 3 VIEWS)    Result Date: 8/13/2021  PROCEDURE: XR FOOT LEFT (MIN 3 VIEWS) CLINICAL INFORMATION: pain after fall. COMPARISON: No prior study. TECHNIQUE: 4 views of the left foot. FINDINGS: There is diffuse osteopenia. There is degenerative change. There is a small plantar spur. There is spurring at the attachment of the Achilles tendon upon the calcaneus. There is no acute fracture. There is vascular calcification. .      1. Diffuse osteopenia. 2. Degenerative change. 3. No acute fracture. 4. Plantar spur. Spurring at the attachment of the Achilles tendon upon the calcaneus. 5. Vascular calcification. . **This report has been created using voice recognition software. It may contain minor errors which are inherent in voice recognition technology. ** Final report electronically signed by DR Nita Grant on 8/13/2021 9:41 AM    XR FOOT RIGHT (MIN 3 VIEWS)    Result Date: 8/13/2021  PROCEDURE: XR FOOT RIGHT (MIN 3 VIEWS) CLINICAL INFORMATION: pain after fall . COMPARISON: No prior study. TECHNIQUE: 4 projections FINDINGS: No acute fracture, or dislocation. Joint spaces are preserved. Bones are severely osteopenic. Calcaneal spurring is present. Venous calcifications are noted. No acute osseous abnormality **This report has been created using voice recognition software. It may contain minor errors which are inherent in voice recognition technology. ** Final report electronically signed by Dr. Nathalie Hunter on 8/13/2021 9:43 AM    CT Head WO Contrast    Result Date: 8/13/2021  PROCEDURE: CT HEAD WO CONTRAST CLINICAL INFORMATION: fall on thinners Saturday. Fell 5 days ago. COMPARISON: Head CT 10/14/2018. TECHNIQUE: Noncontrast 5 mm axial images were obtained through the brain. Sagittal and coronal reconstructions were obtained. All CT scans at this facility use dose modulation, iterative reconstruction, and/or weight-based dosing when appropriate to reduce radiation dose to as low as reasonably achievable. FINDINGS: There is mild global volume loss. There is no hemorrhage. There are no intra-or extra-axial collections. There is no hydrocephalus, midline shift or mass effect. There is a moderate amount of abnormal low attenuation in the white matter the brain suggesting chronic small vessel ischemic changes. This has progressed compared to prior exam. There are vascular calcifications. The paranasal sinuses and mastoid air cells are normally aerated. There is no suspicious calvarial abnormality. 1. No acute findings. 2. Moderate severity chronic small vessel ischemic changes. **This report has been created using voice recognition software. It may contain minor errors which are inherent in voice recognition technology. ** Final report electronically signed by Dr. Lenka Wilder on 8/13/2021 9:52 AM      Assessment/Plan:  1. Provoked, mechanical  fall-orthostats are negative,  no fracturesCT of the head negative, cont PT and OT, may need short term rehab      2. History of paroxysmal atrial fibrillation CHADS2 DS 2 vASC score 5-currently on Eliquis also and Lopressor -recently evaluated by EP electrophysiologist-for pacemaker secondary to bradycardia-however patient was reluctant. - ? Watchman's procedure F/U with EP      3. History of parkinsonism-on amantadine, carbidopa-sees neurologist as outpatient     4. Sinus bradycardia likely may be a reason for the fall- will decrease Lopressor dose     5. History of type 2 diabetes-on Januvia, will cont to hold Metformin and start on insulin sliding scale      6. History of hyperlipidemia-on statin-will continue      7.  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

## 2021-08-14 NOTE — PLAN OF CARE
Problem: Falls - Risk of:  Goal: Will remain free from falls  Description: Will remain free from falls  8/14/2021 1545 by Nicole Marsh RN  Outcome: Ongoing  Note: Bed alarm on, Falling star program in place. Call light within reach. Problem: Falls - Risk of:  Goal: Will remain free from falls  Description: Will remain free from falls  8/14/2021 1536 by Nicole Marsh RN  Outcome: Ongoing  Note: Bed alarm on, Falling star program in place. Call light within reach. Problem: Falls - Risk of:  Goal: Absence of physical injury  Description: Absence of physical injury  8/14/2021 1545 by Nicole Marsh RN  Outcome: Met This Shift     Problem: Falls - Risk of:  Goal: Absence of physical injury  Description: Absence of physical injury  8/14/2021 1536 by Nicole Marsh RN  Outcome: Met This Shift     Problem: Skin Integrity:  Goal: Will show no infection signs and symptoms  Description: Will show no infection signs and symptoms  Outcome: Ongoing  Note: Redness to heels and coccyx. Turn patient in bed and elevate heels on pillow. Problem: Skin Integrity:  Goal: Absence of new skin breakdown  Description: Absence of new skin breakdown  Outcome: Met This Shift     Problem: Discharge Planning:  Goal: Discharged to appropriate level of care  Description: Discharged to appropriate level of care  Outcome: Ongoing  Note: Patient from assisted living. PT/OT to see. Patient may need rehab at discharge. Problem: Activity:  Goal: Capacity to carry out activities will improve  Description: Capacity to carry out activities will improve  Outcome: Ongoing  Note: Patient able to stay awake today. Problem: Activity:  Goal: Fatigue will decrease  Description: Fatigue will decrease  Outcome: Met This Shift     Problem: Activity:  Goal: Energy level will increase  Description: Energy level will increase  Outcome: Ongoing  Note: Patient able to get up to chair today.       Problem:

## 2021-08-15 LAB
ANION GAP SERPL CALCULATED.3IONS-SCNC: 11 MEQ/L (ref 8–16)
BUN BLDV-MCNC: 10 MG/DL (ref 7–22)
CALCIUM SERPL-MCNC: 9.6 MG/DL (ref 8.5–10.5)
CHLORIDE BLD-SCNC: 104 MEQ/L (ref 98–111)
CO2: 23 MEQ/L (ref 23–33)
CREAT SERPL-MCNC: 0.5 MG/DL (ref 0.4–1.2)
GFR SERPL CREATININE-BSD FRML MDRD: > 90 ML/MIN/1.73M2
GLUCOSE BLD-MCNC: 108 MG/DL (ref 70–108)
GLUCOSE BLD-MCNC: 165 MG/DL (ref 70–108)
GLUCOSE BLD-MCNC: 166 MG/DL (ref 70–108)
GLUCOSE BLD-MCNC: 179 MG/DL (ref 70–108)
GLUCOSE BLD-MCNC: 289 MG/DL (ref 70–108)
POTASSIUM SERPL-SCNC: 4.3 MEQ/L (ref 3.5–5.2)
SODIUM BLD-SCNC: 138 MEQ/L (ref 135–145)

## 2021-08-15 PROCEDURE — G0378 HOSPITAL OBSERVATION PER HR: HCPCS

## 2021-08-15 PROCEDURE — 36415 COLL VENOUS BLD VENIPUNCTURE: CPT

## 2021-08-15 PROCEDURE — 6370000000 HC RX 637 (ALT 250 FOR IP): Performed by: INTERNAL MEDICINE

## 2021-08-15 PROCEDURE — 82948 REAGENT STRIP/BLOOD GLUCOSE: CPT

## 2021-08-15 PROCEDURE — 99226 PR SBSQ OBSERVATION CARE/DAY 35 MINUTES: CPT | Performed by: INTERNAL MEDICINE

## 2021-08-15 PROCEDURE — 80048 BASIC METABOLIC PNL TOTAL CA: CPT

## 2021-08-15 RX ORDER — AMLODIPINE BESYLATE 10 MG/1
10 TABLET ORAL DAILY
Status: DISCONTINUED | OUTPATIENT
Start: 2021-08-15 | End: 2021-08-18 | Stop reason: HOSPADM

## 2021-08-15 RX ORDER — FUROSEMIDE 20 MG/1
10 TABLET ORAL DAILY
Status: DISCONTINUED | OUTPATIENT
Start: 2021-08-16 | End: 2021-08-18 | Stop reason: HOSPADM

## 2021-08-15 RX ORDER — ACETAMINOPHEN 325 MG/1
650 TABLET ORAL EVERY 4 HOURS PRN
Status: DISCONTINUED | OUTPATIENT
Start: 2021-08-15 | End: 2021-08-18 | Stop reason: HOSPADM

## 2021-08-15 RX ORDER — HYDRALAZINE HYDROCHLORIDE 20 MG/ML
20 INJECTION INTRAMUSCULAR; INTRAVENOUS EVERY 6 HOURS PRN
Status: DISCONTINUED | OUTPATIENT
Start: 2021-08-15 | End: 2021-08-17

## 2021-08-15 RX ADMIN — ASPIRIN 81 MG: 81 TABLET, COATED ORAL at 08:02

## 2021-08-15 RX ADMIN — CARBIDOPA AND LEVODOPA 2 TABLET: 25; 100 TABLET ORAL at 13:25

## 2021-08-15 RX ADMIN — CARBIDOPA AND LEVODOPA 2 TABLET: 25; 100 TABLET ORAL at 08:02

## 2021-08-15 RX ADMIN — SUCRALFATE 1 G: 1 TABLET ORAL at 11:19

## 2021-08-15 RX ADMIN — SUCRALFATE 1 G: 1 TABLET ORAL at 20:16

## 2021-08-15 RX ADMIN — INSULIN LISPRO 1 UNITS: 100 INJECTION, SOLUTION INTRAVENOUS; SUBCUTANEOUS at 20:16

## 2021-08-15 RX ADMIN — Medication 1 G: at 08:02

## 2021-08-15 RX ADMIN — ISOSORBIDE DINITRATE 20 MG: 20 TABLET ORAL at 08:02

## 2021-08-15 RX ADMIN — SPIRONOLACTONE 25 MG: 25 TABLET ORAL at 08:02

## 2021-08-15 RX ADMIN — FUROSEMIDE 20 MG: 20 TABLET ORAL at 08:02

## 2021-08-15 RX ADMIN — ATORVASTATIN CALCIUM 20 MG: 20 TABLET, FILM COATED ORAL at 20:16

## 2021-08-15 RX ADMIN — ACETAMINOPHEN 650 MG: 325 TABLET ORAL at 09:44

## 2021-08-15 RX ADMIN — AMLODIPINE BESYLATE 10 MG: 10 TABLET ORAL at 11:19

## 2021-08-15 RX ADMIN — CARBIDOPA AND LEVODOPA 2 TABLET: 25; 100 TABLET ORAL at 16:51

## 2021-08-15 RX ADMIN — AMANTADINE HYDROCHLORIDE 100 MG: 100 CAPSULE, GELATIN COATED ORAL at 08:03

## 2021-08-15 RX ADMIN — ISOSORBIDE DINITRATE 20 MG: 20 TABLET ORAL at 13:25

## 2021-08-15 RX ADMIN — HYDRALAZINE HYDROCHLORIDE 25 MG: 25 TABLET, FILM COATED ORAL at 06:01

## 2021-08-15 RX ADMIN — SUCRALFATE 1 G: 1 TABLET ORAL at 16:51

## 2021-08-15 RX ADMIN — APIXABAN 2.5 MG: 2.5 TABLET, FILM COATED ORAL at 08:02

## 2021-08-15 RX ADMIN — CARBIDOPA AND LEVODOPA 2 TABLET: 25; 100 TABLET ORAL at 20:16

## 2021-08-15 RX ADMIN — APIXABAN 2.5 MG: 2.5 TABLET, FILM COATED ORAL at 20:16

## 2021-08-15 RX ADMIN — SUCRALFATE 1 G: 1 TABLET ORAL at 06:01

## 2021-08-15 RX ADMIN — ISOSORBIDE DINITRATE 20 MG: 20 TABLET ORAL at 20:16

## 2021-08-15 RX ADMIN — PANTOPRAZOLE SODIUM 40 MG: 40 TABLET, DELAYED RELEASE ORAL at 15:47

## 2021-08-15 RX ADMIN — PANTOPRAZOLE SODIUM 40 MG: 40 TABLET, DELAYED RELEASE ORAL at 06:01

## 2021-08-15 ASSESSMENT — PAIN DESCRIPTION - PROGRESSION

## 2021-08-15 ASSESSMENT — PAIN DESCRIPTION - ONSET
ONSET: ON-GOING

## 2021-08-15 ASSESSMENT — PAIN SCALES - WONG BAKER

## 2021-08-15 ASSESSMENT — PAIN DESCRIPTION - FREQUENCY
FREQUENCY: CONTINUOUS

## 2021-08-15 ASSESSMENT — PAIN SCALES - GENERAL
PAINLEVEL_OUTOF10: 5
PAINLEVEL_OUTOF10: 2
PAINLEVEL_OUTOF10: 4
PAINLEVEL_OUTOF10: 2
PAINLEVEL_OUTOF10: 2
PAINLEVEL_OUTOF10: 3

## 2021-08-15 ASSESSMENT — PAIN DESCRIPTION - ORIENTATION
ORIENTATION: RIGHT;LEFT;ANTERIOR
ORIENTATION: RIGHT;LEFT

## 2021-08-15 ASSESSMENT — PAIN DESCRIPTION - DESCRIPTORS
DESCRIPTORS: ACHING

## 2021-08-15 ASSESSMENT — PAIN DESCRIPTION - LOCATION
LOCATION: KNEE
LOCATION: KNEE
LOCATION: KNEE;HEAD
LOCATION: KNEE

## 2021-08-15 ASSESSMENT — PAIN DESCRIPTION - PAIN TYPE
TYPE: CHRONIC PAIN

## 2021-08-15 ASSESSMENT — PAIN - FUNCTIONAL ASSESSMENT
PAIN_FUNCTIONAL_ASSESSMENT: ACTIVITIES ARE NOT PREVENTED

## 2021-08-15 NOTE — PROGRESS NOTES
Hospitalist Progress Note  [facility] Telemetry 6[facility]       Patient: Julio Swan  Unit/Bed: 3R-65/412-T  YOB: 1932  MRN: [id number]  Acct: [de-identified]   AdmittingDiagnosis: Bradycardia [R00.1]  Hyponatremia [E87.1]  Falls frequently [R29.6]  Accident due to mechanical fall without injury, initial encounter [W19. XXXA]  Admit Date: 8/13/2021  Hospital Day: 0    Subjective: RN reports significantly elevated blood pressure    Objective:   /60   Pulse 77   Temp 97.7 °F (36.5 °C) (Oral)   Resp 18   Ht 5' 2\" (1.575 m)   Wt 119 lb 12.8 oz (54.3 kg)   SpO2 94%   BMI 21.91 kg/m²     Intake/Output Summary (Last 24 hours) at 8/15/2021 1415  Last data filed at 8/15/2021 0331  Gross per 24 hour   Intake 1560.99 ml   Output 4300 ml   Net -2739.01 ml     Physical Exam  HENT:      Head: Normocephalic and atraumatic. Right Ear: External ear normal.      Left Ear: External ear normal.      Nose: Nose normal.      Mouth/Throat:      Mouth: Mucous membranes are moist.      Pharynx: Oropharynx is clear. Eyes:      Conjunctiva/sclera: Conjunctivae normal.      Pupils: Pupils are equal, round, and reactive to light. Cardiovascular:      Rate and Rhythm: Normal rate. Pulses: Normal pulses. Pulmonary:      Effort: Pulmonary effort is normal.   Abdominal:      General: Bowel sounds are normal.      Palpations: Abdomen is soft. Musculoskeletal:         General: Normal range of motion. Skin:     General: Skin is warm. Capillary Refill: Capillary refill takes less than 2 seconds. Neurological:      General: No focal deficit present. Mental Status: She is alert.      DVT Prophylaxis: Eliquis     Data:  CBC:   Lab Results   Component Value Date    WBC 7.4 08/13/2021    HGB 10.2 08/13/2021    HCT 32.5 08/13/2021    MCV 88.1 08/13/2021     08/13/2021     BMP:   Lab Results   Component Value Date     08/15/2021    K 4.3 08/15/2021    K 4.2 08/14/2021     08/15/2021 CO2 23 08/15/2021    PHOS 3.5 05/24/2020    BUN 10 08/15/2021    CREATININE 0.5 08/15/2021    CALCIUM 9.6 08/15/2021     ABGs:   Lab Results   Component Value Date    PH 7.39 02/27/2021    PH 5.5 09/21/2018    PCO2 38 02/27/2021    PO2 69 02/27/2021    HCO3 23 02/27/2021    O2SAT 93 02/27/2021     Troponin: No results found for: TROPONINI   LFTs   Lab Results   Component Value Date    AST 14 02/27/2021    ALT <5 02/27/2021    BILITOT 0.6 02/27/2021    BILITOT NEGATIVE 09/21/2018    ALKPHOS 79 02/27/2021          Imaging   XR TIBIA FIBULA LEFT (2 VIEWS)    Result Date: 8/13/2021  PROCEDURE: XR TIBIA FIBULA LEFT (2 VIEWS) CLINICAL INFORMATION: Pain after fall . COMPARISON: No prior study. TECHNIQUE: AP and lateral FINDINGS: No fracture or bone destruction. Bones are severely osteopenic. Masses or calcifications are noted. No other soft tissue abnormality is identified. No acute abnormality **This report has been created using voice recognition software. It may contain minor errors which are inherent in voice recognition technology. ** Final report electronically signed by Dr. Monika Ellis on 8/13/2021 9:44 AM    XR TIBIA FIBULA RIGHT (2 VIEWS)    Result Date: 8/13/2021  PROCEDURE: XR TIBIA FIBULA RIGHT (2 VIEWS) CLINICAL INFORMATION: pain after fall . COMPARISON: No prior study. TECHNIQUE: AP and lateral FINDINGS: No acute fracture, or dislocation. Bones are severely osteopenic. Vascular calcifications are noted. No distinct soft tissue abnormality is otherwise seen. No acute finding **This report has been created using voice recognition software. It may contain minor errors which are inherent in voice recognition technology. ** Final report electronically signed by Dr. Monika Ellis on 8/13/2021 9:42 AM    XR FOOT LEFT (MIN 3 VIEWS)    Result Date: 8/13/2021  PROCEDURE: XR FOOT LEFT (MIN 3 VIEWS) CLINICAL INFORMATION: pain after fall. COMPARISON: No prior study. TECHNIQUE: 4 views of the left foot.  FINDINGS: There is diffuse osteopenia. There is degenerative change. There is a small plantar spur. There is spurring at the attachment of the Achilles tendon upon the calcaneus. There is no acute fracture. There is vascular calcification. .      1. Diffuse osteopenia. 2. Degenerative change. 3. No acute fracture. 4. Plantar spur. Spurring at the attachment of the Achilles tendon upon the calcaneus. 5. Vascular calcification. . **This report has been created using voice recognition software. It may contain minor errors which are inherent in voice recognition technology. ** Final report electronically signed by  [facility] on 8/13/2021 9:41 AM    XR FOOT RIGHT (MIN 3 VIEWS)    Result Date: 8/13/2021  PROCEDURE: XR FOOT RIGHT (MIN 3 VIEWS) CLINICAL INFORMATION: pain after fall . COMPARISON: No prior study. TECHNIQUE: 4 projections FINDINGS: No acute fracture, or dislocation. Joint spaces are preserved. Bones are severely osteopenic. Calcaneal spurring is present. Venous calcifications are noted. No acute osseous abnormality **This report has been created using voice recognition software. It may contain minor errors which are inherent in voice recognition technology. ** Final report electronically signed by Dr. Romana Washington on 8/13/2021 9:43 AM    CT Head WO Contrast    Result Date: 8/13/2021  PROCEDURE: CT HEAD WO CONTRAST CLINICAL INFORMATION: fall on thinners Saturday. Fell 5 days ago. COMPARISON: Head CT 10/14/2018. TECHNIQUE: Noncontrast 5 mm axial images were obtained through the brain. Sagittal and coronal reconstructions were obtained. All CT scans at this facility use dose modulation, iterative reconstruction, and/or weight-based dosing when appropriate to reduce radiation dose to as low as reasonably achievable. FINDINGS: There is mild global volume loss. There is no hemorrhage. There are no intra-or extra-axial collections. There is no hydrocephalus, midline shift or mass effect.   There is a moderate amount of abnormal low attenuation in the white matter the brain suggesting chronic small vessel ischemic changes. This has progressed compared to prior exam. There are vascular calcifications. The paranasal sinuses and mastoid air cells are normally aerated. There is no suspicious calvarial abnormality. 1. No acute findings. 2. Moderate severity chronic small vessel ischemic changes. **This report has been created using voice recognition software. It may contain minor errors which are inherent in voice recognition technology. ** Final report electronically signed by Dr. Martina Elmore on 8/13/2021 9:52 AM      Assessment/Plan:  1. Mechanical  fall-orthostats are negative,  no fractures, CT of the head negative, cont PT and OT, may need short term rehab      2. History of paroxysmal atrial fibrillation CHADS2 DS 2 vASC score 5-currently on Eliquis also and Lopressor -recently evaluated by EP electrophysiologist-for pacemaker secondary to bradycardia-however patient was reluctant. - ?  Watchman's procedure F/U with EP      3. History of parkinsonism-on amantadine, carbidopa-sees neurologist as outpatient     4. Sinus bradycardia likely may be a reason for the fall- will decrease Lopressor dose      5. History of type 2 diabetes-on Januvia, will cont to hold Metformin and start on insulin sliding scale      6. History of hyperlipidemia-on statin-will continue      7.  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

## 2021-08-15 NOTE — PLAN OF CARE
Problem: Falls - Risk of:  Goal: Will remain free from falls  Description: Will remain free from falls  Outcome: Ongoing  Note: No falls noted this shift. Continue falling star program. Bed alarm on, bed in low position. Call light and personal belongings in reach. Patient uses call light appropriately. Problem: Falls - Risk of:  Goal: Absence of physical injury  Description: Absence of physical injury  Outcome: Ongoing  Note: Patient remains free from physical injury this shift. Call light appropriate. Will continue to assess. Problem: Skin Integrity:  Goal: Will show no infection signs and symptoms  Description: Will show no infection signs and symptoms  Outcome: Ongoing  Note: Patient remains afebrile this shift. Vital signs in stable condition. Will continue to assess. Problem: Skin Integrity:  Goal: Absence of new skin breakdown  Description: Absence of new skin breakdown  Outcome: Ongoing  Note: Patient free from skin breakdown. Patient turns self and makes frequent positional changes. Will continue to monitor. Problem: Discharge Planning:  Goal: Discharged to appropriate level of care  Description: Discharged to appropriate level of care  Outcome: Ongoing  Note: Patient plans to be discharged to [facility] when medically stable. Case management on board. Care plan reviewed with patient and family. Patient and family verbalize understanding of the plan of care and contribute to goal setting.

## 2021-08-15 NOTE — PLAN OF CARE
Problem: Falls - Risk of:  Goal: Will remain free from falls  Description: Will remain free from falls  8/14/2021 2159 by Al Phillips RN  Outcome: Ongoing   Call light within reach. Side rails up x2. Bed alarm on. Non skid slippers available. Problem: Falls - Risk of:  Goal: Absence of physical injury  Description: Absence of physical injury  8/14/2021 2159 by Al Phillips RN  Outcome: Ongoing    Problem: Skin Integrity:  Goal: Will show no infection signs and symptoms  Description: Will show no infection signs and symptoms  8/14/2021 2159 by Al Phillips RN  Outcome: Ongoing     Problem: Skin Integrity:  Goal: Absence of new skin breakdown  Description: Absence of new skin breakdown  8/14/2021 2159 by Al Phillips RN  Outcome: Ongoing  Patient free from skin breakdown. Patient encouraged to turn self and make  frequent positional changes. Heels elevated on pillows while in bed. Will continue to monitor. Problem: Discharge Planning:  Goal: Discharged to appropriate level of care  Description: Discharged to appropriate level of care  8/14/2021 2159 by Al Phillips RN  Outcome: Ongoing  Patient from assisted living. PT/OT to see. Patient may need rehab at discharge. Problem: Activity:  Goal: Capacity to carry out activities will improve  Description: Capacity to carry out activities will improve  8/14/2021 2159 by Al Phillips RN  Outcome: Ongoing     Problem: Activity:  Goal: Fatigue will decrease  Description: Fatigue will decrease  8/14/2021 2159 by Al Phillips RN  Outcome: Ongoing     Problem: Activity:  Goal: Energy level will increase  Description: Energy level will increase  8/14/2021 2159 by Al Phillips RN  Outcome: Ongoing     Problem:  Activity:  Goal: Ability to implement measures to reduce episodes of fatigue will improve  Description: Ability to implement measures to reduce episodes of fatigue will improve  8/14/2021 2159 by Al Phillips RN  Outcome: Ongoing Problem: Serum Glucose Level - Abnormal:  Goal: Ability to maintain appropriate glucose levels will improve  Description: Ability to maintain appropriate glucose levels will improve  8/14/2021 2159 by Melissa Peacock RN  Outcome: Ongoing  Serum glucose levels increased throughout shift. Insulin was given as ordered. Problem: Pain:  Goal: Pain level will decrease  Description: Pain level will decrease  8/14/2021 2159 by Melissa Peacock RN  Outcome: Ongoing     Problem: Pain:  Goal: Control of acute pain  Description: Control of acute pain  8/14/2021 2159 by Melissa Peacock RN  Outcome: Ongoing     Problem: Pain:  Goal: Control of chronic pain  Description: Control of chronic pain  8/14/2021 2159 by Melissa Peacock RN  Outcome: Ongoing  Patient stated pain in knees. Offered positional changes and pillow under legs. Will continue to reassess.

## 2021-08-15 NOTE — PROGRESS NOTES
Dr Maia Sanches notified via perfect serve regarding BP elevation and patient request for tylenol for headache.

## 2021-08-16 PROBLEM — I48.91 A-FIB (HCC): Status: ACTIVE | Noted: 2021-08-16

## 2021-08-16 LAB
ANION GAP SERPL CALCULATED.3IONS-SCNC: 12 MEQ/L (ref 8–16)
BUN BLDV-MCNC: 12 MG/DL (ref 7–22)
CALCIUM SERPL-MCNC: 9.5 MG/DL (ref 8.5–10.5)
CHLORIDE BLD-SCNC: 98 MEQ/L (ref 98–111)
CO2: 21 MEQ/L (ref 23–33)
CREAT SERPL-MCNC: 0.5 MG/DL (ref 0.4–1.2)
GFR SERPL CREATININE-BSD FRML MDRD: > 90 ML/MIN/1.73M2
GLUCOSE BLD-MCNC: 157 MG/DL (ref 70–108)
GLUCOSE BLD-MCNC: 174 MG/DL (ref 70–108)
GLUCOSE BLD-MCNC: 190 MG/DL (ref 70–108)
GLUCOSE BLD-MCNC: 203 MG/DL (ref 70–108)
GLUCOSE BLD-MCNC: 213 MG/DL (ref 70–108)
GLUCOSE BLD-MCNC: 222 MG/DL (ref 70–108)
POTASSIUM SERPL-SCNC: 4.4 MEQ/L (ref 3.5–5.2)
SODIUM BLD-SCNC: 131 MEQ/L (ref 135–145)

## 2021-08-16 PROCEDURE — 6370000000 HC RX 637 (ALT 250 FOR IP): Performed by: INTERNAL MEDICINE

## 2021-08-16 PROCEDURE — 97530 THERAPEUTIC ACTIVITIES: CPT

## 2021-08-16 PROCEDURE — 97166 OT EVAL MOD COMPLEX 45 MIN: CPT

## 2021-08-16 PROCEDURE — 80048 BASIC METABOLIC PNL TOTAL CA: CPT

## 2021-08-16 PROCEDURE — 99232 SBSQ HOSP IP/OBS MODERATE 35: CPT | Performed by: INTERNAL MEDICINE

## 2021-08-16 PROCEDURE — 97535 SELF CARE MNGMENT TRAINING: CPT

## 2021-08-16 PROCEDURE — 97162 PT EVAL MOD COMPLEX 30 MIN: CPT

## 2021-08-16 PROCEDURE — 82948 REAGENT STRIP/BLOOD GLUCOSE: CPT

## 2021-08-16 PROCEDURE — 36415 COLL VENOUS BLD VENIPUNCTURE: CPT

## 2021-08-16 PROCEDURE — 1200000000 HC SEMI PRIVATE

## 2021-08-16 RX ADMIN — ACETAMINOPHEN 650 MG: 325 TABLET ORAL at 20:23

## 2021-08-16 RX ADMIN — APIXABAN 2.5 MG: 2.5 TABLET, FILM COATED ORAL at 20:23

## 2021-08-16 RX ADMIN — AMANTADINE HYDROCHLORIDE 100 MG: 100 CAPSULE, GELATIN COATED ORAL at 08:27

## 2021-08-16 RX ADMIN — AMLODIPINE BESYLATE 10 MG: 10 TABLET ORAL at 08:27

## 2021-08-16 RX ADMIN — CARBIDOPA AND LEVODOPA 2 TABLET: 25; 100 TABLET ORAL at 08:27

## 2021-08-16 RX ADMIN — CARBIDOPA AND LEVODOPA 2 TABLET: 25; 100 TABLET ORAL at 16:34

## 2021-08-16 RX ADMIN — CARBIDOPA AND LEVODOPA 2 TABLET: 25; 100 TABLET ORAL at 20:22

## 2021-08-16 RX ADMIN — SUCRALFATE 1 G: 1 TABLET ORAL at 20:23

## 2021-08-16 RX ADMIN — ISOSORBIDE DINITRATE 20 MG: 20 TABLET ORAL at 20:23

## 2021-08-16 RX ADMIN — ASPIRIN 81 MG: 81 TABLET, COATED ORAL at 08:27

## 2021-08-16 RX ADMIN — FUROSEMIDE 10 MG: 20 TABLET ORAL at 08:26

## 2021-08-16 RX ADMIN — ATORVASTATIN CALCIUM 20 MG: 20 TABLET, FILM COATED ORAL at 20:24

## 2021-08-16 RX ADMIN — INSULIN LISPRO 1 UNITS: 100 INJECTION, SOLUTION INTRAVENOUS; SUBCUTANEOUS at 20:29

## 2021-08-16 RX ADMIN — PANTOPRAZOLE SODIUM 40 MG: 40 TABLET, DELAYED RELEASE ORAL at 16:34

## 2021-08-16 RX ADMIN — SUCRALFATE 1 G: 1 TABLET ORAL at 10:57

## 2021-08-16 RX ADMIN — SUCRALFATE 1 G: 1 TABLET ORAL at 16:34

## 2021-08-16 RX ADMIN — SPIRONOLACTONE 25 MG: 25 TABLET ORAL at 08:27

## 2021-08-16 RX ADMIN — APIXABAN 2.5 MG: 2.5 TABLET, FILM COATED ORAL at 08:28

## 2021-08-16 RX ADMIN — CARBIDOPA AND LEVODOPA 2 TABLET: 25; 100 TABLET ORAL at 13:07

## 2021-08-16 RX ADMIN — SUCRALFATE 1 G: 1 TABLET ORAL at 05:44

## 2021-08-16 RX ADMIN — Medication 1 G: at 08:26

## 2021-08-16 RX ADMIN — ACETAMINOPHEN 650 MG: 325 TABLET ORAL at 10:56

## 2021-08-16 RX ADMIN — ISOSORBIDE DINITRATE 20 MG: 20 TABLET ORAL at 13:07

## 2021-08-16 RX ADMIN — ISOSORBIDE DINITRATE 20 MG: 20 TABLET ORAL at 08:27

## 2021-08-16 RX ADMIN — PANTOPRAZOLE SODIUM 40 MG: 40 TABLET, DELAYED RELEASE ORAL at 05:44

## 2021-08-16 ASSESSMENT — PAIN SCALES - GENERAL
PAINLEVEL_OUTOF10: 0
PAINLEVEL_OUTOF10: 1
PAINLEVEL_OUTOF10: 5
PAINLEVEL_OUTOF10: 0
PAINLEVEL_OUTOF10: 2
PAINLEVEL_OUTOF10: 2

## 2021-08-16 ASSESSMENT — PAIN DESCRIPTION - LOCATION: LOCATION: LEG

## 2021-08-16 ASSESSMENT — PAIN DESCRIPTION - ORIENTATION: ORIENTATION: RIGHT;LEFT

## 2021-08-16 ASSESSMENT — PAIN DESCRIPTION - PAIN TYPE: TYPE: CHRONIC PAIN

## 2021-08-16 NOTE — PROGRESS NOTES
Leonid 83  INPATIENT PHYSICAL THERAPY  EVALUATION  [facility] RENAL TELEMETRY 6K - 6K-21/021-A    Time In: 1104  Time Out: 1125  Timed Code Treatment Minutes: 11 Minutes  Minutes: 21          Date: 2021  Patient Name: Ken Valdez,  Gender:  female        MRN: [id number]  : 1932  (80 y.o.)      Referring Practitioner: Lala Kussmaul, MD  Diagnosis: bradycardia  Additional Pertinent Hx: 80-year-old lady with past medical history described below who comes in after a provoked fall 6 days ago, patient took her walker got up to go to her closet so that she can dress for her supper however did take kraus turn leading to losing her balance and falling down and was not able to get back up. She has not lost her consciousness she did not have any dizziness or chest pain or shortness of breath or any prodromal symptoms. The nurse then came and found her since she did not come for the supper none informed family family has been noticing that she has been tired and weak the last few days and hence got her to the ER. .  Currently has pain all over her legs and the lower back-she did not hit her head-initial radiology as part of the trauma [id number] for any bleeds or breaks/fractures. Otherwise denies any nausea vomiting diarrhea shortness of breath or chest pain     Restrictions/Precautions:  Restrictions/Precautions: General Precautions, Fall Risk    Subjective:  Chart Reviewed: Yes  Patient assessed for rehabilitation services?: Yes  Family / Caregiver Present: Yes (2 daughters present during evaluation)  Subjective: RN approved PT evaluation. Pt in chair, with 2 daughters present in room, agreeable to therapy. She states she has been feeling weaker over the past weeks, and agrees that she would benefit from continued physical therapy prior to returning to assisted living to improve strength and balance, preventing further occurrence of falls.  Did request to use bathroom during session, but unable to void or have BM. General:  Follows Commands: Within Functional Limits    Vision: Impaired  Vision Exceptions: Wears glasses at all times    Hearing: Within functional limits    Pain: none stated     Vitals: Tech checked vitals prior to session -- WNL    Social/Functional History:    Lives With: Alone ([place])  Type of Home: Assisted living  Home Layout: One level  Home Access: Level entry  Home Equipment: 4 wheeled walker     Bathroom Shower/Tub: Walk-in shower, Shower chair with back  Bathroom Toilet: Handicap height  Bathroom Equipment: Grab bars in shower, Grab bars around toilet  Bathroom Accessibility: Accessible  IADL Comments: Pt fixes breakfast, goes down for lunch and supper.     Receives Help From:  (staff)  ADL Assistance: Needs assistance (A with showering 2x/wk)  Homemaking Assistance: Needs assistance  Ambulation Assistance: Independent  Transfer Assistance: Independent    Additional Comments: uses 4WW at baseline for ambulation    OBJECTIVE:  Range of Motion:  Bilateral Lower Extremity: WFL    Strength:  Right Lower Extremity: Impaired - 4/5 GMT for major mm groups   Left Lower Extremity: Impaired - Generally 4/5 GMT, however, 3+/5 for knee flexion --pt states she did fall on her R knee     Balance:  Static Sitting Balance:  Supervision  Dynamic Sitting Balance: Supervision  Static Standing Balance: Contact Guard Assistance  Dynamic Standing Balance: Contact Guard Assistance, with cues for safety    Bed Mobility:  Not Tested   *able to scoot in chair with modified independence    Transfers:  Sit to Stand: Contact Guard Assistance, Minimal Assistance, with increased time for completion, cues for hand placement, with verbal cues  Stand to Sit:Contact Guard Assistance, Minimal Assistance, with cues for hand placement  Min A required for STS to/from low chair in hallway, as pt became fatigued with walking and requested seated rest break---closest available chair had very low seat   *cues for using grab bar in bathroom when standing from toilet     Ambulation:  [address], X 1, with cues for safety, with verbal cues , with increased time for completion  Distance: 20', 10', 10'---seated rest break in chair in hallway, seated rest break on toilet   Surface: Level Tile  Device:Rolling Walker  Gait Deviations: Forward Flexed Posture, Slow Deidre, Decreased Step Length Bilaterally and Decreased Gait Speed    Functional Outcome Measures: Completed  AM-PAC Inpatient Mobility Raw Score : 16  AM-PAC Inpatient T-Scale Score : 40.78    ASSESSMENT:  Activity Tolerance:  Patient tolerance of  treatment: fair. Required 2 seated rest breaks due to decreased endurance       Treatment Initiated: Treatment and education initiated within context of evaluation. Evaluation time included review of current medical information, gathering information related to past medical, social and functional history, completion of standardized testing, formal and informal observation of tasks, assessment of data and development of plan of care and goals. Treatment time included skilled education and facilitation of tasks to increase safety and independence with functional mobility for improved independence and quality of life. Assessment: Body structures, Functions, Activity limitations: Decreased functional mobility , Decreased balance, Decreased posture, Decreased strength, Decreased safe awareness, Decreased endurance  Assessment: Pt is below PLOF by way of transfers and ambulation, demonstrating deficits in balance, strength, and endurance. Pt is receptive to education and continued physical therapy. She will benefit from continued physical therapy to return to PLOF and prevent another fall incident. Prognosis: Good    REQUIRES PT FOLLOW UP: Yes    Discharge Recommendations:  Discharge Recommendations: Continue to assess pending progress, Subacute/Skilled Nursing Facility--pt is currently at increased fall risk.  Will benefit from continued physical therapy prior to returning to assisted living. Patient Education:  PT Education: Goals, General Safety, Gait Training, PT Role, Plan of Care, Transfer Training, Functional Mobility Training    Equipment Recommendations:  Equipment Needed: No    Plan:  Times per week: 3-5x GM (fall)  Times per day: Daily  Current Treatment Recommendations: Strengthening, Safety Education & Training, Balance Training, Patient/Caregiver Education & Training, Endurance Training, Functional Mobility Training, Transfer Training, Gait Training, Home Exercise Program    Goals:  Patient goals : go to SNF for therapy, improve ambulation  Short term goals  Time Frame for Short term goals: by hospital discharge  Short term goal 1: Supine to/from sit with modified independence for ease of transfers at discharge site. Short term goal 2: Sit to/from stand with modified independence for ease of transfers at discharge site. Short term goal 3: Ambulate 48' with RW and modified independence for community distance ambulation. Short term goal 4: Stand x 5 minutes performing functional task, for improved endurance with standing balance. Short term goal 5: Perform TUG in less than 13.5 seconds, indicating decreased fall risk. Long term goals  Time Frame for Long term goals : N/A due to short ELOS. Following session, patient left in safe position with all fall risk precautions in place.     Lynn James, PT, DPT

## 2021-08-16 NOTE — PLAN OF CARE
Problem: Falls - Risk of:  Goal: Will remain free from falls  Description: Will remain free from falls  8/16/2021 1509 by Edwin Rice RN  Outcome: Ongoing  Note: No falls noted this shift. Continue falling star program. Bed alarm on, bed in low position. Call light and personal belongings in reach. Patient uses call light appropriately. Problem: Skin Integrity:  Goal: Absence of new skin breakdown  Description: Absence of new skin breakdown  8/16/2021 1509 by Edwin Rice RN  Outcome: Ongoing  Note: No new signs or symptoms of skin breakdown noted this shift, encouraging patient to turn and reposition self in bed q2h       Problem: Discharge Planning:  Goal: Discharged to appropriate level of care  Description: Discharged to appropriate level of care  8/16/2021 1509 by Edwin Rice RN  Outcome: Ongoing  Note: Plans are still being discussed about whether or not the pt will be able to return to [facility]. Problem: Activity:  Goal: Energy level will increase  Description: Energy level will increase  8/16/2021 1509 by Edwin Rice RN  Outcome: Ongoing  Note: Pt was able to get up to chair today and said that she felt very good. Will continue to encourage getting up to chair. Problem: Serum Glucose Level - Abnormal:  Goal: Ability to maintain appropriate glucose levels will improve  Description: Ability to maintain appropriate glucose levels will improve  8/16/2021 1509 by Edwin Rice RN  Outcome: Ongoing  Note: Glucose stabilzer has been used throughout shift. Will continue to check fingerstick blood sugar ACHS. Problem: Pain:  Goal: Pain level will decrease  Description: Pain level will decrease  8/16/2021 1509 by Edwin Rice RN  Outcome: Ongoing  Note: Pt has had minimal pain throughout shift. Using a 0-10 pain scale, pt has been rating pain at a 5 or lower, will continue to assess and reassess pain every hour. Care plan reviewed with patient and family.   Patient and family verbalize understanding of the plan of care and contribute to goal setting.

## 2021-08-16 NOTE — PROGRESS NOTES
Madeleine [place] 60  INPATIENT OCCUPATIONAL THERAPY  [facility] RENAL TELEMETRY 6K  EVALUATION    Time:    Time In: 920  Time Out: 1011  Timed Code Treatment Minutes: 30 Minutes  Minutes: 51   -10 min d/t MD discussion with pt and pt's dtr       Date: 2021  Patient Name: Duke Arzate,   Gender: female      MRN: [id number]  : 1932  (80 y.o.)  Referring Practitioner: Jorge L Munoz MD  Diagnosis: Bradycardia  Additional Pertinent Hx: 27-year-old lady with past medical history described below who comes in after a provoked fall 6 days ago, patient took her walker got up to go to her closet so that she can dress for her supper however did take kraus turn leading to losing her balance and falling down and was not able to get back up. She has not lost her consciousness she did not have any dizziness or chest pain or shortness of breath or any prodromal symptoms. The nurse then came and found her since she did not come for the supper none informed family family has been noticing that she has been tired and weak the last few days and hence got her to the ER. .  Currently has pain all over her legs and the lower back-she did not hit her head-initial radiology as part of the trauma [id number] for any bleeds or breaks/fractures. Otherwise denies any nausea vomiting diarrhea shortness of breath or chest pain    Restrictions/Precautions:  Restrictions/Precautions: General Precautions, Fall Risk    Subjective  Chart Reviewed: Yes, Orders, Progress Notes  Patient assessed for rehabilitation services?: Yes  Family / Caregiver Present: Yes    Subjective: RN okayed session. Pt was resting in bed upon arrival, pleasant and cooperative. Pt motivated to walk to/from BR.     Pain:  Pain Assessment  Patient Currently in Pain: Denies    Vitals: Blood Pressure: Min c/o headache and dizziness with activity, BP at 144/68    Social/Functional History:  Lives With: Alone ([place])  Type of Home: Assisted living  Home Layout: One level  Home Access: Level entry  Home Equipment: Rolling walker   Bathroom Shower/Tub: Walk-in shower, Shower chair with back  Bathroom Toilet: Handicap height  Bathroom Equipment: Grab bars in shower, Grab bars around toilet  Bathroom Accessibility: Accessible  IADL Comments: Pt fixes breakfast, goes down for lunch and supper. Receives Help From:  (staff)  ADL Assistance: Needs assistance (A with showering 2x/wk)  Homemaking Assistance: Needs assistance  Ambulation Assistance: Independent  Transfer Assistance: Independent               VISION:Corrected    HEARING:  WFL    COGNITION: Decreased Insight, Decreased Problem Solving and Decreased Safety Awareness    RANGE OF MOTION:  Bilateral Upper Extremity:  WFL    STRENGTH:  Bilateral Upper Extremity:  minimal deconditioning noted bilaterally    SENSATION:   WFL    ADL:   Lower Extremity Dressing: Minimal Assistance. to thread BLE into briefs while seated on BSC  Toileting: Contact Guard Assistance. for standing balance during clothing mgmt  Toilet Transfer: Contact Guard Assistance. to/from [facility]. BALANCE:  Sitting Balance:  Supervision. seated on EOB  Standing Balance: Contact Guard Assistance. with BUE support on RW. pt stood x4 trials throughout session, tolerated <3 min each d/t calf tightness, quickly resolved after stretching legs    BED MOBILITY:  Supine to Sit: Minimal Assistance with increased time  Scooting: Stand By Assistance      TRANSFERS:  Sit to Stand:  Contact Guard Assistance. from EOB, BSC, and recliner. Good safety noted  Stand to Sit: Contact Guard Assistance. to EOB, BSC, and recliner, min VC for body placement    FUNCTIONAL MOBILITY:  Assistive Device: Rolling Walker  Assist Level:  Contact Guard Assistance. Distance: from EOB, around bed to recliner and from recliner x5' to [facility]  Pt completed at a very slow pace, minimal instability, no LOB. Activity Tolerance:  Patient tolerance of  treatment: good. Assessment:  Assessment: Pt presented with the listed deficits s/p admission with bradycardia. Pt with decreased strength, endurance, and activity tolerance and currently requires CGA for safety and fatigues very quickly with ADLs/IADLs. Pt would benefit from continued OT to restore PLOF maximize pt's indep with ADLs and IADLs in prep for a safe return home at max level of indep. Performance deficits / Impairments: Decreased functional mobility , Decreased balance, Decreased safe awareness, Decreased ADL status, Decreased posture, Decreased endurance, Decreased high-level IADLs, Decreased strength  Prognosis: Good  REQUIRES OT FOLLOW UP: Yes  Decision Making: Medium Complexity  Safety Devices in place: Yes  Type of devices: All fall risk precautions in place, Call light within reach, Chair alarm in place, Left in chair    Treatment Initiated: Treatment and education initiated within context of evaluation. Evaluation time included review of current medical information, gathering information related to past medical, social and functional history, completion of standardized testing, formal and informal observation of tasks, assessment of data and development of plan of care and goals. Treatment time included skilled education and facilitation of tasks to increase safety and independence with ADL's for improved functional independence and quality of life.     Discharge Recommendations:  Patient would benefit from continued therapy after discharge (Pt would benefit from an inpatient therapy stay prior to return to Assisted Living)    Patient Education:  OT Education: OT Role, Plan of Care, ADL Adaptive Strategies, Transfer Training, Energy Conservation, IADL Safety    Equipment Recommendations:  Equipment Needed: No    Plan:  Times per week: 5x  Times per day: Daily  Current Treatment Recommendations: Strengthening, Balance Training, Functional Mobility Training, Endurance Training, Safety Education & Training,

## 2021-08-16 NOTE — PROGRESS NOTES
I have examined the patient independently  Discussed physical findings and the history with the resident  Medical condition the  and case management as well  Patient has been accepted into rehab and the patient and the family is agreeable for transfer will discharge in the morning pending PT evaluation    Patient - Sharon Andres   MRN -  [id number]   [place] # - [de-identified]   - 1932      Date of Admission -  2021  8:32 AM  Date of evaluation -  2021  Room - [address] [place] [address] Day - 4  Primary Care Physician - Fam Tang MD   Code Status: DNR-CCA    Chief Complaint:    Multiple falls  History Of Presenting Illness:   Sharon Andres is a 80 y.o. female with a past medical history of Parkinson's, CAD s/p CABG, DM2, GERD, HLD, HTN, SIADH, restless leg syndrome who presented to [facility] ED for multiple falls, which is-reportedly getting worse per daughter. Patient uses a walker and lives by herself in an assisted living facility. It is reported that the patient did not lose consciousness after the fall. Imaging work-up in the ED was unremarkable except for bradycardia and hyponatremia. As a result of the fall patient has pain all over her legs and the lower back. Trauma work-up negative for any bleeds or fractures. ROS Positives: +Weakness, +Tremor. Pin-rolling tremor and shuffling gait appreciated due to chronic Parkinson's. Last 24 Hours:    Patient laying in bed comfortably this morning, reporting no complaints or concerns. Continues to be weak, having difficulty to stand up and walk to the commode. Daughter at bedside. Bradycardia seemingly resolved once Lopressor dosage reduced. Patient will need to continue with PT/OT to regain her strength.    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  Medications    Current Medications    amLODIPine  10 mg Oral Daily    furosemide  10 mg Oral Daily    spironolactone  25 mg Oral Daily    aspirin  81 mg Oral Daily    amantadine  100 mg Oral Daily    carbidopa-levodopa  2 tablet Oral 4x Daily    apixaban  2.5 mg Oral BID    atorvastatin  20 mg Oral Daily    isosorbide dinitrate  20 mg Oral TID    pantoprazole  40 mg Oral BID AC    sucralfate  1 g Oral 4x Daily AC & HS    sodium chloride  1 g Oral Daily    insulin lispro  0-12 Units Subcutaneous TID WC    insulin lispro  0-6 Units Subcutaneous Nightly     acetaminophen, hydrALAZINE, glucose, dextrose, glucagon (rDNA), dextrose  IV Drips/Infusions   dextrose       Diet    ADULT DIET; Regular; 4 carb choices (60 gm/meal)  Allergies    Latex, Lisinopril, Tape [adhesive tape], Bacitracin, Penicillins, Polymyxin b, Keflex [cephalexin], Lactulose, Morphine, and Polysporin [bacitracin-polymyxin b]    Vitals     height is 5' 2\" (1.575 m) and weight is 115 lb 8 oz (52.4 kg). Her oral temperature is 98.1 °F (36.7 °C). Her blood pressure is 119/57 (abnormal) and her pulse is 75. Her respiration is 18 and oxygen saturation is 94%. Body mass index is 21.13 kg/m². Input/Output       Intake/Output Summary (Last 24 hours) at 8/16/2021 1447  Last data filed at 8/16/2021 0405  Gross per 24 hour   Intake 200 ml   Output 750 ml   Net -550 ml     I/O last 3 completed shifts: In: 200 [P.O.:200]  Out: 750 [Urine:750]   Patient Vitals for the past 96 hrs (Last 3 readings):   Weight   08/16/21 0330 115 lb 8 oz (52.4 kg)   08/15/21 0315 119 lb 12.8 oz (54.3 kg)   08/14/21 0333 119 lb 9.6 oz (54.3 kg)     Physical Exam     GENERAL APPEARANCE: Elderly, frail female sitting upright in bed, alert & cooperative, and appears to be in NAD  EYES: PERRL, EOMI, no conjunctivitis, no erythema, no discharge. HENT: normocephalic head, hearing grossly intact, no nasal discharge, oral cavity & pharynx free of inflammation, swelling, exudate, or lesions. Neck supple, non-tender without lymphadenopathy, masses or thyromegaly.   CARDIAC: RRR, normal S1 and S2. No S3, S4, no m/r/g, no peripheral edema, cyanosis or pallor. Extremities warm & well perfused. Capillary refill < 2 seconds. No carotid bruits. LUNGS: CTA b/l, no rales, rhonchi, wheezing or diminished breath sounds, non-pursing lips, no cyanosis  ABDOMEN: NABS, soft, nondistended, nontender, without  guarding or rebound, no masses, organomegaly noted   MUSKULOSKELETAL: No weakness. Strength diminished, 4/5 in all extremities, ROM symmetric and intant, no joint erythema or tenderness. Atrophic muscular development, shuffling gait. EXTREMITIES: No significant deformity or joint abnormality, no edema, peripheral pulses intact 2/4, no varicosities. NEUROLOGICAL: CN II-XII intact, strength and sensation symmetric and intact throughout. Cerebellar function intact. Severe shuffling gait and pill-rolling tremor appreciated  SKIN: Skin normal color, texture and turgor with no lesions or eruptions, purpuritic flat ecchymotic and non-blanching senile purpura throughout extremities   PSYCHIATRIC: AOx3, able to demonstrate good judgement & reason    Labs   ABG  Lab Results   Component Value Date    PH 7.39 02/27/2021    PH 5.5 09/21/2018    PO2 69 02/27/2021    PCO2 38 02/27/2021    HCO3 23 02/27/2021    O2SAT 93 02/27/2021     No results found for: IFIO2, MODE, SETTIDVOL, SETPEEP  CBC  No results for input(s): WBC, RBC, HGB, HCT, MCV, MCH, MCHC, RDW, PLT, MPV in the last 72 hours. BMP  Recent Labs     08/14/21  0836 08/14/21  0836 08/14/21  1648 08/15/21  0450 08/16/21  0650   *   < > 128* 138 131*   K 4.2  --  4.2 4.3 4.4   CL 90*   < > 92* 104 98   CO2 22*   < > 22* 23 21*   BUN 11  --   --  10 12   CREATININE 0.6  --   --  0.5 0.5   GLUCOSE 262*  --   --  179* 174*   MG 1.3*  --   --   --   --    CALCIUM 9.6  --   --  9.6 9.5    < > = values in this interval not displayed. LFT  No results for input(s): AST, ALT, ALB, BILITOT, ALKPHOS, LIPASE in the last 72 hours.     Invalid input(s): AMYLASE  TROP  Lab Results   Component Value Date    TROPONINT < 0.010 08/13/2021    TROPONINT < 0.010 02/27/2021    TROPONINT < 0.010 05/16/2020     BNP  No results for input(s): BNP in the last 72 hours. Lactic Acid  No results for input(s): LACTA in the last 72 hours. INR  No results for input(s): INR, PROTIME in the last 72 hours. PTT  No results for input(s): APTT in the last 72 hours. Glucose  Recent Labs     08/15/21  2012 08/16/21  0628 08/16/21  1058   POCGLU 166* 157* 222*     UA No results for input(s): SPECGRAV, PHUR, COLORU, CLARITYU, MUCUS, PROTEINU, BLOODU, RBCUA, WBCUA, BACTERIA, NITRU, GLUCOSEU, BILIRUBINUR, UROBILINOGEN, KETUA, LABCAST, LABCASTTY, AMORPHOS in the last 72 hours. Invalid input(s): CRYSTALS. Problem List      Active Hospital Problems    Diagnosis Date Noted    A-fib [facility]) [I48.91] 08/16/2021    Falls frequently [R29.6] 08/13/2021      Assessment & Plan:   1. MULTIPLE FALLS: Daughter reports patient has fallen several times in the last few months. She lives by herself with her walker. Worsening Parkinson's with shuffling gait. Imaging work-up including CT head unremarkable for any trauma, fractures, bleed. -Tylenol for pain as needed    - Continue PT/OT to regain strength   2. BRADYCARDIA: RESOLVED. Likely due to metoprolol dosage. Resolved after adjustment. - We will use Norvasc, hydralazine for alternative medications BP control  3. HYPONATREMIA: 130 today, chronic. Likely due to his SIADH. Patient takes salt tabs at home. Will continue   - Salt tabs prn with meals   - Lasix 20 mg PO daily   4. ATRIAL FIBRILLATION: Chronically on Eliquis. CHADS-VASc of 5. Follows with Dr. Lasha Abel outpatient   5. PARKINSON'S: Worsening with time. Continue home amantadine and carbidopa levodopa  6. CAD S/P CABG: Continue home aspirin and Eliquis  7. DM2: Well-controlled on sliding scale insulin  8. GERD: Stable on Protonix 40 mg PO daily     9.  HLD: Continue home Lipitor 20 mg PO daily 10. HTN: Better controlled today.  Continue Norvasc 10 mg daily PO, hydralazine 20mg q6    Diet: Regular Adult   Code Status: DNR-CCA  PT/OT Eval Status: Active      Electronically signed by   Talon Ryan DO on 8/16/2021 at 2:47 PM

## 2021-08-16 NOTE — DISCHARGE INSTR - COC
Continuity of Care Form    Patient Name: Nola Herrera   :  1932  MRN:  [id number]    Admit date:  2021  Discharge date:  21    Code Status Order: DNR-CCA   Advance Directives:      Admitting Physician:  Madhuri Alberto MD  PCP: Aiden Galeano MD    Discharging Nurse:   [address] Unit/Room#: 6K-21/021-A  Discharging Unit Phone Number:     Emergency Contact:   Extended Emergency Contact Information  Primary Emergency Contact: Diego Pena [place] of [address] [facility] Phone: [phone]  Work Phone: [phone]  Mobile Phone: [phone]  Relation: Child  Secondary Emergency Contact: Macy Valdez [facility] [address] [facility] Phone: [phone]  Mobile Phone: [phone]  Relation: Child    Past Surgical History:  Past Surgical History:   Procedure Laterality Date    CARDIAC SURGERY      CARDIOVASCULAR STRESS TEST  2009    Gated SPECT images revealed normal global wall motion with EF of 60%. Persantine test associated w/nonspecific symptoms. EKG is nondiagnostic w/baseline LBBB. No obvious stress-induced ischemia by Cardiolite. EF within normal limits. CARDIOVASCULAR STRESS TEST  7 10 2007    The gated SPECT images revealed normal wall motion with EF of 69%. Poor exercise tolerance w/SOB on exertion. EKG is nondiagnostic. Cardiolite scan revealing no obvious stress-induced ischemia. EF 60%. CARDIOVASCULAR STRESS TEST  2006    Fair exercise tolerance w/SOB on exertion. No EKG evidence of ischemia. Patient should be able to proceed with phase II cardiac rehab. CATARACT REMOVAL      CATARACT REMOVAL  2016    AND 16    DIAGNOSTIC CARDIAC CATH LAB PROCEDURE  2005    LV was obtained. AGEE projection showed preserved LV function w/estimated EF at 55%. No significant MR. Patent left main coronary artery. Tortuous LAD which appeared to be patent. Patent left circumflex artery.  High-grade stenosis around 99% in very large dominant RCA w/heavy calcification at the ostium. Normal LV function. DOPPLER ECHOCARDIOGRAPHY  4 09 2009    Global LV function w/in lower limits of normal, with mild anteroseptal hypokinesis. EF of 50-55%. Light LVH. Mild to moderate left atrial enlargement. Calcific aortic and mitral valve w/no obvious stenosis. No obvious pericardial effusion. DOPPLER ECHOCARDIOGRAPHY  7 10 2007    LV function w/in lower limits of normal w/peridoxical septal wall motion. Moderate left atrial enlargement. Mild MR. Mild TR. Calcified valves w/no obvious stenosis. No pericardial effusion. DOPPLER ECHOCARDIOGRAPHY  4 14 2006    There appears to be significant improvement from previous echo w/complete resolution of pericardial effusion and normal LV function. EF of 60%. DOPPLER ECHOCARDIOGRAPHY  3 21 2006    Normal LV function. Mild LV hypertrophy. Mild biatrial enlargement. Mildly calcific aortic and mitral valve w/no stenosis. Mild MR. Mild TR. Minimal residual pericardial effusion w/significant improvement from previous echo. DOPPLER ECHOCARDIOGRAPHY  3 16 2006    Interval change from previous echocardiogram w/worsening of effusion. Correlation clinically. Consideration of pericardial centesis. Will obtain a [facility] consult. DOPPLER ECHOCARDIOGRAPHY  1 31 2006    No significant change from previous echo. The 2D echo showed moderate degree of pericardial effusion. It does seem to be worst or better than previous echo. No evidence of tamponade. DOPPLER ECHOCARDIOGRAPHY  1 27 2006    Normal global LV function. Mild biatrial enlargement. Mildly sclerotic aortic & mitral valve w/no stenosis. Mild MR. Mild TR. Small to moderate pericardial effusion w/no obvious tamponade.      JOINT REPLACEMENT      MOHS SURGERY Right 2/13/2019    MOHS DEFECT REPAIR CYSTIC SCC RIGHT LOWER LATERAL LEG WITH SKIN GRAFT FROM RIGHT GROIN (FULL THICKNESS ) performed by Lindsay Aranda MD at [address] [place] [address] / [id number] [facility] EXCISION Left 12/20/13    left leg lesion excision x2, frozen section, skin graft closure    ROTATOR CUFF REPAIR  09/2001    RIGHT    ROTATOR CUFF REPAIR  04/15/2009    LEFT     SKIN BIOPSY  07/2021    face    SKIN CANCER EXCISION  06/2006      Rt leg    SPINAL CORD STIMULATOR IMPLANTATION N/A 12/4/2018    PERMANENT INTERSTIM performed by Julissa Louise MD at [facility]       Immunization History:   Immunization History   Administered Date(s) Administered    COVID-19, Pfizer, PF, 30mcg/0.3mL 01/04/2021, 01/25/2021    Influenza Virus Vaccine 10/01/2016, 11/30/2016    Influenza, High Dose (Fluzone 65 yrs and older) 09/23/2015, 10/23/2017, 09/05/2018    Influenza, Triv, inactivated, subunit, adjuvanted, IM (Fluad 65 yrs and older) 09/28/2016    PPD Test 10/19/2018, 10/28/2018    Pneumococcal Conjugate 13-valent (Nolon Damon) 05/09/2017    Pneumococcal Polysaccharide ([id number]) 01/01/2004    Zoster Live (Zostavax) 04/20/2013       Active Problems:  Patient Active Problem List   Diagnosis Code    CAD (coronary artery disease) I25.10    S/P CABG (coronary artery bypass graft) Z95.1    Hyperlipidemia E78.5    Parkinson's disease ([facility] [address] G20    Chronic LBBB (left bundle branch block) I44.7    History of skin cancer Z85.828    Imbalance R26.89    Atypical chest pain R07.89    Hypercholesterolemia E78.00    Diabetes mellitus, type II (HCC) E11.9    Esophageal stricture K22.2    FH: CAD (coronary artery disease) Z82.49    History of gastritis Z87.19    Lactose intolerance E73.9    Hyponatremia E87.1    Abdominal pain R10.9    Melanotic stools K92.1    Recurrent UTI N39.0    Urinary tract infection without hematuria N39.0    Urgency-frequency syndrome N32.81    Atrial fibrillation with RVR ([facility]) I48.91    Pyelonephritis N12    Paroxysmal atrial fibrillation ([facility] [address] I48.0    Sepsis due to Escherichia coli ([facility]) A41.51    Physical deconditioning R53.81    Cervical spinal stenosis M48.02    Cervical arthritis M47.812 Arteriosclerosis I70.90    Osteopenia M85.80    Mixed incontinence urge and stress N39.46    Overactive bladder N32.81    Acute on chronic systolic congestive heart failure (HCC) I50.23    CHF (congestive heart failure), NYHA class II, chronic, diastolic ([facility]) G26.08    Pleural effusion J90    Uncontrolled hypertension I10    History of coronary artery bypass graft x 1 Z95.1    Chronic atrial fibrillation ([facility]) I48.20    Pulmonary hypertension, moderate to severe ([facility]) I27.20    SOB (shortness of breath) R06.02    Moderate malnutrition ([facility]) E44.0    Edema of left lower leg due to peripheral venous insufficiency I87.2, R60.0    Traumatic open wound of left lower leg with delayed healing S81.802D    Symptomatic sinus bradycardia R00.1    Bilateral pleural effusion J90    Non-intractable vomiting with nausea R11.2    Severe malnutrition ([address] E43    Falls frequently R29.6    A-fib ([facility]) I48.91       Isolation/Infection:   Isolation            No Isolation          Patient Infection Status       Infection Onset Added Last Indicated Last Indicated By Review Planned Expiration Resolved Resolved By    None active    Resolved    C-diff Rule Out 02/28/21 02/28/21 02/28/21 Gastrointestinal Panel, Molecular (Ordered)   08/13/21 Reford Leanne Rivera RN    COVID-19 Rule Out 05/26/20 05/26/20 05/27/20 COVID-19 (Ordered)   05/27/20 Rule-Out Test Resulted            Nurse Assessment:  Last Vital Signs: BP (!) 119/57   Pulse 75   Temp 98.1 °F (36.7 °C) (Oral)   Resp 18   Ht 5' 2\" (1.575 m)   Wt 115 lb 8 oz (52.4 kg)   SpO2 94%   BMI 21.13 kg/m²     Last documented pain score (0-10 scale): Pain Level: 1  Last Weight:   Wt Readings from Last 1 Encounters:   08/16/21 115 lb 8 oz (52.4 kg)     Mental Status:  oriented and alert    IV Access:  - None    Nursing Mobility/ADLs:  Walking   Assisted for safety  Transfer  Assisted for safety  Bathing  Assisted for safety  Dressing  Assisted for safety  Toileting Independent  Feeding  Independent  Med Admin  Assisted  Med Delivery   whole and prefers mixed with applesauce    Wound Care Documentation and Therapy:        Elimination:  Continence: Bowel: No  Bladder: Yes  Urinary Catheter: None   Colostomy/Ileostomy/Ileal Conduit: No       Date of Last BM: 8/17/21    Intake/Output Summary (Last 24 hours) at 8/16/2021 1405  Last data filed at 8/16/2021 0405  Gross per 24 hour   Intake 200 ml   Output 750 ml   Net -550 ml     I/O last 3 completed shifts: In: 200 [P.O.:200]  Out: 750 [Urine:750]    Safety Concerns:     History of Falls (last 30 days) and At Risk for Falls    Impairments/Disabilities:      Hearing    Nutrition Therapy:  Current Nutrition Therapy:   - Oral Diet:  Carb Control 4 carbs/meal (1800kcals/day)    Routes of Feeding: Oral  Liquids: Thin Liquids  Daily Fluid Restriction: no  Last Modified Barium Swallow with Video (Video Swallowing Test): not done    Treatments at the Time of Hospital Discharge:   Respiratory Treatments: NA  Oxygen Therapy:  is not on home oxygen therapy.   Ventilator:    - No ventilator support    Rehab Therapies: Physical Therapy and Occupational Therapy  Weight Bearing Status/Restrictions: No weight bearing restirctions  Other Medical Equipment (for information only, NOT a DME order):  walker  Other Treatments: NA    Patient's personal belongings (please select all that are sent with patient):  Glasses, clothing, cell phone, purse    RN SIGNATURE:  Electronically signed by Charissa Harry RN on 8/17/21 at 11:08 AM EDT    CASE MANAGEMENT/SOCIAL WORK SECTION    Inpatient Status Date: 8/15/21    Readmission Risk Assessment Score:  Readmission Risk              Risk of Unplanned Readmission:  19           Discharging to Facility/ Agency   Name: [facility]  Address:    [address] [place] [id number]         Phone: [phone]       Fax: [phone]            Dialysis Facility (if applicable)   Name:  Address:  Dialysis Schedule:  Phone:  Fax:    / signature: Electronically signed by MOO Hines, NEYW on 8/16/21 at 2:06 PM EDT    PHYSICIAN SECTION    Prognosis: Good    Condition at Discharge: Stable    Rehab Potential (if transferring to Rehab): Fair    Recommended Labs or Other Treatments After Discharge: BMP, Magnesium    Physician Certification: I certify the above information and transfer of Nola Herrera  is necessary for the continuing treatment of the diagnosis listed and that she requires [facility] for less than 30 days.      Update Admission H&P: No change in H&P    PHYSICIAN SIGNATURE:  Electronically signed by Jose Juan Hernandez DO on 8/18/21 at 1:40 PM EDT

## 2021-08-16 NOTE — CARE COORDINATION
8/16/21, 9:57 AM EDT    DISCHARGE PLANNING EVALUATION    JOAO spoke with Patient's son Jackie Graham in regards to a rehab stay at discharge. Jackie Graham stated that he would like to speak with his sister and would contact JOAO back regarding AL vs ECF for rehab. JOAO spoke with Dr. Wilmer Mendes and he feels that rehab would be appropriate for Patient. JOAO spoke with Patient and daughter and they are agreeable to [facility] for rehab at discharge. JOAO updated Dr. Wilmer Mendes and he plans for discharge tomorrow. JOAO spoke with Antonio Arias at [facility] and they are able to accept Patient for rehab at discharge.

## 2021-08-16 NOTE — PLAN OF CARE
Problem: Falls - Risk of:  Goal: Will remain free from falls  Description: Will remain free from falls  Outcome: Ongoing   No falls noted this shift. Continue falling star program. Bed alarm on, bed in low position. Call light and personal belongings in reach. Patient uses call light appropriately. Problem: Falls - Risk of:  Goal: Absence of physical injury  Description: Absence of physical injury  Outcome: Ongoing     Problem: Skin Integrity:  Goal: Will show no infection signs and symptoms  Description: Will show no infection signs and symptoms  Outcome: Ongoing     Problem: Skin Integrity:  Goal: Absence of new skin breakdown  Description: Absence of new skin breakdown  Outcome: Ongoing  Patient free from new skin breakdown. Patient turns self and makes frequent positional changes. Will continue to monitor. Problem: Discharge Planning:  Goal: Discharged to appropriate level of care  Description: Discharged to appropriate level of care  Outcome: Ongoing  Patient plans to be discharged to assisted living when medically stable. Problem: Activity:  Goal: Capacity to carry out activities will improve  Description: Capacity to carry out activities will improve  Outcome: Ongoing     Problem: Activity:  Goal: Fatigue will decrease  Description: Fatigue will decrease  Outcome: Ongoing     Problem: Activity:  Goal: Energy level will increase  Description: Energy level will increase  Outcome: Ongoing     Problem:  Activity:  Goal: Ability to implement measures to reduce episodes of fatigue will improve  Description: Ability to implement measures to reduce episodes of fatigue will improve  Outcome: Ongoing     Problem: Serum Glucose Level - Abnormal:  Goal: Ability to maintain appropriate glucose levels will improve  Description: Ability to maintain appropriate glucose levels will improve  Outcome: Ongoing     Problem: Pain:  Goal: Pain level will decrease  Description: Pain level will decrease  Outcome: Ongoing  Patient free from pain this shift. Pain rated on 0-10 pain rating scale. Will continue to reassess.       Problem: Pain:  Goal: Control of acute pain  Description: Control of acute pain  Outcome: Ongoing     Problem: Pain:  Goal: Control of chronic pain  Description: Control of chronic pain  Outcome: Ongoing

## 2021-08-17 LAB
ANION GAP SERPL CALCULATED.3IONS-SCNC: 15 MEQ/L (ref 8–16)
BUN BLDV-MCNC: 16 MG/DL (ref 7–22)
CALCIUM SERPL-MCNC: 9.5 MG/DL (ref 8.5–10.5)
CHLORIDE BLD-SCNC: 96 MEQ/L (ref 98–111)
CO2: 22 MEQ/L (ref 23–33)
CREAT SERPL-MCNC: 1 MG/DL (ref 0.4–1.2)
GFR SERPL CREATININE-BSD FRML MDRD: 52 ML/MIN/1.73M2
GLUCOSE BLD-MCNC: 135 MG/DL (ref 70–108)
GLUCOSE BLD-MCNC: 138 MG/DL (ref 70–108)
GLUCOSE BLD-MCNC: 166 MG/DL (ref 70–108)
GLUCOSE BLD-MCNC: 192 MG/DL (ref 70–108)
GLUCOSE BLD-MCNC: 270 MG/DL (ref 70–108)
MAGNESIUM: 1.5 MG/DL (ref 1.6–2.4)
POTASSIUM SERPL-SCNC: 4.1 MEQ/L (ref 3.5–5.2)
SARS-COV-2, NAAT: NOT DETECTED
SODIUM BLD-SCNC: 133 MEQ/L (ref 135–145)
T4 FREE: 1.28 NG/DL (ref 0.93–1.76)
TSH SERPL DL<=0.05 MIU/L-ACNC: 5.47 UIU/ML (ref 0.4–4.2)

## 2021-08-17 PROCEDURE — 83735 ASSAY OF MAGNESIUM: CPT

## 2021-08-17 PROCEDURE — 84439 ASSAY OF FREE THYROXINE: CPT

## 2021-08-17 PROCEDURE — 6370000000 HC RX 637 (ALT 250 FOR IP): Performed by: INTERNAL MEDICINE

## 2021-08-17 PROCEDURE — 80048 BASIC METABOLIC PNL TOTAL CA: CPT

## 2021-08-17 PROCEDURE — 84443 ASSAY THYROID STIM HORMONE: CPT

## 2021-08-17 PROCEDURE — 97110 THERAPEUTIC EXERCISES: CPT

## 2021-08-17 PROCEDURE — 96365 THER/PROPH/DIAG IV INF INIT: CPT

## 2021-08-17 PROCEDURE — 1200000000 HC SEMI PRIVATE

## 2021-08-17 PROCEDURE — 96366 THER/PROPH/DIAG IV INF ADDON: CPT

## 2021-08-17 PROCEDURE — 6360000002 HC RX W HCPCS: Performed by: INTERNAL MEDICINE

## 2021-08-17 PROCEDURE — 97530 THERAPEUTIC ACTIVITIES: CPT

## 2021-08-17 PROCEDURE — 36415 COLL VENOUS BLD VENIPUNCTURE: CPT

## 2021-08-17 PROCEDURE — 82948 REAGENT STRIP/BLOOD GLUCOSE: CPT

## 2021-08-17 PROCEDURE — 99233 SBSQ HOSP IP/OBS HIGH 50: CPT | Performed by: INTERNAL MEDICINE

## 2021-08-17 PROCEDURE — 87635 SARS-COV-2 COVID-19 AMP PRB: CPT

## 2021-08-17 RX ORDER — POLYETHYLENE GLYCOL 3350 17 G/17G
17 POWDER, FOR SOLUTION ORAL DAILY
Status: DISCONTINUED | OUTPATIENT
Start: 2021-08-17 | End: 2021-08-18 | Stop reason: HOSPADM

## 2021-08-17 RX ORDER — MAGNESIUM SULFATE IN WATER 40 MG/ML
2000 INJECTION, SOLUTION INTRAVENOUS ONCE
Status: COMPLETED | OUTPATIENT
Start: 2021-08-17 | End: 2021-08-17

## 2021-08-17 RX ORDER — SODIUM CHLORIDE 1000 MG
1 TABLET, SOLUBLE MISCELLANEOUS 2 TIMES DAILY WITH MEALS
Status: DISCONTINUED | OUTPATIENT
Start: 2021-08-17 | End: 2021-08-18

## 2021-08-17 RX ORDER — SENNA PLUS 8.6 MG/1
1 TABLET ORAL ONCE
Qty: 1 TABLET | Refills: 0 | Status: CANCELLED | OUTPATIENT
Start: 2021-08-17 | End: 2021-08-17

## 2021-08-17 RX ORDER — SENNA PLUS 8.6 MG/1
1 TABLET ORAL ONCE
Status: COMPLETED | OUTPATIENT
Start: 2021-08-17 | End: 2021-08-17

## 2021-08-17 RX ADMIN — SUCRALFATE 1 G: 1 TABLET ORAL at 16:16

## 2021-08-17 RX ADMIN — SUCRALFATE 1 G: 1 TABLET ORAL at 11:23

## 2021-08-17 RX ADMIN — AMANTADINE HYDROCHLORIDE 100 MG: 100 CAPSULE, GELATIN COATED ORAL at 09:13

## 2021-08-17 RX ADMIN — SENNOSIDES 8.6 MG: 8.6 TABLET, FILM COATED ORAL at 16:16

## 2021-08-17 RX ADMIN — AMLODIPINE BESYLATE 10 MG: 10 TABLET ORAL at 09:13

## 2021-08-17 RX ADMIN — APIXABAN 2.5 MG: 2.5 TABLET, FILM COATED ORAL at 20:40

## 2021-08-17 RX ADMIN — CARBIDOPA AND LEVODOPA 2 TABLET: 25; 100 TABLET ORAL at 16:16

## 2021-08-17 RX ADMIN — CARBIDOPA AND LEVODOPA 2 TABLET: 25; 100 TABLET ORAL at 12:48

## 2021-08-17 RX ADMIN — INSULIN LISPRO 1 UNITS: 100 INJECTION, SOLUTION INTRAVENOUS; SUBCUTANEOUS at 20:41

## 2021-08-17 RX ADMIN — PANTOPRAZOLE SODIUM 40 MG: 40 TABLET, DELAYED RELEASE ORAL at 06:21

## 2021-08-17 RX ADMIN — ISOSORBIDE DINITRATE 20 MG: 20 TABLET ORAL at 09:13

## 2021-08-17 RX ADMIN — POLYETHYLENE GLYCOL 3350 17 G: 17 POWDER, FOR SOLUTION ORAL at 12:48

## 2021-08-17 RX ADMIN — CARBIDOPA AND LEVODOPA 2 TABLET: 25; 100 TABLET ORAL at 20:37

## 2021-08-17 RX ADMIN — Medication 1 G: at 09:13

## 2021-08-17 RX ADMIN — MAGNESIUM SULFATE HEPTAHYDRATE 2000 MG: 40 INJECTION, SOLUTION INTRAVENOUS at 19:33

## 2021-08-17 RX ADMIN — ISOSORBIDE DINITRATE 20 MG: 20 TABLET ORAL at 14:49

## 2021-08-17 RX ADMIN — PANTOPRAZOLE SODIUM 40 MG: 40 TABLET, DELAYED RELEASE ORAL at 16:16

## 2021-08-17 RX ADMIN — ASPIRIN 81 MG: 81 TABLET, COATED ORAL at 09:14

## 2021-08-17 RX ADMIN — SUCRALFATE 1 G: 1 TABLET ORAL at 20:40

## 2021-08-17 RX ADMIN — CARBIDOPA AND LEVODOPA 2 TABLET: 25; 100 TABLET ORAL at 09:13

## 2021-08-17 RX ADMIN — ISOSORBIDE DINITRATE 20 MG: 20 TABLET ORAL at 20:37

## 2021-08-17 RX ADMIN — SPIRONOLACTONE 25 MG: 25 TABLET ORAL at 09:13

## 2021-08-17 RX ADMIN — APIXABAN 2.5 MG: 2.5 TABLET, FILM COATED ORAL at 09:13

## 2021-08-17 RX ADMIN — SUCRALFATE 1 G: 1 TABLET ORAL at 06:21

## 2021-08-17 RX ADMIN — FUROSEMIDE 10 MG: 20 TABLET ORAL at 09:14

## 2021-08-17 ASSESSMENT — PAIN SCALES - GENERAL
PAINLEVEL_OUTOF10: 0

## 2021-08-17 NOTE — PROGRESS NOTES
BED MOBILITY:  Supine to Sit: Stand By Assistance      TRANSFERS:  Sit to Stand:  Contact Guard Assistance. Stand to Sit: Contact Guard Assistance. FUNCTIONAL MOBILITY:  Assistive Device: Rolling Walker  Assist Level:  Contact Guard Assistance. Distance: short distance in hallway slow pace demo fatigue seated rest break required. ADDITIONAL ACTIVITIES:  Patient completed BUE strengthening exercises with skilled education on HEP: completed x10 reps x1 set with AROM in all joints and all planes in order to improve UE strength and activity tolerance required for BADL routine and toilet / shower transfers. Patient tolerated, requiring rest breaks. Patient also required verbal and visual cues for technique. ASSESSMENT:     Activity Tolerance:  Patient tolerance of  treatment: fair. Discharge Recommendations: Patient would benefit from continued therapy after discharge (Pt would benefit from an inpatient therapy stay prior to return to Assisted Living)   Equipment Recommendations: Equipment Needed: No  Plan: Times per week: 5x  Times per day: Daily  Current Treatment Recommendations: Strengthening, Balance Training, Functional Mobility Training, Endurance Training, Safety Education & Training, Self-Care / ADL, Patient/Caregiver Education & Training    Patient Education  Patient Education: Home Exercise Program, Importance of Increasing Activity and Assistive Device Safety and safety with functional mobility and transfers.     Goals  Short term goals  Time Frame for Short term goals: by discharge  Short term goal 1: Pt will complete functional mobility to/from BR with SBA and no LOB to increase indep with ADL routine  Short term goal 2: Pt will tolerate dynamic standing x5 min with SBA and 1-2 hand release to increase indep with grooming  Short term goal 3: Pt will complete BADL routine with no > than SBA to increase ease with dressing  Short term goal 4: Pt will complete BUE strengthening HEP with mod resistance to increase ease with t/fs    Following session, patient left in safe position with all fall risk precautions in place.

## 2021-08-17 NOTE — PLAN OF CARE
Problem: Falls - Risk of:  Goal: Will remain free from falls  Description: Will remain free from falls  8/17/2021 0124 by Edyta Le RN  Outcome: Ongoing  Call light within reach. Side rails up x2. Bed alarm on. Non skid slippers available. Problem: Skin Integrity:  Goal: Absence of new skin breakdown  Description: Absence of new skin breakdown  8/17/2021 0124 by Edyta Le RN  Outcome: Ongoing  Patient free from skin breakdown. Patient turns self and makes frequent positional changes. Will continue to monitor. Problem: Discharge Planning:  Goal: Discharged to appropriate level of care  Description: Discharged to appropriate level of care  8/17/2021 0124 by Edyta Le RN  Outcome: Ongoing  Patient plans to be discharged to [facility] when medically stable. Social work following. Problem: Serum Glucose Level - Abnormal:  Goal: Ability to maintain appropriate glucose levels will improve  Description: Ability to maintain appropriate glucose levels will improve  8/17/2021 0124 by Edyta Le RN  Outcome: Ongoing  Chem AC/HS, coverage per sliding scale     Problem: Pain:  Goal: Pain level will decrease  Description: Pain level will decrease  8/17/2021 0124 by Edyta Le RN  Outcome: Ongoing  Patient free from pain this shift. Pain rated on 0-10 pain rating scale. Pain goal 3/10. Will continue to reassess. Care plan reviewed with patient. Patient verbalize understanding of the plan of care and contribute to goal setting.

## 2021-08-17 NOTE — FLOWSHEET NOTE
Paged Dr. Noble Sit about pt's constipation since Friday. Pt normally takes psyllium daily prn at home. Awaiting response.   Senia Wild RN

## 2021-08-17 NOTE — PROGRESS NOTES
INPATIENT HOSPITALIST PROGRESS NOTE    Patient - Enrike Lynn   MRN -  [id number]   [facility]   - 1932      Date of Admission -  2021  8:32 AM  Date of evaluation -  2021  Room - [address] [place] [address] Day - 5  Primary Care Physician - Genny Cano MD   Code Status: DNR-CCA     Chief Complaint:    Multiple falls  History Of Presenting Illness:   Enrike Lynn is a 80 y.o. female with a past medical history of Parkinson's, CAD s/p CABG, DM2, GERD, HLD, HTN, SIADH, restless leg syndrome who presented to [facility] ED for multiple falls, which is-reportedly getting worse per daughter. Patient uses a walker and lives by herself in an assisted living facility. It is reported that the patient did not lose consciousness after the fall. Imaging work-up in the ED was unremarkable except for bradycardia and hyponatremia. As a result of the fall patient has pain all over her legs and the lower back. Trauma work-up negative for any bleeds or fractures. ROS Positives: +Weakness, +Tremor. Pin-rolling tremor and shuffling gait appreciated due to chronic Parkinson's. Last 24 Hours:    Patient laying in bed comfortably this morning, reporting no complaints or concerns. Strength improving, able to sit up right and eat breakfast. Has not had bowel movement in 4 days, will order her laxatives.  May be discharged today or tomorrow    - Net I/O -3.2 L   - Blood pressure doing better, 126/59 today   - Sodium 133, chronically hyponatremic due to SIADH but much improved  Medications    Current Medications    polyethylene glycol  17 g Oral Daily    magnesium sulfate  2,000 mg Intravenous Once    amLODIPine  10 mg Oral Daily    furosemide  10 mg Oral Daily    spironolactone  25 mg Oral Daily    aspirin  81 mg Oral Daily    amantadine  100 mg Oral Daily    carbidopa-levodopa  2 tablet Oral 4x Daily    apixaban  2.5 mg Oral BID    atorvastatin  20 mg Oral Daily    isosorbide dinitrate  20 mg Oral TID  pantoprazole  40 mg Oral BID AC    sucralfate  1 g Oral 4x Daily AC & HS    sodium chloride  1 g Oral Daily    insulin lispro  0-12 Units Subcutaneous TID WC    insulin lispro  0-6 Units Subcutaneous Nightly     acetaminophen, hydrALAZINE, glucose, dextrose, glucagon (rDNA), dextrose  IV Drips/Infusions   dextrose       Diet    ADULT DIET; Regular; 4 carb choices (60 gm/meal)  Allergies    Latex, Lisinopril, Tape [adhesive tape], Bacitracin, Penicillins, Polymyxin b, Keflex [cephalexin], Lactulose, Morphine, and Polysporin [bacitracin-polymyxin b]    Vitals     height is 5' 2\" (1.575 m) and weight is 116 lb (52.6 kg). Her oral temperature is 98 °F (36.7 °C). Her blood pressure is 126/59 (abnormal) and her pulse is 65. Her respiration is 18 and oxygen saturation is 97%. Body mass index is 21.22 kg/m². Input/Output     Intake/Output Summary (Last 24 hours) at 8/17/2021 1837  Last data filed at 8/17/2021 1723  Gross per 24 hour   Intake 420 ml   Output 400 ml   Net 20 ml     I/O last 3 completed shifts: In: 5 [P.O.:420]  Out: 400 [Urine:400]   Patient Vitals for the past 96 hrs (Last 3 readings):   Weight   08/17/21 0347 116 lb (52.6 kg)   08/16/21 0330 115 lb 8 oz (52.4 kg)   08/15/21 0315 119 lb 12.8 oz (54.3 kg)     Physical Exam   GENERAL APPEARANCE: Elderly, frail female sitting upright in bed, alert & cooperative, and appears to be in NAD  EYES: PERRL, EOMI, no conjunctivitis, no erythema, no discharge. HENT: normocephalic head, hearing grossly intact, no nasal discharge, oral cavity & pharynx free of inflammation, swelling, exudate, or lesions. Neck supple, non-tender without lymphadenopathy, masses or thyromegaly. CARDIAC: RRR, normal S1 and S2. No S3, S4, no m/r/g, no peripheral edema, cyanosis or pallor. Extremities warm & well perfused. Capillary refill < 2 seconds. No carotid bruits.   LUNGS: CTA b/l, no rales, rhonchi, wheezing or diminished breath sounds, non-pursing lips, no cyanosis  ABDOMEN: NABS, soft, nondistended, nontender, without  guarding or rebound, no masses, organomegaly noted   MUSKULOSKELETAL: No weakness. Strength diminished, 4/5 but slightly improved in all extremities, ROM symmetric and intant, no joint erythema or tenderness. Atrophic muscular development, shuffling gait. EXTREMITIES: No significant deformity or joint abnormality, no edema, peripheral pulses intact 2/4, no varicosities. NEUROLOGICAL: CN II-XII intact, strength and sensation symmetric and intact throughout. Cerebellar function intact. Severe shuffling gait and pill-rolling tremor appreciated  SKIN: Skin normal color, texture and turgor with no lesions or eruptions, purpuritic flat ecchymotic and non-blanching senile purpura throughout extremities   PSYCHIATRIC: AOx3, able to demonstrate good judgement & reason    Labs   ABG  Lab Results   Component Value Date    PH 7.39 02/27/2021    PH 5.5 09/21/2018    PO2 69 02/27/2021    PCO2 38 02/27/2021    HCO3 23 02/27/2021    O2SAT 93 02/27/2021     No results found for: IFIO2, MODE, SETTIDVOL, SETPEEP  CBC  No results for input(s): WBC, RBC, HGB, HCT, MCV, MCH, MCHC, RDW, PLT, MPV in the last 72 hours. BMP  Recent Labs     08/15/21  0450 08/16/21  0650 08/17/21  1626    131* 133*   K 4.3 4.4 4.1    98 96*   CO2 23 21* 22*   BUN 10 12 16   CREATININE 0.5 0.5 1.0   GLUCOSE 179* 174* 135*   MG  --   --  1.5*   CALCIUM 9.6 9.5 9.5     LFT  No results for input(s): AST, ALT, ALB, BILITOT, ALKPHOS, LIPASE in the last 72 hours. Invalid input(s): AMYLASE  TROP  Lab Results   Component Value Date    TROPONINT < 0.010 08/13/2021    TROPONINT < 0.010 02/27/2021    TROPONINT < 0.010 05/16/2020     BNP  No results for input(s): BNP in the last 72 hours. Lactic Acid  No results for input(s): LACTA in the last 72 hours. INR  No results for input(s): INR, PROTIME in the last 72 hours. PTT  No results for input(s):  APTT in the last 72 hours.  Glucose  Recent Labs     08/17/21  0617 08/17/21  1109 08/17/21  1622   POCGLU 166* 270* 138*     UA No results for input(s): SPECGRAV, PHUR, COLORU, CLARITYU, MUCUS, PROTEINU, BLOODU, RBCUA, WBCUA, BACTERIA, NITRU, GLUCOSEU, BILIRUBINUR, UROBILINOGEN, KETUA, LABCAST, LABCASTTY, AMORPHOS in the last 72 hours. Invalid input(s): CRYSTALS. Problem List      Active Hospital Problems    Diagnosis Date Noted    A-fib [facility]) [I48.91] 08/16/2021    Falls frequently [R29.6] 08/13/2021      Assessment & Plan:   1. MULTIPLE FALLS: Daughter reports patient has fallen several times in the last few months. She lives by herself with her walker. Worsening Parkinson's with shuffling gait. Imaging work-up including CT head unremarkable for any trauma, fractures, bleed. -Tylenol for pain as needed    - Continue PT/OT to regain strength   2. BRADYCARDIA: RESOLVED. Likely due to metoprolol dosage. Resolved after adjustment. - Will use Norvasc, hydralazine for alternative medications BP control  3. HYPOTHYROIDISM: Subclinical, in setting of bradycardia, constipation. Elevated TSH of 5.47. Recommending outpatient follow-up if symptoms worsen. - Free T4 pending, will follow   4. HYPOMAGNESEMIA: New onset, 1.5 today. Will replete     - 2g IV Mg given today   5. HYPONATREMIA: Improved. 133 today, chronic. Likely due to his SIADH. Patient takes salt tabs at home. Will continue   - Salt tabs prn with meals   - Lasix 20 mg PO daily   6. ATRIAL FIBRILLATION: Chronically on Eliquis. CHADS-VASc of 5. Follows with Dr. Yohannes Brar outpatient   7. PARKINSON'S: Worsening with time. Continue home amantadine and carbidopa levodopa  8. CAD S/P CABG: Continue home aspirin and Eliquis  9. DM2: Well-controlled on sliding scale insulin  10. GERD: Stable on Protonix 40 mg PO daily     11. HLD: Continue home Lipitor 20 mg PO daily   12. HTN: Better controlled today.  Continue Norvasc 10 mg daily PO, hydralazine 20mg q6    Diet: Regular Adult Code Status: DNR-CCA  PT/OT Eval Status: Active      Electronically signed by   Lokesh Cooper DO on 8/17/2021 at 6:37 PM

## 2021-08-17 NOTE — CARE COORDINATION
8/17/21, 3:26 PM EDT    Patient goals/plan/ treatment preferences discussed by  and . Patient goals/plan/ treatment preferences reviewed with patient/ family. Patient/ family verbalize understanding of discharge plan and are in agreement with goal/plan/treatment preferences. Understanding was demonstrated using the teach back method. AVS provided by RN at time of discharge, which includes all necessary medical information pertaining to the patients current course of illness, treatment, post-discharge goals of care, and treatment preferences. IMM Letter  IMM Letter given to Patient/Family/Significant other/Guardian/POA/by[de-identified] staff  IMM Letter date given[de-identified] 08/16/21  IMM Letter time given[de-identified] 1789     Planning discharge today, JOAO notified Zaheer Avendano at [facility]. Blue packet on chart for nurse. JOAO spoke with nurse to remind her pt will need covid test prior to discharge. Family transporting pt.

## 2021-08-18 VITALS
DIASTOLIC BLOOD PRESSURE: 58 MMHG | WEIGHT: 113.6 LBS | SYSTOLIC BLOOD PRESSURE: 119 MMHG | BODY MASS INDEX: 20.91 KG/M2 | RESPIRATION RATE: 18 BRPM | TEMPERATURE: 97.9 F | HEART RATE: 61 BPM | HEIGHT: 62 IN | OXYGEN SATURATION: 97 %

## 2021-08-18 LAB
ANION GAP SERPL CALCULATED.3IONS-SCNC: 12 MEQ/L (ref 8–16)
BUN BLDV-MCNC: 16 MG/DL (ref 7–22)
CALCIUM SERPL-MCNC: 9.1 MG/DL (ref 8.5–10.5)
CHLORIDE BLD-SCNC: 95 MEQ/L (ref 98–111)
CO2: 22 MEQ/L (ref 23–33)
CREAT SERPL-MCNC: 0.6 MG/DL (ref 0.4–1.2)
GFR SERPL CREATININE-BSD FRML MDRD: > 90 ML/MIN/1.73M2
GLUCOSE BLD-MCNC: 179 MG/DL (ref 70–108)
GLUCOSE BLD-MCNC: 179 MG/DL (ref 70–108)
GLUCOSE BLD-MCNC: 232 MG/DL (ref 70–108)
MAGNESIUM: 1.8 MG/DL (ref 1.6–2.4)
POTASSIUM SERPL-SCNC: 4.8 MEQ/L (ref 3.5–5.2)
SODIUM BLD-SCNC: 129 MEQ/L (ref 135–145)
SODIUM BLD-SCNC: 129 MEQ/L (ref 135–145)

## 2021-08-18 PROCEDURE — 97530 THERAPEUTIC ACTIVITIES: CPT

## 2021-08-18 PROCEDURE — 82948 REAGENT STRIP/BLOOD GLUCOSE: CPT

## 2021-08-18 PROCEDURE — 97110 THERAPEUTIC EXERCISES: CPT

## 2021-08-18 PROCEDURE — 99239 HOSP IP/OBS DSCHRG MGMT >30: CPT | Performed by: INTERNAL MEDICINE

## 2021-08-18 PROCEDURE — 36415 COLL VENOUS BLD VENIPUNCTURE: CPT

## 2021-08-18 PROCEDURE — 83735 ASSAY OF MAGNESIUM: CPT

## 2021-08-18 PROCEDURE — 84295 ASSAY OF SERUM SODIUM: CPT

## 2021-08-18 PROCEDURE — 80048 BASIC METABOLIC PNL TOTAL CA: CPT

## 2021-08-18 PROCEDURE — 6370000000 HC RX 637 (ALT 250 FOR IP): Performed by: INTERNAL MEDICINE

## 2021-08-18 RX ORDER — AMLODIPINE BESYLATE 10 MG/1
10 TABLET ORAL DAILY
Qty: 30 TABLET | Refills: 3 | Status: SHIPPED | OUTPATIENT
Start: 2021-08-18

## 2021-08-18 RX ORDER — SODIUM CHLORIDE 1000 MG
2 TABLET, SOLUBLE MISCELLANEOUS 2 TIMES DAILY WITH MEALS
Qty: 90 TABLET | Refills: 3 | Status: ON HOLD | OUTPATIENT
Start: 2021-08-18 | End: 2021-09-20 | Stop reason: HOSPADM

## 2021-08-18 RX ORDER — SENNA PLUS 8.6 MG/1
1 TABLET ORAL DAILY PRN
Qty: 60 TABLET | Refills: 0 | Status: ON HOLD | DISCHARGE
Start: 2021-08-18 | End: 2021-09-13

## 2021-08-18 RX ORDER — SODIUM CHLORIDE 1000 MG
2 TABLET, SOLUBLE MISCELLANEOUS 2 TIMES DAILY WITH MEALS
Status: DISCONTINUED | OUTPATIENT
Start: 2021-08-18 | End: 2021-08-18 | Stop reason: HOSPADM

## 2021-08-18 RX ORDER — SODIUM CHLORIDE 1000 MG
2 TABLET, SOLUBLE MISCELLANEOUS ONCE
Status: COMPLETED | OUTPATIENT
Start: 2021-08-18 | End: 2021-08-18

## 2021-08-18 RX ADMIN — APIXABAN 2.5 MG: 2.5 TABLET, FILM COATED ORAL at 08:56

## 2021-08-18 RX ADMIN — Medication 2 G: at 11:09

## 2021-08-18 RX ADMIN — ISOSORBIDE DINITRATE 20 MG: 20 TABLET ORAL at 08:56

## 2021-08-18 RX ADMIN — ASPIRIN 81 MG: 81 TABLET, COATED ORAL at 08:56

## 2021-08-18 RX ADMIN — AMLODIPINE BESYLATE 10 MG: 10 TABLET ORAL at 08:56

## 2021-08-18 RX ADMIN — SUCRALFATE 1 G: 1 TABLET ORAL at 05:49

## 2021-08-18 RX ADMIN — ISOSORBIDE DINITRATE 20 MG: 20 TABLET ORAL at 13:10

## 2021-08-18 RX ADMIN — PANTOPRAZOLE SODIUM 40 MG: 40 TABLET, DELAYED RELEASE ORAL at 05:49

## 2021-08-18 RX ADMIN — CARBIDOPA AND LEVODOPA 2 TABLET: 25; 100 TABLET ORAL at 13:10

## 2021-08-18 RX ADMIN — AMANTADINE HYDROCHLORIDE 100 MG: 100 CAPSULE, GELATIN COATED ORAL at 08:56

## 2021-08-18 RX ADMIN — Medication 1 G: at 08:56

## 2021-08-18 RX ADMIN — SPIRONOLACTONE 25 MG: 25 TABLET ORAL at 08:56

## 2021-08-18 RX ADMIN — Medication 400 MG: at 08:56

## 2021-08-18 RX ADMIN — POLYETHYLENE GLYCOL 3350 17 G: 17 POWDER, FOR SOLUTION ORAL at 08:56

## 2021-08-18 RX ADMIN — FUROSEMIDE 10 MG: 20 TABLET ORAL at 08:56

## 2021-08-18 RX ADMIN — CARBIDOPA AND LEVODOPA 2 TABLET: 25; 100 TABLET ORAL at 08:56

## 2021-08-18 RX ADMIN — SUCRALFATE 1 G: 1 TABLET ORAL at 11:08

## 2021-08-18 ASSESSMENT — PAIN SCALES - GENERAL
PAINLEVEL_OUTOF10: 0
PAINLEVEL_OUTOF10: 0

## 2021-08-18 NOTE — CARE COORDINATION
8/18/21, 11:50 AM EDT    Patient goals/plan/ treatment preferences discussed by  and . Patient goals/plan/ treatment preferences reviewed with patient/ family. Patient/ family verbalize understanding of discharge plan and are in agreement with goal/plan/treatment preferences. Understanding was demonstrated using the teach back method. AVS provided by RN at time of discharge, which includes all necessary medical information pertaining to the patients current course of illness, treatment, post-discharge goals of care, and treatment preferences. IMM Letter  IMM Letter given to Patient/Family/Significant other/Guardian/POA/by[de-identified] staff  IMM Letter date given[de-identified] 08/16/21  IMM Letter time given[de-identified] 2773     [facility] today, blue packet on chart with discharge instructions to follow. SW left message for Chuckbong Redman at [facility]RUSTY Adams.

## 2021-08-18 NOTE — PROGRESS NOTES
[address] RENAL TELEMETRY 6K  Occupational Therapy  Daily Note  Time:   Time In: 3018  Time Out: 3175  Timed Code Treatment Minutes: 38 Minutes  Minutes: 38          Date: 2021  Patient Name: Marichuy Jones,   Gender: female      Room: [facility]021-A  MRN: [id number]  : 1932  (80 y.o.)  Referring Practitioner: Adilene Licona MD  Diagnosis: Bradycardia  Additional Pertinent Hx: 80-year-old lady with past medical history described below who comes in after a provoked fall 6 days ago, patient took her walker got up to go to her closet so that she can dress for her supper however did take kraus turn leading to losing her balance and falling down and was not able to get back up. She has not lost her consciousness she did not have any dizziness or chest pain or shortness of breath or any prodromal symptoms. The nurse then came and found her since she did not come for the supper none informed family family has been noticing that she has been tired and weak the last few days and hence got her to the ER. .  Currently has pain all over her legs and the lower back-she did not hit her head-initial radiology as part of the trauma [id number] for any bleeds or breaks/fractures. Otherwise denies any nausea vomiting diarrhea shortness of breath or chest pain    Restrictions/Precautions:  Restrictions/Precautions: General Precautions, Fall Risk     SUBJECTIVE: Pt. Nurse approved OT treatment. Pt. Sitting in chair just finishing breakfast pleasant and agreeable to OT. PAIN: 3/10: BLE's    Vitals: Vitals not assessed per clinical judgement, see nursing flowsheet    COGNITION: Decreased Safety Awareness    ADL:   Grooming: Stand By Assistance and with set-up. completed seated in chair  Lower Extremity Dressing: Stand By Assistance, Maximum Assistance, with set-up and with verbal cues . SBA to doff socks and to don R/L shoe and sock onto R foot, Max Assistance to don L sock.     BALANCE:  Standing Balance: Contact Guard Assistance, with cues for safety. stood for ~ 1 min with one hand release     BED MOBILITY:  Not Tested    TRANSFERS:  Sit to Stand:  [address], cues for hand placement. Stand to Sit: [address], cues for hand placement. FUNCTIONAL MOBILITY:  Assistive Device: Rolling Walker  Assist Level:  Contact Guard Assistance and with verbal cues . Distance:short distance in hallway of unit with seated rest break needed. ADDITIONAL ACTIVITIES:  Patient completed BUE strengthening exercises with skilled education on HEP: completed x12 reps x1 set with AROM in all joints and all planes in order to improve UE strength and activity tolerance required for BADL routine and toilet / shower transfers. Patient tolerated, requiring  rest breaks. Patient also required verbal and visual cues for technique. ASSESSMENT:     Activity Tolerance:  Patient tolerance of  treatment: fair. Discharge Recommendations: Patient would benefit from continued therapy after discharge (Pt would benefit from an inpatient therapy stay prior to return to Assisted Living)   Equipment Recommendations: Equipment Needed: No  Plan: Times per week: 5x  Times per day: Daily  Current Treatment Recommendations: Strengthening, Balance Training, Functional Mobility Training, Endurance Training, Safety Education & Training, Self-Care / ADL, Patient/Caregiver Education & Training    Patient Education  Patient Education: ADL's, Home Exercise Program, Importance of Increasing Activity and Assistive Device Safety and safety with functional mobility and transfers.      Goals  Short term goals  Time Frame for Short term goals: by discharge  Short term goal 1: Pt will complete functional mobility to/from BR with SBA and no LOB to increase indep with ADL routine  Short term goal 2: Pt will tolerate dynamic standing x5 min with SBA and 1-2 hand release to increase indep with grooming  Short term goal 3: Pt will complete BADL routine with no > than SBA to increase ease with dressing  Short term goal 4: Pt will complete BUE strengthening HEP with mod resistance to increase ease with t/fs    Following session, patient left in safe position with all fall risk precautions in place.

## 2021-08-18 NOTE — DISCHARGE SUMMARY
DISCHARGE SUMMARY NOTE    Patient - Anthony Louise   MRN -  [id number]   Acct # - [de-identified]   - 1932      Date of Admission -  2021  8:32 AM  Date of Discharge-  2021  Length of Stay - 6 days  Code Status:  DNR-CCA  Attending Physician: Linda Samayoa. [facility]  Primary Care Physician - Asuncion Chavez MD     Admission Diagnosis:     Chief Complaint   Patient presents with    Gait Problem    Fatigue   - Bradycardia     Discharge Diagnosis:     Active Hospital Problems    Diagnosis Date Noted    A-fib [facility]) [I48.91] 2021    Falls frequently [R29.6] 2021     Consultations:   IP CONSULT TO SOCIAL WORK  Procedures:    - None     HPI:   Anthony Louise is a 59-year-old lady with past medical history described below who comes in after a provoked fall 6 days ago, patient took her walker got up to go to her closet so that she can dress for her supper however did take kraus turn leading to losing her balance and falling down and was not able to get back up. She has not lost her consciousness she did not have any dizziness or chest pain or shortness of breath or any prodromal symptoms. The nurse then came and found her since she did not come for the supper none informed family family has been noticing that she has been tired and weak the last few days and hence got her to the ER. .  Currently has pain all over her legs and the lower back-she did not hit her head-initial radiology as part of the trauma [id number] for any bleeds or breaks/fractures. Otherwise denies any nausea vomiting diarrhea shortness of breath or chest pain  Brief Hospital Course:   Ms. Cesia Everett presented to [facility] ED for multiple falls, which is-reportedly getting worse per daughter. Work-up in the ED was unremarkable except for bradycardia and hyponatremia. Trauma work-up negative for any bleeds or fractures. Patient's bradycardia was found to be due to her metoprolol.  This was discontinued along with her hydralazine and she was started on Norvasc 10mg. Patient continued to work with PT/OT throughout hospital stay to recover from her fall. Her blood pressures have been moderately controlled. We recommend further outpatient management. Physical Exam   GENERAL APPEARANCE: Elderly, frail female sitting upright in bed, alert & cooperative, and appears to be in NAD  EYES: PERRL, EOMI, no conjunctivitis, no erythema, no discharge. HENT: normocephalic head, hearing grossly intact, no nasal discharge, oral cavity & pharynx free of inflammation, swelling, exudate, or lesions. Neck supple, non-tender without lymphadenopathy, masses or thyromegaly. CARDIAC: RRR, normal S1 and S2. No S3, S4, no m/r/g, no peripheral edema, cyanosis or pallor. Extremities warm & well perfused. Capillary refill < 2 seconds. No carotid bruits. LUNGS: CTA b/l, no rales, rhonchi, wheezing or diminished breath sounds, non-pursing lips, no cyanosis  ABDOMEN: NABS, soft, nondistended, nontender, without  guarding or rebound, no masses, organomegaly noted   MUSKULOSKELETAL: No weakness. Strength diminished, 4/5 but slightly improved in all extremities, ROM symmetric and intant, no joint erythema or tenderness. Atrophic muscular development, shuffling gait. EXTREMITIES: No significant deformity or joint abnormality, no edema, peripheral pulses intact 2/4, no varicosities. NEUROLOGICAL: CN II-XII intact, strength and sensation symmetric and intact throughout. Cerebellar function intact. Severe shuffling gait and pill-rolling tremor appreciated  SKIN: Skin normal color, texture and turgor with no lesions or eruptions, purpuritic flat ecchymotic and non-blanching senile purpura throughout extremities   PSYCHIATRIC: AOx3, able to demonstrate good judgement & reason  Radiology      CT Head WO Contrast   Final Result       1. No acute findings. 2. Moderate severity chronic small vessel ischemic changes.                **This report has been created using voice recognition software. It may contain minor errors which are inherent in voice recognition technology. **      Final report electronically signed by Dr. Pauline Reyes on 8/13/2021 9:52 AM      XR FOOT LEFT (MIN 3 VIEWS)   Final Result       1. Diffuse osteopenia. 2. Degenerative change. 3. No acute fracture. 4. Plantar spur. Spurring at the attachment of the Achilles tendon upon the calcaneus. 5. Vascular calcification. .               **This report has been created using voice recognition software. It may contain minor errors which are inherent in voice recognition technology. **      Final report electronically signed by DR Lev Bloom on 8/13/2021 9:41 AM      XR FOOT RIGHT (MIN 3 VIEWS)   Final Result   No acute osseous abnormality            **This report has been created using voice recognition software. It may contain minor errors which are inherent in voice recognition technology. **      Final report electronically signed by Dr. Yovani Haji on 8/13/2021 9:43 AM      XR TIBIA FIBULA RIGHT (2 VIEWS)   Final Result   No acute finding            **This report has been created using voice recognition software. It may contain minor errors which are inherent in voice recognition technology. **      Final report electronically signed by Dr. Yovani Haji on 8/13/2021 9:42 AM      XR TIBIA FIBULA LEFT (2 VIEWS)   Final Result   No acute abnormality            **This report has been created using voice recognition software. It may contain minor errors which are inherent in voice recognition technology. **      Final report electronically signed by Dr. Yovani Haji on 8/13/2021 9:44 AM        Labs:   CBC:    Lab Results   Component Value Date    WBC 7.4 08/13/2021    HGB 10.2 08/13/2021    HCT 32.5 08/13/2021     08/13/2021     Renal:    Lab Results   Component Value Date     08/18/2021    K 4.8 08/18/2021    K 4.2 08/14/2021    CL 95 08/18/2021    CO2 22 08/18/2021    BUN 16 08/18/2021    CREATININE 0.6 08/18/2021    CALCIUM 9.1 08/18/2021    PHOS 3.5 05/24/2020     Disposition:   Stable. Discharged to [facility] on 8/18/2021     Discharge Medications:       Franchesca Carter Medication Instructions UYU:[id number]    Printed on:08/18/21 1341   Medication Information                      acetaminophen (TYLENOL 8 HOUR ARTHRITIS PAIN) 650 MG extended release tablet  Take 650 mg by mouth 3 times daily             amantadine (SYMMETREL) 100 MG capsule  Take 1 capsule by mouth daily             amLODIPine (NORVASC) 10 MG tablet  Take 1 tablet by mouth daily             apixaban (ELIQUIS) 2.5 MG TABS tablet  TAKE 1 TABLET BY MOUTH 2  TIMES DAILY, EQUIVALENT TO  APIXABAN             aspirin 81 MG EC tablet  Take 81 mg by mouth daily. atorvastatin (LIPITOR) 20 MG tablet  TAKE 1 TABLET DAILY             blood glucose test strips (CONTOUR TEST) strip  USE TO TEST BLOOD SUGAR TWICE DAILY.              bumetanide (BUMEX) 1 MG tablet  Take 1 tablet by mouth three times a week On Mondays and Thursdays             carbidopa-levodopa (SINEMET)  MG per tablet  Take 2 tablets by mouth 4 times daily             Cholecalciferol (VITAMIN D3 PO)  Take 4,000 Units by mouth 2 times daily              Cranberry 500 MG CAPS  Take 1,000 mg by mouth daily              Cyanocobalamin 200 MCG/SPRAY LIQD  Take 4 sprays by mouth 2 times daily Vitamist - Vitamin B 12 spray             diclofenac sodium (VOLTAREN) 1 % GEL  Apply 4 g topically 4 times daily             ferrous sulfate (IRON 325) 325 (65 Fe) MG tablet  Take 1 tablet by mouth 2 times daily             isosorbide dinitrate (ISORDIL) 20 MG tablet  Take 1 tablet by mouth 3 times daily             JANUVIA 100 MG tablet  TAKE 1 TABLET BY MOUTH DAILY             lactase (LACTAID ULTRA) 9000 units TABS  Take 9,000 Units by mouth 3 times daily (before meals)             lactobacillus (CULTURELLE) capsule  Take 1 capsule by mouth 3 times daily (with meals)             magnesium oxide (MAG-OX) 400 MG tablet  Take 1 tablet by mouth 3 times daily             metFORMIN (GLUCOPHAGE-XR) 500 MG extended release tablet  Take 1 tablet by mouth 2 times daily             Misc.  Devices MISC  1 each by Does not apply route once for 1 dose Param Glucose Meter; Diagnosis:E11.65             Multiple Vitamin (MULTIVITAMIN) TABS tablet  Take 1 tablet by mouth daily             Multiple Vitamins-Minerals (PRESERVISION AREDS 2) CAPS  Take 2 capsules by mouth 2 times daily             ondansetron (ZOFRAN) 4 MG tablet  Take 4 mg by mouth every 6 hours              pantoprazole (PROTONIX) 40 MG tablet  Take 1 tablet by mouth 2 times daily (before meals)             potassium chloride (KLOR-CON M) 10 MEQ extended release tablet  Take 10 mEq by mouth 3 times daily             psyllium (KONSYL) 28.3 % PACK  Take 1 packet by mouth daily             sodium chloride 1 g tablet  Take 2 tablets by mouth 2 times daily (with meals)             spironolactone (ALDACTONE) 25 MG tablet  Take 1 tablet by mouth daily             sucralfate (CARAFATE) 1 GM tablet  Take 1 tablet by mouth 4 times daily (before meals and nightly) Dissolve in 1 ounce of water to take as a slurry             zinc oxide (PINXAV) 30 % OINT  Apply topically 3 times daily as needed               Vitals/Labs to Monitor Outpatient:   - Blood Pressure  - Heart Rate   - Magnesium  - Sodium   Follow-up Appointments:       Electronically signed by   Suman Wilburn DO on 8/18/2021 at 1:41 PM

## 2021-08-19 ENCOUNTER — CARE COORDINATION (OUTPATIENT)
Dept: CASE MANAGEMENT | Age: 86
End: 2021-08-19

## 2021-08-25 NOTE — PROGRESS NOTES
Physician Progress Note      Nakul Nails  CSN #:                  [id number]  :                       1932  ADMIT DATE:       2021 8:32 AM  DISCH DATE:        2021 2:41 PM  RESPONDING  PROVIDER #:        Arcadio Torres          QUERY TEXT:    Patient admitted with falls likely secondary to acute on chronic   hyponatremia/SIADH and bradycardia. Pt noted to have paroxysmal atrial   fibrillation and is maintained on Eliquis. If possible, please respond below   and document in the discharge summary if you are evaluating and/or treating   any of the following: The medical record reflects the following:  Risk Factors: PAF, advanced age  Clinical Indicators: PAF maintained on Eliquis  Treatment: Eliquis, tele, EKG  Options provided:  -- Secondary hypercoagulable state in a patient with atrial fibrillation  -- Eliquis is prophylactic treatment only without secondary hypercoagulable   state  -- Other - I will add my own diagnosis  -- Disagree - Not applicable / Not valid  -- Disagree - Clinically unable to determine / Unknown  -- Refer to Clinical Documentation Reviewer    PROVIDER RESPONSE TEXT:    This patient has secondary hypercoagulable state related to atrial   fibrillation.     Query created by: Wesley Dhillon on 2021 9:38 AM      Electronically signed by:  Arcadio Torres 2021 8:17 AM

## 2021-09-10 ENCOUNTER — TELEPHONE (OUTPATIENT)
Dept: CARDIOLOGY CLINIC | Age: 86
End: 2021-09-10

## 2021-09-10 NOTE — TELEPHONE ENCOUNTER
Per Henry Phelps at the [facility] had chest pain last night and this morning, but nitro was effective. They'd like a followup with Dr Samantha Santo or a nurse practitioner later next week d/t transportation.  (only opening is with Paula Monday at 8, but she can't do that)  Call Henry Phelps at [phone]

## 2021-09-11 ENCOUNTER — HOSPITAL ENCOUNTER (INPATIENT)
Age: 86
LOS: 9 days | Discharge: NURSING, REHAB OR LONG-TERM CARE | DRG: 291 | End: 2021-09-20
Attending: FAMILY MEDICINE
Payer: MEDICARE

## 2021-09-11 ENCOUNTER — APPOINTMENT (OUTPATIENT)
Dept: GENERAL RADIOLOGY | Age: 86
DRG: 291 | End: 2021-09-11
Payer: MEDICARE

## 2021-09-11 DIAGNOSIS — J96.01 ACUTE RESPIRATORY FAILURE WITH HYPOXIA (HCC): ICD-10-CM

## 2021-09-11 DIAGNOSIS — E87.1 HYPONATREMIA: ICD-10-CM

## 2021-09-11 DIAGNOSIS — I50.23 ACUTE ON CHRONIC SYSTOLIC CONGESTIVE HEART FAILURE (HCC): Primary | ICD-10-CM

## 2021-09-11 PROBLEM — I50.9 HEART FAILURE (HCC): Status: ACTIVE | Noted: 2021-09-11

## 2021-09-11 PROBLEM — I50.9 HEART FAILURE EXACERBATED BY SOTALOL (HCC): Status: ACTIVE | Noted: 2021-09-11

## 2021-09-11 LAB
ALBUMIN SERPL-MCNC: 3.8 G/DL (ref 3.5–5.1)
ALP BLD-CCNC: 124 U/L (ref 38–126)
ALT SERPL-CCNC: 6 U/L (ref 11–66)
ANION GAP SERPL CALCULATED.3IONS-SCNC: 15 MEQ/L (ref 8–16)
APTT: 38.1 SECONDS (ref 22–38)
AST SERPL-CCNC: 12 U/L (ref 5–40)
BASOPHILS # BLD: 0.6 %
BASOPHILS ABSOLUTE: 0.1 THOU/MM3 (ref 0–0.1)
BILIRUB SERPL-MCNC: 0.6 MG/DL (ref 0.3–1.2)
BILIRUBIN DIRECT: < 0.2 MG/DL (ref 0–0.3)
BUN BLDV-MCNC: 16 MG/DL (ref 7–22)
CALCIUM SERPL-MCNC: 9.9 MG/DL (ref 8.5–10.5)
CHLORIDE BLD-SCNC: 92 MEQ/L (ref 98–111)
CO2: 20 MEQ/L (ref 23–33)
CREAT SERPL-MCNC: 0.8 MG/DL (ref 0.4–1.2)
EKG ATRIAL RATE: 288 BPM
EKG ATRIAL RATE: 46 BPM
EKG ATRIAL RATE: 72 BPM
EKG P AXIS: -10 DEGREES
EKG P-R INTERVAL: 374 MS
EKG Q-T INTERVAL: 432 MS
EKG Q-T INTERVAL: 434 MS
EKG Q-T INTERVAL: 434 MS
EKG QRS DURATION: 144 MS
EKG QRS DURATION: 146 MS
EKG QRS DURATION: 148 MS
EKG QTC CALCULATION (BAZETT): 465 MS
EKG QTC CALCULATION (BAZETT): 471 MS
EKG QTC CALCULATION (BAZETT): 473 MS
EKG R AXIS: -59 DEGREES
EKG R AXIS: -60 DEGREES
EKG R AXIS: -62 DEGREES
EKG T AXIS: 93 DEGREES
EKG T AXIS: 95 DEGREES
EKG T AXIS: 96 DEGREES
EKG VENTRICULAR RATE: 69 BPM
EKG VENTRICULAR RATE: 71 BPM
EKG VENTRICULAR RATE: 72 BPM
EOSINOPHIL # BLD: 0.8 %
EOSINOPHILS ABSOLUTE: 0.1 THOU/MM3 (ref 0–0.4)
ERYTHROCYTE [DISTWIDTH] IN BLOOD BY AUTOMATED COUNT: 15.3 % (ref 11.5–14.5)
ERYTHROCYTE [DISTWIDTH] IN BLOOD BY AUTOMATED COUNT: 48.3 FL (ref 35–45)
GFR SERPL CREATININE-BSD FRML MDRD: 68 ML/MIN/1.73M2
GLUCOSE BLD-MCNC: 129 MG/DL (ref 70–108)
GLUCOSE BLD-MCNC: 165 MG/DL (ref 70–108)
GLUCOSE BLD-MCNC: 202 MG/DL (ref 70–108)
HCT VFR BLD CALC: 33.2 % (ref 37–47)
HEMOGLOBIN: 10.9 GM/DL (ref 12–16)
IMMATURE GRANS (ABS): 0.05 THOU/MM3 (ref 0–0.07)
IMMATURE GRANULOCYTES: 0.6 %
INR BLD: 1.71 (ref 0.85–1.13)
LACTIC ACID, SEPSIS: 1.3 MMOL/L (ref 0.5–1.9)
LACTIC ACID, SEPSIS: 2.1 MMOL/L (ref 0.5–1.9)
LYMPHOCYTES # BLD: 4.3 %
LYMPHOCYTES ABSOLUTE: 0.4 THOU/MM3 (ref 1–4.8)
MCH RBC QN AUTO: 28.4 PG (ref 26–33)
MCHC RBC AUTO-ENTMCNC: 32.8 GM/DL (ref 32.2–35.5)
MCV RBC AUTO: 86.5 FL (ref 81–99)
MONOCYTES # BLD: 9.2 %
MONOCYTES ABSOLUTE: 0.8 THOU/MM3 (ref 0.4–1.3)
NUCLEATED RED BLOOD CELLS: 0 /100 WBC
OSMOLALITY CALCULATION: 262.2 MOSMOL/KG (ref 275–300)
PLATELET # BLD: 201 THOU/MM3 (ref 130–400)
PMV BLD AUTO: 8.7 FL (ref 9.4–12.4)
POTASSIUM REFLEX MAGNESIUM: 4.8 MEQ/L (ref 3.5–5.2)
PRO-BNP: 3264 PG/ML (ref 0–1800)
RBC # BLD: 3.84 MILL/MM3 (ref 4.2–5.4)
SARS-COV-2, NAAT: NOT DETECTED
SEG NEUTROPHILS: 84.5 %
SEGMENTED NEUTROPHILS ABSOLUTE COUNT: 7.4 THOU/MM3 (ref 1.8–7.7)
SODIUM BLD-SCNC: 127 MEQ/L (ref 135–145)
TOTAL PROTEIN: 6.5 G/DL (ref 6.1–8)
TROPONIN T: < 0.01 NG/ML
TROPONIN T: < 0.01 NG/ML
WBC # BLD: 8.8 THOU/MM3 (ref 4.8–10.8)

## 2021-09-11 PROCEDURE — 36415 COLL VENOUS BLD VENIPUNCTURE: CPT

## 2021-09-11 PROCEDURE — 93005 ELECTROCARDIOGRAM TRACING: CPT | Performed by: FAMILY MEDICINE

## 2021-09-11 PROCEDURE — 99285 EMERGENCY DEPT VISIT HI MDM: CPT

## 2021-09-11 PROCEDURE — 87040 BLOOD CULTURE FOR BACTERIA: CPT

## 2021-09-11 PROCEDURE — 93005 ELECTROCARDIOGRAM TRACING: CPT | Performed by: STUDENT IN AN ORGANIZED HEALTH CARE EDUCATION/TRAINING PROGRAM

## 2021-09-11 PROCEDURE — 94660 CPAP INITIATION&MGMT: CPT

## 2021-09-11 PROCEDURE — 82948 REAGENT STRIP/BLOOD GLUCOSE: CPT

## 2021-09-11 PROCEDURE — 83605 ASSAY OF LACTIC ACID: CPT

## 2021-09-11 PROCEDURE — 85025 COMPLETE CBC W/AUTO DIFF WBC: CPT

## 2021-09-11 PROCEDURE — 6370000000 HC RX 637 (ALT 250 FOR IP): Performed by: STUDENT IN AN ORGANIZED HEALTH CARE EDUCATION/TRAINING PROGRAM

## 2021-09-11 PROCEDURE — 2060000000 HC ICU INTERMEDIATE R&B

## 2021-09-11 PROCEDURE — 6360000002 HC RX W HCPCS: Performed by: FAMILY MEDICINE

## 2021-09-11 PROCEDURE — 83880 ASSAY OF NATRIURETIC PEPTIDE: CPT

## 2021-09-11 PROCEDURE — 85610 PROTHROMBIN TIME: CPT

## 2021-09-11 PROCEDURE — 87635 SARS-COV-2 COVID-19 AMP PRB: CPT

## 2021-09-11 PROCEDURE — 71045 X-RAY EXAM CHEST 1 VIEW: CPT

## 2021-09-11 PROCEDURE — 84484 ASSAY OF TROPONIN QUANT: CPT

## 2021-09-11 PROCEDURE — 99223 1ST HOSP IP/OBS HIGH 75: CPT | Performed by: STUDENT IN AN ORGANIZED HEALTH CARE EDUCATION/TRAINING PROGRAM

## 2021-09-11 PROCEDURE — 2580000003 HC RX 258: Performed by: STUDENT IN AN ORGANIZED HEALTH CARE EDUCATION/TRAINING PROGRAM

## 2021-09-11 PROCEDURE — 80048 BASIC METABOLIC PNL TOTAL CA: CPT

## 2021-09-11 PROCEDURE — 99223 1ST HOSP IP/OBS HIGH 75: CPT | Performed by: PSYCHIATRY & NEUROLOGY

## 2021-09-11 PROCEDURE — 80076 HEPATIC FUNCTION PANEL: CPT

## 2021-09-11 PROCEDURE — 85730 THROMBOPLASTIN TIME PARTIAL: CPT

## 2021-09-11 RX ORDER — DEXTROSE MONOHYDRATE 50 MG/ML
100 INJECTION, SOLUTION INTRAVENOUS PRN
Status: DISCONTINUED | OUTPATIENT
Start: 2021-09-11 | End: 2021-09-20 | Stop reason: HOSPADM

## 2021-09-11 RX ORDER — AMLODIPINE BESYLATE 10 MG/1
10 TABLET ORAL DAILY
Status: DISCONTINUED | OUTPATIENT
Start: 2021-09-12 | End: 2021-09-20 | Stop reason: HOSPADM

## 2021-09-11 RX ORDER — SODIUM CHLORIDE 0.9 % (FLUSH) 0.9 %
5-40 SYRINGE (ML) INJECTION EVERY 12 HOURS SCHEDULED
Status: DISCONTINUED | OUTPATIENT
Start: 2021-09-11 | End: 2021-09-20 | Stop reason: HOSPADM

## 2021-09-11 RX ORDER — ACETAMINOPHEN 325 MG/1
650 TABLET ORAL EVERY 6 HOURS PRN
Status: DISCONTINUED | OUTPATIENT
Start: 2021-09-11 | End: 2021-09-20 | Stop reason: HOSPADM

## 2021-09-11 RX ORDER — LEVOFLOXACIN 5 MG/ML
500 INJECTION, SOLUTION INTRAVENOUS EVERY 24 HOURS
Status: DISCONTINUED | OUTPATIENT
Start: 2021-09-12 | End: 2021-09-11 | Stop reason: DRUGHIGH

## 2021-09-11 RX ORDER — ONDANSETRON 2 MG/ML
4 INJECTION INTRAMUSCULAR; INTRAVENOUS EVERY 6 HOURS PRN
Status: DISCONTINUED | OUTPATIENT
Start: 2021-09-11 | End: 2021-09-20 | Stop reason: HOSPADM

## 2021-09-11 RX ORDER — LEVOFLOXACIN 5 MG/ML
750 INJECTION, SOLUTION INTRAVENOUS
Status: DISCONTINUED | OUTPATIENT
Start: 2021-09-13 | End: 2021-09-12

## 2021-09-11 RX ORDER — ASPIRIN 81 MG/1
81 TABLET ORAL DAILY
Status: DISCONTINUED | OUTPATIENT
Start: 2021-09-12 | End: 2021-09-20 | Stop reason: HOSPADM

## 2021-09-11 RX ORDER — NICOTINE POLACRILEX 4 MG
15 LOZENGE BUCCAL PRN
Status: DISCONTINUED | OUTPATIENT
Start: 2021-09-11 | End: 2021-09-20 | Stop reason: HOSPADM

## 2021-09-11 RX ORDER — PANTOPRAZOLE SODIUM 40 MG/1
40 TABLET, DELAYED RELEASE ORAL
Status: DISCONTINUED | OUTPATIENT
Start: 2021-09-11 | End: 2021-09-20 | Stop reason: HOSPADM

## 2021-09-11 RX ORDER — POLYETHYLENE GLYCOL 3350 17 G/17G
17 POWDER, FOR SOLUTION ORAL DAILY PRN
Status: DISCONTINUED | OUTPATIENT
Start: 2021-09-11 | End: 2021-09-16

## 2021-09-11 RX ORDER — ACETAMINOPHEN 650 MG/1
650 SUPPOSITORY RECTAL EVERY 6 HOURS PRN
Status: DISCONTINUED | OUTPATIENT
Start: 2021-09-11 | End: 2021-09-20 | Stop reason: HOSPADM

## 2021-09-11 RX ORDER — SUCRALFATE 1 G/1
1 TABLET ORAL
Status: DISCONTINUED | OUTPATIENT
Start: 2021-09-11 | End: 2021-09-20 | Stop reason: HOSPADM

## 2021-09-11 RX ORDER — ATORVASTATIN CALCIUM 20 MG/1
20 TABLET, FILM COATED ORAL DAILY
Status: DISCONTINUED | OUTPATIENT
Start: 2021-09-12 | End: 2021-09-20 | Stop reason: HOSPADM

## 2021-09-11 RX ORDER — SODIUM CHLORIDE 0.9 % (FLUSH) 0.9 %
5-40 SYRINGE (ML) INJECTION PRN
Status: DISCONTINUED | OUTPATIENT
Start: 2021-09-11 | End: 2021-09-20 | Stop reason: HOSPADM

## 2021-09-11 RX ORDER — IPRATROPIUM BROMIDE AND ALBUTEROL SULFATE 2.5; .5 MG/3ML; MG/3ML
1 SOLUTION RESPIRATORY (INHALATION) EVERY 4 HOURS PRN
Status: DISCONTINUED | OUTPATIENT
Start: 2021-09-11 | End: 2021-09-12

## 2021-09-11 RX ORDER — ONDANSETRON 4 MG/1
4 TABLET, ORALLY DISINTEGRATING ORAL EVERY 8 HOURS PRN
Status: DISCONTINUED | OUTPATIENT
Start: 2021-09-11 | End: 2021-09-20 | Stop reason: HOSPADM

## 2021-09-11 RX ORDER — DEXTROSE MONOHYDRATE 25 G/50ML
12.5 INJECTION, SOLUTION INTRAVENOUS PRN
Status: DISCONTINUED | OUTPATIENT
Start: 2021-09-11 | End: 2021-09-20 | Stop reason: HOSPADM

## 2021-09-11 RX ORDER — SODIUM CHLORIDE 9 MG/ML
25 INJECTION, SOLUTION INTRAVENOUS PRN
Status: DISCONTINUED | OUTPATIENT
Start: 2021-09-11 | End: 2021-09-20 | Stop reason: HOSPADM

## 2021-09-11 RX ORDER — LEVOFLOXACIN 5 MG/ML
750 INJECTION, SOLUTION INTRAVENOUS ONCE
Status: COMPLETED | OUTPATIENT
Start: 2021-09-11 | End: 2021-09-11

## 2021-09-11 RX ORDER — SPIRONOLACTONE 25 MG/1
25 TABLET ORAL DAILY
Status: DISCONTINUED | OUTPATIENT
Start: 2021-09-12 | End: 2021-09-20 | Stop reason: HOSPADM

## 2021-09-11 RX ORDER — ISOSORBIDE DINITRATE 20 MG/1
20 TABLET ORAL 3 TIMES DAILY
Status: DISCONTINUED | OUTPATIENT
Start: 2021-09-11 | End: 2021-09-20 | Stop reason: HOSPADM

## 2021-09-11 RX ADMIN — APIXABAN 2.5 MG: 2.5 TABLET, FILM COATED ORAL at 19:53

## 2021-09-11 RX ADMIN — PANTOPRAZOLE SODIUM 40 MG: 40 TABLET, DELAYED RELEASE ORAL at 19:53

## 2021-09-11 RX ADMIN — CARBIDOPA AND LEVODOPA 2 TABLET: 25; 100 TABLET ORAL at 19:52

## 2021-09-11 RX ADMIN — ACETAMINOPHEN 650 MG: 325 TABLET ORAL at 19:53

## 2021-09-11 RX ADMIN — CARBIDOPA AND LEVODOPA 2 TABLET: 25; 100 TABLET ORAL at 18:16

## 2021-09-11 RX ADMIN — SODIUM CHLORIDE, PRESERVATIVE FREE 10 ML: 5 INJECTION INTRAVENOUS at 19:52

## 2021-09-11 RX ADMIN — ISOSORBIDE DINITRATE 20 MG: 20 TABLET ORAL at 18:16

## 2021-09-11 RX ADMIN — ISOSORBIDE DINITRATE 20 MG: 20 TABLET ORAL at 19:52

## 2021-09-11 RX ADMIN — LEVOFLOXACIN 750 MG: 5 INJECTION, SOLUTION INTRAVENOUS at 13:00

## 2021-09-11 RX ADMIN — SUCRALFATE 1 G: 1 TABLET ORAL at 18:16

## 2021-09-11 RX ADMIN — SUCRALFATE 1 G: 1 TABLET ORAL at 19:53

## 2021-09-11 ASSESSMENT — PAIN - FUNCTIONAL ASSESSMENT
PAIN_FUNCTIONAL_ASSESSMENT: ACTIVITIES ARE NOT PREVENTED
PAIN_FUNCTIONAL_ASSESSMENT: ACTIVITIES ARE NOT PREVENTED

## 2021-09-11 ASSESSMENT — PAIN DESCRIPTION - PROGRESSION
CLINICAL_PROGRESSION: GRADUALLY WORSENING
CLINICAL_PROGRESSION: GRADUALLY IMPROVING

## 2021-09-11 ASSESSMENT — ENCOUNTER SYMPTOMS
ABDOMINAL PAIN: 0
RHINORRHEA: 0
DIARRHEA: 0
CHEST TIGHTNESS: 1
VOMITING: 0
SHORTNESS OF BREATH: 1
EYE PAIN: 0
CONSTIPATION: 0
NAUSEA: 0
SORE THROAT: 0

## 2021-09-11 ASSESSMENT — PAIN DESCRIPTION - PAIN TYPE
TYPE: CHRONIC PAIN
TYPE: ACUTE PAIN
TYPE: CHRONIC PAIN

## 2021-09-11 ASSESSMENT — PAIN DESCRIPTION - LOCATION
LOCATION: ABDOMEN;LEG
LOCATION: ABDOMEN;LEG
LOCATION: CHEST

## 2021-09-11 ASSESSMENT — PAIN SCALES - GENERAL
PAINLEVEL_OUTOF10: 7
PAINLEVEL_OUTOF10: 6
PAINLEVEL_OUTOF10: 3

## 2021-09-11 ASSESSMENT — PAIN DESCRIPTION - FREQUENCY
FREQUENCY: CONTINUOUS

## 2021-09-11 ASSESSMENT — PAIN DESCRIPTION - ORIENTATION
ORIENTATION: RIGHT;LEFT;MID
ORIENTATION: RIGHT;LEFT;MID

## 2021-09-11 ASSESSMENT — PAIN DESCRIPTION - ONSET
ONSET: ON-GOING
ONSET: ON-GOING

## 2021-09-11 ASSESSMENT — PAIN DESCRIPTION - DESCRIPTORS
DESCRIPTORS: ACHING;DULL
DESCRIPTORS: DULL;ACHING
DESCRIPTORS: BURNING;ACHING

## 2021-09-11 NOTE — Clinical Note
Patient Class: Inpatient [101]   REQUIRED: Diagnosis: Heart failure exacerbated by sotalol ([facility] [[id number]]   Estimated Length of Stay: Estimated stay of more than 2 midnights

## 2021-09-11 NOTE — CONSULTS
Neurology Consult Note    Date:9/11/2021       ETLP:[id number]  Patient Susanna Costa     YOB: 1932     Age:88 y.o. Requesting Physician: Barbara Pederson DO     Reason for Consult:  Evaluate for history of parkinson's. Issue with recent medication change      Chief Complaint: Whole body weakness    Subjective   Azra Atwood is a 24-year-old female who presents to [facility] ED via EMS from [facility] for worsening chest pain weakness and shortness of breath. She has a history significant for Parkinson's disease, diabetes mellitus type 2, paroxysmal atrial fibrillation and is on Eliquis 2.5 mg twice daily, CAD post CABG, HF, skin cancer with recent left cheek biopsy. Her family reports that she is very weak and requires significant assistance with transfers and simple tasks such as her ADLs. Her exercise tolerance has decreased over the last month and more specifically over the last 1 to 2 weeks. Her family does report that she has had worsening lower extremity edema. She typically sees Dr. Theodora Calderon for her Parkinson's and approximately 1 month ago on 8/2 she was started on amantadine due to worsening parkinsonians symptoms and has not done well since that time. Per patient and her family for almost a month now she has had full body weakness, cannot get her feet to move, feels like she is shaking inside and out, and is having a hard time with writing, per family she takes pride in her handwriting. She also reports a few weeks of increased tiredness in which she falls asleep randomly, but is unable to stay asleep. On 8/10 patient and her family report that she had a slow fall at [place], where she had her own apartment in which her  walker moved with her and she grabbed onto clothes in the closet, she was unable to get up.  3 days later she was admitted to [address] [place] [address] for x-rays and this showed no fractures.   Upon discharge she was sent to [facility] for rehab and according to patient and family she just kept getting worse and worse every day. She works with therapy while at [facility] daily. She also reports shortness of breath over the last month as well. Per her family she typically does not wear any oxygen and she is currently on 5L nasal cannula. Review of Systems   Review of Systems   Constitutional: Positive for activity change. Negative for chills and fever. HENT: Negative for congestion, rhinorrhea and sore throat. Eyes: Negative for pain and visual disturbance. Respiratory: Positive for chest tightness and shortness of breath. Cardiovascular: Positive for chest pain. Gastrointestinal: Negative for abdominal pain, constipation, diarrhea, nausea and vomiting. Genitourinary: Negative for difficulty urinating. Musculoskeletal: Negative for neck pain and neck stiffness. Neurological: Positive for tremors (action tremor, at baseline) and weakness. Negative for dizziness. Hematological: Bruises/bleeds easily. Psychiatric/Behavioral: Negative for confusion and decreased concentration. Medications   Scheduled Meds:    sodium chloride flush  5-40 mL IntraVENous 2 times per day    [START ON 9/13/2021] levofloxacin  750 mg IntraVENous Q48H    [START ON 9/12/2021] amLODIPine  10 mg Oral Daily    apixaban  2.5 mg Oral BID    [START ON 9/12/2021] aspirin  81 mg Oral Daily    [START ON 9/12/2021] atorvastatin  20 mg Oral Daily    carbidopa-levodopa  2 tablet Oral 4x Daily    isosorbide dinitrate  20 mg Oral TID    pantoprazole  40 mg Oral BID AC    [START ON 9/12/2021] spironolactone  25 mg Oral Daily    sucralfate  1 g Oral 4x Daily AC & HS    insulin lispro  0-12 Units SubCUTAneous TID WC    insulin lispro  0-6 Units SubCUTAneous Nightly     Continuous Infusions:    sodium chloride      dextrose       No current facility-administered medications on file prior to encounter.      Current Outpatient Medications on File Prior to Encounter   Medication Sig Dispense Refill    amLODIPine (NORVASC) 10 MG tablet Take 1 tablet by mouth daily 30 tablet 3    sodium chloride 1 g tablet Take 2 tablets by mouth 2 times daily (with meals) 90 tablet 3    senna (SENOKOT) 8.6 MG tablet Take 1 tablet by mouth daily as needed for Constipation 60 tablet 0    potassium chloride (KLOR-CON M) 10 MEQ extended release tablet Take 10 mEq by mouth 3 times daily      amantadine (SYMMETREL) 100 MG capsule Take 1 capsule by mouth daily 30 capsule 0    bumetanide (BUMEX) 1 MG tablet Take 1 tablet by mouth three times a week On Mondays and Thursdays (Patient taking differently: Take 1 mg by mouth Twice a Week On Mondays and Thursdays) 30 tablet 3    blood glucose test strips (CONTOUR TEST) strip USE TO TEST BLOOD SUGAR TWICE DAILY.  200 strip 3    zinc oxide (PINXAV) 30 % OINT Apply topically 3 times daily as needed      lactobacillus (CULTURELLE) capsule Take 1 capsule by mouth 3 times daily (with meals) 20 capsule 0    psyllium (KONSYL) 28.3 % PACK Take 1 packet by mouth daily (Patient taking differently: Take 1 packet by mouth daily as needed ) 30 each 1    pantoprazole (PROTONIX) 40 MG tablet Take 1 tablet by mouth 2 times daily (before meals) 30 tablet 1    sucralfate (CARAFATE) 1 GM tablet Take 1 tablet by mouth 4 times daily (before meals and nightly) Dissolve in 1 ounce of water to take as a slurry 120 tablet 1    ondansetron (ZOFRAN) 4 MG tablet Take 4 mg by mouth every 6 hours       Multiple Vitamin (MULTIVITAMIN) TABS tablet Take 1 tablet by mouth daily 90 tablet 3    Multiple Vitamins-Minerals (PRESERVISION AREDS 2) CAPS Take 2 capsules by mouth 2 times daily 360 capsule 3    Cyanocobalamin 200 MCG/SPRAY LIQD Take 4 sprays by mouth 2 times daily Vitamist - Vitamin B 12 spray 90 mL 3    ferrous sulfate (IRON 325) 325 (65 Fe) MG tablet Take 1 tablet by mouth 2 times daily 180 tablet 1    JANUVIA 100 MG tablet TAKE 1 TABLET BY MOUTH DAILY 90 tablet 2    spironolactone (ALDACTONE) 25 MG tablet Take 1 tablet by mouth daily 30 tablet 3    acetaminophen (TYLENOL 8 HOUR ARTHRITIS PAIN) 650 MG extended release tablet Take 650 mg by mouth 3 times daily      metFORMIN (GLUCOPHAGE-XR) 500 MG extended release tablet Take 1 tablet by mouth 2 times daily 180 tablet 3    diclofenac sodium (VOLTAREN) 1 % GEL Apply 4 g topically 4 times daily 4 Tube 5    lactase (LACTAID ULTRA) 9000 units TABS Take 9,000 Units by mouth 3 times daily (before meals)      magnesium oxide (MAG-OX) 400 MG tablet Take 1 tablet by mouth 3 times daily 30 tablet 3    isosorbide dinitrate (ISORDIL) 20 MG tablet Take 1 tablet by mouth 3 times daily 90 tablet 3    atorvastatin (LIPITOR) 20 MG tablet TAKE 1 TABLET DAILY (Patient taking differently: On hold for surgery) 90 tablet 1    carbidopa-levodopa (SINEMET)  MG per tablet Take 2 tablets by mouth 4 times daily 720 tablet 0    apixaban (ELIQUIS) 2.5 MG TABS tablet TAKE 1 TABLET BY MOUTH 2  TIMES DAILY, EQUIVALENT TO  APIXABAN 180 tablet 1    Cholecalciferol (VITAMIN D3 PO) Take 4,000 Units by mouth 2 times daily       Misc. Devices MISC 1 each by Does not apply route once for 1 dose Param Glucose Meter; Diagnosis:E11.65 1 Device 0    Cranberry 500 MG CAPS Take 1,000 mg by mouth daily       aspirin 81 MG EC tablet Take 81 mg by mouth daily.            PRN Meds: sodium chloride flush, sodium chloride, ondansetron **OR** ondansetron, polyethylene glycol, acetaminophen **OR** acetaminophen, ipratropium-albuterol, glucose, dextrose, glucagon (rDNA), dextrose    Past History    Past Medical History:   has a past medical history of Arthritis, Atypical chest pain, CAD (coronary artery disease), Chronic LBBB (left bundle branch block), Diabetes mellitus type II, Esophageal stricture, FH: CAD (coronary artery disease), GERD (gastroesophageal reflux disease), History of gastritis, History of skin cancer, Hypercholesterolemia, Hyperlipidemia, Hypertension, Imbalance, Lactose intolerance, Nummular dermatitis, Parkinson's disease ([address] Restless legs syndrome (RLS), S/P CABG (coronary artery bypass graft), and SCC (squamous cell carcinoma). Social History:   reports that she has never smoked. She has never used smokeless tobacco. She reports that she does not drink alcohol and does not use drugs. Family History:   Family History   Problem Relation Age of Onset    Heart Disease Mother     Diabetes Mother     Stroke Father     Diabetes Sister     Lung Cancer Brother        Physical Examination      Vitals:  BP (!) 149/56   Pulse 66   Temp 98.7 °F (37.1 °C) (Oral)   Resp 18   Wt 121 lb (54.9 kg)   SpO2 95%   BMI 22.13 kg/m²   Temp (24hrs), Av.7 °F (37.1 °C), Min:98.7 °F (37.1 °C), Max:98.7 °F (37.1 °C)      I/O (24Hr): No intake or output data in the 24 hours ending 21 1532    Physical Exam  Constitutional:       Appearance: She is ill-appearing. HENT:      Head: Normocephalic and atraumatic. Nose: Nose normal.      Mouth/Throat:      Mouth: Mucous membranes are moist.      Pharynx: Oropharynx is clear. Eyes:      Extraocular Movements: Extraocular movements intact and EOM normal.      Pupils: Pupils are equal, round, and reactive to light. Cardiovascular:      Pulses: Normal pulses. Heart sounds: Murmur heard. Pulmonary:      Effort: Pulmonary effort is normal.      Breath sounds: Normal breath sounds. Abdominal:      General: Bowel sounds are normal.      Palpations: Abdomen is soft. Musculoskeletal:         General: Normal range of motion. Cervical back: Normal range of motion and neck supple. Skin:     General: Skin is warm and dry. Findings: Bruising present. Comments: Recent bleeding on left leg, bandage over area     Neurological:      General: No focal deficit present. Mental Status: She is alert and oriented to person, place, and time.       Coordination: Finger-nose-finger test: kinetic tremor noted. Deep Tendon Reflexes:      Reflex Scores:       Brachioradialis reflexes are 2+ on the right side and 2+ on the left side. Patellar reflexes are 2+ on the right side and 2+ on the left side. Psychiatric:         Speech: Speech normal.       Neurologic Exam     Mental Status   Oriented to person, place, and time. Follows 1 step commands. Attention: normal. Concentration: normal.   Speech: speech is normal (Hypophonia  )  Level of consciousness: alert  Knowledge: good. Cranial Nerves     CN III, IV, VI   Pupils are equal, round, and reactive to light. Extraocular motions are normal.   Right pupil: Size: 3 mm. Shape: regular. Reactivity: brisk. Left pupil: Size: 3 mm. Shape: regular. Reactivity: brisk. CN V   Facial sensation intact. Left facial sensation deficit: cheeks (due to recent biopsy/scar tissue)    CN VII   Facial expression full, symmetric. CN VIII   CN VIII normal.     CN IX, X   CN IX normal.   CN X normal.   Palate: symmetric    CN XI   CN XI normal.   Right trapezius strength: normal  Left trapezius strength: normal    CN XII   CN XII normal.   Tongue deviation: none    Motor Exam   Muscle bulk: normal  Right arm tone: rigidity noted. Left arm tone: rigidity noted. Sensory Exam   Light touch normal.     Gait, Coordination, and Reflexes     Coordination   Finger-nose-finger test: kinetic tremor noted.     Tremor   Resting tremor: present  Intention tremor: present  Action tremor: left arm and right arm    Reflexes   Right brachioradialis: 2+  Left brachioradialis: 2+  Right patellar: 2+  Left patellar: 2+  Bradykinesia present        Labs/Imaging/Diagnostics   Labs:  CBC:  Recent Labs     09/11/21  1036   WBC 8.8   RBC 3.84*   HGB 10.9*   HCT 33.2*   MCV 86.5        CHEMISTRIES:  Recent Labs     09/11/21  1036   *   K 4.8   CL 92*   CO2 20*   BUN 16   CREATININE 0.8   GLUCOSE 202*     PT/INR:  Recent Labs     09/11/21  1134   INR 1.71*     APTT:  Recent Labs     09/11/21  1134   APTT 38.1*     LIVER PROFILE:  Recent Labs     09/11/21  1436   AST 12   ALT 6*   BILIDIR <0.2   BILITOT 0.6   ALKPHOS 124       Imaging Last 24 Hours:  XR CHEST PORTABLE    Result Date: 9/11/2021  PROCEDURE: XR CHEST PORTABLE CLINICAL INFORMATION: sob/chest pain COMPARISON: 2/27/2021 TECHNIQUE: A single mobile view of the chest was obtained. 1. Poor inflation of the lungs. Mild cardiomegaly. Metallic sternotomy sutures. 2. Small bilateral pleural effusions. Moderate bibasilar atelectasis/pneumonia. **This report has been created using voice recognition software. It may contain minor errors which are inherent in voice recognition technology. ** Final report electronically signed by Dr. 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 Increased whole body weakness, unsure if it is related to parkinson's or her cardiac status at this time. · Continue sinemet dose  · Recommend a stress echocardiogram  · BNP 3264.0  · Continue Levaquin to treat current PNA-patient currently on 5 liters NC  · SLP/PT/OT  · Maintain adequate hydration  · Neuro will continue to follow    Patient's case reviewed with Dr. Fabiano Guzman. He is in agreement with assessment and plan.     Electronically signed by ROWDY Leblanc CNP on 9/11/21 at 3:32 PM EDT

## 2021-09-11 NOTE — ED NOTES
ED to inpatient nurses report    Chief Complaint   Patient presents with    Chest Pain    Shortness of Breath      Present [facility] ED [facility]  LOC: alert and orientated to name and place  Vital signs   Vitals:    09/11/21 1133 09/11/21 1146 09/11/21 1300 09/11/21 1340   BP:   (!) 141/55    Pulse:   62 65   Resp:  21 13 17   Temp: 98.7 °F (37.1 °C)      TempSrc: Oral      SpO2:   96% 96%   Weight:          Oxygen Baseline Bipap (pt is 88% on room air    Current needs required none  LDAs:   Peripheral IV 09/11/21 Right Forearm (Active)   Site Assessment Clean;Dry; Intact 09/11/21 1341   Line Status Infusing 09/11/21 1341   Dressing Status Clean;Dry; Intact 09/11/21 1341   Dressing Intervention New 09/11/21 1037     Mobility: Requires assistance * 2  Pending ED orders: none  Present condition: stable      Electronically signed by Renate Felty, RN on 9/11/2021 at 2:54 PM       Renate Felty, RN  09/11/21 7436

## 2021-09-11 NOTE — PROGRESS NOTES
Pt admitted to  4K21 from ED. Complaints: generalized pain      INT right arm. IV site free of s/s of infection or infiltration. Vital signs obtained. Assessment and data collection initiated. Two nurse skin assessment performed by [facility] RN and Dana ZVAALA. Oriented to room. Explained patients right to have family, representative or physician notified of their admission. Patient has Declined for physician to be notified. Patient has Declined for family/representative to be notified. Family at bedside    The patient is interested in [facility]. Tabithas meds to beds program?:  No  Fall prevention discussed with patient. Bed alarm on. Call light in reach.

## 2021-09-11 NOTE — ED NOTES
Pt laying in bed at this time with family at bedside. Pt on Bipap at this time and resp regular at this time. PT denies any needs at this time. Call light within reach. Pt placed on pure wick.       Joellen Garcia RN  09/11/21 2684

## 2021-09-11 NOTE — ED PROVIDER NOTES
[address] COMPLAINT   Chief Complaint   Patient presents with    Chest Pain    Shortness of Breath        HPI   Antoni Dunlap is a 80 y.o. female from [facility] with recent admission to [facility] who presents with shortness of breath and hypoxia, worsening respiratory effort, onset was last day or so. No fevers. She is immunized against COVID. The duration has been constant. She came tachypneic. She is on eliquis for her new onset A-fib. She might have fallen yesterday but she denies striking her head or having any bleeding. No fever or leg swelling noted. En route via EMS transport, she might have suffered a skin tear in her left lower leg. REVIEW OF SYSTEMS   Cardiac: +dyspnea on exertion; neg Chest Pain, Denies syncope   Respiratory: +sob and dyspnea; Denies cough or hemoptysis   GI: Denies Vomiting or Diarrhea   General: Denies Fever   All other review of systems are reviewed and are otherwise negative. PAST MEDICAL & SURGICAL HISTORY   Past Medical History:   Diagnosis Date    Arthritis     Atypical chest pain     CAD (coronary artery disease)     Chronic LBBB (left bundle branch block)     Diabetes mellitus type II     Esophageal stricture     History of esophageal stricture.  FH: CAD (coronary artery disease)     Mom and dad both with heart disease at mid to late age.  GERD (gastroesophageal reflux disease)     History of gastritis     History of skin cancer     Hypercholesterolemia     Hyperlipidemia     Hypertension     Imbalance     As far as imbalance is concerned, asked patient to follow-up with Dr. Deepak Goldberg since she follows with him on a regular basis.      Lactose intolerance     Nummular dermatitis     Parkinson's disease ([facility] 2009    Restless legs syndrome (RLS)     S/P CABG (coronary artery bypass graft)     SCC (squamous cell carcinoma)       Past Surgical History:   Procedure Laterality Date    CARDIAC SURGERY      CARDIOVASCULAR STRESS TEST 4 09 2009    Gated SPECT images revealed normal global wall motion with EF of 60%. Persantine test associated w/nonspecific symptoms. EKG is nondiagnostic w/baseline LBBB. No obvious stress-induced ischemia by Cardiolite. EF within normal limits.  CARDIOVASCULAR STRESS TEST  7 10 2007    The gated SPECT images revealed normal wall motion with EF of 69%. Poor exercise tolerance w/SOB on exertion. EKG is nondiagnostic. Cardiolite scan revealing no obvious stress-induced ischemia. EF 60%.  CARDIOVASCULAR STRESS TEST  2 03 2006    Fair exercise tolerance w/SOB on exertion. No EKG evidence of ischemia. Patient should be able to proceed with phase II cardiac rehab.  CATARACT REMOVAL      CATARACT REMOVAL  06/08/2016    AND 6/29/16    DIAGNOSTIC CARDIAC CATH LAB PROCEDURE  12 23 2005    LV was obtained. AGEE projection showed preserved LV function w/estimated EF at 55%. No significant MR. Patent left main coronary artery. Tortuous LAD which appeared to be patent. Patent left circumflex artery. High-grade stenosis around 99% in very large dominant RCA w/heavy calcification at the ostium. Normal LV function.  DOPPLER ECHOCARDIOGRAPHY  4 09 2009    Global LV function w/in lower limits of normal, with mild anteroseptal hypokinesis. EF of 50-55%. Light LVH. Mild to moderate left atrial enlargement. Calcific aortic and mitral valve w/no obvious stenosis. No obvious pericardial effusion.  DOPPLER ECHOCARDIOGRAPHY  7 10 2007    LV function w/in lower limits of normal w/peridoxical septal wall motion. Moderate left atrial enlargement. Mild MR. Mild TR. Calcified valves w/no obvious stenosis. No pericardial effusion.  DOPPLER ECHOCARDIOGRAPHY  4 14 2006    There appears to be significant improvement from previous echo w/complete resolution of pericardial effusion and normal LV function. EF of 60%.  DOPPLER ECHOCARDIOGRAPHY  3 21 2006    Normal LV function. Mild LV hypertrophy. Mild biatrial enlargement. Mildly calcific aortic and mitral valve w/no stenosis. Mild MR. Mild TR. Minimal residual pericardial effusion w/significant improvement from previous echo.  DOPPLER ECHOCARDIOGRAPHY  3 16 2006    Interval change from previous echocardiogram w/worsening of effusion. Correlation clinically. Consideration of pericardial centesis. Will obtain a [facility] consult.  DOPPLER ECHOCARDIOGRAPHY  1 31 2006    No significant change from previous echo. The 2D echo showed moderate degree of pericardial effusion. It does seem to be worst or better than previous echo. No evidence of tamponade.  DOPPLER ECHOCARDIOGRAPHY  1 27 2006    Normal global LV function. Mild biatrial enlargement. Mildly sclerotic aortic & mitral valve w/no stenosis. Mild MR. Mild TR. Small to moderate pericardial effusion w/no obvious tamponade.      JOINT REPLACEMENT      MOHS SURGERY Right 2/13/2019    MOHS DEFECT REPAIR CYSTIC SCC RIGHT LOWER LATERAL LEG WITH SKIN GRAFT FROM RIGHT GROIN (FULL THICKNESS ) performed by Michaelle Pinedo MD at [facility] / [email or web address] Left 12/20/13    left leg lesion excision x2, frozen section, skin graft closure    ROTATOR CUFF REPAIR  09/2001    RIGHT    ROTATOR CUFF REPAIR  04/15/2009    LEFT     SKIN BIOPSY  07/2021    face    SKIN CANCER EXCISION  06/2006      Rt leg    SPINAL CORD STIMULATOR IMPLANTATION N/A 12/4/2018    PERMANENT INTERSTIM performed by Fredy Ch MD at [facility]   Current Outpatient Rx   Medication Sig Dispense Refill    amLODIPine (NORVASC) 10 MG tablet Take 1 tablet by mouth daily 30 tablet 3    sodium chloride 1 g tablet Take 2 tablets by mouth 2 times daily (with meals) 90 tablet 3    senna (SENOKOT) 8.6 MG tablet Take 1 tablet by mouth daily as needed for Constipation 60 tablet 0    potassium chloride (KLOR-CON M) 10 MEQ extended release tablet Take 10 mEq by mouth 3 times daily      amantadine (SYMMETREL) 100 MG capsule Take 1 capsule by mouth daily 30 capsule 0    bumetanide (BUMEX) 1 MG tablet Take 1 tablet by mouth three times a week On Mondays and Thursdays (Patient taking differently: Take 1 mg by mouth Twice a Week On Mondays and Thursdays) 30 tablet 3    blood glucose test strips (CONTOUR TEST) strip USE TO TEST BLOOD SUGAR TWICE DAILY.  200 strip 3    zinc oxide (PINXAV) 30 % OINT Apply topically 3 times daily as needed      lactobacillus (CULTURELLE) capsule Take 1 capsule by mouth 3 times daily (with meals) 20 capsule 0    psyllium (KONSYL) 28.3 % PACK Take 1 packet by mouth daily (Patient taking differently: Take 1 packet by mouth daily as needed ) 30 each 1    pantoprazole (PROTONIX) 40 MG tablet Take 1 tablet by mouth 2 times daily (before meals) 30 tablet 1    sucralfate (CARAFATE) 1 GM tablet Take 1 tablet by mouth 4 times daily (before meals and nightly) Dissolve in 1 ounce of water to take as a slurry 120 tablet 1    ondansetron (ZOFRAN) 4 MG tablet Take 4 mg by mouth every 6 hours       Multiple Vitamin (MULTIVITAMIN) TABS tablet Take 1 tablet by mouth daily 90 tablet 3    Multiple Vitamins-Minerals (PRESERVISION AREDS 2) CAPS Take 2 capsules by mouth 2 times daily 360 capsule 3    Cyanocobalamin 200 MCG/SPRAY LIQD Take 4 sprays by mouth 2 times daily Vitamist - Vitamin B 12 spray 90 mL 3    ferrous sulfate (IRON 325) 325 (65 Fe) MG tablet Take 1 tablet by mouth 2 times daily 180 tablet 1    JANUVIA 100 MG tablet TAKE 1 TABLET BY MOUTH DAILY 90 tablet 2    spironolactone (ALDACTONE) 25 MG tablet Take 1 tablet by mouth daily 30 tablet 3    acetaminophen (TYLENOL 8 HOUR ARTHRITIS PAIN) 650 MG extended release tablet Take 650 mg by mouth 3 times daily      metFORMIN (GLUCOPHAGE-XR) 500 MG extended release tablet Take 1 tablet by mouth 2 times daily 180 tablet 3    diclofenac sodium (VOLTAREN) 1 % GEL Apply 4 g topically 4 times daily 4 Tube 5    lactase (LACTAID ULTRA) 9000 units TABS Take 9,000 Units by mouth 3 times daily (before meals)      magnesium oxide (MAG-OX) 400 MG tablet Take 1 tablet by mouth 3 times daily 30 tablet 3    isosorbide dinitrate (ISORDIL) 20 MG tablet Take 1 tablet by mouth 3 times daily 90 tablet 3    atorvastatin (LIPITOR) 20 MG tablet TAKE 1 TABLET DAILY (Patient taking differently: On hold for surgery) 90 tablet 1    carbidopa-levodopa (SINEMET)  MG per tablet Take 2 tablets by mouth 4 times daily 720 tablet 0    apixaban (ELIQUIS) 2.5 MG TABS tablet TAKE 1 TABLET BY MOUTH 2  TIMES DAILY, EQUIVALENT TO  APIXABAN 180 tablet 1    Cholecalciferol (VITAMIN D3 PO) Take 4,000 Units by mouth 2 times daily       Misc. Devices MISC 1 each by Does not apply route once for 1 dose Param Glucose Meter; Diagnosis:E11.65 1 Device 0    Cranberry 500 MG CAPS Take 1,000 mg by mouth daily       aspirin 81 MG EC tablet Take 81 mg by mouth daily. ALLERGIES   Allergies   Allergen Reactions    Latex Rash    Lisinopril Swelling     mouth    Tape Jaqueline Sicilian Tape] Itching    Bacitracin Hives    Penicillins     Polymyxin B Hives    Keflex [Cephalexin] Nausea And Vomiting and Rash    Lactulose Diarrhea    Morphine Rash    Polysporin [Bacitracin-Polymyxin B] Rash        SOCIAL & FAMILY HISTORY   Social History     Socioeconomic History    Marital status:       Spouse name: None    Number of children: None    Years of education: None    Highest education level: None   Occupational History    None   Tobacco Use    Smoking status: Never Smoker    Smokeless tobacco: Never Used   Vaping Use    Vaping Use: Never used   Substance and Sexual Activity    Alcohol use: No    Drug use: No    Sexual activity: None   Other Topics Concern    None   Social History Narrative    None     Social Determinants of Health     Financial Resource Strain:     Difficulty of Paying Living Expenses:    Food Insecurity:     Worried About Running Out of Food in the Last Year:    [address] in the Last Year:    Transportation Needs:     Lack of Transportation (Medical):  Lack of Transportation (Non-Medical):    Physical Activity:     Days of Exercise per Week:     Minutes of Exercise per Session:    Stress:     Feeling of Stress :    Social Connections:     Frequency of Communication with Friends and Family:     Frequency of Social Gatherings with Friends and Family:     Attends Taoism Services:     Active Member of Clubs or Organizations:     Attends Club or Organization Meetings:     Marital Status:    Intimate Partner Violence:     Fear of Current or Ex-Partner:     Emotionally Abused:     Physically Abused:     Sexually Abused:       Family History   Problem Relation Age of Onset    Heart Disease Mother     Diabetes Mother     Stroke Father     Diabetes Sister     Lung Cancer Brother         PHYSICAL EXAM   VITAL SIGNS: BP (!) 141/55   Pulse 62   Temp 98.7 °F (37.1 °C) (Oral)   Resp 13   Wt 121 lb (54.9 kg)   SpO2 96%   BMI 22.13 kg/m²    Constitutional: Well developed, well nourished, moderate acute distress   HENT: Atraumatic, moist mucus membranes   Neck: supple, no JVD   Respiratory: moderate tachypnea, Lungs showed decreased bibasilar breath sounds with mild subcostal retractions   Cardiovascular: Reg rate, nromal rhythm, no murmurs   Vascular: Radial pulses 2+ equal bilaterally   GI: Soft, nontender, normal bowel sounds   Musculoskeletal: No edema, no deformities   Integument: Skin warm and dry, no petechiae; moderate skin tear in left lower leg  Neurologic: Alert & oriented, normal speech   Psych: Pleasant affect, no hallucinations     EKG (interpreted by me) 9/11/21 10;24  Rhythm: Sinus   Rate: 71 BPM   Axis: normal   Conduction: normal   ST/T Segments: nonacute LBBB (stable)    RADIOLOGY/PROCEDURES   XR CHEST PORTABLE   Final Result   1. Poor inflation of the lungs. Mild cardiomegaly.  Metallic sternotomy See chart for details of medications given during the ED stay. Vitals:    09/11/21 1300   BP: (!) 141/55   Pulse: 62   Resp: 13   Temp:    SpO2: 96%          Differential Diagnosis: CHF, pneumonia, COVID, Acute Coronary Syndrome, Congestive Heart Failure, Myocardial Infarction, Pneumothorax, other. FINAL IMPRESSION   1. Acute on chronic systolic congestive heart failure ([address]    2. Acute respiratory failure with hypoxia (HCC)    3. Hyponatremia         Plan: Admission to the hospital     PT is in respiratory distress with mild hypoxia (on 2L oxygen via NC initially) but requiring BiPAP, which is helping her. She also may have concurrent pneumonia, so levaquin was ordered. Pt will benefit from admission to telemetry. Her COVID-19 test was negative.     Electronically signed by: Arvind Mccarthy MD, 9/11/2021 1:05 PM   (This note was completed with a voice recognition program)         Sarahi Trevino MD  09/11/21 1528

## 2021-09-11 NOTE — ED NOTES
Patient transferred to [facility] room 021 nurse informed.       María Eric  09/11/21 [id number]

## 2021-09-11 NOTE — ED NOTES
Bed: 003A  Expected date: 9/11/21  Expected time: 10:13 AM  Means of arrival: [facility]  Comments:  TEMO Curran RN  09/11/21 1025

## 2021-09-11 NOTE — ED NOTES
ED nurse-to-nurse bedside report    Chief Complaint   Patient presents with    Chest Pain    Shortness of Breath      LOC: alert and orientated to name, place, date  Vital signs   Vitals:    09/11/21 1130 09/11/21 1133 09/11/21 1146 09/11/21 1300   BP: 136/71   (!) 141/55   Pulse: 61   62   Resp: 22 21 13   Temp:  98.7 °F (37.1 °C)     TempSrc:  Oral     SpO2: 92%   96%   Weight:          Pain:    Pain Interventions: denies pain  Pain Goal: denies pain  Oxygen: Yes    Current needs required Bipap   Telemetry: Yes  LDAs:   Peripheral IV 09/11/21 Right Forearm (Active)   Site Assessment Clean;Dry; Intact 09/11/21 1303   Line Status Infusing 09/11/21 1303   Dressing Status Clean;Dry; Intact 09/11/21 1303   Dressing Intervention New 09/11/21 1037     Continuous Infusions:   Mobility: Requires assistance * 1  [place] Fall Risk Score:    Fall Risk 2/4/2021 9/26/2019 7/20/2018 5/10/2018 5/9/2017 1/15/2015   2 or more falls in past year? no yes yes yes no yes   Fall with injury in past year? no yes yes no no yes     Fall Interventions: side rails up x 2, call light within reach, family at bedside  Report given to: LUCAS Katz RN  09/11/21 0184

## 2021-09-11 NOTE — ED NOTES
Patient took [place] off to take a drink and O2 level dropped to 86% on RA. PT O2 back up  To 93% on Bipap at this time.       Bharati James RN  09/11/21 2409

## 2021-09-11 NOTE — H&P
Hospitalist - History & Physical      Patient: Alberto Buchananumb    Unit/Bed:03/003A  YOB: 1932  MRN: [id number]   Acct: [de-identified]   PCP: Marcy Andres MD    Date of Service: Pt seen/examined on 09/11/21  and Admitted to [Inpatient] with expected LOS [greater than] two midnights due to medical therapy. Chief Complaint: Chest pain, weakness, shortness of breath    Assessment and Plan:-  1. Chest pain  2. CAD status post CABG  a. Patient does have a history of chest pain and coronary artery disease status post CABG. Has had multiple episodes of chest pain at the [facility] nursing [facility]. b. Initial troponin negative. Will repeat troponin now.  c. Continue ASA, Imdur, and lipitor  d. Patient does not appear to be on BB therapy at baseline. Current HR of 60.   e. Repeat EKG. Initial EKG without evidence of acute ischemia  f. Will hold on anticoagulation at this time given no ischemia noted on EKG and normal troponin. g. Cardiology consulted. Appreciate their recommendations. 3. Acute hypoxia  4. Pneumonia, POA  5. Acute exacerbation of pulmonary hypertension  6. Acute exacerbation of HFpEF.  a. Concern for acute exacerbation of HFpEF vs. pulmonary hypertension. Most recent echocardiogram 5/2020: EF 55-60%. Mildly dilated LA and RV. RV systolic pressure 63-48% consistent with severe pulmonary hypertension  b. Repeat echocardiogram ordered  c. CXR with bibasilar infiltrate. Concern for possible aspiration. Speech therapy to evaluate  d. Levaquin daily. e. Larayne Sneddon Q4h prn   f. Cardiology consultation. Appreciate their recommendations. 7. Acute on chronic hyponatremia  a. Likely hypervolemic hyponatremia  b. Patient did receive an extra dose of bumex at SNF prior to hospitalization with improvement in her LE edema. Will hold on further diuretics at this time and reassess the need for diuretics tomorrow.  c. U/A, urine sodium, and urine osmolality pending  d.  Suspect the patient has some degree of reset osmostat. 8. Dysphagia  a. Patient has been having increasing complaints of dysphagia while eating  b. Speech therapy consult for evaluation and management. 9. Elevated INR  a. Likely a small component of elevation from chronic eliquis use as well as possible hypoperfusion given elevated LA  b. Hepatic function panel pending  c. If evidence of hepatitis, will further evaluate with RUQ u/s. 10. Acute debility with deconditioning  a. PT/OT evaluation  b. Patient will likely return to SNF at time of discharge  c. CM/SW consult to assist with discharge planning. 11. Parkinson disease  a. Follows with Dr. Phu Bui.  Patient and family state that she was started on a new medication approximately 1 month ago and she has not done well since that time  b. Continue Sinemet. Holding amantadine  c. Will consult Neurology for evaluation while inpatient. 12. Hyperlipidemia  a. Continue lipitor  13. Diabetes mellitus type 2  a. Holding home metformin and [facility]. b. Sliding scale insulin  c. hypoglycemic protocol in place. 14. Hypertension  a. Continue imdur, sprironolactone, norvasc. 15. GERD  a. Continue protonix. 16. pAF  a. She does not appear to be on rate controlling medications. Currently HR of 60.    b. Will place on tele  c. Continue reduced dose eliquis. Disposition: Back to SNF pending resolution and work-up for chest pain/shortness of breath. History Of Present Illness:    Patient is an 80-year-old female with medical history of HFpEF, atrial fibrillation, and pulmonary hypertension who presents to the hospital via EMS from SNF for evaluation of worsening chest pain, shortness of breath, and weakness. Much of the history if obtained from the patient's family present at bedside as the patient has difficulty speaking while on BiPAP.      They state that the patient has had several episodes of chest pain, mostly at rest.  She has received 3 dose of sublingual nitroglycerine ECHOCARDIOGRAPHY  3 16 2006    Interval change from previous echocardiogram w/worsening of effusion. Correlation clinically. Consideration of pericardial centesis. Will obtain a [facility] consult.  DOPPLER ECHOCARDIOGRAPHY  1 31 2006    No significant change from previous echo. The 2D echo showed moderate degree of pericardial effusion. It does seem to be worst or better than previous echo. No evidence of tamponade.  DOPPLER ECHOCARDIOGRAPHY  1 27 2006    Normal global LV function. Mild biatrial enlargement. Mildly sclerotic aortic & mitral valve w/no stenosis. Mild MR. Mild TR. Small to moderate pericardial effusion w/no obvious tamponade.  JOINT REPLACEMENT      MOHS SURGERY Right 2/13/2019    MOHS DEFECT REPAIR CYSTIC SCC RIGHT LOWER LATERAL LEG WITH SKIN GRAFT FROM RIGHT GROIN (FULL THICKNESS ) performed by Norma Rooney MD at [address] [facility] PRE-[facility] / [facility] Left 12/20/13    left leg lesion excision x2, frozen section, skin graft closure    ROTATOR CUFF REPAIR  09/2001    RIGHT    ROTATOR CUFF REPAIR  04/15/2009    LEFT     SKIN BIOPSY  07/2021    face    SKIN CANCER EXCISION  06/2006      Rt leg    SPINAL CORD STIMULATOR IMPLANTATION N/A 12/4/2018    PERMANENT INTERSTIM performed by Marlena Castaneda MD at [facility] Medications:   No current facility-administered medications on file prior to encounter.      Current Outpatient Medications on File Prior to Encounter   Medication Sig Dispense Refill    amLODIPine (NORVASC) 10 MG tablet Take 1 tablet by mouth daily 30 tablet 3    sodium chloride 1 g tablet Take 2 tablets by mouth 2 times daily (with meals) 90 tablet 3    senna (SENOKOT) 8.6 MG tablet Take 1 tablet by mouth daily as needed for Constipation 60 tablet 0    potassium chloride (KLOR-CON M) 10 MEQ extended release tablet Take 10 mEq by mouth 3 times daily      amantadine (SYMMETREL) 100 MG capsule Take 1 capsule by mouth daily 30 capsule 0    bumetanide (BUMEX) 1 MG tablet Take 1 tablet by mouth three times a week On Mondays and Thursdays (Patient taking differently: Take 1 mg by mouth Twice a Week On Mondays and Thursdays) 30 tablet 3    blood glucose test strips (CONTOUR TEST) strip USE TO TEST BLOOD SUGAR TWICE DAILY.  200 strip 3    zinc oxide (PINXAV) 30 % OINT Apply topically 3 times daily as needed      lactobacillus (CULTURELLE) capsule Take 1 capsule by mouth 3 times daily (with meals) 20 capsule 0    psyllium (KONSYL) 28.3 % PACK Take 1 packet by mouth daily (Patient taking differently: Take 1 packet by mouth daily as needed ) 30 each 1    pantoprazole (PROTONIX) 40 MG tablet Take 1 tablet by mouth 2 times daily (before meals) 30 tablet 1    sucralfate (CARAFATE) 1 GM tablet Take 1 tablet by mouth 4 times daily (before meals and nightly) Dissolve in 1 ounce of water to take as a slurry 120 tablet 1    ondansetron (ZOFRAN) 4 MG tablet Take 4 mg by mouth every 6 hours       Multiple Vitamin (MULTIVITAMIN) TABS tablet Take 1 tablet by mouth daily 90 tablet 3    Multiple Vitamins-Minerals (PRESERVISION AREDS 2) CAPS Take 2 capsules by mouth 2 times daily 360 capsule 3    Cyanocobalamin 200 MCG/SPRAY LIQD Take 4 sprays by mouth 2 times daily Vitamist - Vitamin B 12 spray 90 mL 3    ferrous sulfate (IRON 325) 325 (65 Fe) MG tablet Take 1 tablet by mouth 2 times daily 180 tablet 1    JANUVIA 100 MG tablet TAKE 1 TABLET BY MOUTH DAILY 90 tablet 2    spironolactone (ALDACTONE) 25 MG tablet Take 1 tablet by mouth daily 30 tablet 3    acetaminophen (TYLENOL 8 HOUR ARTHRITIS PAIN) 650 MG extended release tablet Take 650 mg by mouth 3 times daily      metFORMIN (GLUCOPHAGE-XR) 500 MG extended release tablet Take 1 tablet by mouth 2 times daily 180 tablet 3    diclofenac sodium (VOLTAREN) 1 % GEL Apply 4 g topically 4 times daily 4 Tube 5    lactase (LACTAID ULTRA) 9000 units TABS Take 9,000 Units by mouth 3 times daily (before meals)      magnesium oxide (MAG-OX) 400 MG tablet Take 1 tablet by mouth 3 times daily 30 tablet 3    isosorbide dinitrate (ISORDIL) 20 MG tablet Take 1 tablet by mouth 3 times daily 90 tablet 3    atorvastatin (LIPITOR) 20 MG tablet TAKE 1 TABLET DAILY (Patient taking differently: On hold for surgery) 90 tablet 1    carbidopa-levodopa (SINEMET)  MG per tablet Take 2 tablets by mouth 4 times daily 720 tablet 0    apixaban (ELIQUIS) 2.5 MG TABS tablet TAKE 1 TABLET BY MOUTH 2  TIMES DAILY, EQUIVALENT TO  APIXABAN 180 tablet 1    Cholecalciferol (VITAMIN D3 PO) Take 4,000 Units by mouth 2 times daily       Misc. Devices MISC 1 each by Does not apply route once for 1 dose Param Glucose Meter; Diagnosis:E11.65 1 Device 0    Cranberry 500 MG CAPS Take 1,000 mg by mouth daily       aspirin 81 MG EC tablet Take 81 mg by mouth daily. Allergies:    Latex, Lisinopril, Tape [adhesive tape], Bacitracin, Penicillins, Polymyxin b, Keflex [cephalexin], Lactulose, Morphine, and Polysporin [bacitracin-polymyxin b]    Social History:    reports that she has never smoked. She has never used smokeless tobacco. She reports that she does not drink alcohol and does not use drugs. Family History:       Problem Relation Age of Onset    Heart Disease Mother     Diabetes Mother     Stroke Father     Diabetes Sister     Lung Cancer Brother        Diet:  ADULT DIET; Dysphagia - Minced and Moist; 4 carb choices (60 gm/meal); No Added Salt (3-4 gm)    Review of systems:   Pertinent positives as noted in the HPI. All other systems reviewed and negative. PHYSICAL EXAM:  BP (!) 141/55   Pulse 65   Temp 98.7 °F (37.1 °C) (Oral)   Resp 17   Wt 121 lb (54.9 kg)   SpO2 96%   BMI 22.13 kg/m²   General appearance: No apparent distress but appears uncomfortable, appears stated age and cooperative. HEENT: Normal cephalic, atraumatic without obvious deformity. Pupils equal, round, and reactive to light. Extra ocular muscles intact. Conjunctivae/corneas clear. Neck: Supple, with full range of motion. No jugular venous distention. Trachea midline. Respiratory:  Diminished bibasilar breath sounds. No appreciable wheezes or rhonchi. Shallow respirations. Cardiovascular: Regular rate at approximately 60. Regular rhythm with normal S1/S2 without murmurs, rubs or gallops. Abdomen: Soft and non-distended. Midline diathesis noted. Tenderness to the epigastric region. No rebound tenderness. Musculoskeletal:  Thin with generalized cachexia noted throughout. Extremities warm and well perfused. Skin: small skin tear noted to the LLE covered by bandage. Neurologic:  Neurovascularly intact without any focal sensory/motor deficits. Cranial nerves: II-XII intact, grossly non-focal.  Psychiatric: Alert and oriented, thought content appropriate, normal insight  Capillary Refill: Brisk,< 3 seconds   Peripheral Pulses: +1 palpable, equal bilaterally     Labs:   Recent Labs     09/11/21  1036   WBC 8.8   HGB 10.9*   HCT 33.2*        Recent Labs     09/11/21  1036   *   K 4.8   CL 92*   CO2 20*   BUN 16   CREATININE 0.8   CALCIUM 9.9     No results for input(s): AST, ALT, BILIDIR, BILITOT, ALKPHOS in the last 72 hours. Recent Labs     09/11/21  1134   INR 1.71*     No results for input(s): Eron Clap in the last 72 hours. Urinalysis:    Lab Results   Component Value Date    NITRU NEGATIVE 02/27/2021    WBCUA 25-50 11/26/2018    BACTERIA NONE 11/26/2018    RBCUA 3-5 11/26/2018    BLOODU NEGATIVE 02/27/2021    SPECGRAV 1.015 12/28/2019    GLUCOSEU NEGATIVE 02/27/2021       Radiology:   XR CHEST PORTABLE   Final Result   1. Poor inflation of the lungs. Mild cardiomegaly. Metallic sternotomy sutures. 2. Small bilateral pleural effusions. Moderate bibasilar atelectasis/pneumonia. **This report has been created using voice recognition software.   It may contain minor errors which are inherent in voice recognition technology. **      Final report electronically signed by Dr. Luz Novoa on 9/11/2021 11:13 AM        XR CHEST PORTABLE    Result Date: 9/11/2021  PROCEDURE: XR CHEST PORTABLE CLINICAL INFORMATION: sob/chest pain COMPARISON: 2/27/2021 TECHNIQUE: A single mobile view of the chest was obtained. 1. Poor inflation of the lungs. Mild cardiomegaly. Metallic sternotomy sutures. 2. Small bilateral pleural effusions. Moderate bibasilar atelectasis/pneumonia. **This report has been created using voice recognition software. It may contain minor errors which are inherent in voice recognition technology. ** Final report electronically signed by Dr. Luz Novoa on 9/11/2021 11:13 AM    Electronically signed by Isaac Mulligan DO on 9/11/2021 at 2:16 PM

## 2021-09-12 LAB
ABSOLUTE RETIC #: 88 THOU/MM3 (ref 20–115)
ANION GAP SERPL CALCULATED.3IONS-SCNC: 13 MEQ/L (ref 8–16)
AVERAGE GLUCOSE: 129 MG/DL (ref 70–126)
BACTERIA: NORMAL
BASOPHILS # BLD: 0.7 %
BASOPHILS ABSOLUTE: 0 THOU/MM3 (ref 0–0.1)
BILIRUBIN URINE: NEGATIVE
BLOOD, URINE: NEGATIVE
BUN BLDV-MCNC: 14 MG/DL (ref 7–22)
CALCIUM SERPL-MCNC: 9.2 MG/DL (ref 8.5–10.5)
CASTS: NORMAL /LPF
CASTS: NORMAL /LPF
CHARACTER, URINE: CLEAR
CHLORIDE BLD-SCNC: 92 MEQ/L (ref 98–111)
CO2: 23 MEQ/L (ref 23–33)
COLOR: YELLOW
CREAT SERPL-MCNC: 0.7 MG/DL (ref 0.4–1.2)
CREATININE URINE: 57.4 MG/DL
CRYSTALS: NORMAL
EOSINOPHIL # BLD: 2.5 %
EOSINOPHILS ABSOLUTE: 0.1 THOU/MM3 (ref 0–0.4)
EPITHELIAL CELLS, UA: NORMAL /HPF
ERYTHROCYTE [DISTWIDTH] IN BLOOD BY AUTOMATED COUNT: 15.2 % (ref 11.5–14.5)
ERYTHROCYTE [DISTWIDTH] IN BLOOD BY AUTOMATED COUNT: 49.1 FL (ref 35–45)
FOLATE: 19.8 NG/ML (ref 4.8–24.2)
GFR SERPL CREATININE-BSD FRML MDRD: 79 ML/MIN/1.73M2
GLUCOSE BLD-MCNC: 121 MG/DL (ref 70–108)
GLUCOSE BLD-MCNC: 156 MG/DL (ref 70–108)
GLUCOSE BLD-MCNC: 192 MG/DL (ref 70–108)
GLUCOSE BLD-MCNC: 99 MG/DL (ref 70–108)
GLUCOSE, URINE: NEGATIVE MG/DL
HBA1C MFR BLD: 6.3 % (ref 4.4–6.4)
HCT VFR BLD CALC: 33.7 % (ref 37–47)
HEMOGLOBIN: 10.8 GM/DL (ref 12–16)
IMMATURE GRANS (ABS): 0.01 THOU/MM3 (ref 0–0.07)
IMMATURE GRANULOCYTES: 0.2 %
IMMATURE RETIC FRACT: 7 % (ref 3–15.9)
INR BLD: 1.75 (ref 0.85–1.13)
IRON SATURATION: 11 % (ref 20–50)
IRON: 30 UG/DL (ref 50–170)
KETONES, URINE: NEGATIVE
LD: 161 U/L (ref 100–190)
LEUKOCYTE ESTERASE, URINE: NEGATIVE
LYMPHOCYTES # BLD: 10.3 %
LYMPHOCYTES ABSOLUTE: 0.6 THOU/MM3 (ref 1–4.8)
MCH RBC QN AUTO: 28.3 PG (ref 26–33)
MCHC RBC AUTO-ENTMCNC: 32 GM/DL (ref 32.2–35.5)
MCV RBC AUTO: 88.2 FL (ref 81–99)
MISCELLANEOUS LAB TEST RESULT: NORMAL
MONOCYTES # BLD: 11.5 %
MONOCYTES ABSOLUTE: 0.7 THOU/MM3 (ref 0.4–1.3)
NITRITE, URINE: NEGATIVE
NUCLEATED RED BLOOD CELLS: 0 /100 WBC
OSMOLALITY URINE: 361 MOSMOL/KG (ref 250–750)
OSMOLALITY: 264 MOSMOL/KG (ref 275–295)
PH UA: 7 (ref 5–9)
PLATELET # BLD: 168 THOU/MM3 (ref 130–400)
PMV BLD AUTO: 8.8 FL (ref 9.4–12.4)
POTASSIUM REFLEX MAGNESIUM: 4.4 MEQ/L (ref 3.5–5.2)
PROTEIN UA: NEGATIVE MG/DL
RBC # BLD: 3.82 MILL/MM3 (ref 4.2–5.4)
RBC URINE: NORMAL /HPF
RENAL EPITHELIAL, UA: NORMAL
RETIC HEMOGLOBIN: 31.5 PG (ref 28.2–35.7)
RETICULOCYTE ABSOLUTE COUNT: 2.3 % (ref 0.5–2)
SEG NEUTROPHILS: 74.8 %
SEGMENTED NEUTROPHILS ABSOLUTE COUNT: 4.4 THOU/MM3 (ref 1.8–7.7)
SODIUM BLD-SCNC: 122 MEQ/L (ref 135–145)
SODIUM BLD-SCNC: 128 MEQ/L (ref 135–145)
SODIUM URINE: 76 MEQ/L
SPECIFIC GRAVITY UA: 1.01 (ref 1–1.03)
T4 FREE: 1.52 NG/DL (ref 0.93–1.76)
TOTAL IRON BINDING CAPACITY: 268 UG/DL (ref 171–450)
TOTAL PROTEIN: 6.6 G/DL (ref 6.1–8)
TSH SERPL DL<=0.05 MIU/L-ACNC: 5.03 UIU/ML (ref 0.4–4.2)
UROBILINOGEN, URINE: 0.2 EU/DL (ref 0–1)
VITAMIN B-12: 515 PG/ML (ref 211–911)
WBC # BLD: 5.9 THOU/MM3 (ref 4.8–10.8)
WBC UA: NORMAL /HPF
YEAST: NORMAL

## 2021-09-12 PROCEDURE — 97166 OT EVAL MOD COMPLEX 45 MIN: CPT

## 2021-09-12 PROCEDURE — 82746 ASSAY OF FOLIC ACID SERUM: CPT

## 2021-09-12 PROCEDURE — 82570 ASSAY OF URINE CREATININE: CPT

## 2021-09-12 PROCEDURE — 83036 HEMOGLOBIN GLYCOSYLATED A1C: CPT

## 2021-09-12 PROCEDURE — 99223 1ST HOSP IP/OBS HIGH 75: CPT | Performed by: NUCLEAR MEDICINE

## 2021-09-12 PROCEDURE — 83935 ASSAY OF URINE OSMOLALITY: CPT

## 2021-09-12 PROCEDURE — 36415 COLL VENOUS BLD VENIPUNCTURE: CPT

## 2021-09-12 PROCEDURE — 82607 VITAMIN B-12: CPT

## 2021-09-12 PROCEDURE — 84300 ASSAY OF URINE SODIUM: CPT

## 2021-09-12 PROCEDURE — 97530 THERAPEUTIC ACTIVITIES: CPT

## 2021-09-12 PROCEDURE — 80048 BASIC METABOLIC PNL TOTAL CA: CPT

## 2021-09-12 PROCEDURE — 6370000000 HC RX 637 (ALT 250 FOR IP): Performed by: STUDENT IN AN ORGANIZED HEALTH CARE EDUCATION/TRAINING PROGRAM

## 2021-09-12 PROCEDURE — 82948 REAGENT STRIP/BLOOD GLUCOSE: CPT

## 2021-09-12 PROCEDURE — 83615 LACTATE (LD) (LDH) ENZYME: CPT

## 2021-09-12 PROCEDURE — 83540 ASSAY OF IRON: CPT

## 2021-09-12 PROCEDURE — 84155 ASSAY OF PROTEIN SERUM: CPT

## 2021-09-12 PROCEDURE — 81001 URINALYSIS AUTO W/SCOPE: CPT

## 2021-09-12 PROCEDURE — 84439 ASSAY OF FREE THYROXINE: CPT

## 2021-09-12 PROCEDURE — 2060000000 HC ICU INTERMEDIATE R&B

## 2021-09-12 PROCEDURE — 99233 SBSQ HOSP IP/OBS HIGH 50: CPT | Performed by: HOSPITALIST

## 2021-09-12 PROCEDURE — 84295 ASSAY OF SERUM SODIUM: CPT

## 2021-09-12 PROCEDURE — 93005 ELECTROCARDIOGRAM TRACING: CPT

## 2021-09-12 PROCEDURE — 83930 ASSAY OF BLOOD OSMOLALITY: CPT

## 2021-09-12 PROCEDURE — 83550 IRON BINDING TEST: CPT

## 2021-09-12 PROCEDURE — 2580000003 HC RX 258: Performed by: STUDENT IN AN ORGANIZED HEALTH CARE EDUCATION/TRAINING PROGRAM

## 2021-09-12 PROCEDURE — 85025 COMPLETE CBC W/AUTO DIFF WBC: CPT

## 2021-09-12 PROCEDURE — 84443 ASSAY THYROID STIM HORMONE: CPT

## 2021-09-12 PROCEDURE — 85046 RETICYTE/HGB CONCENTRATE: CPT

## 2021-09-12 PROCEDURE — 85610 PROTHROMBIN TIME: CPT

## 2021-09-12 RX ORDER — BUMETANIDE 1 MG/1
1 TABLET ORAL DAILY
Status: DISCONTINUED | OUTPATIENT
Start: 2021-09-12 | End: 2021-09-20 | Stop reason: HOSPADM

## 2021-09-12 RX ORDER — FERROUS SULFATE 325(65) MG
325 TABLET ORAL 2 TIMES DAILY
Status: DISCONTINUED | OUTPATIENT
Start: 2021-09-12 | End: 2021-09-20 | Stop reason: HOSPADM

## 2021-09-12 RX ORDER — ALOGLIPTIN 12.5 MG/1
12.5 TABLET, FILM COATED ORAL DAILY
Refills: 2 | Status: DISCONTINUED | OUTPATIENT
Start: 2021-09-12 | End: 2021-09-20 | Stop reason: HOSPADM

## 2021-09-12 RX ORDER — METFORMIN HYDROCHLORIDE 500 MG/1
500 TABLET, EXTENDED RELEASE ORAL 2 TIMES DAILY
Status: DISCONTINUED | OUTPATIENT
Start: 2021-09-12 | End: 2021-09-20 | Stop reason: HOSPADM

## 2021-09-12 RX ADMIN — ISOSORBIDE DINITRATE 20 MG: 20 TABLET ORAL at 09:06

## 2021-09-12 RX ADMIN — APIXABAN 2.5 MG: 2.5 TABLET, FILM COATED ORAL at 09:06

## 2021-09-12 RX ADMIN — SUCRALFATE 1 G: 1 TABLET ORAL at 06:27

## 2021-09-12 RX ADMIN — SUCRALFATE 1 G: 1 TABLET ORAL at 09:06

## 2021-09-12 RX ADMIN — CARBIDOPA AND LEVODOPA 2 TABLET: 25; 100 TABLET ORAL at 09:06

## 2021-09-12 RX ADMIN — ASPIRIN 81 MG: 81 TABLET, COATED ORAL at 09:06

## 2021-09-12 RX ADMIN — INSULIN LISPRO 2 UNITS: 100 INJECTION, SOLUTION INTRAVENOUS; SUBCUTANEOUS at 16:48

## 2021-09-12 RX ADMIN — PANTOPRAZOLE SODIUM 40 MG: 40 TABLET, DELAYED RELEASE ORAL at 06:26

## 2021-09-12 RX ADMIN — INSULIN LISPRO 2 UNITS: 100 INJECTION, SOLUTION INTRAVENOUS; SUBCUTANEOUS at 12:24

## 2021-09-12 RX ADMIN — SUCRALFATE 1 G: 1 TABLET ORAL at 16:48

## 2021-09-12 RX ADMIN — SODIUM CHLORIDE, PRESERVATIVE FREE 10 ML: 5 INJECTION INTRAVENOUS at 09:06

## 2021-09-12 RX ADMIN — METFORMIN HYDROCHLORIDE 500 MG: 500 TABLET, EXTENDED RELEASE ORAL at 19:43

## 2021-09-12 RX ADMIN — ATORVASTATIN CALCIUM 20 MG: 20 TABLET, FILM COATED ORAL at 09:06

## 2021-09-12 RX ADMIN — PANTOPRAZOLE SODIUM 40 MG: 40 TABLET, DELAYED RELEASE ORAL at 16:48

## 2021-09-12 RX ADMIN — AMLODIPINE BESYLATE 10 MG: 10 TABLET ORAL at 09:06

## 2021-09-12 RX ADMIN — CARBIDOPA AND LEVODOPA 2 TABLET: 25; 100 TABLET ORAL at 16:48

## 2021-09-12 RX ADMIN — CARBIDOPA AND LEVODOPA 2 TABLET: 25; 100 TABLET ORAL at 12:24

## 2021-09-12 RX ADMIN — ISOSORBIDE DINITRATE 20 MG: 20 TABLET ORAL at 12:24

## 2021-09-12 RX ADMIN — ISOSORBIDE DINITRATE 20 MG: 20 TABLET ORAL at 21:45

## 2021-09-12 RX ADMIN — BUMETANIDE 1 MG: 1 TABLET ORAL at 16:48

## 2021-09-12 RX ADMIN — SPIRONOLACTONE 25 MG: 25 TABLET ORAL at 09:06

## 2021-09-12 RX ADMIN — CARBIDOPA AND LEVODOPA 2 TABLET: 25; 100 TABLET ORAL at 19:43

## 2021-09-12 RX ADMIN — FERROUS SULFATE TAB 325 MG (65 MG ELEMENTAL FE) 325 MG: 325 (65 FE) TAB at 19:43

## 2021-09-12 RX ADMIN — ALOGLIPTIN 12.5 MG: 12.5 TABLET, FILM COATED ORAL at 16:48

## 2021-09-12 RX ADMIN — SUCRALFATE 1 G: 1 TABLET ORAL at 19:43

## 2021-09-12 ASSESSMENT — PAIN DESCRIPTION - DESCRIPTORS
DESCRIPTORS: ACHING;DULL
DESCRIPTORS: DULL;DISCOMFORT

## 2021-09-12 ASSESSMENT — ENCOUNTER SYMPTOMS
CONSTIPATION: 0
CHEST TIGHTNESS: 1
SORE THROAT: 0
SHORTNESS OF BREATH: 1
NAUSEA: 0
VOMITING: 0
DIARRHEA: 0
RHINORRHEA: 0
ABDOMINAL PAIN: 0
EYE PAIN: 0

## 2021-09-12 ASSESSMENT — PAIN SCALES - GENERAL
PAINLEVEL_OUTOF10: 6
PAINLEVEL_OUTOF10: 6
PAINLEVEL_OUTOF10: 4

## 2021-09-12 ASSESSMENT — PAIN DESCRIPTION - ONSET
ONSET: ON-GOING
ONSET: ON-GOING

## 2021-09-12 ASSESSMENT — PAIN DESCRIPTION - FREQUENCY
FREQUENCY: CONTINUOUS
FREQUENCY: CONTINUOUS

## 2021-09-12 ASSESSMENT — PAIN DESCRIPTION - PAIN TYPE
TYPE: CHRONIC PAIN
TYPE: ACUTE PAIN

## 2021-09-12 ASSESSMENT — PAIN DESCRIPTION - ORIENTATION
ORIENTATION: LEFT
ORIENTATION: RIGHT;LEFT;MID

## 2021-09-12 ASSESSMENT — PAIN DESCRIPTION - PROGRESSION
CLINICAL_PROGRESSION: NOT CHANGED
CLINICAL_PROGRESSION: GRADUALLY IMPROVING

## 2021-09-12 ASSESSMENT — PAIN DESCRIPTION - LOCATION
LOCATION: ABDOMEN
LOCATION: ABDOMEN;LEG

## 2021-09-12 NOTE — PROGRESS NOTES
Neurology Progress Note    Date:9/12/2021       XPSA:[id number]  Patient Jillian Dueñas     YOB: 1932     Age:88 y.o. Requesting Physician: Danny Craft MD     Reason for Consult:  Evaluate for history of parkinson's. Issue with recent medication change      Chief Complaint: Whole body weakness    Subjective   Hemant Kirby is a 80-year-old female who presents to [facility] ED via EMS from [facility] for worsening chest pain weakness and shortness of breath. She has a history significant for Parkinson's disease, diabetes mellitus type 2, paroxysmal atrial fibrillation and is on Eliquis 2.5 mg twice daily, CAD post CABG, HF, skin cancer with recent left cheek biopsy. Her family reports that she is very weak and requires significant assistance with transfers and simple tasks such as her ADLs. Her exercise tolerance has decreased over the last month and more specifically over the last 1 to 2 weeks. Her family does report that she has had worsening lower extremity edema. She typically sees Dr. Angy Zepeda for her Parkinson's and approximately 1 month ago on 8/2 she was started on amantadine due to worsening parkinsonians symptoms and has not done well since that time. Per patient and her family for almost a month now she has had full body weakness, cannot get her feet to move, feels like she is shaking inside and out, and is having a hard time with writing, per family she takes pride in her handwriting. She also reports a few weeks of increased tiredness in which she falls asleep randomly, but is unable to stay asleep. On 8/10 patient and her family report that she had a slow fall at [place], where she had her own apartment in which her  walker moved with her and she grabbed onto clothes in the closet, she was unable to get up.  3 days later she was admitted to [address] [place] [address] for x-rays and this showed no fractures.   Upon discharge she was sent to [facility] for rehab and according to patient and family she just kept getting worse and worse every day. She works with therapy while at [facility] daily. She also reports shortness of breath over the last month as well. Per her family she typically does not wear any oxygen and she is currently on 5L nasal cannula. Interval history 9/12/2021:  Patient reports not sleeping well. She still reports shortness of breath and weakness. Review of Systems   Review of Systems   Constitutional: Positive for activity change. Negative for chills and fever. HENT: Negative for congestion, rhinorrhea and sore throat. Eyes: Negative for pain and visual disturbance. Respiratory: Positive for chest tightness and shortness of breath. Cardiovascular: Positive for chest pain. Gastrointestinal: Negative for abdominal pain, constipation, diarrhea, nausea and vomiting. Genitourinary: Negative for difficulty urinating. Musculoskeletal: Negative for neck pain and neck stiffness. Neurological: Positive for tremors (action tremor, at baseline) and weakness. Negative for dizziness. Hematological: Bruises/bleeds easily. Psychiatric/Behavioral: Negative for confusion and decreased concentration. Medications   Scheduled Meds:    sodium chloride flush  5-40 mL IntraVENous 2 times per day    [START ON 9/13/2021] levofloxacin  750 mg IntraVENous Q48H    amLODIPine  10 mg Oral Daily    apixaban  2.5 mg Oral BID    aspirin  81 mg Oral Daily    atorvastatin  20 mg Oral Daily    carbidopa-levodopa  2 tablet Oral 4x Daily    isosorbide dinitrate  20 mg Oral TID    pantoprazole  40 mg Oral BID AC    spironolactone  25 mg Oral Daily    sucralfate  1 g Oral 4x Daily AC & HS    insulin lispro  0-12 Units SubCUTAneous TID WC    insulin lispro  0-6 Units SubCUTAneous Nightly     Continuous Infusions:    sodium chloride      dextrose       No current facility-administered medications on file prior to encounter.      Current Outpatient Medications on File Prior to Encounter   Medication Sig Dispense Refill    amLODIPine (NORVASC) 10 MG tablet Take 1 tablet by mouth daily 30 tablet 3    sodium chloride 1 g tablet Take 2 tablets by mouth 2 times daily (with meals) 90 tablet 3    senna (SENOKOT) 8.6 MG tablet Take 1 tablet by mouth daily as needed for Constipation 60 tablet 0    potassium chloride (KLOR-CON M) 10 MEQ extended release tablet Take 10 mEq by mouth 3 times daily      amantadine (SYMMETREL) 100 MG capsule Take 1 capsule by mouth daily 30 capsule 0    bumetanide (BUMEX) 1 MG tablet Take 1 tablet by mouth three times a week On Mondays and Thursdays (Patient taking differently: Take 1 mg by mouth Twice a Week On Mondays and Thursdays) 30 tablet 3    blood glucose test strips (CONTOUR TEST) strip USE TO TEST BLOOD SUGAR TWICE DAILY.  200 strip 3    zinc oxide (PINXAV) 30 % OINT Apply topically 3 times daily as needed      lactobacillus (CULTURELLE) capsule Take 1 capsule by mouth 3 times daily (with meals) 20 capsule 0    psyllium (KONSYL) 28.3 % PACK Take 1 packet by mouth daily (Patient taking differently: Take 1 packet by mouth daily as needed ) 30 each 1    pantoprazole (PROTONIX) 40 MG tablet Take 1 tablet by mouth 2 times daily (before meals) 30 tablet 1    sucralfate (CARAFATE) 1 GM tablet Take 1 tablet by mouth 4 times daily (before meals and nightly) Dissolve in 1 ounce of water to take as a slurry 120 tablet 1    ondansetron (ZOFRAN) 4 MG tablet Take 4 mg by mouth every 6 hours       Multiple Vitamin (MULTIVITAMIN) TABS tablet Take 1 tablet by mouth daily 90 tablet 3    Multiple Vitamins-Minerals (PRESERVISION AREDS 2) CAPS Take 2 capsules by mouth 2 times daily 360 capsule 3    Cyanocobalamin 200 MCG/SPRAY LIQD Take 4 sprays by mouth 2 times daily Vitamist - Vitamin B 12 spray 90 mL 3    ferrous sulfate (IRON 325) 325 (65 Fe) MG tablet Take 1 tablet by mouth 2 times daily 180 tablet 1    JANUVIA 100 MG tablet TAKE 1 TABLET BY MOUTH DAILY 90 tablet 2    spironolactone (ALDACTONE) 25 MG tablet Take 1 tablet by mouth daily 30 tablet 3    acetaminophen (TYLENOL 8 HOUR ARTHRITIS PAIN) 650 MG extended release tablet Take 650 mg by mouth 3 times daily      metFORMIN (GLUCOPHAGE-XR) 500 MG extended release tablet Take 1 tablet by mouth 2 times daily 180 tablet 3    diclofenac sodium (VOLTAREN) 1 % GEL Apply 4 g topically 4 times daily 4 Tube 5    lactase (LACTAID ULTRA) 9000 units TABS Take 9,000 Units by mouth 3 times daily (before meals)      magnesium oxide (MAG-OX) 400 MG tablet Take 1 tablet by mouth 3 times daily 30 tablet 3    isosorbide dinitrate (ISORDIL) 20 MG tablet Take 1 tablet by mouth 3 times daily 90 tablet 3    atorvastatin (LIPITOR) 20 MG tablet TAKE 1 TABLET DAILY (Patient taking differently: On hold for surgery) 90 tablet 1    carbidopa-levodopa (SINEMET)  MG per tablet Take 2 tablets by mouth 4 times daily 720 tablet 0    apixaban (ELIQUIS) 2.5 MG TABS tablet TAKE 1 TABLET BY MOUTH 2  TIMES DAILY, EQUIVALENT TO  APIXABAN 180 tablet 1    Cholecalciferol (VITAMIN D3 PO) Take 4,000 Units by mouth 2 times daily       Misc. Devices MISC 1 each by Does not apply route once for 1 dose Param Glucose Meter; Diagnosis:E11.65 1 Device 0    Cranberry 500 MG CAPS Take 1,000 mg by mouth daily       aspirin 81 MG EC tablet Take 81 mg by mouth daily.            PRN Meds: sodium chloride flush, sodium chloride, ondansetron **OR** ondansetron, polyethylene glycol, acetaminophen **OR** acetaminophen, ipratropium-albuterol, glucose, dextrose, glucagon (rDNA), dextrose    Past History    Past Medical History:   has a past medical history of Arthritis, Atypical chest pain, CAD (coronary artery disease), Chronic LBBB (left bundle branch block), Diabetes mellitus type II, Esophageal stricture, FH: CAD (coronary artery disease), GERD (gastroesophageal reflux disease), History of gastritis, History of skin cancer, Hypercholesterolemia, Hyperlipidemia, Hypertension, Imbalance, Lactose intolerance, Nummular dermatitis, Parkinson's disease ([address] Restless legs syndrome (RLS), S/P CABG (coronary artery bypass graft), and SCC (squamous cell carcinoma). Social History:   reports that she has never smoked. She has never used smokeless tobacco. She reports that she does not drink alcohol and does not use drugs. Family History:   Family History   Problem Relation Age of Onset    Heart Disease Mother     Diabetes Mother     Stroke Father     Diabetes Sister     Lung Cancer Brother        Physical Examination      Vitals:  BP (!) 150/65   Pulse 75   Temp 97.9 °F (36.6 °C) (Oral)   Resp 20   Ht 5' 2\" (1.575 m)   Wt 112 lb 8 oz (51 kg)   SpO2 93%   BMI 20.58 kg/m²   Temp (24hrs), Av.4 °F (36.9 °C), Min:97.6 °F (36.4 °C), Max:99.1 °F (37.3 °C)      I/O (24Hr): Intake/Output Summary (Last 24 hours) at 2021 0750  Last data filed at 2021 0307  Gross per 24 hour   Intake 460 ml   Output 175 ml   Net 285 ml       Physical Exam  Constitutional:       Appearance: She is ill-appearing. HENT:      Head: Normocephalic and atraumatic. Nose: Nose normal.      Mouth/Throat:      Mouth: Mucous membranes are moist.      Pharynx: Oropharynx is clear. Eyes:      Extraocular Movements: Extraocular movements intact and EOM normal.      Pupils: Pupils are equal, round, and reactive to light. Cardiovascular:      Pulses: Normal pulses. Heart sounds: Murmur heard. Pulmonary:      Effort: Pulmonary effort is normal.      Breath sounds: Normal breath sounds. Abdominal:      General: Bowel sounds are normal.      Palpations: Abdomen is soft. Musculoskeletal:         General: Normal range of motion. Cervical back: Normal range of motion and neck supple. Skin:     General: Skin is warm and dry. Findings: Bruising present.       Comments: Recent bleeding on left leg, bandage over area     Neurological:      General: No focal deficit present. Mental Status: She is alert and oriented to person, place, and time. Coordination: Finger-nose-finger test: kinetic tremor noted. Deep Tendon Reflexes:      Reflex Scores:       Brachioradialis reflexes are 2+ on the right side and 2+ on the left side. Patellar reflexes are 2+ on the right side and 2+ on the left side. Psychiatric:         Speech: Speech normal.       Neurologic Exam     Mental Status   Oriented to person, place, and time. Follows 1 step commands. Attention: normal. Concentration: normal.   Speech: speech is normal (Hypophonia  )  Level of consciousness: alert  Knowledge: good. Cranial Nerves     CN III, IV, VI   Pupils are equal, round, and reactive to light. Extraocular motions are normal.   Right pupil: Size: 3 mm. Shape: regular. Reactivity: brisk. Left pupil: Size: 3 mm. Shape: regular. Reactivity: brisk. CN V   Facial sensation intact. Left facial sensation deficit: cheeks (due to recent biopsy/scar tissue)    CN VII   Facial expression full, symmetric. CN VIII   CN VIII normal.     CN IX, X   CN IX normal.   CN X normal.   Palate: symmetric    CN XI   CN XI normal.   Right trapezius strength: normal  Left trapezius strength: normal    CN XII   CN XII normal.   Tongue deviation: none    Motor Exam   Muscle bulk: normal  Right arm tone: rigidity noted. Left arm tone: rigidity noted. Sensory Exam   Light touch normal.     Gait, Coordination, and Reflexes     Coordination   Finger-nose-finger test: kinetic tremor noted.     Tremor   Resting tremor: present  Intention tremor: present  Action tremor: left arm and right arm    Reflexes   Right brachioradialis: 2+  Left brachioradialis: 2+  Right patellar: 2+  Left patellar: 2+  Bradykinesia present        Labs/Imaging/Diagnostics   Labs:  CBC:  Recent Labs     09/11/21  1036   WBC 8.8   RBC 3.84*   HGB 10.9*   HCT 33.2*   MCV 86.5        CHEMISTRIES:  Recent Labs 09/11/21  1036   *   K 4.8   CL 92*   CO2 20*   BUN 16   CREATININE 0.8   GLUCOSE 202*     PT/INR:  Recent Labs     09/11/21  1134 09/12/21  0558   INR 1.71* 1.75*     APTT:  Recent Labs     09/11/21  1134   APTT 38.1*     LIVER PROFILE:  Recent Labs     09/11/21  1436   AST 12   ALT 6*   BILIDIR <0.2   BILITOT 0.6   ALKPHOS 124       Imaging Last 24 Hours:  XR CHEST PORTABLE    Result Date: 9/11/2021  PROCEDURE: XR CHEST PORTABLE CLINICAL INFORMATION: sob/chest pain COMPARISON: 2/27/2021 TECHNIQUE: A single mobile view of the chest was obtained. 1. Poor inflation of the lungs. Mild cardiomegaly. Metallic sternotomy sutures. 2. Small bilateral pleural effusions. Moderate bibasilar atelectasis/pneumonia. **This report has been created using voice recognition software. It may contain minor errors which are inherent in voice recognition technology. ** Final report electronically signed by Dr. Mohamud Crane on 9/11/2021 11:13 AM        Assessment and Plan:        Mona Devries is an 44-year-old female who presents to the emergency department at [facility] due to increased exercise intolerance with dyspnea on exertion and worsening chest pain. She does have a history of Parkinson's, but it is our impression that the symptoms she is experiencing are less related to her Parkinson's. We recommend cardiac work-up on her for medical management. At this time patient reports that she does not want to have any surgery. 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 Increased whole body weakness, activity intolerance, and shortness of breath. Most likely cardiac in nature, less likely due to her Parkinson's.   · Continue sinemet dose  · Recommend a stress echocardiogram  · BNP 3264.0  · Continue Levaquin to treat current PNA-patient currently on 5 liters NC  · SLP/PT/OT  · Maintain adequate hydration  · Continue to follow-up with Dr. Chuckie Byers for parkinson's management   · Neuro will sign off at this time. Please call with any needs or concerns. Patient was seen in conjunction with Dr. Luke Lora. He is in agreement with assessment and plan.     Electronically signed by ROWDY Lino CNP on 9/12/21 at 12:14 PM EDT

## 2021-09-12 NOTE — CONSULTS
[address] [place] [address]                                  CONSULTATION    PATIENT NAME: Cheyenne Kuhn                       :        1932  MED REC NO:   [id number]                           ROOM:       0021  ACCOUNT NO:   [de-identified]                           ADMIT DATE: 2021  PROVIDER:     JOSELYN Lamb DATE:  2021    CARDIOLOGY CONSULTATION    REASON FOR CONSULTATION:  Chest discomfort, shortness of breath. REFERRING PROVIDER:  Hospitalist Service. HISTORY OF PRESENT ILLNESS:  This is a pleasant 80year-old patient who  has been unfortunately going downhill the last year with generalized  weakness, balance issues, falling, aches and pains, and recurrent  shortness of breath and chest discomfort, comes in with an episode of  shortness of breath and chest discomfort after she was here a month ago  for hyponatremia and ended up at the nursing home. So far, workup  revealed an ongoing hyponatremia with questionable atelectasis versus  pneumonia on the chest x-ray with elevated proBNP suggestive of some  degree of congestive heart failure and possible angina-type symptoms. We are consulted to assist in the management of the patient. REVIEW OF SYSTEMS:  No obvious fever or chills. No hematuria or  dysuria. No obvious abdominal pain, nausea, vomiting, or diarrhea. The  patient has dizziness and balance issues. No recent trauma. No  bleeding problem. Diffuse arthritic and musculoskeletal problem. Chronic shortness of breath, worse lately. No recent surgery. PAST MEDICAL HISTORY:  1.  _____ disease with bypass surgery 16 years ago. 2.  Hypertension. 3.  Atrial fibrillation. 4.  Hyperlipidemia. 5.  Parkinson's. 6.  Arthritis. ALLERGIES:  BACITRACIN PENICILLIN, KEFLEX, MORPHINE SULFATE.     MEDICATIONS:  Amlodipine 10 a day, Aldactone 25 a day, Eliquis 5 twice a  day, Lipitor 20 a day, Humalog, Imdur 30 a day, Sinemet 25/100 a day. SOCIAL HISTORY:  No tobacco, no drugs,, no alcohol. FAMILY HISTORY:  Significant for hypertension and coronary artery  disease. PHYSICAL EXAMINATION:  VITAL SIGNS:  Showed blood pressure 130/80, heart rate of 70. GENERAL APPEARANCE:  Pleasant, elderly lady in no obvious acute  distress. HEENT:  Eyes and Ears:  No discharge. NECK:  No JVD. No bruits. No masses. LUNGS:  Decreased air entry. No crackles. No wheezes. HEART:  Normal S1 and S2. Systolic murmur grade 2/6. ABDOMEN:  Soft, nontender. Positive bowel sounds. No organomegaly. EXTREMITIES:  No significant edema. NEUROLOGIC:  Grossly intact. Awake, alert. No focal deficits. PSYCH:  No evidence of active psychosis. SKIN:  No rashes. LABORATORY DATA:  Showed elevated proBNP with no other acute findings  besides sodium of 128. IMPRESSION:  This is a patient who has unfortunately been deteriorating  over the last year with generalized deterioration. Getting frail, has  issues with balance which I think is multifactorial related to her blood  pressure, heart rate. She comes in with above presentation, and she has  already decided not to have any further invasive evaluation which I  discussed with her again and her daughter today. RECOMMENDATIONS:  As follows:  1. Pending the patient decision about any further consideration of  intervention or invasive evaluation, will treat medically. 2.  We will obtain an echo to assess for any new wall motion  abnormality. 3.  I would highly recommend looking like she is going to need some more  assistance as we go, which I hope the family is understanding the  situation. Thank you for allowing me to participate in her care.         Tiffany Sommer M.D.    D: 09/12/2021 12:55:39       T: 09/12/2021 12:58:54     [email or web address]  Job#: [id number]     Doc#: [id number]    CC:

## 2021-09-12 NOTE — PROGRESS NOTES
Hospitalist Progress Note    Patient:  Laquetta Kehr      Unit/Bed:4K-21/021-A    YOB: 1932    MRN: [id number]       Acct: [de-identified]     PCP: Ana Luque MD    Date of Admission: 9/11/2021    Assessment/Plan:    1. Acute hypoxic respiratory failure         - Secondary to pleural effusion secondary to acute CHF exacerbation. Presented [facility], SOB, decreased exercise tolerance. 9/11 CXR revealed for inflation of the lungs, cardiomegaly, small bilateral pleural effusions mild bibasilar atelectasis/pneumonia. - Continued increasing O2 requirements, currently on 5 L NC. No Home O2       - Started Bumex 1 g po qDay. Will do therapeutic and diagnostic thoracentesis tomorrow with study of pleural LDH, protein, glucose, cytology and culture. - Started Acapella, inspiratory spirometer     2. Acute HFpEF exacerbation - diastolic        - Previous echo 5/2020 demonstrated EF 55-60%, moderately dilated left LA, mildly dilated RV, right ventricular systolic pressure of 62-60 consistent with severe pulmonary retention, mild to moderate mitral regurgitation, moderate tricuspid regurgitation. BNP 4264.        - Continue spironolactone 25 mg po qDay        - 9/12 echo ordered, will follow results. - Strict daily I&O's and daily weights     3. Pleural Effusions, bilaterally - likely secondary to acute diastolic CHF exacerbation        - See above. 4. Severe pulmonary hypertension        - Per 5/2020 Echo, right ventricular systolic pressure 10-43 mmHg, consistent with severe pulmonary hypertension. Repeat echo ordered to reevaluate pulmonary hypertension. 5. Suspected pneumonia         - Was started on Levaquin for suspected concurrent pneumonia. 9/12  Respiratory culture pending, blood cultures reveal no preliminary growth. - Currently afebrile, WBC WNL. Levaquin discontinued. Will repeat CXR symptomatic. 6. CAD s/p CABG        - History of CAD.   Denies active chest 2021, A. Fib on eliquis, CAD s/p CABG, pulmonary HTN, DM2, Parkinson's, GERD who presented to [facility] ED 9/11 from SNF for shortness of breath, and weakness. Per family the patient has had several episodes of chest pain, most recently at rest.  For this she received a total of 3 doses of sublingual nitroglycerin over the past few days. The family had also noted a decrease in her exercise tolerance and functional capacity over the recent month. This more pronounced over the last 1-2 weeks. The patient reports initially the nitroglycerin helped however the last dose did not. She had also reported tenderness in the epigastric region, does complain of GERD symptoms. These have been going on for the last couple of weeks. The patient denies taking any oxygen at home. In the ED CXR revealed poor inflation of lungs, cardiomegaly, small bilateral pleural effusions and moderate bibasilar atelectasis/pneumonia. EKG revealed sinus rhythm with first-degree AV block and LBBB, chronic. Tropnin negative. BNP 3264. Echo ordered, pending. Sodium was found to be 127, Bumex was held. The patient was noted to be in respiratory distress with mild hypoxia. She was started on 2L NC, but requiring BiPAP. Levaquin was started due to concern she may have a concurrent pneumonia. Neurology was consulted for patient's Parkinson's. Subjective (past 24 hours):   Overnight the patient's oxygen demand has continued to increase. She is now currently on 5 L NC. This morning the patient continues to report shortness of breath, and generalized weakness. There is also associated epigastric tightness but no pain. Sodium mildly improved to 128. The patient is currently hemodynamically stable. Bumex will be resumed. ROS (12 point review of systems completed. Pertinent positives noted. Otherwise ROS is negative).     Medications:  Reviewed    Infusion Medications    sodium chloride      dextrose       Scheduled Medications    sodium chloride flush  5-40 mL IntraVENous 2 times per day    [START ON 9/13/2021] levofloxacin  750 mg IntraVENous Q48H    amLODIPine  10 mg Oral Daily    apixaban  2.5 mg Oral BID    aspirin  81 mg Oral Daily    atorvastatin  20 mg Oral Daily    carbidopa-levodopa  2 tablet Oral 4x Daily    isosorbide dinitrate  20 mg Oral TID    pantoprazole  40 mg Oral BID AC    spironolactone  25 mg Oral Daily    sucralfate  1 g Oral 4x Daily AC & HS    insulin lispro  0-12 Units SubCUTAneous TID WC    insulin lispro  0-6 Units SubCUTAneous Nightly     PRN Meds: sodium chloride flush, sodium chloride, ondansetron **OR** ondansetron, polyethylene glycol, acetaminophen **OR** acetaminophen, ipratropium-albuterol, glucose, dextrose, glucagon (rDNA), dextrose      Intake/Output Summary (Last 24 hours) at 9/12/2021 1049  Last data filed at 9/12/2021 0307  Gross per 24 hour   Intake 460 ml   Output 175 ml   Net 285 ml       Diet:  ADULT DIET; Dysphagia - Minced and Moist; 4 carb choices (60 gm/meal); No Added Salt (3-4 gm)    Exam:  BP (!) 150/65   Pulse 75   Temp 97.9 °F (36.6 °C) (Oral)   Resp 20   Ht 5' 2\" (1.575 m)   Wt 112 lb 8 oz (51 kg)   SpO2 93%   BMI 20.58 kg/m²     General appearance: Mild acute distress. Ill-appearing and cachectic at stated age and cooperative. HEENT: Pupils equal, round, and reactive to light. Conjunctivae/corneas clear. Neck: Supple, with full range of motion. No jugular venous distention. Trachea midline. Respiratory: Decreased work of breathing. Diminished bibasilar breath sounds. Clear to auscultation, bilaterally without Rales/Wheezes/Rhonchi. Cardiovascular: Regular rate with normal S1/S2 without murmurs, rubs or gallops. Abdomen: Soft, non-tender, non-distended with normal bowel sounds. Mild epigastric tightness  Musculoskeletal: passive and active ROM x 4 extremities. Skin: Skin color, texture, turgor normal.  No rashes or lesions.   Neurologic:  Neurovascularly intact without any focal sensory/motor deficits. Cranial nerves: II-XII intact, grossly non-focal.  Psychiatric: Alert and oriented, thought content appropriate, normal insight  Capillary Refill: Brisk,< 3 seconds   Peripheral Pulses: +2 palpable, equal bilaterally       Labs:   Recent Labs     09/11/21  1036 09/12/21  0825   WBC 8.8 5.9   HGB 10.9* 10.8*   HCT 33.2* 33.7*    168     Recent Labs     09/11/21  1036 09/12/21  0825   * 128*   K 4.8 4.4   CL 92* 92*   CO2 20* 23   BUN 16 14   CREATININE 0.8 0.7   CALCIUM 9.9 9.2     Recent Labs     09/11/21  1436   AST 12   ALT 6*   BILIDIR <0.2   BILITOT 0.6   ALKPHOS 124     Recent Labs     09/11/21  1134 09/12/21  0558   INR 1.71* 1.75*     No results for input(s): Chon Piña in the last 72 hours. Microbiology:    9/12/21 Blood culture 1 - no preliminary growth   9/12/21 Blood culture 2 - no preliminary growth       Urinalysis:      Lab Results   Component Value Date    NITRU NEGATIVE 02/27/2021    WBCUA 25-50 11/26/2018    BACTERIA NONE 11/26/2018    RBCUA 3-5 11/26/2018    BLOODU NEGATIVE 02/27/2021    SPECGRAV 1.015 12/28/2019    GLUCOSEU NEGATIVE 02/27/2021       Radiology:  XR CHEST PORTABLE   Final Result   1. Poor inflation of the lungs. Mild cardiomegaly. Metallic sternotomy sutures. 2. Small bilateral pleural effusions. Moderate bibasilar atelectasis/pneumonia. **This report has been created using voice recognition software. It may contain minor errors which are inherent in voice recognition technology. **      Final report electronically signed by Dr. Gm Horn on 9/11/2021 11:13 AM          DVT prophylaxis: [] Lovenox                                 [] SCDs                                 [x] Eliquis                                  [] Encourage ambulation           [] Already on Anticoagulation     Code Status: DNR-CCA    PT/OT Eval Status: PT/OT following    Tele:   [x] yes             [] no    Electronically signed by Sharon Verduzco DO on 9/12/2021 at 10:49 AM

## 2021-09-12 NOTE — PROGRESS NOTES
[facility] STEPDOWN TELEMETRY 4K  EVALUATION    Time:   Time In: 1535  Time Out: 1600  Timed Code Treatment Minutes: 17 Minutes  Minutes: 25    Date: 2021  Patient Name: Caroline Washington,   Gender: female      MRN: [id number]  : 1932  (80 y.o.)  Referring Practitioner: Isai Rey DO  Diagnosis: Hyponatremia  Additional Pertinent Hx: Per initial H&P: \"Patient is an 80-year-old female with medical history of HFpEF, atrial fibrillation, and pulmonary hypertension who presents to the hospital via EMS from SNF for evaluation of worsening chest pain, shortness of breath, and weakness. \"    Restrictions/Precautions:  Restrictions/Precautions: General Precautions, Fall Risk  Position Activity Restriction  Other position/activity restrictions: 5L NC on     Subjective  Chart Reviewed: Yes, Orders, Progress Notes, History and Physical  Patient assessed for rehabilitation services?: Yes  Family / Caregiver Present: Yes    Subjective: RN approved OT session. Patient sitting up in recliner with family present upon arrival, agreeable to participating in therapy.     Pain:  Pain Assessment  Patient Currently in Pain: Yes  Pain Level: 6    Vitals: Oxygen: >93% on 5L NC    Social/Functional History:  Lives With: Alone  Type of Home: Facility  Home Layout: One level  Home Access: Level entry  Home Equipment: 4 wheeled walker   Bathroom Shower/Tub: Walk-in shower, Shower chair with back  Bathroom Toilet: Standard  Bathroom Equipment: Built-in shower seat    ADL Assistance: Needs assistance (Pt reports she has assist for showers 2x/week at facility)  Homemaking Assistance: Needs assistance (Pt reports she do microwave meals for breakfast and lunch, facility provides dinner)  Homemaking Responsibilities: Yes (light cleaning and meal prep - staff at facility assists with some too)    Additional Comments: Pt and family report pt has required increased assist since last admission/discharge from [facility] s/p fall (dc'd 8/13). Pt reports she used 4WW at facility. Was getting therapies prior to current admission. VISION:Corrected wears glasses full time    HEARING:  WFL    COGNITION: WFL, slow processing    RANGE OF MOTION:  Bilateral Upper Extremity:  WFL    STRENGTH:  Bilateral Upper Extremity:  Impaired - grossly deconditioned 4-/5    SENSATION:   WFL    ADL:   Lower Extremity Dressing: Stand By Assistance. to adjust L sock prior to transfer. BALANCE:  Sitting Balance:  Stand By Assistance. Standing Balance: Moderate Assistance, Maximum Assistance, X 2, with cues for safety, with verbal cues , with increased time for completion. pt initially requiring max cues and A to get balance when standing, then progressing to mod A x2 with brief minAx2 and 2UE support on RW    BED MOBILITY:  Sit to Supine: Minimal Assistance, with head of bed flat assist at BLE with verbal cues for shifting hips and repositioning   Rolling to Right: Minimal Assistance. To position pillow under L side    TRANSFERS:  Sit to Stand: Moderate Assistance, X 2, cues for hand placement. from recliner  Stand to Sit: Minimal Assistance, X 2, cues for hand placement. to EOB    FUNCTIONAL MOBILITY:  Assistive Device: Rolling Walker  Assist Level: Moderate Assistance and X 2. Distance: recliner>EOB  Pt requiring max cues and manual assist to turn walker and advance feet. Pt with slow processing during task. Min unsteadiness, no LOB. Activity Tolerance:  Patient tolerance of  treatment: fair. Patient with overall decreased endurance, safety awareness, and increased O2 needs compared to PLOF. Assessment:  Assessment: Patient presents with decreased endurance, balance, and safety awareness resulting in overall decreased participation in all self care, transfer, and mobility tasks.  Pt currently required mod-max A for all mobility and self care tasks and would benefit from skilled OT services throughout admission and after discharge to increase safety and independence needed to return to PLOF. Without skilled OT services pt is at increased risk of falls and caregiver burden. Performance deficits / Impairments: Decreased functional mobility , Decreased safe awareness, Decreased balance, Decreased ADL status, Decreased endurance, Decreased strength  Prognosis: Fair  REQUIRES OT FOLLOW UP: Yes    Treatment Initiated: Treatment and education initiated within context of evaluation. Evaluation time included review of current medical information, gathering information related to past medical, social and functional history, completion of standardized testing, formal and informal observation of tasks, assessment of data and development of plan of care and goals. Treatment time included skilled education and facilitation of tasks to increase safety and independence with ADL's for improved functional independence and quality of life. Discharge Recommendations:  [address], Patient would benefit from continued therapy after discharge, Continue to assess pending progress    Patient Education:  OT Education: OT Role, Plan of Care    Equipment Recommendations:  Equipment Needed: No    Plan:  Times per week: 3-5x  Current Treatment Recommendations: Self-Care / ADL, Functional Mobility Training, Endurance Training, Strengthening, Balance Training. See long-term goal time frame for expected duration of plan of care. If no long-term goals established, a short length of stay is anticipated. Goals:  Patient goals : To get stronger  Short term goals  Time Frame for Short term goals: By discharge  Short term goal 1: Patient to increase activity tolerance to/from BSC/recliner with min A x2 to increase indep in toileting. Short term goal 2: Patient to complete BADL task with no > min A to increase indep in ADL completion.   Short term goal 3: Patient to tolerate dynamic standing task with no > min A and unilateral release to increase indep in clothing management during toileting. Short term goal 4: Patient to complete BUE light resistive HEP with min cues for technique to increase strength needed for mobility and ADL completion. Following session, patient left in safe position with all fall risk precautions in place.

## 2021-09-13 LAB
ANION GAP SERPL CALCULATED.3IONS-SCNC: 12 MEQ/L (ref 8–16)
BASOPHILS # BLD: 0.7 %
BASOPHILS ABSOLUTE: 0 THOU/MM3 (ref 0–0.1)
BUN BLDV-MCNC: 14 MG/DL (ref 7–22)
CALCIUM SERPL-MCNC: 9 MG/DL (ref 8.5–10.5)
CHLORIDE BLD-SCNC: 92 MEQ/L (ref 98–111)
CO2: 24 MEQ/L (ref 23–33)
CORTISOL COLLECTION INFO: NORMAL
CORTISOL: 14.52 UG/DL
CREAT SERPL-MCNC: 0.7 MG/DL (ref 0.4–1.2)
EOSINOPHIL # BLD: 6.4 %
EOSINOPHILS ABSOLUTE: 0.4 THOU/MM3 (ref 0–0.4)
ERYTHROCYTE [DISTWIDTH] IN BLOOD BY AUTOMATED COUNT: 15 % (ref 11.5–14.5)
ERYTHROCYTE [DISTWIDTH] IN BLOOD BY AUTOMATED COUNT: 47.5 FL (ref 35–45)
GFR SERPL CREATININE-BSD FRML MDRD: 79 ML/MIN/1.73M2
GLUCOSE BLD-MCNC: 122 MG/DL (ref 70–108)
GLUCOSE BLD-MCNC: 132 MG/DL (ref 70–108)
GLUCOSE BLD-MCNC: 138 MG/DL (ref 70–108)
GLUCOSE BLD-MCNC: 152 MG/DL (ref 70–108)
GLUCOSE BLD-MCNC: 176 MG/DL (ref 70–108)
HCT VFR BLD CALC: 32.6 % (ref 37–47)
HEMOGLOBIN: 10.7 GM/DL (ref 12–16)
IMMATURE GRANS (ABS): 0.03 THOU/MM3 (ref 0–0.07)
IMMATURE GRANULOCYTES: 0.5 %
LV EF: 58 %
LVEF MODALITY: NORMAL
LYMPHOCYTES # BLD: 15.5 %
LYMPHOCYTES ABSOLUTE: 0.9 THOU/MM3 (ref 1–4.8)
MCH RBC QN AUTO: 28.2 PG (ref 26–33)
MCHC RBC AUTO-ENTMCNC: 32.8 GM/DL (ref 32.2–35.5)
MCV RBC AUTO: 86 FL (ref 81–99)
MONOCYTES # BLD: 11.6 %
MONOCYTES ABSOLUTE: 0.7 THOU/MM3 (ref 0.4–1.3)
NUCLEATED RED BLOOD CELLS: 0 /100 WBC
PLATELET # BLD: 175 THOU/MM3 (ref 130–400)
PMV BLD AUTO: 8.7 FL (ref 9.4–12.4)
POTASSIUM REFLEX MAGNESIUM: 4.4 MEQ/L (ref 3.5–5.2)
RBC # BLD: 3.79 MILL/MM3 (ref 4.2–5.4)
SEG NEUTROPHILS: 65.3 %
SEGMENTED NEUTROPHILS ABSOLUTE COUNT: 4 THOU/MM3 (ref 1.8–7.7)
SODIUM BLD-SCNC: 123 MEQ/L (ref 135–145)
SODIUM BLD-SCNC: 125 MEQ/L (ref 135–145)
SODIUM BLD-SCNC: 125 MEQ/L (ref 135–145)
SODIUM BLD-SCNC: 128 MEQ/L (ref 135–145)
WBC # BLD: 6.1 THOU/MM3 (ref 4.8–10.8)

## 2021-09-13 PROCEDURE — 82533 TOTAL CORTISOL: CPT

## 2021-09-13 PROCEDURE — 93306 TTE W/DOPPLER COMPLETE: CPT

## 2021-09-13 PROCEDURE — 97530 THERAPEUTIC ACTIVITIES: CPT

## 2021-09-13 PROCEDURE — 97535 SELF CARE MNGMENT TRAINING: CPT

## 2021-09-13 PROCEDURE — 84295 ASSAY OF SERUM SODIUM: CPT

## 2021-09-13 PROCEDURE — 82948 REAGENT STRIP/BLOOD GLUCOSE: CPT

## 2021-09-13 PROCEDURE — 99233 SBSQ HOSP IP/OBS HIGH 50: CPT | Performed by: HOSPITALIST

## 2021-09-13 PROCEDURE — 80048 BASIC METABOLIC PNL TOTAL CA: CPT

## 2021-09-13 PROCEDURE — 97162 PT EVAL MOD COMPLEX 30 MIN: CPT

## 2021-09-13 PROCEDURE — 92610 EVALUATE SWALLOWING FUNCTION: CPT

## 2021-09-13 PROCEDURE — 36415 COLL VENOUS BLD VENIPUNCTURE: CPT

## 2021-09-13 PROCEDURE — 2060000000 HC ICU INTERMEDIATE R&B

## 2021-09-13 PROCEDURE — 6370000000 HC RX 637 (ALT 250 FOR IP): Performed by: STUDENT IN AN ORGANIZED HEALTH CARE EDUCATION/TRAINING PROGRAM

## 2021-09-13 PROCEDURE — 94669 MECHANICAL CHEST WALL OSCILL: CPT

## 2021-09-13 PROCEDURE — 85025 COMPLETE CBC W/AUTO DIFF WBC: CPT

## 2021-09-13 RX ORDER — MECLIZINE HYDROCHLORIDE 25 MG/1
25 TABLET ORAL 3 TIMES DAILY
COMMUNITY

## 2021-09-13 RX ORDER — NITROGLYCERIN 0.4 MG/1
0.4 TABLET SUBLINGUAL EVERY 5 MIN PRN
COMMUNITY

## 2021-09-13 RX ORDER — LIDOCAINE 4 G/G
1 PATCH TOPICAL DAILY
COMMUNITY

## 2021-09-13 RX ADMIN — ONDANSETRON 4 MG: 4 TABLET, ORALLY DISINTEGRATING ORAL at 20:10

## 2021-09-13 RX ADMIN — CARBIDOPA AND LEVODOPA 2 TABLET: 25; 100 TABLET ORAL at 12:27

## 2021-09-13 RX ADMIN — ATORVASTATIN CALCIUM 20 MG: 20 TABLET, FILM COATED ORAL at 09:48

## 2021-09-13 RX ADMIN — INSULIN LISPRO 2 UNITS: 100 INJECTION, SOLUTION INTRAVENOUS; SUBCUTANEOUS at 09:51

## 2021-09-13 RX ADMIN — APIXABAN 2.5 MG: 2.5 TABLET, FILM COATED ORAL at 20:10

## 2021-09-13 RX ADMIN — AMLODIPINE BESYLATE 10 MG: 10 TABLET ORAL at 09:48

## 2021-09-13 RX ADMIN — INSULIN LISPRO 2 UNITS: 100 INJECTION, SOLUTION INTRAVENOUS; SUBCUTANEOUS at 16:12

## 2021-09-13 RX ADMIN — SUCRALFATE 1 G: 1 TABLET ORAL at 15:55

## 2021-09-13 RX ADMIN — ALOGLIPTIN 12.5 MG: 12.5 TABLET, FILM COATED ORAL at 09:48

## 2021-09-13 RX ADMIN — METFORMIN HYDROCHLORIDE 500 MG: 500 TABLET, EXTENDED RELEASE ORAL at 09:49

## 2021-09-13 RX ADMIN — PANTOPRAZOLE SODIUM 40 MG: 40 TABLET, DELAYED RELEASE ORAL at 06:29

## 2021-09-13 RX ADMIN — CARBIDOPA AND LEVODOPA 2 TABLET: 25; 100 TABLET ORAL at 09:48

## 2021-09-13 RX ADMIN — CARBIDOPA AND LEVODOPA 2 TABLET: 25; 100 TABLET ORAL at 15:56

## 2021-09-13 RX ADMIN — ISOSORBIDE DINITRATE 20 MG: 20 TABLET ORAL at 20:10

## 2021-09-13 RX ADMIN — PANTOPRAZOLE SODIUM 40 MG: 40 TABLET, DELAYED RELEASE ORAL at 15:56

## 2021-09-13 RX ADMIN — SUCRALFATE 1 G: 1 TABLET ORAL at 09:49

## 2021-09-13 RX ADMIN — ISOSORBIDE DINITRATE 20 MG: 20 TABLET ORAL at 09:48

## 2021-09-13 RX ADMIN — FERROUS SULFATE TAB 325 MG (65 MG ELEMENTAL FE) 325 MG: 325 (65 FE) TAB at 09:48

## 2021-09-13 RX ADMIN — METFORMIN HYDROCHLORIDE 500 MG: 500 TABLET, EXTENDED RELEASE ORAL at 20:10

## 2021-09-13 RX ADMIN — CARBIDOPA AND LEVODOPA 2 TABLET: 25; 100 TABLET ORAL at 20:10

## 2021-09-13 RX ADMIN — SUCRALFATE 1 G: 1 TABLET ORAL at 06:29

## 2021-09-13 RX ADMIN — BUMETANIDE 1 MG: 1 TABLET ORAL at 09:48

## 2021-09-13 RX ADMIN — ACETAMINOPHEN 650 MG: 325 TABLET ORAL at 10:14

## 2021-09-13 RX ADMIN — SPIRONOLACTONE 25 MG: 25 TABLET ORAL at 09:49

## 2021-09-13 RX ADMIN — FERROUS SULFATE TAB 325 MG (65 MG ELEMENTAL FE) 325 MG: 325 (65 FE) TAB at 20:10

## 2021-09-13 RX ADMIN — ISOSORBIDE DINITRATE 20 MG: 20 TABLET ORAL at 12:27

## 2021-09-13 RX ADMIN — SUCRALFATE 1 G: 1 TABLET ORAL at 20:10

## 2021-09-13 ASSESSMENT — PAIN DESCRIPTION - ORIENTATION
ORIENTATION: LEFT
ORIENTATION: RIGHT

## 2021-09-13 ASSESSMENT — PAIN SCALES - GENERAL
PAINLEVEL_OUTOF10: 5
PAINLEVEL_OUTOF10: 0
PAINLEVEL_OUTOF10: 0
PAINLEVEL_OUTOF10: 8
PAINLEVEL_OUTOF10: 8

## 2021-09-13 ASSESSMENT — PAIN DESCRIPTION - PAIN TYPE
TYPE: ACUTE PAIN
TYPE: ACUTE PAIN

## 2021-09-13 ASSESSMENT — PAIN - FUNCTIONAL ASSESSMENT
PAIN_FUNCTIONAL_ASSESSMENT: PREVENTS OR INTERFERES SOME ACTIVE ACTIVITIES AND ADLS
PAIN_FUNCTIONAL_ASSESSMENT: ACTIVITIES ARE NOT PREVENTED

## 2021-09-13 ASSESSMENT — PAIN DESCRIPTION - LOCATION
LOCATION: ABDOMEN
LOCATION: BUTTOCKS
LOCATION: LEG

## 2021-09-13 ASSESSMENT — PAIN DESCRIPTION - DESCRIPTORS
DESCRIPTORS: ACHING
DESCRIPTORS: SPASM;CRAMPING

## 2021-09-13 ASSESSMENT — PAIN SCALES - WONG BAKER
WONGBAKER_NUMERICALRESPONSE: 2
WONGBAKER_NUMERICALRESPONSE: 0

## 2021-09-13 ASSESSMENT — PAIN DESCRIPTION - FREQUENCY
FREQUENCY: CONTINUOUS
FREQUENCY: INTERMITTENT

## 2021-09-13 ASSESSMENT — PAIN DESCRIPTION - PROGRESSION
CLINICAL_PROGRESSION: RAPIDLY WORSENING
CLINICAL_PROGRESSION: NOT CHANGED

## 2021-09-13 ASSESSMENT — PAIN DESCRIPTION - ONSET
ONSET: ON-GOING
ONSET: SUDDEN

## 2021-09-13 NOTE — DISCHARGE INSTR - COC
Continuity of Care Form    Patient Name: Nola Herrera   :  1932  MRN:  [id number]    Admit date:  2021  Discharge date:  2021    Code Status Order: DNR-CCA   Advance Directives:      Admitting Physician:  No admitting provider for patient encounter. PCP: Aiden Galeano MD    Discharging Nurse: [facility] Unit/Room#: 4K-21/021-A  Discharging Unit Phone Number: [phone]    Emergency Contact:   Extended Emergency Contact Information  Primary Emergency Contact: Diego Pena [address] Phone: [phone]  Work Phone: [phone]  Mobile Phone: [phone]  Relation: Child  Secondary Emergency Contact: Macy Valdez [address] Phone: [phone]  Mobile Phone: [phone]  Relation: Child    Past Surgical History:  Past Surgical History:   Procedure Laterality Date    CARDIAC SURGERY      CARDIOVASCULAR STRESS TEST  2009    Gated SPECT images revealed normal global wall motion with EF of 60%. Persantine test associated w/nonspecific symptoms. EKG is nondiagnostic w/baseline LBBB. No obvious stress-induced ischemia by Cardiolite. EF within normal limits.  CARDIOVASCULAR STRESS TEST  7 10 2007    The gated SPECT images revealed normal wall motion with EF of 69%. Poor exercise tolerance w/SOB on exertion. EKG is nondiagnostic. Cardiolite scan revealing no obvious stress-induced ischemia. EF 60%.  CARDIOVASCULAR STRESS TEST  2006    Fair exercise tolerance w/SOB on exertion. No EKG evidence of ischemia. Patient should be able to proceed with phase II cardiac rehab.  CATARACT REMOVAL      CATARACT REMOVAL  2016    AND 16    DIAGNOSTIC CARDIAC CATH LAB PROCEDURE  2005    LV was obtained. AGEE projection showed preserved LV function w/estimated EF at 55%. No significant MR. Patent left main coronary artery. Tortuous LAD which appeared to be patent. Patent left circumflex artery.  High-grade stenosis around 99% in very large dominant RCA w/heavy calcification at the ostium. Normal LV function.  DOPPLER ECHOCARDIOGRAPHY  4 09 2009    Global LV function w/in lower limits of normal, with mild anteroseptal hypokinesis. EF of 50-55%. Light LVH. Mild to moderate left atrial enlargement. Calcific aortic and mitral valve w/no obvious stenosis. No obvious pericardial effusion.  DOPPLER ECHOCARDIOGRAPHY  7 10 2007    LV function w/in lower limits of normal w/peridoxical septal wall motion. Moderate left atrial enlargement. Mild MR. Mild TR. Calcified valves w/no obvious stenosis. No pericardial effusion.  DOPPLER ECHOCARDIOGRAPHY  4 14 2006    There appears to be significant improvement from previous echo w/complete resolution of pericardial effusion and normal LV function. EF of 60%.  DOPPLER ECHOCARDIOGRAPHY  3 21 2006    Normal LV function. Mild LV hypertrophy. Mild biatrial enlargement. Mildly calcific aortic and mitral valve w/no stenosis. Mild MR. Mild TR. Minimal residual pericardial effusion w/significant improvement from previous echo.  DOPPLER ECHOCARDIOGRAPHY  3 16 2006    Interval change from previous echocardiogram w/worsening of effusion. Correlation clinically. Consideration of pericardial centesis. Will obtain a [facility] consult.  DOPPLER ECHOCARDIOGRAPHY  1 31 2006    No significant change from previous echo. The 2D echo showed moderate degree of pericardial effusion. It does seem to be worst or better than previous echo. No evidence of tamponade.  DOPPLER ECHOCARDIOGRAPHY  1 27 2006    Normal global LV function. Mild biatrial enlargement. Mildly sclerotic aortic & mitral valve w/no stenosis. Mild MR. Mild TR. Small to moderate pericardial effusion w/no obvious tamponade.      JOINT REPLACEMENT      MOHS SURGERY Right 2/13/2019    MOHS DEFECT REPAIR CYSTIC SCC RIGHT LOWER LATERAL LEG WITH SKIN GRAFT FROM RIGHT GROIN (FULL THICKNESS ) performed by Laurita Salazar MD at [facility] OR    PRE-MALIGNANT / BENIGN SKIN LESION EXCISION Left 12/20/13    left leg lesion excision x2, frozen section, skin graft closure    ROTATOR CUFF REPAIR  09/2001    RIGHT    ROTATOR CUFF REPAIR  04/15/2009    LEFT     SKIN BIOPSY  07/2021    face    SKIN CANCER EXCISION  06/2006      Rt leg    SPINAL CORD STIMULATOR IMPLANTATION N/A 12/4/2018    PERMANENT INTERSTIM performed by Cyril Duff MD at [facility]       Immunization History:   Immunization History   Administered Date(s) Administered    COVID-19, [place], PF, 30mcg/0.3mL 01/04/2021, 01/25/2021    Influenza Virus Vaccine 10/01/2016, 11/30/2016    Influenza, High Dose (Fluzone 65 yrs and older) 09/23/2015, 10/23/2017, 09/05/2018    Influenza, Triv, inactivated, subunit, adjuvanted, IM (Fluad 65 yrs and older) 09/28/2016    PPD Test 10/19/2018, 10/28/2018    Pneumococcal Conjugate 13-valent (Flordia Reel) 05/09/2017    Pneumococcal Polysaccharide ([id number]) 01/01/2004    Zoster Live (Zostavax) 04/20/2013       Active Problems:  Patient Active Problem List   Diagnosis Code    CAD (coronary artery disease) I25.10    S/P CABG (coronary artery bypass graft) Z95.1    Hyperlipidemia E78.5    Parkinson's disease ([facility] [address] G20    Chronic LBBB (left bundle branch block) I44.7    History of skin cancer Z85.828    Imbalance R26.89    Atypical chest pain R07.89    Hypercholesterolemia E78.00    Diabetes mellitus, type II ([facility] [address] E11.9    Esophageal stricture K22.2    FH: CAD (coronary artery disease) Z82.49    History of gastritis Z87.19    Lactose intolerance E73.9    Hyponatremia E87.1    Abdominal pain R10.9    Melanotic stools K92.1    Recurrent UTI N39.0    Urinary tract infection without hematuria N39.0    Urgency-frequency syndrome N32.81    Atrial fibrillation with RVR (HCC) I48.91    Pyelonephritis N12    Paroxysmal atrial fibrillation (HCC) I48.0    Sepsis due to Escherichia coli (HCC) A41.51    Physical deconditioning R53.81    Cervical spinal stenosis M48.02    Cervical arthritis M47.812    Arteriosclerosis I70.90    Osteopenia M85.80    Mixed incontinence urge and stress N39.46    Overactive bladder N32.81    Acute on chronic systolic congestive heart failure ([facility]) I50.23    CHF (congestive heart failure), NYHA class II, chronic, diastolic ([facility]) L85.37    Pleural effusion J90    Uncontrolled hypertension I10    History of coronary artery bypass graft x 1 Z95.1    Chronic atrial fibrillation ([facility]) I48.20    Pulmonary hypertension, moderate to severe ([facility]) I27.20    SOB (shortness of breath) R06.02    Moderate malnutrition ([facility]) E44.0    Edema of left lower leg due to peripheral venous insufficiency I87.2, R60.0    Traumatic open wound of left lower leg with delayed healing S81.802D    Symptomatic sinus bradycardia R00.1    Bilateral pleural effusion J90    Non-intractable vomiting with nausea R11.2    Severe malnutrition ([address] E43    Falls frequently R29.6    A-fib ([facility]) I48.91    Heart failure exacerbated by sotalol ([facility]) I50.9    Heart failure ([facility]) I50.9    Precordial pain R07.2       Isolation/Infection:   Isolation            No Isolation          Patient Infection Status       Infection Onset Added Last Indicated Last Indicated By Review Planned Expiration Resolved Resolved By    None active    Resolved    COVID-19 Rule Out 09/11/21 09/11/21 09/11/21 COVID-19, Rapid (Ordered)   09/11/21 Rule-Out Test Resulted    C-diff Rule Out 02/28/21 02/28/21 02/28/21 Gastrointestinal Panel, Molecular (Ordered)   08/13/21 Gibson Rivera RN    COVID-19 Rule Out 05/26/20 05/26/20 05/27/20 COVID-19 (Ordered)   05/27/20 Rule-Out Test Resulted            Nurse Assessment:  Last Vital Signs: /61   Pulse 58   Temp 97.8 °F (36.6 °C) (Oral)   Resp 20   Ht 5' 2\" (1.575 m)   Wt 108 lb 14.4 oz (49.4 kg)   SpO2 96%   BMI 19.92 kg/m²     Last documented pain score (0-10 scale): Pain Level: 0  Last Weight:   Wt Readings from Last 1 Encounters:   09/13/21 108 lb 14.4 oz (49.4 kg)     Mental Status:  oriented and alert    IV Access:  - None    Nursing Mobility/ADLs:  Walking   Assisted  Transfer  Assisted  Bathing  Assisted  Dressing  Assisted  Toileting Assisted  Feeding  Independent  Med Admin  Assisted  Med Delivery   whole and prefers mixed with applesauce    Wound Care Documentation and Therapy:  Wound 09/11/21 Coccyx Left stage 2 (Active)   Wound Etiology Pressure Stage  2 09/12/21 1931   Dressing/Treatment Protective barrier; Foam 09/12/21 1931   Wound Assessment Pink/red 09/12/21 1931   Drainage Amount None 09/12/21 1931   Amanda-wound Assessment Fragile;Blanchable erythema 09/12/21 1931   Number of days: 1       Wound 09/11/21 Buttocks Right stage 1 (Active)   Wound Etiology Pressure Stage  1 09/12/21 1931   Dressing/Treatment Protective barrier; Foam 09/12/21 1931   Wound Assessment Pink/red 09/12/21 1931   Drainage Amount None 09/12/21 1931   Amanda-wound Assessment Fragile;Blanchable erythema 09/12/21 1931   Number of days: 1        Elimination:  Continence:   · Bowel: Yes  · Bladder: Yes  Urinary Catheter: None   Colostomy/Ileostomy/Ileal Conduit: No       Date of Last BM: 9/18/2021    Intake/Output Summary (Last 24 hours) at 9/13/2021 0940  Last data filed at 9/13/2021 0302  Gross per 24 hour   Intake 840 ml   Output 1250 ml   Net -410 ml     I/O last 3 completed shifts: In: 5797 [P.O.:1040]  Out: 1250 [Urine:1250]    Safety Concerns: At Risk for Falls    Impairments/Disabilities:      None    Nutrition Therapy:  Current Nutrition Therapy:   - Oral Diet:  Carb Control 5 carbs/meal (2000kcals/day)    Routes of Feeding: Oral  Liquids:  Thin Liquids  Daily Fluid Restriction: yes - amount 2000  Last Modified Barium Swallow with Video (Video Swallowing Test): not done    Treatments at the Time of Hospital Discharge:   Respiratory Treatments: n/a  Oxygen Therapy:  is not on home oxygen therapy. Ventilator:    - No ventilator support    Rehab Therapies: Physical Therapy, Occupational Therapy and Speech/Language Therapy  Weight Bearing Status/Restrictions: No weight bearing restirctions  Other Medical Equipment (for information only, NOT a DME order):  walker  Other Treatments: n/a    Patient's personal belongings (please select all that are sent with patient):  Glasses    RN SIGNATURE:  Electronically signed by Meryl Chew RN on 9/20/21 at 9:03 AM EDT    CASE MANAGEMENT/SOCIAL WORK SECTION    Inpatient Status Date: 9/11/2021    Readmission Risk Assessment Score:  Readmission Risk              Risk of Unplanned Readmission:  27           Discharging to Facility/ Agency   · Name: [address] [facility]         Phone: [phone]       Fax: [phone]        ·     Dialysis Facility (if applicable)   · Name:  · Address:  · Dialysis Schedule:  · Phone:  · Fax:    / signature: Electronically signed by ANDRAE Temple on 9/13/21 at 9:40 AM EDT    PHYSICIAN SECTION    Prognosis: Fair    Condition at Discharge: Stable    Rehab Potential (if transferring to Rehab): Fair    Recommended Labs or Other Treatments After Discharge:     Physician Certification: I certify the above information and transfer of Ysabel Dubois  is necessary for the continuing treatment of the diagnosis listed and that she requires Intermediate Nursing Care for greater 30 days.      Update Admission H&P: No change in H&P    PHYSICIAN SIGNATURE:  Electronically signed by Joanne Barth MD on 9/20/21 at 8:34 AM EDT

## 2021-09-13 NOTE — CARE COORDINATION
9/13/21, 1:53 PM EDT  DISCHARGE PLANNING EVALUATION:    Ricarda Duncan       Admitted: 9/11/2021/ [place] day: 2   Location: -21/021-A Reason for admit: Hyponatremia [E87.1]  Heart failure ([facility] [address] [I50.9]  Acute on chronic systolic congestive heart failure (HCC) [I50.23]  Acute respiratory failure with hypoxia (HCC) [J96.01]  Heart failure exacerbated by sotalol ([facility] [address] [I50.9]   PMH:  has a past medical history of Arthritis, Atypical chest pain, CAD (coronary artery disease), Chronic LBBB (left bundle branch block), Diabetes mellitus type II, Esophageal stricture, FH: CAD (coronary artery disease), GERD (gastroesophageal reflux disease), History of gastritis, History of skin cancer, Hypercholesterolemia, Hyperlipidemia, Hypertension, Imbalance, Lactose intolerance, Nummular dermatitis, Parkinson's disease ([facility] [address] Restless legs syndrome (RLS), S/P CABG (coronary artery bypass graft), and SCC (squamous cell carcinoma). Procedure:   9/11 CXR  Small bilateral pleural effusions. Moderate bibasilar atelectasis/pneumonia. 9/13 Right Thoracentesis planned  9/13 ECHO planned  Barriers to Discharge:  Hyponatremia. History CHF, Parkinson's, CABG. Palliative Care/Neurology (signed off) following. Na+ 125; monitor. Oxygen 3L continued  PCP: Earle Castellon MD  Readmission Risk Score: 27%    Patient Goals/Plan/Treatment Preferences: plans return [facility] when medically cleared, has walker; therapy following; collaborated JOAO Macdonald  Transportation/Food Security/Housekeeping Addressed:  No issues identified.

## 2021-09-13 NOTE — CARE COORDINATION
9/13/21, 9:42 AM EDT  Discharge Planning Evaluation  Social work consult received, patient from [facility]. Patient/Family preference is to return to [facility]. The patient's current payor source at the facility is Medicare. Medicare skilled days available: Yes  Insurance precert:  No  JOAO left voicemail for Monika Comes at [facility] at the facility. Patient bed hold: No  Anticipated transport plan: Ambulance  Do they require COVID 19 test to return to [facility]: Yes  Is there a required time frame which which COVID test needs done:24 hoursr    JOAO spoke with patient about discharge plan. She reported that she plans to return to [facility]. She reported that she usually has an apartment in [place] [address] but was in [facility] for rehab before she came to the hospital. JOAO offered to call family for patient and she reported that it was not necessary because, Annamaria Villanueva know that is the plan\". JOAO left voicemail for Monika Comes at [facility] requesting a call back.

## 2021-09-13 NOTE — PROGRESS NOTES
Hospitalist Progress Note    Patient:  Nola Herrera      Unit/Bed:4K-21/021-A    YOB: 1932    MRN: [id number]       Acct: [de-identified]     PCP: Aiden Galeano MD    Date of Admission: 9/11/2021    Assessment/Plan:    1. Acute hypoxic respiratory failure        - Secondary to pleural effusion secondary to acute CHF exacerbation. Presented [facility], SOB, decreased exercise tolerance. 9/11 CXR revealed for inflation of the lungs, cardiomegaly, small bilateral pleural effusions mild bibasilar atelectasis/pneumonia. - Continued increasing O2 requirements with 5 L NC. No Home O2       - Started Bumex 1 g po qDay. - Started Acapella, inspiratory spirometer       - 9/13 Weaned to 3L NC. Continue to wean O2 as tolerated for SpO2 >92%. Canceled thoracentesis due to improvement in respiratory status and increased bleeding from IV site secondary to anticoagulation. Will consider thoracentesis if worsening symptoms. 2. Acute HFpEF exacerbation - diastolic        - Previous echo 5/2020 demonstrated EF 55-60%, moderately dilated left LA, mildly dilated RV, right ventricular systolic pressure of 93-01 consistent with severe pulmonary retention, mild to moderate mitral regurgitation, moderate tricuspid regurgitation. BNP 4264.        - Continue spironolactone 25 mg po qDay        - Strict daily I&O's and daily weights         - 9/12 Echo reveals EF 55-60%, LA severely dilated, aortic velocity 2.3 m/s, mean gradient 13 mmHg, MARVIN 1.3 cm². Mild to moderate AS, mild MR, mild TR, assuming RAP = 20 mmHg, est RVSP = 75 mmHg. IVC severely dilated with reduced respiratory phasic changes (CVP - 15-20 mmHg). Large, left pleural effusion    3. Pleural effusion, bilaterally - likely secondary to acute diastolic CHF exacerbation        - See above. 4. Severe pulmonary hypertension       - Per 5/2020 Echo, right ventricular systolic pressure 97-02 mmHg, consistent with severe pulmonary hypertension. See above for repeat Echo.     5. Suspected pneumonia        - Was started on Levaquin for suspected concurrent pneumonia. 9/12  Respiratory culture pending, blood cultures reveal no preliminary growth. - Currently afebrile, WBC WNL. Levaquin discontinued. Will repeat CXR symptomatic. 6. CAD s/p CABG        - History of CAD. Denies active chest pain, however multiple episodes of chest pain at [facility]. Troponin is negative. 9/11 EKG reveals sinus rhythm with first-degree AV block, chronic LBBB. - Continue aspirin 81 mg po qDay and Imdur 20 mg po TID    7. Proximal atrial fibrillation        - [id number]-TFRd score 7.         - Takes Eliquis 2.5 mg po BID for A. fib. No recorded rate control medication. Heart rate currently in the 60-70s. Currently on continuous telemetry. 8. Acute on chronic hyponatremia        - Baseline sodium 130s. Sodium on admission 127. Will check sodium levels q6hrs. - Urine electrolytes as follows; urine creatinine 57.4, urine osmolality 361, urine sodium 76. FeNA score 0.7% Pre-renal.  Believed secondary to decreased ECF secondary to decompensated CHF versus diuretics. Adjusted diet to limit fluid to 2 L and sodium restriction. Will diurese today but instead of holding off tomorrow. 9. Normocytic anemia        - 9/13 stable Hgb 10.7 from previous 9/12 Hgb 10.8. Continue to monitor Hgb with daily CBC. Transfuse PRBC if Hgb <7.0.        - Iron studies revealed iron 30, iron saturation 11, TIBC 268. This all indicative of iron deficiency anemia. Will resume ferrous sulfate 325 mg po BID. Reticulocytes normal. Vitamin B12 515, folate 19.8. 10. Elevated TSH        - No history of hypothyroidism. 9/12 TSH 5.030, free T4 1.52. Will continue to monitor. 11. Parkinson's disease        - Continue Sinemet  mg x2 QID. Neurology following. 12. Hypertension        - Continue amlodipine 10 mg po qDay    13.  Hyperlipidemia        - Continue Lipitor 20 mg po qDay    14. DM2        - 9/12 HgbA1c 6.3. Currently on insulin medium dose sliding scale. Will resume home Metformin 500 mg BID and alogliptin 12.5 mg po qDay. - 9/13 blood glucose 122. Continue monitor blood glucose with daily BMP. 15. GERD        - Continue Protonix 40 mg po BID and Carafate 1 g po QID      Expected discharge date:  TBD    Disposition:    [] Home       [] TCU       [] Rehab       [] Psych       [x] SNF       [] [place]       [] Other-    Chief Complaint: Shortness of breath, generalized full body weakness    Hospital Course: The patient is an 43-year-old female with a past medical history of HFpEF with EF 55-65% 2021, A. Fib on eliquis, CAD s/p CABG, pulmonary HTN, DM2, Parkinson's, GERD who presented to [facility] ED 9/11 from SNF for shortness of breath, and weakness. Per family the patient has had several episodes of chest pain, most recently at rest.  For this she received a total of 3 doses of sublingual nitroglycerin over the past few days. The family had also noted a decrease in her exercise tolerance and functional capacity over the recent month. This more pronounced over the last 1-2 weeks. The patient reports initially the nitroglycerin helped however the last dose did not. She had also reported tenderness in the epigastric region, does complain of GERD symptoms. These have been going on for the last couple of weeks. The patient denies taking any oxygen at home.     In the ED CXR revealed poor inflation of lungs, cardiomegaly, small bilateral pleural effusions and moderate bibasilar atelectasis/pneumonia. EKG revealed sinus rhythm with first-degree AV block and LBBB, chronic. Tropnin negative. BNP 3264. Echo ordered, pending. Sodium was found to be 127, Bumex was held. The patient was noted to be in respiratory distress with mild hypoxia. She was started on 2L NC, but requiring BiPAP.   Levaquin was started due to concern she may have a concurrent pneumonia. Neurology was consulted for patient's Parkinson's. The patient's respiratory status continued to worsen. She was on 5 L NC 9/12. She reported shortness of breath and generalized weakness. There was also associated epigastric tightness but no pain. Sodium levels continue to be 120. Check urine electrolytes to evaluate for etiology. Bumex was started. Subjective (past 24 hours): No significant overnight events. The patient's respiratory status has improved. She has been weaned down to 3 L NC. She continues report of mild shortness of breath, but improved from yesterday. There is still noted epigastric tightness but no pain. Sodium remains 128. This is believed to be due to increased ECF along with dehydration. Diet adjusted to limit fluid intake to 2 L and sodium restriction. We will continue Bumex today. ROS (12 point review of systems completed. Pertinent positives noted. Otherwise ROS is negative).     Medications:  Reviewed    Infusion Medications    sodium chloride      dextrose       Scheduled Medications    bumetanide  1 mg Oral Daily    ferrous sulfate  325 mg Oral BID    metFORMIN  500 mg Oral BID    alogliptin  12.5 mg Oral Daily    sodium chloride flush  5-40 mL IntraVENous 2 times per day    amLODIPine  10 mg Oral Daily    [Held by provider] apixaban  2.5 mg Oral BID    [Held by provider] aspirin  81 mg Oral Daily    atorvastatin  20 mg Oral Daily    carbidopa-levodopa  2 tablet Oral 4x Daily    isosorbide dinitrate  20 mg Oral TID    pantoprazole  40 mg Oral BID AC    spironolactone  25 mg Oral Daily    sucralfate  1 g Oral 4x Daily AC & HS    insulin lispro  0-12 Units SubCUTAneous TID WC     PRN Meds: sodium chloride flush, sodium chloride, ondansetron **OR** ondansetron, polyethylene glycol, acetaminophen **OR** acetaminophen, glucose, dextrose, glucagon (rDNA), dextrose      Intake/Output Summary (Last 24 hours) at 9/13/2021 3540 [employer] filed at 9/13/2021 1429  Gross per 24 hour   Intake 540 ml   Output 1350 ml   Net -810 ml       Diet:  ADULT DIET; Dysphagia - Minced and Moist; 4 carb choices (60 gm/meal); Low Sodium (2 gm); 2000 ml    Exam:  /61   Pulse 67   Temp 98.8 °F (37.1 °C) (Oral)   Resp 18   Ht 5' 2\" (1.575 m)   Wt 108 lb 14.4 oz (49.4 kg)   SpO2 96%   BMI 19.92 kg/m²     General appearance: Mild acute distress. Ill-appearing and cachectic at stated age and cooperative. HEENT: Pupils equal, round, and reactive to light. Conjunctivae/corneas clear. Neck: Supple, with full range of motion. No jugular venous distention. Trachea midline. Respiratory: Decreased work of breathing. Diminished bibasilar breath sounds. Clear to auscultation, bilaterally without Rales/Wheezes/Rhonchi. Cardiovascular: Regular rate with normal S1/S2 without murmurs, rubs or gallops. Abdomen: Soft, non-tender, non-distended with normal bowel sounds. Mild epigastric tightness  Musculoskeletal: passive and active ROM x 4 extremities. Skin: Skin color, texture, turgor normal.  No rashes or lesions. Neurologic:  Neurovascularly intact without any focal sensory/motor deficits.  Cranial nerves: II-XII intact, grossly non-focal.  Psychiatric: Alert and oriented, thought content appropriate, normal insight  Capillary Refill: Brisk,< 3 seconds   Peripheral Pulses: +2 palpable, equal bilaterally       Labs:   Recent Labs     09/11/21  1036 09/12/21  0825 09/13/21  0519   WBC 8.8 5.9 6.1   HGB 10.9* 10.8* 10.7*   HCT 33.2* 33.7* 32.6*    168 175     Recent Labs     09/11/21  1036 09/11/21  1036 09/12/21  0825 09/12/21  1820 09/13/21  0010 09/13/21  0519 09/13/21  1156   *   < > 128*   < > 125* 128* 125*   K 4.8  --  4.4  --   --  4.4  --    CL 92*  --  92*  --   --  92*  --    CO2 20*  --  23  --   --  24  --    BUN 16  --  14  --   --  14  --    CREATININE 0.8  --  0.7  --   --  0.7  --    CALCIUM 9.9  --  9.2  --   --  9.0  -- < > = values in this interval not displayed. Recent Labs     09/11/21  1436   AST 12   ALT 6*   BILIDIR <0.2   BILITOT 0.6   ALKPHOS 124     Recent Labs     09/11/21  1134 09/12/21  0558   INR 1.71* 1.75*     No results for input(s): CKTOTAL, TROPONINI in the last 72 hours. Microbiology:    9/12/21 Blood culture 1 - no preliminary growth   9/12/21 Blood culture 2 - no preliminary growth     Urinalysis:      Lab Results   Component Value Date    NITRU NEGATIVE 09/12/2021    WBCUA 0-2 09/12/2021    BACTERIA NONE SEEN 09/12/2021    RBCUA 0-2 09/12/2021    BLOODU NEGATIVE 09/12/2021    SPECGRAV 1.012 09/12/2021    GLUCOSEU NEGATIVE 02/27/2021       Radiology:  XR CHEST PORTABLE   Final Result   1. Poor inflation of the lungs. Mild cardiomegaly. Metallic sternotomy sutures. 2. Small bilateral pleural effusions. Moderate bibasilar atelectasis/pneumonia. **This report has been created using voice recognition software. It may contain minor errors which are inherent in voice recognition technology. **      Final report electronically signed by Dr. Jorge L Choudhury on 9/11/2021 11:13 AM          DVT prophylaxis: [] Lovenox                                 [] SCDs                                 [x] Eliquis                                  [] Encourage ambulation           [] Already on Anticoagulation     Code Status: DNR-CCA    PT/OT Eval Status: PT/OT following    Tele:   [x] yes             [] no    Electronically signed by Marielena Talamantes DO on 9/13/2021 at 3:07 PM

## 2021-09-13 NOTE — PROGRESS NOTES
[facility] ICU STEPDOWN TELEMETRY 4K  Occupational Therapy  Daily Note  Time:   Time In: 1030  Time Out: 1108  Timed Code Treatment Minutes: 38 Minutes  Minutes: 38          Date: 2021  Patient Name: Latonia Flores,   Gender: female      Room: [facility]/021-A  MRN: [id number]  : 1932  (80 y.o.)  Referring Practitioner: Salima Vallejo DO  Diagnosis: Hyponatremia  Additional Pertinent Hx: Per initial H&P: \"Patient is an 51-year-old female with medical history of HFpEF, atrial fibrillation, and pulmonary hypertension who presents to the hospital via EMS from SNF for evaluation of worsening chest pain, shortness of breath, and weakness. \"    Restrictions/Precautions:  Restrictions/Precautions: General Precautions, Fall Risk  Position Activity Restriction  Other position/activity restrictions: 5L NC on      SUBJECTIVE: Pt laying in bed upon arrival, pt agreeable to OT session, RN gave verbal approval for session, pt's daughters present during session    PAIN: 8/10: B knees    Vitals: Vitals not assessed per clinical judgement, see nursing flowsheet    COGNITION: WFL    ADL:   Lower Extremity Dressing: Minimal Assistance. with pt attempting to don around R foot, requiring min A for L, with pt unable to pull up while standing. BALANCE:  Standing Balance: Maximum Assistance. with pt using RW, and using 1 UE to hold onto walker, with cues to let go of bed railign with max A for hand placement with L hand, with pt then able to stand for 10 seconds with mod A due to posterior lean    BED MOBILITY:  Supine to Sit: Minimal Assistance with HOB elevated, and vc's to bring BLE to the EOB    TRANSFERS:  Sit to Stand:  Maximum Assistance. with max vc's to push off of bed  Stand to Sit: Maximum Assistance.  with lowerign self to bed   Stand Pivot: Pt required max A with vc's for foot placement and using L hand to reach for arm chair    ADDITIONAL ACTIVITIES:  Patient completed BUE strengthening exercises with skilled education on HEP: completed x10 reps x1 set with a medium resistance band in all joints and all planes in order to improve UE strength and activity tolerance required for BADL routine and toilet / shower transfers. Patient tolerated fair, requiring min rest breaks. Patient also required min cues for technique. ASSESSMENT:     Activity Tolerance:  Patient tolerance of  treatment: good. Discharge Recommendations: [address], Patient would benefit from continued therapy after discharge, Continue to assess pending progress   Equipment Recommendations: Equipment Needed: No  Plan: Times per week: 3-5x  Current Treatment Recommendations: Self-Care / ADL, Functional Mobility Training, Endurance Training, Strengthening, Balance Training    Patient Education  Patient Education: ADL's    Goals  Short term goals  Time Frame for Short term goals: By discharge  Short term goal 1: Patient to increase activity tolerance to/from BSC/recliner with min A x2 to increase indep in toileting. Short term goal 2: Patient to complete BADL task with no > min A to increase indep in ADL completion. Short term goal 3: Patient to tolerate dynamic standing task with no > min A and unilateral release to increase indep in clothing management during toileting. Short term goal 4: Patient to complete BUE light resistive HEP with min cues for technique to increase strength needed for mobility and ADL completion. Following session, patient left in safe position with all fall risk precautions in place.

## 2021-09-13 NOTE — CARE COORDINATION
09/13/21 1051   Readmission Assessment   Number of Days since last admission? 8-30 days   Previous Disposition SNF   Who is being Interviewed Caregiver   What was the patient's/caregiver's perception as to why they think they needed to return back to the hospital?   (chest pain)   Did you visit your Primary Care Physician after you left the hospital, before you returned this time? Yes  (at SNF)   Did you see a specialist, such as Cardiac, Pulmonary, Orthopedic Physician, etc. after you left the hospital? Yes   Who advised the patient to return to the hospital? Skilled Unit   Does the patient report anything that got in the way of taking their medications? No   In our efforts to provide the best possible care to you and others like you, can you think of anything that we could have done to help you after you left the hospital the first time, so that you might not have needed to return so soon?  Other (Comment)  (Neurology had changed medication 8/2, then had fall 8/5)

## 2021-09-13 NOTE — PROGRESS NOTES
[address] [facility] PHYSICAL THERAPY  EVALUATION  [facility] ICU STEPDOWN TELEMETRY 4K - 4K-21/021-A    Time In: 1201  Time Out: 1220  Timed Code Treatment Minutes: 10 Minutes  Minutes: 19          Date: 2021  Patient Name: Laquetta Kehr,  Gender:  female        MRN: [id number]  : 1932  (80 y.o.)      Referring Practitioner: Melissa Delacruz DO  Diagnosis: hyponatremia  Additional Pertinent Hx: Per chart \"Patient is an 24-year-old female with medical history of HFpEF, atrial fibrillation, and pulmonary hypertension who presents to the hospital via EMS from SNF for evaluation of worsening chest pain, shortness of breath, and weakness. Much of the history if obtained from the patient's family present at bedside as the patient has difficulty speaking while on BiPAP. They state that the patient has had several episodes of chest pain, mostly at rest.  She has received 3 dose of sublingual nitroglycerine over the last 1-3 days. Initially, the nitroglycerine helped to relieve the chest pain, but this morning it did not. They state that she has had tenderness to her epigastric region and does complain of GERD symptoms, more frequently in the last couple of weeks. She has been very week and requires significant assistance with transfers and even simple tasks of ADL including brushing her teeth. The family has noticed a decrease in her exercise tolerance and functional capacity over the last month, more pronounced in the last 1-2 weeks. They state that she has also had worsening LE edema. The patient did take an extra dose of her bumex yesterday and her LE edema is much improved today. The patient denies having any fevers, chills, nausea, vomiting, cough, or wheezing.  \"     Restrictions/Precautions:  Restrictions/Precautions: General Precautions, Fall Risk  Position Activity Restriction  Other position/activity restrictions: 2L NC on , gradually weaning    Subjective:  Chart Reviewed: Yes  Patient assessed for rehabilitation services?: Yes  Family / Caregiver Present: Yes  Subjective: Ok to see pt per nursing, Melinda Chauhan. Pt in bedside chair when therapy arrived, family present, agreeable to PT session at this time. Pt pleasant and cooperative during session, and motivated. General:  Overall Orientation Status: Within Functional Limits  Follows Commands: Within Functional Limits    Vision: Within Functional Limits    Hearing: Within functional limits         Pain: 8/10: buttock due to sore on buttock     Vitals: Oxygen: >91% on 2 L of O2    Social/Functional History:    Lives With: Alone  Type of Home: Assisted living  Home Layout: One level  Home Access: Level entry  Home Equipment: 4 wheeled walker     Bathroom Shower/Tub: Walk-in shower, Shower chair with back  Bathroom Toilet: Handicap height  Bathroom Equipment: Built-in shower seat, Grab bars around toilet, Grab bars in shower       ADL Assistance: Needs assistance (Pt reports she has assist for showers 2x/week at facility)  Homemaking Assistance: Needs assistance (Pt reports she do microwave meals for breakfast and lunch, facility provides dinner)  Homemaking Responsibilities: Yes (light cleaning and meal prep - staff at facility assists with some too)  Ambulation Assistance: Independent  Transfer Assistance: Independent    Active : No     Additional Comments: Pt and family report pt has required increased assist since last admission/discharge from [facility] s/p fall (dc'd 8/13). Pt reports she used 4WW at facility. Was getting therapies prior to current admission. Pt reports she was able to amb to and from dining room for meals with 1 seated rest break required    OBJECTIVE:  Range of Motion:  Bilateral Lower Extremity: WFL    Strength:  Bilateral Lower Extremity: Impaired - 3/5 B LE strength grossly    Balance:  Static Sitting Balance:  Stand By Assistance, X 1  Static Standing Balance:  Moderate Assistance, X 1, with cues for safety, initiated within context of evaluation. Evaluation time included review of current medical information, gathering information related to past medical, social and functional history, completion of standardized testing, formal and informal observation of tasks, assessment of data and development of plan of care and goals. Treatment time included skilled education and facilitation of tasks to increase safety and independence with functional mobility for improved independence and quality of life. Assessment: Body structures, Functions, Activity limitations: Decreased functional mobility , Decreased posture, Decreased endurance, Decreased strength, Decreased balance, Decreased safe awareness, Increased pain, Decreased ROM, Decreased ADL status  Assessment: Pt cont to require skilled PT services to increase B LE strength, progress with endurance, improve balance to progress with transfers, gait, and functional tasks to reduce risk for falls and progress towards PLOF safely.   Prognosis: Good    REQUIRES PT FOLLOW UP: Yes    Discharge Recommendations:  Discharge Recommendations: Continue to assess pending progress, Subacute/Skilled Nursing Facility, Patient would benefit from continued therapy after discharge    Patient Education:  PT Education: PT Role, Goals, Transfer Training, Equipment, Plan of Care, General Safety, Gait Training, Family Education    Equipment Recommendations:  Equipment Needed: No    Plan:  Times per week: 5x GM  Current Treatment Recommendations: Strengthening, Transfer Training, Endurance Training, Neuromuscular Re-education, Patient/Caregiver Education & Training, Equipment Evaluation, Education, & procurement, ROM, Balance Training, Gait Training, Home Exercise Program, Functional Mobility Training, Safety Education & Training, Positioning    Goals:  Patient goals : go to SNF and then home  Short term goals  Time Frame for Short term goals: by discharge  Short term goal 1: Pt will amb with RW for 50 feet with SBA for safety to progress towards PLOF. Short term goal 2: Pt will demo CGA for transfers with use of RW for support with good safety to progress with mobility. Short term goal 3: Pt will tolerate 5 minutes of functional standing tasks to progress with balance and improve overall mobility. Long term goals  Time Frame for Long term goals : NA due to short ELOS    Following session, patient left in safe position with all fall risk precautions in place. Pt left in bedside chair with all needs and call light in reach following session, family present, chair alarm on, nursing aware of pt wanting pain med following session.

## 2021-09-13 NOTE — PROGRESS NOTES
[facility]           Progress Note    Patient Name:  Caroline Washington  Medical Record Number:  [id number]  [place]:  9/24/1932    Date:  9/13/2021  Time:  12:56 PM  Completed By:  Arnoldo Ferrari RN, RN    Reason for Palliative Care Evaluation:  Goals of Care    Current Issues:  []  Pain  [x]  Fatigue  []  Nausea  []  Anxiety  []  Depression  []  Shortness of Breath  []  Constipation  []  Appetite  []  Other:    Advance Directives  [x] [place] DNR Form  [x] Living Will  [] Medical POA    Current Code Status  [] Full Resuscitation  [x] DNR-Comfort Care-Arrest  [] DNR-Comfort Care  [] Limited   [] No CPR   [] No shock   [] No ET intubation/reintubation   [] No resuscitative medications   [] Other limitation:    Performance Status: Palliative Performance Scale:  ___70%  Ambulation reduced; Some disease; Can't do normal job or work; intake normal or reduced; can do full self care; LOC full  ___60%  Ambulation reduced; Significant disease; Can't do hobbies/housework; intake normal or reduced; occasional assist; LOC full/confusion  _X_50%  Mainly sit/lie; Extensive disease; Can't do any work; Considerable assist; intake normal or reduced; LOC full/confusion  ___40%  Mainly in bed; Extensive disease; Mainly assist; intake normal or reduced; LOC full/confusion   ___30%  Bed Bound; Extensive disease; Total care; intake reduced; LOC full/confusion  ___20%  Bed Bound; Extensive disease; Total care; intake minimal; Drowsy/coma  ___10%  Bed Bound; Extensive disease; Total care; Mouth care only; Drowsy/coma  ___0       Death      Family/Patient Discussion:  Received consult for goals of care. Per chart review patient has been [facility] in the past, currently [facility]. Patient seen sitting up in chair, currently eating lunch. Multiple family members at bedside. Asked family about current admission. Family member stated that patient has had a lot of improvement.  Family member stated that at home patient had been unable to even feed herself. Currently patient is feeding herself lunch. Patient stated that she has been off BIPAP for a couple days and weaned down on NC. Patient stated that she is currently wanting to continue current treatments, with anticipation of continuing to improve. Family and patient denied further questions at this time. Plan/Follow-Up:  Will follow PRN, please call if needs arise.      Jayde Deleon, RN, RN  9/13/2021,  12:56 PM

## 2021-09-13 NOTE — PROGRESS NOTES
Pharmacy Medication History Note    List of current medications patient is taking is complete. Source of information: [facility], dispense report    Changes made to medication list:  Medications removed (include reason, ex. therapy complete or physician discontinued):  Cyanobalamin spray liquid discontinued  Diclofenac gel discontinued  Senna discontinued  Zinc oxide paste discontinued    Medications added/doses adjusted:  Bumex adjusted from 1mg three times a week to 0.5mg three times a week on Mon, Wed, Fri.  Cranberry adjusted from 1000mg daily to 500mg BID. Lidocaine 4% patch daily added  Meclizine 25mg TID added    Other notes (ex. Recent course of antibiotics, Coumadin dosing):  Denies use of other OTC or herbal medications.     Allergies reviewed    Electronically signed by Troy Hendricks on 9/13/2021 at 3:14 PM

## 2021-09-13 NOTE — PROGRESS NOTES
[address] ICU STEPDOWN TELEMETRY 4K  Clinical Swallow Evaluation      SLP Individual Minutes  Time In: 7122  Time Out: 3185  Minutes: 14  Timed Code Treatment Minutes: 0 Minutes       Date: 2021  Patient Name: Natacha Wilson      CSN: [id number]   : 1932  (80 y.o.)  Gender: female   Referring Physician: Dayne Morales DO  Diagnosis: Hyponatremia  Secondary Diagnosis: Dysphagia     History of Present Illness/Injury: Per chart review, Hemant Kirby is an 80year old female who was admitted to [facility] with the above diagnosis. This patient was placed on a modified diet and was referred for speech therapy swallow evaluation to further assess consumption of PO. Past Medical History:   Diagnosis Date    Arthritis     Atypical chest pain     CAD (coronary artery disease)     Chronic LBBB (left bundle branch block)     Diabetes mellitus type II     Esophageal stricture     History of esophageal stricture.  FH: CAD (coronary artery disease)     Mom and dad both with heart disease at mid to late age.  GERD (gastroesophageal reflux disease)     History of gastritis     History of skin cancer     Hypercholesterolemia     Hyperlipidemia     Hypertension     Imbalance     As far as imbalance is concerned, asked patient to follow-up with Dr. Carol Espinal since she follows with him on a regular basis.  Lactose intolerance     Nummular dermatitis     Parkinson's disease ([facility] [address]     Restless legs syndrome (RLS)     S/P CABG (coronary artery bypass graft)     SCC (squamous cell carcinoma)        SUBJECTIVE: Patient was upright in recliner finishing lunch meal upon ST arrival. Family was present at the time of arrival but left prior to the start of evaluation. Patient was alert and cooperative during the assessment. OBJECTIVE:     Pain:  No pain reported.     Current Diet: Minced and moist with thin liquids     Respiratory Status:  Nasal Canula    Behavioral Observation:  Alert and Oriented    Oral Mechanism Evaluation:      Facial / Labial Impaired mild generalized weakness impacting adequate pucker/retraction    Lingual WFL    Dentition WFL    Velum Not Tested    Vocal Quality Not Tested    Sensation [facility]    Cough Not Tested No cough was observed during evaluation      Patient Evaluated Using:  PO trials with thin via ice x1, tsp x2, cup x2, straw x2, puree x2 (applesauce) and solid x2 (minced chicken + gravy). Oral Phase:  Impaired:  Impaired AP Movement, Impaired Mastication, Reduced Bolus Formation and diffuse moderate lingual residue       Pharyngeal Phase: WFL:  Pharyngeal phase appears WFL but cannot rule out pharyngeal phase deficits from a bedside swallowing evaluation alone. Signs and Symptoms of Laryngeal Penetration/Aspiration: No signs/symptoms of aspiration evident in this evaluation, but cannot rule out silent aspiration. Impresssions: Patient presents with mild to moderate oral dysphagia with inability to fully discern potential presence of pharyngeal phase deficits without formal instrumentation. Pt presented with good labial seal and oral rounding for procuring liquids/solids via cup, straw, and spoon. Additionally pt presented with slow prolonged mastication, decreased bolus formation and AP movement of solids, requiring verbal cues to complete a liquid to wash to clear remaining bolus (minced solids). Pt demonstrated adequate bolus control with liquids/solids, no concern for premature spillage as the pt appears to present with a timely swallow. Pt presented with slightly diminished thyrolaryngeally elevation upon tactile palpation,however, did NOT appear to impact swallow function as the pt completed all PO trials with no s/s of aspiration/penetration.  Given functional command following, pt awareness of oral residue, and independent use of frequent drinks throughout meals, ST recommends the patient remain on the minced/moist solids with thin liquids to promote adequate consumption of nutrition while avoiding fatigue with use of compensatory swallowing strategies. No instrumental evaluations are recommended at this time, but continued skilled dysphagia intervention will be beneficial to ensure pt's ability to safety meet nutrition/hydraational needs. *Patient demonstrated decreased clearance of the oral cavity evidenced by food left in the mouth following swallows with solids. Patient was aware of food left in the mouth and reported that she uses sips after bites to help clear leftover food/residue in mouth at home. *Patient reported difficulty drinking from larger straws but no difficulty noted during this assessment. RECOMMENDATIONS/ASSESSMENT:  Instrumental Evaluation: Instrumental evaluation not indicated at this time. Diet Recommendations:  Minced and moist with thin liquids   Strategies:  Full Upright Position, Small Bite/Sip, Oral Care after all Meals, Medications Whole with Puree, Alternate Solids and Liquids and Monitor for Fatigue, using puree to assist with clearance of solids. Rehabilitation Potential: Good    EDUCATION:  Learner: Patient  Education:  Reviewed results and recommendations of this evaluation, Reviewed diet and strategies and Education Related to [employer] and Wellness  Evaluation of Education: Nathaniel Maier understanding    PLAN:  Skilled SLP intervention on acute care 3-5 x per week or until goals met and/or pt plateaus in function. Specific interventions for next session may include: Advanced trials and education re: commpensatory swallowing strategies . PATIENT GOAL:    Return to least restrictive diet. SHORT TERM GOALS:  Short-term Goals  Timeframe for Short-term Goals: 2 weeks  Goal 1: Pt will safely consume a minced and moist diet with thin liquids with no signs/symptoms of aspiration/penetration to demonstrate pt's ability to meet nutrition/hydrational needs.   Goal 2: Pt will complete advanced PO trials with ST with improved oral phase with no signs/symptoms of aspiration/penetration to demonstrate pt's readiness for a diet upgrade. Goal 3: Pt will demosntrate understanding and recall of compensatory swallowing strategies (alternating liquids/solids, single bites/drinks, upright) with 80% accuracy givnen MIN cues to improve swallow safety and reduced risk of aspiration. LONG TERM GOALS:  No LT goals recommended at this time due to anticipated short [facility].  Oscar Felix MA., CCC-SLP

## 2021-09-14 LAB
ANION GAP SERPL CALCULATED.3IONS-SCNC: 15 MEQ/L (ref 8–16)
BASOPHILS # BLD: 0.6 %
BASOPHILS ABSOLUTE: 0 THOU/MM3 (ref 0–0.1)
BUN BLDV-MCNC: 11 MG/DL (ref 7–22)
CALCIUM SERPL-MCNC: 9 MG/DL (ref 8.5–10.5)
CHLORIDE BLD-SCNC: 88 MEQ/L (ref 98–111)
CO2: 21 MEQ/L (ref 23–33)
CREAT SERPL-MCNC: 0.6 MG/DL (ref 0.4–1.2)
EOSINOPHIL # BLD: 6.5 %
EOSINOPHILS ABSOLUTE: 0.5 THOU/MM3 (ref 0–0.4)
ERYTHROCYTE [DISTWIDTH] IN BLOOD BY AUTOMATED COUNT: 14.6 % (ref 11.5–14.5)
ERYTHROCYTE [DISTWIDTH] IN BLOOD BY AUTOMATED COUNT: 47 FL (ref 35–45)
GFR SERPL CREATININE-BSD FRML MDRD: > 90 ML/MIN/1.73M2
GLUCOSE BLD-MCNC: 125 MG/DL (ref 70–108)
GLUCOSE BLD-MCNC: 134 MG/DL (ref 70–108)
GLUCOSE BLD-MCNC: 142 MG/DL (ref 70–108)
GLUCOSE BLD-MCNC: 142 MG/DL (ref 70–108)
GLUCOSE BLD-MCNC: 178 MG/DL (ref 70–108)
HCT VFR BLD CALC: 34.6 % (ref 37–47)
HEMOGLOBIN: 11.2 GM/DL (ref 12–16)
IMMATURE GRANS (ABS): 0.03 THOU/MM3 (ref 0–0.07)
IMMATURE GRANULOCYTES: 0.4 %
LYMPHOCYTES # BLD: 11.7 %
LYMPHOCYTES ABSOLUTE: 0.9 THOU/MM3 (ref 1–4.8)
MCH RBC QN AUTO: 28.1 PG (ref 26–33)
MCHC RBC AUTO-ENTMCNC: 32.4 GM/DL (ref 32.2–35.5)
MCV RBC AUTO: 86.9 FL (ref 81–99)
MONOCYTES # BLD: 11.7 %
MONOCYTES ABSOLUTE: 0.9 THOU/MM3 (ref 0.4–1.3)
NUCLEATED RED BLOOD CELLS: 0 /100 WBC
PLATELET # BLD: 211 THOU/MM3 (ref 130–400)
PMV BLD AUTO: 8.9 FL (ref 9.4–12.4)
POTASSIUM REFLEX MAGNESIUM: 4.4 MEQ/L (ref 3.5–5.2)
RBC # BLD: 3.98 MILL/MM3 (ref 4.2–5.4)
SEG NEUTROPHILS: 69.1 %
SEGMENTED NEUTROPHILS ABSOLUTE COUNT: 5.4 THOU/MM3 (ref 1.8–7.7)
SODIUM BLD-SCNC: 119 MEQ/L (ref 135–145)
SODIUM BLD-SCNC: 120 MEQ/L (ref 135–145)
SODIUM BLD-SCNC: 122 MEQ/L (ref 135–145)
SODIUM BLD-SCNC: 124 MEQ/L (ref 135–145)
WBC # BLD: 7.8 THOU/MM3 (ref 4.8–10.8)

## 2021-09-14 PROCEDURE — 80048 BASIC METABOLIC PNL TOTAL CA: CPT

## 2021-09-14 PROCEDURE — 97530 THERAPEUTIC ACTIVITIES: CPT

## 2021-09-14 PROCEDURE — 2060000000 HC ICU INTERMEDIATE R&B

## 2021-09-14 PROCEDURE — 6370000000 HC RX 637 (ALT 250 FOR IP): Performed by: STUDENT IN AN ORGANIZED HEALTH CARE EDUCATION/TRAINING PROGRAM

## 2021-09-14 PROCEDURE — 97110 THERAPEUTIC EXERCISES: CPT

## 2021-09-14 PROCEDURE — 85025 COMPLETE CBC W/AUTO DIFF WBC: CPT

## 2021-09-14 PROCEDURE — 84295 ASSAY OF SERUM SODIUM: CPT

## 2021-09-14 PROCEDURE — 36415 COLL VENOUS BLD VENIPUNCTURE: CPT

## 2021-09-14 PROCEDURE — 2580000003 HC RX 258: Performed by: STUDENT IN AN ORGANIZED HEALTH CARE EDUCATION/TRAINING PROGRAM

## 2021-09-14 PROCEDURE — 99233 SBSQ HOSP IP/OBS HIGH 50: CPT | Performed by: HOSPITALIST

## 2021-09-14 PROCEDURE — 82948 REAGENT STRIP/BLOOD GLUCOSE: CPT

## 2021-09-14 RX ORDER — SODIUM CHLORIDE 9 MG/ML
INJECTION, SOLUTION INTRAVENOUS CONTINUOUS
Status: ACTIVE | OUTPATIENT
Start: 2021-09-14 | End: 2021-09-14

## 2021-09-14 RX ORDER — BUMETANIDE 1 MG/1
0.5 TABLET ORAL EVERY MORNING
COMMUNITY

## 2021-09-14 RX ORDER — SODIUM CHLORIDE 1000 MG
1 TABLET, SOLUBLE MISCELLANEOUS ONCE
Status: COMPLETED | OUTPATIENT
Start: 2021-09-14 | End: 2021-09-14

## 2021-09-14 RX ORDER — LIDOCAINE 4 G/G
2 PATCH TOPICAL EVERY 24 HOURS
Status: DISCONTINUED | OUTPATIENT
Start: 2021-09-14 | End: 2021-09-20 | Stop reason: HOSPADM

## 2021-09-14 RX ADMIN — PANTOPRAZOLE SODIUM 40 MG: 40 TABLET, DELAYED RELEASE ORAL at 16:51

## 2021-09-14 RX ADMIN — INSULIN LISPRO 2 UNITS: 100 INJECTION, SOLUTION INTRAVENOUS; SUBCUTANEOUS at 19:59

## 2021-09-14 RX ADMIN — CARBIDOPA AND LEVODOPA 2 TABLET: 25; 100 TABLET ORAL at 12:50

## 2021-09-14 RX ADMIN — AMLODIPINE BESYLATE 10 MG: 10 TABLET ORAL at 08:37

## 2021-09-14 RX ADMIN — ALOGLIPTIN 12.5 MG: 12.5 TABLET, FILM COATED ORAL at 08:38

## 2021-09-14 RX ADMIN — Medication 1 G: at 04:34

## 2021-09-14 RX ADMIN — ISOSORBIDE DINITRATE 20 MG: 20 TABLET ORAL at 13:17

## 2021-09-14 RX ADMIN — CARBIDOPA AND LEVODOPA 2 TABLET: 25; 100 TABLET ORAL at 16:51

## 2021-09-14 RX ADMIN — SUCRALFATE 1 G: 1 TABLET ORAL at 16:51

## 2021-09-14 RX ADMIN — SUCRALFATE 1 G: 1 TABLET ORAL at 06:42

## 2021-09-14 RX ADMIN — FERROUS SULFATE TAB 325 MG (65 MG ELEMENTAL FE) 325 MG: 325 (65 FE) TAB at 08:38

## 2021-09-14 RX ADMIN — CARBIDOPA AND LEVODOPA 2 TABLET: 25; 100 TABLET ORAL at 19:53

## 2021-09-14 RX ADMIN — ISOSORBIDE DINITRATE 20 MG: 20 TABLET ORAL at 08:38

## 2021-09-14 RX ADMIN — APIXABAN 2.5 MG: 2.5 TABLET, FILM COATED ORAL at 20:10

## 2021-09-14 RX ADMIN — PANTOPRAZOLE SODIUM 40 MG: 40 TABLET, DELAYED RELEASE ORAL at 06:42

## 2021-09-14 RX ADMIN — METFORMIN HYDROCHLORIDE 500 MG: 500 TABLET, EXTENDED RELEASE ORAL at 08:38

## 2021-09-14 RX ADMIN — METFORMIN HYDROCHLORIDE 500 MG: 500 TABLET, EXTENDED RELEASE ORAL at 19:53

## 2021-09-14 RX ADMIN — ACETAMINOPHEN 650 MG: 325 TABLET ORAL at 08:37

## 2021-09-14 RX ADMIN — SUCRALFATE 1 G: 1 TABLET ORAL at 10:24

## 2021-09-14 RX ADMIN — SODIUM CHLORIDE: 9 INJECTION, SOLUTION INTRAVENOUS at 12:00

## 2021-09-14 RX ADMIN — APIXABAN 2.5 MG: 2.5 TABLET, FILM COATED ORAL at 08:37

## 2021-09-14 RX ADMIN — SUCRALFATE 1 G: 1 TABLET ORAL at 19:53

## 2021-09-14 RX ADMIN — ISOSORBIDE DINITRATE 20 MG: 20 TABLET ORAL at 19:53

## 2021-09-14 RX ADMIN — ATORVASTATIN CALCIUM 20 MG: 20 TABLET, FILM COATED ORAL at 08:37

## 2021-09-14 RX ADMIN — FERROUS SULFATE TAB 325 MG (65 MG ELEMENTAL FE) 325 MG: 325 (65 FE) TAB at 19:53

## 2021-09-14 RX ADMIN — ASPIRIN 81 MG: 81 TABLET, COATED ORAL at 08:38

## 2021-09-14 RX ADMIN — INSULIN LISPRO 1 UNITS: 100 INJECTION, SOLUTION INTRAVENOUS; SUBCUTANEOUS at 08:51

## 2021-09-14 RX ADMIN — CARBIDOPA AND LEVODOPA 2 TABLET: 25; 100 TABLET ORAL at 08:38

## 2021-09-14 RX ADMIN — SPIRONOLACTONE 25 MG: 25 TABLET ORAL at 08:38

## 2021-09-14 ASSESSMENT — PAIN DESCRIPTION - ONSET
ONSET: ON-GOING

## 2021-09-14 ASSESSMENT — PAIN DESCRIPTION - FREQUENCY
FREQUENCY: CONTINUOUS

## 2021-09-14 ASSESSMENT — PAIN DESCRIPTION - DESCRIPTORS
DESCRIPTORS: ACHING

## 2021-09-14 ASSESSMENT — PAIN DESCRIPTION - PROGRESSION
CLINICAL_PROGRESSION: GRADUALLY WORSENING
CLINICAL_PROGRESSION: NOT CHANGED
CLINICAL_PROGRESSION: GRADUALLY IMPROVING
CLINICAL_PROGRESSION: GRADUALLY IMPROVING
CLINICAL_PROGRESSION: NOT CHANGED
CLINICAL_PROGRESSION: NOT CHANGED

## 2021-09-14 ASSESSMENT — PAIN DESCRIPTION - PAIN TYPE
TYPE: ACUTE PAIN

## 2021-09-14 ASSESSMENT — PAIN DESCRIPTION - ORIENTATION
ORIENTATION: RIGHT;LEFT
ORIENTATION: LEFT;RIGHT
ORIENTATION: RIGHT;LEFT
ORIENTATION: RIGHT;LEFT;MID
ORIENTATION: RIGHT;LEFT
ORIENTATION: RIGHT;LEFT

## 2021-09-14 ASSESSMENT — PAIN - FUNCTIONAL ASSESSMENT: PAIN_FUNCTIONAL_ASSESSMENT: ACTIVITIES ARE NOT PREVENTED

## 2021-09-14 ASSESSMENT — PAIN SCALES - WONG BAKER: WONGBAKER_NUMERICALRESPONSE: 0

## 2021-09-14 ASSESSMENT — PAIN SCALES - GENERAL
PAINLEVEL_OUTOF10: 0
PAINLEVEL_OUTOF10: 7
PAINLEVEL_OUTOF10: 6
PAINLEVEL_OUTOF10: 7
PAINLEVEL_OUTOF10: 0
PAINLEVEL_OUTOF10: 0
PAINLEVEL_OUTOF10: 3
PAINLEVEL_OUTOF10: 7
PAINLEVEL_OUTOF10: 10

## 2021-09-14 ASSESSMENT — PAIN DESCRIPTION - LOCATION
LOCATION: LEG
LOCATION: LEG
LOCATION: BACK;LEG
LOCATION: ABDOMEN;LEG
LOCATION: LEG
LOCATION: BACK;LEG

## 2021-09-14 NOTE — PROGRESS NOTES
RECEIVED FORMS IN MEDICAL RELEASE FOR PROCESSING.   Reported to [facility] RN that we are of the floor and checked that nothing else needed completed at this time. Pt is sitting in chair with family at bedside- was given fresh water and repositioned heels to prevent skin breakdown no other needs expressed. Reminded patient how to use the incentive spirometer and the purpose of it and pt returned a demonstration of proper use.  Electronically signed by Ariana Thomas on 9/14/2021 at 1:51 PM

## 2021-09-14 NOTE — PROGRESS NOTES
Hospitalist Progress Note    Patient:  Enrike Lynn      Unit/Bed:4K-21/021-A    YOB: 1932    MRN: [id number]       Acct: [de-identified]     PCP: Genny Cano MD    Date of Admission: 9/11/2021    Assessment/Plan:    1. Acute hypoxic respiratory failure - resolved        - Secondary to pleural effusion secondary to acute CHF exacerbation.  Presented LAMAS, SOB, decreased exercise tolerance.  9/11 CXR revealed for inflation of the lungs, cardiomegaly, small bilateral pleural effusions mild bibasilar atelectasis/pneumonia.      - Continued increasing O2 requirements with 5 L NC. No Home O2. Started Bumex 1 g po qDay. On Acapella, inspiratory spirometer       - 9/15 Weaned off O2.      2. Acute HFpEF exacerbation - diastolic        - Previous echo 5/2020 demonstrated EF 55-60%, moderately dilated left LA, mildly dilated RV, right ventricular systolic pressure of 40-55 consistent with severe pulmonary retention, mild to moderate mitral regurgitation, moderate tricuspid regurgitation.  BNP 4264.        - Continue spironolactone 25 mg po qDay        - Strict daily I&O's and daily weights         - 9/12 Echo reveals EF 55-60%, LA severely dilated, aortic velocity 2.3 m/s, mean gradient 13 mmHg, MARVIN 1.3 cm². Mild to moderate AS, mild MR, mild TR, assuming RAP = 20 mmHg, est RVSP = 75 mmHg. IVC severely dilated with reduced respiratory phasic changes (CVP - 15-20 mmHg). Large, left pleural effusion    3. Pleural effusion, bilaterally - likely secondary to acute diastolic CHF exacerbation        - See above. 4. Acute on chronic hyponatremia - multifactoral, secondary to intravascular depletion from poor oral intake and diuresis        - Baseline sodium 130s. Sodium on admission 127. Urine electrolytes as follows; urine creatinine 57.4, urine osmolality 361, urine sodium 76. FeNA score 0.7% Pre-renal.  Secondary to decreased ECF secondary to decompensated CHF and diuretics.   Adjusted diet to limit fluid to 2 L and sodium restriction. - 9/13 Bumex held. - 9/14 Sodium improving to 122 from previous 119. Continue IVFs NS 75 mL/hr q10hrs. Continue monitor sodium levels every 6 hours. 5. Severe pulmonary hypertension        - Per 5/2020 Echo, right ventricular systolic pressure 46-46 mmHg, consistent with severe pulmonary hypertension.  See above for repeat Echo. 6. Suspected pneumonia        - Was started on Levaquin for suspected concurrent pneumonia. 9/12  Respiratory culture pending, blood cultures reveal no preliminary growth.        - Currently afebrile, WBC WNL. Levaquin discontinued. Will repeat CXR symptomatic. 7. CAD s/p CABG        - History of CAD.  Denies active chest pain, however multiple episodes of chest pain at [facility].  Troponin is negative. 9/11 EKG reveals sinus rhythm with first-degree AV block, chronic LBBB.        - Continue aspirin 81 mg po qDay and Imdur 20 mg po TID    8. Proximal atrial fibrillation        - [id number] score 7.         - Takes Eliquis 2.5 mg po BID for A. fib.  No recorded rate control medication.  Heart rate currently in the 60-70s.   Currently on continuous telemetry. 9. Normocytic anemia        - 9/14 stable Hgb 11.2 from previous 9/13 Hgb 10.7.  Continue to monitor Hgb with daily CBC.  Transfuse PRBC if Hgb <7.0.        - Iron studies revealed iron 30, iron saturation 11, TIBC 268.  This all indicative of iron deficiency anemia.  Will resume ferrous sulfate 325 mg po BID.  Reticulocytes normal. Vitamin B12 515, folate 19.8. 10. Elevated TSH        - No history of hypothyroidism. 9/12 TSH 5.030, free T4 1.52. Will continue to monitor. 11. Parkinson's disease        - Continue Sinemet  mg x2 QID.   Neurology following. 12. Hypertension        - Continue amlodipine 10 mg po qDay    13. Hyperlipidemia        - Continue Lipitor 20 mg po qDay    14. DM2        - 9/12 HgbA1c 6.3.  Currently on insulin medium dose sliding scale.  Will resume home Metformin 500 mg BID and alogliptin 12.5 mg po qDay.       - 9/14 blood glucose 211.  Continue monitor blood glucose with daily BMP. 15. GERD        - Continue Protonix 40 mg po BID and Carafate 1 g po QID      Expected discharge date:  TBD    Disposition:    [] Home       [] TCU       [] Rehab       [] Psych       [x] SNF       [] [place]       [] Other-    Chief Complaint: Shortness of breath, generalized full body weakness    Hospital Course: The patient is an 80-year-old female with a past medical history of HFpEF with EF 55-65% 2021, A. Fib on eliquis, CAD s/p CABG, pulmonary HTN, DM2, Parkinson's, GERD who presented to [facility] ED 9/11 from SNF for shortness of breath, and weakness.  Per family the patient has had several episodes of chest pain, most recently at rest.  For this she received a total of 3 doses of sublingual nitroglycerin over the past few days.  The family had also noted a decrease in her exercise tolerance and functional capacity over the recent month.  This more pronounced over the last 1-2 weeks.  The patient reports initially the nitroglycerin helped however the last dose did not.  She had also reported tenderness in the epigastric region, does complain of GERD symptoms. Correne Quivers have been going on for the last couple of weeks.  The patient denies taking any oxygen at home.     In the ED CXR revealed poor inflation of lungs, cardiomegaly, small bilateral pleural effusions and moderate bibasilar atelectasis/pneumonia.  EKG revealed sinus rhythm with first-degree AV block and LBBB, chronic. Tropnin negative. BNP 3264.  Echo ordered, pending.  Sodium was found to be 127, Bumex was held.  The patient was noted to be in respiratory distress with mild hypoxia.  She was started on 2L NC, but requiring BiPAP.  Levaquin was started due to concern she may have a concurrent pneumonia.  Neurology was consulted for patient's Parkinson's.      The patient's respiratory status continued to worsen. She was on 5 L NC 9/12. She reported shortness of breath and generalized weakness. There was also associated epigastric tightness but no pain. Bumex was started  Sodium levels were low. This is believed to be due to increased ECF along with dehydration. Subjective (past 24 hours): No significant overnight events. The patient has been weaned off oxygen. She reports that she is doing well. She denies any new complaints. Her epigastric pain has improved. There is continued shortness of breath with exertion, this mild. Sodium is 120. Bumex was discontinued yesterday. Started IVFs for gentle hydration. ROS (12 point review of systems completed. Pertinent positives noted. Otherwise ROS is negative). Medications:  Reviewed    Infusion Medications    sodium chloride 75 mL/hr at 09/14/21 1200    sodium chloride      dextrose       Scheduled Medications    lidocaine  2 patch TransDERmal Q24H    [Held by provider] bumetanide  1 mg Oral Daily    ferrous sulfate  325 mg Oral BID    metFORMIN  500 mg Oral BID    alogliptin  12.5 mg Oral Daily    sodium chloride flush  5-40 mL IntraVENous 2 times per day    amLODIPine  10 mg Oral Daily    apixaban  2.5 mg Oral BID    aspirin  81 mg Oral Daily    atorvastatin  20 mg Oral Daily    carbidopa-levodopa  2 tablet Oral 4x Daily    isosorbide dinitrate  20 mg Oral TID    pantoprazole  40 mg Oral BID AC    spironolactone  25 mg Oral Daily    sucralfate  1 g Oral 4x Daily AC & HS    insulin lispro  0-12 Units SubCUTAneous TID WC     PRN Meds: sodium chloride flush, sodium chloride, ondansetron **OR** ondansetron, polyethylene glycol, acetaminophen **OR** acetaminophen, glucose, dextrose, glucagon (rDNA), dextrose      Intake/Output Summary (Last 24 hours) at 9/14/2021 1617  Last data filed at 9/14/2021 1415  Gross per 24 hour   Intake 927.61 ml   Output 860 ml   Net 67.61 ml       Diet:  ADULT DIET;  Dysphagia - Minced and Moist; 4 carb choices (60 gm/meal); Low Sodium (2 gm); 2000 ml    Exam:  BP (!) 145/66   Pulse 61   Temp 98.1 °F (36.7 °C) (Oral)   Resp 22   Ht 5' 2\" (1.575 m)   Wt 111 lb (50.3 kg)   SpO2 93%   BMI 20.30 kg/m²      General appearance: No acute distress. Ill-appearing and cachectic at stated age and cooperative. HEENT: Pupils equal, round, and reactive to light. Conjunctivae/corneas clear. Neck: Supple, with full range of motion. No jugular venous distention. Trachea midline. Respiratory: Decreased work of breathing. Diminished bibasilar breath sounds. Clear to auscultation, bilaterally without Rales/Wheezes/Rhonchi. Cardiovascular: Regular rate with normal S1/S2 without murmurs, rubs or gallops. Abdomen: Soft, non-tender, non-distended with normal bowel sounds. Mild epigastric tightness  Musculoskeletal: passive and active ROM x 4 extremities. Skin: Skin color, texture, turgor normal.  No rashes or lesions. Neurologic:  Neurovascularly intact without any focal sensory/motor deficits. Cranial nerves: II-XII intact, grossly non-focal.  Psychiatric: Alert and oriented, thought content appropriate, normal insight  Capillary Refill: Brisk,< 3 seconds   Peripheral Pulses: +2 palpable, equal bilaterally       Labs:   Recent Labs     09/12/21  0825 09/13/21  0519 09/14/21  0650   WBC 5.9 6.1 7.8   HGB 10.8* 10.7* 11.2*   HCT 33.7* 32.6* 34.6*    175 211     Recent Labs     09/12/21  0825 09/12/21  1820 09/13/21  0519 09/13/21  1156 09/13/21  2314 09/14/21  0650 09/14/21  1206   *   < > 128*   < > 119* 124* 120*   K 4.4  --  4.4  --   --  4.4  --    CL 92*  --  92*  --   --  88*  --    CO2 23  --  24  --   --  21*  --    BUN 14  --  14  --   --  11  --    CREATININE 0.7  --  0.7  --   --  0.6  --    CALCIUM 9.2  --  9.0  --   --  9.0  --     < > = values in this interval not displayed. No results for input(s): AST, ALT, BILIDIR, BILITOT, ALKPHOS in the last 72 hours.   Recent Labs 09/12/21  0558   INR 1.75*     No results for input(s): CKTOTAL, TROPONINI in the last 72 hours. Microbiology:    9/12/21 Blood culture 1 - no preliminary growth   9/12/21 Blood culture 2 - no preliminary growth     Urinalysis:      Lab Results   Component Value Date    NITRU NEGATIVE 09/12/2021    WBCUA 0-2 09/12/2021    BACTERIA NONE SEEN 09/12/2021    RBCUA 0-2 09/12/2021    BLOODU NEGATIVE 09/12/2021    SPECGRAV 1.012 09/12/2021    GLUCOSEU NEGATIVE 02/27/2021       Radiology:  XR CHEST PORTABLE   Final Result   1. Poor inflation of the lungs. Mild cardiomegaly. Metallic sternotomy sutures. 2. Small bilateral pleural effusions. Moderate bibasilar atelectasis/pneumonia. **This report has been created using voice recognition software. It may contain minor errors which are inherent in voice recognition technology. **      Final report electronically signed by Dr. Rodo Feng on 9/11/2021 11:13 AM          DVT prophylaxis: [] Lovenox                                 [] SCDs                                 [x] Eliquis                                 [] Encourage ambulation           [] Already on Anticoagulation     Code Status: DNR-CCA    PT/OT Eval Status: PT/OT following    Tele:   [x] yes             [] no    Electronically signed by Viky Ramirez DO on 9/14/2021 at 4:17 PM

## 2021-09-14 NOTE — PROGRESS NOTES
[facility] ICU STEPDOWN TELEMETRY 4K  Occupational Therapy  Daily Note  Time:   Time In:   Time Out: 239  Timed Code Treatment Minutes: 24 Minutes  Minutes: 24          Date: 2021  Patient Name: Crystal Baumann,   Gender: female      Room: [facility]/021-A  MRN: [id number]  : 1932  (80 y.o.)  Referring Practitioner: Shashank Khan DO  Diagnosis: Hyponatremia  Additional Pertinent Hx: Per initial H&P: \"Patient is an 80-year-old female with medical history of HFpEF, atrial fibrillation, and pulmonary hypertension who presents to the hospital via EMS from SNF for evaluation of worsening chest pain, shortness of breath, and weakness. \"    Restrictions/Precautions:  Restrictions/Precautions: General Precautions, Fall Risk  Position Activity Restriction  Other position/activity restrictions: 2L NC on , gradually weaning     SUBJECTIVE: Patient in chair upon arrival agreeable to OT treatment daughter present     PAIN: no however grimacing during transfer     Vitals: Blood Pressure: 142/66 d/t patient stated she was dizzy upon standing     COGNITION: Slow Processing, Decreased Insight, Decreased Problem Solving, Decreased Safety Awareness and Difficulty Following Commands    ADL:   No ADL's completed this session. Ankur Jeffries BALANCE:  Sitting Balance:  Supervision. Standing Balance: Contact Guard Assistance, X 2, with cues for safety. BED MOBILITY:  Not Tested    TRANSFERS:  Sit to Stand: Moderate Assistance, X 2, with increased time for completion, cues for hand placement. Stand to Sit: Moderate Assistance, X 2, with increased time for completion, cues for hand placement. multiple trials and unable to evenly weightshift       ASSESSMENT:     Activity Tolerance:  Patient tolerance of  treatment: poor.        Discharge Recommendations: [address], Patient would benefit from continued therapy after discharge, Continue to assess pending progress   Equipment Recommendations: Equipment Needed: No  Plan: Times per week: 3-5x  Current Treatment Recommendations: Self-Care / ADL, Functional Mobility Training, Endurance Training, Strengthening, Balance Training    Patient Education  Patient Education: Equipment Education    Goals  Short term goals  Time Frame for Short term goals: By discharge  Short term goal 1: Patient to increase activity tolerance to/from BSC/recliner with min A x2 to increase indep in toileting. Short term goal 2: Patient to complete BADL task with no > min A to increase indep in ADL completion. Short term goal 3: Patient to tolerate dynamic standing task with no > min A and unilateral release to increase indep in clothing management during toileting. Short term goal 4: Patient to complete BUE light resistive HEP with min cues for technique to increase strength needed for mobility and ADL completion. Following session, patient left in safe position with all fall risk precautions in place.

## 2021-09-15 LAB
ANION GAP SERPL CALCULATED.3IONS-SCNC: 13 MEQ/L (ref 8–16)
BASOPHILS # BLD: 1 %
BASOPHILS ABSOLUTE: 0.1 THOU/MM3 (ref 0–0.1)
BUN BLDV-MCNC: 8 MG/DL (ref 7–22)
CALCIUM SERPL-MCNC: 9.1 MG/DL (ref 8.5–10.5)
CHLORIDE BLD-SCNC: 91 MEQ/L (ref 98–111)
CO2: 21 MEQ/L (ref 23–33)
CREAT SERPL-MCNC: 0.6 MG/DL (ref 0.4–1.2)
EOSINOPHIL # BLD: 6.1 %
EOSINOPHILS ABSOLUTE: 0.4 THOU/MM3 (ref 0–0.4)
ERYTHROCYTE [DISTWIDTH] IN BLOOD BY AUTOMATED COUNT: 14.8 % (ref 11.5–14.5)
ERYTHROCYTE [DISTWIDTH] IN BLOOD BY AUTOMATED COUNT: 46.1 FL (ref 35–45)
GFR SERPL CREATININE-BSD FRML MDRD: > 90 ML/MIN/1.73M2
GLUCOSE BLD-MCNC: 128 MG/DL (ref 70–108)
GLUCOSE BLD-MCNC: 132 MG/DL (ref 70–108)
GLUCOSE BLD-MCNC: 153 MG/DL (ref 70–108)
GLUCOSE BLD-MCNC: 89 MG/DL (ref 70–108)
HCT VFR BLD CALC: 36.8 % (ref 37–47)
HEMOGLOBIN: 12.1 GM/DL (ref 12–16)
IMMATURE GRANS (ABS): 0.01 THOU/MM3 (ref 0–0.07)
IMMATURE GRANULOCYTES: 0.2 %
LYMPHOCYTES # BLD: 17.5 %
LYMPHOCYTES ABSOLUTE: 1 THOU/MM3 (ref 1–4.8)
MCH RBC QN AUTO: 28.2 PG (ref 26–33)
MCHC RBC AUTO-ENTMCNC: 32.9 GM/DL (ref 32.2–35.5)
MCV RBC AUTO: 85.8 FL (ref 81–99)
MONOCYTES # BLD: 9.7 %
MONOCYTES ABSOLUTE: 0.6 THOU/MM3 (ref 0.4–1.3)
NUCLEATED RED BLOOD CELLS: 0 /100 WBC
PLATELET # BLD: 187 THOU/MM3 (ref 130–400)
PMV BLD AUTO: 8.3 FL (ref 9.4–12.4)
POTASSIUM REFLEX MAGNESIUM: 4.5 MEQ/L (ref 3.5–5.2)
RBC # BLD: 4.29 MILL/MM3 (ref 4.2–5.4)
SEG NEUTROPHILS: 65.5 %
SEGMENTED NEUTROPHILS ABSOLUTE COUNT: 3.8 THOU/MM3 (ref 1.8–7.7)
SODIUM BLD-SCNC: 122 MEQ/L (ref 135–145)
SODIUM BLD-SCNC: 122 MEQ/L (ref 135–145)
SODIUM BLD-SCNC: 124 MEQ/L (ref 135–145)
SODIUM BLD-SCNC: 125 MEQ/L (ref 135–145)
WBC # BLD: 5.8 THOU/MM3 (ref 4.8–10.8)

## 2021-09-15 PROCEDURE — 80048 BASIC METABOLIC PNL TOTAL CA: CPT

## 2021-09-15 PROCEDURE — 97530 THERAPEUTIC ACTIVITIES: CPT

## 2021-09-15 PROCEDURE — 99233 SBSQ HOSP IP/OBS HIGH 50: CPT | Performed by: INTERNAL MEDICINE

## 2021-09-15 PROCEDURE — 2060000000 HC ICU INTERMEDIATE R&B

## 2021-09-15 PROCEDURE — 97110 THERAPEUTIC EXERCISES: CPT

## 2021-09-15 PROCEDURE — 85025 COMPLETE CBC W/AUTO DIFF WBC: CPT

## 2021-09-15 PROCEDURE — 6370000000 HC RX 637 (ALT 250 FOR IP): Performed by: STUDENT IN AN ORGANIZED HEALTH CARE EDUCATION/TRAINING PROGRAM

## 2021-09-15 PROCEDURE — 84295 ASSAY OF SERUM SODIUM: CPT

## 2021-09-15 PROCEDURE — 82948 REAGENT STRIP/BLOOD GLUCOSE: CPT

## 2021-09-15 PROCEDURE — 97535 SELF CARE MNGMENT TRAINING: CPT

## 2021-09-15 PROCEDURE — 92523 SPEECH SOUND LANG COMPREHEN: CPT

## 2021-09-15 PROCEDURE — 2580000003 HC RX 258: Performed by: STUDENT IN AN ORGANIZED HEALTH CARE EDUCATION/TRAINING PROGRAM

## 2021-09-15 PROCEDURE — 36415 COLL VENOUS BLD VENIPUNCTURE: CPT

## 2021-09-15 PROCEDURE — 92526 ORAL FUNCTION THERAPY: CPT

## 2021-09-15 RX ORDER — SENNA PLUS 8.6 MG/1
1 TABLET ORAL NIGHTLY
Status: DISCONTINUED | OUTPATIENT
Start: 2021-09-15 | End: 2021-09-20 | Stop reason: HOSPADM

## 2021-09-15 RX ORDER — SODIUM CHLORIDE 1000 MG
1 TABLET, SOLUBLE MISCELLANEOUS
Status: COMPLETED | OUTPATIENT
Start: 2021-09-15 | End: 2021-09-16

## 2021-09-15 RX ADMIN — DICLOFENAC SODIUM 4 G: 10 GEL TOPICAL at 17:37

## 2021-09-15 RX ADMIN — CARBIDOPA AND LEVODOPA 2 TABLET: 25; 100 TABLET ORAL at 12:29

## 2021-09-15 RX ADMIN — METFORMIN HYDROCHLORIDE 500 MG: 500 TABLET, EXTENDED RELEASE ORAL at 21:00

## 2021-09-15 RX ADMIN — PANTOPRAZOLE SODIUM 40 MG: 40 TABLET, DELAYED RELEASE ORAL at 06:02

## 2021-09-15 RX ADMIN — METFORMIN HYDROCHLORIDE 500 MG: 500 TABLET, EXTENDED RELEASE ORAL at 09:42

## 2021-09-15 RX ADMIN — SUCRALFATE 1 G: 1 TABLET ORAL at 17:17

## 2021-09-15 RX ADMIN — SUCRALFATE 1 G: 1 TABLET ORAL at 06:02

## 2021-09-15 RX ADMIN — SPIRONOLACTONE 25 MG: 25 TABLET ORAL at 09:41

## 2021-09-15 RX ADMIN — ACETAMINOPHEN 650 MG: 325 TABLET ORAL at 21:00

## 2021-09-15 RX ADMIN — ISOSORBIDE DINITRATE 20 MG: 20 TABLET ORAL at 17:17

## 2021-09-15 RX ADMIN — CARBIDOPA AND LEVODOPA 2 TABLET: 25; 100 TABLET ORAL at 17:17

## 2021-09-15 RX ADMIN — ISOSORBIDE DINITRATE 20 MG: 20 TABLET ORAL at 21:00

## 2021-09-15 RX ADMIN — ISOSORBIDE DINITRATE 20 MG: 20 TABLET ORAL at 09:42

## 2021-09-15 RX ADMIN — SUCRALFATE 1 G: 1 TABLET ORAL at 21:01

## 2021-09-15 RX ADMIN — ATORVASTATIN CALCIUM 20 MG: 20 TABLET, FILM COATED ORAL at 09:42

## 2021-09-15 RX ADMIN — CARBIDOPA AND LEVODOPA 2 TABLET: 25; 100 TABLET ORAL at 21:00

## 2021-09-15 RX ADMIN — ASPIRIN 81 MG: 81 TABLET, COATED ORAL at 09:42

## 2021-09-15 RX ADMIN — FERROUS SULFATE TAB 325 MG (65 MG ELEMENTAL FE) 325 MG: 325 (65 FE) TAB at 09:42

## 2021-09-15 RX ADMIN — SENNOSIDES 8.6 MG: 8.6 TABLET, COATED ORAL at 20:59

## 2021-09-15 RX ADMIN — DICLOFENAC SODIUM 4 G: 10 GEL TOPICAL at 21:00

## 2021-09-15 RX ADMIN — APIXABAN 2.5 MG: 2.5 TABLET, FILM COATED ORAL at 09:42

## 2021-09-15 RX ADMIN — SODIUM CHLORIDE, PRESERVATIVE FREE 10 ML: 5 INJECTION INTRAVENOUS at 20:59

## 2021-09-15 RX ADMIN — Medication 1 G: at 17:17

## 2021-09-15 RX ADMIN — ACETAMINOPHEN 650 MG: 325 TABLET ORAL at 09:42

## 2021-09-15 RX ADMIN — ALOGLIPTIN 12.5 MG: 12.5 TABLET, FILM COATED ORAL at 09:42

## 2021-09-15 RX ADMIN — APIXABAN 2.5 MG: 2.5 TABLET, FILM COATED ORAL at 21:00

## 2021-09-15 RX ADMIN — CARBIDOPA AND LEVODOPA 2 TABLET: 25; 100 TABLET ORAL at 09:42

## 2021-09-15 RX ADMIN — INSULIN LISPRO 2 UNITS: 100 INJECTION, SOLUTION INTRAVENOUS; SUBCUTANEOUS at 13:04

## 2021-09-15 RX ADMIN — PANTOPRAZOLE SODIUM 40 MG: 40 TABLET, DELAYED RELEASE ORAL at 17:36

## 2021-09-15 RX ADMIN — FERROUS SULFATE TAB 325 MG (65 MG ELEMENTAL FE) 325 MG: 325 (65 FE) TAB at 21:00

## 2021-09-15 RX ADMIN — SUCRALFATE 1 G: 1 TABLET ORAL at 12:29

## 2021-09-15 RX ADMIN — SODIUM CHLORIDE, PRESERVATIVE FREE 10 ML: 5 INJECTION INTRAVENOUS at 09:41

## 2021-09-15 RX ADMIN — AMLODIPINE BESYLATE 10 MG: 10 TABLET ORAL at 09:42

## 2021-09-15 ASSESSMENT — PAIN DESCRIPTION - DESCRIPTORS
DESCRIPTORS: ACHING

## 2021-09-15 ASSESSMENT — PAIN SCALES - GENERAL
PAINLEVEL_OUTOF10: 8
PAINLEVEL_OUTOF10: 0
PAINLEVEL_OUTOF10: 3
PAINLEVEL_OUTOF10: 9
PAINLEVEL_OUTOF10: 5
PAINLEVEL_OUTOF10: 4
PAINLEVEL_OUTOF10: 9

## 2021-09-15 ASSESSMENT — PAIN DESCRIPTION - FREQUENCY
FREQUENCY: CONTINUOUS

## 2021-09-15 ASSESSMENT — PAIN DESCRIPTION - PAIN TYPE
TYPE: CHRONIC PAIN
TYPE: ACUTE PAIN
TYPE: CHRONIC PAIN

## 2021-09-15 ASSESSMENT — PAIN DESCRIPTION - ORIENTATION
ORIENTATION: RIGHT;LEFT
ORIENTATION: RIGHT
ORIENTATION: RIGHT;LEFT

## 2021-09-15 ASSESSMENT — PAIN SCALES - WONG BAKER
WONGBAKER_NUMERICALRESPONSE: 0

## 2021-09-15 ASSESSMENT — PAIN - FUNCTIONAL ASSESSMENT
PAIN_FUNCTIONAL_ASSESSMENT: ACTIVITIES ARE NOT PREVENTED

## 2021-09-15 ASSESSMENT — PAIN DESCRIPTION - LOCATION
LOCATION: LEG

## 2021-09-15 ASSESSMENT — PAIN DESCRIPTION - ONSET
ONSET: ON-GOING

## 2021-09-15 ASSESSMENT — PAIN DESCRIPTION - PROGRESSION
CLINICAL_PROGRESSION: NOT CHANGED

## 2021-09-15 NOTE — PROGRESS NOTES
[address] ICU STEPDOWN TELEMETRY 4K  Speech - Language - Cognitive Evaluation & Dysphagia Tx     SLP Individual Minutes  Time In: 0464  Time Out: 1100  Minutes: 41  Timed Code Treatment Minutes: 0 Minutes       Date: 9/15/2021  Patient Name: Ken Valdez      CSN: [id number]   : 1932  (80 y.o.)  Gender: female   Referring Physician:  Brenda Painting DO  Diagnosis: Hyponatremia  Secondary Diagnosis: Dysphagia   Precautions: None  History of Present Illness/Injury: Per chart review, Noam Newton is an 80 year  old female who was admitted to [facility] with the above diagnosis. This patient was placed on a modified diet and was referred for speech therapy & cognitive assessment to further assess cognition and consumption of PO. Past Medical History:   Diagnosis Date    Arthritis     Atypical chest pain     CAD (coronary artery disease)     Chronic LBBB (left bundle branch block)     Diabetes mellitus type II     Esophageal stricture     History of esophageal stricture.  FH: CAD (coronary artery disease)     Mom and dad both with heart disease at mid to late age.  GERD (gastroesophageal reflux disease)     History of gastritis     History of skin cancer     Hypercholesterolemia     Hyperlipidemia     Hypertension     Imbalance     As far as imbalance is concerned, asked patient to follow-up with Dr. Osbaldo Bell since she follows with him on a regular basis.  Lactose intolerance     Nummular dermatitis     Parkinson's disease ([facility] [address]     Restless legs syndrome (RLS)     S/P CABG (coronary artery bypass graft)     SCC (squamous cell carcinoma)        Pain: No pain reported. Subjective: Pt was sitting upright in recliner upon entering the room. Pt's daughter Parvez Ryan) was present during this therapy session. Pt was alert and cooperative during the session.     SOCIAL HISTORY:   Living Arrangements: Pt previously lived alone in an assisted living apartment, baseline d/t increased difficulties. [facility] on [address] [place] [address]     SWALLOWING:  Current Diet: upgrade to Soft and bite size, thin liquids  Impaired - minimal residue remaining in the oral cavity following swallow of solids   *re: to treatment below          RECOMMENDATIONS/ASSESSMENT:  DIAGNOSTIC IMPRESSIONS:  Pt presents with moderately impaired cognitive linguistic skills in the domains of basic and mildly complex attention (sustained and selective), as well as, impaired STM with <2-3 units for immediate, delayed, and working memory task. PT demonstrated impaired organizational naming, decreased thought processing requiring additional time for lexical retrieval during complex language tasks; poor sequencing problem solving, and impaired executive functioning. Receptive and expressive language skills are intact for basic and moderately complex communication, requiring occasional repetition d/t hearing loss. Pt demosntrated good success with command following, answering basic and moderately complex questions, and functional conversational exchange. No dysarthria or dysphonia noted. At baseline, the pt required some help with completion of ADLs in the assisted living setting, however, was able to demonstrate safe living in that specific environment. Recommended 24 hour SUP for safe completion of ADLs/iADLS, continued skilled ST to address aforementioned deficits, as well as, skilled dysphagia treatment to work towards baseline PLOF. Rehabilitation Potential: good    EDUCATION:  Learner: Patient and Family  Education:  Reviewed diet and strategies, Reviewed recommendations for follow-up and Education Related to [employer] and Wellness  Evaluation of Education: [employer] understanding    PLAN:  Skilled SLP intervention on acute care 3-5 x per week or until goals met and/or pt plateaus in function.   Specific interventions for next session may include: Advanced trials and education re: commpensatory swallowing strategies; memory, orangizational naming, sequencing, attention, problem solving, verbal/visual reasoining, executive functioing. PATIENT GOAL:    Return to least restrictive diet; Improve cognitive functioning    SHORT TERM GOALS:  Short-term Goals  Timeframe for Short-term Goals: 2 weeks  Goal 1: Pt will safely consume a minced and moist diet with thin liquids with no signs/symptoms of aspiration/penetration to demonstrate pt's ability to meet nutrition/hydrational needs. INTERVENTION:  Refer to goal 2    Goal 2: Pt will complete advanced PO trials with ST with improved oral phase with no signs/symptoms of aspiration/penetration to demonstrate pt's readiness for a diet upgrade. INTERVENTION:  Education: Completed skilled dietary analysis to further assess swallow function with completion of PO trials of soft and advanced solids. PO trials included: thin liquids via cup & straw (~4-5 oz of H2O), and hard solids (moo crackers, 50% of package). Prior to PO intake, Patient independently recalled strategies (small bites, alternate solids/liquds), however pt demonstrate difficulties with recall of rationale re: why ST requires her to use these strategies. Pt demonstrated improved timing of mastication and bolus formation/AP transit, demonstrated by clearance of oral cavity. There were no overt s/s of penetration/aspiration across all solids/liquid trials. Pt demonstrated the ability to safely upgrade to soft and bite size solids with thin liquids d/t aforementioned improvements. Pt's demonstrated fair to good carry-over of strategies during PO trials with improved understanding/recall of feeding strategies. *pt's daughter present and verbalized understanding as well. *pt and daughter in agreement with diet recommendations/upgrade.    *concerns for endurance remaining a barrier during entire meal. Recommended to complete dietary analysis with regular solids prior to diet advancement. Goal 3: Pt will demosntrate understanding and recall of compensatory swallowing strategies (alternating liquids/solids, single bites/drinks, upright) with 80% accuracy given MIN cues to improve swallow safety and reduced risk of aspiration. INTERVENTION:  Refer to goal 2    Goal 4: Pt will complete STM recall with 2-3 units (immediate, delayed and working) with 60% accuracy given MOD assist to improve retention of personal and newly learned information  Goal 5: Pt will complete organizational naming, sequencing, attention tasks (divergent, convergent, sustained and alternating, etc.) with 70% accuracy given MOD assist to improve processing speed and lexical retrieval  Goal 6: Pt will complete mildly complex problem solving, verbal/visual reasoning, and exectutive funcitoning tasks with 60% accuracy given MOD assist to improve safety and independence with completion of ADLs/IADLs    LONG TERM GOALS:  No LT goals recommended at this time due to anticipated short [address] [facility] [address], [facility] SLP      Mari Tee) Nicholas Tellez MA., CCC-SLP

## 2021-09-15 NOTE — CARE COORDINATION
DISCHARGE PLANNING UPDATE    Barriers to Discharge:  Hyponatremia/CHF. History CHF, Parkinson's, CABG. Palliative Care/Neurology (signed off) following. Na+ 124; monitor.  Attending plans sodium tabs today; collaborated w Shaniqua Lagunas, Pharmacy, Attending     Patient Goals/Plan/Treatment Preferences: plans return [facility] when medically cleared, has walker; therapy following; collaborated manny Hoffman

## 2021-09-15 NOTE — PROGRESS NOTES
[address] [facility] PHYSICAL THERAPY  DAILY NOTE  [facility] ICU STEPDOWN TELEMETRY 4K - 4K-21/021-A    Time In: 827  Time Out: 3547  Timed Code Treatment Minutes: 23 Minutes  Minutes: 23          Date: 9/15/2021  Patient Name: Duke Arzate,  Gender:  female        MRN: [id number]  : 1932  (80 y.o.)     Referring Practitioner: Kandace Lujan DO  Diagnosis: hyponatremia  Additional Pertinent Hx: Per chart \"Patient is an 59-year-old female with medical history of HFpEF, atrial fibrillation, and pulmonary hypertension who presents to the hospital via EMS from SNF for evaluation of worsening chest pain, shortness of breath, and weakness. Much of the history if obtained from the patient's family present at bedside as the patient has difficulty speaking while on BiPAP. They state that the patient has had several episodes of chest pain, mostly at rest.  She has received 3 dose of sublingual nitroglycerine over the last 1-3 days. Initially, the nitroglycerine helped to relieve the chest pain, but this morning it did not. They state that she has had tenderness to her epigastric region and does complain of GERD symptoms, more frequently in the last couple of weeks. She has been very week and requires significant assistance with transfers and even simple tasks of ADL including brushing her teeth. The family has noticed a decrease in her exercise tolerance and functional capacity over the last month, more pronounced in the last 1-2 weeks. They state that she has also had worsening LE edema. The patient did take an extra dose of her bumex yesterday and her LE edema is much improved today. The patient denies having any fevers, chills, nausea, vomiting, cough, or wheezing.  \"     Prior Level of Function:  Lives With: Alone  Type of Home: Assisted living  Home Layout: One level  Home Access: Level entry  Home Equipment: 4 wheeled walker   Bathroom Shower/Tub: Walk-in shower, Shower chair with back  Bathroom Toilet: Handicap height  Bathroom Equipment: Built-in shower seat, Grab bars around toilet, Grab bars in shower    ADL Assistance: Needs assistance (Pt reports she has assist for showers 2x/week at facility)  Homemaking Assistance: Needs assistance (Pt reports she do microwave meals for breakfast and lunch, facility provides dinner)  Homemaking Responsibilities: Yes (light cleaning and meal prep - staff at facility assists with some too)  Ambulation Assistance: Independent  Transfer Assistance: Independent  Active : No  Additional Comments: Pt and family report pt has required increased assist since last admission/discharge from [facility] s/p fall (dc'd 8/13). Pt reports she used 4WW at facility. Was getting therapies prior to current admission. Pt reports she was able to amb to and from dining room for meals with 1 seated rest break required    Restrictions/Precautions:  Restrictions/Precautions: General Precautions, Fall Risk  Position Activity Restriction  Other position/activity restrictions: 2L NC on 9/13, gradually weaning     SUBJECTIVE: Patient in bed upon arrival, very hesitant but agreeable to attempt therapy. States she has been doing supine B LE exercises this morning. Daughter present during session. PAIN: 9/10: B knees down to ankles. Pain meds received this morning per patient. Vitals: Vitals not assessed per clinical judgement, see nursing flowsheet    OBJECTIVE:  Bed Mobility:  Supine to Sit: Moderate Assistance, Maximum Assistance   Patient able to move B LEs slightly towards Edge of bed, needing minimal assistance, however required maximal assistance to trunk to roll push to sit up tall. Moderate assistance needed to scoot hips towards edge of bed. Patient very scared to get to edge of bed, so daughter came and put a hand on her back. Patient used B UEs to support herself sitting upright, and required minimal assistance to trunk to decrease posterior lean. Transfers:  Sit to Stand: Moderate Assistance, Maximum Assistance, with increased time for completion  Stand to [address], Maximum Assistance, with increased time for completion  Stand Pivot:Maximum Assistance, with increased time for completion, with verbal cues   Patient extremely hesitant and stiff with above transfers d/t fear and overall weakness. Patient denies use of AD to transfer, instead holding therapist's arms with B UEs. Required increased cues for anterior weight shift to perform sit > stand, and moderate cues given to fully extend through knees. Patient moved feet slowly, taking short shuffles or stiff stepss when performing stand pivot transfer to bedside chair- patient's B UE  on therapist much stronger as fear increases in patient, encouragement provided by therapist and daughter to reassure her of her abilities. Needed increased verbal cueing to align with chair before performing stand > sit transfer. Verbal cueing for hand placement provided, however patient too scared to let go of therapist with B UEs. Once seated patient does not experience SOB or labored breathing, just took 1 big breath of relief d/t fear with functional activity. Ambulation:  Not Tested d/t patients refusal because of high B knee pain. Balance:  Dynamic Sitting Balance: Stand By Assistance   B UE support by patient to remain seated upright when performing LE exercises seated edge of bedside chair. Exercise:  Patient was guided in 1 set(s) 10 reps of exercise to both lower extremities. Ankle pumps, Glut sets, Seated marches, Seated heel/toe raises, Long arc quads and Seated abduction/adduction. Exercises were completed for increased independence with functional mobility. Functional Outcome Measures: Completed  -PAC Inpatient Mobility Raw Score : 11  -PAC Inpatient T-Scale Score : 33.86    ASSESSMENT:  Assessment: Patient progressing toward established goals.  and Patient still very hesitant when performing functional activity, requiring increased encouragment from therapist and present daughter. Patient demonstrates very stiff and small movements that did not improve with therapist cueing. Patient states she is very tired post stand pivot transfer, and completes LE exercises with small range. Therapist assist given throughout sit <> stand and stand pivot transfer d/t patient's instability and fear. Bed mobility required maximal assistance to complete d/t overall weakness demonstrated by patient. Patient would greatly benefit from continued therapy to improve overall strength and functional independence. Activity Tolerance:  Patient tolerance of  Treatment: fair. Equipment Recommendations:Equipment Needed: No  Discharge Recommendations:  Continue to assess pending progress, Subacute/Skilled Nursing Facility, Patient would benefit from continued therapy after discharge    Plan: Times per week: 5x GM  Current Treatment Recommendations: Strengthening, Transfer Training, Endurance Training, Neuromuscular Re-education, Patient/Caregiver Education & Training, Equipment Evaluation, Education, & procurement, ROM, Balance Training, Gait Training, Home Exercise Program, Functional Mobility Training, Safety Education & Training, Positioning    Patient Education  Patient Education: [employer] Mobility, Transfers    Goals:  Patient goals : go to SNF and then home  Short term goals  Time Frame for Short term goals: by discharge  Short term goal 1: Pt will amb with RW for 50 feet with SBA for safety to progress towards PLOF. Short term goal 2: Pt will demo CGA for transfers with use of RW for support with good safety to progress with mobility. Short term goal 3: Pt will tolerate 5 minutes of functional standing tasks to progress with balance and improve overall mobility.   Long term goals  Time Frame for Long term goals : NA due to short ELOS    Following session, patient left in safe position with all fall risk precautions in place.

## 2021-09-15 NOTE — PROGRESS NOTES
Hospitalist Progress Note    Patient:  Edwin Mcknight      Unit/Bed:4K-21/021-A    YOB: 1932    MRN: [id number]       Acct: [de-identified]     PCP: Dayne Ordoñez MD    Date of Admission: 9/11/2021    Assessment/Plan:    1. Acute hypoxic respiratory failure - resolved        - Secondary to pleural effusion secondary to acute CHF exacerbation.  Presented LAMAS, SOB, decreased exercise tolerance.  9/11 CXR revealed for inflation of the lungs, cardiomegaly, small bilateral pleural effusions mild bibasilar atelectasis/pneumonia.      - Continued increasing O2 requirements with 5 L NC. No Home O2. Started Bumex 1 g po qDay. On Acapella, inspiratory spirometer       - 9/15 Weaned off O2.      2. Acute HFpEF exacerbation - diastolic        - Previous echo 5/2020 demonstrated EF 55-60%, moderately dilated left LA, mildly dilated RV, right ventricular systolic pressure of 13-21 consistent with severe pulmonary retention, mild to moderate mitral regurgitation, moderate tricuspid regurgitation.  BNP 4264.        - Continue spironolactone 25 mg po qDay        - Strict daily I&O's and daily weights         - 9/12 Echo reveals EF 55-60%, LA severely dilated, aortic velocity 2.3 m/s, mean gradient 13 mmHg, MARVIN 1.3 cm².  Mild to moderate AS, mild MR, mild TR, assuming RAP = 20 mmHg, est RVSP = 75 mmHg.  IVC severely dilated with reduced respiratory phasic changes (CVP - 15-20 mmHg).   Large, left pleural effusion    3. Pleural effusion, bilaterally - likely secondary to acute diastolic CHF exacerbation        - See above.     4. Acute on chronic hyponatremia - multifactorial, secondary to intravascular depletion from poor oral intake and diuresis        - Baseline sodium 130s. Sodium on admission 127. Urine electrolytes as follows; urine creatinine 57.4, urine osmolality 361, urine sodium 76. FeNA score 0.7% Pre-renal.  Secondary to decreased ECF secondary to decompensated CHF and diuretics.   Adjusted diet to limit fluid to 2 L and sodium restriction. - 9/13 Bumex held. - 9/15 Sodium trending up, 125. Continue monitor sodium levels every 6 hours. Will start 1 g salt tabs TID if no improvement. 5. Constipation        - Last reported BM 3 days ago. Currently on MiraLAX as needed. Started Senokot qDay    6. Severe pulmonary hypertension        - Per 5/2020 Echo, right ventricular systolic pressure 35-31 mmHg, consistent with severe pulmonary hypertension.  See above for repeat Echo.       7. Suspected pneumonia        - Was started on Levaquin for suspected concurrent pneumonia. 9/12  Respiratory culture pending, blood cultures reveal no preliminary growth.        - Currently afebrile, WBC WNL. Levaquin discontinued. Will repeat CXR symptomatic. 8. CAD s/p CABG        - History of CAD.  Denies active chest pain, however multiple episodes of chest [facility] at [facility].  Troponin is negative. 9/11 EKG reveals sinus rhythm with first-degree AV block, chronic LBBB.        - Continue aspirin 81 mg po qDay and Imdur 20 mg po TID    9. Proximal atrial fibrillation        - [id number]-MUSa score 7.         - Takes Eliquis 2.5 mg po BID for A. fib.  No recorded rate control medication.  Heart rate currently in the 60-70s.   Currently on continuous telemetry. 10. Normocytic anemia        - 9/15 improving, Hgb 12.1 from previous 9/14 Hgb 11.2.  Continue to monitor Hgb with daily CBC.  Transfuse PRBC if Hgb <7.0.        - Iron studies revealed iron 30, iron saturation 11, TIBC 268.  This all indicative of iron deficiency anemia.  Will resume ferrous sulfate 325 mg po BID.  Reticulocytes normal. Vitamin B12 515, folate 19. 8.     11. Elevated TSH        - No history of hypothyroidism. 9/12 TSH 5.030, free T4 1. 52.  Will continue to monitor. 12. Parkinson's disease        - Continue Sinemet  mg x2 QID.   Neurology following. 13. Hypertension        - Continue amlodipine 10 mg po qDay    14.  Hyperlipidemia - Continue Lipitor 20 mg po qDay    15. DM2        - 9/12 HgbA1c 6.3.  Currently on insulin medium dose sliding scale.  Will resume home Metformin 500 mg BID and alogliptin 12.5 mg po qDay.       - 9/15 blood glucose 128.  Continue monitor blood glucose with daily BMP. 16. GERD        - Continue Protonix 40 mg po BID and Carafate 1 g po QID      Expected discharge date:  TBD    Disposition:    [] Home       [] TCU       [] Rehab       [] Psych       [x] SNF       [] [place]       [] Other-    Chief Complaint: Shortness of breath, generalized full body weakness    Hospital Course: The patient is an 42-year-old female with a past medical history of HFpEF with EF 55-65% 2021, A. Fib on eliquis, CAD s/p CABG, pulmonary HTN, DM2, Parkinson's, GERD who presented to [facility] ED 9/11 from SNF for shortness of breath, and weakness.  Per family the patient has had several episodes of chest pain, most recently at rest.  For this she received a total of 3 doses of sublingual nitroglycerin over the past few days.  The family had also noted a decrease in her exercise tolerance and functional capacity over the recent month.  This more pronounced over the last 1-2 weeks.  The patient reports initially the nitroglycerin helped however the last dose did not.  She had also reported tenderness in the epigastric region, does complain of GERD symptoms. Etha Hippo have been going on for the last couple of weeks.  The patient denies taking any oxygen at home.     In the ED CXR revealed poor inflation of lungs, cardiomegaly, small bilateral pleural effusions and moderate bibasilar atelectasis/pneumonia.  EKG revealed sinus rhythm with first-degree AV block and LBBB, chronic. Tropnin negative. BNP 3264.  Echo ordered, pending.  Sodium was found to be 127, Bumex was held.  The patient was noted to be in respiratory distress with mild hypoxia.  She was started on 2L NC, but requiring BiPAP.  Levaquin was started due to concern she may have a concurrent pneumonia.  Neurology was consulted for patient's Parkinson's.      The patient's respiratory status continued to worsen.  She was on 5 L NC 9/12.  She reported shortness of breath and generalized weakness. Juris Betzaida was also associated epigastric tightness but no pain. Bumex was started  Sodium levels were low. This is believed to be due to increased ECF along with dehydration. 9/14 The patient was weaned off oxygen. Noted improvement in shortness of breath and epigastric pain. Sodium was 120. Bumex was discontinued. Started IVF for gentle hydration. Subjective (past 24 hours): No significant events overnight. This morning the patient reports she is only short of breath with exertion. She does note to be somewhat distended. Patient states her last bowel movement was 3 days ago. She is currently on MiraLAX as needed. Senokot was started. Sodium this morning 125. Started home Voltaren gel for bilateral knee pain. Otherwise the patient is hemodynamically stable. ROS (12 point review of systems completed. Pertinent positives noted. Otherwise ROS is negative).     Medications:  Reviewed    Infusion Medications    sodium chloride      dextrose       Scheduled Medications    diclofenac sodium  4 g Topical 4x Daily    lidocaine  2 patch TransDERmal Q24H    [Held by provider] bumetanide  1 mg Oral Daily    ferrous sulfate  325 mg Oral BID    metFORMIN  500 mg Oral BID    alogliptin  12.5 mg Oral Daily    sodium chloride flush  5-40 mL IntraVENous 2 times per day    amLODIPine  10 mg Oral Daily    apixaban  2.5 mg Oral BID    aspirin  81 mg Oral Daily    atorvastatin  20 mg Oral Daily    carbidopa-levodopa  2 tablet Oral 4x Daily    isosorbide dinitrate  20 mg Oral TID    pantoprazole  40 mg Oral BID AC    spironolactone  25 mg Oral Daily    sucralfate  1 g Oral 4x Daily AC & HS    insulin lispro  0-12 Units SubCUTAneous TID WC     PRN Meds: sodium chloride flush, sodium chloride, ondansetron **OR** ondansetron, polyethylene glycol, acetaminophen **OR** acetaminophen, glucose, dextrose, glucagon (rDNA), dextrose      Intake/Output Summary (Last 24 hours) at 9/15/2021 1313  Last data filed at 9/15/2021 0852  Gross per 24 hour   Intake 1431.72 ml   Output 1385 ml   Net 46.72 ml       Diet:  ADULT DIET; Dysphagia - Soft and Bite Sized; 4 carb choices (60 gm/meal); Low Sodium (2 gm); 2000 ml    Exam:  BP (!) 142/65   Pulse 60   Temp 97.8 °F (36.6 °C) (Oral)   Resp 16   Ht 5' 2\" (1.575 m)   Wt 113 lb 14.4 oz (51.7 kg)   SpO2 94%   BMI 20.83 kg/m²     General appearance: No acute distress. Ill-appearing and cachectic at stated age and cooperative. HEENT: Pupils equal, round, and reactive to light. Conjunctivae/corneas clear. Neck: Supple, with full range of motion. No jugular venous distention. Trachea midline. Respiratory: Decreased work of breathing. Clear to auscultation, bilaterally without Rales/Wheezes/Rhonchi. Cardiovascular: Regular rate with normal S1/S2 without murmurs, rubs or gallops. Abdomen: Soft, non-tender, non-distended with normal bowel sounds. Mild epigastric tightness  Musculoskeletal: passive and active ROM x 4 extremities. Skin: Skin color, texture, turgor normal.  No rashes or lesions. Neurologic:  Neurovascularly intact without any focal sensory/motor deficits.  Cranial nerves: II-XII intact, grossly non-focal.  Psychiatric: Alert and oriented, thought content appropriate, normal insight  Capillary Refill: Brisk,< 3 seconds   Peripheral Pulses: +2 palpable, equal bilaterally       Labs:   Recent Labs     09/13/21  0519 09/14/21  0650 09/15/21  0637   WBC 6.1 7.8 5.8   HGB 10.7* 11.2* 12.1   HCT 32.6* 34.6* 36.8*    211 187     Recent Labs     09/13/21  0519 09/13/21  1156 09/14/21  0650 09/14/21  1206 09/14/21  1834 09/15/21  0043 09/15/21  0637   *   < > 124*   < > 122* 122* 125*   K 4.4  --  4.4  --   --   --  4.5   CL 92* --  88*  --   --   --  91*   CO2 24  --  21*  --   --   --  21*   BUN 14  --  11  --   --   --  8   CREATININE 0.7  --  0.6  --   --   --  0.6   CALCIUM 9.0  --  9.0  --   --   --  9.1    < > = values in this interval not displayed. No results for input(s): AST, ALT, BILIDIR, BILITOT, ALKPHOS in the last 72 hours. No results for input(s): INR in the last 72 hours. No results for input(s): Nepali Guzman in the last 72 hours. Microbiology:    9/12/21 Blood culture 1 - no preliminary growth   9/12/21 Blood culture 2 - no preliminary growth     Urinalysis:      Lab Results   Component Value Date    NITRU NEGATIVE 09/12/2021    WBCUA 0-2 09/12/2021    BACTERIA NONE SEEN 09/12/2021    RBCUA 0-2 09/12/2021    BLOODU NEGATIVE 09/12/2021    SPECGRAV 1.012 09/12/2021    GLUCOSEU NEGATIVE 02/27/2021       Radiology:  XR CHEST PORTABLE   Final Result   1. Poor inflation of the lungs. Mild cardiomegaly. Metallic sternotomy sutures. 2. Small bilateral pleural effusions. Moderate bibasilar atelectasis/pneumonia. **This report has been created using voice recognition software. It may contain minor errors which are inherent in voice recognition technology. **      Final report electronically signed by Dr. Champ Guevara on 9/11/2021 11:13 AM          DVT prophylaxis: [] Lovenox                                 [] SCDs                                 [x] Eliquis                                 [] Encourage ambulation           [] Already on Anticoagulation     Code Status: DNR-CCA    PT/OT Eval Status: PT/OT following    Tele:   [x] yes             [] no    Electronically signed by Lizy Dupont DO on 9/15/2021 at 1:13 PM

## 2021-09-15 NOTE — PROGRESS NOTES
[facility] ICU STEPDOWN TELEMETRY 4K  Occupational Therapy  Daily Note  Time:   Time In: 8933  Time Out: 1016  Timed Code Treatment Minutes: 38 Minutes  Minutes: 38          Date: 9/15/2021  Patient Name: Nola Herrera,   Gender: female      Room: [facility]/021-A  MRN: [id number]  : 1932  (80 y.o.)  Referring Practitioner: Tyson English DO  Diagnosis: Hyponatremia  Additional Pertinent Hx: Per initial H&P: \"Patient is an 80-year-old female with medical history of HFpEF, atrial fibrillation, and pulmonary hypertension who presents to the hospital via EMS from SNF for evaluation of worsening chest pain, shortness of breath, and weakness. \"    Restrictions/Precautions:  Restrictions/Precautions: General Precautions, Fall Risk  Position Activity Restriction  Other position/activity restrictions: 2L NC on , gradually weaning     SUBJECTIVE: Pt sitting in recliner chair upon arrival, pt agreeable to OT session, RN gave verbal approval for session, pt's daughter present during session    PAIN: 8/10: BLE with ice placed onto L quad upon end of session    Vitals: Vitals not assessed per clinical judgement, see nursing flowsheet    COGNITION: Slow Processing, Decreased Recall, Decreased Insight and Impaired Memory    ADL:   Grooming: Stand By Assistance. with pt brushing teeth sitting in chair  Upper Extremity Dressing: Stand By Assistance. for donning gown . BALANCE:  Standing Balance: Moderate Assistance. with pt using RW, with pt noted to have posterior lean with max vc's to tuck hips under, and for hand placement on walker with pt standing 1-2 minutes with 2 trials  With rest breaks in between standing. BED MOBILITY:  Not Tested    TRANSFERS:  Sit to Stand: Moderate Assistance. with vc's to bring B feet  to chair, and for pushing off of arms of chair  Stand to Sit: [employer].  vc's to reach back      ASSESSMENT:     Activity Tolerance:  Patient tolerance of treatment: good. Discharge Recommendations: [address], Patient would benefit from continued therapy after discharge, Continue to assess pending progress   Equipment Recommendations: Equipment Needed: No  Plan: Times per week: 3-5x  Current Treatment Recommendations: Self-Care / ADL, Functional Mobility Training, Endurance Training, Strengthening, Balance Training    Patient Education  Patient Education: Importance of Increasing Activity    Goals  Short term goals  Time Frame for Short term goals: By discharge  Short term goal 1: Patient to increase activity tolerance to/from BSC/recliner with min A x2 to increase indep in toileting. Short term goal 2: Patient to complete BADL task with no > min A to increase indep in ADL completion. Short term goal 3: Patient to tolerate dynamic standing task with no > min A and unilateral release to increase indep in clothing management during toileting. Short term goal 4: Patient to complete BUE light resistive HEP with min cues for technique to increase strength needed for mobility and ADL completion. Following session, patient left in safe position with all fall risk precautions in place.

## 2021-09-16 LAB
ANION GAP SERPL CALCULATED.3IONS-SCNC: 13 MEQ/L (ref 8–16)
BASOPHILS # BLD: 1.4 %
BASOPHILS ABSOLUTE: 0.1 THOU/MM3 (ref 0–0.1)
BUN BLDV-MCNC: 9 MG/DL (ref 7–22)
CALCIUM SERPL-MCNC: 9 MG/DL (ref 8.5–10.5)
CHLORIDE BLD-SCNC: 90 MEQ/L (ref 98–111)
CO2: 22 MEQ/L (ref 23–33)
CREAT SERPL-MCNC: 0.5 MG/DL (ref 0.4–1.2)
EOSINOPHIL # BLD: 7.2 %
EOSINOPHILS ABSOLUTE: 0.4 THOU/MM3 (ref 0–0.4)
ERYTHROCYTE [DISTWIDTH] IN BLOOD BY AUTOMATED COUNT: 14.6 % (ref 11.5–14.5)
ERYTHROCYTE [DISTWIDTH] IN BLOOD BY AUTOMATED COUNT: 44.9 FL (ref 35–45)
GFR SERPL CREATININE-BSD FRML MDRD: > 90 ML/MIN/1.73M2
GLUCOSE BLD-MCNC: 116 MG/DL (ref 70–108)
GLUCOSE BLD-MCNC: 119 MG/DL (ref 70–108)
GLUCOSE BLD-MCNC: 121 MG/DL (ref 70–108)
GLUCOSE BLD-MCNC: 152 MG/DL (ref 70–108)
GLUCOSE BLD-MCNC: 158 MG/DL (ref 70–108)
HCT VFR BLD CALC: 34.6 % (ref 37–47)
HEMOGLOBIN: 11.7 GM/DL (ref 12–16)
IMMATURE GRANS (ABS): 0.02 THOU/MM3 (ref 0–0.07)
IMMATURE GRANULOCYTES: 0.4 %
LYMPHOCYTES # BLD: 17.2 %
LYMPHOCYTES ABSOLUTE: 0.9 THOU/MM3 (ref 1–4.8)
MCH RBC QN AUTO: 28.5 PG (ref 26–33)
MCHC RBC AUTO-ENTMCNC: 33.8 GM/DL (ref 32.2–35.5)
MCV RBC AUTO: 84.2 FL (ref 81–99)
MONOCYTES # BLD: 15.1 %
MONOCYTES ABSOLUTE: 0.8 THOU/MM3 (ref 0.4–1.3)
NUCLEATED RED BLOOD CELLS: 0 /100 WBC
PLATELET # BLD: 195 THOU/MM3 (ref 130–400)
PMV BLD AUTO: 8.5 FL (ref 9.4–12.4)
POTASSIUM REFLEX MAGNESIUM: 4.2 MEQ/L (ref 3.5–5.2)
RBC # BLD: 4.11 MILL/MM3 (ref 4.2–5.4)
SEG NEUTROPHILS: 58.7 %
SEGMENTED NEUTROPHILS ABSOLUTE COUNT: 3 THOU/MM3 (ref 1.8–7.7)
SODIUM BLD-SCNC: 121 MEQ/L (ref 135–145)
SODIUM BLD-SCNC: 121 MEQ/L (ref 135–145)
SODIUM BLD-SCNC: 122 MEQ/L (ref 135–145)
SODIUM BLD-SCNC: 125 MEQ/L (ref 135–145)
WBC # BLD: 5.1 THOU/MM3 (ref 4.8–10.8)

## 2021-09-16 PROCEDURE — 85025 COMPLETE CBC W/AUTO DIFF WBC: CPT

## 2021-09-16 PROCEDURE — 97116 GAIT TRAINING THERAPY: CPT

## 2021-09-16 PROCEDURE — 97535 SELF CARE MNGMENT TRAINING: CPT

## 2021-09-16 PROCEDURE — 84295 ASSAY OF SERUM SODIUM: CPT

## 2021-09-16 PROCEDURE — 36415 COLL VENOUS BLD VENIPUNCTURE: CPT

## 2021-09-16 PROCEDURE — 2580000003 HC RX 258: Performed by: STUDENT IN AN ORGANIZED HEALTH CARE EDUCATION/TRAINING PROGRAM

## 2021-09-16 PROCEDURE — 6370000000 HC RX 637 (ALT 250 FOR IP): Performed by: STUDENT IN AN ORGANIZED HEALTH CARE EDUCATION/TRAINING PROGRAM

## 2021-09-16 PROCEDURE — 2060000000 HC ICU INTERMEDIATE R&B

## 2021-09-16 PROCEDURE — 82948 REAGENT STRIP/BLOOD GLUCOSE: CPT

## 2021-09-16 PROCEDURE — 80048 BASIC METABOLIC PNL TOTAL CA: CPT

## 2021-09-16 PROCEDURE — 99233 SBSQ HOSP IP/OBS HIGH 50: CPT | Performed by: INTERNAL MEDICINE

## 2021-09-16 PROCEDURE — 6360000002 HC RX W HCPCS: Performed by: STUDENT IN AN ORGANIZED HEALTH CARE EDUCATION/TRAINING PROGRAM

## 2021-09-16 PROCEDURE — 97530 THERAPEUTIC ACTIVITIES: CPT

## 2021-09-16 RX ORDER — SODIUM CHLORIDE 1000 MG
1 TABLET, SOLUBLE MISCELLANEOUS
Status: DISCONTINUED | OUTPATIENT
Start: 2021-09-16 | End: 2021-09-18

## 2021-09-16 RX ORDER — POLYETHYLENE GLYCOL 3350 17 G/17G
17 POWDER, FOR SOLUTION ORAL DAILY
Status: DISCONTINUED | OUTPATIENT
Start: 2021-09-17 | End: 2021-09-17

## 2021-09-16 RX ADMIN — CARBIDOPA AND LEVODOPA 2 TABLET: 25; 100 TABLET ORAL at 13:20

## 2021-09-16 RX ADMIN — SPIRONOLACTONE 25 MG: 25 TABLET ORAL at 09:50

## 2021-09-16 RX ADMIN — DICLOFENAC SODIUM 4 G: 10 GEL TOPICAL at 16:50

## 2021-09-16 RX ADMIN — DICLOFENAC SODIUM 4 G: 10 GEL TOPICAL at 13:20

## 2021-09-16 RX ADMIN — SODIUM CHLORIDE, PRESERVATIVE FREE 10 ML: 5 INJECTION INTRAVENOUS at 09:50

## 2021-09-16 RX ADMIN — ATORVASTATIN CALCIUM 20 MG: 20 TABLET, FILM COATED ORAL at 09:50

## 2021-09-16 RX ADMIN — SODIUM CHLORIDE, PRESERVATIVE FREE 10 ML: 5 INJECTION INTRAVENOUS at 19:39

## 2021-09-16 RX ADMIN — SUCRALFATE 1 G: 1 TABLET ORAL at 16:50

## 2021-09-16 RX ADMIN — ALOGLIPTIN 12.5 MG: 12.5 TABLET, FILM COATED ORAL at 09:50

## 2021-09-16 RX ADMIN — POLYETHYLENE GLYCOL 3350 17 G: 17 POWDER, FOR SOLUTION ORAL at 13:20

## 2021-09-16 RX ADMIN — FERROUS SULFATE TAB 325 MG (65 MG ELEMENTAL FE) 325 MG: 325 (65 FE) TAB at 09:50

## 2021-09-16 RX ADMIN — DICLOFENAC SODIUM 4 G: 10 GEL TOPICAL at 09:52

## 2021-09-16 RX ADMIN — SUCRALFATE 1 G: 1 TABLET ORAL at 11:21

## 2021-09-16 RX ADMIN — ISOSORBIDE DINITRATE 20 MG: 20 TABLET ORAL at 19:39

## 2021-09-16 RX ADMIN — DICLOFENAC SODIUM 4 G: 10 GEL TOPICAL at 19:39

## 2021-09-16 RX ADMIN — ASPIRIN 81 MG: 81 TABLET, COATED ORAL at 09:50

## 2021-09-16 RX ADMIN — Medication 1 G: at 16:50

## 2021-09-16 RX ADMIN — APIXABAN 2.5 MG: 2.5 TABLET, FILM COATED ORAL at 09:51

## 2021-09-16 RX ADMIN — ISOSORBIDE DINITRATE 20 MG: 20 TABLET ORAL at 09:50

## 2021-09-16 RX ADMIN — Medication 1 G: at 13:20

## 2021-09-16 RX ADMIN — CARBIDOPA AND LEVODOPA 2 TABLET: 25; 100 TABLET ORAL at 16:50

## 2021-09-16 RX ADMIN — SUCRALFATE 1 G: 1 TABLET ORAL at 05:50

## 2021-09-16 RX ADMIN — FERROUS SULFATE TAB 325 MG (65 MG ELEMENTAL FE) 325 MG: 325 (65 FE) TAB at 19:39

## 2021-09-16 RX ADMIN — APIXABAN 2.5 MG: 2.5 TABLET, FILM COATED ORAL at 19:39

## 2021-09-16 RX ADMIN — PANTOPRAZOLE SODIUM 40 MG: 40 TABLET, DELAYED RELEASE ORAL at 05:50

## 2021-09-16 RX ADMIN — ONDANSETRON 4 MG: 2 INJECTION INTRAMUSCULAR; INTRAVENOUS at 18:15

## 2021-09-16 RX ADMIN — METFORMIN HYDROCHLORIDE 500 MG: 500 TABLET, EXTENDED RELEASE ORAL at 09:52

## 2021-09-16 RX ADMIN — INSULIN LISPRO 2 UNITS: 100 INJECTION, SOLUTION INTRAVENOUS; SUBCUTANEOUS at 13:13

## 2021-09-16 RX ADMIN — ISOSORBIDE DINITRATE 20 MG: 20 TABLET ORAL at 13:46

## 2021-09-16 RX ADMIN — CARBIDOPA AND LEVODOPA 2 TABLET: 25; 100 TABLET ORAL at 19:39

## 2021-09-16 RX ADMIN — SENNOSIDES 8.6 MG: 8.6 TABLET, COATED ORAL at 19:38

## 2021-09-16 RX ADMIN — CARBIDOPA AND LEVODOPA 2 TABLET: 25; 100 TABLET ORAL at 09:50

## 2021-09-16 RX ADMIN — METFORMIN HYDROCHLORIDE 500 MG: 500 TABLET, EXTENDED RELEASE ORAL at 19:38

## 2021-09-16 RX ADMIN — AMLODIPINE BESYLATE 10 MG: 10 TABLET ORAL at 09:50

## 2021-09-16 RX ADMIN — PANTOPRAZOLE SODIUM 40 MG: 40 TABLET, DELAYED RELEASE ORAL at 16:50

## 2021-09-16 RX ADMIN — SUCRALFATE 1 G: 1 TABLET ORAL at 19:38

## 2021-09-16 RX ADMIN — Medication 1 G: at 09:51

## 2021-09-16 ASSESSMENT — PAIN DESCRIPTION - ONSET: ONSET: ON-GOING

## 2021-09-16 ASSESSMENT — PAIN - FUNCTIONAL ASSESSMENT: PAIN_FUNCTIONAL_ASSESSMENT: ACTIVITIES ARE NOT PREVENTED

## 2021-09-16 ASSESSMENT — PAIN DESCRIPTION - DESCRIPTORS: DESCRIPTORS: ACHING

## 2021-09-16 ASSESSMENT — PAIN DESCRIPTION - PROGRESSION: CLINICAL_PROGRESSION: NOT CHANGED

## 2021-09-16 ASSESSMENT — PAIN SCALES - GENERAL
PAINLEVEL_OUTOF10: 7
PAINLEVEL_OUTOF10: 0

## 2021-09-16 ASSESSMENT — PAIN DESCRIPTION - LOCATION: LOCATION: KNEE;LEG

## 2021-09-16 ASSESSMENT — PAIN DESCRIPTION - FREQUENCY: FREQUENCY: CONTINUOUS

## 2021-09-16 ASSESSMENT — PAIN DESCRIPTION - ORIENTATION: ORIENTATION: RIGHT;LEFT

## 2021-09-16 ASSESSMENT — PAIN DESCRIPTION - PAIN TYPE: TYPE: CHRONIC PAIN

## 2021-09-16 NOTE — PROGRESS NOTES
recliner  Stand to Sit: Minimal Assistance, X 1, with increased time for completion, cues for hand placement. to recliner    FUNCTIONAL MOBILITY:  Not assessed due to pt wanting to continue sitting up in recliner    ASSESSMENT:     Activity Tolerance:  Patient tolerance of  treatment: fair. Patient with decreased endurance and safety awareness resulting in decreased indep in all self care tasks. Discharge Recommendations: [address], Patient would benefit from continued therapy after discharge, Continue to assess pending progress   Equipment Recommendations: Equipment Needed: No  Plan: Times per week: 3-5x  Current Treatment Recommendations: Self-Care / ADL, Functional Mobility Training, Endurance Training, Strengthening, Balance Training    Patient Education  Patient Education: Role of OT, Plan of Care and ADL's    Goals  Short term goals  Time Frame for Short term goals: By discharge  Short term goal 1: Patient to increase activity tolerance to/from BSC/recliner with min A x2 to increase indep in toileting. Short term goal 2: Patient to complete BADL task with no > min A to increase indep in ADL completion. Short term goal 3: Patient to tolerate dynamic standing task with no > min A and unilateral release to increase indep in clothing management during toileting. Short term goal 4: Patient to complete BUE light resistive HEP with min cues for technique to increase strength needed for mobility and ADL completion. Following session, patient left in safe position with all fall risk precautions in place.

## 2021-09-16 NOTE — PROGRESS NOTES
[address] [facility] PHYSICAL THERAPY  DAILY NOTE  [facility] ICU STEPDOWN TELEMETRY 4K - 4K-21/021-A    Time In: 0746  Time Out: 8065  Timed Code Treatment Minutes: 23 Minutes  Minutes: 23          Date: 2021  Patient Name: Nidia Valdivia,  Gender:  female        MRN: [id number]  : 1932  (80 y.o.)     Referring Practitioner: Luis Hayward DO  Diagnosis: hyponatremia  Additional Pertinent Hx: Per chart \"Patient is an 42-year-old female with medical history of HFpEF, atrial fibrillation, and pulmonary hypertension who presents to the hospital via EMS from SNF for evaluation of worsening chest pain, shortness of breath, and weakness. Much of the history if obtained from the patient's family present at bedside as the patient has difficulty speaking while on BiPAP. They state that the patient has had several episodes of chest pain, mostly at rest.  She has received 3 dose of sublingual nitroglycerine over the last 1-3 days. Initially, the nitroglycerine helped to relieve the chest pain, but this morning it did not. They state that she has had tenderness to her epigastric region and does complain of GERD symptoms, more frequently in the last couple of weeks. She has been very week and requires significant assistance with transfers and even simple tasks of ADL including brushing her teeth. The family has noticed a decrease in her exercise tolerance and functional capacity over the last month, more pronounced in the last 1-2 weeks. They state that she has also had worsening LE edema. The patient did take an extra dose of her bumex yesterday and her LE edema is much improved today. The patient denies having any fevers, chills, nausea, vomiting, cough, or wheezing.  \"     Prior Level of Function:  Lives With: Alone  Type of Home: Assisted living  Home Layout: One level  Home Access: Level entry  Home Equipment: 4 wheeled walker   Bathroom Shower/Tub: Walk-in shower, Shower chair with back  Bathroom Toilet: Handicap height  Bathroom Equipment: Built-in shower seat, Grab bars around toilet, Grab bars in shower    ADL Assistance: Needs assistance (Pt reports she has assist for showers 2x/week at facility)  Homemaking Assistance: Needs assistance (Pt reports she do microwave meals for breakfast and lunch, facility provides dinner)  Homemaking Responsibilities: Yes (light cleaning and meal prep - staff at facility assists with some too)  Ambulation Assistance: Independent  Transfer Assistance: Independent  Active : No  Additional Comments: Pt and family report pt has required increased assist since last admission/discharge from [facility] s/p fall (dc'd 8/13). Pt reports she used 4WW at facility. Was getting therapies prior to current admission. Pt reports she was able to amb to and from dining room for meals with 1 seated rest break required    Restrictions/Precautions:  Restrictions/Precautions: General Precautions, Fall Risk  Position Activity Restriction  Other position/activity restrictions: 2L NC on 9/13, gradually weaning     SUBJECTIVE: RN approved session, student RN in to give meds. Pt resting in bed with daughter present. Agreeable to up to bedside chair. PAIN: Pt notes pain in B LEs but does not rate on numerical scale. Vitals: Vitals not assessed per clinical judgement, see nursing flowsheet    OBJECTIVE:  Bed Mobility:  Rolling to Left: Moderate Assistance, with rail, with verbal cues , with increased time for completion   Supine to Sit: Moderate Assistance, with rail, with increased time for completion    Transfers:  Sit to Stand: Moderate Assistance, Maximum Assistance, X 1, From EOB. Max cuing for hand placement and for anterior weight shift. When PTA would try to facilitate forward weight shift pt would push posteriorly. Stand to Sit:Moderate Assistance, Max cuing to back all the way up to chair and reach back before sitting for decreased fall risk. Ambulation:  Moderate Assistance  Distance: 3 feet x1   Surface: Level Tile  Device:Rolling Walker  Gait Deviations:  Slow Deidre, Decreased Step Length Bilaterally, Decreased Weight Shift Bilaterally, Decreased Gait Speed, Decreased Heel Strike Bilaterally and Heavy posterior lean pt was unable to correct. Balance:  Static Sitting Balance: Moderate Assistance, Pt sat EOB with Mod A to correct posterior lean. Max verbal and tactile cuing for anterior weight shift however pt was unable to acheive. Sat EOB ~ 8 minutes working on bringing center of balance forward to safely maintain sitting position. Exercise:  None    Functional Outcome Measures: Completed  AM-PAC Inpatient Mobility Raw Score : 10  AM-PAC Inpatient T-Scale Score : 32.29    ASSESSMENT:  Assessment: Patient progressing toward established goals. and Pt tolerated session fair. Limited by decreased strength, poor balance, and fear of falling. Pt would greatly benefit from continued therapy before returning home. Activity Tolerance:  Patient tolerance of  treatment: fair.       Equipment Recommendations:Equipment Needed: No  Discharge Recommendations:  Continue to assess pending progress, Subacute/Skilled Nursing Facility, Patient would benefit from continued therapy after discharge    Plan: Times per week: 5x GM  Current Treatment Recommendations: Strengthening, Transfer Training, Endurance Training, Neuromuscular Re-education, Patient/Caregiver Education & Training, Equipment Evaluation, Education, & procurement, ROM, Balance Training, Gait Training, Home Exercise Program, Functional Mobility Training, Safety Education & Training, Positioning    Patient Education  Patient Education: Plan of Care, [employer] Mobility, Transfers, Gait    Goals:  Patient goals : go to SNF and then home  Short term goals  Time Frame for Short term goals: by discharge  Short term goal 1: Pt will amb with RW for 50 feet with SBA for safety to progress towards PLOF.  Short term goal 2: Pt will demo CGA for transfers with use of RW for support with good safety to progress with mobility. Short term goal 3: Pt will tolerate 5 minutes of functional standing tasks to progress with balance and improve overall mobility. Long term goals  Time Frame for Long term goals : NA due to short ELOS    Following session, patient left in safe position with all fall risk precautions in place.

## 2021-09-16 NOTE — CARE COORDINATION
9/16/21, 2:42 PM EDT    Patient goals/plan/ treatment preferences discussed by  and . Patient goals/plan/ treatment preferences reviewed with patient/ family. Patient/ family verbalize understanding of discharge plan and are in agreement with goal/plan/treatment preferences. Understanding was demonstrated using the teach back method. AVS provided by RN at time of discharge, which includes all necessary medical information pertaining to the patients current course of illness, treatment, post-discharge goals of care, and treatment preferences. Services After Discharge  Services At/After Discharge: Aide Services, Nursing Services, OT, PT ([address] [place] [address])   IMM Letter  IMM Letter given to Patient/Family/Significant other/Guardian/POA/by[de-identified] CM  IMM Letter date given[de-identified] 09/16/21  IMM Letter time given[de-identified] 2073       Patient to potentially discharge back to [address] [place] [address] today. Patient and family aware and agreeable to discharge plan. SW called and notified Gibson Echavarria at [address] [place] [address] of potential discharge today. RN made aware and discharge instructions and transportation forms placed on chart.

## 2021-09-17 ENCOUNTER — APPOINTMENT (OUTPATIENT)
Dept: GENERAL RADIOLOGY | Age: 86
DRG: 291 | End: 2021-09-17
Payer: MEDICARE

## 2021-09-17 LAB
ANION GAP SERPL CALCULATED.3IONS-SCNC: 14 MEQ/L (ref 8–16)
BASOPHILS # BLD: 0.9 %
BASOPHILS ABSOLUTE: 0 THOU/MM3 (ref 0–0.1)
BLOOD CULTURE, ROUTINE: NORMAL
BLOOD CULTURE, ROUTINE: NORMAL
BUN BLDV-MCNC: 9 MG/DL (ref 7–22)
CALCIUM SERPL-MCNC: 9.1 MG/DL (ref 8.5–10.5)
CHLORIDE BLD-SCNC: 93 MEQ/L (ref 98–111)
CO2: 20 MEQ/L (ref 23–33)
CREAT SERPL-MCNC: 0.5 MG/DL (ref 0.4–1.2)
EOSINOPHIL # BLD: 7.7 %
EOSINOPHILS ABSOLUTE: 0.4 THOU/MM3 (ref 0–0.4)
ERYTHROCYTE [DISTWIDTH] IN BLOOD BY AUTOMATED COUNT: 14.5 % (ref 11.5–14.5)
ERYTHROCYTE [DISTWIDTH] IN BLOOD BY AUTOMATED COUNT: 45 FL (ref 35–45)
GFR SERPL CREATININE-BSD FRML MDRD: > 90 ML/MIN/1.73M2
GLUCOSE BLD-MCNC: 108 MG/DL (ref 70–108)
GLUCOSE BLD-MCNC: 119 MG/DL (ref 70–108)
GLUCOSE BLD-MCNC: 140 MG/DL (ref 70–108)
GLUCOSE BLD-MCNC: 146 MG/DL (ref 70–108)
GLUCOSE BLD-MCNC: 154 MG/DL (ref 70–108)
HCT VFR BLD CALC: 33.9 % (ref 37–47)
HEMOGLOBIN: 11.2 GM/DL (ref 12–16)
IMMATURE GRANS (ABS): 0.02 THOU/MM3 (ref 0–0.07)
IMMATURE GRANULOCYTES: 0.4 %
LYMPHOCYTES # BLD: 19.9 %
LYMPHOCYTES ABSOLUTE: 1.1 THOU/MM3 (ref 1–4.8)
MCH RBC QN AUTO: 28 PG (ref 26–33)
MCHC RBC AUTO-ENTMCNC: 33 GM/DL (ref 32.2–35.5)
MCV RBC AUTO: 84.8 FL (ref 81–99)
MONOCYTES # BLD: 13.5 %
MONOCYTES ABSOLUTE: 0.7 THOU/MM3 (ref 0.4–1.3)
NUCLEATED RED BLOOD CELLS: 0 /100 WBC
PLATELET # BLD: 202 THOU/MM3 (ref 130–400)
PMV BLD AUTO: 8.8 FL (ref 9.4–12.4)
POTASSIUM REFLEX MAGNESIUM: 4.2 MEQ/L (ref 3.5–5.2)
RBC # BLD: 4 MILL/MM3 (ref 4.2–5.4)
SEG NEUTROPHILS: 57.6 %
SEGMENTED NEUTROPHILS ABSOLUTE COUNT: 3.2 THOU/MM3 (ref 1.8–7.7)
SODIUM BLD-SCNC: 121 MEQ/L (ref 135–145)
SODIUM BLD-SCNC: 124 MEQ/L (ref 135–145)
SODIUM BLD-SCNC: 125 MEQ/L (ref 135–145)
SODIUM BLD-SCNC: 127 MEQ/L (ref 135–145)
WBC # BLD: 5.5 THOU/MM3 (ref 4.8–10.8)

## 2021-09-17 PROCEDURE — 92526 ORAL FUNCTION THERAPY: CPT

## 2021-09-17 PROCEDURE — 36415 COLL VENOUS BLD VENIPUNCTURE: CPT

## 2021-09-17 PROCEDURE — 80048 BASIC METABOLIC PNL TOTAL CA: CPT

## 2021-09-17 PROCEDURE — 6370000000 HC RX 637 (ALT 250 FOR IP): Performed by: STUDENT IN AN ORGANIZED HEALTH CARE EDUCATION/TRAINING PROGRAM

## 2021-09-17 PROCEDURE — 74018 RADEX ABDOMEN 1 VIEW: CPT

## 2021-09-17 PROCEDURE — 2580000003 HC RX 258: Performed by: STUDENT IN AN ORGANIZED HEALTH CARE EDUCATION/TRAINING PROGRAM

## 2021-09-17 PROCEDURE — 85025 COMPLETE CBC W/AUTO DIFF WBC: CPT

## 2021-09-17 PROCEDURE — 84295 ASSAY OF SERUM SODIUM: CPT

## 2021-09-17 PROCEDURE — 99233 SBSQ HOSP IP/OBS HIGH 50: CPT | Performed by: INTERNAL MEDICINE

## 2021-09-17 PROCEDURE — 97112 NEUROMUSCULAR REEDUCATION: CPT

## 2021-09-17 PROCEDURE — 2060000000 HC ICU INTERMEDIATE R&B

## 2021-09-17 PROCEDURE — 82948 REAGENT STRIP/BLOOD GLUCOSE: CPT

## 2021-09-17 PROCEDURE — 97116 GAIT TRAINING THERAPY: CPT

## 2021-09-17 RX ORDER — POLYETHYLENE GLYCOL 3350 17 G/17G
17 POWDER, FOR SOLUTION ORAL 2 TIMES DAILY
Status: DISCONTINUED | OUTPATIENT
Start: 2021-09-17 | End: 2021-09-20 | Stop reason: HOSPADM

## 2021-09-17 RX ADMIN — CARBIDOPA AND LEVODOPA 2 TABLET: 25; 100 TABLET ORAL at 21:59

## 2021-09-17 RX ADMIN — APIXABAN 2.5 MG: 2.5 TABLET, FILM COATED ORAL at 09:15

## 2021-09-17 RX ADMIN — INSULIN LISPRO 2 UNITS: 100 INJECTION, SOLUTION INTRAVENOUS; SUBCUTANEOUS at 12:22

## 2021-09-17 RX ADMIN — ASPIRIN 81 MG: 81 TABLET, COATED ORAL at 09:15

## 2021-09-17 RX ADMIN — CARBIDOPA AND LEVODOPA 2 TABLET: 25; 100 TABLET ORAL at 09:14

## 2021-09-17 RX ADMIN — CARBIDOPA AND LEVODOPA 2 TABLET: 25; 100 TABLET ORAL at 17:30

## 2021-09-17 RX ADMIN — Medication 1 G: at 12:21

## 2021-09-17 RX ADMIN — ATORVASTATIN CALCIUM 20 MG: 20 TABLET, FILM COATED ORAL at 09:14

## 2021-09-17 RX ADMIN — FERROUS SULFATE TAB 325 MG (65 MG ELEMENTAL FE) 325 MG: 325 (65 FE) TAB at 21:59

## 2021-09-17 RX ADMIN — APIXABAN 2.5 MG: 2.5 TABLET, FILM COATED ORAL at 21:59

## 2021-09-17 RX ADMIN — DICLOFENAC SODIUM 4 G: 10 GEL TOPICAL at 12:22

## 2021-09-17 RX ADMIN — SODIUM CHLORIDE, PRESERVATIVE FREE 10 ML: 5 INJECTION INTRAVENOUS at 21:59

## 2021-09-17 RX ADMIN — SUCRALFATE 1 G: 1 TABLET ORAL at 05:59

## 2021-09-17 RX ADMIN — DICLOFENAC SODIUM 4 G: 10 GEL TOPICAL at 17:33

## 2021-09-17 RX ADMIN — POLYETHYLENE GLYCOL 3350 17 G: 17 POWDER, FOR SOLUTION ORAL at 09:17

## 2021-09-17 RX ADMIN — INSULIN LISPRO 2 UNITS: 100 INJECTION, SOLUTION INTRAVENOUS; SUBCUTANEOUS at 08:00

## 2021-09-17 RX ADMIN — DICLOFENAC SODIUM 4 G: 10 GEL TOPICAL at 21:59

## 2021-09-17 RX ADMIN — PANTOPRAZOLE SODIUM 40 MG: 40 TABLET, DELAYED RELEASE ORAL at 15:51

## 2021-09-17 RX ADMIN — AMLODIPINE BESYLATE 10 MG: 10 TABLET ORAL at 09:14

## 2021-09-17 RX ADMIN — POLYETHYLENE GLYCOL 3350 17 G: 17 POWDER, FOR SOLUTION ORAL at 21:59

## 2021-09-17 RX ADMIN — SPIRONOLACTONE 25 MG: 25 TABLET ORAL at 09:13

## 2021-09-17 RX ADMIN — ALOGLIPTIN 12.5 MG: 12.5 TABLET, FILM COATED ORAL at 09:15

## 2021-09-17 RX ADMIN — INSULIN LISPRO 2 UNITS: 100 INJECTION, SOLUTION INTRAVENOUS; SUBCUTANEOUS at 17:30

## 2021-09-17 RX ADMIN — SUCRALFATE 1 G: 1 TABLET ORAL at 21:59

## 2021-09-17 RX ADMIN — SODIUM CHLORIDE, PRESERVATIVE FREE 10 ML: 5 INJECTION INTRAVENOUS at 09:16

## 2021-09-17 RX ADMIN — SUCRALFATE 1 G: 1 TABLET ORAL at 11:17

## 2021-09-17 RX ADMIN — Medication 1 G: at 17:30

## 2021-09-17 RX ADMIN — METFORMIN HYDROCHLORIDE 500 MG: 500 TABLET, EXTENDED RELEASE ORAL at 21:59

## 2021-09-17 RX ADMIN — ISOSORBIDE DINITRATE 20 MG: 20 TABLET ORAL at 09:14

## 2021-09-17 RX ADMIN — ONDANSETRON 4 MG: 4 TABLET, ORALLY DISINTEGRATING ORAL at 09:15

## 2021-09-17 RX ADMIN — PANTOPRAZOLE SODIUM 40 MG: 40 TABLET, DELAYED RELEASE ORAL at 05:59

## 2021-09-17 RX ADMIN — ISOSORBIDE DINITRATE 20 MG: 20 TABLET ORAL at 22:00

## 2021-09-17 RX ADMIN — CARBIDOPA AND LEVODOPA 2 TABLET: 25; 100 TABLET ORAL at 12:22

## 2021-09-17 RX ADMIN — SENNOSIDES 8.6 MG: 8.6 TABLET, COATED ORAL at 22:00

## 2021-09-17 RX ADMIN — Medication 1 G: at 09:13

## 2021-09-17 RX ADMIN — FERROUS SULFATE TAB 325 MG (65 MG ELEMENTAL FE) 325 MG: 325 (65 FE) TAB at 09:14

## 2021-09-17 RX ADMIN — METFORMIN HYDROCHLORIDE 500 MG: 500 TABLET, EXTENDED RELEASE ORAL at 09:15

## 2021-09-17 RX ADMIN — SUCRALFATE 1 G: 1 TABLET ORAL at 17:30

## 2021-09-17 RX ADMIN — DICLOFENAC SODIUM 4 G: 10 GEL TOPICAL at 06:40

## 2021-09-17 RX ADMIN — ACETAMINOPHEN 650 MG: 325 TABLET ORAL at 06:01

## 2021-09-17 RX ADMIN — ISOSORBIDE DINITRATE 20 MG: 20 TABLET ORAL at 13:46

## 2021-09-17 ASSESSMENT — PAIN DESCRIPTION - DESCRIPTORS
DESCRIPTORS: ACHING;CONSTANT

## 2021-09-17 ASSESSMENT — PAIN DESCRIPTION - ORIENTATION
ORIENTATION: RIGHT;LEFT;UPPER
ORIENTATION: RIGHT;LEFT;UPPER
ORIENTATION: RIGHT;LEFT

## 2021-09-17 ASSESSMENT — PAIN DESCRIPTION - PROGRESSION
CLINICAL_PROGRESSION: NOT CHANGED
CLINICAL_PROGRESSION: NOT CHANGED
CLINICAL_PROGRESSION: RAPIDLY WORSENING
CLINICAL_PROGRESSION: NOT CHANGED

## 2021-09-17 ASSESSMENT — PAIN SCALES - GENERAL
PAINLEVEL_OUTOF10: 10
PAINLEVEL_OUTOF10: 5
PAINLEVEL_OUTOF10: 0
PAINLEVEL_OUTOF10: 10

## 2021-09-17 ASSESSMENT — PAIN SCALES - WONG BAKER: WONGBAKER_NUMERICALRESPONSE: 0

## 2021-09-17 ASSESSMENT — PAIN - FUNCTIONAL ASSESSMENT
PAIN_FUNCTIONAL_ASSESSMENT: PREVENTS OR INTERFERES SOME ACTIVE ACTIVITIES AND ADLS
PAIN_FUNCTIONAL_ASSESSMENT: PREVENTS OR INTERFERES WITH MANY ACTIVE NOT PASSIVE ACTIVITIES
PAIN_FUNCTIONAL_ASSESSMENT: PREVENTS OR INTERFERES SOME ACTIVE ACTIVITIES AND ADLS

## 2021-09-17 ASSESSMENT — PAIN DESCRIPTION - FREQUENCY
FREQUENCY: CONTINUOUS

## 2021-09-17 ASSESSMENT — PAIN DESCRIPTION - ONSET
ONSET: ON-GOING
ONSET: SUDDEN
ONSET: PROGRESSIVE

## 2021-09-17 ASSESSMENT — PAIN DESCRIPTION - PAIN TYPE
TYPE: ACUTE PAIN
TYPE: CHRONIC PAIN
TYPE: CHRONIC PAIN

## 2021-09-17 ASSESSMENT — PAIN DESCRIPTION - LOCATION
LOCATION: LEG
LOCATION: LEG
LOCATION: KNEE;LEG

## 2021-09-17 NOTE — CARE COORDINATION
9/17/21, 3:54 PM EDT    DISCHARGE ON GOING EVALUATION    [facility] day: 6     Barriers to Discharge: Na 127 /125, 1500 mL fluid restrictions  PCP: Asuncion Chavez MD  Readmission Risk Score: 26%  Patient Goals/Plan/Treatment Preferences: plans return to [facility].

## 2021-09-17 NOTE — PROGRESS NOTES
[id number] [place] [address]  INPATIENT PHYSICAL THERAPY  DAILY NOTE  [facility] ICU STEPDOWN TELEMETRY 4K - 4K-21/021-A    Time In: 0820  Time Out: 0788  Timed Code Treatment Minutes: 27 Minutes  Minutes: 27          Date: 2021  Patient Name: Natacha Wilson,  Gender:  female        MRN: [id number]  : 1932  (80 y.o.)     Referring Practitioner: Dayne Morales DO  Diagnosis: hyponatremia  Additional Pertinent Hx: Per chart \"Patient is an 80-year-old female with medical history of HFpEF, atrial fibrillation, and pulmonary hypertension who presents to the hospital via EMS from SNF for evaluation of worsening chest pain, shortness of breath, and weakness. Much of the history if obtained from the patient's family present at bedside as the patient has difficulty speaking while on BiPAP. They state that the patient has had several episodes of chest pain, mostly at rest.  She has received 3 dose of sublingual nitroglycerine over the last 1-3 days. Initially, the nitroglycerine helped to relieve the chest pain, but this morning it did not. They state that she has had tenderness to her epigastric region and does complain of GERD symptoms, more frequently in the last couple of weeks. She has been very week and requires significant assistance with transfers and even simple tasks of ADL including brushing her teeth. The family has noticed a decrease in her exercise tolerance and functional capacity over the last month, more pronounced in the last 1-2 weeks. They state that she has also had worsening LE edema. The patient did take an extra dose of her bumex yesterday and her LE edema is much improved today. The patient denies having any fevers, chills, nausea, vomiting, cough, or wheezing.  \"     Prior Level of Function:  Lives With: Alone  Type of Home: Assisted living  Home Layout: One level  Home Access: Level entry  Home Equipment: 4 wheeled walker   Bathroom Shower/Tub: Walk-in shower, Shower chair with back  Bathroom Toilet: Handicap height  Bathroom Equipment: Built-in shower seat, Grab bars around toilet, Grab bars in shower    ADL Assistance: Needs assistance (Pt reports she has assist for showers 2x/week at facility)  Homemaking Assistance: Needs assistance (Pt reports she do microwave meals for breakfast and lunch, facility provides dinner)  Homemaking Responsibilities: Yes (light cleaning and meal prep - staff at facility assists with some too)  Ambulation Assistance: Independent  Transfer Assistance: Independent  Active : No  Additional Comments: Pt and family report pt has required increased assist since last admission/discharge from [facility] s/p fall (dc'd 8/13). Pt reports she used 4WW at facility. Was getting therapies prior to current admission. Pt reports she was able to amb to and from dining room for meals with 1 seated rest break required    Restrictions/Precautions:  Restrictions/Precautions: General Precautions, Fall Risk  Position Activity Restriction  Other position/activity restrictions: 2L NC on 9/13, gradually weaning, now on RA     SUBJECTIVE: Patient resting in bed and daughter present for session, patient agreed to \"get therapy over with\"     PAIN: 9/10: B thighs, pain patches applied prior to therapy by RN. Vitals: Vitals not assessed per clinical judgement, see nursing flowsheet    OBJECTIVE:  Bed Mobility:  Supine to Sit: Maximum Assistance, X 1, with head of bed raised, with rail, with increased time for completion   Patient able to move B LEs to Edge of bed without assistance, however required maximal assistance x1 at trunk to reach upright sitting, also required moderate assist to scoot hips towards edge of bed- while sitting edge of bed patient attempted to scoot/ adjust hips under SBA, being able to \"scoot\" slightly.      Transfers:  Sit to Stand: Maximum Assistance, X 1, with increased time for completion, cues for hand placement, with verbal cues  Stand to Sit:Maximum Assistance, X 1, with increased time for completion, cues for hand placement, with verbal cues   Patient required cues for anterior weight shift through entire sit < stand transfer, and when given tactile cues patient would push harder into posterior weight shift. Sit > stand completed with rocking 3 times to prep patient, with verbal cues for hand placement on bed. Required verbal cues to move hands to/ from surface and RW for each sit <> stand. 2 sit <> stands completed this session. For stand > sit patient required cues to push hips towards back of chair, and physical assist to align hips to the center in chair. Ambulation:  Maximum Assistance, X 1, with cues for safety, with verbal cues , with increased time for completion  Distance: 4ft  Surface: Level Tile  Device:Rolling Walker  Gait Deviations: Forward Flexed Posture, Slow Deidre, Decreased Step Length Bilaterally, Decreased Weight Shift Bilaterally, Decreased Gait Speed, Decreased Heel Strike Bilaterally and heavy posterior lean. Patient unable to correct posterior weight shift during ambulation despite therapist and daughter cues. Improved management of AD observed this date, only requiring minimal guidance to turn. Balance:  Static Sitting Balance:  Stand By Assistance, Contact Guard Assistance  Patient sits EOB ~10 minutes, 9 minutes of which with SBA, and 1-0 UE support with no LOB or postural sways. Verbal cues required in sitting to centered and more upright through shoulders and chest. Very minimal verbal cueing required to sit EOB. Patient able to reach B UE forward and back x12 with SBA as well. Dynamic Sitting Balance: Stand By Assistance, Contact Guard Assistance   Patient completed LE exercises seated EOB with 1 UE support, initially requiring CGA, but progressed quickly to SBA with no LOB or increased posterior lean observed. Minimal verbal cueing provided to keep posture during dynamic balance activity.     Exercise:  Patient was guided in 1 set(s) 12 reps of exercise to both lower extremities. Seated marches, Seated hamstring curls, Seated heel/toe raises and Long arc quads. Exercises were completed for increased independence with functional mobility. Functional Outcome Measures: Completed  AM-PAC Inpatient Mobility Raw Score : 11  AM-PAC Inpatient T-Scale Score : 33.86    ASSESSMENT:  Assessment: Patient progressing toward established goals. and Patient demonstrates improved static and dynamic sitting balance, requiring less physical assist and verbal cues to stay upright an decrease posterior lean. Maximal assistance x1 still required for transfers and ambulation d/t overall weakness and heavy posterior lean during functional activity. Patient did not show signs of fatigue with sitting EOB for ~10 minutes. Activity Tolerance:  Patient tolerance of  treatment: good. Equipment Recommendations:Equipment Needed: No  Discharge Recommendations:  Continue to assess pending progress, Subacute/Skilled Nursing Facility, Patient would benefit from continued therapy after discharge    Plan: Times per week: 5x GM  Current Treatment Recommendations: Strengthening, Transfer Training, Endurance Training, Neuromuscular Re-education, Patient/Caregiver Education & Training, Equipment Evaluation, Education, & procurement, ROM, Balance Training, Gait Training, Home Exercise Program, Functional Mobility Training, Safety Education & Training, Positioning    Patient Education  Patient Education: Plan of Care, [employer] Mobility, Transfers, Gait    Goals:  Patient goals : go to SNF and then home  Short term goals  Time Frame for Short term goals: by discharge  Short term goal 1: Pt will amb with RW for 50 feet with SBA for safety to progress towards PLOF. Short term goal 2: Pt will demo CGA for transfers with use of RW for support with good safety to progress with mobility.   Short term goal 3: Pt will tolerate 5 minutes of functional standing tasks to progress with balance and improve overall mobility. Long term goals  Time Frame for Long term goals : NA due to short ELOS    Following session, patient left in safe position with all fall risk precautions in place.

## 2021-09-17 NOTE — PROGRESS NOTES
[address] [place] THERAPY  [facility] ICU STEPDOWN TELEMETRY 4K  DAILY NOTE       TIME   SLP Individual Minutes  Time In: 5547  Time Out: 6116  Minutes: 30  Timed Code Treatment Minutes: 0 Minutes       Date: 2021  Patient Name: Neri Butler      CSN: [id number]   : 1932  (80 y.o.)  Gender: female   Referring Physician:  Gutierrez Rasmussen DO  Diagnosis: Hyponatremia  Secondary Diagnosis: Dysphagia   Precautions: Aspiration, fall risk, hearing difficulty   Current Diet: Soft & bite size with thin liquids; Upgraded to regular solids with thin liquids  Swallowing Strategies: Single bites, alternate bites/sips, sit up to eat,   Date of Last MBS/FEES: Not Applicable    Pain:  No pain reported. Subjective:  Pt was alert, oriented, and pleasant this date. Pt's daughter Iam Ayers was present during the session and able to participate as needed. Short-Term Goals:  SHORT TERM GOAL #1:  Goal 1: Pt will safely consume a soft and bite size diet with thin liquids with no signs/symptoms of aspiration/penetration to demonstrate pt's ability to meet nutrition/hydrational needs. GOAL MET  Revised goal #1: Pt will safely consume a regular solid/liquid diet with no signs/symptoms of aspiration/penetration to demonstrate pt's ability to meet nutrition/hydrational needs. INTERVENTIONS: Completed skilled dietary analysis during lunch with a regular diet tray. Dietary analysis completed to further assess swallow function, carry over of compensatory swallowing strategies, and monitor endurance with completion of entire meal. Prior to PO intake, ST ensured PT was in upright positioning with feet on the ground. Meal Tray Included:  Fish, baked potato halves, salad, cookie, thin liquid via cup and straw. Prior to PO intake, completed comprehensive review and reinforcement of compensatory swallowing strategies (alternating bites/sips, small bites/sips, upright).  In addition to swallowing strategies, patient was encouraged to complete several small meals throughout the day vs. 3 large meals to reduce fatigue, poor endurance, and ensure Pt able to meet nutritional/hydration needs via PO. During the oral phase, the Pt exhibited adequate lip seal for straw drinks. Pt also demonstrated decreased bolus formation and A-P movement evidenced by residue in the oral cavity following swallow of solids, however the lingual residue was cleared following a liquid wash (MIN verbal cues throughout meal). Pt's mastication of solids was slightly prolonged, however functional. No overt signs/symptoms of aspiration/penetratoin were noted through the duration of the meal. Recommended diet upgrade to regular solids with thin liquids, with continued use of compensatory swallowing strategies and close supervision to provide verbal cues for swallowing strategies. *Pt demonstrated adequate endurance for adequate completion of meal to meet nutritional/hydration needs. *Pt and daughter verbalized understanding and agreement with dietary and strategy recommendations   *Recommended brief continuation of dysphagia therapy to improve consistent use of safe swallowing strategies prior to discharge at baseline diet. SHORT TERM GOAL #2:  Goal 2: Pt will complete advanced PO trials with ST with improved oral phase with no signs/symptoms of aspiration/penetration to demonstrate pt's readiness for a diet upgrade. INTERVENTIONS: Refer to goal 1    SHORT TERM GOAL #3:  Goal 3: Pt will demosntrate understanding and recall of compensatory swallowing strategies (alternating liquids/solids, single bites/drinks, upright) with 80% accuracy given MIN cues to improve swallow safety and reduced risk of aspiration.   INTERVENTIONS: Refer to goal 1    SHORT TERM GOAL #4:  Goal 4: Pt will complete STM recall with 2-3 units (immediate, delayed and working) with 60% accuracy given MOD assist to improve retention of personal and newly learned information  INTERVENTIONS: Did not address due to focus on dysphagia goals. SHORT TERM GOAL #5:  Goal 5: Pt will complete organizational naming, sequencing, attention tasks (divergent, convergent, sustained and alternating, etc.) with 70% accuracy given MOD assist to improve processing speed and lexical retrieval  INTERVENTIONS: Did not address due to focus on dysphagia goals. SHORT TERM GOAL #6:  Goal 6: Pt will complete mildly complex problem solving, verbal/visual reasoning, and exectutive funcitoning tasks with 60% accuracy given MOD assist to improve safety and independence with completion of ADLs/IADLs  INTERVENTIONS:Did not address due to focus on dysphagia goals. Long-Term Goals:   No LT goals established this session d/t anticipated short 2020 Darshan Ewing EDUCATION:  Learner: Patient and Family  Education:  Reviewed diet and strategies, Reviewed signs, symptoms and risks of aspiration, Reviewed ST goals and Plan of Care and Reviewed recommendations for follow-up  Evaluation of Education: Demonstrates with assistance, Needs further instruction and daughter Jose Plummer present for education    ASSESSMENT/PLAN:  Activity Tolerance:  Patient tolerance of  treatment: good. Assessment/Plan: Patient progressing toward established goals. Continues to require skilled care of licensed speech pathologist to progress toward achievement of established goals and plan of care. .     Plan for Next Session: STM, problem solving, advanced dietary analysis. Malinda Dang B.S., Speech Therapy Student  Tramaine Cobb.  Javed Wren MA., CCC-SLP

## 2021-09-17 NOTE — PROGRESS NOTES
Hospitalist Progress Note    Patient:  Twylla Nissen      Unit/Bed:4K-21/021-A    YOB: 1932    MRN: [id number]       Acct: [de-identified]     PCP: Franko Perez MD    Date of Admission: 9/11/2021    Assessment/Plan:    1. Acute hypoxic respiratory failure - resolved        - Secondary to pleural effusion secondary to acute CHF exacerbation.  Presented LAMAS, SOB, decreased exercise tolerance.  9/11 CXR revealed for inflation of the lungs, cardiomegaly, small bilateral pleural effusions mild bibasilar atelectasis/pneumonia.      - Continued increasing O2 requirements with 5 L NC. No Home O2. Started Bumex 1 g po qDay. On Acapella, inspiratory spirometer       - 9/15 Weaned off O2.      2. Acute HFpEF exacerbation - diastolic        - Previous echo 5/2020 demonstrated EF 55-60%, moderately dilated left LA, mildly dilated RV, right ventricular systolic pressure of 20-39 consistent with severe pulmonary retention, mild to moderate mitral regurgitation, moderate tricuspid regurgitation.  BNP 4264.        - Continue spironolactone 25 mg po qDay        - Strict daily I&O's and daily weights         - 9/12 Echo reveals EF 55-60%, LA severely dilated, aortic velocity 2.3 m/s, mean gradient 13 mmHg, MARVIN 1.3 cm².  Mild to moderate AS, mild MR, mild TR, assuming RAP = 20 mmHg, est RVSP = 75 mmHg.  IVC severely dilated with reduced respiratory phasic changes (CVP - 15-20 mmHg).   Large, left pleural effusion    3. Pleural effusion, bilateral - likely secondary to acute diastolic CHF exacerbation        - See above.     4. Acute on chronic hyponatremia - multifactorial, secondary to intravascular depletion from poor oral Intake and diuresis vs SIADH        - Baseline sodium 130s. Sodium on admission 127. Urine electrolytes as follows; urine creatinine 57.4, urine osmolality 361, urine sodium 76. FeNA score 0.7% Pre-renal.  Secondary to decreased ECF secondary to decompensated CHF and diuretics.   Adjusted diet to limit fluid to 2 L and sodium restriction.         - 9/13 Bumex held.        - 9/15 Sodium trending up, 125. 9/17 repeat sodium level 125. Currently on sodium chloride 1 g tablets 3 times daily. Continue monitor sodium levels every 6 hours.     5. Constipation        - Last reported BM 6 days ago.  Continue Senokot qDay. Started MiraLAX BID. We will check a KUB. If noted constipation, will start water enema. 6. Severe pulmonary hypertension        - Per 5/2020 Echo, right ventricular systolic pressure 94-83 mmHg, consistent with severe pulmonary hypertension.  See above for repeat Echo.     7. Suspected pneumonia        - Was started on Levaquin for suspected concurrent pneumonia. 9/12  Respiratory culture pending, blood cultures reveal no preliminary growth.        - Currently afebrile, WBC WNL. Levaquin discontinued. Will repeat CXR symptomatic. 8. CAD s/p CABG        - History of CAD.  Denies active chest pain, however multiple episodes of chest pain at [facility].  Troponin is negative. 9/11 EKG reveals sinus rhythm with first-degree AV block, chronic LBBB.        - Continue aspirin 81 mg po qDay and Imdur 20 mg po TID    9. Proximal atrial fibrillation         - [id number] score 7.         - Takes Eliquis 2.5 mg po BID for A. fib.  No recorded rate control medication.  Heart rate currently in the 60-70s.   Currently on continuous telemetry. 10. Normocytic anemia        - 9/17 improving, Hgb 11.2 from previous 9/16 Hgb 12. 1.  Continue to monitor Hgb with daily CBC.  Transfuse PRBC if Hgb <7.0.        - Iron studies revealed iron 30, iron saturation 11, TIBC 268.  This all indicative of iron deficiency anemia.  Will resume ferrous sulfate 325 mg po BID.  Reticulocytes normal. Vitamin B12 515, folate 19. 8.     11. Elevated TSH        - No history of hypothyroidism. 9/12 TSH 5.030, free T4 1. 52.  Will continue to monitor. 12. Parkinson's disease         - Continue Sinemet  mg x2 QID.   Neurology following. 13. Hypertension        - Continue amlodipine 10 mg po qDay    14. Hyperlipidemia        - Continue Lipitor 20 mg po qDay    15. DM 2        - 9/12 HgbA1c 6.3.  Currently on insulin medium dose sliding scale.  Will resume home Metformin 500 mg BID and alogliptin 12.5 mg po qDay.       - 9/17 blood glucose 119.  Continue monitor blood glucose with daily BMP. 16. GERD        - Continue Protonix 40 mg po BID and Carafate 1 g po QID      Expected discharge date:  TBD    Disposition:    [] Home       [] TCU       [] Rehab       [] Psych       [x] SNF       [] [facility]       [] Other-    Chief Complaint: Shortness of breath, generalized full body weakness    Hospital Course: The patient is an 70-year-old female with a past medical history of HFpEF with EF 55-65% 2021, A. Fib on eliquis, CAD s/p CABG, pulmonary HTN, DM2, Parkinson's, GERD who presented to [facility] ED 9/11 from SNF for shortness of breath, and weakness.  Per family the patient has had several episodes of chest pain, most recently at rest.  For this she received a total of 3 doses of sublingual nitroglycerin over the past few days.  The family had also noted a decrease in her exercise tolerance and functional capacity over the recent month.  This more pronounced over the last 1-2 weeks.  The patient reports initially the nitroglycerin helped however the last dose did not.  She had also reported tenderness in the epigastric region, does complain of GERD symptoms. Darrelyn Dudley have been going on for the last couple of weeks.  The patient denies taking any oxygen at home.     In the ED CXR revealed poor inflation of lungs, cardiomegaly, small bilateral pleural effusions and moderate bibasilar atelectasis/pneumonia.  EKG revealed sinus rhythm with first-degree AV block and LBBB, chronic. Tropnin negative. BNP 3264.  Echo ordered, pending.  Sodium was found to be 127, Bumex was held.  The patient was noted to be in respiratory distress with mild hypoxia.  She was started on 2L NC, but requiring BiPAP.  Levaquin was started due to concern she may have a concurrent pneumonia.  Neurology was consulted for patient's Parkinson's.      The patient's respiratory status continued to worsen.  She was on 5 L NC 9/12.  She reported shortness of breath and generalized weakness. Pia Netta was also associated epigastric tightness but no pain. Bumex was started  Sodium levels were low. This is believed to be due to increased ECF along with dehydration.      9/14 The patient was weaned off oxygen.  Noted improvement in shortness of breath and epigastric pain.  Sodium was 120.  Bumex was discontinued. 9/16 sodium levels continue to remain low, 121. Started sodium chloride tablets, fluid restriction per diet. Subjective (past 24 hours): No significant events overnight. The patient still has not had a BM. Last known BM 6 days ago. Started MiraLAX twice daily. Currently getting Senokot. We will check a KUB. Sodium level steady, 125. Hemodynamically stable, vital signs within normal limits. ROS (12 point review of systems completed. Pertinent positives noted. Otherwise ROS is negative).     Medications:  Reviewed    Infusion Medications    sodium chloride      dextrose       Scheduled Medications    polyethylene glycol  17 g Oral BID    sodium chloride  1 g Oral TID WC    diclofenac sodium  4 g Topical 4x Daily    senna  1 tablet Oral Nightly    lidocaine  2 patch TransDERmal Q24H    [Held by provider] bumetanide  1 mg Oral Daily    ferrous sulfate  325 mg Oral BID    metFORMIN  500 mg Oral BID    alogliptin  12.5 mg Oral Daily    sodium chloride flush  5-40 mL IntraVENous 2 times per day    amLODIPine  10 mg Oral Daily    apixaban  2.5 mg Oral BID    aspirin  81 mg Oral Daily    atorvastatin  20 mg Oral Daily    carbidopa-levodopa  2 tablet Oral 4x Daily    isosorbide dinitrate  20 mg Oral TID    pantoprazole  40 mg Oral BID AC    spironolactone  25 mg Oral Daily    sucralfate  1 g Oral 4x Daily AC & HS    insulin lispro  0-12 Units SubCUTAneous TID WC     PRN Meds: sodium chloride flush, sodium chloride, ondansetron **OR** ondansetron, acetaminophen **OR** acetaminophen, glucose, dextrose, glucagon (rDNA), dextrose      Intake/Output Summary (Last 24 hours) at 9/17/2021 0962  Last data filed at 9/17/2021 0303  Gross per 24 hour   Intake 450 ml   Output 910 ml   Net -460 ml       Diet:  ADULT DIET; Dysphagia - Soft and Bite Sized; 4 carb choices (60 gm/meal); 1500 ml    Exam:  BP (!) 141/63   Pulse 73   Temp 98 °F (36.7 °C) (Oral)   Resp 18   Ht 5' 2\" (1.575 m)   Wt 114 lb (51.7 kg)   SpO2 92%   BMI 20.85 kg/m²     General appearance: No acute distress. Ill-appearing and cachectic at stated age and cooperative. HEENT: Pupils equal, round, and reactive to light. Conjunctivae/corneas clear. Neck: Supple, with full range of motion. No jugular venous distention. Trachea midline. Respiratory: Normal respiratory effort, clear to auscultation, bilaterally without Rales/Wheezes/Rhonchi. Cardiovascular: Regular rate with normal S1/S2 without murmurs, rubs or gallops. Abdomen: Soft, distended with normal bowel sounds. Mild left upper quadrant tenderness. Musculoskeletal: passive and active ROM x 4 extremities. Skin: Skin color, texture, turgor normal.  No rashes or lesions. Neurologic:  Neurovascularly intact without any focal sensory/motor deficits.  Cranial nerves: II-XII intact, grossly non-focal.  Psychiatric: Alert and oriented, thought content appropriate, normal insight  Capillary Refill: Brisk,< 3 seconds   Peripheral Pulses: +2 palpable, equal bilaterally       Labs:   Recent Labs     09/15/21  0637 09/16/21  0525 09/17/21  0555   WBC 5.8 5.1 5.5   HGB 12.1 11.7* 11.2*   HCT 36.8* 34.6* 33.9*    195 202     Recent Labs     09/15/21  0637 09/15/21  1242 09/16/21  0525 09/16/21  1216 09/16/21  1844 09/17/21  0043 09/17/21  0555   *   < > 125*   < > 121* 121* 127*   K 4.5  --  4.2  --   --   --  4.2   CL 91*  --  90*  --   --   --  93*   CO2 21*  --  22*  --   --   --  20*   BUN 8  --  9  --   --   --  9   CREATININE 0.6  --  0.5  --   --   --  0.5   CALCIUM 9.1  --  9.0  --   --   --  9.1    < > = values in this interval not displayed. No results for input(s): AST, ALT, BILIDIR, BILITOT, ALKPHOS in the last 72 hours. No results for input(s): INR in the last 72 hours. No results for input(s): Vance Breeze in the last 72 hours. Microbiology:    9/12/21 Blood culture 1 - no preliminary growth   9/12/21 Blood culture 2 - no preliminary growth     Urinalysis:      Lab Results   Component Value Date    NITRU NEGATIVE 09/12/2021    WBCUA 0-2 09/12/2021    BACTERIA NONE SEEN 09/12/2021    RBCUA 0-2 09/12/2021    BLOODU NEGATIVE 09/12/2021    SPECGRAV 1.012 09/12/2021    GLUCOSEU NEGATIVE 02/27/2021       Radiology:  XR CHEST PORTABLE   Final Result   1. Poor inflation of the lungs. Mild cardiomegaly. Metallic sternotomy sutures. 2. Small bilateral pleural effusions. Moderate bibasilar atelectasis/pneumonia. **This report has been created using voice recognition software. It may contain minor errors which are inherent in voice recognition technology. **      Final report electronically signed by Dr. Iesha Null on 9/11/2021 11:13 AM          DVT prophylaxis: [] Lovenox                                 [] SCDs                                 [x] Eliquis                                 [] Encourage ambulation           [] Already on Anticoagulation     Code Status: DNR-CCA    PT/OT Eval Status: PT/OT following     Tele:   [x] yes             [] no    Electronically signed by Katya Canada DO on 9/17/2021 at 9:45 AM

## 2021-09-18 LAB
ANION GAP SERPL CALCULATED.3IONS-SCNC: 11 MEQ/L (ref 8–16)
BASOPHILS # BLD: 0.6 %
BASOPHILS ABSOLUTE: 0 THOU/MM3 (ref 0–0.1)
BUN BLDV-MCNC: 10 MG/DL (ref 7–22)
CALCIUM SERPL-MCNC: 8.9 MG/DL (ref 8.5–10.5)
CHLORIDE BLD-SCNC: 92 MEQ/L (ref 98–111)
CO2: 23 MEQ/L (ref 23–33)
CREAT SERPL-MCNC: 0.6 MG/DL (ref 0.4–1.2)
EOSINOPHIL # BLD: 6.2 %
EOSINOPHILS ABSOLUTE: 0.4 THOU/MM3 (ref 0–0.4)
ERYTHROCYTE [DISTWIDTH] IN BLOOD BY AUTOMATED COUNT: 14.6 % (ref 11.5–14.5)
ERYTHROCYTE [DISTWIDTH] IN BLOOD BY AUTOMATED COUNT: 45.7 FL (ref 35–45)
GFR SERPL CREATININE-BSD FRML MDRD: > 90 ML/MIN/1.73M2
GLUCOSE BLD-MCNC: 118 MG/DL (ref 70–108)
GLUCOSE BLD-MCNC: 135 MG/DL (ref 70–108)
GLUCOSE BLD-MCNC: 146 MG/DL (ref 70–108)
HCT VFR BLD CALC: 31.7 % (ref 37–47)
HEMOGLOBIN: 10.4 GM/DL (ref 12–16)
IMMATURE GRANS (ABS): 0.02 THOU/MM3 (ref 0–0.07)
IMMATURE GRANULOCYTES: 0.3 %
LYMPHOCYTES # BLD: 16.4 %
LYMPHOCYTES ABSOLUTE: 1.1 THOU/MM3 (ref 1–4.8)
MCH RBC QN AUTO: 28.3 PG (ref 26–33)
MCHC RBC AUTO-ENTMCNC: 32.8 GM/DL (ref 32.2–35.5)
MCV RBC AUTO: 86.1 FL (ref 81–99)
MONOCYTES # BLD: 12.7 %
MONOCYTES ABSOLUTE: 0.8 THOU/MM3 (ref 0.4–1.3)
NUCLEATED RED BLOOD CELLS: 0 /100 WBC
PLATELET # BLD: 198 THOU/MM3 (ref 130–400)
PMV BLD AUTO: 8.6 FL (ref 9.4–12.4)
POTASSIUM REFLEX MAGNESIUM: 4.7 MEQ/L (ref 3.5–5.2)
RBC # BLD: 3.68 MILL/MM3 (ref 4.2–5.4)
SEG NEUTROPHILS: 63.8 %
SEGMENTED NEUTROPHILS ABSOLUTE COUNT: 4.2 THOU/MM3 (ref 1.8–7.7)
SODIUM BLD-SCNC: 124 MEQ/L (ref 135–145)
SODIUM BLD-SCNC: 126 MEQ/L (ref 135–145)
WBC # BLD: 6.6 THOU/MM3 (ref 4.8–10.8)

## 2021-09-18 PROCEDURE — 6370000000 HC RX 637 (ALT 250 FOR IP): Performed by: STUDENT IN AN ORGANIZED HEALTH CARE EDUCATION/TRAINING PROGRAM

## 2021-09-18 PROCEDURE — 99233 SBSQ HOSP IP/OBS HIGH 50: CPT | Performed by: INTERNAL MEDICINE

## 2021-09-18 PROCEDURE — 85025 COMPLETE CBC W/AUTO DIFF WBC: CPT

## 2021-09-18 PROCEDURE — 84295 ASSAY OF SERUM SODIUM: CPT

## 2021-09-18 PROCEDURE — 36415 COLL VENOUS BLD VENIPUNCTURE: CPT

## 2021-09-18 PROCEDURE — 2580000003 HC RX 258: Performed by: STUDENT IN AN ORGANIZED HEALTH CARE EDUCATION/TRAINING PROGRAM

## 2021-09-18 PROCEDURE — 82948 REAGENT STRIP/BLOOD GLUCOSE: CPT

## 2021-09-18 PROCEDURE — 80048 BASIC METABOLIC PNL TOTAL CA: CPT

## 2021-09-18 PROCEDURE — 2060000000 HC ICU INTERMEDIATE R&B

## 2021-09-18 RX ORDER — SODIUM CHLORIDE 1000 MG
2 TABLET, SOLUBLE MISCELLANEOUS
Status: DISCONTINUED | OUTPATIENT
Start: 2021-09-18 | End: 2021-09-20

## 2021-09-18 RX ADMIN — DICLOFENAC SODIUM 4 G: 10 GEL TOPICAL at 08:30

## 2021-09-18 RX ADMIN — ATORVASTATIN CALCIUM 20 MG: 20 TABLET, FILM COATED ORAL at 08:29

## 2021-09-18 RX ADMIN — CARBIDOPA AND LEVODOPA 2 TABLET: 25; 100 TABLET ORAL at 16:21

## 2021-09-18 RX ADMIN — AMLODIPINE BESYLATE 10 MG: 10 TABLET ORAL at 08:29

## 2021-09-18 RX ADMIN — SODIUM CHLORIDE, PRESERVATIVE FREE 10 ML: 5 INJECTION INTRAVENOUS at 19:42

## 2021-09-18 RX ADMIN — SUCRALFATE 1 G: 1 TABLET ORAL at 19:43

## 2021-09-18 RX ADMIN — Medication 2 G: at 16:21

## 2021-09-18 RX ADMIN — SUCRALFATE 1 G: 1 TABLET ORAL at 12:42

## 2021-09-18 RX ADMIN — DICLOFENAC SODIUM 4 G: 10 GEL TOPICAL at 12:43

## 2021-09-18 RX ADMIN — ALOGLIPTIN 12.5 MG: 12.5 TABLET, FILM COATED ORAL at 08:30

## 2021-09-18 RX ADMIN — Medication 2 G: at 12:41

## 2021-09-18 RX ADMIN — ISOSORBIDE DINITRATE 20 MG: 20 TABLET ORAL at 12:42

## 2021-09-18 RX ADMIN — CARBIDOPA AND LEVODOPA 2 TABLET: 25; 100 TABLET ORAL at 19:43

## 2021-09-18 RX ADMIN — DICLOFENAC SODIUM 4 G: 10 GEL TOPICAL at 19:42

## 2021-09-18 RX ADMIN — CARBIDOPA AND LEVODOPA 2 TABLET: 25; 100 TABLET ORAL at 08:28

## 2021-09-18 RX ADMIN — SUCRALFATE 1 G: 1 TABLET ORAL at 16:21

## 2021-09-18 RX ADMIN — METFORMIN HYDROCHLORIDE 500 MG: 500 TABLET, EXTENDED RELEASE ORAL at 08:28

## 2021-09-18 RX ADMIN — CARBIDOPA AND LEVODOPA 2 TABLET: 25; 100 TABLET ORAL at 12:42

## 2021-09-18 RX ADMIN — ACETAMINOPHEN 650 MG: 325 TABLET ORAL at 02:56

## 2021-09-18 RX ADMIN — ISOSORBIDE DINITRATE 20 MG: 20 TABLET ORAL at 08:29

## 2021-09-18 RX ADMIN — PANTOPRAZOLE SODIUM 40 MG: 40 TABLET, DELAYED RELEASE ORAL at 16:23

## 2021-09-18 RX ADMIN — DICLOFENAC SODIUM 4 G: 10 GEL TOPICAL at 16:22

## 2021-09-18 RX ADMIN — ISOSORBIDE DINITRATE 20 MG: 20 TABLET ORAL at 19:43

## 2021-09-18 RX ADMIN — SUCRALFATE 1 G: 1 TABLET ORAL at 06:51

## 2021-09-18 RX ADMIN — APIXABAN 2.5 MG: 2.5 TABLET, FILM COATED ORAL at 19:43

## 2021-09-18 RX ADMIN — METFORMIN HYDROCHLORIDE 500 MG: 500 TABLET, EXTENDED RELEASE ORAL at 16:24

## 2021-09-18 RX ADMIN — ACETAMINOPHEN 650 MG: 325 TABLET ORAL at 08:28

## 2021-09-18 RX ADMIN — PANTOPRAZOLE SODIUM 40 MG: 40 TABLET, DELAYED RELEASE ORAL at 05:48

## 2021-09-18 RX ADMIN — SENNOSIDES 8.6 MG: 8.6 TABLET, COATED ORAL at 19:43

## 2021-09-18 RX ADMIN — ASPIRIN 81 MG: 81 TABLET, COATED ORAL at 08:26

## 2021-09-18 RX ADMIN — INSULIN LISPRO 2 UNITS: 100 INJECTION, SOLUTION INTRAVENOUS; SUBCUTANEOUS at 16:27

## 2021-09-18 RX ADMIN — SODIUM CHLORIDE, PRESERVATIVE FREE 10 ML: 5 INJECTION INTRAVENOUS at 08:32

## 2021-09-18 RX ADMIN — SPIRONOLACTONE 25 MG: 25 TABLET ORAL at 08:30

## 2021-09-18 RX ADMIN — FERROUS SULFATE TAB 325 MG (65 MG ELEMENTAL FE) 325 MG: 325 (65 FE) TAB at 08:29

## 2021-09-18 RX ADMIN — APIXABAN 2.5 MG: 2.5 TABLET, FILM COATED ORAL at 08:30

## 2021-09-18 RX ADMIN — METFORMIN HYDROCHLORIDE 500 MG: 500 TABLET, EXTENDED RELEASE ORAL at 20:40

## 2021-09-18 RX ADMIN — FERROUS SULFATE TAB 325 MG (65 MG ELEMENTAL FE) 325 MG: 325 (65 FE) TAB at 19:43

## 2021-09-18 RX ADMIN — Medication 1 G: at 08:28

## 2021-09-18 ASSESSMENT — PAIN DESCRIPTION - FREQUENCY
FREQUENCY: CONTINUOUS
FREQUENCY: CONTINUOUS

## 2021-09-18 ASSESSMENT — PAIN - FUNCTIONAL ASSESSMENT
PAIN_FUNCTIONAL_ASSESSMENT: PREVENTS OR INTERFERES SOME ACTIVE ACTIVITIES AND ADLS
PAIN_FUNCTIONAL_ASSESSMENT: PREVENTS OR INTERFERES SOME ACTIVE ACTIVITIES AND ADLS

## 2021-09-18 ASSESSMENT — PAIN DESCRIPTION - ONSET
ONSET: ON-GOING
ONSET: ON-GOING

## 2021-09-18 ASSESSMENT — PAIN DESCRIPTION - DESCRIPTORS
DESCRIPTORS: ACHING
DESCRIPTORS: ACHING

## 2021-09-18 ASSESSMENT — PAIN DESCRIPTION - ORIENTATION
ORIENTATION: RIGHT;LEFT;UPPER
ORIENTATION: RIGHT;LEFT;UPPER

## 2021-09-18 ASSESSMENT — PAIN SCALES - GENERAL
PAINLEVEL_OUTOF10: 3

## 2021-09-18 ASSESSMENT — PAIN DESCRIPTION - PAIN TYPE
TYPE: CHRONIC PAIN
TYPE: CHRONIC PAIN

## 2021-09-18 ASSESSMENT — PAIN DESCRIPTION - LOCATION
LOCATION: LEG
LOCATION: LEG

## 2021-09-18 ASSESSMENT — PAIN DESCRIPTION - PROGRESSION
CLINICAL_PROGRESSION: NOT CHANGED
CLINICAL_PROGRESSION: NOT CHANGED

## 2021-09-18 NOTE — PROGRESS NOTES
Hospitalist Progress Note    Patient:  Ricarda Duncan      Unit/Bed:4K-21/021-A    YOB: 1932    MRN: [id number]       Acct: [de-identified]     PCP: Earle Castellon MD    Date of Admission: 9/11/2021    Assessment/Plan:    1. Acute hypoxic respiratory failure - resolved        - Secondary to pleural effusion secondary to acute CHF exacerbation.  Presented LAMAS, SOB, decreased exercise tolerance.  9/11 CXR revealed for inflation of the lungs, cardiomegaly, small bilateral pleural effusions mild bibasilar atelectasis/pneumonia.      - Continued increasing O2 requirements with 5 L NC. No Home O2. Started Bumex 1 g po qDay. On Acapella, inspiratory spirometer       - 9/15 Weaned off O2.      2. Acute HFpEF exacerbation - diastolic        - Previous echo 5/2020 demonstrated EF 55-60%, moderately dilated left LA, mildly dilated RV, right ventricular systolic pressure of 36-36 consistent with severe pulmonary retention, mild to moderate mitral regurgitation, moderate tricuspid regurgitation.  BNP 4264.        - Continue spironolactone 25 mg po qDay        - Strict daily I&O's and daily weights         - 9/12 Echo reveals EF 55-60%, LA severely dilated, aortic velocity 2.3 m/s, mean gradient 13 mmHg, MARVIN 1.3 cm².  Mild to moderate AS, mild MR, mild TR, assuming RAP = 20 mmHg, est RVSP = 75 mmHg.  IVC severely dilated with reduced respiratory phasic changes (CVP - 15-20 mmHg).   Large, left pleural effusion    3. Pleural effusion, bilateral - likely secondary to acute diastolic CHF exacerbation        - See above.     4. Acute on chronic hyponatremia - multifactorial, secondary to intravascular depletion from poor oral intake and diuresis versus SIADH. - Baseline sodium 130s. Sodium on admission 127. Urine electrolytes as follows; urine creatinine 57.4, urine osmolality 361, urine sodium 76. FeNA score 0.7% Pre-renal.  Secondary to decreased ECF secondary to decompensated CHF and diuretics.   Adjusted diet to limit fluid to 2 L and sodium restriction.         - 9/13 Bumex held.        - 9/15 Sodium trending up, 125. 9/18 repeat sodium level 126. Increased sodium chloride to 2 g tablets 3 times daily. Continue monitor sodium levels every 6 hours.     5. Constipation - resolved        - Currently on Senokot po qDay, Miralax BID. Last BM 9/18. Will continue to monitor     6. Severe pulmonary hypertension        - Per 5/2020 Echo, right ventricular systolic pressure 15-21 mmHg, consistent with severe pulmonary hypertension.  See above for repeat Echo.     7. Suspected pneumonia        - Was started on Levaquin for suspected concurrent pneumonia. 9/12  Respiratory culture pending, blood cultures reveal no preliminary growth.        - Currently afebrile, WBC WNL. Levaquin discontinued. Will repeat CXR symptomatic. 8. CAD s/p CABG        - History of CAD.  Denies active chest pain, however multiple episodes of chest pain at [facility].  Troponin is negative. 9/11 EKG reveals sinus rhythm with first-degree AV block, chronic LBBB.        - Continue aspirin 81 mg po qDay and Imdur 20 mg po TID    9. Proximal atrial fibrillation        - [id number] score 7.         - Takes Eliquis 2.5 mg po BID for A. fib.  No recorded rate control medication.  Heart rate currently in the 60-70s.   Currently on continuous telemetry. 10. Normocytic anemia        - 9/18 improving, Hgb 10.4 from previous 9/17 Hgb 11.2.  Continue to monitor Hgb with daily CBC.  Transfuse PRBC if Hgb <7.0.        - Iron studies revealed iron 30, iron saturation 11, TIBC 268.  This all indicative of iron deficiency anemia.  Will resume ferrous sulfate 325 mg po BID.  Reticulocytes normal. Vitamin B12 515, folate 19. 8.     11. Elevated TSH        - No history of hypothyroidism. 9/12 TSH 5.030, free T4 1. 52.  Will continue to monitor. 12. Parkinson's disease        - Continue Sinemet  mg x2 QID.   Neurology following.     13. Hypertension        - Continue amlodipine 10 mg po qDay    14. Hyperlipidemia        - Continue Lipitor 20 mg po qDay    15. DM2        - 9/12 HgbA1c 6.3.  Currently on insulin medium dose sliding scale.  Will resume home Metformin 500 mg BID and alogliptin 12.5 mg po qDay.       - 9/18 blood glucose 118.  Continue monitor blood glucose with daily BMP. 16. GERD        - Continue Protonix 40 mg po BID and Carafate 1 g po QID        Expected discharge date:  TBD    Disposition:    [] Home       [] TCU       [] Rehab       [] Psych       [x] SNF       [] [facility]       [] Other-    Chief Complaint: Shortness of breath, generalized full body weakness    Hospital Course: The patient is an 44-year-old female with a past medical history of HFpEF with EF 55-65% 2021, A. Fib on eliquis, CAD s/p CABG, pulmonary HTN, DM2, Parkinson's, GERD who presented to [facility] ED 9/11 from SNF for shortness of breath, and weakness.  Per family the patient has had several episodes of chest pain, most recently at rest.  For this she received a total of 3 doses of sublingual nitroglycerin over the past few days.  The family had also noted a decrease in her exercise tolerance and functional capacity over the recent month.  This more pronounced over the last 1-2 weeks.  The patient reports initially the nitroglycerin helped however the last dose did not.  She had also reported tenderness in the epigastric region, does complain of GERD symptoms. Shaista Lango have been going on for the last couple of weeks.  The patient denies taking any oxygen at home.     In the ED CXR revealed poor inflation of lungs, cardiomegaly, small bilateral pleural effusions and moderate bibasilar atelectasis/pneumonia.  EKG revealed sinus rhythm with first-degree AV block and LBBB, chronic. Tropnin negative. BNP 3264.  Echo ordered, pending.  Sodium was found to be 127, Bumex was held.  The patient was noted to be in respiratory distress with mild hypoxia.  She was started on 2L NC, but requiring BiPAP.  Levaquin was started due to concern she may have a concurrent pneumonia.  Neurology was consulted for patient's Parkinson's.      The patient's respiratory status continued to worsen.  She was on 5 L NC 9/12.  She reported shortness of breath and generalized weakness. Suann Pillar was also associated epigastric tightness but no pain. Bumex was started  Sodium levels were low. This is believed to be due to increased ECF along with dehydration.      9/14 The patient was weaned off oxygen.  Noted improvement in shortness of breath and epigastric pain.  Sodium was 120.  Bumex was discontinued. 9/16 sodium levels continue to remain low, 121.  Started sodium chloride tablets, fluid restriction per diet. Subjective (past 24 hours):   Significant events overnight. The patient had a BM this morning. She reports her abdominal pain is better. Sodium level steady, 126. Increase salt tabs to 2 g. Otherwise patient is hemodynamically stable, vital signs WNL. ROS (12 point review of systems completed. Pertinent positives noted. Otherwise ROS is negative).     Medications:  Reviewed    Infusion Medications    sodium chloride      dextrose       Scheduled Medications    sodium chloride  2 g Oral TID WC    polyethylene glycol  17 g Oral BID    diclofenac sodium  4 g Topical 4x Daily    senna  1 tablet Oral Nightly    lidocaine  2 patch TransDERmal Q24H    [Held by provider] bumetanide  1 mg Oral Daily    ferrous sulfate  325 mg Oral BID    metFORMIN  500 mg Oral BID    alogliptin  12.5 mg Oral Daily    sodium chloride flush  5-40 mL IntraVENous 2 times per day    amLODIPine  10 mg Oral Daily    apixaban  2.5 mg Oral BID    aspirin  81 mg Oral Daily    atorvastatin  20 mg Oral Daily    carbidopa-levodopa  2 tablet Oral 4x Daily    isosorbide dinitrate  20 mg Oral TID    pantoprazole  40 mg Oral BID AC    spironolactone  25 mg Oral Daily    sucralfate  1 g Oral 4x Daily AC & HS    insulin --  20*  --   --   --  23   BUN 9  --  9  --   --   --  10   CREATININE 0.5  --  0.5  --   --   --  0.6   CALCIUM 9.0  --  9.1  --   --   --  8.9    < > = values in this interval not displayed. No results for input(s): AST, ALT, BILIDIR, BILITOT, ALKPHOS in the last 72 hours. No results for input(s): INR in the last 72 hours. No results for input(s): Jadene Moh in the last 72 hours. Microbiology:    9/12/21 Blood culture 1 - no preliminary growth   9/12/21 Blood culture 2 - no preliminary growth     Urinalysis:      Lab Results   Component Value Date    NITRU NEGATIVE 09/12/2021    WBCUA 0-2 09/12/2021    BACTERIA NONE SEEN 09/12/2021    RBCUA 0-2 09/12/2021    BLOODU NEGATIVE 09/12/2021    SPECGRAV 1.012 09/12/2021    GLUCOSEU NEGATIVE 02/27/2021       Radiology:  XR ABDOMEN (KUB) (SINGLE AP VIEW)   Final Result   1. Indeterminate bowel gas pattern with prominent gaseous distention of the stomach overlying the mid abdomen. This displaces the transverse colon inferiorly. 2. Large amount retained stool throughout colon. **This report has been created using voice recognition software. It may contain minor errors which are inherent in voice recognition technology. **      Final report electronically signed by Dr. Saba Carmona on 9/17/2021 3:25 PM      XR CHEST PORTABLE   Final Result   1. Poor inflation of the lungs. Mild cardiomegaly. Metallic sternotomy sutures. 2. Small bilateral pleural effusions. Moderate bibasilar atelectasis/pneumonia. **This report has been created using voice recognition software. It may contain minor errors which are inherent in voice recognition technology. **      Final report electronically signed by Dr. Adelita Michelle on 9/11/2021 11:13 AM          DVT prophylaxis: [] Lovenox                                 [] SCDs                                 [x] Eliquis                                 [] Encourage ambulation           [] Already on Anticoagulation     Code Status: DNR-CCA    PT/OT Eval Status: PT/OT following    Tele:   [x] yes             [] no    Electronically signed by Deja Saunders DO on 9/18/2021 at 8:52 AM

## 2021-09-19 LAB
ANION GAP SERPL CALCULATED.3IONS-SCNC: 12 MEQ/L (ref 8–16)
BASOPHILS # BLD: 0.7 %
BASOPHILS ABSOLUTE: 0.1 THOU/MM3 (ref 0–0.1)
BUN BLDV-MCNC: 11 MG/DL (ref 7–22)
CALCIUM SERPL-MCNC: 9.2 MG/DL (ref 8.5–10.5)
CHLORIDE BLD-SCNC: 96 MEQ/L (ref 98–111)
CO2: 20 MEQ/L (ref 23–33)
CREAT SERPL-MCNC: 0.5 MG/DL (ref 0.4–1.2)
EOSINOPHIL # BLD: 3.9 %
EOSINOPHILS ABSOLUTE: 0.3 THOU/MM3 (ref 0–0.4)
ERYTHROCYTE [DISTWIDTH] IN BLOOD BY AUTOMATED COUNT: 14.9 % (ref 11.5–14.5)
ERYTHROCYTE [DISTWIDTH] IN BLOOD BY AUTOMATED COUNT: 47.3 FL (ref 35–45)
GFR SERPL CREATININE-BSD FRML MDRD: > 90 ML/MIN/1.73M2
GLUCOSE BLD-MCNC: 114 MG/DL (ref 70–108)
GLUCOSE BLD-MCNC: 120 MG/DL (ref 70–108)
GLUCOSE BLD-MCNC: 160 MG/DL (ref 70–108)
GLUCOSE BLD-MCNC: 166 MG/DL (ref 70–108)
GLUCOSE BLD-MCNC: 178 MG/DL (ref 70–108)
HCT VFR BLD CALC: 35.8 % (ref 37–47)
HEMOGLOBIN: 11.8 GM/DL (ref 12–16)
IMMATURE GRANS (ABS): 0.04 THOU/MM3 (ref 0–0.07)
IMMATURE GRANULOCYTES: 0.6 %
LYMPHOCYTES # BLD: 12 %
LYMPHOCYTES ABSOLUTE: 0.9 THOU/MM3 (ref 1–4.8)
MCH RBC QN AUTO: 28.6 PG (ref 26–33)
MCHC RBC AUTO-ENTMCNC: 33 GM/DL (ref 32.2–35.5)
MCV RBC AUTO: 86.7 FL (ref 81–99)
MONOCYTES # BLD: 7.6 %
MONOCYTES ABSOLUTE: 0.6 THOU/MM3 (ref 0.4–1.3)
NUCLEATED RED BLOOD CELLS: 0 /100 WBC
PLATELET # BLD: 251 THOU/MM3 (ref 130–400)
PMV BLD AUTO: 8.7 FL (ref 9.4–12.4)
POTASSIUM REFLEX MAGNESIUM: 4.5 MEQ/L (ref 3.5–5.2)
RBC # BLD: 4.13 MILL/MM3 (ref 4.2–5.4)
SEG NEUTROPHILS: 75.2 %
SEGMENTED NEUTROPHILS ABSOLUTE COUNT: 5.5 THOU/MM3 (ref 1.8–7.7)
SODIUM BLD-SCNC: 125 MEQ/L (ref 135–145)
SODIUM BLD-SCNC: 127 MEQ/L (ref 135–145)
SODIUM BLD-SCNC: 128 MEQ/L (ref 135–145)
WBC # BLD: 7.3 THOU/MM3 (ref 4.8–10.8)

## 2021-09-19 PROCEDURE — 82948 REAGENT STRIP/BLOOD GLUCOSE: CPT

## 2021-09-19 PROCEDURE — 84295 ASSAY OF SERUM SODIUM: CPT

## 2021-09-19 PROCEDURE — 36415 COLL VENOUS BLD VENIPUNCTURE: CPT

## 2021-09-19 PROCEDURE — 2060000000 HC ICU INTERMEDIATE R&B

## 2021-09-19 PROCEDURE — 80048 BASIC METABOLIC PNL TOTAL CA: CPT

## 2021-09-19 PROCEDURE — 6370000000 HC RX 637 (ALT 250 FOR IP): Performed by: STUDENT IN AN ORGANIZED HEALTH CARE EDUCATION/TRAINING PROGRAM

## 2021-09-19 PROCEDURE — 99233 SBSQ HOSP IP/OBS HIGH 50: CPT | Performed by: INTERNAL MEDICINE

## 2021-09-19 PROCEDURE — 99221 1ST HOSP IP/OBS SF/LOW 40: CPT | Performed by: INTERNAL MEDICINE

## 2021-09-19 PROCEDURE — 85025 COMPLETE CBC W/AUTO DIFF WBC: CPT

## 2021-09-19 PROCEDURE — 2580000003 HC RX 258: Performed by: STUDENT IN AN ORGANIZED HEALTH CARE EDUCATION/TRAINING PROGRAM

## 2021-09-19 RX ADMIN — DICLOFENAC SODIUM 4 G: 10 GEL TOPICAL at 20:08

## 2021-09-19 RX ADMIN — SODIUM CHLORIDE, PRESERVATIVE FREE 10 ML: 5 INJECTION INTRAVENOUS at 07:47

## 2021-09-19 RX ADMIN — ISOSORBIDE DINITRATE 20 MG: 20 TABLET ORAL at 20:09

## 2021-09-19 RX ADMIN — DICLOFENAC SODIUM 4 G: 10 GEL TOPICAL at 12:37

## 2021-09-19 RX ADMIN — CARBIDOPA AND LEVODOPA 2 TABLET: 25; 100 TABLET ORAL at 17:34

## 2021-09-19 RX ADMIN — PANTOPRAZOLE SODIUM 40 MG: 40 TABLET, DELAYED RELEASE ORAL at 17:34

## 2021-09-19 RX ADMIN — METFORMIN HYDROCHLORIDE 500 MG: 500 TABLET, EXTENDED RELEASE ORAL at 07:45

## 2021-09-19 RX ADMIN — Medication 2 G: at 07:45

## 2021-09-19 RX ADMIN — ASPIRIN 81 MG: 81 TABLET, COATED ORAL at 07:46

## 2021-09-19 RX ADMIN — CARBIDOPA AND LEVODOPA 2 TABLET: 25; 100 TABLET ORAL at 12:37

## 2021-09-19 RX ADMIN — METFORMIN HYDROCHLORIDE 500 MG: 500 TABLET, EXTENDED RELEASE ORAL at 20:08

## 2021-09-19 RX ADMIN — CARBIDOPA AND LEVODOPA 2 TABLET: 25; 100 TABLET ORAL at 20:09

## 2021-09-19 RX ADMIN — AMLODIPINE BESYLATE 10 MG: 10 TABLET ORAL at 07:46

## 2021-09-19 RX ADMIN — FERROUS SULFATE TAB 325 MG (65 MG ELEMENTAL FE) 325 MG: 325 (65 FE) TAB at 20:09

## 2021-09-19 RX ADMIN — SUCRALFATE 1 G: 1 TABLET ORAL at 17:34

## 2021-09-19 RX ADMIN — APIXABAN 2.5 MG: 2.5 TABLET, FILM COATED ORAL at 07:46

## 2021-09-19 RX ADMIN — SPIRONOLACTONE 25 MG: 25 TABLET ORAL at 07:47

## 2021-09-19 RX ADMIN — ISOSORBIDE DINITRATE 20 MG: 20 TABLET ORAL at 12:37

## 2021-09-19 RX ADMIN — POLYETHYLENE GLYCOL 3350 17 G: 17 POWDER, FOR SOLUTION ORAL at 07:49

## 2021-09-19 RX ADMIN — APIXABAN 2.5 MG: 2.5 TABLET, FILM COATED ORAL at 20:09

## 2021-09-19 RX ADMIN — FERROUS SULFATE TAB 325 MG (65 MG ELEMENTAL FE) 325 MG: 325 (65 FE) TAB at 07:45

## 2021-09-19 RX ADMIN — CARBIDOPA AND LEVODOPA 2 TABLET: 25; 100 TABLET ORAL at 07:44

## 2021-09-19 RX ADMIN — SENNOSIDES 8.6 MG: 8.6 TABLET, COATED ORAL at 20:09

## 2021-09-19 RX ADMIN — ACETAMINOPHEN 650 MG: 325 TABLET ORAL at 06:41

## 2021-09-19 RX ADMIN — ISOSORBIDE DINITRATE 20 MG: 20 TABLET ORAL at 07:44

## 2021-09-19 RX ADMIN — PANTOPRAZOLE SODIUM 40 MG: 40 TABLET, DELAYED RELEASE ORAL at 05:22

## 2021-09-19 RX ADMIN — SUCRALFATE 1 G: 1 TABLET ORAL at 12:37

## 2021-09-19 RX ADMIN — Medication 2 G: at 12:37

## 2021-09-19 RX ADMIN — DICLOFENAC SODIUM 4 G: 10 GEL TOPICAL at 07:45

## 2021-09-19 RX ADMIN — SUCRALFATE 1 G: 1 TABLET ORAL at 05:22

## 2021-09-19 RX ADMIN — SODIUM CHLORIDE, PRESERVATIVE FREE 10 ML: 5 INJECTION INTRAVENOUS at 20:08

## 2021-09-19 RX ADMIN — ATORVASTATIN CALCIUM 20 MG: 20 TABLET, FILM COATED ORAL at 07:46

## 2021-09-19 RX ADMIN — SUCRALFATE 1 G: 1 TABLET ORAL at 20:09

## 2021-09-19 RX ADMIN — Medication 2 G: at 17:34

## 2021-09-19 RX ADMIN — DICLOFENAC SODIUM 4 G: 10 GEL TOPICAL at 17:34

## 2021-09-19 RX ADMIN — ALOGLIPTIN 12.5 MG: 12.5 TABLET, FILM COATED ORAL at 07:44

## 2021-09-19 RX ADMIN — INSULIN LISPRO 2 UNITS: 100 INJECTION, SOLUTION INTRAVENOUS; SUBCUTANEOUS at 07:49

## 2021-09-19 RX ADMIN — INSULIN LISPRO 2 UNITS: 100 INJECTION, SOLUTION INTRAVENOUS; SUBCUTANEOUS at 12:38

## 2021-09-19 RX ADMIN — POLYETHYLENE GLYCOL 3350 17 G: 17 POWDER, FOR SOLUTION ORAL at 20:08

## 2021-09-19 ASSESSMENT — PAIN DESCRIPTION - PROGRESSION: CLINICAL_PROGRESSION: NOT CHANGED

## 2021-09-19 ASSESSMENT — PAIN DESCRIPTION - PAIN TYPE: TYPE: CHRONIC PAIN

## 2021-09-19 ASSESSMENT — PAIN SCALES - GENERAL
PAINLEVEL_OUTOF10: 3
PAINLEVEL_OUTOF10: 0

## 2021-09-19 ASSESSMENT — PAIN DESCRIPTION - ORIENTATION: ORIENTATION: RIGHT;LEFT;UPPER

## 2021-09-19 ASSESSMENT — PAIN DESCRIPTION - LOCATION: LOCATION: LEG

## 2021-09-19 ASSESSMENT — PAIN DESCRIPTION - ONSET: ONSET: ON-GOING

## 2021-09-19 ASSESSMENT — PAIN DESCRIPTION - DESCRIPTORS: DESCRIPTORS: ACHING

## 2021-09-19 ASSESSMENT — PAIN - FUNCTIONAL ASSESSMENT: PAIN_FUNCTIONAL_ASSESSMENT: PREVENTS OR INTERFERES SOME ACTIVE ACTIVITIES AND ADLS

## 2021-09-19 ASSESSMENT — PAIN DESCRIPTION - FREQUENCY: FREQUENCY: CONTINUOUS

## 2021-09-19 NOTE — CONSULTS
Nephrology Consult Note  Patient's Name: Ysabel Dubois  10:09 AM  9/19/2021    Nephrologist: Roger Chin    Reason for Consult: Hyponatremia  Requesting Physician:  Gloria Gandara MD  PCP: Lorene Davies MD    Chief Complaint: Right leg pain  Assessment  1. Hyponatremia chronic and mostly stable  2. Degenerative joint disease  3. Diabetes mellitus type 2  4. Deconditioned state  5. Metabolic acidosis mild  6. Normocytic anemia  7. Hypertension primary  8. Recent pneumonia? 9. Pulmonary hypertension severe  10. Atrial fibrillation with controlled ventricular rate    Plan    1. I discussed my thoughts with the patient. 2. She appeared to have understood. 3. She had no questions. 4. Labs reviewed  5. Serum sodium is mostly stable  6. Abdominal x-ray report reviewed  7. Recent echocardiogram report reviewed  8. Medications reviewed  9. Continue sodium chloride tablets 2000 mg 3 times a day  10. Labs in the morning  11. Dr. Michelle Wan resumes care tomorrow      History Obtained From: Patient, staff and electronic medical record  History of Present Ilness:    Ysabel Dubois is a 80 y.o. female with history of chronic hyponatremia, diabetes mellitus type 2, gastroesophageal reflux disease among other multiple medical entities discussed below admitted through the emergency department after patient presented there with progressive fatigue and shortness of breath exacerbated by activities and relieved by rest.  X-ray of heart abdomen was done which was unremarkable except for return large amount of stool in the colon. The latest chest x-ray on record was done on 11 September 2021. It was significant for bibasilar pneumonia. She has a history of  chronic hyponatremia with serum sodium that has ranged between 121 mEq/L up to 135 mEq/L going back to 2014. Today it is 128 mEq/L. On admission it was  125 mEq/L. In any case we were asked to see her for hyponatremia. She has been followed by my partner Dr. Michelle Wan.   No chest pain.  No nausea vomiting. No diarrhea. No fever chills. She does complain of right lower extremity leg and knee pain which is chronic as well. Past Medical History:   Diagnosis Date    Arthritis     Atypical chest pain     CAD (coronary artery disease)     Chronic LBBB (left bundle branch block)     Diabetes mellitus type II     Esophageal stricture     History of esophageal stricture.  FH: CAD (coronary artery disease)     Mom and dad both with heart disease at mid to late age.  GERD (gastroesophageal reflux disease)     History of gastritis     History of skin cancer     Hypercholesterolemia     Hyperlipidemia     Hypertension     Imbalance     As far as imbalance is concerned, asked patient to follow-up with Dr. Augusta Murray since she follows with him on a regular basis.  Lactose intolerance     Nummular dermatitis     Parkinson's disease ([address] 2009    Restless legs syndrome (RLS)     S/P CABG (coronary artery bypass graft)     SCC (squamous cell carcinoma)        Past Surgical History:   Procedure Laterality Date    CARDIAC SURGERY      CARDIOVASCULAR STRESS TEST  4 09 2009    Gated SPECT images revealed normal global wall motion with EF of 60%. Persantine test associated w/nonspecific symptoms. EKG is nondiagnostic w/baseline LBBB. No obvious stress-induced ischemia by Cardiolite. EF within normal limits.  CARDIOVASCULAR STRESS TEST  7 10 2007    The gated SPECT images revealed normal wall motion with EF of 69%. Poor exercise tolerance w/SOB on exertion. EKG is nondiagnostic. Cardiolite scan revealing no obvious stress-induced ischemia. EF 60%.  CARDIOVASCULAR STRESS TEST  2 03 2006    Fair exercise tolerance w/SOB on exertion. No EKG evidence of ischemia. Patient should be able to proceed with phase II cardiac rehab.  CATARACT REMOVAL      CATARACT REMOVAL  06/08/2016    AND 6/29/16    DIAGNOSTIC CARDIAC CATH LAB PROCEDURE  12 23 2005    LV was obtained.  AGEE projection showed preserved LV function w/estimated EF at 55%. No significant MR. Patent left main coronary artery. Tortuous LAD which appeared to be patent. Patent left circumflex artery. High-grade stenosis around 99% in very large dominant RCA w/heavy calcification at the ostium. Normal LV function.  DOPPLER ECHOCARDIOGRAPHY  4 09 2009    Global LV function w/in lower limits of normal, with mild anteroseptal hypokinesis. EF of 50-55%. Light LVH. Mild to moderate left atrial enlargement. Calcific aortic and mitral valve w/no obvious stenosis. No obvious pericardial effusion.  DOPPLER ECHOCARDIOGRAPHY  7 10 2007    LV function w/in lower limits of normal w/peridoxical septal wall motion. Moderate left atrial enlargement. Mild MR. Mild TR. Calcified valves w/no obvious stenosis. No pericardial effusion.  DOPPLER ECHOCARDIOGRAPHY  4 14 2006    There appears to be significant improvement from previous echo w/complete resolution of pericardial effusion and normal LV function. EF of 60%.  DOPPLER ECHOCARDIOGRAPHY  3 21 2006    Normal LV function. Mild LV hypertrophy. Mild biatrial enlargement. Mildly calcific aortic and mitral valve w/no stenosis. Mild MR. Mild TR. Minimal residual pericardial effusion w/significant improvement from previous echo.  DOPPLER ECHOCARDIOGRAPHY  3 16 2006    Interval change from previous echocardiogram w/worsening of effusion. Correlation clinically. Consideration of pericardial centesis. Will obtain a [facility] consult.  DOPPLER ECHOCARDIOGRAPHY  1 31 2006    No significant change from previous echo. The 2D echo showed moderate degree of pericardial effusion. It does seem to be worst or better than previous echo. No evidence of tamponade.  DOPPLER ECHOCARDIOGRAPHY  1 27 2006    Normal global LV function. Mild biatrial enlargement. Mildly sclerotic aortic & mitral valve w/no stenosis. Mild MR. Mild TR. Small to moderate pericardial effusion w/no obvious tamponade.      JOINT REPLACEMENT      MOHS SURGERY Right 2/13/2019    MOHS DEFECT REPAIR CYSTIC SCC RIGHT LOWER LATERAL LEG WITH SKIN GRAFT FROM RIGHT GROIN (FULL THICKNESS ) performed by Lindsay Aranda MD at [facility] PRE-MAL[facility]NT / [facility] Left 12/20/13    left leg lesion excision x2, frozen section, skin graft closure    ROTATOR CUFF REPAIR  09/2001    RIGHT    ROTATOR CUFF REPAIR  04/15/2009    LEFT     SKIN BIOPSY  07/2021    face    SKIN CANCER EXCISION  06/2006      Rt leg    SPINAL CORD STIMULATOR IMPLANTATION N/A 12/4/2018    PERMANENT INTERSTIM performed by Stevie Pizarro MD at [place] [address] History   Problem Relation Age of Onset    Heart Disease Mother     Diabetes Mother     Stroke Father     Diabetes Sister     Lung Cancer Brother         reports that she has never smoked. She has never used smokeless tobacco. She reports that she does not drink alcohol and does not use drugs.     Allergies:  Latex, Lisinopril, Tape [adhesive tape], Bacitracin, Penicillins, Polymyxin b, Keflex [cephalexin], Lactulose, Morphine, and Polysporin [bacitracin-polymyxin b]    Current Medications:    sodium chloride tablet 2 g, TID WC  polyethylene glycol (GLYCOLAX) packet 17 g, BID  diclofenac sodium (VOLTAREN) 1 % gel 4 g, 4x Daily  senna (SENOKOT) tablet 8.6 mg, Nightly  lidocaine 4 % external patch 2 patch, Q24H  [Held by provider] bumetanide (BUMEX) tablet 1 mg, Daily  ferrous sulfate (IRON 325) tablet 325 mg, BID  metFORMIN (GLUCOPHAGE-XR) extended release tablet 500 mg, BID  alogliptin (NESINA) tablet 12.5 mg, Daily  sodium chloride flush 0.9 % injection 5-40 mL, 2 times per day  sodium chloride flush 0.9 % injection 5-40 mL, PRN  0.9 % sodium chloride infusion, PRN  ondansetron (ZOFRAN-ODT) disintegrating tablet 4 mg, Q8H PRN   Or  ondansetron (ZOFRAN) injection 4 mg, Q6H PRN  acetaminophen (TYLENOL) tablet 650 mg, Q6H PRN   Or  acetaminophen (TYLENOL) suppository 650 mg, Q6H PRN  amLODIPine (NORVASC) tablet 10 mg, Daily  apixaban (ELIQUIS) tablet 2.5 mg, BID  aspirin EC tablet 81 mg, Daily  atorvastatin (LIPITOR) tablet 20 mg, Daily  carbidopa-levodopa (SINEMET)  MG per tablet 2 tablet, 4x Daily  isosorbide dinitrate (ISORDIL) tablet 20 mg, TID  pantoprazole (PROTONIX) tablet 40 mg, BID AC  spironolactone (ALDACTONE) tablet 25 mg, Daily  sucralfate (CARAFATE) tablet 1 g, 4x Daily AC & HS  insulin lispro (HUMALOG) injection vial 0-12 Units, TID WC  glucose (GLUTOSE) 40 % oral gel 15 g, PRN  dextrose 50 % IV solution, PRN  glucagon (rDNA) injection 1 mg, PRN  dextrose 5 % solution, PRN        Review of Systems:   Review of Systems   Pertinent positives stated above in HPI. All other systems were reviewed and were negative. ROS: As in HPI    Physical exam:   Physical Exam   Constitutional: Well-developed elderly lady no distress  Vitals:   Vitals:    09/19/21 0743   BP: (!) 143/63   Pulse: 65   Resp: 18   Temp: 99.2 °F (37.3 °C)   SpO2: 94%       Skin: no rash, turgor is normal  Heent: Pupils are reactive to light. Throat is clear. Oral mucosa is moist.  Neck: no bruits or jvd noted   Cardiovascular: Irregular heart rhythm  Respiratory: Clear with no wheezes,rhonchi or rales   Abdomen: soft,non tender,good bowel sound and no palpable mass  Ext: No lower extremity edema  Psychiatric: mood and affect appropriate  Musculoskeletal:  Rom, muscular strength intact   CNS: Very awake and very alert. Well-oriented. Normal speech. Good motor strength. No obvious focal deficit.     Data:   Labs:  Lab Results   Component Value Date     (L) 09/19/2021     (L) 09/19/2021     (L) 09/18/2021    K 4.5 09/19/2021    K 4.7 09/18/2021    K 4.2 09/17/2021    CL 96 (L) 09/19/2021    CO2 20 (L) 09/19/2021    CO2 23 09/18/2021    CO2 20 (L) 09/17/2021    CREATININE 0.5 09/19/2021    CREATININE 0.6 09/18/2021    CREATININE 0.5 09/17/2021    BUN 11 09/19/2021    BUN 10 09/18/2021    BUN 9 09/17/2021    GLUCOSE 160 (H) 09/19/2021    GLUCOSE 118 (H) 09/18/2021    GLUCOSE 119 (H) 09/17/2021    PHOS 3.5 05/24/2020    WBC 7.3 09/19/2021    WBC 6.6 09/18/2021    WBC 5.5 09/17/2021    HGB 11.8 (L) 09/19/2021    HGB 10.4 (L) 09/18/2021    HGB 11.2 (L) 09/17/2021    HCT 35.8 (L) 09/19/2021    HCT 31.7 (L) 09/18/2021    HCT 33.9 (L) 09/17/2021    MCV 86.7 09/19/2021     09/19/2021     {Labs     Imaging:  CXR results: Diagnostic images reports reviewed        Thank you Dr. Ivana Gupta MD for allowing us to participate in care of Latonia Flores   **This report has been created using voice recognition software. It maycontain minor  errors which are inherent in voice recognition technology. **    Electronically signed by Hayes Robles MD on 9/19/2021 at 10:09 AM

## 2021-09-19 NOTE — PROGRESS NOTES
Hospitalist Progress Note    Patient:  Anthony Louise      Unit/Bed:4K-21/021-A    YOB: 1932    MRN: [id number]       Acct: [de-identified]     PCP: Asuncion Chavez MD    Date of Admission: 9/11/2021    Assessment/Plan:    1. Acute hypoxic respiratory failure - resolved        - Secondary to pleural effusion secondary to acute CHF exacerbation.  Presented LAMAS, SOB, decreased exercise tolerance.  9/11 CXR revealed for inflation of the lungs, cardiomegaly, small bilateral pleural effusions mild bibasilar atelectasis/pneumonia.      - Continued increasing O2 requirements with 5 L NC. No Home O2. Started Bumex 1 g po qDay. On Acapella, inspiratory spirometer       - 9/15 Weaned off O2.      2. Acute HFpEF exacerbation - diastolic        - Previous echo 5/2020 demonstrated EF 55-60%, moderately dilated left LA, mildly dilated RV, right ventricular systolic pressure of 12-45 consistent with severe pulmonary retention, mild to moderate mitral regurgitation, moderate tricuspid regurgitation.  BNP 4264.        - Continue spironolactone 25 mg po qDay        - Strict daily I&O's and daily weights         - 9/12 Echo reveals EF 55-60%, LA severely dilated, aortic velocity 2.3 m/s, mean gradient 13 mmHg, MARVIN 1.3 cm².  Mild to moderate AS, mild MR, mild TR, assuming RAP = 20 mmHg, est RVSP = 75 mmHg.  IVC severely dilated with reduced respiratory phasic changes (CVP - 15-20 mmHg).   Large, left pleural effusion    3. Pleural effusion, bilateral - likely secondary to acute diastolic CHF exacerbation        - See above.     4. Acute on chronic hyponatremia - multifactorial, secondary to intravascular depletion from poor oral intake and diuresis versus SIADH. - Baseline sodium 130s. Sodium on admission 127. Urine electrolytes as follows; urine creatinine 57.4, urine osmolality 361, urine sodium 76. FeNA score 0.7% Pre-renal.  Secondary to decreased ECF secondary to decompensated CHF and diuretics.   Adjusted diet to limit fluid to 2 L and sodium restriction.         - 9/13 Bumex held.        - 9/15 Sodium trending up, 125. 9/18 repeat sodium level 126. Increased sodium chloride to 2 g tablets 3 times daily. Continue monitor sodium levels every 6 hours.     9/19: Patients sodium dropped again this morning despite being on 2g TID sodium chloride. Will talk to nephrology on further recommendations as patients sodium has been persistently dropping. 5. Constipation - resolved        - Currently on Senokot po qDay, Miralax BID. Last BM 9/18. Will continue to monitor     6. Severe pulmonary hypertension        - Per 5/2020 Echo, right ventricular systolic pressure 25-31 mmHg, consistent with severe pulmonary hypertension.  See above for repeat Echo.     7. Suspected pneumonia        - Was started on Levaquin for suspected concurrent pneumonia. 9/12  Respiratory culture pending, blood cultures reveal no preliminary growth.        - Currently afebrile, WBC WNL. Levaquin discontinued. Will repeat CXR symptomatic. 8. CAD s/p CABG        - History of CAD.  Denies active chest pain, however multiple episodes of chest pain at [facility].  Troponin is negative. 9/11 EKG reveals sinus rhythm with first-degree AV block, chronic LBBB.        - Continue aspirin 81 mg po qDay and Imdur 20 mg po TID    9. Proximal atrial fibrillation        - [id number] score 7.         - Takes Eliquis 2.5 mg po BID for A. fib.  No recorded rate control medication.  Heart rate currently in the 60-70s.   Currently on continuous telemetry. 10. Normocytic anemia        - 9/18 improving, Hgb 10.4 from previous 9/17 Hgb 11.2.  Continue to monitor Hgb with daily CBC.  Transfuse PRBC if Hgb <7.0.        - Iron studies revealed iron 30, iron saturation 11, TIBC 268.  This all indicative of iron deficiency anemia.  Will resume ferrous sulfate 325 mg po BID.  Reticulocytes normal. Vitamin B12 515, folate 19. 8.     11.  Elevated TSH        - No history of hypothyroidism. 9/12 TSH 5.030, free T4 1. 52.  Will continue to monitor. 12. Parkinson's disease        - Continue Sinemet  mg x2 QID.   Neurology following. 13. Hypertension        - Continue amlodipine 10 mg po qDay    14. Hyperlipidemia        - Continue Lipitor 20 mg po qDay    15. DM2        - 9/12 HgbA1c 6.3.  Currently on insulin medium dose sliding scale.  Will resume home Metformin 500 mg BID and alogliptin 12.5 mg po qDay.       - 9/18 blood glucose 118.  Continue monitor blood glucose with daily BMP. 16. GERD        - Continue Protonix 40 mg po BID and Carafate 1 g po QID        Expected discharge date:  TBD    Disposition:    [] Home       [] TCU       [] Rehab       [] Psych       [x] SNF       [] [place]       [] Other-    Chief Complaint: Shortness of breath, generalized full body weakness    Hospital Course: The patient is an 80-year-old female with a past medical history of HFpEF with EF 55-65% 2021, A.  Fib on eliquis, CAD s/p CABG, pulmonary HTN, DM2, Parkinson's, GERD who presented to [facility] ED 9/11 from SNF for shortness of breath, and weakness.  Per family the patient has had several episodes of chest pain, most recently at rest.  For this she received a total of 3 doses of sublingual nitroglycerin over the past few days.  The family had also noted a decrease in her exercise tolerance and functional capacity over the recent month.  This more pronounced over the last 1-2 weeks.  The patient reports initially the nitroglycerin helped however the last dose did not.  She had also reported tenderness in the epigastric region, does complain of GERD symptoms. Hipolito Lisa have been going on for the last couple of weeks.  The patient denies taking any oxygen at home.     In the ED CXR revealed poor inflation of lungs, cardiomegaly, small bilateral pleural effusions and moderate bibasilar atelectasis/pneumonia.  EKG revealed sinus rhythm with first-degree AV block and LBBB, chronic. Tropnin negative. BNP 3264.  Echo ordered, pending. Sodium was found to be 127, Bumex was held.  The patient was noted to be in respiratory distress with mild hypoxia.  She was started on 2L NC, but requiring BiPAP.  Levaquin was started due to concern she may have a concurrent pneumonia.  Neurology was consulted for patient's Parkinson's.      The patient's respiratory status continued to worsen.  She was on 5 L NC 9/12.  She reported shortness of breath and generalized weakness. Mariam An was also associated epigastric tightness but no pain. Bumex was started  Sodium levels were low. This is believed to be due to increased ECF along with dehydration.      9/14 The patient was weaned off oxygen.  Noted improvement in shortness of breath and epigastric pain.  Sodium was 120.  Bumex was discontinued. 9/16 sodium levels continue to remain low, 121.  Started sodium chloride tablets, fluid restriction per diet. Subjective (past 24 hours):   Patient had a big bowel movement yesterday. Doing really well today. Denies any abdominal pain. No nausea vomiting. Does have chronic lower extremity knee pain. Otherwise no acute events overnight. ROS (12 point review of systems completed. Pertinent positives noted. Otherwise ROS is negative).     Medications:  Reviewed    Infusion Medications    sodium chloride      dextrose       Scheduled Medications    sodium chloride  2 g Oral TID WC    polyethylene glycol  17 g Oral BID    diclofenac sodium  4 g Topical 4x Daily    senna  1 tablet Oral Nightly    lidocaine  2 patch TransDERmal Q24H    [Held by provider] bumetanide  1 mg Oral Daily    ferrous sulfate  325 mg Oral BID    metFORMIN  500 mg Oral BID    alogliptin  12.5 mg Oral Daily    sodium chloride flush  5-40 mL IntraVENous 2 times per day    amLODIPine  10 mg Oral Daily    apixaban  2.5 mg Oral BID    aspirin  81 mg Oral Daily    atorvastatin  20 mg Oral Daily    carbidopa-levodopa  2 tablet Oral 4x Daily    isosorbide dinitrate  20 mg Oral TID    pantoprazole  40 mg Oral BID AC    spironolactone  25 mg Oral Daily    sucralfate  1 g Oral 4x Daily AC & HS    insulin lispro  0-12 Units SubCUTAneous TID WC     PRN Meds: sodium chloride flush, sodium chloride, ondansetron **OR** ondansetron, acetaminophen **OR** acetaminophen, glucose, dextrose, glucagon (rDNA), dextrose      Intake/Output Summary (Last 24 hours) at 9/19/2021 1338  Last data filed at 9/19/2021 0416  Gross per 24 hour   Intake 650 ml   Output 850 ml   Net -200 ml       Diet:  ADULT DIET; Regular; 4 carb choices (60 gm/meal); 1500 ml    Exam:  /63   Pulse 58   Temp 98.3 °F (36.8 °C) (Oral)   Resp 18   Ht 5' 2\" (1.575 m)   Wt 112 lb 11.2 oz (51.1 kg)   SpO2 96%   BMI 20.61 kg/m²     General appearance: No acute distress. Ill-appearing and cachectic at stated age and cooperative. HEENT: Pupils equal, round, and reactive to light. Conjunctivae/corneas clear. Neck: Supple, with full range of motion. No jugular venous distention. Trachea midline. Respiratory: Normal respiratory effort, clear to auscultation, bilaterally without Rales/Wheezes/Rhonchi. Cardiovascular: Regular rate with normal S1/S2 without murmurs, rubs or gallops. Abdomen: Soft, distended, non-tender, with normal bowel sounds.   Musculoskeletal: passive and active ROM x 4 extremities. Skin: Skin color, texture, turgor normal.  No rashes or lesions. Neurologic:  Neurovascularly intact without any focal sensory/motor deficits.  Cranial nerves: II-XII intact, grossly non-focal.  Psychiatric: Alert and oriented, thought content appropriate, normal insight  Capillary Refill: Brisk,< 3 seconds   Peripheral Pulses: +2 palpable, equal bilaterally       Labs:   Recent Labs     09/17/21  0555 09/18/21  0442 09/19/21  0818   WBC 5.5 6.6 7.3   HGB 11.2* 10.4* 11.8*   HCT 33.9* 31.7* 35.8*    198 251     Recent Labs     09/17/21  0555 09/17/21  0919 09/18/21  0442 09/18/21  1221 09/19/21  0032 09/19/21  0818 09/19/21  1201   *   < > 126*   < > 125* 128* 127*   K 4.2  --  4.7  --   --  4.5  --    CL 93*  --  92*  --   --  96*  --    CO2 20*  --  23  --   --  20*  --    BUN 9  --  10  --   --  11  --    CREATININE 0.5  --  0.6  --   --  0.5  --    CALCIUM 9.1  --  8.9  --   --  9.2  --     < > = values in this interval not displayed. No results for input(s): AST, ALT, BILIDIR, BILITOT, ALKPHOS in the last 72 hours. No results for input(s): INR in the last 72 hours. No results for input(s): Martin Favre in the last 72 hours. Microbiology:    9/12/21 Blood culture 1 - no preliminary growth   9/12/21 Blood culture 2 - no preliminary growth     Urinalysis:      Lab Results   Component Value Date    NITRU NEGATIVE 09/12/2021    WBCUA 0-2 09/12/2021    BACTERIA NONE SEEN 09/12/2021    RBCUA 0-2 09/12/2021    BLOODU NEGATIVE 09/12/2021    SPECGRAV 1.012 09/12/2021    GLUCOSEU NEGATIVE 02/27/2021       Radiology:  XR ABDOMEN (KUB) (SINGLE AP VIEW)   Final Result   1. Indeterminate bowel gas pattern with prominent gaseous distention of the stomach overlying the mid abdomen. This displaces the transverse colon inferiorly. 2. Large amount retained stool throughout colon. **This report has been created using voice recognition software. It may contain minor errors which are inherent in voice recognition technology. **      Final report electronically signed by Dr. Caitlyn Wei on 9/17/2021 3:25 PM      XR CHEST PORTABLE   Final Result   1. Poor inflation of the lungs. Mild cardiomegaly. Metallic sternotomy sutures. 2. Small bilateral pleural effusions. Moderate bibasilar atelectasis/pneumonia. **This report has been created using voice recognition software. It may contain minor errors which are inherent in voice recognition technology. **      Final report electronically signed by Dr. Mohamud Crane on 9/11/2021 11:13 AM          DVT

## 2021-09-20 VITALS
OXYGEN SATURATION: 92 % | WEIGHT: 111 LBS | HEIGHT: 62 IN | SYSTOLIC BLOOD PRESSURE: 168 MMHG | TEMPERATURE: 98.4 F | BODY MASS INDEX: 20.43 KG/M2 | HEART RATE: 71 BPM | DIASTOLIC BLOOD PRESSURE: 73 MMHG | RESPIRATION RATE: 18 BRPM

## 2021-09-20 LAB
ANION GAP SERPL CALCULATED.3IONS-SCNC: 12 MEQ/L (ref 8–16)
BASOPHILS # BLD: 1.1 %
BASOPHILS ABSOLUTE: 0.1 THOU/MM3 (ref 0–0.1)
BUN BLDV-MCNC: 9 MG/DL (ref 7–22)
CALCIUM SERPL-MCNC: 9 MG/DL (ref 8.5–10.5)
CHLORIDE BLD-SCNC: 96 MEQ/L (ref 98–111)
CO2: 20 MEQ/L (ref 23–33)
CREAT SERPL-MCNC: 0.5 MG/DL (ref 0.4–1.2)
EOSINOPHIL # BLD: 5.6 %
EOSINOPHILS ABSOLUTE: 0.3 THOU/MM3 (ref 0–0.4)
ERYTHROCYTE [DISTWIDTH] IN BLOOD BY AUTOMATED COUNT: 15 % (ref 11.5–14.5)
ERYTHROCYTE [DISTWIDTH] IN BLOOD BY AUTOMATED COUNT: 48.9 FL (ref 35–45)
GFR SERPL CREATININE-BSD FRML MDRD: > 90 ML/MIN/1.73M2
GLUCOSE BLD-MCNC: 120 MG/DL (ref 70–108)
GLUCOSE BLD-MCNC: 134 MG/DL (ref 70–108)
HCT VFR BLD CALC: 34.1 % (ref 37–47)
HEMOGLOBIN: 10.7 GM/DL (ref 12–16)
IMMATURE GRANS (ABS): 0.03 THOU/MM3 (ref 0–0.07)
IMMATURE GRANULOCYTES: 0.5 %
LYMPHOCYTES # BLD: 17.7 %
LYMPHOCYTES ABSOLUTE: 1 THOU/MM3 (ref 1–4.8)
MCH RBC QN AUTO: 28 PG (ref 26–33)
MCHC RBC AUTO-ENTMCNC: 31.4 GM/DL (ref 32.2–35.5)
MCV RBC AUTO: 89.3 FL (ref 81–99)
MONOCYTES # BLD: 13.4 %
MONOCYTES ABSOLUTE: 0.7 THOU/MM3 (ref 0.4–1.3)
NUCLEATED RED BLOOD CELLS: 0 /100 WBC
PLATELET # BLD: 215 THOU/MM3 (ref 130–400)
PMV BLD AUTO: 8.9 FL (ref 9.4–12.4)
POTASSIUM REFLEX MAGNESIUM: 4.5 MEQ/L (ref 3.5–5.2)
RBC # BLD: 3.82 MILL/MM3 (ref 4.2–5.4)
SARS-COV-2, NAAT: NOT DETECTED
SEG NEUTROPHILS: 61.7 %
SEGMENTED NEUTROPHILS ABSOLUTE COUNT: 3.4 THOU/MM3 (ref 1.8–7.7)
SODIUM BLD-SCNC: 128 MEQ/L (ref 135–145)
WBC # BLD: 5.5 THOU/MM3 (ref 4.8–10.8)

## 2021-09-20 PROCEDURE — 6370000000 HC RX 637 (ALT 250 FOR IP): Performed by: STUDENT IN AN ORGANIZED HEALTH CARE EDUCATION/TRAINING PROGRAM

## 2021-09-20 PROCEDURE — 2580000003 HC RX 258: Performed by: STUDENT IN AN ORGANIZED HEALTH CARE EDUCATION/TRAINING PROGRAM

## 2021-09-20 PROCEDURE — 85025 COMPLETE CBC W/AUTO DIFF WBC: CPT

## 2021-09-20 PROCEDURE — 36415 COLL VENOUS BLD VENIPUNCTURE: CPT

## 2021-09-20 PROCEDURE — 87635 SARS-COV-2 COVID-19 AMP PRB: CPT

## 2021-09-20 PROCEDURE — 99239 HOSP IP/OBS DSCHRG MGMT >30: CPT | Performed by: INTERNAL MEDICINE

## 2021-09-20 PROCEDURE — 80048 BASIC METABOLIC PNL TOTAL CA: CPT

## 2021-09-20 PROCEDURE — 82948 REAGENT STRIP/BLOOD GLUCOSE: CPT

## 2021-09-20 RX ORDER — SENNA PLUS 8.6 MG/1
1 TABLET ORAL DAILY PRN
Qty: 60 TABLET | Refills: 0 | Status: SHIPPED | OUTPATIENT
Start: 2021-09-20

## 2021-09-20 RX ORDER — SODIUM CHLORIDE 1000 MG
2 TABLET, SOLUBLE MISCELLANEOUS
Qty: 90 TABLET | Refills: 3 | Status: SHIPPED | OUTPATIENT
Start: 2021-09-20

## 2021-09-20 RX ORDER — SODIUM CHLORIDE 1000 MG
1 TABLET, SOLUBLE MISCELLANEOUS
Status: DISCONTINUED | OUTPATIENT
Start: 2021-09-20 | End: 2021-09-20 | Stop reason: HOSPADM

## 2021-09-20 RX ORDER — POLYETHYLENE GLYCOL 3350 17 G/17G
17 POWDER, FOR SOLUTION ORAL 2 TIMES DAILY
Qty: 527 G | Refills: 1 | Status: SHIPPED | OUTPATIENT
Start: 2021-09-20 | End: 2021-10-20

## 2021-09-20 RX ADMIN — Medication 2 G: at 08:48

## 2021-09-20 RX ADMIN — APIXABAN 2.5 MG: 2.5 TABLET, FILM COATED ORAL at 08:48

## 2021-09-20 RX ADMIN — AMLODIPINE BESYLATE 10 MG: 10 TABLET ORAL at 08:47

## 2021-09-20 RX ADMIN — METFORMIN HYDROCHLORIDE 500 MG: 500 TABLET, EXTENDED RELEASE ORAL at 08:48

## 2021-09-20 RX ADMIN — PANTOPRAZOLE SODIUM 40 MG: 40 TABLET, DELAYED RELEASE ORAL at 05:28

## 2021-09-20 RX ADMIN — FERROUS SULFATE TAB 325 MG (65 MG ELEMENTAL FE) 325 MG: 325 (65 FE) TAB at 08:48

## 2021-09-20 RX ADMIN — SPIRONOLACTONE 25 MG: 25 TABLET ORAL at 08:48

## 2021-09-20 RX ADMIN — ATORVASTATIN CALCIUM 20 MG: 20 TABLET, FILM COATED ORAL at 08:48

## 2021-09-20 RX ADMIN — POLYETHYLENE GLYCOL 3350 17 G: 17 POWDER, FOR SOLUTION ORAL at 08:46

## 2021-09-20 RX ADMIN — CARBIDOPA AND LEVODOPA 2 TABLET: 25; 100 TABLET ORAL at 08:48

## 2021-09-20 RX ADMIN — ALOGLIPTIN 12.5 MG: 12.5 TABLET, FILM COATED ORAL at 08:47

## 2021-09-20 RX ADMIN — SODIUM CHLORIDE, PRESERVATIVE FREE 10 ML: 5 INJECTION INTRAVENOUS at 08:47

## 2021-09-20 RX ADMIN — DICLOFENAC SODIUM 4 G: 10 GEL TOPICAL at 08:47

## 2021-09-20 RX ADMIN — SUCRALFATE 1 G: 1 TABLET ORAL at 05:28

## 2021-09-20 RX ADMIN — ASPIRIN 81 MG: 81 TABLET, COATED ORAL at 08:46

## 2021-09-20 RX ADMIN — ACETAMINOPHEN 650 MG: 325 TABLET ORAL at 08:46

## 2021-09-20 RX ADMIN — ISOSORBIDE DINITRATE 20 MG: 20 TABLET ORAL at 08:48

## 2021-09-20 ASSESSMENT — PAIN DESCRIPTION - ORIENTATION: ORIENTATION: LEFT;RIGHT

## 2021-09-20 ASSESSMENT — PAIN SCALES - GENERAL
PAINLEVEL_OUTOF10: 9
PAINLEVEL_OUTOF10: 3

## 2021-09-20 ASSESSMENT — PAIN DESCRIPTION - PAIN TYPE: TYPE: ACUTE PAIN

## 2021-09-20 ASSESSMENT — PAIN DESCRIPTION - DESCRIPTORS: DESCRIPTORS: SHARP

## 2021-09-20 ASSESSMENT — PAIN DESCRIPTION - LOCATION: LOCATION: LEG

## 2021-09-20 NOTE — PROGRESS NOTES
[facility]    Renal Inpatient Follow-up note  9/20/2021 12:11 PM       Pt Name:   Siena Booker  YOB: 1932  Attending:   No att. providers found     Chief Complaint : Siena Booker is a 80 y.o. female being followed by nephrology for Electrolyte Abnormalities including hyponatremia. Interval History : Patient seen and examined. She is feeling okay this morning. Reports some ongoing leg pain, which is chronic and unchanged for her. Denies any chest pain, shortness of breath, abdominal pain, diarrhea, headache, nausea and Vomiting   She was initially admitted for shortness of breath and progressive fatigue due to CHF exacerbation. On admission, she was noted to have hyponatremia of 127. Patient has chronic hyponatremia and denies any symptoms with this. This increased to 128 throughout hospitalization. Patient planning to be discharged back to [facility] today.      Scheduled Medications :      sodium chloride  1 g Oral TID WC    polyethylene glycol  17 g Oral BID    diclofenac sodium  4 g Topical 4x Daily    senna  1 tablet Oral Nightly    lidocaine  2 patch TransDERmal Q24H    [Held by provider] bumetanide  1 mg Oral Daily    ferrous sulfate  325 mg Oral BID    metFORMIN  500 mg Oral BID    alogliptin  12.5 mg Oral Daily    sodium chloride flush  5-40 mL IntraVENous 2 times per day    amLODIPine  10 mg Oral Daily    apixaban  2.5 mg Oral BID    aspirin  81 mg Oral Daily    atorvastatin  20 mg Oral Daily    carbidopa-levodopa  2 tablet Oral 4x Daily    isosorbide dinitrate  20 mg Oral TID    pantoprazole  40 mg Oral BID AC    spironolactone  25 mg Oral Daily    sucralfate  1 g Oral 4x Daily AC & HS    insulin lispro  0-12 Units SubCUTAneous TID WC      sodium chloride      dextrose         Vitals :  BP (!) 168/73   Pulse 71   Temp 98.4 °F (36.9 °C) (Oral)   Resp 18   Ht 5' 2\" (1.575 m)   Wt 111 lb (50.3 kg)   SpO2 92%   BMI 20.30 kg/m² 24HR INTAKE/OUTPUT:      Intake/Output Summary (Last 24 hours) at 9/20/2021 1211  Last data filed at 9/20/2021 0813  Gross per 24 hour   Intake 1140 ml   Output 2650 ml   Net -1510 ml        Physical Exam :  General: Patient alert and oriented. No distress. Resting comfortably  Neck: No JVD  Mouth: Moist mucous membranes  Cardiovascular: Irregular rhythm, S1, S2 heard. Respiratory: clear to auscultation bilaterally without wheezes or rales  Extremities: no LE edema     Last 3 CBC  Recent Labs     09/18/21  0442 09/19/21  0818 09/20/21  0536   WBC 6.6 7.3 5.5   RBC 3.68* 4.13* 3.82*   HGB 10.4* 11.8* 10.7*   HCT 31.7* 35.8* 34.1*   MCV 86.1 86.7 89.3    251 215     Last 3 CMP  Recent Labs     09/18/21  0442 09/18/21  1221 09/19/21  0818 09/19/21  1201 09/20/21  0536   *   < > 128* 127* 128*   K 4.7  --  4.5  --  4.5   CL 92*  --  96*  --  96*   CO2 23  --  20*  --  20*   BUN 10  --  11  --  9   CREATININE 0.6  --  0.5  --  0.5   CALCIUM 8.9  --  9.2  --  9.0    < > = values in this interval not displayed. Assessment / Plan :  1. Hyponatremia - acute on chronic. This is mostly stable. 1. Continue 2g NaCl tablets PO 3x/day on discharge  2. OK to resume Bumex 1 mg daily on discharge  3. Recheck BMP in 1-2 weeks  4. Follow-up in office. 2. Non-anion gap metabolic acidosis. CO2 20 today. Continue to monitor. May need to consider sodium bicarb. 3.  Hypertension - BP relatively stable. Continue to monitor. 4. Acute HFpEF exacerbation. Patient on spironolactone. Bumex held currently, resume on discharge. 5. Severe pulmonary hypertension  6. Atrial fibrillation with controlled ventricular rate  7. Diabetes mellitus type 2  8. Lefty Villarreal MD  [facility].

## 2021-09-20 NOTE — PROGRESS NOTES
CLINICAL PHARMACY: DISCHARGE MED RECONCILIATION/REVIEW    [employer] Patient?: Yes  Total # of Interventions Recommended: 0   -   Total # Interventions Accepted: 0  Intervention Severity:   - Level 1 Intervention Present?: No   - Level 2 #: 0   - Level 3 #: 0   Time Spent (min):  20    Additional Documentation:    Rigoberto James PharmD, BCPS   9/20/2021  9:49 AM

## 2021-09-20 NOTE — PROGRESS NOTES
[address] ICU STEPDOWN TELEMETRY 4K - 4K-21/021-A    Time In: 829  Time Out: 1004  Timed Code Treatment Minutes: 14 Minutes  Minutes: 14          Date: 2021  Patient Name: Ramez Noel,  Gender:  female        MRN: [id number]  : 1932  (80 y.o.)     Referring Practitioner: Barbara Brar DO  Diagnosis: hyponatremia  Additional Pertinent Hx: Per chart \"Patient is an 72-year-old female with medical history of HFpEF, atrial fibrillation, and pulmonary hypertension who presents to the hospital via EMS from SNF for evaluation of worsening chest pain, shortness of breath, and weakness. Much of the history if obtained from the patient's family present at bedside as the patient has difficulty speaking while on BiPAP. They state that the patient has had several episodes of chest pain, mostly at rest.  She has received 3 dose of sublingual nitroglycerine over the last 1-3 days. Initially, the nitroglycerine helped to relieve the chest pain, but this morning it did not. They state that she has had tenderness to her epigastric region and does complain of GERD symptoms, more frequently in the last couple of weeks. She has been very week and requires significant assistance with transfers and even simple tasks of ADL including brushing her teeth. The family has noticed a decrease in her exercise tolerance and functional capacity over the last month, more pronounced in the last 1-2 weeks. They state that she has also had worsening LE edema. The patient did take an extra dose of her bumex yesterday and her LE edema is much improved today. The patient denies having any fevers, chills, nausea, vomiting, cough, or wheezing.  \"     Prior Level of Function:  Lives With: Alone  Type of Home: Assisted living  Home Layout: One level  Home Access: Level entry  Home Equipment: 4 wheeled walker   Bathroom Shower/Tub: Walk-in shower, Shower chair with back  [employer]: Handicap height  Bathroom Equipment: Built-in shower seat, Grab bars around toilet, Grab bars in shower    ADL Assistance: Needs assistance (Pt reports she has assist for showers 2x/week at facility)  Homemaking Assistance: Needs assistance (Pt reports she do microwave meals for breakfast and lunch, facility provides dinner)  Homemaking Responsibilities: Yes (light cleaning and meal prep - staff at facility assists with some too)  Ambulation Assistance: Independent  Transfer Assistance: Independent  Active : No  Additional Comments: Pt and family report pt has required increased assist since last admission/discharge from [facility] s/p fall (dc'd 8/13). Pt reports she used 4WW at facility. Was getting therapies prior to current admission. Pt reports she was able to amb to and from dining room for meals with 1 seated rest break required    Restrictions/Precautions:  Restrictions/Precautions: General Precautions, Fall Risk  Position Activity Restriction  Other position/activity restrictions: 2L NC on 9/13, gradually weaning, now on RA     SUBJECTIVE: RN approved PT session. Patient is pleasant and agreeable.  RN requesting assistance for transfers    PAIN: 0/10:     Vitals: Vitals not assessed per clinical judgement, see nursing flowsheet    OBJECTIVE:  Bed Mobility:  Rolling to Left: Maximum Assistance   Rolling to Right: Maximum Assistance   Supine to Sit: Maximum Assistance, Patient required max verbal cueing for reaching across midline in order to utilize bedrails and for initiation of BLE's to edge of bed in order to assist with bed mobility  Sit to Supine: Maximum Assistance, Patient required max assist for BLE's onto edge of bed and required max verbal cueing for proper sequencing with trunk  Scooting: Maximum Assistance     Transfers:  Sit to Stand: Maximum Assistance, X 2, Patient required max verbal cueing for anterior trunk lean and for upright standing posture upon initial standing in order to maintain COG over DION  Stand to [address] [place] [address], X 2, Patient required max verbal cueing for hand placement   Stand Pivot:Maximum Assistance, X 2, Patient required max verbal and tactile cueing for B knee flexion in order to assist with turning and for weightshifting to the right in order to assist with stepping with LLE    Ambulation:  Not Tested- Unable to assess due to safety concerns    Balance:  Static Sitting Balance:  Minimal Assistance, patient required max verbal cueing for hand placement on edge of bed in order to assist with static sitting balance  Dynamic Sitting Balance: Minimal Assistance  Static Standing Balance: Maximum Assistance, X 2, Patient demonstrates increased B knee extension with decreased ability to weightshift anteriorly resulting in posterior trunk lean and unsteadiness upon initial standing   Dynamic Standing Balance: Maximum Assistance, X 2      Functional Outcome Measures: Completed  AM-PAC Inpatient Mobility Raw Score : 11  AM-PAC Inpatient T-Scale Score : 33.86    ASSESSMENT:  Assessment: Patient progressing toward established goals. Activity Tolerance:  Patient tolerance of  treatment: fair.       Equipment Recommendations:Equipment Needed: No  Discharge Recommendations:  Continue to assess pending progress, Subacute/Skilled Nursing Facility, Patient would benefit from continued therapy after discharge    Plan: Times per week: 5x GM  Current Treatment Recommendations: Strengthening, Transfer Training, Endurance Training, Neuromuscular Re-education, Patient/Caregiver Education & Training, Equipment Evaluation, Education, & procurement, ROM, Balance Training, Gait Training, Home Exercise Program, Functional Mobility Training, Safety Education & Training, Positioning    Patient Education  Patient Education: Transfers,  - Patient Verbalized Understanding    Goals:  Patient goals : go to SNF and then home  Short term goals  Time Frame for Short term goals: by discharge  Short term goal 1: Pt will amb with RW for 50 feet with SBA for safety to progress towards PLOF. Short term goal 2: Pt will demo CGA for transfers with use of RW for support with good safety to progress with mobility. Short term goal 3: Pt will tolerate 5 minutes of functional standing tasks to progress with balance and improve overall mobility. Long term goals  Time Frame for Long term goals : NA due to short ELOS    Following session, patient left in safe position with all fall risk precautions in place.

## 2021-09-20 NOTE — CARE COORDINATION
9/20/21, 9:05 AM EDT    Patient goals/plan/ treatment preferences discussed by  and . Patient goals/plan/ treatment preferences reviewed with patient/ family. Patient/ family verbalize understanding of discharge plan and are in agreement with goal/plan/treatment preferences. Understanding was demonstrated using the teach back method. AVS provided by RN at time of discharge, which includes all necessary medical information pertaining to the patients current course of illness, treatment, post-discharge goals of care, and treatment preferences. Services After Discharge  Services At/After Discharge: Aide Services, Nursing Services, OT, PT ([address] [place] [address])   IMM Letter  IMM Letter given to Patient/Family/Significant other/Guardian/POA/by[de-identified] CM  IMM Letter date given[de-identified] 09/20/21  IMM Letter time given[de-identified] [id number]     JOAO met with pt this morning, pt aware of plans to return to [address] [place] [address] today by [facility] ambulance. Pt asked to have [facility] notified. JOAO Travis spoke with [facility] by phone, updated on discharge today. JOAO Travis spoke with [facility], they are aware of pt returning to them today. RN aware of discharge instructions placed on chart.

## 2021-09-20 NOTE — DISCHARGE SUMMARY
Hospital Medicine Discharge Summary      Patient Identification:   Julio Swan   : 1932  MRN: [id number]   Account: [de-identified]      Patient's PCP: Corona Abreu MD    Admit Date: 2021     Discharge Date:   2021    Admitting Physician: No admitting provider for patient encounter. Discharge Physician: Faith Woo DO     Discharge Diagnoses:    1. Acute hypoxic respiratory failure - resolved        - Secondary to pleural effusion secondary to acute CHF exacerbation.  Presented LAMAS, SOB, decreased exercise tolerance.   CXR revealed for inflation of the lungs, cardiomegaly, small bilateral pleural effusions mild bibasilar atelectasis/pneumonia.      - Continued increasing O2 requirements with 5 L NC. No Home O2. Started Bumex 1 g po qDay. On Acapella, inspiratory spirometer       - 9/15 Weaned off O2.       2. Acute HFpEF exacerbation - diastolic        - Previous echo 2020 demonstrated EF 55-60%, moderately dilated left LA, mildly dilated RV, right ventricular systolic pressure of 07-80 consistent with severe pulmonary retention, mild to moderate mitral regurgitation, moderate tricuspid regurgitation.  BNP 4264.        - Continue spironolactone 25 mg po qDay        - Strict daily I&O's and daily weights         -  Echo reveals EF 55-60%, LA severely dilated, aortic velocity 2.3 m/s, mean gradient 13 mmHg, MARVIN 1.3 cm².  Mild to moderate AS, mild MR, mild TR, assuming RAP = 20 mmHg, est RVSP = 75 mmHg.  IVC severely dilated with reduced respiratory phasic changes (CVP - 15-20 mmHg).   Large, left pleural effusion    3.    Pleural effusion, bilateral - likely secondary to acute diastolic CHF exacerbation        - See above.     4.   Acute on chronic hyponatremia - multifactorial, secondary to intravascular depletion from poor oral intake and diuresis versus SIADH        - Baseline sodium 130s. Sodium on admission 127. Urine electrolytes as follows; urine creatinine 57.4, urine osmolality 361, urine sodium 76. FeNA score 0.7% Pre-renal.  Secondary to decreased ECF secondary to decompensated CHF and diuretics.  Adjusted diet to limit fluid to 2 L and sodium restriction.         - 9/19 nephrology consulted for continued hyponatremia. Per nephrology sodium mostly stable. 9/20 sodium 128. Repeat outpatient BMP in 3 to 5 days and [facility] in 3 to 4 weeks. Continue sodium chloride tablets 1 g TID, Bumex. 5.   Constipation - resolved        - Currently on Senokot po qDay, Miralax BID. Last BM 9/18. 6.   Severe pulmonary hypertension        - Per 5/2020 Echo, right ventricular systolic pressure 26-80 mmHg, consistent with severe pulmonary hypertension.  See above for repeat Echo.     7.   CAD s/p CABG         - History of CAD.  Denies active chest pain, however multiple episodes of chest pain at [facility].  Troponin is negative. 9/11 EKG reveals sinus rhythm with first-degree AV block, chronic LBBB.         - Continue aspirin 81 mg po qDay and Imdur 20 mg po TID    8. Proximal atrial fibrillation         - [id number] score 7.          - Takes Eliquis 2.5 mg po BID for A. fib.  No recorded rate control medication.  Heart rate currently in the 60-70s.     9.   Normocytic anemia         - 9/20, Hgb 10.7 from previous 9/19 Hgb 11.8.  Continue to monitor Hgb with daily CBC.  Transfuse PRBC if Hgb <7.0.         - Iron studies revealed iron 30, iron saturation 11, TIBC 268.  This all indicative of iron deficiency anemia.  Will resume ferrous sulfate 325 mg po BID.  Reticulocytes normal. Vitamin B12 515, folate 19. 8.     10.   Elevated TSH         - No history of hypothyroidism. 9/12 TSH 5.030, free T4 1. 52.      11. Parkinson's disease         - Continue Sinemet  mg x2 QID.       12. Hypertension         - Continue amlodipine 10 mg po qDay    13. Hyperlipidemia         - Continue Lipitor 20 mg po qDay    14.    DM2         - 9/12 HgbA1c 6.3.  Currently on insulin medium dose sliding scale.  Will resume home Metformin 500 mg BID and alogliptin 12.5 mg po qDay. 9/20 glucose stable 134.         - 9/20 blood glucose 134.  Continue monitor blood glucose with daily BMP. 15.   GERD          - Continue Protonix 40 mg po BID and Carafate 1 g po QID    The patient was seen and examined on day of discharge and this discharge summary is in conjunction with any daily progress note from day of discharge. Hospital Course:   Neri Butler is a 80 y.o. female admitted to [address] [place] [address] on 9/11/2021 for Shortness of breath, generalized full body weakness. The patient is an 61-year-old female with a past medical history of HFpEF with EF 55-65% 2021, A. Fib on eliquis, CAD s/p CABG, pulmonary HTN, DM2, Parkinson's, GERD who [facility] from SNF for shortness of breath, and weakness.  Per family the patient has had several episodes of chest pain, most recently at rest.  For this she received a total of 3 doses of sublingual nitroglycerin over the past few days.  The family had also noted a decrease in her exercise tolerance and functional capacity over the recent month.  This more pronounced over the last 1-2 weeks.  The patient reports initially the nitroglycerin helped however the last dose did not.  She had also reported tenderness in the epigastric region, does complain of GERD symptoms. Katheleen  have been going on for the last couple of weeks.  The patient denies taking any oxygen at home.     In the ED CXR revealed poor inflation of lungs, cardiomegaly, small bilateral pleural effusions and moderate bibasilar atelectasis/pneumonia.  EKG revealed sinus rhythm with first-degree AV block and LBBB, chronic. Tropnin negative. BNP 3264.  Echo ordered, pending.  Sodium was found to be 127, Bumex was held.  The patient was noted to be in respiratory distress with mild hypoxia.  She was started on 2L NC, but requiring BiPAP.  Levaquin was started due to concern she may have a concurrent pneumonia.  Neurology was consulted for patient's Parkinson's.      The patient's respiratory status continued to worsen.  She was on 5 L NC 9/12.  She reported shortness of breath and generalized weakness. Lucetta Record was also associated epigastric tightness but no pain. Bumex was started  Sodium levels were low. This is believed to be due to increased ECF along with dehydration.      9/14 The patient was weaned off oxygen.  Noted improvement in shortness of breath and epigastric pain.  Sodium was 120.  Bumex was discontinued. 9/16 sodium levels continue to remain low, 121.  Started sodium chloride tablets, fluid restriction per diet. 9/19 continued hyponatremia, nephrology consulted. 9/20 sodium level 128, and improving relative to baseline. The patient is medically stable for discharge. She is to repeat labs outpatient and [facility] and 3 to 4 weeks. Exam:     Vitals:  Vitals:    09/19/21 2000 09/19/21 2315 09/20/21 0430 09/20/21 0753   BP: 128/60 (!) 137/90 (!) 133/59 (!) 168/73   Pulse: 59 55 65 71   Resp: 18 18 18 18   Temp: 98.2 °F (36.8 °C) 98.1 °F (36.7 °C) 98 °F (36.7 °C) 98.4 °F (36.9 °C)   TempSrc: Oral Oral Oral Oral   SpO2: 94% 92% 95% 92%   Weight:   111 lb (50.3 kg)    Height:   5' 2\" (1.575 m)      Weight: Weight: 111 lb (50.3 kg)     24 hour intake/output:    Intake/Output Summary (Last 24 hours) at 9/20/2021 1040  Last data filed at 9/20/2021 0813  Gross per 24 hour   Intake 1260 ml   Output 2650 ml   Net -1390 ml         General appearance:  No apparent distress, appears stated age and cooperative. HEENT:  Normal cephalic, atraumatic without obvious deformity. Pupils equal, round, and reactive to light. Extra ocular muscles intact. Conjunctivae/corneas clear. Neck: Supple, with full range of motion. No jugular venous distention. Trachea midline. Respiratory:  Normal respiratory effort.  Clear to auscultation, bilaterally without Rales/Wheezes/Rhonchi. Cardiovascular:  Regular rate and rhythm with normal S1/S2 without murmurs, rubs or gallops. Abdomen: Soft, non-tender, non-distended with normal bowel sounds. Musculoskeletal:  No clubbing, cyanosis or edema bilaterally. Full range of motion without deformity. Skin: Skin color, texture, turgor normal.  No rashes or lesions. Neurologic:  Neurovascularly intact without any focal sensory/motor deficits. Cranial nerves: II-XII intact, grossly non-focal.  Psychiatric:  Alert and oriented, thought content appropriate, normal insight  Capillary Refill: Brisk,< 3 seconds   Peripheral Pulses: +2 palpable, equal bilaterally       Labs: For convenience and continuity at follow-up the following most recent labs are provided:      CBC:    Lab Results   Component Value Date    WBC 5.5 09/20/2021    HGB 10.7 09/20/2021    HCT 34.1 09/20/2021     09/20/2021       Renal:    Lab Results   Component Value Date     09/20/2021    K 4.5 09/20/2021    CL 96 09/20/2021    CO2 20 09/20/2021    BUN 9 09/20/2021    CREATININE 0.5 09/20/2021    CALCIUM 9.0 09/20/2021    PHOS 3.5 05/24/2020         Significant Diagnostic Studies    Radiology:   XR ABDOMEN (KUB) (SINGLE AP VIEW)   Final Result   1. Indeterminate bowel gas pattern with prominent gaseous distention of the stomach overlying the mid abdomen. This displaces the transverse colon inferiorly. 2. Large amount retained stool throughout colon. **This report has been created using voice recognition software. It may contain minor errors which are inherent in voice recognition technology. **      Final report electronically signed by Dr. King Bassett on 9/17/2021 3:25 PM      XR CHEST PORTABLE   Final Result   1. Poor inflation of the lungs. Mild cardiomegaly. Metallic sternotomy sutures. 2. Small bilateral pleural effusions. Moderate bibasilar atelectasis/pneumonia.             **This report has been created using voice recognition software. It may contain minor errors which are inherent in voice recognition technology. **      Final report electronically signed by Dr. Emilia Dubois on 9/11/2021 11:13 AM             Consults:     IP CONSULT TO NEUROLOGY  IP CONSULT TO CASE MANAGEMENT  IP CONSULT TO SOCIAL WORK  PALLIATIVE CARE EVAL  IP CONSULT TO NEPHROLOGY    Disposition: SNF  Condition at Discharge: Stable    Code Status:  DNR-CCA     Patient Instructions:    Discharge lab work: Repeat BMP in 3 days, [facility]. Activity: activity as tolerated  Diet: ADULT DIET; Regular; 4 carb choices (60 gm/meal); 1500 ml      Follow-up visits:   [facility] [address] physician to follow    Teri Maddox MD  [address] [address]  [address] [facility]  [phone]    On 10/15/2021  FOLLOW UP APPT @ 10:00AM, arrive 15 minutes early, please bring photo ID and medications         Discharge Medications:      Jarrod Melchor   [place] Medication Instructions [id number]    Printed on:09/20/21 1040   Medication Information                      acetaminophen (TYLENOL 8 HOUR ARTHRITIS PAIN) 650 MG extended release tablet  Take 650 mg by mouth 3 times daily             amantadine (SYMMETREL) 100 MG capsule  Take 1 capsule by mouth daily             amLODIPine (NORVASC) 10 MG tablet  Take 1 tablet by mouth daily             apixaban (ELIQUIS) 2.5 MG TABS tablet  TAKE 1 TABLET BY MOUTH 2  TIMES DAILY, EQUIVALENT TO  APIXABAN             aspirin 81 MG EC tablet  Take 81 mg by mouth daily. atorvastatin (LIPITOR) 20 MG tablet  TAKE 1 TABLET DAILY             blood glucose test strips (CONTOUR TEST) strip  USE TO TEST BLOOD SUGAR TWICE DAILY.              bumetanide (BUMEX) 1 MG tablet  Take 0.5 mg by mouth three times a week Mon, Wed, Fri.             carbidopa-levodopa (SINEMET)  MG per tablet  Take 2 tablet  Take 10 mEq by mouth 3 times daily             psyllium (KONSYL) 28.3 % PACK  Take 1 packet by mouth daily             senna (SENOKOT) 8.6 MG tablet  Take 1 tablet by mouth daily as needed for Constipation             sodium chloride 1 g tablet  Take 2 tablets by mouth 3 times daily (with meals)             spironolactone (ALDACTONE) 25 MG tablet  Take 1 tablet by mouth daily             sucralfate (CARAFATE) 1 GM tablet  Take 1 tablet by mouth 4 times daily (before meals and nightly) Dissolve in 1 ounce of water to take as a slurry                 Time Spent on discharge is more than 45 minutes in the examination, evaluation, counseling and review of medications and discharge plan. Discharge Medications for PCI/MI (performed or attempted):   ASA:   Aspirin 81 mg    Statin:   Lipitor 20 mg           Signed: Thank you Cora Blanco MD for the opportunity to be involved in this patient's care.     Electronically signed by Serina Patterson DO on 9/20/2021 at 10:40 AM

## 2021-09-21 ENCOUNTER — TELEPHONE (OUTPATIENT)
Dept: FAMILY MEDICINE CLINIC | Age: 86
End: 2021-09-21

## 2021-09-21 NOTE — TELEPHONE ENCOUNTER
Erin [facility] Initial Follow Up Call    Call within 2 business days of discharge: Yes     Patient: Duke Arzate Patient : 1932   MRN: [id number]  Reason for Admission:  Acute hypoxic respiratory failure   Discharge Date: 21 RARS: Readmission Risk Score: 28       Spoke with: [place](daughter     Discharge department/facility: [facility]    Non-face-to-face services provided: sees lexy alvarez In the nursing home, and is seeing her kidney specialist 10/15/2021    Follow Up  Future Appointments   Date Time Provider J Carlos Patterson   2021  8:30 AM ROWDY Harper CNP [facility] [place]   10/15/2021 10:00 AM MD JOSÉ Penn   2021  3:00 PM [address], MD N [facility] [place]   2021 11:00 AM MD JOSÉ Penn [facility] [place]   2022 11:00 AM ROWDY Cabrera CNP [facility] [place]   2022  1:15 PM Niko Varghese MD [address]

## 2021-09-28 ENCOUNTER — OFFICE VISIT (OUTPATIENT)
Dept: CARDIOLOGY CLINIC | Age: 86
End: 2021-09-28
Payer: MEDICARE

## 2021-09-28 ENCOUNTER — CARE COORDINATION (OUTPATIENT)
Dept: CASE MANAGEMENT | Age: 86
End: 2021-09-28

## 2021-09-28 VITALS
SYSTOLIC BLOOD PRESSURE: 110 MMHG | HEIGHT: 62 IN | BODY MASS INDEX: 21.16 KG/M2 | OXYGEN SATURATION: 98 % | DIASTOLIC BLOOD PRESSURE: 72 MMHG | HEART RATE: 62 BPM | WEIGHT: 115 LBS

## 2021-09-28 DIAGNOSIS — I50.32 CHRONIC DIASTOLIC CONGESTIVE HEART FAILURE, NYHA CLASS 2 (HCC): Primary | ICD-10-CM

## 2021-09-28 DIAGNOSIS — I48.20 CHRONIC ATRIAL FIBRILLATION (HCC): ICD-10-CM

## 2021-09-28 PROCEDURE — 99214 OFFICE O/P EST MOD 30 MIN: CPT | Performed by: NURSE PRACTITIONER

## 2021-09-28 ASSESSMENT — ENCOUNTER SYMPTOMS
ABDOMINAL PAIN: 0
SHORTNESS OF BREATH: 0
COUGH: 0
ABDOMINAL DISTENTION: 0
WHEEZING: 0

## 2021-09-28 NOTE — CARE COORDINATION
Yuri Blanc has closed patient  due to hitting 30-day anchor closing date while in a Post-Acute Setting. BPCI episode resolved.       Lauren Coto RN

## 2021-09-28 NOTE — PATIENT INSTRUCTIONS
You may receive a survey regarding the care you received during your visit. Your input is valuable to us. We encourage you to complete and return your survey. We hope you will choose us in the future for your healthcare needs. You may receive a survey regarding the care you received during your visit. Your input is valuable to us. We encourage you to complete and return your survey. We hope you will choose us in the future for your healthcare needs. Continue:  · Continue current medications  · Daily weights and record  · Fluid restriction of 2 Liters per day  · Limit sodium in diet to around 6041-2939 mg/day  · Monitor BP  · Activity as tolerated     Call the [facility] for any of the following symptoms: [phone]   Weight gain of 2-3 pounds in 1 day or 5 pounds in 1 week   Increased shortness of breath   Shortness of breath while laying down   Cough   Chest pain   Swelling in feet, ankles or legs   Tenderness or bloating in the abdomen   Fatigue    Decreased appetite or nausea    Confusion       Stable, appears Euvolemic  Lab reviewed - stable  Repeat blood work in 2 weeks  BP/HR stable   Increasing Bumex to .5 daily  Continue diet/fluid adherence  Continue daily wts.   F/U w/ Cardiology  F/U in clinic in 6 months

## 2021-09-28 NOTE — PROGRESS NOTES
[facility]  Orthopnea/PND: no  DEBO: no  Edema: no  Fatigue: yes  Abdominal bloating: no  Cough: no  Appetite: good  Home weight: at nursing home  Home blood pressure: yes    Past Medical History:   Diagnosis Date    Arthritis     Atypical chest pain     CAD (coronary artery disease)     Chronic LBBB (left bundle branch block)     Diabetes mellitus type II     Esophageal stricture     History of esophageal stricture.  FH: CAD (coronary artery disease)     Mom and dad both with heart disease at mid to late age.  GERD (gastroesophageal reflux disease)     History of gastritis     History of skin cancer     Hypercholesterolemia     Hyperlipidemia     Hypertension     Imbalance     As far as imbalance is concerned, asked patient to follow-up with Dr. Jane Kidd since she follows with him on a regular basis.  Lactose intolerance     Nummular dermatitis     Parkinson's disease ([address] 2009    Restless legs syndrome (RLS)     S/P CABG (coronary artery bypass graft)     SCC (squamous cell carcinoma)      Past Surgical History:   Procedure Laterality Date    CARDIAC SURGERY      CARDIOVASCULAR STRESS TEST  4 09 2009    Gated SPECT images revealed normal global wall motion with EF of 60%. Persantine test associated w/nonspecific symptoms. EKG is nondiagnostic w/baseline LBBB. No obvious stress-induced ischemia by Cardiolite. EF within normal limits.  CARDIOVASCULAR STRESS TEST  7 10 2007    The gated SPECT images revealed normal wall motion with EF of 69%. Poor exercise tolerance w/SOB on exertion. EKG is nondiagnostic. Cardiolite scan revealing no obvious stress-induced ischemia. EF 60%.  CARDIOVASCULAR STRESS TEST  2 03 2006    Fair exercise tolerance w/SOB on exertion. No EKG evidence of ischemia. Patient should be able to proceed with phase II cardiac rehab.      CATARACT REMOVAL      CATARACT REMOVAL  06/08/2016    AND 6/29/16    DIAGNOSTIC CARDIAC CATH LAB PROCEDURE  12 23 2005    LV was obtained. AGEE projection showed preserved LV function w/estimated EF at 55%. No significant MR. Patent left main coronary artery. Tortuous LAD which appeared to be patent. Patent left circumflex artery. High-grade stenosis around 99% in very large dominant RCA w/heavy calcification at the ostium. Normal LV function.  DOPPLER ECHOCARDIOGRAPHY  4 09 2009    Global LV function w/in lower limits of normal, with mild anteroseptal hypokinesis. EF of 50-55%. Light LVH. Mild to moderate left atrial enlargement. Calcific aortic and mitral valve w/no obvious stenosis. No obvious pericardial effusion.  DOPPLER ECHOCARDIOGRAPHY  7 10 2007    LV function w/in lower limits of normal w/peridoxical septal wall motion. Moderate left atrial enlargement. Mild MR. Mild TR. Calcified valves w/no obvious stenosis. No pericardial effusion.  DOPPLER ECHOCARDIOGRAPHY  4 14 2006    There appears to be significant improvement from previous echo w/complete resolution of pericardial effusion and normal LV function. EF of 60%.  DOPPLER ECHOCARDIOGRAPHY  3 21 2006    Normal LV function. Mild LV hypertrophy. Mild biatrial enlargement. Mildly calcific aortic and mitral valve w/no stenosis. Mild MR. Mild TR. Minimal residual pericardial effusion w/significant improvement from previous echo.  DOPPLER ECHOCARDIOGRAPHY  3 16 2006    Interval change from previous echocardiogram w/worsening of effusion. Correlation clinically. Consideration of pericardial centesis. Will obtain a [facility] consult.  DOPPLER ECHOCARDIOGRAPHY  1 31 2006    No significant change from previous echo. The 2D echo showed moderate degree of pericardial effusion. It does seem to be worst or better than previous echo. No evidence of tamponade.  DOPPLER ECHOCARDIOGRAPHY  1 27 2006    Normal global LV function. Mild biatrial enlargement. Mildly sclerotic aortic & mitral valve w/no stenosis. Mild MR. Mild TR.  Small to moderate pericardial effusion w/no obvious tamponade.  JOINT REPLACEMENT      MOHS SURGERY Right 2/13/2019    MOHS DEFECT REPAIR CYSTIC SCC RIGHT LOWER LATERAL LEG WITH SKIN GRAFT FROM RIGHT GROIN (FULL THICKNESS ) performed by Kennedy Brown MD at [facility] / [facility] Left 12/20/13    left leg lesion excision x2, frozen section, skin graft closure    ROTATOR CUFF REPAIR  09/2001    RIGHT    ROTATOR CUFF REPAIR  04/15/2009    LEFT     SKIN BIOPSY  07/2021    face    SKIN CANCER EXCISION  06/2006      Rt leg    SPINAL CORD STIMULATOR IMPLANTATION N/A 12/4/2018    PERMANENT INTERSTIM performed by Farrah Fair MD at [facility] History   Problem Relation Age of Onset    Heart Disease Mother     Diabetes Mother     Stroke Father     Diabetes Sister     Lung Cancer Brother      Social History     Tobacco Use    Smoking status: Never Smoker    Smokeless tobacco: Never Used   Substance Use Topics    Alcohol use: No     Current Outpatient Medications   Medication Sig Dispense Refill    diclofenac sodium (VOLTAREN) 1 % GEL Apply 4 g topically 4 times daily 90 each 0    polyethylene glycol (GLYCOLAX) 17 g packet Take 17 g by mouth 2 times daily 527 g 1    senna (SENOKOT) 8.6 MG tablet Take 1 tablet by mouth daily as needed for Constipation 60 tablet 0    sodium chloride 1 g tablet Take 2 tablets by mouth 3 times daily (with meals) 90 tablet 3    bumetanide (BUMEX) 1 MG tablet Take 0.5 mg by mouth three times a week Mon, Wed, Fri.      lidocaine 4 % external patch Place 1 patch onto the skin daily Apply to both knees      meclizine (ANTIVERT) 25 MG tablet Take 25 mg by mouth 3 times daily      nitroGLYCERIN (NITROSTAT) 0.4 MG SL tablet Place 0.4 mg under the tongue every 5 minutes as needed for Chest pain up to max of 3 total doses. If no relief after 1 dose, call 911.       amLODIPine (NORVASC) 10 MG tablet Take 1 tablet by mouth daily 30 tablet 3    potassium chloride by mouth 4 times daily 720 tablet 0    apixaban (ELIQUIS) 2.5 MG TABS tablet TAKE 1 TABLET BY MOUTH 2  TIMES DAILY, EQUIVALENT TO  APIXABAN 180 tablet 1    Cholecalciferol (VITAMIN D3 PO) Take 4,000 Units by mouth 2 times daily       Cranberry 500 MG CAPS Take 500 mg by mouth 2 times daily       aspirin 81 MG EC tablet Take 81 mg by mouth daily.  Misc. Devices MISC 1 each by Does not apply route once for 1 dose Param Glucose Meter; Diagnosis:E11.65 1 Device 0     No current facility-administered medications for this visit. Allergies   Allergen Reactions    Latex Rash    Lisinopril Swelling     mouth    Tape Becca  Tape] Itching    Bacitracin Hives    Penicillins     Polymyxin B Hives    Keflex [Cephalexin] Nausea And Vomiting and Rash    Lactulose Diarrhea    Morphine Rash    Polysporin [Bacitracin-Polymyxin B] Rash       SUBJECTIVE:   Review of Systems   Constitutional: Positive for fatigue. Negative for activity change and appetite change. Respiratory: Negative for cough, shortness of breath and wheezing. Cardiovascular: Negative for chest pain, palpitations and leg swelling. Gastrointestinal: Negative for abdominal distention and abdominal pain. Musculoskeletal: Negative for gait problem. Neurological: Positive for dizziness (w/ physical therapy). Negative for weakness, light-headedness and headaches. Psychiatric/Behavioral: Negative for sleep disturbance. OBJECTIVE:   Today's Vitals:  /72   Pulse 62   Ht 5' 2\" (1.575 m)   Wt 115 lb (52.2 kg)   SpO2 98%   BMI 21.03 kg/m²     Physical Exam  Vitals reviewed. Constitutional:       General: She is not in acute distress. Appearance: Normal appearance. She is well-developed. She is not diaphoretic. HENT:      Head: Normocephalic and atraumatic. Eyes:      Conjunctiva/sclera: Conjunctivae normal.   Cardiovascular:      Rate and Rhythm: Normal rate and regular rhythm. Heart sounds: Murmur heard. Pulmonary:      Effort: Pulmonary effort is normal. No respiratory distress. Breath sounds: Normal breath sounds. No wheezing, rhonchi or rales. Abdominal:      General: Bowel sounds are normal. There is no distension. Palpations: Abdomen is soft. Tenderness: There is no abdominal tenderness. Musculoskeletal:         General: Normal range of motion. Cervical back: Normal range of motion and neck supple. Right lower leg: No edema. Left lower leg: No edema. Skin:     General: Skin is warm and dry. Capillary Refill: Capillary refill takes less than 2 seconds. Neurological:      Mental Status: She is alert and oriented to person, place, and time. Coordination: Coordination normal.   Psychiatric:         Behavior: Behavior normal.         Wt Readings from Last 3 Encounters:   09/28/21 115 lb (52.2 kg)   09/20/21 111 lb (50.3 kg)   08/18/21 113 lb 9.6 oz (51.5 kg)     BP Readings from Last 3 Encounters:   09/28/21 110/72   09/20/21 (!) 168/73   08/18/21 (!) 119/58     Pulse Readings from Last 3 Encounters:   09/28/21 62   09/20/21 71   08/18/21 61     Body mass index is 21.03 kg/m². ECHO:    Summary   Ejection fraction was estimated at 55-60%. Left atrial size was severely dilated. The aortic valve was trileaflet with increased thickness and reduced   cuspal separation. DOPPLER: Transaortic velocity was 2.3 m/s, mean   gradient 13 mmHg, MARVIN 1.3 cm2. There is mild-moderate aortic stenosis. There was no evidence of aortic regurgitation. There was mild mitral regurgitation. There was mild tricuspid regurgitation. Assuming RAP = 20 mmHg, the estimated RVSP = 75 mmHg. IVC size is severely dilated with reduced respiratory phasic changes   (CVP~15-20 mmHg). Large, left pleural effusion.       Signature      ----------------------------------------------------------------   Electronically signed by Lida Wesley MD (Interpreting   physician) on 09/13/2021 at 02:07 PM   ----------------------------------------------------------------      CATH/STRESS:   Summary   Lexiscan EKG stress test is not suggestive for ischemia. Calculated gated LVEF 66 %. The T.I.D. ratio was 1.03 . Myocardial perfusion imaging is not suggestive for myocardial ischemia. This study was negative for ischemia. Recommendation   Clinical correlation is recommended. Aggressive risk factor management. Medical management.       Signatures      ----------------------------------------------------------------   Electronically signed by Rose Glass MD (Interpreting   Cardiologist) on 11/01/2018 at 18:24   ----------------------------------------------------------------        Results reviewed:  BNP: No results found for: BNP  CBC:   Lab Results   Component Value Date    WBC 5.5 09/20/2021    RBC 3.82 09/20/2021    RBC 4.36 02/04/2021    HGB 10.7 09/20/2021    HCT 34.1 09/20/2021     09/20/2021     CMP:    Lab Results   Component Value Date     09/20/2021    K 4.5 09/20/2021    CL 96 09/20/2021    CO2 20 09/20/2021    BUN 9 09/20/2021    CREATININE 0.5 09/20/2021    LABGLOM >90 09/20/2021    GLUCOSE 134 09/20/2021    GLUCOSE 180 02/04/2021    CALCIUM 9.0 09/20/2021     Hepatic Function Panel:    Lab Results   Component Value Date    ALKPHOS 124 09/11/2021    ALT 6 09/11/2021    AST 12 09/11/2021    PROT 6.6 09/12/2021    BILITOT 0.6 09/11/2021    BILITOT NEGATIVE 09/21/2018    BILIDIR <0.2 09/11/2021    LABALBU 3.8 09/11/2021    LABALBU 4.1 01/12/2012     Magnesium:    Lab Results   Component Value Date    MG 1.8 08/18/2021     PT/INR:    Lab Results   Component Value Date    INR 1.75 09/12/2021     Lipids:    Lab Results   Component Value Date    TRIG 73 05/13/2021    HDL 49 05/13/2021    LDLCALC 60 05/13/2021    LDLDIRECT 220.21 03/07/2016    LABVLDL 22 01/25/2018       ASSESSMENT AND PLAN:   The patient's condition/symptoms are Stable: No clinical evidence of fluid overload today. Continue current medical regimen without changes at present time. Diagnosis Orders   1. Chronic diastolic congestive heart failure, NYHA class 2 (HCC)  Basic Metabolic Panel    Brain Natriuretic Peptide   2. Chronic atrial fibrillation (HCC)       Continue:  GDMT:   ACE/ARB/ARNI - none   BB - none   Diuretic - Bumex . 5 MWF, changing to .5 daily  AA - Aldactone 25 QD  SGLT2 -  none  Vasodilator - Imdur, Nitor  Other - Amlodipine 10 QD, Eliquis, ASA, Mg, KCl 10 TID    Tolerating above noted HF meds, no ill side effects noted. Will continue to monitor kidney function and electrolytes. Will optimize as tolerated. Pt is compliant w/ medications. Total visit time of 30 minutes has been spent with patient on education of symptoms, management, medication, and plan of care; as well as review of chart: labs, ECHO, radiology reports, etc.   I personally spent more then 50% of the appt time face to face with the patient. · Daily weights  · Fluid restriction of 2 Liters per day  · Limit sodium in diet to around 2199-9552 mg/day  · Monitor BP  · Activity as tolerated     Stable, appears Euvolemic  Lab reviewed - stable  Repeat blood work in 2 weeks  BP/HR stable   Increasing Bumex to .5 daily  Continue diet/fluid adherence  Continue daily wts. F/U w/ Cardiology  F/U in clinic in 6 months    **Discussed recent ECHO w/ daughters and patient. Discussed correlation of symptoms and at this time comfort is their goal of treatment**      Patient was instructed to call the [address] for any changes in symptoms as noted in AVS.      Return in about 6 months (around 3/28/2022). or sooner if needed     Patient given educational materials - see patient instructions. We discussed the importance of weighing oneself and recording daily. We also discussed the importance of a low sodium diet, higher sodium foods to avoid and better low sodium food options.    Patient verbalizes understanding of plan of care using teach back method, and is agreeable to the treatment plan.        Electronically signed by ROWDY Leblanc CNP on 9/28/2021 at 9:16 AM

## 2021-10-12 ENCOUNTER — TELEPHONE (OUTPATIENT)
Dept: CARDIOLOGY CLINIC | Age: 86
End: 2021-10-12

## 2021-10-12 LAB
B-TYPE NATRIURETIC PEPTIDE: 248.9 PG/ML
BUN BLDV-MCNC: 11 MG/DL
CALCIUM SERPL-MCNC: 9.9 MG/DL
CHLORIDE BLD-SCNC: 98 MMOL/L
CO2: 25 MMOL/L
CREAT SERPL-MCNC: 0.7 MG/DL
GFR CALCULATED: 79
GLUCOSE BLD-MCNC: 163 MG/DL
POTASSIUM SERPL-SCNC: 4.5 MMOL/L
SODIUM BLD-SCNC: 133 MMOL/L

## 2021-10-12 NOTE — TELEPHONE ENCOUNTER
----- Message from ROWDY Padilla - CNP sent at 10/12/2021  4:19 PM EDT -----  Labs reviewed, WNL will continue increase in bumex

## 2021-10-15 ENCOUNTER — OFFICE VISIT (OUTPATIENT)
Dept: NEPHROLOGY | Age: 86
End: 2021-10-15
Payer: MEDICARE

## 2021-10-15 VITALS
DIASTOLIC BLOOD PRESSURE: 58 MMHG | TEMPERATURE: 97.2 F | OXYGEN SATURATION: 98 % | BODY MASS INDEX: 20.12 KG/M2 | HEART RATE: 65 BPM | WEIGHT: 110 LBS | SYSTOLIC BLOOD PRESSURE: 118 MMHG

## 2021-10-15 DIAGNOSIS — I50.33 ACUTE ON CHRONIC DIASTOLIC CHF (CONGESTIVE HEART FAILURE), NYHA CLASS 3 (HCC): Primary | ICD-10-CM

## 2021-10-15 DIAGNOSIS — E87.1 HYPONATREMIA: ICD-10-CM

## 2021-10-15 PROCEDURE — 1123F ACP DISCUSS/DSCN MKR DOCD: CPT | Performed by: INTERNAL MEDICINE

## 2021-10-15 PROCEDURE — 99213 OFFICE O/P EST LOW 20 MIN: CPT | Performed by: INTERNAL MEDICINE

## 2021-10-15 PROCEDURE — G8428 CUR MEDS NOT DOCUMENT: HCPCS | Performed by: INTERNAL MEDICINE

## 2021-10-15 PROCEDURE — 4040F PNEUMOC VAC/ADMIN/RCVD: CPT | Performed by: INTERNAL MEDICINE

## 2021-10-15 PROCEDURE — 1036F TOBACCO NON-USER: CPT | Performed by: INTERNAL MEDICINE

## 2021-10-15 PROCEDURE — 1111F DSCHRG MED/CURRENT MED MERGE: CPT | Performed by: INTERNAL MEDICINE

## 2021-10-15 PROCEDURE — G8484 FLU IMMUNIZE NO ADMIN: HCPCS | Performed by: INTERNAL MEDICINE

## 2021-10-15 PROCEDURE — G8420 CALC BMI NORM PARAMETERS: HCPCS | Performed by: INTERNAL MEDICINE

## 2021-10-15 PROCEDURE — 1090F PRES/ABSN URINE INCON ASSESS: CPT | Performed by: INTERNAL MEDICINE

## 2021-10-15 NOTE — PROGRESS NOTES
[facility]        Outpatient Follow-Up note         10/15/2021 10:36 AM    Patient Name:   Sindy Guzmán  YOB: 1932  Primary Care Physician:  Derek Martel MD     [facility]  [address]  Dept: [phone]  Loc: [phone]     Chief Complaint / Reason for follow-up : Follow Up of hyponatremia and fluid overload     Interval History :  Patient seen and examined by me. No distress. No cp or SOB. feeling weak and tired      Past History :  Past Medical History:   Diagnosis Date    Arthritis     Atypical chest pain     CAD (coronary artery disease)     Chronic LBBB (left bundle branch block)     Diabetes mellitus type II     Esophageal stricture     History of esophageal stricture.  FH: CAD (coronary artery disease)     Mom and dad both with heart disease at mid to late age.  GERD (gastroesophageal reflux disease)     History of gastritis     History of skin cancer     Hypercholesterolemia     Hyperlipidemia     Hypertension     Imbalance     As far as imbalance is concerned, asked patient to follow-up with Dr. Radha Huerta since she follows with him on a regular basis.  Lactose intolerance     Nummular dermatitis     Parkinson's disease ([address] 2009    Restless legs syndrome (RLS)     S/P CABG (coronary artery bypass graft)     SCC (squamous cell carcinoma)      Past Surgical History:   Procedure Laterality Date    CARDIAC SURGERY      CARDIOVASCULAR STRESS TEST  4 09 2009    Gated SPECT images revealed normal global wall motion with EF of 60%. Persantine test associated w/nonspecific symptoms. EKG is nondiagnostic w/baseline LBBB. No obvious stress-induced ischemia by Cardiolite. EF within normal limits.  CARDIOVASCULAR STRESS TEST  7 10 2007    The gated SPECT images revealed normal wall motion with EF of 69%.  Poor exercise tolerance w/SOB on exertion. EKG is nondiagnostic. Cardiolite scan revealing no obvious stress-induced ischemia. EF 60%.  CARDIOVASCULAR STRESS TEST  2 03 2006    Fair exercise tolerance w/SOB on exertion. No EKG evidence of ischemia. Patient should be able to proceed with phase II cardiac rehab.  CATARACT REMOVAL      CATARACT REMOVAL  06/08/2016    AND 6/29/16    DIAGNOSTIC CARDIAC CATH LAB PROCEDURE  12 23 2005    LV was obtained. AGEE projection showed preserved LV function w/estimated EF at 55%. No significant MR. Patent left main coronary artery. Tortuous LAD which appeared to be patent. Patent left circumflex artery. High-grade stenosis around 99% in very large dominant RCA w/heavy calcification at the ostium. Normal LV function.  DOPPLER ECHOCARDIOGRAPHY  4 09 2009    Global LV function w/in lower limits of normal, with mild anteroseptal hypokinesis. EF of 50-55%. Light LVH. Mild to moderate left atrial enlargement. Calcific aortic and mitral valve w/no obvious stenosis. No obvious pericardial effusion.  DOPPLER ECHOCARDIOGRAPHY  7 10 2007    LV function w/in lower limits of normal w/peridoxical septal wall motion. Moderate left atrial enlargement. Mild MR. Mild TR. Calcified valves w/no obvious stenosis. No pericardial effusion.  DOPPLER ECHOCARDIOGRAPHY  4 14 2006    There appears to be significant improvement from previous echo w/complete resolution of pericardial effusion and normal LV function. EF of 60%.  DOPPLER ECHOCARDIOGRAPHY  3 21 2006    Normal LV function. Mild LV hypertrophy. Mild biatrial enlargement. Mildly calcific aortic and mitral valve w/no stenosis. Mild MR. Mild TR. Minimal residual pericardial effusion w/significant improvement from previous echo.  DOPPLER ECHOCARDIOGRAPHY  3 16 2006    Interval change from previous echocardiogram w/worsening of effusion. Correlation clinically. Consideration of pericardial centesis. Will [facility] a [facility] consult.      DOPPLER ECHOCARDIOGRAPHY  1 31 2006    No significant change from previous echo. The 2D echo showed moderate degree of pericardial effusion. It does seem to be worst or better than previous echo. No evidence of tamponade.  DOPPLER ECHOCARDIOGRAPHY  1 27 2006    Normal global LV function. Mild biatrial enlargement. Mildly sclerotic aortic & mitral valve w/no stenosis. Mild MR. Mild TR. Small to moderate pericardial effusion w/no obvious tamponade.      JOINT REPLACEMENT      MOHS SURGERY Right 2/13/2019    MOHS DEFECT REPAIR CYSTIC SCC RIGHT LOWER LATERAL LEG WITH SKIN GRAFT FROM RIGHT GROIN (FULL THICKNESS ) performed by Teo Campos MD at [facility] / [facility] Left 12/20/13    left leg lesion excision x2, frozen section, skin graft closure    ROTATOR CUFF REPAIR  09/2001    RIGHT    ROTATOR CUFF REPAIR  04/15/2009    LEFT     SKIN BIOPSY  07/2021    face    SKIN CANCER EXCISION  06/2006      Rt leg    SPINAL CORD STIMULATOR IMPLANTATION N/A 12/4/2018    PERMANENT INTERSTIM performed by Horacio Lara MD at [facility]        Medications :     Outpatient Medications Marked as Taking for the 10/15/21 encounter (Office Visit) with Pamela Goetz MD   Medication Sig Dispense Refill    diclofenac sodium (VOLTAREN) 1 % GEL Apply 4 g topically 4 times daily 90 each 0    polyethylene glycol (GLYCOLAX) 17 g packet Take 17 g by mouth 2 times daily 527 g 1    senna (SENOKOT) 8.6 MG tablet Take 1 tablet by mouth daily as needed for Constipation 60 tablet 0    sodium chloride 1 g tablet Take 2 tablets by mouth 3 times daily (with meals) 90 tablet 3    bumetanide (BUMEX) 1 MG tablet Take 0.5 mg by mouth every morning       lidocaine 4 % external patch Place 1 patch onto the skin daily Apply to both knees      meclizine (ANTIVERT) 25 MG tablet Take 25 mg by mouth 3 times daily      nitroGLYCERIN (NITROSTAT) 0.4 MG SL tablet Place 0.4 mg under the tongue every 5 minutes as needed for Chest pain up to max of 3 total doses. If no relief after 1 dose, call 911.  amLODIPine (NORVASC) 10 MG tablet Take 1 tablet by mouth daily 30 tablet 3    potassium chloride (KLOR-CON M) 10 MEQ extended release tablet Take 10 mEq by mouth 3 times daily      amantadine (SYMMETREL) 100 MG capsule Take 1 capsule by mouth daily 30 capsule 0    blood glucose test strips (CONTOUR TEST) strip USE TO TEST BLOOD SUGAR TWICE DAILY.  200 strip 3    lactobacillus (CULTURELLE) capsule Take 1 capsule by mouth 3 times daily (with meals) 20 capsule 0    psyllium (KONSYL) 28.3 % PACK Take 1 packet by mouth daily 30 each 1    pantoprazole (PROTONIX) 40 MG tablet Take 1 tablet by mouth 2 times daily (before meals) 30 tablet 1    sucralfate (CARAFATE) 1 GM tablet Take 1 tablet by mouth 4 times daily (before meals and nightly) Dissolve in 1 ounce of water to take as a slurry 120 tablet 1    ondansetron (ZOFRAN) 4 MG tablet Take 4 mg by mouth every 6 hours       Multiple Vitamin (MULTIVITAMIN) TABS tablet Take 1 tablet by mouth daily 90 tablet 3    Multiple Vitamins-Minerals (PRESERVISION AREDS 2) CAPS Take 2 capsules by mouth 2 times daily 360 capsule 3    ferrous sulfate (IRON 325) 325 (65 Fe) MG tablet Take 1 tablet by mouth 2 times daily 180 tablet 1    JANUVIA 100 MG tablet TAKE 1 TABLET BY MOUTH DAILY 90 tablet 2    spironolactone (ALDACTONE) 25 MG tablet Take 1 tablet by mouth daily 30 tablet 3    acetaminophen (TYLENOL 8 HOUR ARTHRITIS PAIN) 650 MG extended release tablet Take 650 mg by mouth 3 times daily      metFORMIN (GLUCOPHAGE-XR) 500 MG extended release tablet Take 1 tablet by mouth 2 times daily 180 tablet 3    lactase (LACTAID ULTRA) 9000 units TABS Take 9,000 Units by mouth 3 times daily (before meals)      magnesium oxide (MAG-OX) 400 MG tablet Take 1 tablet by mouth 3 times daily 30 tablet 3    isosorbide dinitrate (ISORDIL) 20 MG tablet Take 1 tablet by mouth 3 times daily 90 tablet 3    atorvastatin (LIPITOR) 20 MG tablet TAKE 1 TABLET DAILY (Patient taking differently: On hold for surgery) 90 tablet 1    carbidopa-levodopa (SINEMET)  MG per tablet Take 2 tablets by mouth 4 times daily 720 tablet 0    apixaban (ELIQUIS) 2.5 MG TABS tablet TAKE 1 TABLET BY MOUTH 2  TIMES DAILY, EQUIVALENT TO  APIXABAN 180 tablet 1    Cholecalciferol (VITAMIN D3 PO) Take 4,000 Units by mouth 2 times daily       Cranberry 500 MG CAPS Take 500 mg by mouth 2 times daily       aspirin 81 MG EC tablet Take 81 mg by mouth daily. Vitals     BP (!) 118/58 (Site: Right Upper Arm, Position: Sitting, Cuff Size: Medium Adult)   Pulse 65   Temp 97.2 °F (36.2 °C)   Wt 110 lb (49.9 kg)   SpO2 98%   BMI 20.12 kg/m²  Wt Readings from Last 3 Encounters:   10/15/21 110 lb (49.9 kg)   09/28/21 115 lb (52.2 kg)   09/20/21 111 lb (50.3 kg)        Physical Exam     General -- no distress  Lungs -- clear  Heart -- S1, S2 heard, JVD - no  Abdomen - soft, non-tender  Extremities -- no edema   CNS - awake and alert    Labs, Radiology and Tests    Labs -    6/20 12/20 10/21         Sodium 136 138 133         Potassium 4.1 4.6 11         BUN 11 25 0.7         Creatinine 0.7 0.8 79         eGFR 79 68                      UPCR            UMCR                          Renal Ultrasound Scan -- not done       Assessment    1. Renal -renal function appears stable at baseline  2. Hyponatremia-mostly was due to hypervolemic hyponatremia improved with diuretics and salt tabs  -Currently maintaining well. Continue salt tabs and low dose diuretic  -   3. Acute on chronic congestive heart failure currently reasonably well compensated continue current dose of diuretics and potassium. Give additional dses if needed  4.  Hx of diabetes mellitus  5. meds reviewed and D/W patient and daughter in detail    Tests and orders placed this Encounter     Orders Placed This Encounter   Procedures    Basic Metabolic Karla Montes M.D  [facility].

## 2021-12-09 ENCOUNTER — OFFICE VISIT (OUTPATIENT)
Dept: CARDIOLOGY CLINIC | Age: 86
End: 2021-12-09
Payer: MEDICARE

## 2021-12-09 VITALS
WEIGHT: 119 LBS | BODY MASS INDEX: 21.9 KG/M2 | OXYGEN SATURATION: 99 % | SYSTOLIC BLOOD PRESSURE: 134 MMHG | DIASTOLIC BLOOD PRESSURE: 69 MMHG | HEART RATE: 71 BPM | HEIGHT: 62 IN

## 2021-12-09 DIAGNOSIS — I25.10 CORONARY ARTERY DISEASE INVOLVING NATIVE CORONARY ARTERY OF NATIVE HEART WITHOUT ANGINA PECTORIS: ICD-10-CM

## 2021-12-09 DIAGNOSIS — I48.0 PAROXYSMAL ATRIAL FIBRILLATION (HCC): ICD-10-CM

## 2021-12-09 DIAGNOSIS — R06.02 SOB (SHORTNESS OF BREATH): Primary | ICD-10-CM

## 2021-12-09 DIAGNOSIS — Z95.1 HX OF CABG: ICD-10-CM

## 2021-12-09 DIAGNOSIS — I50.32 CHF (CONGESTIVE HEART FAILURE), NYHA CLASS III, CHRONIC, DIASTOLIC (HCC): ICD-10-CM

## 2021-12-09 DIAGNOSIS — I10 ESSENTIAL HYPERTENSION: ICD-10-CM

## 2021-12-09 DIAGNOSIS — E78.01 FAMILIAL HYPERCHOLESTEROLEMIA: ICD-10-CM

## 2021-12-09 DIAGNOSIS — I27.20 PULMONARY HYPERTENSION, MODERATE TO SEVERE (HCC): ICD-10-CM

## 2021-12-09 PROCEDURE — 1123F ACP DISCUSS/DSCN MKR DOCD: CPT | Performed by: NURSE PRACTITIONER

## 2021-12-09 PROCEDURE — 99214 OFFICE O/P EST MOD 30 MIN: CPT | Performed by: NURSE PRACTITIONER

## 2021-12-09 PROCEDURE — 1090F PRES/ABSN URINE INCON ASSESS: CPT | Performed by: NURSE PRACTITIONER

## 2021-12-09 PROCEDURE — G8420 CALC BMI NORM PARAMETERS: HCPCS | Performed by: NURSE PRACTITIONER

## 2021-12-09 PROCEDURE — G8427 DOCREV CUR MEDS BY ELIG CLIN: HCPCS | Performed by: NURSE PRACTITIONER

## 2021-12-09 PROCEDURE — G8484 FLU IMMUNIZE NO ADMIN: HCPCS | Performed by: NURSE PRACTITIONER

## 2021-12-09 PROCEDURE — 1036F TOBACCO NON-USER: CPT | Performed by: NURSE PRACTITIONER

## 2021-12-09 PROCEDURE — 4040F PNEUMOC VAC/ADMIN/RCVD: CPT | Performed by: NURSE PRACTITIONER

## 2021-12-09 RX ORDER — DAPAGLIFLOZIN 10 MG/1
10 TABLET, FILM COATED ORAL EVERY MORNING
COMMUNITY
Start: 2021-12-08

## 2021-12-09 RX ORDER — BUSPIRONE HYDROCHLORIDE 10 MG/1
10 TABLET ORAL 2 TIMES DAILY
COMMUNITY
Start: 2021-12-02

## 2021-12-09 RX ORDER — POLYETHYLENE GLYCOL 3350 17 G/17G
17 POWDER, FOR SOLUTION ORAL 2 TIMES DAILY
COMMUNITY

## 2021-12-09 NOTE — PROGRESS NOTES
[facility] PROFESSIONAL SERVICES  [facility] OF [address]    [address]   [address] [address]   Dept: [phone]   Dept Fax: [phone]   Loc: [phone]      Chief Complaint   Patient presents with    Follow-up    Edema    Shortness of Breath    Atrial Fibrillation    Coronary Artery Disease     F/U office visit for swelling in hands, BLE, chronic sob in frail, limited, 79 y/o female with history of CAD s/p CABG, acute on chronic HFpEF, Pulmonary HTN, home oxygen, PAFB on eliquis, type II second-degree AV block, LBBB, DM2, refused pacemaker as recommended in past. Son is with her today. She is DNR. She wants conservative medical treatment, basically comfort care. The son states this is how she has been since leaving the hospital in September and becomes anxious at time and had buspar started. She is being weighed daily and her wt is around 113 to 115. She has mild swelling of upper and lower extremities but no worse than her usual. She does not ambulate and is wheelchair/bed bound. Cardiologist:  Dr. Huang Postal:   No fever, no chills, No fatigue or weight loss/gain  Pulmonary:    chronic dyspnea on home O2, no wheezing  Cardiac:    Denies recent chest pain   GI:     No nausea or vomiting, no abdominal pain  Neuro:    No dizziness or light headedness  Musculoskeletal:  No recent active issues  Extremities:   Bilat upper and lower edema, good peripheral pulses      Past Medical History:   Diagnosis Date    Arthritis     Atypical chest pain     CAD (coronary artery disease)     Chronic LBBB (left bundle branch block)     Diabetes mellitus type II     Esophageal stricture     History of esophageal stricture.  FH: CAD (coronary artery disease)     Mom and dad both with heart disease at mid to late age.      GERD (gastroesophageal reflux disease)     History of gastritis     History of skin cancer     Hypercholesterolemia     Hyperlipidemia     Hypertension  Imbalance     As far as imbalance is concerned, asked patient to follow-up with Dr. César Rockwell since she follows with him on a regular basis.  Lactose intolerance     Nummular dermatitis     Parkinson's disease ([facility] [address] 2009    Restless legs syndrome (RLS)     S/P CABG (coronary artery bypass graft)     SCC (squamous cell carcinoma)        Allergies   Allergen Reactions    Latex Rash    Lisinopril Swelling     mouth    Tape [Adhesive Tape] Itching    Bacitracin Hives    Penicillins     Polymyxin B Hives    Keflex [Cephalexin] Nausea And Vomiting and Rash    Lactulose Diarrhea    Morphine Rash    Polysporin [Bacitracin-Polymyxin B] Rash       Current Outpatient Medications   Medication Sig Dispense Refill    busPIRone (BUSPAR) 10 MG tablet Take 10 mg by mouth 2 times daily       FARXIGA 10 MG tablet Take 10 mg by mouth every morning       OXYGEN Inhale into the lungs      polyethylene glycol (GLYCOLAX) 17 GM/SCOOP powder Take 17 g by mouth 2 times daily      diclofenac sodium (VOLTAREN) 1 % GEL Apply 4 g topically 4 times daily 90 each 0    senna (SENOKOT) 8.6 MG tablet Take 1 tablet by mouth daily as needed for Constipation 60 tablet 0    sodium chloride 1 g tablet Take 2 tablets by mouth 3 times daily (with meals) (Patient taking differently: Take 1 g by mouth 3 times daily (with meals) ) 90 tablet 3    bumetanide (BUMEX) 1 MG tablet Take 0.5 mg by mouth every morning       lidocaine 4 % external patch Place 1 patch onto the skin daily Apply to both knees      meclizine (ANTIVERT) 25 MG tablet Take 25 mg by mouth 3 times daily      nitroGLYCERIN (NITROSTAT) 0.4 MG SL tablet Place 0.4 mg under the tongue every 5 minutes as needed for Chest pain up to max of 3 total doses. If no relief after 1 dose, call 911.       amLODIPine (NORVASC) 10 MG tablet Take 1 tablet by mouth daily 30 tablet 3    potassium chloride (KLOR-CON M) 10 MEQ extended release tablet Take 10 mEq by mouth 3 times daily      amantadine (SYMMETREL) 100 MG capsule Take 1 capsule by mouth daily 30 capsule 0    blood glucose test strips (CONTOUR TEST) strip USE TO TEST BLOOD SUGAR TWICE DAILY. 200 strip 3    psyllium (KONSYL) 28.3 % PACK Take 1 packet by mouth daily 30 each 1    pantoprazole (PROTONIX) 40 MG tablet Take 1 tablet by mouth 2 times daily (before meals) 30 tablet 1    sucralfate (CARAFATE) 1 GM tablet Take 1 tablet by mouth 4 times daily (before meals and nightly) Dissolve in 1 ounce of water to take as a slurry 120 tablet 1    ondansetron (ZOFRAN) 4 MG tablet Take 4 mg by mouth every 6 hours       Multiple Vitamin (MULTIVITAMIN) TABS tablet Take 1 tablet by mouth daily 90 tablet 3    Multiple Vitamins-Minerals (PRESERVISION AREDS 2) CAPS Take 2 capsules by mouth 2 times daily 360 capsule 3    ferrous sulfate (IRON 325) 325 (65 Fe) MG tablet Take 1 tablet by mouth 2 times daily 180 tablet 1    JANUVIA 100 MG tablet TAKE 1 TABLET BY MOUTH DAILY 90 tablet 2    spironolactone (ALDACTONE) 25 MG tablet Take 1 tablet by mouth daily 30 tablet 3    acetaminophen (TYLENOL 8 HOUR ARTHRITIS PAIN) 650 MG extended release tablet Take 650 mg by mouth 3 times daily      metFORMIN (GLUCOPHAGE-XR) 500 MG extended release tablet Take 1 tablet by mouth 2 times daily 180 tablet 3    lactase (LACTAID ULTRA) 9000 units TABS Take 9,000 Units by mouth 3 times daily (before meals)      magnesium oxide (MAG-OX) 400 MG tablet Take 1 tablet by mouth 3 times daily 30 tablet 3    isosorbide dinitrate (ISORDIL) 20 MG tablet Take 1 tablet by mouth 3 times daily 90 tablet 3    atorvastatin (LIPITOR) 20 MG tablet TAKE 1 TABLET DAILY (Patient taking differently:  On hold for surgery) 90 tablet 1    carbidopa-levodopa (SINEMET)  MG per tablet Take 2 tablets by mouth 4 times daily 720 tablet 0    apixaban (ELIQUIS) 2.5 MG TABS tablet TAKE 1 TABLET BY MOUTH 2  TIMES DAILY, EQUIVALENT TO  APIXABAN 180 tablet 1    Cholecalciferol (VITAMIN D3 PO) Take 4,000 Units by mouth 2 times daily       Misc. Devices MISC 1 each by Does not apply route once for 1 dose Param Glucose Meter; Diagnosis:E11.65 1 Device 0    Cranberry 500 MG CAPS Take 500 mg by mouth 2 times daily       aspirin 81 MG EC tablet Take 81 mg by mouth daily. No current facility-administered medications for this visit. Social History     Socioeconomic History    Marital status:      Spouse name: None    Number of children: None    Years of education: None    Highest education level: None   Occupational History    None   Tobacco Use    Smoking status: Never Smoker    Smokeless tobacco: Never Used   Vaping Use    Vaping Use: Never used   Substance and Sexual Activity    Alcohol use: No    Drug use: No    Sexual activity: None   Other Topics Concern    None   Social History Narrative    None     Social Determinants of Health     Financial Resource Strain:     Difficulty of Paying Living Expenses: Not on file   Food Insecurity:     Worried About Running Out of Food in the Last Year: Not on file    Citlaly of Food in the Last Year: Not on file   Transportation Needs:     Lack of Transportation (Medical): Not on file    Lack of Transportation (Non-Medical):  Not on file   Physical Activity:     Days of Exercise per Week: Not on file    Minutes of Exercise per Session: Not on file   Stress:     Feeling of Stress : Not on file   Social Connections:     Frequency of Communication with Friends and Family: Not on file    Frequency of Social Gatherings with Friends and Family: Not on file    Attends Spiritism Services: Not on file    Active Member of Clubs or Organizations: Not on file    Attends Club or Organization Meetings: Not on file    Marital Status: Not on file   Intimate Partner Violence:     Fear of Current or Ex-Partner: Not on file    Emotionally Abused: Not on file    Physically Abused: Not on file    Sexually Abused: Not on file   Housing Stability:     Unable to Pay for Housing in the Last Year: Not on file    Number of Places Lived in the Last Year: Not on file    Unstable Housing in the Last Year: Not on file       Family History   Problem Relation Age of Onset    Heart Disease Mother     Diabetes Mother     Stroke Father     Diabetes Sister     Lung Cancer Brother        Blood pressure 134/69, pulse 71, height 5' 2\" (1.575 m), weight 119 lb (54 kg), SpO2 99 %. General:   Well developed, well nourished  Lungs:   Clear to auscultation  Heart:    Normal S1 S2, No murmur, rubs, or gallops  Abdomen:   Soft, non tender, no organomegalies, positive bowel sounds  Extremities:   No edema, no cyanosis, good peripheral pulses  Neurological:   Awake, alert, oriented. No obvious focal deficits  Musculoskeletal:  No obvious deformities    EK21  Sinus rhythm with 1st degree A-V block  Left axis deviation  Left bundle branch block  Abnormal ECG  When compared with ECG of 11-SEP-2021 10:24,  Previous ECG has undetermined rhythm, needs review  Confirmed by Naomia Floor     Echo: 21  Summary   Ejection fraction was estimated at 55-60%. Left atrial size was severely dilated. The aortic valve was trileaflet with increased thickness and reduced   cuspal separation. DOPPLER: Transaortic velocity was 2.3 m/s, mean   gradient 13 mmHg, MARVIN 1.3 cm2. There is mild-moderate aortic stenosis. There was no evidence of aortic regurgitation. There was mild mitral regurgitation. There was mild tricuspid regurgitation. Assuming RAP = 20 mmHg, the estimated RVSP = 75 mmHg. IVC size is severely dilated with reduced respiratory phasic changes   (CVP~15-20 mmHg). Large, left pleural effusion. Signature      ----------------------------------------------------------------   Electronically signed by Cyndie Crawford MD          Diagnosis Orders   1. SOB (shortness of breath)     2.  CHF (congestive heart failure), NYHA class III, chronic, diastolic (HCC)     3. Paroxysmal atrial fibrillation ([address]     4. Essential hypertension     5. Coronary artery disease involving native coronary artery of native heart without angina pectoris     6. Familial hypercholesterolemia     7. Hx of CABG     8. Pulmonary hypertension, moderate to severe ([address]         No orders of the defined types were placed in this encounter. F/U office visit for swelling in hands, BLE, chronic sob in frail, limited, 79 y/o female  History of CAD s/p CABG, acute on chronic HFpEF, Pulmonary HTN, home oxygen, PAFB on eliquis, type II second-degree AV block, LBBB, DM2, refused pacemaker as recommended in past.   Son is with her today. She is DNR. She wants conservative medical treatment, basically comfort care. The son states this is how she has been since leaving the hospital in September and becomes anxious at time and had buspar started. She is being weighed daily and her wt is around 113 to 115. Her pulse ox readings are above 90. She has mild swelling of upper and lower extremities but no worse than her usual. She does not ambulate and is wheelchair/bed bound. Denies chest pain, palpitations, lightheadedness, dizziness or syncope. No crackles heard in lungs. She follows fluid and sodium restriction. Increase bumex to 1 mg daily for 3 days, She may need prn doses of extra diuretic. If diuretic increased consistently, will need to follow lytes. Has had hyponatremia in past and followed with Dr. Asif Caicedo who documented mostly due to hypervolemic hyponatremia  Offered chest x-ray but declined at present and will have ECF get if she decides to do. Discussed options including seeking emergency medical care if symptoms persists or worsens after increasing bumex. Discussed palliative or hospice care option in light of multiple co-morbidities, advanced age, limited treatment options, and request for conservative treatment and DNR status.    Jose Miguel Gross and her son verbalize understanding and are going to discuss options with other family members. She is encouraged to call office with further questions or needs. On asa, bumex, potassium, aldactone, isordil, norvasc, eliquis, lipitor, SL NTG prn     Discussed use, benefit, and side effects of prescribed medications. Reports compliance and denies side effects with. All patient questions answered. Pt voiced understanding. Instructed to continue current medications, diet and exercise. Continue risk factor modification and medical management. Patient agreed with treatment plan. Follow up as directed.     Continue Dr Jorge Luis Jiménez current treatment plan  Follow up with  [facility] as scheduled or sooner if needed

## 2022-01-05 ENCOUNTER — TELEPHONE (OUTPATIENT)
Dept: CARDIOLOGY CLINIC | Age: 87
End: 2022-01-05

## 2022-01-05 NOTE — TELEPHONE ENCOUNTER
FYI:   Nurse, Debbie Shah, from [facility] called to cancel appt in CHF clinic and Dr Dillon Glasgow. Patient is currently hospice. appts cancelled.

## 2022-06-29 NOTE — ED TRIAGE NOTES
Pt presents to ED via EMS from [facility] c/c intermittent sob and chest pain that started yesterday. Per pt the chest pain comes after she starts to feel sob. Pt was given 3 nitro at [facility] with no relief and 4 baby ASA en route by EMS. Pt rates pain 3/4 and described as burning. Per EMS pt was 91% on room air and EMS placed on 2L/NC. Pt is A&O x 4.  Pt is [facility] Azithromycin Pregnancy And Lactation Text: This medication is considered safe during pregnancy and is also secreted in breast milk.

## 2022-06-30 NOTE — ED TRIAGE NOTES
Pt presents to the ED with complaints of SOB that started today. Pt states she has missed three doses of lasix. Per Karen Mask pt states 90% on room air. Pt has +2 bilateral pitting edema. [Negative] : Gastrointestinal

## 2025-02-14 NOTE — PROGRESS NOTES
Infusions:   sodium chloride 75 mL/hr at 10/22/18 0650    dextrose         I & O's:  I/O last 3 completed shifts: In: 420 [P.O.:420]  Out: 3250 [Urine:3250]  I/O this shift: In: 420 [P.O.:420]  Out: -     Intake/Output Summary (Last 24 hours) at 10/23/18 1210  Last data filed at 10/23/18 0800   Gross per 24 hour   Intake              420 ml   Output             3250 ml   Net            -2830 ml       Date 10/23/18 0000 - 10/23/18 2359   Shift 6021-5204 4783-7892 3581-2040 24 Hour Total   I  N  T  A  K  E   P.O.  (mL/kg/hr)  420  420    Shift Total  (mL/kg)  420  (6.1)  420  (6.1)   O  U  T  P  U  T   Urine  (mL/kg/hr) 2000  (3.6)   2000    Shift Total  (mL/kg) 2000  (29.2)   2000  (29.2)   Weight (kg) 68.6 68.6 68.6 68.6           CBC:   Recent Labs      10/21/18   0637   WBC  10.8   HGB  10.8*   PLT  321     BMP:    Recent Labs      10/21/18   0637  10/22/18   0549  10/23/18   0559   NA  135  136  138   K  3.6  4.2  4.2  3.7   CL  97*  97*  96*   CO2  25  29  31   BUN  9  7  8   CREATININE  0.6  0.6  0.6   GLUCOSE  273*  191*  190*     Hepatic:   Recent Labs      10/22/18   0549   AST  37   ALT  15   BILITOT  0.5   ALKPHOS  96     BNP: No results for input(s): BNP in the last 72 hours. URINALYSIS:    Results for Siri Cabrera (MRN [id number]) as of 10/16/2018 17:17   Ref.  Range 10/14/2018 15:49   Color, UA Latest Ref Range: STRAW-YELL  YELLOW   Glucose, UA Latest Ref Range: NEGATIVE mg/dl NEGATIVE   Bilirubin, Urine Latest Ref Range: NEGATIVE  NEGATIVE   Ketones, Urine Latest Ref Range: NEGATIVE  TRACE (A)   Blood, Urine Latest Ref Range: NEGATIVE  MODERATE (A)   pH, UA Latest Ref Range: 5.0 - 9.0  5.5   Protein, UA Latest Ref Range: NEGATIVE  100 (A)   Urobilinogen, Urine Latest Ref Range: 0.0 - 1.0 eu/dl 0.2   Nitrite, Urine Latest Ref Range: NEGATIVE  POSITIVE (A)   Leukocyte Esterase, Urine Latest Ref Range: NEGATIVE  LARGE (A)   Casts UA Latest Ref Range: NONE SEEN /lpf NONE SEEN   CASTS 2 Latest Ref on the discharge service for the patient. I have reviewed and made amendments to the documentation where necessary.

## 2025-06-18 NOTE — TELEPHONE ENCOUNTER
Orly notified and verbalized understanding The recording was initiated after a verbal consent was obtained from the patient to record this visit for documentation in their clinical record.

## (undated) DEVICE — PACK PROCEDURE SURG PLAS SC MIN SRHP LF

## (undated) DEVICE — GLOVE SURG SZ 8 L11.77IN FNGR THK9.8MIL STRW LTX POLYMER

## (undated) DEVICE — C-ARMOR C-ARM EQUIPMENT COVERS CLEAR STERILE UNIVERSAL FIT 12 PER CASE: Brand: C-ARMOR

## (undated) DEVICE — GOWN,SIRUS,NON REINFRCD,LARGE,SET IN SL: Brand: MEDLINE

## (undated) DEVICE — PATIENT RETURN ELECTRODE, SINGLE-USE, CONTACT QUALITY MONITORING, ADULT, WITH 9FT CORD, FOR PATIENTS WEIGING OVER 33LBS. (15KG): Brand: MEGADYNE

## (undated) DEVICE — BANDAGE COMPR M W4INXL10YD WHT BGE VELC E MTRX HK AND LOOP

## (undated) DEVICE — SKIN AFFIX SURG ADHESIVE 72/CS 0.55ML: Brand: MEDLINE

## (undated) DEVICE — DRAPE C ARM W36XL30IN RECTANG BND BG AND TAPE

## (undated) DEVICE — SPONGE GZ W4XL4IN COT 12 PLY TYP VII WVN C FLD DSGN

## (undated) DEVICE — BREAST HERNIA PACK: Brand: MEDLINE INDUSTRIES, INC.

## (undated) DEVICE — GLOVE ORANGE PI 7   MSG9070

## (undated) DEVICE — BANDAGE ADH W2XL4IN NITRL FAB STRP CURAD

## (undated) DEVICE — CHLORAPREP 26ML CLEAR

## (undated) DEVICE — SUTURE VCRL SZ 2-0 L27IN ABSRB UD L26MM SH 1/2 CIR J417H

## (undated) DEVICE — SYRINGE MED 10ML LUERLOCK TIP W/O SFTY DISP

## (undated) DEVICE — TUBING, SUCTION, 1/4" X 20', STRAIGHT: Brand: MEDLINE INDUSTRIES, INC.

## (undated) DEVICE — INTRODUCER NEUROSTIMULATOR LD FOR URIN CTRL INTERSTIM

## (undated) DEVICE — SOLUTION SURG PREP POV IOD 7.5% 4 OZ

## (undated) DEVICE — NEUROSTIMULATOR EXT SM LTWT SGL BTTN H2O RESIST WIRELESS

## (undated) DEVICE — HEAD POSITIONER, SURGICAL: Brand: DEROYAL

## (undated) DEVICE — YANKAUER,BULB TIP,W/O VENT,RIGID,STERILE: Brand: MEDLINE

## (undated) DEVICE — BANDAGE ADH W1XL3IN NAT FAB WVN FLX DURABLE N ADH PD SEAL

## (undated) DEVICE — BANDAGE,GAUZE,4.5"X4.1YD,STERILE,LF: Brand: MEDLINE

## (undated) DEVICE — GLOVE SURG SZ 65 THK91MIL LTX FREE SYN POLYISOPRENE

## (undated) DEVICE — PREP SOL PVP IODINE 4%  4 OZ/BTL

## (undated) DEVICE — DRESSING TRNSPAR W5XL4.5IN FLM SHT SEMIPERMEABLE WIND

## (undated) DEVICE — 3M™ WARMING BLANKET, UPPER BODY, 10 PER CASE, 42268: Brand: BAIR HUGGER™

## (undated) DEVICE — COVER ARMBRD W13XL28.5IN IMPERV BLU FOR OP RM

## (undated) DEVICE — TOWEL,OR,DSP,ST,BLUE,DLX,4/PK,20PK/CS: Brand: MEDLINE

## (undated) DEVICE — CABLE NEUROSTIMULATOR TST STIM INTERSTIM

## (undated) DEVICE — TAPE,CLOTH/SILK,CURAD,3"X10YD,LF,40/CS: Brand: CURAD

## (undated) DEVICE — Z DISCONTINUED BY MEDLINE USE 2711682 TRAY SKIN PREP DRY W/ PREM GLV

## (undated) DEVICE — 3M™ STERI-STRIP™ COMPOUND BENZOIN TINCTURE 40 BAGS/CARTON 4 CARTONS/CASE C1544: Brand: 3M™ STERI-STRIP™

## (undated) DEVICE — PROGRAMMER NEUROSTIMULATOR PT FOR URIN CTRL INTERSTIM ICON